# Patient Record
Sex: MALE | Race: WHITE | Employment: OTHER | URBAN - METROPOLITAN AREA
[De-identification: names, ages, dates, MRNs, and addresses within clinical notes are randomized per-mention and may not be internally consistent; named-entity substitution may affect disease eponyms.]

---

## 2019-03-27 ENCOUNTER — HOSPITAL ENCOUNTER (INPATIENT)
Age: 79
LOS: 7 days | Discharge: HOME | DRG: 872 | End: 2019-04-03
Attending: EMERGENCY MEDICINE | Admitting: INTERNAL MEDICINE
Payer: MEDICARE

## 2019-03-27 ENCOUNTER — APPOINTMENT (OUTPATIENT)
Dept: CT IMAGING | Age: 79
DRG: 872 | End: 2019-03-27
Payer: MEDICARE

## 2019-03-27 ENCOUNTER — APPOINTMENT (OUTPATIENT)
Dept: GENERAL RADIOLOGY | Age: 79
DRG: 872 | End: 2019-03-27
Payer: MEDICARE

## 2019-03-27 ENCOUNTER — APPOINTMENT (OUTPATIENT)
Dept: ULTRASOUND IMAGING | Age: 79
DRG: 872 | End: 2019-03-27
Payer: MEDICARE

## 2019-03-27 DIAGNOSIS — R41.82 ALTERED MENTAL STATUS, UNSPECIFIED ALTERED MENTAL STATUS TYPE: ICD-10-CM

## 2019-03-27 DIAGNOSIS — L03.90 CELLULITIS, UNSPECIFIED CELLULITIS SITE: ICD-10-CM

## 2019-03-27 DIAGNOSIS — R50.9 FEVER, UNSPECIFIED FEVER CAUSE: Primary | ICD-10-CM

## 2019-03-27 PROBLEM — L03.119 CELLULITIS OF LOWER LEG: Status: ACTIVE | Noted: 2019-03-27

## 2019-03-27 PROBLEM — L03.119 CELLULITIS OF LEG: Status: ACTIVE | Noted: 2019-03-27

## 2019-03-27 LAB
ALBUMIN SERPL-MCNC: 3.7 GM/DL (ref 3.4–5)
ALP BLD-CCNC: 42 IU/L (ref 40–129)
ALT SERPL-CCNC: 26 U/L (ref 10–40)
ANION GAP SERPL CALCULATED.3IONS-SCNC: 8 MMOL/L (ref 4–16)
AST SERPL-CCNC: 43 IU/L (ref 15–37)
BACTERIA: NEGATIVE /HPF
BASOPHILS ABSOLUTE: 0 K/CU MM
BASOPHILS RELATIVE PERCENT: 0.6 % (ref 0–1)
BILIRUB SERPL-MCNC: 0.5 MG/DL (ref 0–1)
BILIRUBIN URINE: NEGATIVE MG/DL
BLOOD, URINE: ABNORMAL
BUN BLDV-MCNC: 24 MG/DL (ref 6–23)
CALCIUM SERPL-MCNC: 8.6 MG/DL (ref 8.3–10.6)
CHLORIDE BLD-SCNC: 99 MMOL/L (ref 99–110)
CHP ED QC CHECK: NORMAL
CLARITY: CLEAR
CO2: 28 MMOL/L (ref 21–32)
COLOR: YELLOW
CREAT SERPL-MCNC: 1.3 MG/DL (ref 0.9–1.3)
DIFFERENTIAL TYPE: ABNORMAL
EOSINOPHILS ABSOLUTE: 0.1 K/CU MM
EOSINOPHILS RELATIVE PERCENT: 1 % (ref 0–3)
GFR AFRICAN AMERICAN: >60 ML/MIN/1.73M2
GFR NON-AFRICAN AMERICAN: 54 ML/MIN/1.73M2
GLUCOSE BLD-MCNC: 200 MG/DL
GLUCOSE BLD-MCNC: 200 MG/DL (ref 70–99)
GLUCOSE BLD-MCNC: 218 MG/DL (ref 70–99)
GLUCOSE, URINE: >500 MG/DL
HCT VFR BLD CALC: 43.9 % (ref 42–52)
HEMOGLOBIN: 13.7 GM/DL (ref 13.5–18)
IMMATURE NEUTROPHIL %: 1 % (ref 0–0.43)
KETONES, URINE: NEGATIVE MG/DL
LACTATE: 1.4 MMOL/L (ref 0.4–2)
LACTIC ACID, SEPSIS: 1.4 MMOL/L (ref 0.5–1.9)
LEUKOCYTE ESTERASE, URINE: NEGATIVE
LYMPHOCYTES ABSOLUTE: 0.7 K/CU MM
LYMPHOCYTES RELATIVE PERCENT: 10.8 % (ref 24–44)
MCH RBC QN AUTO: 29.5 PG (ref 27–31)
MCHC RBC AUTO-ENTMCNC: 31.2 % (ref 32–36)
MCV RBC AUTO: 94.4 FL (ref 78–100)
MONOCYTES ABSOLUTE: 0.4 K/CU MM
MONOCYTES RELATIVE PERCENT: 6.1 % (ref 0–4)
NITRITE URINE, QUANTITATIVE: NEGATIVE
NUCLEATED RBC %: 0 %
PDW BLD-RTO: 14 % (ref 11.7–14.9)
PH, URINE: 6 (ref 5–8)
PLATELET # BLD: 145 K/CU MM (ref 140–440)
PMV BLD AUTO: 10.5 FL (ref 7.5–11.1)
POTASSIUM SERPL-SCNC: 5.1 MMOL/L (ref 3.5–5.1)
PRO-BNP: 604.4 PG/ML
PROTEIN UA: 30 MG/DL
RAPID INFLUENZA  B AGN: NEGATIVE
RAPID INFLUENZA A AGN: NEGATIVE
RBC # BLD: 4.65 M/CU MM (ref 4.6–6.2)
RBC URINE: <1 /HPF (ref 0–3)
SEGMENTED NEUTROPHILS ABSOLUTE COUNT: 5.5 K/CU MM
SEGMENTED NEUTROPHILS RELATIVE PERCENT: 80.5 % (ref 36–66)
SODIUM BLD-SCNC: 135 MMOL/L (ref 135–145)
SPECIFIC GRAVITY UA: 1.02 (ref 1–1.03)
TOTAL IMMATURE NEUTOROPHIL: 0.07 K/CU MM
TOTAL NUCLEATED RBC: 0 K/CU MM
TOTAL PROTEIN: 6.9 GM/DL (ref 6.4–8.2)
TRICHOMONAS: ABNORMAL /HPF
TROPONIN T: <0.01 NG/ML
UROBILINOGEN, URINE: NORMAL MG/DL (ref 0.2–1)
WBC # BLD: 6.9 K/CU MM (ref 4–10.5)
WBC UA: <1 /HPF (ref 0–2)

## 2019-03-27 PROCEDURE — 6370000000 HC RX 637 (ALT 250 FOR IP): Performed by: EMERGENCY MEDICINE

## 2019-03-27 PROCEDURE — 36415 COLL VENOUS BLD VENIPUNCTURE: CPT

## 2019-03-27 PROCEDURE — 80053 COMPREHEN METABOLIC PANEL: CPT

## 2019-03-27 PROCEDURE — 96375 TX/PRO/DX INJ NEW DRUG ADDON: CPT

## 2019-03-27 PROCEDURE — 93971 EXTREMITY STUDY: CPT

## 2019-03-27 PROCEDURE — 87804 INFLUENZA ASSAY W/OPTIC: CPT

## 2019-03-27 PROCEDURE — 1200000000 HC SEMI PRIVATE

## 2019-03-27 PROCEDURE — 81001 URINALYSIS AUTO W/SCOPE: CPT

## 2019-03-27 PROCEDURE — 85025 COMPLETE CBC W/AUTO DIFF WBC: CPT

## 2019-03-27 PROCEDURE — 96365 THER/PROPH/DIAG IV INF INIT: CPT

## 2019-03-27 PROCEDURE — 70450 CT HEAD/BRAIN W/O DYE: CPT

## 2019-03-27 PROCEDURE — 82962 GLUCOSE BLOOD TEST: CPT

## 2019-03-27 PROCEDURE — 2580000003 HC RX 258: Performed by: PHYSICIAN ASSISTANT

## 2019-03-27 PROCEDURE — 83880 ASSAY OF NATRIURETIC PEPTIDE: CPT

## 2019-03-27 PROCEDURE — 87040 BLOOD CULTURE FOR BACTERIA: CPT

## 2019-03-27 PROCEDURE — 6360000002 HC RX W HCPCS: Performed by: PHYSICIAN ASSISTANT

## 2019-03-27 PROCEDURE — 99285 EMERGENCY DEPT VISIT HI MDM: CPT

## 2019-03-27 PROCEDURE — 84484 ASSAY OF TROPONIN QUANT: CPT

## 2019-03-27 PROCEDURE — 96366 THER/PROPH/DIAG IV INF ADDON: CPT

## 2019-03-27 PROCEDURE — 93005 ELECTROCARDIOGRAM TRACING: CPT | Performed by: PHYSICIAN ASSISTANT

## 2019-03-27 PROCEDURE — 83605 ASSAY OF LACTIC ACID: CPT

## 2019-03-27 PROCEDURE — 96367 TX/PROPH/DG ADDL SEQ IV INF: CPT

## 2019-03-27 PROCEDURE — 71045 X-RAY EXAM CHEST 1 VIEW: CPT

## 2019-03-27 RX ORDER — MONTELUKAST SODIUM 4 MG/1
1 TABLET, CHEWABLE ORAL 3 TIMES DAILY
Status: ON HOLD | COMMUNITY
End: 2020-10-01 | Stop reason: HOSPADM

## 2019-03-27 RX ORDER — KETOROLAC TROMETHAMINE 30 MG/ML
15 INJECTION, SOLUTION INTRAMUSCULAR; INTRAVENOUS ONCE
Status: COMPLETED | OUTPATIENT
Start: 2019-03-27 | End: 2019-03-27

## 2019-03-27 RX ORDER — OXYCODONE HYDROCHLORIDE AND ACETAMINOPHEN 5; 325 MG/1; MG/1
1.5 TABLET ORAL ONCE
Status: COMPLETED | OUTPATIENT
Start: 2019-03-27 | End: 2019-03-27

## 2019-03-27 RX ORDER — SODIUM CHLORIDE 0.9 % (FLUSH) 0.9 %
10 SYRINGE (ML) INJECTION PRN
Status: DISCONTINUED | OUTPATIENT
Start: 2019-03-27 | End: 2019-04-03 | Stop reason: HOSPADM

## 2019-03-27 RX ORDER — RANOLAZINE 500 MG/1
500 TABLET, EXTENDED RELEASE ORAL 2 TIMES DAILY
Status: ON HOLD | COMMUNITY
End: 2020-08-09 | Stop reason: ALTCHOICE

## 2019-03-27 RX ORDER — LOSARTAN POTASSIUM 100 MG/1
100 TABLET ORAL DAILY
Status: ON HOLD | COMMUNITY
End: 2020-10-01 | Stop reason: HOSPADM

## 2019-03-27 RX ORDER — ACETAMINOPHEN 80 MG
TABLET,CHEWABLE ORAL
Status: COMPLETED
Start: 2019-03-27 | End: 2019-03-27

## 2019-03-27 RX ORDER — 0.9 % SODIUM CHLORIDE 0.9 %
1000 INTRAVENOUS SOLUTION INTRAVENOUS ONCE
Status: COMPLETED | OUTPATIENT
Start: 2019-03-27 | End: 2019-03-27

## 2019-03-27 RX ORDER — SODIUM CHLORIDE 0.9 % (FLUSH) 0.9 %
10 SYRINGE (ML) INJECTION EVERY 12 HOURS SCHEDULED
Status: DISCONTINUED | OUTPATIENT
Start: 2019-03-27 | End: 2019-04-03 | Stop reason: HOSPADM

## 2019-03-27 RX ORDER — CEPHALEXIN 500 MG/1
500 CAPSULE ORAL 3 TIMES DAILY
Status: ON HOLD | COMMUNITY
Start: 2019-03-27 | End: 2019-04-03 | Stop reason: HOSPADM

## 2019-03-27 RX ADMIN — OXYCODONE AND ACETAMINOPHEN 1.5 TABLET: 5; 325 TABLET ORAL at 21:39

## 2019-03-27 RX ADMIN — SODIUM CHLORIDE 1000 ML: 9 INJECTION, SOLUTION INTRAVENOUS at 18:16

## 2019-03-27 RX ADMIN — GABAPENTIN 400 MG: 300 CAPSULE ORAL at 21:39

## 2019-03-27 RX ADMIN — Medication 10 ML: at 22:01

## 2019-03-27 RX ADMIN — TAZOBACTAM SODIUM AND PIPERACILLIN SODIUM 3.38 G: 375; 3 INJECTION, SOLUTION INTRAVENOUS at 18:17

## 2019-03-27 RX ADMIN — Medication: at 22:00

## 2019-03-27 RX ADMIN — VANCOMYCIN HYDROCHLORIDE 1500 MG: 5 INJECTION, POWDER, LYOPHILIZED, FOR SOLUTION INTRAVENOUS at 19:11

## 2019-03-27 RX ADMIN — KETOROLAC TROMETHAMINE 15 MG: 30 INJECTION, SOLUTION INTRAMUSCULAR; INTRAVENOUS at 18:17

## 2019-03-27 ASSESSMENT — PAIN SCALES - GENERAL
PAINLEVEL_OUTOF10: 5
PAINLEVEL_OUTOF10: 8
PAINLEVEL_OUTOF10: 8

## 2019-03-27 NOTE — ED TRIAGE NOTES
Patient presented to the ED with complaints of altered mental status. Patient recently started keflex and has had a decrease in mental status ever since. Patient is arrousable to pain.  No family at bedside

## 2019-03-27 NOTE — ED PROVIDER NOTES
eMERGENCY dEPARTMENT eNCOUnter      PCP: Ursula De La Cruz MD    CHIEF COMPLAINT    Chief Complaint   Patient presents with    Altered Mental Status    Fever       HPI    Zeb Draper is a 69 y.o. male who presents with family with AMS. Patient lives with his daughter. She states that patient went to an urgent care at around 10 this morning complaining of leg pain. He was diagnosed with a cellulitis and started on Keflex. She states that he took first dose of Keflex, lied down at home around noon. She states around 4 patient was difficult to arouse, seemed confused and had difficulty ambulating. Symptoms have been consistent since that time. Apart from starting Keflex, no other new medications. No recent infectious symptoms according to family including cough, congestion, abdominal pain, nausea, vomiting or diarrhea. Patient is arousable to voice during my history and exam, however is not reliable historian. REVIEW OF SYSTEMS  Per family  General: No known fevers or chills  ENT: No obvious visual deficits. No sore throat, ear pain  Cardiac: No chest pain or palpitations  Respiratory: No shortness of breath or new cough  : No dysuria or hematuria  GI: No vomiting or diarrhea  Neuro: See HPI  Lymphatics: No swollen lymph nodes or streaks    See HPI for further details. All other review of systems are negative  See HPI and nursing notes for additional information    PAST MEDICAL OR SURGICAL HISTORY    Past Medical History:   Diagnosis Date    Arthritis     Diabetes mellitus ([address]     GERD (gastroesophageal reflux disease)     Gout     left ankle and right shoulder    Hypertension     MI (myocardial infarction) [facility])     May 2013       CURRENT MEDICATIONS    Current Outpatient Rx   Medication Sig Dispense Refill    aspirin 81 MG tablet Take 81 mg by mouth daily.  atorvastatin (LIPITOR) 20 MG tablet Take 20 mg by mouth daily.  clopidogrel (PLAVIX) 75 MG tablet Take 75 mg by mouth daily.       normal bowel sounds, nontender  Musculoskeletal:  No edema, no acute deformities   Integument:  Skin is warm and dry, no obvious rash    Swelling, tenderness and area of indurated macular erythema noted to lateral aspect of left lower leg  Vascular: Radial and DP pulses 2+ equal bilaterally  Neurologic:    - Arousable by voice  - Resting tremor of upper extremities bilaterally  - No obvious gross motor deficits  - Sensation intact to light touch  - Strength 5/5 in upper extremities bilaterally  - Strength 3/5 in lower extremities bilaterally  - Follows commands        LABS:  Results for orders placed or performed during the hospital encounter of 03/27/19   Rapid Flu Swab   Result Value Ref Range    Rapid Influenza A Ag NEGATIVE NEGATIVE    Rapid Influenza B Ag NEGATIVE NEGATIVE   CBC auto diff   Result Value Ref Range    WBC 6.9 4.0 - 10.5 K/CU MM    RBC 4.65 4.6 - 6.2 M/CU MM    Hemoglobin 13.7 13.5 - 18.0 GM/DL    Hematocrit 43.9 42 - 52 %    MCV 94.4 78 - 100 FL    MCH 29.5 27 - 31 PG    MCHC 31.2 (L) 32.0 - 36.0 %    RDW 14.0 11.7 - 14.9 %    Platelets 724 209 - 160 K/CU MM    MPV 10.5 7.5 - 11.1 FL    Differential Type AUTOMATED DIFFERENTIAL     Segs Relative 80.5 (H) 36 - 66 %    Lymphocytes % 10.8 (L) 24 - 44 %    Monocytes % 6.1 (H) 0 - 4 %    Eosinophils % 1.0 0 - 3 %    Basophils % 0.6 0 - 1 %    Segs Absolute 5.5 K/CU MM    Lymphocytes # 0.7 K/CU MM    Monocytes # 0.4 K/CU MM    Eosinophils # 0.1 K/CU MM    Basophils # 0.0 K/CU MM    Nucleated RBC % 0.0 %    Total Nucleated RBC 0.0 K/CU MM    Total Immature Neutrophil 0.07 K/CU MM    Immature Neutrophil % 1.0 (H) 0 - 0.43 %   CMP   Result Value Ref Range    Sodium 135 135 - 145 MMOL/L    Potassium 5.1 3.5 - 5.1 MMOL/L    Chloride 99 99 - 110 mMol/L    CO2 28 21 - 32 MMOL/L    BUN 24 (H) 6 - 23 MG/DL    CREATININE 1.3 0.9 - 1.3 MG/DL    Glucose 218 (H) 70 - 99 MG/DL    Calcium 8.6 8.3 - 10.6 MG/DL    Alb 3.7 3.4 - 5.0 GM/DL    Total Protein 6.9 6.4 - 8.2 GM/DL    Total Bilirubin 0.5 0.0 - 1.0 MG/DL    ALT 26 10 - 40 U/L    AST 43 (H) 15 - 37 IU/L    Alkaline Phosphatase 42 40 - 129 IU/L    GFR Non- 54 (L) >60 mL/min/1.73m2    GFR African American >60 >60 mL/min/1.73m2    Anion Gap 8 4 - 16   Troponin   Result Value Ref Range    Troponin T <0.010 <0.01 NG/ML   Lactic Acid, Plasma   Result Value Ref Range    Lactate 1.4 0.4 - 2.0 mMOL/L   Brain Natriuretic Peptide   Result Value Ref Range    Pro-.4 (H) <300 PG/ML   POCT Glucose   Result Value Ref Range    Glucose 200 mg/dL    QC OK? Jock Reading PA    POCT Glucose   Result Value Ref Range    POC Glucose 200 (H) 70 - 99 MG/DL   EKG 12 Lead   Result Value Ref Range    Ventricular Rate 93 BPM    Atrial Rate 93 BPM    P-R Interval 206 ms    QRS Duration 96 ms    Q-T Interval 366 ms    QTc Calculation (Bazett) 455 ms    P Axis 88 degrees    R Axis 4 degrees    T Axis 60 degrees    Diagnosis       Normal sinus rhythm  Lateral infarct , age undetermined  Abnormal ECG  When compared with ECG of 06-JAN-2017 12:48,  Incomplete right bundle branch block is no longer present  Lateral infarct is now present           EKG Interpretation  Please see ED physician's note for EKG interpretation      RADIOLOGY/PROCEDURES    CT Head WO Contrast   Final Result   No acute intracranial abnormality. XR CHEST PORTABLE   Final Result   Cardiomegaly. Findings suggesting mild congestive heart failure. VL DUP LOWER EXTREMITY VENOUS LEFT    (Results Pending)           ED COURSE & MEDICAL DECISION MAKING       Vital signs and nursing notes reviewed during ED course. Patient care and presentation staffed with supervising MD.   Patient seen by supervising MD today- see his/her note for details of the encounter. Patient presents as above. On arrival, patient is febrile with temperature of 103. He is hypertensive, not tachycardic, oxygenating at 98% on room air.    As he is afebrile with possible cellulitic

## 2019-03-27 NOTE — ED PROVIDER NOTES
I independently examined and evaluated Araceli Baumgarten. In brief their history revealed cellulitis, altered mental status. He states that he developed some redness, swelling to the left lower leg yesterday, and then went and started Keflex today. He was told with cellulitis. Family states that this afternoon he seemed confused and was altered. Medics arrived and stated his blood pressure was low. He is complaining of pain in the left lower leg. Complains of fever. Denies new cough, shortness of breath. Family states he has intermittently complained of chest pain over the last few days. He states he took nitro and it resolved. Their focused exam revealed awake, alert, well-hydrated, well-nourished, and in no acute distress. Mucous membranes are moist. Speech is clear. Breathing is unlabored. Skin is dry. Mental status is normal. The patient has moves all extremities, and is without facial droop. There is erythema, induration of the left anterior lower leg. No crepitance noted. Pain is not out of proportion. ED course: 81-year-old male who presented with altered mental status, cellulitis. On arrival he was febrile at 103. White blood cell count is normal, urinalysis shows no sign of infection, lactate was normal.  Was given antibiotics. Ultrasound negative for DVT, head CT normal, chest x-ray showed cardiomegaly and findings of congestive heart failure. BNP is within normal range for the patient's age. Patient appears to have history of diastolic dysfunction on ultrasound. Appears to be cellulitis at this point as the source of infection and fever. Patient's mentation is normal on reevaluation. Plan at this point will be admission to the hospital for further evaluation and care. I spoke with Dr. Jarek Santiago who is on-call for the patient's primary care physician Dr. Teresa Alarcon. He agrees to admit at this time.     All diagnostic, treatment, and disposition decisions were made by myself in conjunction with the Advanced Practice Provider. For all further details of the patient's emergency department visit, please see the Advanced Practice Provider's documentation.       Ashlie Leary, DO  03/27/19 1382

## 2019-03-28 LAB
BASOPHILS ABSOLUTE: 0 K/CU MM
BASOPHILS RELATIVE PERCENT: 0.4 % (ref 0–1)
DIFFERENTIAL TYPE: ABNORMAL
EKG ATRIAL RATE: 90 BPM
EKG ATRIAL RATE: 93 BPM
EKG DIAGNOSIS: NORMAL
EKG DIAGNOSIS: NORMAL
EKG P AXIS: 55 DEGREES
EKG P AXIS: 88 DEGREES
EKG P-R INTERVAL: 176 MS
EKG P-R INTERVAL: 206 MS
EKG Q-T INTERVAL: 366 MS
EKG Q-T INTERVAL: 390 MS
EKG QRS DURATION: 96 MS
EKG QRS DURATION: 98 MS
EKG QTC CALCULATION (BAZETT): 455 MS
EKG QTC CALCULATION (BAZETT): 477 MS
EKG R AXIS: -5 DEGREES
EKG R AXIS: 4 DEGREES
EKG T AXIS: 49 DEGREES
EKG T AXIS: 60 DEGREES
EKG VENTRICULAR RATE: 90 BPM
EKG VENTRICULAR RATE: 93 BPM
EOSINOPHILS ABSOLUTE: 0.1 K/CU MM
EOSINOPHILS RELATIVE PERCENT: 1 % (ref 0–3)
ERYTHROCYTE SEDIMENTATION RATE: 28 MM/HR (ref 0–20)
GLUCOSE BLD-MCNC: 241 MG/DL (ref 70–99)
GLUCOSE BLD-MCNC: 252 MG/DL (ref 70–99)
GLUCOSE BLD-MCNC: 337 MG/DL (ref 70–99)
GLUCOSE BLD-MCNC: 355 MG/DL (ref 70–99)
GLUCOSE BLD-MCNC: 409 MG/DL (ref 70–99)
HCT VFR BLD CALC: 40.6 % (ref 42–52)
HEMOGLOBIN: 12.6 GM/DL (ref 13.5–18)
HIGH SENSITIVE C-REACTIVE PROTEIN: 77.8 MG/L
IMMATURE NEUTROPHIL %: 1.5 % (ref 0–0.43)
LACTIC ACID, SEPSIS: 1.1 MMOL/L (ref 0.5–1.9)
LYMPHOCYTES ABSOLUTE: 1.2 K/CU MM
LYMPHOCYTES RELATIVE PERCENT: 17.3 % (ref 24–44)
MCH RBC QN AUTO: 30.1 PG (ref 27–31)
MCHC RBC AUTO-ENTMCNC: 31 % (ref 32–36)
MCV RBC AUTO: 96.9 FL (ref 78–100)
MONOCYTES ABSOLUTE: 0.6 K/CU MM
MONOCYTES RELATIVE PERCENT: 8.3 % (ref 0–4)
NUCLEATED RBC %: 0 %
PDW BLD-RTO: 14.3 % (ref 11.7–14.9)
PLATELET # BLD: 125 K/CU MM (ref 140–440)
PMV BLD AUTO: 10.6 FL (ref 7.5–11.1)
PROCALCITONIN: 0.62
RBC # BLD: 4.19 M/CU MM (ref 4.6–6.2)
SEGMENTED NEUTROPHILS ABSOLUTE COUNT: 5.1 K/CU MM
SEGMENTED NEUTROPHILS RELATIVE PERCENT: 71.5 % (ref 36–66)
TOTAL IMMATURE NEUTOROPHIL: 0.11 K/CU MM
TOTAL NUCLEATED RBC: 0 K/CU MM
WBC # BLD: 7.1 K/CU MM (ref 4–10.5)

## 2019-03-28 PROCEDURE — 86141 C-REACTIVE PROTEIN HS: CPT

## 2019-03-28 PROCEDURE — 6370000000 HC RX 637 (ALT 250 FOR IP): Performed by: INTERNAL MEDICINE

## 2019-03-28 PROCEDURE — 6360000002 HC RX W HCPCS: Performed by: INTERNAL MEDICINE

## 2019-03-28 PROCEDURE — 87081 CULTURE SCREEN ONLY: CPT

## 2019-03-28 PROCEDURE — 83605 ASSAY OF LACTIC ACID: CPT

## 2019-03-28 PROCEDURE — 99222 1ST HOSP IP/OBS MODERATE 55: CPT | Performed by: INTERNAL MEDICINE

## 2019-03-28 PROCEDURE — 84145 PROCALCITONIN (PCT): CPT

## 2019-03-28 PROCEDURE — 82962 GLUCOSE BLOOD TEST: CPT

## 2019-03-28 PROCEDURE — 93010 ELECTROCARDIOGRAM REPORT: CPT | Performed by: INTERNAL MEDICINE

## 2019-03-28 PROCEDURE — 2580000003 HC RX 258: Performed by: INTERNAL MEDICINE

## 2019-03-28 PROCEDURE — 93005 ELECTROCARDIOGRAM TRACING: CPT | Performed by: INTERNAL MEDICINE

## 2019-03-28 PROCEDURE — 85652 RBC SED RATE AUTOMATED: CPT

## 2019-03-28 PROCEDURE — 85025 COMPLETE CBC W/AUTO DIFF WBC: CPT

## 2019-03-28 PROCEDURE — 36415 COLL VENOUS BLD VENIPUNCTURE: CPT

## 2019-03-28 PROCEDURE — 1200000000 HC SEMI PRIVATE

## 2019-03-28 PROCEDURE — 94761 N-INVAS EAR/PLS OXIMETRY MLT: CPT

## 2019-03-28 RX ORDER — OXYCODONE AND ACETAMINOPHEN 7.5; 325 MG/1; MG/1
1 TABLET ORAL EVERY 8 HOURS PRN
Status: DISCONTINUED | OUTPATIENT
Start: 2019-03-28 | End: 2019-03-30

## 2019-03-28 RX ORDER — DEXTROSE MONOHYDRATE 25 G/50ML
12.5 INJECTION, SOLUTION INTRAVENOUS PRN
Status: DISCONTINUED | OUTPATIENT
Start: 2019-03-28 | End: 2019-04-03 | Stop reason: HOSPADM

## 2019-03-28 RX ORDER — CITALOPRAM 20 MG/1
20 TABLET ORAL DAILY
Status: DISCONTINUED | OUTPATIENT
Start: 2019-03-28 | End: 2019-04-03 | Stop reason: HOSPADM

## 2019-03-28 RX ORDER — FENOFIBRATE 160 MG/1
160 TABLET ORAL DAILY
Status: DISCONTINUED | OUTPATIENT
Start: 2019-03-28 | End: 2019-04-03 | Stop reason: HOSPADM

## 2019-03-28 RX ORDER — PANTOPRAZOLE SODIUM 40 MG/1
40 TABLET, DELAYED RELEASE ORAL
Status: DISCONTINUED | OUTPATIENT
Start: 2019-03-28 | End: 2019-04-03 | Stop reason: HOSPADM

## 2019-03-28 RX ORDER — LOSARTAN POTASSIUM 50 MG/1
50 TABLET ORAL DAILY
Status: DISCONTINUED | OUTPATIENT
Start: 2019-03-28 | End: 2019-04-03 | Stop reason: HOSPADM

## 2019-03-28 RX ORDER — CLOPIDOGREL BISULFATE 75 MG/1
75 TABLET ORAL DAILY
Status: DISCONTINUED | OUTPATIENT
Start: 2019-03-28 | End: 2019-04-03 | Stop reason: HOSPADM

## 2019-03-28 RX ORDER — FLUTICASONE PROPIONATE 50 MCG
1 SPRAY, SUSPENSION (ML) NASAL 2 TIMES DAILY
Status: DISCONTINUED | OUTPATIENT
Start: 2019-03-28 | End: 2019-04-03 | Stop reason: HOSPADM

## 2019-03-28 RX ORDER — SODIUM CHLORIDE 0.9 % (FLUSH) 0.9 %
10 SYRINGE (ML) INJECTION EVERY 12 HOURS SCHEDULED
Status: DISCONTINUED | OUTPATIENT
Start: 2019-03-28 | End: 2019-04-03 | Stop reason: HOSPADM

## 2019-03-28 RX ORDER — ACETAMINOPHEN 325 MG/1
650 TABLET ORAL EVERY 4 HOURS PRN
Status: DISCONTINUED | OUTPATIENT
Start: 2019-03-28 | End: 2019-04-03 | Stop reason: HOSPADM

## 2019-03-28 RX ORDER — GABAPENTIN 400 MG/1
400 CAPSULE ORAL 3 TIMES DAILY
Status: DISCONTINUED | OUTPATIENT
Start: 2019-03-28 | End: 2019-03-31

## 2019-03-28 RX ORDER — DEXTROSE MONOHYDRATE 50 MG/ML
100 INJECTION, SOLUTION INTRAVENOUS PRN
Status: DISCONTINUED | OUTPATIENT
Start: 2019-03-28 | End: 2019-04-03 | Stop reason: HOSPADM

## 2019-03-28 RX ORDER — RANOLAZINE 500 MG/1
500 TABLET, EXTENDED RELEASE ORAL 2 TIMES DAILY
Status: DISCONTINUED | OUTPATIENT
Start: 2019-03-28 | End: 2019-04-03 | Stop reason: HOSPADM

## 2019-03-28 RX ORDER — NICOTINE POLACRILEX 4 MG
15 LOZENGE BUCCAL PRN
Status: DISCONTINUED | OUTPATIENT
Start: 2019-03-28 | End: 2019-04-03 | Stop reason: HOSPADM

## 2019-03-28 RX ORDER — ATORVASTATIN CALCIUM 20 MG/1
20 TABLET, FILM COATED ORAL DAILY
Status: DISCONTINUED | OUTPATIENT
Start: 2019-03-28 | End: 2019-04-03 | Stop reason: HOSPADM

## 2019-03-28 RX ORDER — CLOTRIMAZOLE 1 %
CREAM (GRAM) TOPICAL 2 TIMES DAILY
Status: DISCONTINUED | OUTPATIENT
Start: 2019-03-28 | End: 2019-04-03 | Stop reason: HOSPADM

## 2019-03-28 RX ORDER — SODIUM CHLORIDE 0.9 % (FLUSH) 0.9 %
10 SYRINGE (ML) INJECTION PRN
Status: DISCONTINUED | OUTPATIENT
Start: 2019-03-28 | End: 2019-04-03 | Stop reason: HOSPADM

## 2019-03-28 RX ORDER — INSULIN GLARGINE 100 [IU]/ML
60 INJECTION, SOLUTION SUBCUTANEOUS EVERY EVENING
Status: DISCONTINUED | OUTPATIENT
Start: 2019-03-28 | End: 2019-04-03 | Stop reason: HOSPADM

## 2019-03-28 RX ORDER — CEFAZOLIN SODIUM 1 G/50ML
1 INJECTION, SOLUTION INTRAVENOUS EVERY 8 HOURS
Status: DISCONTINUED | OUTPATIENT
Start: 2019-03-28 | End: 2019-03-30

## 2019-03-28 RX ORDER — ONDANSETRON 2 MG/ML
4 INJECTION INTRAMUSCULAR; INTRAVENOUS EVERY 6 HOURS PRN
Status: DISCONTINUED | OUTPATIENT
Start: 2019-03-28 | End: 2019-04-03 | Stop reason: HOSPADM

## 2019-03-28 RX ADMIN — TAZOBACTAM SODIUM AND PIPERACILLIN SODIUM 3.38 G: 375; 3 INJECTION, SOLUTION INTRAVENOUS at 02:28

## 2019-03-28 RX ADMIN — RANOLAZINE 500 MG: 500 TABLET, FILM COATED, EXTENDED RELEASE ORAL at 09:50

## 2019-03-28 RX ADMIN — GABAPENTIN 400 MG: 400 CAPSULE ORAL at 21:24

## 2019-03-28 RX ADMIN — CLOTRIMAZOLE: 10 CREAM TOPICAL at 21:24

## 2019-03-28 RX ADMIN — PANTOPRAZOLE SODIUM 40 MG: 40 TABLET, DELAYED RELEASE ORAL at 07:04

## 2019-03-28 RX ADMIN — FENOFIBRATE 160 MG: 160 TABLET ORAL at 09:50

## 2019-03-28 RX ADMIN — INSULIN LISPRO 4 UNITS: 100 INJECTION, SOLUTION INTRAVENOUS; SUBCUTANEOUS at 09:01

## 2019-03-28 RX ADMIN — OXYCODONE HYDROCHLORIDE AND ACETAMINOPHEN 1 TABLET: 7.5; 325 TABLET ORAL at 09:53

## 2019-03-28 RX ADMIN — CEFAZOLIN SODIUM 1 G: 1 INJECTION, SOLUTION INTRAVENOUS at 21:25

## 2019-03-28 RX ADMIN — INSULIN LISPRO 8 UNITS: 100 INJECTION, SOLUTION INTRAVENOUS; SUBCUTANEOUS at 18:17

## 2019-03-28 RX ADMIN — TAZOBACTAM SODIUM AND PIPERACILLIN SODIUM 3.38 G: 375; 3 INJECTION, SOLUTION INTRAVENOUS at 08:09

## 2019-03-28 RX ADMIN — LOSARTAN POTASSIUM 50 MG: 50 TABLET, FILM COATED ORAL at 09:50

## 2019-03-28 RX ADMIN — INSULIN LISPRO 5 UNITS: 100 INJECTION, SOLUTION INTRAVENOUS; SUBCUTANEOUS at 21:24

## 2019-03-28 RX ADMIN — INSULIN LISPRO 12 UNITS: 100 INJECTION, SOLUTION INTRAVENOUS; SUBCUTANEOUS at 12:31

## 2019-03-28 RX ADMIN — CLOPIDOGREL BISULFATE 75 MG: 75 TABLET ORAL at 09:50

## 2019-03-28 RX ADMIN — VANCOMYCIN HYDROCHLORIDE 1250 MG: 5 INJECTION, POWDER, LYOPHILIZED, FOR SOLUTION INTRAVENOUS at 06:23

## 2019-03-28 RX ADMIN — METFORMIN HYDROCHLORIDE 1000 MG: 500 TABLET ORAL at 09:50

## 2019-03-28 RX ADMIN — ENOXAPARIN SODIUM 40 MG: 100 INJECTION SUBCUTANEOUS at 09:51

## 2019-03-28 RX ADMIN — CITALOPRAM HYDROBROMIDE 20 MG: 20 TABLET ORAL at 09:50

## 2019-03-28 RX ADMIN — VANCOMYCIN HYDROCHLORIDE 1250 MG: 5 INJECTION, POWDER, LYOPHILIZED, FOR SOLUTION INTRAVENOUS at 23:36

## 2019-03-28 RX ADMIN — INSULIN GLARGINE 60 UNITS: 100 INJECTION, SOLUTION SUBCUTANEOUS at 21:24

## 2019-03-28 RX ADMIN — GABAPENTIN 400 MG: 400 CAPSULE ORAL at 09:50

## 2019-03-28 RX ADMIN — METFORMIN HYDROCHLORIDE 1000 MG: 500 TABLET ORAL at 18:05

## 2019-03-28 RX ADMIN — ATORVASTATIN CALCIUM 20 MG: 20 TABLET, FILM COATED ORAL at 09:50

## 2019-03-28 RX ADMIN — GABAPENTIN 400 MG: 400 CAPSULE ORAL at 14:07

## 2019-03-28 RX ADMIN — RANOLAZINE 500 MG: 500 TABLET, FILM COATED, EXTENDED RELEASE ORAL at 21:24

## 2019-03-28 RX ADMIN — SODIUM CHLORIDE, PRESERVATIVE FREE 10 ML: 5 INJECTION INTRAVENOUS at 21:30

## 2019-03-28 RX ADMIN — INSULIN LISPRO 3 UNITS: 100 INJECTION, SOLUTION INTRAVENOUS; SUBCUTANEOUS at 02:28

## 2019-03-28 RX ADMIN — OXYCODONE HYDROCHLORIDE AND ACETAMINOPHEN 1 TABLET: 7.5; 325 TABLET ORAL at 18:05

## 2019-03-28 RX ADMIN — SODIUM CHLORIDE, PRESERVATIVE FREE 10 ML: 5 INJECTION INTRAVENOUS at 09:53

## 2019-03-28 RX ADMIN — RANOLAZINE 500 MG: 500 TABLET, FILM COATED, EXTENDED RELEASE ORAL at 02:28

## 2019-03-28 ASSESSMENT — PAIN DESCRIPTION - PAIN TYPE
TYPE: ACUTE PAIN
TYPE: ACUTE PAIN

## 2019-03-28 ASSESSMENT — PAIN SCALES - GENERAL
PAINLEVEL_OUTOF10: 7
PAINLEVEL_OUTOF10: 0
PAINLEVEL_OUTOF10: 7

## 2019-03-28 ASSESSMENT — PAIN DESCRIPTION - LOCATION
LOCATION: LEG
LOCATION: LEG

## 2019-03-28 ASSESSMENT — PAIN DESCRIPTION - ORIENTATION
ORIENTATION: LEFT
ORIENTATION: LEFT

## 2019-03-28 ASSESSMENT — PAIN DESCRIPTION - ONSET
ONSET: ON-GOING
ONSET: ON-GOING

## 2019-03-28 ASSESSMENT — PAIN DESCRIPTION - DESCRIPTORS
DESCRIPTORS: ACHING;CONSTANT
DESCRIPTORS: ACHING;CONSTANT

## 2019-03-28 ASSESSMENT — PAIN DESCRIPTION - FREQUENCY
FREQUENCY: CONTINUOUS
FREQUENCY: CONTINUOUS

## 2019-03-28 ASSESSMENT — PAIN - FUNCTIONAL ASSESSMENT
PAIN_FUNCTIONAL_ASSESSMENT: PREVENTS OR INTERFERES SOME ACTIVE ACTIVITIES AND ADLS
PAIN_FUNCTIONAL_ASSESSMENT: PREVENTS OR INTERFERES SOME ACTIVE ACTIVITIES AND ADLS

## 2019-03-28 ASSESSMENT — PAIN DESCRIPTION - PROGRESSION
CLINICAL_PROGRESSION: GRADUALLY WORSENING
CLINICAL_PROGRESSION: GRADUALLY WORSENING

## 2019-03-28 NOTE — ED NOTES
Per verbal order from Dr. Mullen Other 7.5/325mg and Gabapentin 400mg x 1 dose. Orders placed.       Louann Simmonds, RN  03/27/19 9143

## 2019-03-28 NOTE — ED NOTES
22:01 paged Dr Consuelo Bae (covering for Dr Kelton Mayorga) thru perfect [employer]     [facility]  03/27/19 9525

## 2019-03-28 NOTE — ED NOTES
[facility] for bed [address], RN  03/28/19 0010

## 2019-03-28 NOTE — PROGRESS NOTES
3/28/2019  Pt admitted last night with leg cellulitis. Heart regular, lungs clear, abd soft legs with 1-2+ edema bilat, left leg with anterior erythema. Tmax last night was 103.5. WBC 7.1 K/CU MM    RBC 4. 19Low  M/CU MM    Hemoglobin 12.6Low  GM/DL    Hematocrit 40.6Low  %    MCV 96.9 FL    MCH 30.1 PG    MCHC 31.0Low  %    RDW 14.3 %    Platelets 145SCB  K/CU MM    MPV 10.6 FL    Differential Type AUTOMATED DIFFERENTIAL    Segs Relative 71.5High  %    Lymphocytes % 17.3Low  %     Will continue IV antibiotics, await ID consult. Follow for diabetic control; elevate legs for reducing edema. Leg cellulitis. Mental status appears clear, he had an episode of confusion yesterday.   Deanna Muniz MD

## 2019-03-28 NOTE — ED NOTES
00:01 received ready bed 4021 -- notified [facility] RN @ 00:03     Malissa Rae  03/28/19 0004

## 2019-03-28 NOTE — CONSULTS
Infectious Disease Consult Note  3/28/2019   Patient Name: Richard Montenegro : 1940   Impression   Sepsis secondary to left leg non-purulent cellulitis  o Lymphangitic and discrete borders suggests Streptococcal etiology  · Tinea pedis, obesity are risk factors   Multi-morbidity: per PMHx Diastolic CHF, DM  Plan:   D/c Zosyn  · Continue vancomycin  · Start cefazolin  · CRP, pct  · F/u blood culture  · Clotrimazole to toe clefts  · Keep left leg elevated    Thank you for allowing me to consult in the care of this patient.  ------------------------  REASON FOR CONSULT: Infective syndrome   Requested by: Dr. Osvaldo Camarillo is a 69 y.o. white male with DM, CHF, who was admitted 3/27/2019 for further evaluation and management of sudden onset left leg swelling, pain, and redness. There was associated fever. Denies nausea, vomiting, diarrhea. Venous doppler negative for DVT. ? Infectious diseases service was consulted to evaluate the pt, and recommend further investigative and therapeutic measures. ROS: Other systems reviewed Including eyes, ENT, respiratory, cardiovascular, GI, , dermatologic, neurologic, psych, hem/lymphatic, musculoskeletal and endocrine were negative other than what is mentioned above.      Patient Active Problem List    Diagnosis Date Noted    Cellulitis of leg 2019    Cellulitis of lower leg 2019    Chest pain 2014    Coronary atherosclerosis 2014    Chronic kidney disease, stage III (moderate) ([address] 2014    Essential hypertension, benign 2013    Type II or unspecified type diabetes mellitus without mention of complication, not stated as uncontrolled 2013    Other and unspecified hyperlipidemia 2013     Past Medical History:   Diagnosis Date    Arthritis     Diabetes mellitus ([address]     GERD (gastroesophageal reflux disease)     Gout     left ankle and right shoulder    Hypertension     MI (myocardial infarction) ([address] May 2013      Past Surgical History:   Procedure Laterality Date    BACK SURGERY      CARDIAC SURGERY      stents x 1    CHOLECYSTECTOMY      May 2013    DILATATION, ESOPHAGUS      SINUS SURGERY      sinus surgery x 2    TOTAL KNEE ARTHROPLASTY Bilateral       History reviewed. No pertinent family history. Infectious disease related family history - not contibutory. SOCIAL HISTORY  Social History     Tobacco Use    Smoking status: Never Smoker    Smokeless tobacco: Never Used   Substance Use Topics    Alcohol use: No       Born:   Lived   Occupation:   No recent travel of significance.  No recent unusual exposures.  NO pets    ? ALLERGIES  Allergies   Allergen Reactions    Sulfa Antibiotics Hives      MEDICATIONS  Reviewed and are per the chart/EMR. ? Antibiotics:   Vancomycin  Zosyn  ?  -------------------------------------------------------------------------------------------------------------------    Vital Signs:  Vitals:    03/28/19 0905   BP:    Pulse:    Resp:    Temp:    SpO2: 100%         Exam:    VS: noted; wt 114.4 kg  Gen: alert and oriented X3, no distress  Skin: no stigmata of endocarditis, left leg erythema, tender, swollen, lymphangitic spread, tender lymph nodes in groin. Wounds: C/D/I  HEMT: AT/NC Oropharynx pink, moist, and without lesions or exudates; dentition in good state of repair  Eyes: PERRLA, EOMI, conjunctiva pink, sclera anicteric. Neck: Supple. Trachea midline. No LAD. Chest: no distress and CTA. Good air movement. Heart: RRR and no MRG. Abd: soft, non-distended, no tenderness, no hepatomegaly. Normoactive bowel sounds. Ext: no clubbing, cyanosis, or edema  Catheter Site: without erythema or tenderness  Neuro: Mental status intact. CN 2-12 intact and no focal sensory or motor deficits    ? Diagnostic Studies: reviewed  ? ?   I have examined this patient and available medical records on this date and have made the above observations, conclusions and

## 2019-03-28 NOTE — ED NOTES
22:01 paged Dr Jessica Henry (covering for Dr Maldonado Medina) thru perfect [employer]     Shruthi Savannahing [facility]  03/27/19 9411

## 2019-03-29 LAB
ANION GAP SERPL CALCULATED.3IONS-SCNC: 12 MMOL/L (ref 4–16)
BUN BLDV-MCNC: 27 MG/DL (ref 6–23)
CALCIUM SERPL-MCNC: 8.3 MG/DL (ref 8.3–10.6)
CHLORIDE BLD-SCNC: 96 MMOL/L (ref 99–110)
CO2: 25 MMOL/L (ref 21–32)
CREAT SERPL-MCNC: 1.5 MG/DL (ref 0.9–1.3)
DOSE AMOUNT: NORMAL
DOSE TIME: NORMAL
GFR AFRICAN AMERICAN: 55 ML/MIN/1.73M2
GFR NON-AFRICAN AMERICAN: 45 ML/MIN/1.73M2
GLUCOSE BLD-MCNC: 269 MG/DL (ref 70–99)
GLUCOSE BLD-MCNC: 338 MG/DL (ref 70–99)
GLUCOSE BLD-MCNC: 368 MG/DL (ref 70–99)
GLUCOSE BLD-MCNC: 376 MG/DL (ref 70–99)
GLUCOSE BLD-MCNC: 395 MG/DL (ref 70–99)
HIGH SENSITIVE C-REACTIVE PROTEIN: 118.9 MG/L
POTASSIUM SERPL-SCNC: 4.6 MMOL/L (ref 3.5–5.1)
SODIUM BLD-SCNC: 133 MMOL/L (ref 135–145)
VANCOMYCIN TROUGH: 13.6 UG/ML (ref 10–20)

## 2019-03-29 PROCEDURE — 6360000002 HC RX W HCPCS: Performed by: INTERNAL MEDICINE

## 2019-03-29 PROCEDURE — 82962 GLUCOSE BLOOD TEST: CPT

## 2019-03-29 PROCEDURE — 80202 ASSAY OF VANCOMYCIN: CPT

## 2019-03-29 PROCEDURE — 86141 C-REACTIVE PROTEIN HS: CPT

## 2019-03-29 PROCEDURE — 6370000000 HC RX 637 (ALT 250 FOR IP): Performed by: INTERNAL MEDICINE

## 2019-03-29 PROCEDURE — 99232 SBSQ HOSP IP/OBS MODERATE 35: CPT | Performed by: INTERNAL MEDICINE

## 2019-03-29 PROCEDURE — 2580000003 HC RX 258: Performed by: INTERNAL MEDICINE

## 2019-03-29 PROCEDURE — 36415 COLL VENOUS BLD VENIPUNCTURE: CPT

## 2019-03-29 PROCEDURE — 80048 BASIC METABOLIC PNL TOTAL CA: CPT

## 2019-03-29 PROCEDURE — 1200000000 HC SEMI PRIVATE

## 2019-03-29 RX ADMIN — VANCOMYCIN HYDROCHLORIDE 1250 MG: 5 INJECTION, POWDER, LYOPHILIZED, FOR SOLUTION INTRAVENOUS at 17:38

## 2019-03-29 RX ADMIN — LOSARTAN POTASSIUM 50 MG: 50 TABLET, FILM COATED ORAL at 08:53

## 2019-03-29 RX ADMIN — INSULIN LISPRO 8 UNITS: 100 INJECTION, SOLUTION INTRAVENOUS; SUBCUTANEOUS at 17:35

## 2019-03-29 RX ADMIN — ENOXAPARIN SODIUM 40 MG: 100 INJECTION SUBCUTANEOUS at 08:53

## 2019-03-29 RX ADMIN — INSULIN LISPRO 8 UNITS: 100 INJECTION, SOLUTION INTRAVENOUS; SUBCUTANEOUS at 17:32

## 2019-03-29 RX ADMIN — CLOPIDOGREL BISULFATE 75 MG: 75 TABLET ORAL at 08:53

## 2019-03-29 RX ADMIN — FENOFIBRATE 160 MG: 160 TABLET ORAL at 08:53

## 2019-03-29 RX ADMIN — PANTOPRAZOLE SODIUM 40 MG: 40 TABLET, DELAYED RELEASE ORAL at 05:32

## 2019-03-29 RX ADMIN — CLOTRIMAZOLE: 10 CREAM TOPICAL at 09:33

## 2019-03-29 RX ADMIN — OXYCODONE HYDROCHLORIDE AND ACETAMINOPHEN 1 TABLET: 7.5; 325 TABLET ORAL at 11:39

## 2019-03-29 RX ADMIN — CEFAZOLIN SODIUM 1 G: 1 INJECTION, SOLUTION INTRAVENOUS at 13:48

## 2019-03-29 RX ADMIN — CLOTRIMAZOLE: 10 CREAM TOPICAL at 22:54

## 2019-03-29 RX ADMIN — INSULIN GLARGINE 60 UNITS: 100 INJECTION, SOLUTION SUBCUTANEOUS at 22:53

## 2019-03-29 RX ADMIN — INSULIN LISPRO 10 UNITS: 100 INJECTION, SOLUTION INTRAVENOUS; SUBCUTANEOUS at 12:36

## 2019-03-29 RX ADMIN — ATORVASTATIN CALCIUM 20 MG: 20 TABLET, FILM COATED ORAL at 08:53

## 2019-03-29 RX ADMIN — CITALOPRAM HYDROBROMIDE 20 MG: 20 TABLET ORAL at 08:53

## 2019-03-29 RX ADMIN — OXYCODONE HYDROCHLORIDE AND ACETAMINOPHEN 1 TABLET: 7.5; 325 TABLET ORAL at 19:37

## 2019-03-29 RX ADMIN — GABAPENTIN 400 MG: 400 CAPSULE ORAL at 08:53

## 2019-03-29 RX ADMIN — GABAPENTIN 400 MG: 400 CAPSULE ORAL at 22:53

## 2019-03-29 RX ADMIN — INSULIN LISPRO 5 UNITS: 100 INJECTION, SOLUTION INTRAVENOUS; SUBCUTANEOUS at 08:54

## 2019-03-29 RX ADMIN — CEFAZOLIN SODIUM 1 G: 1 INJECTION, SOLUTION INTRAVENOUS at 05:32

## 2019-03-29 RX ADMIN — CEFAZOLIN SODIUM 1 G: 1 INJECTION, SOLUTION INTRAVENOUS at 22:53

## 2019-03-29 RX ADMIN — INSULIN LISPRO 3 UNITS: 100 INJECTION, SOLUTION INTRAVENOUS; SUBCUTANEOUS at 22:54

## 2019-03-29 RX ADMIN — METFORMIN HYDROCHLORIDE 1000 MG: 500 TABLET ORAL at 17:38

## 2019-03-29 RX ADMIN — METFORMIN HYDROCHLORIDE 1000 MG: 500 TABLET ORAL at 08:53

## 2019-03-29 RX ADMIN — OXYCODONE HYDROCHLORIDE AND ACETAMINOPHEN 1 TABLET: 7.5; 325 TABLET ORAL at 02:55

## 2019-03-29 RX ADMIN — GABAPENTIN 400 MG: 400 CAPSULE ORAL at 13:48

## 2019-03-29 RX ADMIN — MAGNESIUM HYDROXIDE 30 ML: 400 SUSPENSION ORAL at 10:29

## 2019-03-29 RX ADMIN — RANOLAZINE 500 MG: 500 TABLET, FILM COATED, EXTENDED RELEASE ORAL at 08:53

## 2019-03-29 RX ADMIN — RANOLAZINE 500 MG: 500 TABLET, FILM COATED, EXTENDED RELEASE ORAL at 22:53

## 2019-03-29 RX ADMIN — SODIUM CHLORIDE, PRESERVATIVE FREE 10 ML: 5 INJECTION INTRAVENOUS at 09:36

## 2019-03-29 ASSESSMENT — PAIN SCALES - GENERAL
PAINLEVEL_OUTOF10: 3
PAINLEVEL_OUTOF10: 7
PAINLEVEL_OUTOF10: 7
PAINLEVEL_OUTOF10: 8

## 2019-03-29 ASSESSMENT — PAIN DESCRIPTION - PAIN TYPE: TYPE: ACUTE PAIN

## 2019-03-29 ASSESSMENT — PAIN DESCRIPTION - LOCATION: LOCATION: LEG

## 2019-03-29 ASSESSMENT — PAIN DESCRIPTION - ORIENTATION: ORIENTATION: LEFT

## 2019-03-29 NOTE — H&P
causing hives. CURRENT OUTPATIENT MEDICATIONS:  1. Aspirin 81 mg once a day. 2.  Atorvastatin 20 mg daily. 3.  Plavix 75 mg daily. 4.  Neurontin 100 mg t.i.d.  5.  Cyclobenzaprine 10 mg t.i.d. p.r.n. muscle spasm. 6.  Metformin 1000 mg b.i.d.  7.  Fenofibrate 160 once a day. 8.  Losartan/hydrochlorothiazide 100/12.5.  9. Percocet one every 12 hours as needed for pain. 10.  Fluticasone nasal spray one spray nasal route 2 times daily. 11.  Omeprazole 20 mg daily. 12.  Amaryl 4 mg b.i.d.  13.  Insulin glargine 50 units each night. 14.  Citalopram 20 mg daily. 15.  Byetta 10 mg b.i.d. with meals. 16.  Orphenadrine 100 mg b.i.d. for spasm. PHYSICAL EXAMINATION:  VITAL SIGNS:  In the emergency room, blood pressure is 166/71, pulse of  71. Temp was 100.3 in the vital signs reviewed, but later in the  hospital ER stay he was 100.3 and 103. Respirations 22. He is 5 feet  10 inches tall, 250 pounds for a BMI of 33. GENERAL:  Overall, well nourished and well developed  HEENT:  Normocephalic, atraumatic head. PERRLA. EOMI. Oral mucosa  slightly dry. NECK:  Supple. No TMG or LA. Pharynx is clear. No carotid bruits are  appreciated. LUNGS:  Clear to A and P with no wheeze, rales, or rhonchi. Good air  movement. No respiratory shifts. HEART:  Regular. He has a 2/6 to 3/6 systolic ejection murmur at the  aortic root that radiates to the left ventricular outflow tract. Pulses  are 2+ in the lower extremities. ABDOMEN:  Benign. Active bowel sounds. No HSM. No CVAT. EXTREMITIES:  Shows 1 to 2+ edema bilaterally. SKIN:  Warm and dry. He has indurated macular erythema to the lateral  and anterior aspect of the left leg which is discontinuous. It is  somewhat tender to palpation. VASCULAR:  Appears to have equal pulses in the lower extremities. NEUROLOGIC:  Grossly intact on my examination some hours after  admission. He moves all extremities.   He is alert, oriented and  cooperative on my exam.    LABORATORY WORK:  Rapid influenza A and B were negative. Laboratory  values show white count of 6900, H and H of 13 and 43, platelets are  363,944. Differential shows 80% segs, 10% lymphocytes, 60% monocytes. Biochem profile shows sodium of 135, potassium 5.1, chloride 99, CO2 is  28, BUN is 24, creatinine is 1.3, glucose 218, calcium 8.6, albumin 3.7,  total protein 6.9, bilirubin 0.5. ALT is 26, AST is slightly elevated  at 43. GFR is estimated at 54. Lactate is 1.4. Troponin is less than  0.010. ProBNP is 604. EKG shows sinus rhythm with an old lateral  infarct, which appears to be new from an EKG in 01/2017. CT of the brain showed no acute intracranial abnormality. Chest x-ray  suggested mild congestive failure. DISCUSSION:  The patient is febrile in the emergency room and confused. He has cellulitis of the leg. Because of these changes and his  worsening clinical presentation since the visit to urgent care, he is  admitted now for further evaluation and therapy of cellulitis of the  leg. He was started on vancomycin and Zosyn in the emergency room. Because of his diabetic status, infectious disease will be consulted. DIAGNOSES:  Include the aforementioned fever, unspecified fever cause;  cellulitis of the left leg, altered mental status, type 2 diabetes,  history of coronary artery disease. The patient is admitted to Dr. Lui Horton service as he is the attending physician and I am helping in  cross coverage.         Rachael Salazar MD    D: 03/28/2019 22:03:28       T: 03/29/2019 0:57:46     [email or web address]  Job#: [id number]     Doc#: [id number]    CC:  <>

## 2019-03-29 NOTE — PROGRESS NOTES
355 (H) 70 - 99 MG/DL   POCT Glucose    Collection Time: 03/29/19  7:47 AM   Result Value Ref Range    POC Glucose 376 (H) 70 - 99 MG/DL   C-Reactive Protein    Collection Time: 03/29/19 10:16 AM   Result Value Ref Range    CRP, High Sensitivity 118.9 mg/L   Basic metabolic panel    Collection Time: 03/29/19 10:16 AM   Result Value Ref Range    Sodium 133 (L) 135 - 145 MMOL/L    Potassium 4.6 3.5 - 5.1 MMOL/L    Chloride 96 (L) 99 - 110 mMol/L    CO2 25 21 - 32 MMOL/L    Anion Gap 12 4 - 16    BUN 27 (H) 6 - 23 MG/DL    CREATININE 1.5 (H) 0.9 - 1.3 MG/DL    Glucose 395 (H) 70 - 99 MG/DL    Calcium 8.3 8.3 - 10.6 MG/DL    GFR Non- 45 (L) >60 mL/min/1.73m2    GFR  55 (L) >60 mL/min/1.73m2   Vancomycin, trough    Collection Time: 03/29/19 10:16 AM   Result Value Ref Range    Vancomycin Tr 13.6 10 - 20 UG/ML    DOSE AMOUNT DOSE AMT. GIVEN - 1250 mg     DOSE TIME DOSE TIME GIVEN - 0700    POCT Glucose    Collection Time: 03/29/19 11:50 AM   Result Value Ref Range    POC Glucose 368 (H) 70 - 99 MG/DL     CULTURE results: Invalid input(s): BLOOD CULTURE,  URINE CULTURE, SURGICAL CULTURE    Diagnosis:  Patient Active Problem List   Diagnosis    Essential hypertension, benign    Type II or unspecified type diabetes mellitus without mention of complication, not stated as uncontrolled    Other and unspecified hyperlipidemia    Chest pain    Coronary atherosclerosis    Chronic kidney disease, stage III (moderate) (HCC)    Cellulitis of leg    Cellulitis of lower leg       Active Problems  Principal Problem:    Cellulitis of leg  Active Problems:    Cellulitis of lower leg  Resolved Problems:    * No resolved hospital problems.  *    Electronically signed by: Electronically signed by Edward Durant MD on 3/29/2019 at 3:41 PM

## 2019-03-29 NOTE — PROGRESS NOTES
3/29/2019  Afebrile,VSS. Leg remains reddened anteriorly on hte left, and leg edema is 2+, chronic. Lungs clear, heart regualr, abd soft. Glucose elevated. Sodium 133Low  MMOL/L    Potassium 4.6 MMOL/L    Chloride 96Low  mMol/L    CO2 25 MMOL/L    Anion Gap 12    BUN 27High  MG/DL    CREATININE 1.5High  MG/DL    Glucose 395High  MG/DL    Calcium 8.3 MG/DL    GFR Non-African American 45Low  mL/min/1.73m2   Antibiotics adjusted as per ID recommendation. Will modify prandial insulin dosing, with sliding scale as well. Leg cellulitis. Improved renal function.   Bill Garibay MD

## 2019-03-29 NOTE — PROGRESS NOTES
[facility]  consulted by Dr. Patricia Love for monitoring and adjustment. Indication for treatment: Cellulitis  Goal trough: 10-15 mcg/mL     Pertinent Laboratory Values:   Temp Readings from Last 3 Encounters:   03/29/19 98.4 °F (36.9 °C) (Oral)   01/06/17 97.7 °F (36.5 °C) (Oral)     Recent Labs     03/27/19  1730 03/28/19  0717   WBC 6.9 7.1   LACTATE 1.4  --      Recent Labs     03/27/19  1730 03/29/19  1016   BUN 24* 27*   CREATININE 1.3 1.5*     Estimated Creatinine Clearance: 52 mL/min (A) (based on SCr of 1.5 mg/dL (H)). Intake/Output Summary (Last 24 hours) at 3/29/2019 1225  Last data filed at 3/29/2019 1113  Gross per 24 hour   Intake 780 ml   Output 1975 ml   Net -1195 ml       Vancomycin level:   TROUGH:    Recent Labs     03/29/19  1016   VANCOTROUGH 13.6     RANDOM:  No results for input(s): VANCORANDOM in the last 72 hours. Assessment:  · WBC and temperature: stable  · SCr, BUN, and urine output: trending up slightly   · Day(s) of therapy: 3   · Vancomycin level: therapeutic    Plan:  · Mr. Marquis received vancomycin 1250 mg q12h IVPB x 1  · Trough drawn 12h post-dose therapeutic at 13  · Based on renal function trending up will adjust to q18h IVPB  · Repeat trough in 48h   · Pharmacy will continue to monitor patient and adjust therapy as indicated    Thank you for the consult.   Rashawn Rao, PharmD, BCPS   3/29/2019 12:25 PM

## 2019-03-30 LAB
ANION GAP SERPL CALCULATED.3IONS-SCNC: 10 MMOL/L (ref 4–16)
BUN BLDV-MCNC: 24 MG/DL (ref 6–23)
CALCIUM SERPL-MCNC: 8.9 MG/DL (ref 8.3–10.6)
CHLORIDE BLD-SCNC: 99 MMOL/L (ref 99–110)
CO2: 29 MMOL/L (ref 21–32)
CREAT SERPL-MCNC: 1.3 MG/DL (ref 0.9–1.3)
GFR AFRICAN AMERICAN: >60 ML/MIN/1.73M2
GFR NON-AFRICAN AMERICAN: 54 ML/MIN/1.73M2
GLUCOSE BLD-MCNC: 195 MG/DL (ref 70–99)
GLUCOSE BLD-MCNC: 208 MG/DL (ref 70–99)
GLUCOSE BLD-MCNC: 252 MG/DL (ref 70–99)
GLUCOSE BLD-MCNC: 273 MG/DL (ref 70–99)
GLUCOSE BLD-MCNC: 291 MG/DL (ref 70–99)
GLUCOSE BLD-MCNC: 300 MG/DL (ref 70–99)
GLUCOSE BLD-MCNC: 312 MG/DL (ref 70–99)
HIGH SENSITIVE C-REACTIVE PROTEIN: 87.3 MG/L
POTASSIUM SERPL-SCNC: 4.6 MMOL/L (ref 3.5–5.1)
SODIUM BLD-SCNC: 138 MMOL/L (ref 135–145)

## 2019-03-30 PROCEDURE — 36415 COLL VENOUS BLD VENIPUNCTURE: CPT

## 2019-03-30 PROCEDURE — 80048 BASIC METABOLIC PNL TOTAL CA: CPT

## 2019-03-30 PROCEDURE — 2580000003 HC RX 258: Performed by: INTERNAL MEDICINE

## 2019-03-30 PROCEDURE — 1200000000 HC SEMI PRIVATE

## 2019-03-30 PROCEDURE — 6370000000 HC RX 637 (ALT 250 FOR IP): Performed by: INTERNAL MEDICINE

## 2019-03-30 PROCEDURE — 6360000002 HC RX W HCPCS: Performed by: INTERNAL MEDICINE

## 2019-03-30 PROCEDURE — 94761 N-INVAS EAR/PLS OXIMETRY MLT: CPT

## 2019-03-30 PROCEDURE — 82962 GLUCOSE BLOOD TEST: CPT

## 2019-03-30 PROCEDURE — 99232 SBSQ HOSP IP/OBS MODERATE 35: CPT | Performed by: INTERNAL MEDICINE

## 2019-03-30 PROCEDURE — 86141 C-REACTIVE PROTEIN HS: CPT

## 2019-03-30 RX ORDER — OXYCODONE AND ACETAMINOPHEN 7.5; 325 MG/1; MG/1
1 TABLET ORAL EVERY 6 HOURS PRN
Status: DISCONTINUED | OUTPATIENT
Start: 2019-03-30 | End: 2019-04-03 | Stop reason: HOSPADM

## 2019-03-30 RX ORDER — CEFAZOLIN SODIUM 2 G/100ML
2 INJECTION, SOLUTION INTRAVENOUS EVERY 8 HOURS
Status: DISCONTINUED | OUTPATIENT
Start: 2019-03-30 | End: 2019-04-03 | Stop reason: HOSPADM

## 2019-03-30 RX ORDER — PREDNISONE 20 MG/1
40 TABLET ORAL DAILY
Status: DISCONTINUED | OUTPATIENT
Start: 2019-03-30 | End: 2019-04-03 | Stop reason: HOSPADM

## 2019-03-30 RX ADMIN — METFORMIN HYDROCHLORIDE 1000 MG: 500 TABLET ORAL at 08:52

## 2019-03-30 RX ADMIN — RANOLAZINE 500 MG: 500 TABLET, FILM COATED, EXTENDED RELEASE ORAL at 21:56

## 2019-03-30 RX ADMIN — INSULIN LISPRO 6 UNITS: 100 INJECTION, SOLUTION INTRAVENOUS; SUBCUTANEOUS at 13:46

## 2019-03-30 RX ADMIN — OXYCODONE HYDROCHLORIDE AND ACETAMINOPHEN 1 TABLET: 7.5; 325 TABLET ORAL at 12:20

## 2019-03-30 RX ADMIN — SODIUM CHLORIDE, PRESERVATIVE FREE 10 ML: 5 INJECTION INTRAVENOUS at 08:49

## 2019-03-30 RX ADMIN — OXYCODONE HYDROCHLORIDE AND ACETAMINOPHEN 1 TABLET: 7.5; 325 TABLET ORAL at 04:27

## 2019-03-30 RX ADMIN — VANCOMYCIN HYDROCHLORIDE 1250 MG: 5 INJECTION, POWDER, LYOPHILIZED, FOR SOLUTION INTRAVENOUS at 11:58

## 2019-03-30 RX ADMIN — CLOTRIMAZOLE: 10 CREAM TOPICAL at 21:58

## 2019-03-30 RX ADMIN — INSULIN GLARGINE 60 UNITS: 100 INJECTION, SOLUTION SUBCUTANEOUS at 22:05

## 2019-03-30 RX ADMIN — CEFAZOLIN SODIUM 2 G: 2 INJECTION, SOLUTION INTRAVENOUS at 23:01

## 2019-03-30 RX ADMIN — CLOPIDOGREL BISULFATE 75 MG: 75 TABLET ORAL at 08:53

## 2019-03-30 RX ADMIN — METFORMIN HYDROCHLORIDE 1000 MG: 500 TABLET ORAL at 17:15

## 2019-03-30 RX ADMIN — FENOFIBRATE 160 MG: 160 TABLET ORAL at 08:53

## 2019-03-30 RX ADMIN — OXYCODONE HYDROCHLORIDE AND ACETAMINOPHEN 1 TABLET: 7.5; 325 TABLET ORAL at 21:56

## 2019-03-30 RX ADMIN — LOSARTAN POTASSIUM 50 MG: 50 TABLET, FILM COATED ORAL at 08:53

## 2019-03-30 RX ADMIN — CEFAZOLIN SODIUM 1 G: 1 INJECTION, SOLUTION INTRAVENOUS at 14:32

## 2019-03-30 RX ADMIN — INSULIN LISPRO 6 UNITS: 100 INJECTION, SOLUTION INTRAVENOUS; SUBCUTANEOUS at 17:48

## 2019-03-30 RX ADMIN — ATORVASTATIN CALCIUM 20 MG: 20 TABLET, FILM COATED ORAL at 08:53

## 2019-03-30 RX ADMIN — GABAPENTIN 400 MG: 400 CAPSULE ORAL at 21:56

## 2019-03-30 RX ADMIN — CITALOPRAM HYDROBROMIDE 20 MG: 20 TABLET ORAL at 08:52

## 2019-03-30 RX ADMIN — Medication 10 ML: at 23:01

## 2019-03-30 RX ADMIN — SODIUM CHLORIDE, PRESERVATIVE FREE 10 ML: 5 INJECTION INTRAVENOUS at 21:59

## 2019-03-30 RX ADMIN — INSULIN LISPRO 4 UNITS: 100 INJECTION, SOLUTION INTRAVENOUS; SUBCUTANEOUS at 22:05

## 2019-03-30 RX ADMIN — FLUTICASONE PROPIONATE 1 SPRAY: 50 SPRAY, METERED NASAL at 10:22

## 2019-03-30 RX ADMIN — ENOXAPARIN SODIUM 40 MG: 100 INJECTION SUBCUTANEOUS at 08:53

## 2019-03-30 RX ADMIN — INSULIN LISPRO 2 UNITS: 100 INJECTION, SOLUTION INTRAVENOUS; SUBCUTANEOUS at 08:51

## 2019-03-30 RX ADMIN — PANTOPRAZOLE SODIUM 40 MG: 40 TABLET, DELAYED RELEASE ORAL at 05:52

## 2019-03-30 RX ADMIN — GABAPENTIN 400 MG: 400 CAPSULE ORAL at 08:53

## 2019-03-30 RX ADMIN — RANOLAZINE 500 MG: 500 TABLET, FILM COATED, EXTENDED RELEASE ORAL at 08:53

## 2019-03-30 RX ADMIN — FLUTICASONE PROPIONATE 1 SPRAY: 50 SPRAY, METERED NASAL at 21:58

## 2019-03-30 RX ADMIN — PREDNISONE 40 MG: 20 TABLET ORAL at 17:14

## 2019-03-30 RX ADMIN — CLOTRIMAZOLE: 10 CREAM TOPICAL at 08:53

## 2019-03-30 RX ADMIN — CEFAZOLIN SODIUM 1 G: 1 INJECTION, SOLUTION INTRAVENOUS at 05:53

## 2019-03-30 RX ADMIN — GABAPENTIN 400 MG: 400 CAPSULE ORAL at 15:46

## 2019-03-30 ASSESSMENT — PAIN DESCRIPTION - PROGRESSION

## 2019-03-30 ASSESSMENT — PAIN DESCRIPTION - DESCRIPTORS: DESCRIPTORS: BURNING;ACHING

## 2019-03-30 ASSESSMENT — PAIN SCALES - GENERAL
PAINLEVEL_OUTOF10: 8
PAINLEVEL_OUTOF10: 4
PAINLEVEL_OUTOF10: 5
PAINLEVEL_OUTOF10: 9
PAINLEVEL_OUTOF10: 8

## 2019-03-30 ASSESSMENT — PAIN DESCRIPTION - ONSET: ONSET: ON-GOING

## 2019-03-30 ASSESSMENT — PAIN DESCRIPTION - LOCATION: LOCATION: OTHER (COMMENT)

## 2019-03-30 ASSESSMENT — PAIN DESCRIPTION - FREQUENCY: FREQUENCY: CONTINUOUS

## 2019-03-30 ASSESSMENT — PAIN DESCRIPTION - ORIENTATION: ORIENTATION: LEFT

## 2019-03-30 ASSESSMENT — PAIN - FUNCTIONAL ASSESSMENT: PAIN_FUNCTIONAL_ASSESSMENT: ACTIVITIES ARE NOT PREVENTED

## 2019-03-30 ASSESSMENT — PAIN DESCRIPTION - PAIN TYPE: TYPE: ACUTE PAIN

## 2019-03-30 NOTE — PROGRESS NOTES
Infectious Disease Progress Note  3/30/2019   Patient Name: Trish Nola : 1940   Impression  · Sepsis secondary to left leg non-purulent cellulitis  § Afebrile, leg still warm and tender but the absence of fever, and downtrending CRP indicates that this is not atypical ie gram negative microbe, and most likely to be Streptococcus. § Marked inflammatory reaction  § MRSA nares negative, and as its non-purulent unlikely to be MRSA  § Blood cx 3/27 0/2 ngtd  · Tinea pedis  · Obesity  · Uncontrolled DM  · Multi-morbidity: per PMHx Diastolic CHF, DM  Plan:  ? discontinue vancomycin  ? Increase dose of cefazolin owing to his obesity  ? Prednisone 40 mg po daily for 7 days  ? Trend CRP, pct  ? Clotrimazole to toe clefts  ? Continue Keep left leg elevated      Ongoing Antimicrobial Therapy  Cefazolin 3/28? Completed Antimicrobial Therapy  Zosyn 3/27-3/28  Vancomycin 3/27-? History:? Interval history noted, left leg cellulitis  Some pain in left leg but no worse than before. Denies n/v/d/f or untoward effects of antibiotics  Physical Exam:  Vital Signs: BP (!) 146/78   Pulse 85   Temp 98.2 °F (36.8 °C) (Oral)   Resp 18   Ht 5' 10\" (1.778 m)   Wt 252 lb 1.6 oz (114.4 kg)   SpO2 96%   BMI 36.17 kg/m²     Gen: alert and oriented X3, no distress  Skin: no stigmata of endocarditis, left leg erythema, tender, swollen, lymphangitic spread. Wounds: C/D/I  HEMT: AT/NC Oropharynx pink, moist, and without lesions or exudates; dentition in good state of repair  Eyes: PERRLA, EOMI, conjunctiva pink, sclera anicteric. Neck: Supple. Trachea midline. No LAD. Chest: no distress and CTA. Good air movement. Heart: RRR and no MRG. Abd: soft, non-distended, no tenderness, no hepatomegaly. Normoactive bowel sounds. Ext: no clubbing, cyanosis, or edema  Catheter Site: without erythema or tenderness  Neuro: Mental status intact.  CN 2-12 intact and no focal sensory or motor deficits         Radiologic / Imaging /

## 2019-03-30 NOTE — PROGRESS NOTES
3/30/2019  Afebrile,VSS. Heart regular abd soft lungs clear. Left leg with slightly less erythema and tenderness. Bilat leg edema L>R, 1-2 +. Labs are pending. Insulin has been adjusted for better diabetic control. Continue antibiotics for leg cellulitis.   Jairo Galindo MD

## 2019-03-30 NOTE — PLAN OF CARE
Problem: Falls - Risk of:  Goal: Will remain free from falls  Description  Will remain free from falls  3/30/2019 0040 by Radha Gonzalez RN  Outcome: Ongoing  3/29/2019 1202 by Cecilia Montero RN  Outcome: Ongoing  Goal: Absence of physical injury  Description  Absence of physical injury  3/30/2019 0040 by Radha Gonzalez RN  Outcome: Ongoing  3/29/2019 1202 by Cecilia Montero RN  Outcome: Ongoing

## 2019-03-31 LAB
ANION GAP SERPL CALCULATED.3IONS-SCNC: 13 MMOL/L (ref 4–16)
BUN BLDV-MCNC: 22 MG/DL (ref 6–23)
CALCIUM SERPL-MCNC: 9.4 MG/DL (ref 8.3–10.6)
CHLORIDE BLD-SCNC: 95 MMOL/L (ref 99–110)
CO2: 28 MMOL/L (ref 21–32)
CREAT SERPL-MCNC: 1.2 MG/DL (ref 0.9–1.3)
CULTURE: NORMAL
GFR AFRICAN AMERICAN: >60 ML/MIN/1.73M2
GFR NON-AFRICAN AMERICAN: 59 ML/MIN/1.73M2
GLUCOSE BLD-MCNC: 267 MG/DL (ref 70–99)
GLUCOSE BLD-MCNC: 355 MG/DL (ref 70–99)
GLUCOSE BLD-MCNC: 392 MG/DL (ref 70–99)
GLUCOSE BLD-MCNC: 394 MG/DL (ref 70–99)
GLUCOSE BLD-MCNC: 400 MG/DL (ref 70–99)
HIGH SENSITIVE C-REACTIVE PROTEIN: 67.7 MG/L
Lab: NORMAL
POTASSIUM SERPL-SCNC: 5 MMOL/L (ref 3.5–5.1)
PROCALCITONIN: 0.28
SODIUM BLD-SCNC: 136 MMOL/L (ref 135–145)
SPECIMEN: NORMAL

## 2019-03-31 PROCEDURE — 6360000002 HC RX W HCPCS: Performed by: INTERNAL MEDICINE

## 2019-03-31 PROCEDURE — 36415 COLL VENOUS BLD VENIPUNCTURE: CPT

## 2019-03-31 PROCEDURE — 6370000000 HC RX 637 (ALT 250 FOR IP): Performed by: INTERNAL MEDICINE

## 2019-03-31 PROCEDURE — 80048 BASIC METABOLIC PNL TOTAL CA: CPT

## 2019-03-31 PROCEDURE — 82962 GLUCOSE BLOOD TEST: CPT

## 2019-03-31 PROCEDURE — 84145 PROCALCITONIN (PCT): CPT

## 2019-03-31 PROCEDURE — 2580000003 HC RX 258: Performed by: INTERNAL MEDICINE

## 2019-03-31 PROCEDURE — 1200000000 HC SEMI PRIVATE

## 2019-03-31 PROCEDURE — 86141 C-REACTIVE PROTEIN HS: CPT

## 2019-03-31 RX ORDER — INSULIN GLARGINE 100 [IU]/ML
30 INJECTION, SOLUTION SUBCUTANEOUS
Status: DISCONTINUED | OUTPATIENT
Start: 2019-04-01 | End: 2019-04-03 | Stop reason: HOSPADM

## 2019-03-31 RX ORDER — INSULIN GLARGINE 100 [IU]/ML
20 INJECTION, SOLUTION SUBCUTANEOUS ONCE
Status: COMPLETED | OUTPATIENT
Start: 2019-03-31 | End: 2019-03-31

## 2019-03-31 RX ORDER — GABAPENTIN 400 MG/1
400 CAPSULE ORAL 3 TIMES DAILY
Status: DISCONTINUED | OUTPATIENT
Start: 2019-04-01 | End: 2019-04-03 | Stop reason: HOSPADM

## 2019-03-31 RX ADMIN — CLOTRIMAZOLE: 10 CREAM TOPICAL at 10:35

## 2019-03-31 RX ADMIN — SODIUM CHLORIDE, PRESERVATIVE FREE 10 ML: 5 INJECTION INTRAVENOUS at 10:33

## 2019-03-31 RX ADMIN — INSULIN LISPRO 8 UNITS: 100 INJECTION, SOLUTION INTRAVENOUS; SUBCUTANEOUS at 08:43

## 2019-03-31 RX ADMIN — INSULIN LISPRO 12 UNITS: 100 INJECTION, SOLUTION INTRAVENOUS; SUBCUTANEOUS at 17:56

## 2019-03-31 RX ADMIN — ATORVASTATIN CALCIUM 20 MG: 20 TABLET, FILM COATED ORAL at 10:32

## 2019-03-31 RX ADMIN — CLOTRIMAZOLE: 10 CREAM TOPICAL at 21:19

## 2019-03-31 RX ADMIN — OXYCODONE HYDROCHLORIDE AND ACETAMINOPHEN 1 TABLET: 7.5; 325 TABLET ORAL at 18:02

## 2019-03-31 RX ADMIN — CEFAZOLIN SODIUM 2 G: 2 INJECTION, SOLUTION INTRAVENOUS at 22:03

## 2019-03-31 RX ADMIN — RANOLAZINE 500 MG: 500 TABLET, FILM COATED, EXTENDED RELEASE ORAL at 10:31

## 2019-03-31 RX ADMIN — METFORMIN HYDROCHLORIDE 1000 MG: 500 TABLET ORAL at 10:32

## 2019-03-31 RX ADMIN — INSULIN LISPRO 15 UNITS: 100 INJECTION, SOLUTION INTRAVENOUS; SUBCUTANEOUS at 17:55

## 2019-03-31 RX ADMIN — INSULIN LISPRO 7 UNITS: 100 INJECTION, SOLUTION INTRAVENOUS; SUBCUTANEOUS at 21:28

## 2019-03-31 RX ADMIN — FLUTICASONE PROPIONATE 1 SPRAY: 50 SPRAY, METERED NASAL at 10:35

## 2019-03-31 RX ADMIN — INSULIN GLARGINE 20 UNITS: 100 INJECTION, SOLUTION SUBCUTANEOUS at 17:55

## 2019-03-31 RX ADMIN — ENOXAPARIN SODIUM 40 MG: 100 INJECTION SUBCUTANEOUS at 10:32

## 2019-03-31 RX ADMIN — OXYCODONE HYDROCHLORIDE AND ACETAMINOPHEN 1 TABLET: 7.5; 325 TABLET ORAL at 12:13

## 2019-03-31 RX ADMIN — GABAPENTIN 400 MG: 400 CAPSULE ORAL at 17:58

## 2019-03-31 RX ADMIN — PREDNISONE 40 MG: 20 TABLET ORAL at 10:31

## 2019-03-31 RX ADMIN — CITALOPRAM HYDROBROMIDE 20 MG: 20 TABLET ORAL at 10:31

## 2019-03-31 RX ADMIN — SODIUM CHLORIDE, PRESERVATIVE FREE 10 ML: 5 INJECTION INTRAVENOUS at 21:22

## 2019-03-31 RX ADMIN — LOSARTAN POTASSIUM 50 MG: 50 TABLET, FILM COATED ORAL at 10:31

## 2019-03-31 RX ADMIN — CLOPIDOGREL BISULFATE 75 MG: 75 TABLET ORAL at 10:31

## 2019-03-31 RX ADMIN — INSULIN GLARGINE 60 UNITS: 100 INJECTION, SOLUTION SUBCUTANEOUS at 22:09

## 2019-03-31 RX ADMIN — GABAPENTIN 400 MG: 400 CAPSULE ORAL at 23:47

## 2019-03-31 RX ADMIN — RANOLAZINE 500 MG: 500 TABLET, FILM COATED, EXTENDED RELEASE ORAL at 21:20

## 2019-03-31 RX ADMIN — CEFAZOLIN SODIUM 2 G: 2 INJECTION, SOLUTION INTRAVENOUS at 06:20

## 2019-03-31 RX ADMIN — INSULIN LISPRO 10 UNITS: 100 INJECTION, SOLUTION INTRAVENOUS; SUBCUTANEOUS at 08:42

## 2019-03-31 RX ADMIN — FENOFIBRATE 160 MG: 160 TABLET ORAL at 10:32

## 2019-03-31 RX ADMIN — OXYCODONE HYDROCHLORIDE AND ACETAMINOPHEN 1 TABLET: 7.5; 325 TABLET ORAL at 06:20

## 2019-03-31 RX ADMIN — GABAPENTIN 400 MG: 400 CAPSULE ORAL at 10:32

## 2019-03-31 RX ADMIN — PANTOPRAZOLE SODIUM 40 MG: 40 TABLET, DELAYED RELEASE ORAL at 06:20

## 2019-03-31 ASSESSMENT — PAIN DESCRIPTION - ORIENTATION: ORIENTATION: LEFT;LOWER

## 2019-03-31 ASSESSMENT — PAIN SCALES - GENERAL
PAINLEVEL_OUTOF10: 7
PAINLEVEL_OUTOF10: 7
PAINLEVEL_OUTOF10: 6
PAINLEVEL_OUTOF10: 7
PAINLEVEL_OUTOF10: 8

## 2019-03-31 ASSESSMENT — PAIN DESCRIPTION - PROGRESSION
CLINICAL_PROGRESSION: GRADUALLY IMPROVING

## 2019-03-31 ASSESSMENT — PAIN DESCRIPTION - PAIN TYPE: TYPE: ACUTE PAIN

## 2019-03-31 ASSESSMENT — PAIN DESCRIPTION - LOCATION: LOCATION: LEG

## 2019-03-31 NOTE — PROGRESS NOTES
3/31/2019  Afebrile,mild hypertension. Pt denies chest pain or shortness of breath. He would like to go home  Lungs clear,heart regular, abd soft, still with inflammation of right leg. Sodium 136 MMOL/L     Potassium 5.0 MMOL/L    Chloride 95Low  mMol/L    CO2 28 MMOL/L    Anion Gap 13    BUN 22 MG/DL    CREATININE 1.2 MG/DL    Glucose 400High  MG/DL    Calcium 9.4 MG/DL    GFR Non-African American 59Low  mL/min/1.73m2   Glucoses elevated with steroid prescribed by ID for leg inflammation, will adjust insulin further. May need to add additional BP medication as well. Renal function is stable. Continue antibiotics. Will look at possible topical therapy for leg irritation.    Ary Calvert MD

## 2019-04-01 LAB
ANION GAP SERPL CALCULATED.3IONS-SCNC: 10 MMOL/L (ref 4–16)
BUN BLDV-MCNC: 26 MG/DL (ref 6–23)
CALCIUM SERPL-MCNC: 9 MG/DL (ref 8.3–10.6)
CHLORIDE BLD-SCNC: 96 MMOL/L (ref 99–110)
CO2: 32 MMOL/L (ref 21–32)
CREAT SERPL-MCNC: 1.3 MG/DL (ref 0.9–1.3)
CULTURE: NORMAL
CULTURE: NORMAL
GFR AFRICAN AMERICAN: >60 ML/MIN/1.73M2
GFR NON-AFRICAN AMERICAN: 54 ML/MIN/1.73M2
GLUCOSE BLD-MCNC: 192 MG/DL (ref 70–99)
GLUCOSE BLD-MCNC: 209 MG/DL (ref 70–99)
GLUCOSE BLD-MCNC: 221 MG/DL (ref 70–99)
GLUCOSE BLD-MCNC: 255 MG/DL (ref 70–99)
GLUCOSE BLD-MCNC: 279 MG/DL (ref 70–99)
GLUCOSE BLD-MCNC: 293 MG/DL (ref 70–99)
GLUCOSE BLD-MCNC: 354 MG/DL (ref 70–99)
HIGH SENSITIVE C-REACTIVE PROTEIN: 36.5 MG/L
Lab: NORMAL
Lab: NORMAL
POTASSIUM SERPL-SCNC: 4.5 MMOL/L (ref 3.5–5.1)
SODIUM BLD-SCNC: 138 MMOL/L (ref 135–145)
SPECIMEN: NORMAL
SPECIMEN: NORMAL

## 2019-04-01 PROCEDURE — 94761 N-INVAS EAR/PLS OXIMETRY MLT: CPT

## 2019-04-01 PROCEDURE — 2580000003 HC RX 258: Performed by: INTERNAL MEDICINE

## 2019-04-01 PROCEDURE — 6360000002 HC RX W HCPCS: Performed by: INTERNAL MEDICINE

## 2019-04-01 PROCEDURE — 6370000000 HC RX 637 (ALT 250 FOR IP): Performed by: INTERNAL MEDICINE

## 2019-04-01 PROCEDURE — 80048 BASIC METABOLIC PNL TOTAL CA: CPT

## 2019-04-01 PROCEDURE — 82962 GLUCOSE BLOOD TEST: CPT

## 2019-04-01 PROCEDURE — 99232 SBSQ HOSP IP/OBS MODERATE 35: CPT | Performed by: INTERNAL MEDICINE

## 2019-04-01 PROCEDURE — 1200000000 HC SEMI PRIVATE

## 2019-04-01 PROCEDURE — 86141 C-REACTIVE PROTEIN HS: CPT

## 2019-04-01 RX ADMIN — INSULIN LISPRO 12 UNITS: 100 INJECTION, SOLUTION INTRAVENOUS; SUBCUTANEOUS at 18:11

## 2019-04-01 RX ADMIN — METFORMIN HYDROCHLORIDE 1000 MG: 500 TABLET ORAL at 08:34

## 2019-04-01 RX ADMIN — FENOFIBRATE 160 MG: 160 TABLET ORAL at 08:34

## 2019-04-01 RX ADMIN — LOSARTAN POTASSIUM 50 MG: 50 TABLET, FILM COATED ORAL at 08:34

## 2019-04-01 RX ADMIN — ATORVASTATIN CALCIUM 20 MG: 20 TABLET, FILM COATED ORAL at 08:34

## 2019-04-01 RX ADMIN — OXYCODONE HYDROCHLORIDE AND ACETAMINOPHEN 1 TABLET: 7.5; 325 TABLET ORAL at 22:51

## 2019-04-01 RX ADMIN — OXYCODONE HYDROCHLORIDE AND ACETAMINOPHEN 1 TABLET: 7.5; 325 TABLET ORAL at 16:46

## 2019-04-01 RX ADMIN — CEFAZOLIN SODIUM 2 G: 2 INJECTION, SOLUTION INTRAVENOUS at 15:06

## 2019-04-01 RX ADMIN — RANOLAZINE 500 MG: 500 TABLET, FILM COATED, EXTENDED RELEASE ORAL at 21:55

## 2019-04-01 RX ADMIN — SODIUM CHLORIDE, PRESERVATIVE FREE 10 ML: 5 INJECTION INTRAVENOUS at 08:35

## 2019-04-01 RX ADMIN — INSULIN GLARGINE 60 UNITS: 100 INJECTION, SOLUTION SUBCUTANEOUS at 22:00

## 2019-04-01 RX ADMIN — PREDNISONE 40 MG: 20 TABLET ORAL at 08:34

## 2019-04-01 RX ADMIN — CLOTRIMAZOLE: 10 CREAM TOPICAL at 21:57

## 2019-04-01 RX ADMIN — CLOTRIMAZOLE: 10 CREAM TOPICAL at 08:37

## 2019-04-01 RX ADMIN — CLOPIDOGREL BISULFATE 75 MG: 75 TABLET ORAL at 08:34

## 2019-04-01 RX ADMIN — OXYCODONE HYDROCHLORIDE AND ACETAMINOPHEN 1 TABLET: 7.5; 325 TABLET ORAL at 08:34

## 2019-04-01 RX ADMIN — FLUTICASONE PROPIONATE 1 SPRAY: 50 SPRAY, METERED NASAL at 21:56

## 2019-04-01 RX ADMIN — GABAPENTIN 400 MG: 400 CAPSULE ORAL at 21:55

## 2019-04-01 RX ADMIN — CEFAZOLIN SODIUM 2 G: 2 INJECTION, SOLUTION INTRAVENOUS at 06:01

## 2019-04-01 RX ADMIN — GABAPENTIN 400 MG: 400 CAPSULE ORAL at 15:07

## 2019-04-01 RX ADMIN — GABAPENTIN 400 MG: 400 CAPSULE ORAL at 08:34

## 2019-04-01 RX ADMIN — METFORMIN HYDROCHLORIDE 1000 MG: 500 TABLET ORAL at 16:46

## 2019-04-01 RX ADMIN — RANOLAZINE 500 MG: 500 TABLET, FILM COATED, EXTENDED RELEASE ORAL at 08:34

## 2019-04-01 RX ADMIN — OXYCODONE HYDROCHLORIDE AND ACETAMINOPHEN 1 TABLET: 7.5; 325 TABLET ORAL at 00:56

## 2019-04-01 RX ADMIN — CITALOPRAM HYDROBROMIDE 20 MG: 20 TABLET ORAL at 08:34

## 2019-04-01 RX ADMIN — INSULIN LISPRO 3 UNITS: 100 INJECTION, SOLUTION INTRAVENOUS; SUBCUTANEOUS at 08:37

## 2019-04-01 RX ADMIN — INSULIN LISPRO 7 UNITS: 100 INJECTION, SOLUTION INTRAVENOUS; SUBCUTANEOUS at 21:59

## 2019-04-01 RX ADMIN — CEFAZOLIN SODIUM 2 G: 2 INJECTION, SOLUTION INTRAVENOUS at 22:51

## 2019-04-01 RX ADMIN — INSULIN LISPRO 8 UNITS: 100 INJECTION, SOLUTION INTRAVENOUS; SUBCUTANEOUS at 12:55

## 2019-04-01 RX ADMIN — ENOXAPARIN SODIUM 40 MG: 100 INJECTION SUBCUTANEOUS at 08:37

## 2019-04-01 RX ADMIN — INSULIN LISPRO 8 UNITS: 100 INJECTION, SOLUTION INTRAVENOUS; SUBCUTANEOUS at 08:39

## 2019-04-01 RX ADMIN — SODIUM CHLORIDE, PRESERVATIVE FREE 10 ML: 5 INJECTION INTRAVENOUS at 22:51

## 2019-04-01 RX ADMIN — PANTOPRAZOLE SODIUM 40 MG: 40 TABLET, DELAYED RELEASE ORAL at 06:01

## 2019-04-01 RX ADMIN — INSULIN GLARGINE 30 UNITS: 100 INJECTION, SOLUTION SUBCUTANEOUS at 06:40

## 2019-04-01 RX ADMIN — INSULIN LISPRO 9 UNITS: 100 INJECTION, SOLUTION INTRAVENOUS; SUBCUTANEOUS at 18:11

## 2019-04-01 RX ADMIN — INSULIN LISPRO 6 UNITS: 100 INJECTION, SOLUTION INTRAVENOUS; SUBCUTANEOUS at 12:54

## 2019-04-01 ASSESSMENT — PAIN DESCRIPTION - ORIENTATION: ORIENTATION: LEFT;LOWER

## 2019-04-01 ASSESSMENT — PAIN DESCRIPTION - FREQUENCY: FREQUENCY: INTERMITTENT

## 2019-04-01 ASSESSMENT — PAIN SCALES - GENERAL
PAINLEVEL_OUTOF10: 7
PAINLEVEL_OUTOF10: 8
PAINLEVEL_OUTOF10: 7

## 2019-04-01 ASSESSMENT — PAIN DESCRIPTION - DESCRIPTORS: DESCRIPTORS: CONSTANT

## 2019-04-01 ASSESSMENT — PAIN DESCRIPTION - LOCATION: LOCATION: BACK;LEG

## 2019-04-01 ASSESSMENT — PAIN DESCRIPTION - PAIN TYPE: TYPE: CHRONIC PAIN;ACUTE PAIN

## 2019-04-01 NOTE — PROGRESS NOTES
INTERNAL MEDICINE PROGRESS NOTE        Gato Bloodgood   1940   Primary Care Physician:  Sanju Gonzalez MD  Admit Date: 3/27/2019     Subjective:   Pt is doing better today. Still has pain in left lower ext, but improving. Denies chest pain, SOB, nausea, vomiting, abdominal pain. Remainder of ROS is unremarkable. Meds, labs and other notes reviewed. BS are elevated due to use of steroids. Objective:   BP (!) 150/81   Pulse 88   Temp 97.8 °F (36.6 °C) (Oral)   Resp 16   Ht 5' 10\" (1.778 m)   Wt 252 lb (114.3 kg)   SpO2 94%   BMI 36.16 kg/m²    Recent Labs     03/31/19  2125 04/01/19  0218 04/01/19  0637 04/01/19  0817   POCGLU 355* 293* 221* 192*       I/O last 3 completed shifts: In: 930 [P.O.:720; I.V.:10; IV Piggyback:200]  Out: 325 [Urine:325]  No intake/output data recorded. Neck: no adenopathy and supple, symmetrical, trachea midline  Lungs: clear to auscultation bilaterally  Heart: regular rate and rhythm and S1, S2 normal  Abdomen: obese, soft, non-tender; bowel sounds normal; no masses,  no organomegaly  Extremities: extremities normal, atraumatic, no cyanosis. 1+ edema. Chronic skin discoloration. Left lower ext/shin has redness and erythema. No calf tenderness. Neurologic: Grossly normal    Data Review  CBC with Differential:  No results for input(s): WBC, RBC, HGB, HCT, PLT, MCV, MCH, MCHC, RDW, NRBC, SEGSPCT, BANDSPCT, BLASTSPCT, METASPCT, LYMPHOPCT, PROMYELOPCT, MONOPCT, EOSPCT, BASOPCT, MONOSABS, LYMPHSABS, EOSABS, BASOSABS, DIFFTYPE in the last 72 hours. Invalid input(s): MYELOPCT;3, ATYLMREL  CMP:    Recent Labs     03/30/19  0928 03/31/19  0852 04/01/19  0551    136 138   K 4.6 5.0 4.5   CL 99 95* 96*   CO2 29 28 32   BUN 24* 22 26*   CREATININE 1.3 1.2 1.3   GFRAA >60 >60 >60   LABGLOM 54* 59* 54*   GLUCOSE 273* 400* 255*   CALCIUM 8.9 9.4 9.0     PT/INR:  No results for input(s): PROTIME, INR in the last 72 hours.   Meds:    insulin glargine  30 Units Subcutaneous QAM AC    insulin lispro  0-18 Units Subcutaneous TID WC    insulin lispro  0-9 Units Subcutaneous Nightly    gabapentin  400 mg Oral TID    ceFAZolin  2 g Intravenous Q8H    predniSONE  40 mg Oral Daily    insulin lispro  8 Units Subcutaneous BID WC    insulin lispro  12 Units Subcutaneous Daily    atorvastatin  20 mg Oral Daily    citalopram  20 mg Oral Daily    clopidogrel  75 mg Oral Daily    fenofibrate  160 mg Oral Daily    fluticasone  1 spray Nasal BID    insulin glargine  60 Units Subcutaneous QPM    losartan  50 mg Oral Daily    metFORMIN  1,000 mg Oral BID WC    pantoprazole  40 mg Oral QAM AC    ranolazine  500 mg Oral BID    sodium chloride flush  10 mL Intravenous 2 times per day    enoxaparin  40 mg Subcutaneous Daily    clotrimazole   Topical BID    sodium chloride flush  10 mL Intravenous 2 times per day     PRN Meds: oxyCODONE-acetaminophen, sodium chloride flush, magnesium hydroxide, ondansetron, acetaminophen, glucose, dextrose, glucagon (rDNA), dextrose, sodium chloride flush    Assessment/Plan:   1. Left lower ext cellulitis, sepsis and marked inflammatory reaction. To continue IV Cefazolin. 2. Tinea pedis. On Clotrimazole. 3.  HTN, CAD. Stable. CPM.  4.  DM. Will continue Metformin, Lantus Insulin and SSI coverage. 5.  Hyperlipidemia. 6.  CKD. cret 1.3.       Sanju Gonzalez MD  4/1/2019 11:46 AM

## 2019-04-01 NOTE — PROGRESS NOTES
Infectious Disease Progress Note  2019   Patient Name: Valerie Shah : 1940   Impression  · Left leg non-purulent cellulitis  § Afebrile, leg still warm and tender . § Marked inflammatory reaction, but continues to show good response to therapy as CRP and Pct is on a downward trend  § MRSA nares negative, and as its non-purulent unlikely to be MRSA  § Blood cx 3/27 0/2 ngtd  · Tinea pedis  · Obesity  · Uncontrolled DM  · Multi-morbidity: per PMHx Diastolic CHF, DM  Plan:  ? continue cefazolin. May discharge with 6 day prescription of cefuroxime 500 mg po bid for 6 days (end-date 19)  ? F/u in one week  ? Prednisone 40 mg po daily for 7 days, however this will not be prescribed as home medication owing to hyperglycemia. NSAIDs are an option, but risk of worsening renal function exists. ? Trend CRP, pct  ? Clotrimazole to toe clefts  ? Continue Keep left leg elevated      Ongoing Antimicrobial Therapy  Cefazolin 3/28? Completed Antimicrobial Therapy  Zosyn 3/27-3/28  Vancomycin 3/27-? History:? Interval history noted, left leg cellulitis  Pain in the left leg has improved. Denies n/v/d/f or untoward effects of antibiotics  Physical Exam:  Vital Signs: BP (!) 150/81   Pulse 88   Temp 97.8 °F (36.6 °C) (Oral)   Resp 16   Ht 5' 10\" (1.778 m)   Wt 252 lb (114.3 kg)   SpO2 94%   BMI 36.16 kg/m²     Gen: alert and oriented X3, no distress  Skin: no stigmata of endocarditis, left leg erythema, tender, swollen   Wounds: C/D/I  HEMT: AT/NC Oropharynx pink, moist, and without lesions or exudates; dentition in good state of repair  Eyes: PERRLA, EOMI, conjunctiva pink, sclera anicteric. Neck: Supple. Trachea midline. No LAD. Chest: no distress and CTA. Good air movement. Heart: RRR and no MRG. Abd: soft, non-distended, no tenderness, no hepatomegaly. Normoactive bowel sounds. Ext: no clubbing, cyanosis, or edema  Catheter Site: without erythema or tenderness  Neuro: Mental status intact.  CN 2-12

## 2019-04-02 LAB
GLUCOSE BLD-MCNC: 141 MG/DL (ref 70–99)
GLUCOSE BLD-MCNC: 152 MG/DL (ref 70–99)
GLUCOSE BLD-MCNC: 187 MG/DL (ref 70–99)
GLUCOSE BLD-MCNC: 268 MG/DL (ref 70–99)
GLUCOSE BLD-MCNC: 268 MG/DL (ref 70–99)

## 2019-04-02 PROCEDURE — 1200000000 HC SEMI PRIVATE

## 2019-04-02 PROCEDURE — 6370000000 HC RX 637 (ALT 250 FOR IP): Performed by: INTERNAL MEDICINE

## 2019-04-02 PROCEDURE — 6360000002 HC RX W HCPCS: Performed by: INTERNAL MEDICINE

## 2019-04-02 PROCEDURE — 82962 GLUCOSE BLOOD TEST: CPT

## 2019-04-02 PROCEDURE — 99232 SBSQ HOSP IP/OBS MODERATE 35: CPT | Performed by: INTERNAL MEDICINE

## 2019-04-02 PROCEDURE — 2580000003 HC RX 258: Performed by: INTERNAL MEDICINE

## 2019-04-02 RX ADMIN — INSULIN LISPRO 3 UNITS: 100 INJECTION, SOLUTION INTRAVENOUS; SUBCUTANEOUS at 09:28

## 2019-04-02 RX ADMIN — CEFAZOLIN SODIUM 2 G: 2 INJECTION, SOLUTION INTRAVENOUS at 13:58

## 2019-04-02 RX ADMIN — INSULIN LISPRO 3 UNITS: 100 INJECTION, SOLUTION INTRAVENOUS; SUBCUTANEOUS at 13:53

## 2019-04-02 RX ADMIN — FLUTICASONE PROPIONATE 1 SPRAY: 50 SPRAY, METERED NASAL at 09:27

## 2019-04-02 RX ADMIN — SODIUM CHLORIDE, PRESERVATIVE FREE 10 ML: 5 INJECTION INTRAVENOUS at 09:35

## 2019-04-02 RX ADMIN — GABAPENTIN 400 MG: 400 CAPSULE ORAL at 22:16

## 2019-04-02 RX ADMIN — LOSARTAN POTASSIUM 50 MG: 50 TABLET, FILM COATED ORAL at 09:24

## 2019-04-02 RX ADMIN — PREDNISONE 40 MG: 20 TABLET ORAL at 09:24

## 2019-04-02 RX ADMIN — METFORMIN HYDROCHLORIDE 1000 MG: 500 TABLET ORAL at 17:42

## 2019-04-02 RX ADMIN — RANOLAZINE 500 MG: 500 TABLET, FILM COATED, EXTENDED RELEASE ORAL at 09:25

## 2019-04-02 RX ADMIN — FLUTICASONE PROPIONATE 1 SPRAY: 50 SPRAY, METERED NASAL at 22:18

## 2019-04-02 RX ADMIN — SODIUM CHLORIDE, PRESERVATIVE FREE 10 ML: 5 INJECTION INTRAVENOUS at 22:17

## 2019-04-02 RX ADMIN — FENOFIBRATE 160 MG: 160 TABLET ORAL at 09:24

## 2019-04-02 RX ADMIN — INSULIN LISPRO 12 UNITS: 100 INJECTION, SOLUTION INTRAVENOUS; SUBCUTANEOUS at 17:45

## 2019-04-02 RX ADMIN — PANTOPRAZOLE SODIUM 40 MG: 40 TABLET, DELAYED RELEASE ORAL at 06:34

## 2019-04-02 RX ADMIN — ATORVASTATIN CALCIUM 20 MG: 20 TABLET, FILM COATED ORAL at 09:25

## 2019-04-02 RX ADMIN — INSULIN LISPRO 8 UNITS: 100 INJECTION, SOLUTION INTRAVENOUS; SUBCUTANEOUS at 09:31

## 2019-04-02 RX ADMIN — CLOTRIMAZOLE: 10 CREAM TOPICAL at 09:37

## 2019-04-02 RX ADMIN — CEFAZOLIN SODIUM 2 G: 2 INJECTION, SOLUTION INTRAVENOUS at 05:33

## 2019-04-02 RX ADMIN — INSULIN LISPRO 8 UNITS: 100 INJECTION, SOLUTION INTRAVENOUS; SUBCUTANEOUS at 13:56

## 2019-04-02 RX ADMIN — GABAPENTIN 400 MG: 400 CAPSULE ORAL at 13:52

## 2019-04-02 RX ADMIN — ENOXAPARIN SODIUM 40 MG: 100 INJECTION SUBCUTANEOUS at 09:24

## 2019-04-02 RX ADMIN — OXYCODONE HYDROCHLORIDE AND ACETAMINOPHEN 1 TABLET: 7.5; 325 TABLET ORAL at 22:16

## 2019-04-02 RX ADMIN — INSULIN GLARGINE 60 UNITS: 100 INJECTION, SOLUTION SUBCUTANEOUS at 22:18

## 2019-04-02 RX ADMIN — METFORMIN HYDROCHLORIDE 1000 MG: 500 TABLET ORAL at 09:25

## 2019-04-02 RX ADMIN — OXYCODONE HYDROCHLORIDE AND ACETAMINOPHEN 1 TABLET: 7.5; 325 TABLET ORAL at 14:45

## 2019-04-02 RX ADMIN — GABAPENTIN 400 MG: 400 CAPSULE ORAL at 09:24

## 2019-04-02 RX ADMIN — INSULIN LISPRO 9 UNITS: 100 INJECTION, SOLUTION INTRAVENOUS; SUBCUTANEOUS at 17:42

## 2019-04-02 RX ADMIN — INSULIN GLARGINE 30 UNITS: 100 INJECTION, SOLUTION SUBCUTANEOUS at 06:56

## 2019-04-02 RX ADMIN — CITALOPRAM HYDROBROMIDE 20 MG: 20 TABLET ORAL at 09:24

## 2019-04-02 RX ADMIN — CEFAZOLIN SODIUM 2 G: 2 INJECTION, SOLUTION INTRAVENOUS at 22:17

## 2019-04-02 RX ADMIN — RANOLAZINE 500 MG: 500 TABLET, FILM COATED, EXTENDED RELEASE ORAL at 22:16

## 2019-04-02 RX ADMIN — INSULIN LISPRO 5 UNITS: 100 INJECTION, SOLUTION INTRAVENOUS; SUBCUTANEOUS at 22:19

## 2019-04-02 RX ADMIN — CLOPIDOGREL BISULFATE 75 MG: 75 TABLET ORAL at 09:25

## 2019-04-02 ASSESSMENT — PAIN SCALES - GENERAL
PAINLEVEL_OUTOF10: 7
PAINLEVEL_OUTOF10: 6

## 2019-04-02 NOTE — PROGRESS NOTES
Infectious Disease Progress Note  2019   Patient Name: Ean Arreola : 1940   Impression  · Left leg non-purulent cellulitis  § Afebrile, leg still warm but only tender on his shin. § Continues to improve  § Blood cx 3/27 0/2 ngtd  · Chronic venous dermatitis  · Tinea pedis  · Obesity  · Uncontrolled DM  · Multi-morbidity: per PMHx Diastolic CHF, DM  Plan:  ? continue cefazolin. May discharge with 6 day prescription of cefuroxime 500 mg po bid for 6 days (end-date 19)  ? F/u in one week  ? Prednisone 40 mg po daily for 7 days, however this will not be prescribed as home medication owing to hyperglycemia. NSAIDs are an option, but risk of worsening renal function exists. ? Trend CRP, pct  ? Clotrimazole to toe clefts  ? Continue Keep left leg elevated  ? D/w Dr. Lorie Akins      Ongoing Antimicrobial Therapy  Cefazolin 3/28? Completed Antimicrobial Therapy  Zosyn 3/27-3/28  Vancomycin 3/27-? History:? Interval history noted, left leg cellulitis  Pain in the left leg has improved. Denies n/v/d/f or untoward effects of antibiotics  Physical Exam:  Vital Signs: /66   Pulse 102   Temp 98.5 °F (36.9 °C) (Oral)   Resp 16   Ht 5' 10\" (1.778 m)   Wt 252 lb (114.3 kg)   SpO2 92%   BMI 36.16 kg/m²     Gen: alert and oriented X3, no distress  Skin: no stigmata of endocarditis, left shin tender, and warm  Wounds: C/D/I  HEMT: AT/NC Oropharynx pink, moist, and without lesions or exudates; dentition in good state of repair  Eyes: PERRLA, EOMI, conjunctiva pink, sclera anicteric. Neck: Supple. Trachea midline. No LAD. Chest: no distress and CTA. Good air movement. Heart: RRR and no MRG. Abd: soft, non-distended, no tenderness, no hepatomegaly. Normoactive bowel sounds. Ext: no clubbing, cyanosis, or edema  Catheter Site: without erythema or tenderness  Neuro: Mental status intact. CN 2-12 intact and no focal sensory or motor deficits         Radiologic / Imaging / TESTING  No results found. Labs:    Recent Results (from the past 24 hour(s))   POCT Glucose    Collection Time: 04/01/19 12:24 PM   Result Value Ref Range    POC Glucose 209 (H) 70 - 99 MG/DL   POCT Glucose    Collection Time: 04/01/19  4:56 PM   Result Value Ref Range    POC Glucose 279 (H) 70 - 99 MG/DL   POCT Glucose    Collection Time: 04/01/19  9:07 PM   Result Value Ref Range    POC Glucose 354 (H) 70 - 99 MG/DL   POCT Glucose    Collection Time: 04/02/19  6:36 AM   Result Value Ref Range    POC Glucose 187 (H) 70 - 99 MG/DL   POCT Glucose    Collection Time: 04/02/19  8:20 AM   Result Value Ref Range    POC Glucose 141 (H) 70 - 99 MG/DL     CULTURE results: Invalid input(s): BLOOD CULTURE,  URINE CULTURE, SURGICAL CULTURE    Diagnosis:  Patient Active Problem List   Diagnosis    Essential hypertension, benign    Type 2 diabetes mellitus, with long-term current use of insulin (HCC)    Other and unspecified hyperlipidemia    Chest pain    Coronary atherosclerosis    Chronic kidney disease, stage III (moderate) ([facility])    Cellulitis of leg    Cellulitis of lower leg       Active Problems  Principal Problem:    Cellulitis of leg  Active Problems:    Essential hypertension, benign    Type 2 diabetes mellitus, with long-term current use of insulin (HCC)    Cellulitis of lower leg  Resolved Problems:    * No resolved hospital problems.  *    Electronically signed by: Electronically signed by Tova Andrea MD on 4/2/2019 at 11:18 AM

## 2019-04-02 NOTE — PROGRESS NOTES
INTERNAL MEDICINE PROGRESS NOTE        Suki Madrid   1940   Primary Care Physician:  Renato Muniz MD  Admit Date: 3/27/2019     Subjective:   Pt is doing better today. Pain in left lower ext is improving. Denies chest pain, SOB, nausea, vomiting, abdominal pain. Remainder of ROS is unremarkable. Meds, labs and other notes reviewed. BS are elevated due to use of steroids. Objective:   BP (!) 159/92   Pulse 79   Temp 98.6 °F (37 °C) (Oral)   Resp 16   Ht 5' 10\" (1.778 m)   Wt 252 lb (114.3 kg)   SpO2 98%   BMI 36.16 kg/m²    Recent Labs     04/01/19  1224 04/01/19  1656 04/01/19  2107 04/02/19  0636   POCGLU 209* 279* 354* 187*       I/O last 3 completed shifts: In: 1300 [P.O.:1300]  Out: 675 [Urine:350; Emesis/NG output:325]  No intake/output data recorded. Neck: no adenopathy and supple, symmetrical, trachea midline  Lungs: clear to auscultation bilaterally  Heart: regular rate and rhythm and S1, S2 normal  Abdomen: obese, soft, non-tender; bowel sounds normal; no masses,  no organomegaly  Extremities: extremities normal, atraumatic, no cyanosis. 1+ edema. Chronic skin discoloration. Left lower ext/shin has redness and erythema. No calf tenderness. Neurologic: Grossly normal    Data Review  CBC with Differential:  No results for input(s): WBC, RBC, HGB, HCT, PLT, MCV, MCH, MCHC, RDW, NRBC, SEGSPCT, BANDSPCT, BLASTSPCT, METASPCT, LYMPHOPCT, PROMYELOPCT, MONOPCT, EOSPCT, BASOPCT, MONOSABS, LYMPHSABS, EOSABS, BASOSABS, DIFFTYPE in the last 72 hours. Invalid input(s): MYELOPCT;3, ATYLMREL  CMP:    Recent Labs     03/30/19  0928 03/31/19  0852 04/01/19  0551    136 138   K 4.6 5.0 4.5   CL 99 95* 96*   CO2 29 28 32   BUN 24* 22 26*   CREATININE 1.3 1.2 1.3   GFRAA >60 >60 >60   LABGLOM 54* 59* 54*   GLUCOSE 273* 400* 255*   CALCIUM 8.9 9.4 9.0     PT/INR:  No results for input(s): PROTIME, INR in the last 72 hours.   Meds:    insulin glargine  30 Units Subcutaneous QAM AC    insulin lispro  0-18 Units Subcutaneous TID     insulin lispro  0-9 Units Subcutaneous Nightly    gabapentin  400 mg Oral TID    ceFAZolin  2 g Intravenous Q8H    predniSONE  40 mg Oral Daily    insulin lispro  8 Units Subcutaneous BID     insulin lispro  12 Units Subcutaneous Daily    atorvastatin  20 mg Oral Daily    citalopram  20 mg Oral Daily    clopidogrel  75 mg Oral Daily    fenofibrate  160 mg Oral Daily    fluticasone  1 spray Nasal BID    insulin glargine  60 Units Subcutaneous QPM    losartan  50 mg Oral Daily    metFORMIN  1,000 mg Oral BID WC    pantoprazole  40 mg Oral QAM AC    ranolazine  500 mg Oral BID    sodium chloride flush  10 mL Intravenous 2 times per day    enoxaparin  40 mg Subcutaneous Daily    clotrimazole   Topical BID    sodium chloride flush  10 mL Intravenous 2 times per day     PRN Meds: oxyCODONE-acetaminophen, sodium chloride flush, magnesium hydroxide, ondansetron, acetaminophen, glucose, dextrose, glucagon (rDNA), dextrose, sodium chloride flush    Assessment/Plan:   1. Left lower ext cellulitis, sepsis and marked inflammatory reaction. To continue IV Cefazolin. 2. Tinea pedis. On Clotrimazole. 3.  HTN, CAD. Stable. CPM.  4.  DM. Will continue Metformin, Lantus Insulin and SSI coverage. 5.  Hyperlipidemia. 6.  CKD. 7.  Increase activity.  Anticipate DC in am.       Tea Schaffer MD  4/2/2019 8:16 AM

## 2019-04-03 VITALS
SYSTOLIC BLOOD PRESSURE: 182 MMHG | DIASTOLIC BLOOD PRESSURE: 90 MMHG | HEIGHT: 70 IN | WEIGHT: 252 LBS | RESPIRATION RATE: 16 BRPM | TEMPERATURE: 97.5 F | BODY MASS INDEX: 36.08 KG/M2 | OXYGEN SATURATION: 96 % | HEART RATE: 89 BPM

## 2019-04-03 LAB
GLUCOSE BLD-MCNC: 276 MG/DL (ref 70–99)
GLUCOSE BLD-MCNC: 285 MG/DL (ref 70–99)

## 2019-04-03 PROCEDURE — 82962 GLUCOSE BLOOD TEST: CPT

## 2019-04-03 PROCEDURE — 6370000000 HC RX 637 (ALT 250 FOR IP): Performed by: INTERNAL MEDICINE

## 2019-04-03 PROCEDURE — 6360000002 HC RX W HCPCS: Performed by: INTERNAL MEDICINE

## 2019-04-03 PROCEDURE — 94761 N-INVAS EAR/PLS OXIMETRY MLT: CPT

## 2019-04-03 RX ORDER — CEFUROXIME AXETIL 500 MG/1
500 TABLET ORAL 2 TIMES DAILY
Qty: 14 TABLET | Refills: 0 | Status: SHIPPED | OUTPATIENT
Start: 2019-04-03 | End: 2019-04-10

## 2019-04-03 RX ORDER — CLOTRIMAZOLE 1 %
CREAM (GRAM) TOPICAL
Qty: 60 G | Refills: 1 | Status: SHIPPED | OUTPATIENT
Start: 2019-04-03 | End: 2019-04-10

## 2019-04-03 RX ADMIN — OXYCODONE HYDROCHLORIDE AND ACETAMINOPHEN 1 TABLET: 7.5; 325 TABLET ORAL at 07:12

## 2019-04-03 RX ADMIN — GABAPENTIN 400 MG: 400 CAPSULE ORAL at 09:37

## 2019-04-03 RX ADMIN — LOSARTAN POTASSIUM 50 MG: 50 TABLET, FILM COATED ORAL at 09:37

## 2019-04-03 RX ADMIN — INSULIN LISPRO 9 UNITS: 100 INJECTION, SOLUTION INTRAVENOUS; SUBCUTANEOUS at 09:38

## 2019-04-03 RX ADMIN — INSULIN GLARGINE 30 UNITS: 100 INJECTION, SOLUTION SUBCUTANEOUS at 06:31

## 2019-04-03 RX ADMIN — RANOLAZINE 500 MG: 500 TABLET, FILM COATED, EXTENDED RELEASE ORAL at 09:37

## 2019-04-03 RX ADMIN — METFORMIN HYDROCHLORIDE 1000 MG: 500 TABLET ORAL at 09:37

## 2019-04-03 RX ADMIN — ENOXAPARIN SODIUM 40 MG: 100 INJECTION SUBCUTANEOUS at 09:38

## 2019-04-03 RX ADMIN — PREDNISONE 40 MG: 20 TABLET ORAL at 09:37

## 2019-04-03 RX ADMIN — CLOTRIMAZOLE: 10 CREAM TOPICAL at 09:36

## 2019-04-03 RX ADMIN — PANTOPRAZOLE SODIUM 40 MG: 40 TABLET, DELAYED RELEASE ORAL at 06:31

## 2019-04-03 RX ADMIN — FLUTICASONE PROPIONATE 1 SPRAY: 50 SPRAY, METERED NASAL at 09:37

## 2019-04-03 RX ADMIN — CEFAZOLIN SODIUM 2 G: 2 INJECTION, SOLUTION INTRAVENOUS at 06:31

## 2019-04-03 RX ADMIN — INSULIN LISPRO 8 UNITS: 100 INJECTION, SOLUTION INTRAVENOUS; SUBCUTANEOUS at 09:42

## 2019-04-03 RX ADMIN — ATORVASTATIN CALCIUM 20 MG: 20 TABLET, FILM COATED ORAL at 09:37

## 2019-04-03 RX ADMIN — CLOPIDOGREL BISULFATE 75 MG: 75 TABLET ORAL at 09:37

## 2019-04-03 RX ADMIN — FENOFIBRATE 160 MG: 160 TABLET ORAL at 09:37

## 2019-04-03 RX ADMIN — CITALOPRAM HYDROBROMIDE 20 MG: 20 TABLET ORAL at 09:37

## 2019-04-03 ASSESSMENT — PAIN SCALES - GENERAL: PAINLEVEL_OUTOF10: 7

## 2019-04-03 NOTE — DISCHARGE SUMMARY
sulci.  Vascular calcifications. ORBITS: The visualized portion of the orbits demonstrate no acute abnormality. SINUSES: Evidence of previous functional endoscopic surgery. Mild ethmoid sinus mucosal thickening. SOFT TISSUES/SKULL:  No acute abnormality of the visualized skull or soft tissues. No acute intracranial abnormality. Xr Chest Portable    Result Date: 3/27/2019  EXAMINATION: SINGLE XRAY VIEW OF THE CHEST 3/27/2019 5:52 pm COMPARISON: Chest radiograph 01/06/2017. HISTORY: ORDERING SYSTEM PROVIDED HISTORY: Chest pain TECHNOLOGIST PROVIDED HISTORY: Reason for exam:->Chest pain Ordering Physician Provided Reason for Exam: chest pain Acuity: Acute Type of Exam: Initial FINDINGS: The cardiac silhouette is enlarged. The mediastinal and hilar silhouettes appear unremarkable. Vascular engorgement and cephalization is demonstrated with bilateral peribronchial cuffing and perivascular haziness. No dense consolidation or pleural effusion evident. No pneumothorax is seen. No acute osseous abnormality is identified. Cardiomegaly. Findings suggesting mild congestive heart failure. Vl Dup Lower Extremity Venous Left    Result Date: 3/27/2019  EXAMINATION: DUPLEX VENOUS ULTRASOUND OF THE LEFT LOWER EXTREMITY 3/27/2019 9:11 pm TECHNIQUE: Duplex ultrasound and Doppler images were obtained of the left lower extremity. COMPARISON: None. HISTORY: ORDERING SYSTEM PROVIDED HISTORY: leg pain / swelling TECHNOLOGIST PROVIDED HISTORY: Reason for exam:->leg pain / swelling Ordering Physician Provided Reason for Exam: left leg pain Acuity: Acute Type of Exam: Initial FINDINGS: The visualized veins of the left lower extremity are patent and free of echogenic thrombus. The veins are normally compressible and have normal phasic flow.      No evidence of DVT in the left lower extremity.   -    Recent Results (from the past 24 hour(s))   POCT Glucose    Collection Time: 04/02/19 12:01 PM   Result Value Ref Range    POC Glucose 152 (H) 70 - 99 MG/DL   POCT Glucose    Collection Time: 04/02/19  4:38 PM   Result Value Ref Range    POC Glucose 268 (H) 70 - 99 MG/DL   POCT Glucose    Collection Time: 04/02/19 10:15 PM   Result Value Ref Range    POC Glucose 268 (H) 70 - 99 MG/DL   POCT Glucose    Collection Time: 04/03/19  6:29 AM   Result Value Ref Range    POC Glucose 276 (H) 70 - 99 MG/DL   POCT Glucose    Collection Time: 04/03/19  8:01 AM   Result Value Ref Range    POC Glucose 285 (H) 70 - 99 MG/DL        Disposition: home    Patient Instructions:    Ta Watson   Home Medication Instructions [id number]    Printed on:04/03/19 [id number]   Medication Information                      aspirin 325 MG tablet  Take 81 mg by mouth daily. atorvastatin (LIPITOR) 20 MG tablet  Take 20 mg by mouth daily. cefUROXime (CEFTIN) 500 MG tablet  Take 1 tablet by mouth 2 times daily for 7 days             citalopram (CELEXA) 10 MG tablet  Take 20 mg by mouth daily. clopidogrel (PLAVIX) 75 MG tablet  Take 75 mg by mouth daily. clotrimazole (LOTRIMIN) 1 % cream  Apply topically 2 times daily. colestipol (COLESTID) 1 g tablet  Take 1 g by mouth 3 times daily             fenofibrate 160 MG tablet  Take 160 mg by mouth daily. fluticasone (FLONASE) 50 MCG/ACT nasal spray  1 spray by Nasal route 2 times daily. gabapentin (NEURONTIN) 300 MG capsule  Take 400 mg by mouth 3 times daily. glimepiride (AMARYL) 4 MG tablet  Take 4 mg by mouth 2 times daily. insulin aspart (NOVOLOG) 100 UNIT/ML injection vial  Inject into the skin 3 times daily (before meals)             insulin glargine (LANTUS) 100 UNIT/ML injection  Inject 60 Units into the skin every evening              losartan (COZAAR) 50 MG tablet  Take 50 mg by mouth daily             metFORMIN (GLUCOPHAGE) 1000 MG tablet  Take 1,000 mg by mouth 2 times daily (with meals).              omeprazole (PRILOSEC) 20 MG capsule  Take 20 mg by mouth Daily. ORPHENADRINE CITRATE PO  Take 100 mg by mouth 2 times daily. oxyCODONE-acetaminophen (PERCOCET) 7.5-325 MG per tablet  Take 1 tablet by mouth every 8 hours as needed. ranolazine (RANEXA) 500 MG extended release tablet  Take 500 mg by mouth 2 times daily                  Diet: DIET CARB CONTROL;     Follow-up with Lora Justin in 2 days    Signed: Lora Justin    Time spent on discharge 35 minutes

## 2019-10-30 ENCOUNTER — APPOINTMENT (OUTPATIENT)
Dept: CT IMAGING | Age: 79
End: 2019-10-30
Attending: INTERNAL MEDICINE
Payer: MEDICARE

## 2019-10-30 ENCOUNTER — HOSPITAL ENCOUNTER (OUTPATIENT)
Age: 79
Discharge: HOME | End: 2019-10-30
Payer: MEDICARE

## 2019-10-30 ENCOUNTER — HOSPITAL ENCOUNTER (OUTPATIENT)
Dept: CARDIAC CATH/INVASIVE PROCEDURES | Age: 79
Discharge: HOME | End: 2019-10-30
Attending: INTERNAL MEDICINE | Admitting: INTERNAL MEDICINE
Payer: MEDICARE

## 2019-10-30 VITALS
WEIGHT: 257 LBS | DIASTOLIC BLOOD PRESSURE: 91 MMHG | HEIGHT: 71 IN | SYSTOLIC BLOOD PRESSURE: 194 MMHG | RESPIRATION RATE: 18 BRPM | HEART RATE: 78 BPM | OXYGEN SATURATION: 97 % | BODY MASS INDEX: 35.98 KG/M2 | TEMPERATURE: 97.4 F

## 2019-10-30 LAB
ACTIVATED CLOTTING TIME, LOW RANGE: 173 SEC
ANION GAP SERPL CALCULATED.3IONS-SCNC: 7 MMOL/L (ref 4–16)
APTT: 32.3 SECONDS (ref 25.1–37.1)
BASE EXCESS MIXED: ABNORMAL (ref 0–1.2)
BASOPHILS ABSOLUTE: 0 K/CU MM
BASOPHILS RELATIVE PERCENT: 1 % (ref 0–1)
BUN BLDV-MCNC: 24 MG/DL (ref 6–23)
CALCIUM SERPL-MCNC: 9.9 MG/DL (ref 8.3–10.6)
CARBON MONOXIDE, BLOOD: 1.9 % (ref 0–5)
CHLORIDE BLD-SCNC: 101 MMOL/L (ref 99–110)
CO2 CONTENT: 30.2 MMOL/L (ref 19–24)
CO2: 33 MMOL/L (ref 21–32)
COMMENT: ABNORMAL
CREAT SERPL-MCNC: 1.2 MG/DL (ref 0.9–1.3)
D DIMER: 263 NG/ML(DDU)
DIFFERENTIAL TYPE: ABNORMAL
EOSINOPHILS ABSOLUTE: 0.2 K/CU MM
EOSINOPHILS RELATIVE PERCENT: 5.5 % (ref 0–3)
GFR AFRICAN AMERICAN: >60 ML/MIN/1.73M2
GFR NON-AFRICAN AMERICAN: 59 ML/MIN/1.73M2
GLUCOSE BLD-MCNC: 185 MG/DL (ref 70–99)
HCO3 ARTERIAL: 28.5 MMOL/L (ref 18–23)
HCT VFR BLD CALC: 48.3 % (ref 42–52)
HEMOGLOBIN: 15.1 GM/DL (ref 13.5–18)
IMMATURE NEUTROPHIL %: 1 % (ref 0–0.43)
INR BLD: 1.02 INDEX
LYMPHOCYTES ABSOLUTE: 1.3 K/CU MM
LYMPHOCYTES RELATIVE PERCENT: 45.9 % (ref 24–44)
MCH RBC QN AUTO: 29 PG (ref 27–31)
MCHC RBC AUTO-ENTMCNC: 31.3 % (ref 32–36)
MCV RBC AUTO: 92.7 FL (ref 78–100)
METHEMOGLOBIN ARTERIAL: 0.9 %
MONOCYTES ABSOLUTE: 0.3 K/CU MM
MONOCYTES RELATIVE PERCENT: 10.3 % (ref 0–4)
NUCLEATED RBC %: 0 %
O2 SATURATION: 96.5 % (ref 96–97)
PCO2 ARTERIAL: 54 MMHG (ref 32–45)
PDW BLD-RTO: 13.6 % (ref 11.7–14.9)
PH BLOOD: 7.33 (ref 7.34–7.45)
PLATELET # BLD: 138 K/CU MM (ref 140–440)
PMV BLD AUTO: 10.7 FL (ref 7.5–11.1)
PO2 ARTERIAL: 157 MMHG (ref 75–100)
POTASSIUM SERPL-SCNC: 4.8 MMOL/L (ref 3.5–5.1)
PROTHROMBIN TIME: 11.8 SECONDS (ref 11.7–14.5)
RBC # BLD: 5.21 M/CU MM (ref 4.6–6.2)
REASON FOR REJECTION: NORMAL
REASON FOR REJECTION: NORMAL
REJECTED TEST: NORMAL
SEGMENTED NEUTROPHILS ABSOLUTE COUNT: 1.1 K/CU MM
SEGMENTED NEUTROPHILS RELATIVE PERCENT: 36.3 % (ref 36–66)
SODIUM BLD-SCNC: 141 MMOL/L (ref 135–145)
TOTAL IMMATURE NEUTOROPHIL: 0.03 K/CU MM
TOTAL NUCLEATED RBC: 0 K/CU MM
WBC # BLD: 2.9 K/CU MM (ref 4–10.5)

## 2019-10-30 PROCEDURE — 85730 THROMBOPLASTIN TIME PARTIAL: CPT

## 2019-10-30 PROCEDURE — C1894 INTRO/SHEATH, NON-LASER: HCPCS

## 2019-10-30 PROCEDURE — C1751 CATH, INF, PER/CENT/MIDLINE: HCPCS

## 2019-10-30 PROCEDURE — 6360000004 HC RX CONTRAST MEDICATION

## 2019-10-30 PROCEDURE — 6370000000 HC RX 637 (ALT 250 FOR IP): Performed by: INTERNAL MEDICINE

## 2019-10-30 PROCEDURE — 85379 FIBRIN DEGRADATION QUANT: CPT

## 2019-10-30 PROCEDURE — 2580000003 HC RX 258: Performed by: INTERNAL MEDICINE

## 2019-10-30 PROCEDURE — 6360000002 HC RX W HCPCS

## 2019-10-30 PROCEDURE — 2709999900 HC NON-CHARGEABLE SUPPLY

## 2019-10-30 PROCEDURE — 85025 COMPLETE CBC W/AUTO DIFF WBC: CPT

## 2019-10-30 PROCEDURE — 36415 COLL VENOUS BLD VENIPUNCTURE: CPT

## 2019-10-30 PROCEDURE — 85347 COAGULATION TIME ACTIVATED: CPT

## 2019-10-30 PROCEDURE — C1887 CATHETER, GUIDING: HCPCS

## 2019-10-30 PROCEDURE — 75625 CONTRAST EXAM ABDOMINL AORTA: CPT

## 2019-10-30 PROCEDURE — 93460 R&L HRT ART/VENTRICLE ANGIO: CPT

## 2019-10-30 PROCEDURE — 85610 PROTHROMBIN TIME: CPT

## 2019-10-30 PROCEDURE — 94761 N-INVAS EAR/PLS OXIMETRY MLT: CPT

## 2019-10-30 PROCEDURE — 82803 BLOOD GASES ANY COMBINATION: CPT

## 2019-10-30 PROCEDURE — 2500000003 HC RX 250 WO HCPCS

## 2019-10-30 PROCEDURE — 80048 BASIC METABOLIC PNL TOTAL CA: CPT

## 2019-10-30 PROCEDURE — 71250 CT THORAX DX C-: CPT

## 2019-10-30 PROCEDURE — C1769 GUIDE WIRE: HCPCS

## 2019-10-30 RX ORDER — DIPHENHYDRAMINE HCL 25 MG
50 TABLET ORAL ONCE
Status: COMPLETED | OUTPATIENT
Start: 2019-10-30 | End: 2019-10-30

## 2019-10-30 RX ORDER — DIAZEPAM 5 MG/1
5 TABLET ORAL ONCE
Status: COMPLETED | OUTPATIENT
Start: 2019-10-30 | End: 2019-10-30

## 2019-10-30 RX ORDER — MORPHINE SULFATE 2 MG/ML
1 INJECTION, SOLUTION INTRAMUSCULAR; INTRAVENOUS
Status: DISCONTINUED | OUTPATIENT
Start: 2019-10-30 | End: 2019-10-30 | Stop reason: HOSPADM

## 2019-10-30 RX ORDER — SODIUM CHLORIDE 9 MG/ML
INJECTION, SOLUTION INTRAVENOUS CONTINUOUS
Status: DISCONTINUED | OUTPATIENT
Start: 2019-10-30 | End: 2019-10-30 | Stop reason: HOSPADM

## 2019-10-30 RX ORDER — BUSPIRONE HYDROCHLORIDE 10 MG/1
10 TABLET ORAL 3 TIMES DAILY
COMMUNITY
End: 2020-02-05 | Stop reason: CLARIF

## 2019-10-30 RX ORDER — ATROPINE SULFATE 0.4 MG/ML
0.5 AMPUL (ML) INJECTION
Status: DISCONTINUED | OUTPATIENT
Start: 2019-10-30 | End: 2019-10-30 | Stop reason: HOSPADM

## 2019-10-30 RX ORDER — SODIUM CHLORIDE 0.9 % (FLUSH) 0.9 %
10 SYRINGE (ML) INJECTION PRN
Status: DISCONTINUED | OUTPATIENT
Start: 2019-10-30 | End: 2019-10-30 | Stop reason: HOSPADM

## 2019-10-30 RX ORDER — SODIUM CHLORIDE 0.9 % (FLUSH) 0.9 %
10 SYRINGE (ML) INJECTION EVERY 12 HOURS SCHEDULED
Status: DISCONTINUED | OUTPATIENT
Start: 2019-10-30 | End: 2019-10-30 | Stop reason: HOSPADM

## 2019-10-30 RX ORDER — CHOLESTYRAMINE 4 G/9G
1 POWDER, FOR SUSPENSION ORAL
COMMUNITY

## 2019-10-30 RX ORDER — ACETAMINOPHEN 325 MG/1
650 TABLET ORAL EVERY 4 HOURS PRN
Status: DISCONTINUED | OUTPATIENT
Start: 2019-10-30 | End: 2019-10-30 | Stop reason: HOSPADM

## 2019-10-30 RX ORDER — FUROSEMIDE 20 MG/1
40 TABLET ORAL DAILY
Qty: 60 TABLET | Refills: 3 | Status: ON HOLD | OUTPATIENT
Start: 2019-10-30 | End: 2020-08-09 | Stop reason: ALTCHOICE

## 2019-10-30 RX ORDER — PANTOPRAZOLE SODIUM 40 MG/1
40 GRANULE, DELAYED RELEASE ORAL
COMMUNITY

## 2019-10-30 RX ADMIN — DIAZEPAM 5 MG: 5 TABLET ORAL at 10:56

## 2019-10-30 RX ADMIN — DIPHENHYDRAMINE HYDROCHLORIDE 50 MG: 25 TABLET ORAL at 10:56

## 2019-10-30 RX ADMIN — SODIUM CHLORIDE: 9 INJECTION, SOLUTION INTRAVENOUS at 10:57

## 2020-01-28 ENCOUNTER — HOSPITAL ENCOUNTER (OUTPATIENT)
Age: 80
Discharge: HOME | End: 2020-01-28
Payer: MEDICARE

## 2020-01-28 ENCOUNTER — HOSPITAL ENCOUNTER (OUTPATIENT)
Dept: GENERAL RADIOLOGY | Age: 80
Discharge: HOME | End: 2020-01-28
Payer: MEDICARE

## 2020-01-28 PROCEDURE — 71046 X-RAY EXAM CHEST 2 VIEWS: CPT

## 2020-08-08 ENCOUNTER — HOSPITAL ENCOUNTER (INPATIENT)
Age: 80
LOS: 5 days | Discharge: HOME | DRG: 177 | End: 2020-08-13
Attending: EMERGENCY MEDICINE | Admitting: INTERNAL MEDICINE
Payer: MEDICARE

## 2020-08-08 ENCOUNTER — APPOINTMENT (OUTPATIENT)
Dept: CT IMAGING | Age: 80
DRG: 177 | End: 2020-08-08
Payer: MEDICARE

## 2020-08-08 PROBLEM — R53.83 LETHARGY: Status: ACTIVE | Noted: 2020-08-08

## 2020-08-08 LAB
ALBUMIN SERPL-MCNC: 3.7 GM/DL (ref 3.4–5)
ALCOHOL SCREEN SERUM: <0.01 %WT/VOL
ALP BLD-CCNC: 63 IU/L (ref 40–129)
ALT SERPL-CCNC: 26 U/L (ref 10–40)
AMMONIA: 43 UMOL/L (ref 16–60)
AMPHETAMINES: NEGATIVE
ANION GAP SERPL CALCULATED.3IONS-SCNC: 11 MMOL/L (ref 4–16)
AST SERPL-CCNC: 38 IU/L (ref 15–37)
BACTERIA: NEGATIVE /HPF
BARBITURATE SCREEN URINE: NEGATIVE
BASE EXCESS MIXED: 4.2 (ref 0–1.2)
BASOPHILS ABSOLUTE: 0 K/CU MM
BASOPHILS RELATIVE PERCENT: 0.6 % (ref 0–1)
BENZODIAZEPINE SCREEN, URINE: NEGATIVE
BILIRUB SERPL-MCNC: 0.6 MG/DL (ref 0–1)
BILIRUBIN URINE: NEGATIVE MG/DL
BLOOD, URINE: ABNORMAL
BUN BLDV-MCNC: 18 MG/DL (ref 6–23)
CALCIUM SERPL-MCNC: 8.5 MG/DL (ref 8.3–10.6)
CANNABINOID SCREEN URINE: NEGATIVE
CARBON MONOXIDE, BLOOD: 5.7 % (ref 0–5)
CHLORIDE BLD-SCNC: 99 MMOL/L (ref 99–110)
CLARITY: CLEAR
CO2 CONTENT: 29.6 MMOL/L (ref 19–24)
CO2: 28 MMOL/L (ref 21–32)
COCAINE METABOLITE: NEGATIVE
COLOR: YELLOW
COMMENT: ABNORMAL
CREAT SERPL-MCNC: 1.2 MG/DL (ref 0.9–1.3)
DIFFERENTIAL TYPE: ABNORMAL
EOSINOPHILS ABSOLUTE: 0.1 K/CU MM
EOSINOPHILS RELATIVE PERCENT: 2.2 % (ref 0–3)
GFR AFRICAN AMERICAN: >60 ML/MIN/1.73M2
GFR NON-AFRICAN AMERICAN: 58 ML/MIN/1.73M2
GLUCOSE BLD-MCNC: 175 MG/DL (ref 70–99)
GLUCOSE BLD-MCNC: 198 MG/DL
GLUCOSE BLD-MCNC: 211 MG/DL (ref 70–99)
GLUCOSE, URINE: NEGATIVE MG/DL
HCO3 ARTERIAL: 28.4 MMOL/L (ref 18–23)
HCT VFR BLD CALC: 43.9 % (ref 42–52)
HEMOGLOBIN: 14.2 GM/DL (ref 13.5–18)
IMMATURE NEUTROPHIL %: 1 % (ref 0–0.43)
KETONES, URINE: NEGATIVE MG/DL
LACTIC ACID, SEPSIS: 4.2 MMOL/L (ref 0.5–1.9)
LEUKOCYTE ESTERASE, URINE: NEGATIVE
LYMPHOCYTES ABSOLUTE: 0.5 K/CU MM
LYMPHOCYTES RELATIVE PERCENT: 17.1 % (ref 24–44)
MCH RBC QN AUTO: 30.3 PG (ref 27–31)
MCHC RBC AUTO-ENTMCNC: 32.3 % (ref 32–36)
MCV RBC AUTO: 93.8 FL (ref 78–100)
METHEMOGLOBIN ARTERIAL: 1 %
MONOCYTES ABSOLUTE: 0.5 K/CU MM
MONOCYTES RELATIVE PERCENT: 15.6 % (ref 0–4)
NITRITE URINE, QUANTITATIVE: NEGATIVE
NUCLEATED RBC %: 0 %
O2 SATURATION: 89.7 % (ref 96–97)
OPIATES, URINE: NEGATIVE
OXYCODONE: ABNORMAL
PCO2 ARTERIAL: 40 MMHG (ref 32–45)
PDW BLD-RTO: 14.1 % (ref 11.7–14.9)
PH BLOOD: 7.46 (ref 7.34–7.45)
PH, URINE: 5 (ref 5–8)
PHENCYCLIDINE, URINE: NEGATIVE
PLATELET # BLD: 140 K/CU MM (ref 140–440)
PMV BLD AUTO: 9.7 FL (ref 7.5–11.1)
PO2 ARTERIAL: 73 MMHG (ref 75–100)
POTASSIUM SERPL-SCNC: 4 MMOL/L (ref 3.5–5.1)
PRO-BNP: 466 PG/ML
PROCALCITONIN: 0.17
PROTEIN UA: 100 MG/DL
RBC # BLD: 4.68 M/CU MM (ref 4.6–6.2)
RBC URINE: <1 /HPF (ref 0–3)
SEGMENTED NEUTROPHILS ABSOLUTE COUNT: 2 K/CU MM
SEGMENTED NEUTROPHILS RELATIVE PERCENT: 63.5 % (ref 36–66)
SODIUM BLD-SCNC: 138 MMOL/L (ref 135–145)
SPECIFIC GRAVITY UA: 1.03 (ref 1–1.03)
SQUAMOUS EPITHELIAL: 1 /HPF
TOTAL IMMATURE NEUTOROPHIL: 0.03 K/CU MM
TOTAL NUCLEATED RBC: 0 K/CU MM
TOTAL PROTEIN: 6.3 GM/DL (ref 6.4–8.2)
TRICHOMONAS: ABNORMAL /HPF
TROPONIN T: 0.01 NG/ML
UROBILINOGEN, URINE: NORMAL MG/DL (ref 0.2–1)
WBC # BLD: 3.2 K/CU MM (ref 4–10.5)
WBC UA: <1 /HPF (ref 0–2)

## 2020-08-08 PROCEDURE — 82140 ASSAY OF AMMONIA: CPT

## 2020-08-08 PROCEDURE — 83605 ASSAY OF LACTIC ACID: CPT

## 2020-08-08 PROCEDURE — 96375 TX/PRO/DX INJ NEW DRUG ADDON: CPT

## 2020-08-08 PROCEDURE — 85025 COMPLETE CBC W/AUTO DIFF WBC: CPT

## 2020-08-08 PROCEDURE — 6360000004 HC RX CONTRAST MEDICATION: Performed by: EMERGENCY MEDICINE

## 2020-08-08 PROCEDURE — 81001 URINALYSIS AUTO W/SCOPE: CPT

## 2020-08-08 PROCEDURE — 80307 DRUG TEST PRSMV CHEM ANLYZR: CPT

## 2020-08-08 PROCEDURE — 36415 COLL VENOUS BLD VENIPUNCTURE: CPT

## 2020-08-08 PROCEDURE — U0002 COVID-19 LAB TEST NON-CDC: HCPCS

## 2020-08-08 PROCEDURE — 2060000000 HC ICU INTERMEDIATE R&B

## 2020-08-08 PROCEDURE — 87040 BLOOD CULTURE FOR BACTERIA: CPT

## 2020-08-08 PROCEDURE — 36600 WITHDRAWAL OF ARTERIAL BLOOD: CPT

## 2020-08-08 PROCEDURE — 96361 HYDRATE IV INFUSION ADD-ON: CPT

## 2020-08-08 PROCEDURE — 71275 CT ANGIOGRAPHY CHEST: CPT

## 2020-08-08 PROCEDURE — 74177 CT ABD & PELVIS W/CONTRAST: CPT

## 2020-08-08 PROCEDURE — 76376 3D RENDER W/INTRP POSTPROCES: CPT

## 2020-08-08 PROCEDURE — 96366 THER/PROPH/DIAG IV INF ADDON: CPT

## 2020-08-08 PROCEDURE — 82962 GLUCOSE BLOOD TEST: CPT

## 2020-08-08 PROCEDURE — 84484 ASSAY OF TROPONIN QUANT: CPT

## 2020-08-08 PROCEDURE — 70450 CT HEAD/BRAIN W/O DYE: CPT

## 2020-08-08 PROCEDURE — 6360000002 HC RX W HCPCS: Performed by: EMERGENCY MEDICINE

## 2020-08-08 PROCEDURE — 96365 THER/PROPH/DIAG IV INF INIT: CPT

## 2020-08-08 PROCEDURE — 2580000003 HC RX 258: Performed by: EMERGENCY MEDICINE

## 2020-08-08 PROCEDURE — G0480 DRUG TEST DEF 1-7 CLASSES: HCPCS

## 2020-08-08 PROCEDURE — 82803 BLOOD GASES ANY COMBINATION: CPT

## 2020-08-08 PROCEDURE — 80053 COMPREHEN METABOLIC PANEL: CPT

## 2020-08-08 PROCEDURE — 99285 EMERGENCY DEPT VISIT HI MDM: CPT

## 2020-08-08 PROCEDURE — 93005 ELECTROCARDIOGRAM TRACING: CPT | Performed by: EMERGENCY MEDICINE

## 2020-08-08 PROCEDURE — 83880 ASSAY OF NATRIURETIC PEPTIDE: CPT

## 2020-08-08 PROCEDURE — 84145 PROCALCITONIN (PCT): CPT

## 2020-08-08 PROCEDURE — 96367 TX/PROPH/DG ADDL SEQ IV INF: CPT

## 2020-08-08 RX ORDER — SODIUM CHLORIDE 9 MG/ML
INJECTION, SOLUTION INTRAVENOUS CONTINUOUS
Status: CANCELLED | OUTPATIENT
Start: 2020-08-08

## 2020-08-08 RX ORDER — FENTANYL CITRATE 50 UG/ML
25 INJECTION, SOLUTION INTRAMUSCULAR; INTRAVENOUS ONCE
Status: COMPLETED | OUTPATIENT
Start: 2020-08-08 | End: 2020-08-08

## 2020-08-08 RX ORDER — 0.9 % SODIUM CHLORIDE 0.9 %
30 INTRAVENOUS SOLUTION INTRAVENOUS ONCE
Status: COMPLETED | OUTPATIENT
Start: 2020-08-08 | End: 2020-08-08

## 2020-08-08 RX ADMIN — SODIUM CHLORIDE 1113 ML: 9 INJECTION, SOLUTION INTRAVENOUS at 20:22

## 2020-08-08 RX ADMIN — IOPAMIDOL 95 ML: 755 INJECTION, SOLUTION INTRAVENOUS at 15:22

## 2020-08-08 RX ADMIN — FENTANYL CITRATE 25 MCG: 50 INJECTION INTRAMUSCULAR; INTRAVENOUS at 23:28

## 2020-08-08 RX ADMIN — CEFTRIAXONE SODIUM 1 G: 1 INJECTION, POWDER, FOR SOLUTION INTRAMUSCULAR; INTRAVENOUS at 16:56

## 2020-08-08 RX ADMIN — AZITHROMYCIN DIHYDRATE 500 MG: 500 INJECTION, POWDER, LYOPHILIZED, FOR SOLUTION INTRAVENOUS at 17:53

## 2020-08-08 ASSESSMENT — PAIN SCALES - GENERAL: PAINLEVEL_OUTOF10: 7

## 2020-08-08 NOTE — ED TRIAGE NOTES
Patient found by son unconscious. When EMS arrived patient awake and O2 sat was 88. Patient brought in on nonrebreather at 15L/min.

## 2020-08-08 NOTE — ED PROVIDER NOTES
(gastroesophageal reflux disease)     Gout     left ankle and right shoulder    Hyperlipidemia     Hypertension     MI (myocardial infarction) [facility])     May 2013       CURRENT MEDICATIONS  [unfilled]    ALLERGIES  Allergies   Allergen Reactions    Sulfa Antibiotics Hives       SURGICAL HISTORY  Past Surgical History:   Procedure Laterality Date    BACK SURGERY      CARDIAC SURGERY      stents x 1    CHOLECYSTECTOMY      May 2013    DILATATION, ESOPHAGUS      HAND SURGERY      JOINT REPLACEMENT      SINUS SURGERY      sinus surgery x 2    SINUS SURGERY      TOTAL KNEE ARTHROPLASTY Bilateral        FAMILY HISTORY  History reviewed. No pertinent family history.     SOCIAL HISTORY  Social History     Socioeconomic History    Marital status:      Spouse name: None    Number of children: None    Years of education: None    Highest education level: None   Occupational History    None   Social Needs    Financial resource strain: None    Food insecurity     Worry: None     Inability: None    Transportation needs     Medical: None     Non-medical: None   Tobacco Use    Smoking status: Never Smoker    Smokeless tobacco: Never Used   Substance and Sexual Activity    Alcohol use: No    Drug use: No    Sexual activity: None   Lifestyle    Physical activity     Days per week: None     Minutes per session: None    Stress: None   Relationships    Social connections     Talks on phone: None     Gets together: None     Attends Orthodoxy service: None     Active member of club or organization: None     Attends meetings of clubs or organizations: None     Relationship status: None    Intimate partner violence     Fear of current or ex partner: None     Emotionally abused: None     Physically abused: None     Forced sexual activity: None   Other Topics Concern    None   Social History Narrative    None         **Past medical, family and social histories, and nursing notes reviewed and verified by Questions Right +2  Dysarthric/Intubated/ Trauma/Language Barrier +1  Aphasic +2     1C: 'Blink Eyes' & 'Squeeze Hands'(Pantomime Commands if Communication Barrier)  Performs Both Tasks0  Performs 1 Task+1  Performs 0 Tasks+2     2: Test Horizontal Extraocular Movements  Normal 0  Partial Gaze Palsy: Can Be Overcome +1  Partial Gaze Palsy: Corrects with Oculocephalic Reflex +1  Forced Gaze Palsy: Cannot Be Overcome +2     3: Test Visual Fields  No Visual Loss 0  Partial Hemianopia +1  Complete Hemianopia +2  Patient is Bilaterally Blind +3  Bilateral Hemianopia +3     4: Test Facial Palsy(Use Grimace if Obtunded)  Normal symmetry 0  Minor paralysis (flat nasolabial fold, smile asymmetry) +1  Partial paralysis (lower face) +2  Unilateral Complete paralysis (upper/lower face) +3  Bilateral Complete paralysis (upper/lower face) +3     5A: Test Left Arm Motor Drift  Amputation/Joint Fusion 0  No Drift for 10 Seconds 0  Drift, but doesn't hit bed +1  Drift, hits bed +2  Some Effort Against Gravity +2  No Effort Against Gravity +3  No Movement +4     5B:  Test Right Arm Motor Drift  Amputation/Joint Fusion 0  No Drift for 10 Seconds 0  Drift, but doesn't hit bed +1  Drift, hits bed +2  Some Effort Against Gravity +2  No Effort Against Gravity +3  No Movement +4     6A: Test Left Leg Motor Drift  Amputation/Joint Fusion 0  No Drift for 5 Seconds 0  Drift, but doesn't hit bed +1  Drift, hits bed +2  Some Effort Against Gravity +2  No Effort Against Gravity +3  No Movement +4     6B: Test Right Leg Motor Drift  Amputation/Joint Fusion 0  No Drift for 5 Seconds 0  Drift, but doesn't hit bed +1  Drift, hits bed +2  Some Effort Against Gravity +2  No Effort Against Gravity +3  No Movement +4     7: Test Limb Ataxia(FTN/Heel-Shin)  Amputation/Joint Fusion 0  Does Not Understand 0  Paralyzed 0  No Ataxia 0  Ataxia in 1 Limb +1  Ataxia in 2 Limbs +2     8: Test Sensation  Normal; No sensory loss 0  Mild-Moderate Loss: Less Sharp/More Dull +1  Mild-Moderate Loss: Can Sense Being Touched +1  Complete Loss: Cannot Sense Being Touched At All +2  No Response and Quadriplegic +2  Coma/Unresponsive +2     9: Test Language/Aphasia (Describe the scene; name the words; read the sentences)  Normal; No aphasia 0  Mild-Moderate Aphasia: Some Obvious Changes, Without Significant Limitation +1  Severe Aphasia: Fragmentary Expression, Inference Needed, Cannot Identify  Materials +2  Mute/Global Aphasia: No Usable Speech/Auditory Comprehension +3  Coma/Unresponsive +3     10: Test Dysarthria (Read the words)  Intubated/Unable to Test 0  Normal 0  Mild-Moderate Dysarthria: Slurring but can be understood +1  Severe Dysarthria: Unintelligble Slurring or Out of Proportion to Dysphasia +2  Mute/Anarthric +2     11: Test Extinction/Inattention  No abnormality 0  Visual/tactile/auditory/spatial/personal inattention +1  Extinction to bilateral simultaneous stimulation +1  Profound richard-inattention (ex: does not recognize own hand) +2  Extinction to >1 modality +2     NIH score is 0. EKG Interpretation    Interpreted by emergency department physician    Rhythm: normal sinus   Rate: normal  Axis: left  Ectopy: none  Conduction: normal  ST Segments: normal  T Waves: normal  Q Waves: none    Clinical Impression: Normal sinus rhythm with left axis deviation, otherwise unremarkable EKG. There is no prolonged QTc interval on manual calculation in the manually calculated QTc interval is 455 ms.         RADIOLOGY/PROCEDURES/LABS/MEDICATIONS ADMINISTERED:    I have reviewed and interpreted all of the currently available lab results from this visit (if applicable):  Results for orders placed or performed during the hospital encounter of 08/08/20   CBC Auto Differential   Result Value Ref Range    WBC 3.2 (L) 4.0 - 10.5 K/CU MM    RBC 4.68 4.6 - 6.2 M/CU MM    Hemoglobin 14.2 13.5 - 18.0 GM/DL    Hematocrit 43.9 42 - 52 %    MCV 93.8 78 - 100 FL    MCH 30.3 27 - 31 PG    MCHC 32.3 32.0 - 36.0 %    RDW 14.1 11.7 - 14.9 %    Platelets 342 828 - 273 K/CU MM    MPV 9.7 7.5 - 11.1 FL    Differential Type AUTOMATED DIFFERENTIAL     Segs Relative 63.5 36 - 66 %    Lymphocytes % 17.1 (L) 24 - 44 %    Monocytes % 15.6 (H) 0 - 4 %    Eosinophils % 2.2 0 - 3 %    Basophils % 0.6 0 - 1 %    Segs Absolute 2.0 K/CU MM    Lymphocytes Absolute 0.5 K/CU MM    Monocytes Absolute 0.5 K/CU MM    Eosinophils Absolute 0.1 K/CU MM    Basophils Absolute 0.0 K/CU MM    Nucleated RBC % 0.0 %    Total Nucleated RBC 0.0 K/CU MM    Total Immature Neutrophil 0.03 K/CU MM    Immature Neutrophil % 1.0 (H) 0 - 0.43 %   Comprehensive Metabolic Panel w/ Reflex to MG   Result Value Ref Range    Sodium 138 135 - 145 MMOL/L    Potassium 4.0 3.5 - 5.1 MMOL/L    Chloride 99 99 - 110 mMol/L    CO2 28 21 - 32 MMOL/L    BUN 18 6 - 23 MG/DL    CREATININE 1.2 0.9 - 1.3 MG/DL    Glucose 211 (H) 70 - 99 MG/DL    Calcium 8.5 8.3 - 10.6 MG/DL    Alb 3.7 3.4 - 5.0 GM/DL    Total Protein 6.3 (L) 6.4 - 8.2 GM/DL    Total Bilirubin 0.6 0.0 - 1.0 MG/DL    ALT 26 10 - 40 U/L    AST 38 (H) 15 - 37 IU/L    Alkaline Phosphatase 63 40 - 129 IU/L    GFR Non- 58 (L) >60 mL/min/1.73m2    GFR African American >60 >60 mL/min/1.73m2    Anion Gap 11 4 - 16   Troponin   Result Value Ref Range    Troponin T 0.013 (H) <0.01 NG/ML   Brain Natriuretic Peptide   Result Value Ref Range    Pro-.0 (H) <300 PG/ML   ABG Blood Gas, Arterial   Result Value Ref Range    pH, Bld 7.46 (H) 7.34 - 7.45    pCO2, Arterial 40.0 32 - 45 MMHG    pO2, Arterial 73 (L) 75 - 100 MMHG    Base Exc, Mixed 4.2 (H) 0 - 1.2    HCO3, Arterial 28.4 (H) 18 - 23 MMOL/L    CO2 Content 29.6 (H) 19 - 24 MMOL/L    O2 Sat 89.7 (L) 96 - 97 %    Carbon Monoxide, Blood 5.7 (H) 0 - 5 %    Methemoglobin, Arterial 1.0 <1.5 %    Comment VBG    Ammonia Level   Result Value Ref Range    Ammonia 43 16 - 60 UMOL/L   Ethanol   Result Value Ref Range    Alcohol Scrn <0.01 <0.01 %WT/VOL   Drug screen multi urine   Result Value Ref Range    Cannabinoid Scrn, Ur NEGATIVE NEGATIVE    Amphetamines NEGATIVE NEGATIVE    Cocaine Metabolite NEGATIVE NEGATIVE    Benzodiazepine Screen, Urine NEGATIVE NEGATIVE    Barbiturate Screen, Ur NEGATIVE NEGATIVE    Opiates, Urine NEGATIVE NEGATIVE    Phencyclidine, Urine NEGATIVE NEGATIVE    Oxycodone UNCONFIRMED POSITIVE (A) NEGATIVE   POC Blood Glucose   Result Value Ref Range    Glucose 198 mg/dL   POCT Glucose   Result Value Ref Range    POC Glucose 175 (H) 70 - 99 MG/DL   EKG 12 Lead   Result Value Ref Range    Ventricular Rate 96 BPM    Atrial Rate 96 BPM    P-R Interval 178 ms    QRS Duration 96 ms    Q-T Interval 384 ms    QTc Calculation (Bazett) 485 ms    P Axis 44 degrees    R Axis -23 degrees    T Axis 42 degrees    Diagnosis       Normal sinus rhythm  Prolonged QT  Abnormal ECG  When compared with ECG of 28-MAR-2019 08:25,  No significant change was found            ABNORMAL LABS:  Labs Reviewed   CBC WITH AUTO DIFFERENTIAL - Abnormal; Notable for the following components:       Result Value    WBC 3.2 (*)     Lymphocytes % 17.1 (*)     Monocytes % 15.6 (*)     Immature Neutrophil % 1.0 (*)     All other components within normal limits   COMPREHENSIVE METABOLIC PANEL W/ REFLEX TO MG FOR LOW K - Abnormal; Notable for the following components:    Glucose 211 (*)     Total Protein 6.3 (*)     AST 38 (*)     GFR Non- 58 (*)     All other components within normal limits   TROPONIN - Abnormal; Notable for the following components:    Troponin T 0.013 (*)     All other components within normal limits   BRAIN NATRIURETIC PEPTIDE - Abnormal; Notable for the following components:    Pro-.0 (*)     All other components within normal limits   BLOOD GAS, ARTERIAL - Abnormal; Notable for the following components:    pH, Bld 7.46 (*)     pO2, Arterial 73 (*)     Base Exc, Mixed 4.2 (*)     HCO3, Arterial 28.4 (*)     CO2 Content 29.6 (*)     O2 Sat 89.7 (*)     Carbon Monoxide, Blood 5.7 (*)     All other components within normal limits   URINE DRUG SCREEN - Abnormal; Notable for the following components:    Oxycodone UNCONFIRMED POSITIVE (*)     All other components within normal limits   POCT GLUCOSE - Abnormal; Notable for the following components:    POC Glucose 175 (*)     All other components within normal limits   POCT GLUCOSE - Normal   CULTURE, BLOOD 1   CULTURE, BLOOD 2   AMMONIA   ETHANOL   URINALYSIS WITH MICROSCOPIC   COVID-19   PROCALCITONIN   LACTATE, SEPSIS   LACTATE, SEPSIS         IMAGING STUDIES ORDERED:  CT HEAD WO CONTRAST  CTA PULMONARY W CONTRAST  CT ABDOMEN PELVIS W IV CONTRAST  CT LUMBAR RECONSTRUCTION WO POST PROCESS  IP CONSULT TO HOSPITALIST  TELEMETRY MONITORING  PATIENT STATUS (DIRECT)  IP CONSULT TO ENDOCRINOLOGY  NOTIFY PHYSICIAN (SPECIFY)  HYPOGLYCEMIA TREATMENT: BG LESS THAN 50 MG/DL AND PATIENT ALERT  HYPOGLYCEMIA TREATMENT: BG LESS THAN 70 MG/DL AND PATIENT ALERT  HYPOGLYCEMIA TREATMENT: BG LESS THAN 70 MG/DL AND PATIENT NOT ALERT  VITAL SIGNS  NOTIFY PHYSICIAN (SPECIFY)  UP WITH ASSISTANCE  NURSING COMMUNICATION  REASON FOR NO MECHANICAL VTE PROPHYLAXIS  FULL CODE  DIET CARB CONTROL  DROPLET PLUS ISOLATION  PT EVAL AND TREAT  OT EVAL AND TREAT    I have personally viewed the imaging studies. The radiologist interpretation is:   CT ABDOMEN PELVIS W IV CONTRAST Additional Contrast? None   Preliminary Result   Low lung volumes with mild elevation of the left hemidiaphragm. There are   basilar predominant airspace opacities, left greater than right. These are   new from the prior chest CT. While this could represent atelectasis in the   setting of low lung volumes, infection is not excluded. Poor contrast opacification of the pulmonary arteries. No central pulmonary   arterial filling defect is seen. No evidence of right heart strain. No acute abnormality in the abdomen or pelvis.   No acute fracture or   traumatic malalignment in the lumbar spine. 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 CTA PULMONARY W CONTRAST   Preliminary Result   Low lung volumes with mild elevation of the left hemidiaphragm. There are   basilar predominant airspace opacities, left greater than right. These are   new from the prior chest CT. While this could represent atelectasis in the   setting of low lung volumes, infection is not excluded. Poor contrast opacification of the pulmonary arteries. No central pulmonary   arterial filling defect is seen. No evidence of right heart strain. No acute abnormality in the abdomen or pelvis. No acute fracture or   traumatic malalignment in the lumbar spine. 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 CT LUMBAR RECONSTRUCTION WO POST PROCESS   Preliminary Result   Low lung volumes with mild elevation of the left hemidiaphragm. There are   basilar predominant airspace opacities, left greater than right. These are   new from the prior chest CT. While this could represent atelectasis in the   setting of low lung volumes, infection is not excluded. Poor contrast opacification of the pulmonary arteries. No central pulmonary   arterial filling defect is seen. No evidence of right heart strain. No acute abnormality in the abdomen or pelvis. No acute fracture or   traumatic malalignment in the lumbar spine. 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 CT Head WO Contrast   Final Result   No acute intracranial abnormality.                MEDICATIONS ADMINISTERED:  Medications   azithromycin (ZITHROMAX) 500 mg in dextrose 5 % 250 mL IVPB (500 mg Intravenous New Bag 8/8/20 7292)   iopamidol (ISOVUE-370) 76 % injection 75 mL (95 mLs Intravenous Given 8/8/20 1522)   cefTRIAXone (ROCEPHIN) 1 g IVPB in 50 mL D5W minibag (0 g Intravenous Stopped 8/8/20 3160)         COURSE & MEDICAL DECISION MAKING  Last vitals: BP (!) 131/58   Pulse 95   Temp 99.6 °F (37.6 °C) (Oral)   Resp 26   Ht 5' 10.5\" (1.791 m)   Wt 250 lb (113.4 kg)   SpO2 100%   BMI 35.36 kg/m²     28-year-old male with altered mental status who was noted to have an unresponsive episode at home of unclear etiology. Also noted to have some mild hypoxia with oxygen saturation upper 80s. There is some concern for possible respiratory infection and cannot exclude pneumonia. COVID-19 testing is pending as well. Patient is been started on antibiotics for this. There is no evidence for pulmonary embolism. No evidence for an acute coronary syndrome at this time. Is satting well on 2 L O2 via nasal cannula. No focal neurological deficits no clinical evidence to suggest stroke or additional acute neurological process. Differential diagnoses considered included, but were not limited to, acute bronchospasm, allergic reaction/anaphylaxis, acute respiratory infection, pneumonia, acute coronary syndrome, dysrhythmia, congestive heart failure, non-CHF-related pulmonary edema, pneumothorax, pulmonary embolism, and anemia. Additional workup and treatment in the ED as documented above. Pt will be admitted for further evaluation and treatment. I discussed the case with hospitalist, who agreed to admit the patient. Plan discussed with pt and/or family who expressed understanding and agreement with plan. Admitted in stable condition. Clinical Impression:  1. Hypoxia    2. Altered mental status, unspecified altered mental status type    3.  Unresponsive episode        Disposition referral (if applicable):  [place]  [id number] [address] [address] [address]  [phone]            Disposition medications (if applicable):  New Prescriptions    No medications on file       ED Provider Disposition Time  DISPOSITION            Electronically signed by: Leandro Nolasco M.D.,

## 2020-08-08 NOTE — H&P
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 Weston Do in 2018 (this has an off-label use for primary biliary cholangitis. No medical records available at this time. Other Problems    Coronary artery disease with prior insertion of one stent - OM1in 2013 by Dr. Alexandra Noguera  -He reports that he has been off of aspirin lately because of trouble with his stomach  -He is on Plavix apparently for CAD  -Recent cath looked very good    Chronic kidney disease per chart  -He has seen Dr. Gonzales Siddiqi in the past (the   -His mucous membranes are dry and lactic acid level is high on admission - ordered fluids    Hypertension  -Continue losartan question  -Continue metoprolol question    Diabetes mellitus type 2 since 1995-  -Glimepiride held  -Metformin held  -Continue insulin aspart 15 units 3 times a day  -Continue insulin glargine 60 units daily  -Consult endocrinology    Dyslipidemia-continue Lipitor and fenofibrate    Obesity - Body mass index is 37.2 kg/m². History of tobacco use - not much at all, quit a very long time ago when young    Chronic back pain - he has had 3 back surgeries  -held gabapentin and Percocet for now due to mental status change  -he gets 4 Percocet 7.5 per day per OARRS report    History of Present Illness:     Chief Complaint:   Mirta Garcia is a 79 y.o.  male  who presents with a fall    When asked what brought the patient to the hospital he replied \"I fell a couple of times. My balance is bad. Sometimes I cannot even walk without a walker. When I asked him how long he has had this balance problem he seems to think maybe about 6 months. He reports \"it is not all the time\". He was found unresponsive at home. When the squad arrived to pick him up he was hypoxic.  The ED physician was concerned about a respiratory infection because of this and because imaging was not normal (see above). I asked him if he was short of breath, and all he could tell me was \"once in a while\". I could not get him to tell me whether or not his shortness of breath has worsened the past few days. He did endorse that he has been coughing for about a week. Nothing comes up. Denies any fever. I asked him if he has been feeling sick or has had malaise lately and he replied to that yes, maybe he thought he was on \"too much medication\". He stated that he had his legs operated on recently because the veins in them had a problem. He reports that the legs were pretty swollen and painful. When I spoke with the ED physician I was given the following history: The patient was picked up by the EMS. He gave them very limited history. His son gave history to the EMS. He was allegedly found unresponsive with an O2 sat of 88%. He was initially placed on a nonrebreather mask. When he arrived to the ED he was awake and knew who he was. He denied shortness of breath upon arrival.  He has chronic back pain. Imaging in the ED showed infiltrates in the chest, and there was some concern for sepsis. COVID test was sent by ED. On 8/9 I was able to reach his daughter who indicated that the patient has been confused for a week. He would go to sleep okay, then he would wake up in the room saying things like he had just not notarized some titles when in face he had been sleeping all night. His daughter reports that one of his knees almost gave out the other day. He has had a bilateral knee replacement, on one side he has had it done twice. He has also had 3 or 4 back surgeries per daughter.     Surrogate decision maker: His daughter Alejandro Kilpatrick he states    CODE STATUS: He wants to be a full code    Living well: He reports that he has 1 and stated that \"I have not paid attention to it in a while\" so he is not sure what is in it    Pharmacy: [facility] on [address] [place] [address]     10-14 point ROS reviewed negative, unless as noted above    Objective:   No intake or output data in the 24 hours ending 08/08/20 1727   Vitals:   Vitals:    08/08/20 1456   BP:    Pulse: 95   Resp: 26   Temp:    SpO2: 100%     Physical Exam:    GEN Awake male, sitting upright in bed, well-developed and well-nourished, Body mass index is 37.2 kg/m². EYES No scleral erythema, discharge, or conjunctivitis. RESP Clear to auscultation, no wheezes, rales or rhonchi. Symmetric chest movement while on room air. CARDIO/VASC S1/S2 auscultated. Regular rate without appreciable murmurs, rubs, or gallops. GI Abdomen is soft without significant tenderness   Hong catheter is not present. MSK there is some redness, edema, and tenderness of the left lower extremity  NEURO Cranial nerves appear grossly intact, normal speech, no lateralizing weakness. PSYCH  Affect appropriate. Past Medical History: PMHx:   Past Medical History:   Diagnosis Date    Arthritis     Diabetes mellitus ([address]     GERD (gastroesophageal reflux disease)     Gout     left ankle and right shoulder    Hyperlipidemia     Hypertension     MI (myocardial infarction) [facility])     May 2013     PSHx:  has a past surgical history that includes Total knee arthroplasty (Bilateral); back surgery; Cholecystectomy; Cardiac surgery; Dilatation, esophagus; sinus surgery; joint replacement; Hand surgery; and sinus surgery. Allergies: Allergies   Allergen Reactions    Sulfa Antibiotics Hives     Home Medications:   Prior to Admission medications    Medication Sig Start Date End Date Taking?  Authorizing Provider   torsemide (DEMADEX) 20 MG tablet  1/26/20   Historical Provider, MD   clotrimazole (LOTRIMIN) 1 % cream  11/4/19   Historical Provider, MD   exenatide (BYETTA) 5 MCG/0.02ML injection Inject 5 mcg into the skin 2 times daily (with meals)    Historical Provider, MD   Calcium Carb-Cholecalciferol (CALCIUM-VITAMIN D) 500-200 MG-UNIT per tablet Take 1 tablet by mouth 2 times daily (with meals)    Historical Provider, MD   cholestyramine (QUESTRAN) 4 g packet Take 1 packet by mouth 3 times daily (with meals)    Historical Provider, MD   metoprolol tartrate (LOPRESSOR) 25 MG tablet Take 50 mg by mouth 2 times daily    Historical Provider, MD   pantoprazole sodium (PROTONIX) 40 MG PACK packet Take 40 mg by mouth every morning (before breakfast)    Historical Provider, MD   furosemide (LASIX) 20 MG tablet Take 2 tablets by mouth daily 10/30/19   Yaneli Chakraborty MD   colestipol (COLESTID) 1 g tablet Take 1 g by mouth 3 times daily    Historical Provider, MD   ranolazine (RANEXA) 500 MG extended release tablet Take 500 mg by mouth 2 times daily    Historical Provider, MD   losartan (COZAAR) 50 MG tablet Take 50 mg by mouth daily    Historical Provider, MD   insulin aspart (NOVOLOG) 100 UNIT/ML injection vial Inject into the skin 3 times daily (before meals)    Historical Provider, MD   atorvastatin (LIPITOR) 20 MG tablet Take 20 mg by mouth daily. Historical Provider, MD   clopidogrel (PLAVIX) 75 MG tablet Take 75 mg by mouth daily. Historical Provider, MD   gabapentin (NEURONTIN) 300 MG capsule Take 400 mg by mouth 3 times daily. Historical Provider, MD   metFORMIN (GLUCOPHAGE) 1000 MG tablet Take 1,000 mg by mouth 2 times daily (with meals). Historical Provider, MD   fenofibrate 160 MG tablet Take 160 mg by mouth daily. Historical Provider, MD   aspirin 325 MG tablet Take 81 mg by mouth daily. Historical Provider, MD   oxyCODONE-acetaminophen (PERCOCET) 7.5-325 MG per tablet Take 1 tablet by mouth every 8 hours as needed. Historical Provider, MD   fluticasone (FLONASE) 50 MCG/ACT nasal spray 1 spray by Nasal route 2 times daily. Historical Provider, MD   omeprazole (PRILOSEC) 20 MG capsule Take 20 mg by mouth Daily.     Historical Provider, MD   glimepiride (AMARYL) 4 MG tablet Take 4 mg by mouth 2 times daily. Historical Provider, MD   insulin glargine (LANTUS) 100 UNIT/ML injection Inject 60 Units into the skin every evening     Historical Provider, MD   citalopram (CELEXA) 10 MG tablet Take 20 mg by mouth daily. Historical Provider, MD   ORPHENADRINE CITRATE PO Take 100 mg by mouth 2 times daily. Historical Provider, MD     FHx: family history is not on file. not very reliable due to his mental status change  SHx:   Social History     Socioeconomic History    Marital status:      Spouse name: None    Number of children: None    Years of education: None    Highest education level: None   Occupational History    None   Social Needs    Financial resource strain: None    Food insecurity     Worry: None     Inability: None    Transportation needs     Medical: None     Non-medical: None   Tobacco Use    Smoking status: Never Smoker    Smokeless tobacco: Never Used   Substance and Sexual Activity    Alcohol use: No    Drug use: No    Sexual activity: None   Lifestyle    Physical activity     Days per week: None     Minutes per session: None    Stress: None   Relationships    Social connections     Talks on phone: None     Gets together: None     Attends Yarsani service: None     Active member of club or organization: None     Attends meetings of clubs or organizations: None     Relationship status: None    Intimate partner violence     Fear of current or ex partner: None     Emotionally abused: None     Physically abused: None     Forced sexual activity: None   Other Topics Concern    None   Social History Narrative    None     I spoke with the ED physician, and we decided to admit him    HGB is 14.1    Electronically signed by Gala Munguia MD on 8/8/2020 at 5:27 PM

## 2020-08-08 NOTE — ED NOTES
Bed: ED-16  Expected date:   Expected time:   Means of arrival:   Comments:  [address] [place] [address], TIA  08/08/20 4331

## 2020-08-09 LAB
ABO/RH: NORMAL
ADENOVIRUS DETECTION BY PCR: NOT DETECTED
ALBUMIN SERPL-MCNC: 3.9 GM/DL (ref 3.4–5)
ALP BLD-CCNC: 62 IU/L (ref 40–129)
ALT SERPL-CCNC: 27 U/L (ref 10–40)
ANION GAP SERPL CALCULATED.3IONS-SCNC: 12 MMOL/L (ref 4–16)
ANTIBODY SCREEN: NEGATIVE
APTT: 39.7 SECONDS (ref 25.1–37.1)
AST SERPL-CCNC: 45 IU/L (ref 15–37)
BASOPHILS ABSOLUTE: 0 K/CU MM
BASOPHILS RELATIVE PERCENT: 0.7 % (ref 0–1)
BILIRUB SERPL-MCNC: 0.5 MG/DL (ref 0–1)
BORDETELLA PARAPERTUSSIS BY PCR: NOT DETECTED
BORDETELLA PERTUSSIS PCR: NOT DETECTED
BUN BLDV-MCNC: 13 MG/DL (ref 6–23)
CALCIUM SERPL-MCNC: 8.4 MG/DL (ref 8.3–10.6)
CHLAMYDOPHILA PNEUMONIA PCR: NOT DETECTED
CHLORIDE BLD-SCNC: 102 MMOL/L (ref 99–110)
CO2: 25 MMOL/L (ref 21–32)
CORONAVIRUS 229E PCR: NOT DETECTED
CORONAVIRUS HKU1 PCR: NOT DETECTED
CORONAVIRUS NL63 PCR: NOT DETECTED
CORONAVIRUS OC43 PCR: NOT DETECTED
CREAT SERPL-MCNC: 1 MG/DL (ref 0.9–1.3)
D DIMER: 279 NG/ML(DDU)
DIFFERENTIAL TYPE: ABNORMAL
EOSINOPHILS ABSOLUTE: 0 K/CU MM
EOSINOPHILS RELATIVE PERCENT: 1 % (ref 0–3)
ESTIMATED AVERAGE GLUCOSE: 206 MG/DL
FERRITIN: 59 NG/ML (ref 30–400)
FIBRINOGEN LEVEL: 293 MG/DL (ref 196.9–442.1)
FOLATE: 18.2 NG/ML (ref 3.1–17.5)
GFR AFRICAN AMERICAN: >60 ML/MIN/1.73M2
GFR NON-AFRICAN AMERICAN: >60 ML/MIN/1.73M2
GLUCOSE BLD-MCNC: 175 MG/DL (ref 70–99)
GLUCOSE BLD-MCNC: 181 MG/DL (ref 70–99)
GLUCOSE BLD-MCNC: 189 MG/DL (ref 70–99)
GLUCOSE BLD-MCNC: 195 MG/DL (ref 70–99)
GLUCOSE BLD-MCNC: 209 MG/DL (ref 70–99)
GLUCOSE BLD-MCNC: 243 MG/DL (ref 70–99)
HBA1C MFR BLD: 8.8 % (ref 4.2–6.3)
HCT VFR BLD CALC: 42.9 % (ref 42–52)
HEMOGLOBIN: 14.1 GM/DL (ref 13.5–18)
HIGH SENSITIVE C-REACTIVE PROTEIN: 10.9 MG/L
HUMAN METAPNEUMOVIRUS PCR: NOT DETECTED
IMMATURE NEUTROPHIL %: 1 % (ref 0–0.43)
INFLUENZA A BY PCR: NOT DETECTED
INFLUENZA A H1 (2009) PCR: NOT DETECTED
INFLUENZA A H1 PANDEMIC PCR: NOT DETECTED
INFLUENZA A H3 PCR: NOT DETECTED
INFLUENZA B BY PCR: NOT DETECTED
INR BLD: 1.08 INDEX
IRON: 47 UG/DL (ref 59–158)
LACTATE DEHYDROGENASE: 257 IU/L (ref 120–246)
LEGIONELLA URINARY AG: NEGATIVE
LYMPHOCYTES ABSOLUTE: 0.8 K/CU MM
LYMPHOCYTES RELATIVE PERCENT: 27.2 % (ref 24–44)
MCH RBC QN AUTO: 30.1 PG (ref 27–31)
MCHC RBC AUTO-ENTMCNC: 32.9 % (ref 32–36)
MCV RBC AUTO: 91.5 FL (ref 78–100)
MONOCYTES ABSOLUTE: 0.5 K/CU MM
MONOCYTES RELATIVE PERCENT: 16.7 % (ref 0–4)
MYCOPLASMA PNEUMONIAE PCR: NOT DETECTED
NUCLEATED RBC %: 0 %
PARAINFLUENZA 1 PCR: NOT DETECTED
PARAINFLUENZA 2 PCR: NOT DETECTED
PARAINFLUENZA 3 PCR: NOT DETECTED
PARAINFLUENZA 4 PCR: NOT DETECTED
PCT TRANSFERRIN: 14 % (ref 10–44)
PDW BLD-RTO: 14 % (ref 11.7–14.9)
PLATELET # BLD: 101 K/CU MM (ref 140–440)
PMV BLD AUTO: 10.2 FL (ref 7.5–11.1)
POTASSIUM SERPL-SCNC: 4 MMOL/L (ref 3.5–5.1)
PROCALCITONIN: 0.16
PROTHROMBIN TIME: 13.1 SECONDS (ref 11.7–14.5)
RBC # BLD: 4.69 M/CU MM (ref 4.6–6.2)
RHINOVIRUS ENTEROVIRUS PCR: NOT DETECTED
RSV PCR: NOT DETECTED
SEGMENTED NEUTROPHILS ABSOLUTE COUNT: 1.6 K/CU MM
SEGMENTED NEUTROPHILS RELATIVE PERCENT: 53.4 % (ref 36–66)
SODIUM BLD-SCNC: 139 MMOL/L (ref 135–145)
STREP PNEUMONIAE ANTIGEN: NORMAL
TOTAL IMMATURE NEUTOROPHIL: 0.03 K/CU MM
TOTAL IRON BINDING CAPACITY: 340 UG/DL (ref 250–450)
TOTAL NUCLEATED RBC: 0 K/CU MM
TOTAL PROTEIN: 6.1 GM/DL (ref 6.4–8.2)
TSH HIGH SENSITIVITY: 2.23 UIU/ML (ref 0.27–4.2)
UNSATURATED IRON BINDING CAPACITY: 293 UG/DL (ref 110–370)
VITAMIN B-12: 521.8 PG/ML (ref 211–911)
WBC # BLD: 3.1 K/CU MM (ref 4–10.5)

## 2020-08-09 PROCEDURE — 86901 BLOOD TYPING SEROLOGIC RH(D): CPT

## 2020-08-09 PROCEDURE — 87486 CHLMYD PNEUM DNA AMP PROBE: CPT

## 2020-08-09 PROCEDURE — 83540 ASSAY OF IRON: CPT

## 2020-08-09 PROCEDURE — 84145 PROCALCITONIN (PCT): CPT

## 2020-08-09 PROCEDURE — 87449 NOS EACH ORGANISM AG IA: CPT

## 2020-08-09 PROCEDURE — 82607 VITAMIN B-12: CPT

## 2020-08-09 PROCEDURE — 6360000002 HC RX W HCPCS: Performed by: INTERNAL MEDICINE

## 2020-08-09 PROCEDURE — 82746 ASSAY OF FOLIC ACID SERUM: CPT

## 2020-08-09 PROCEDURE — 85610 PROTHROMBIN TIME: CPT

## 2020-08-09 PROCEDURE — 85379 FIBRIN DEGRADATION QUANT: CPT

## 2020-08-09 PROCEDURE — 83036 HEMOGLOBIN GLYCOSYLATED A1C: CPT

## 2020-08-09 PROCEDURE — 85025 COMPLETE CBC W/AUTO DIFF WBC: CPT

## 2020-08-09 PROCEDURE — 6360000002 HC RX W HCPCS: Performed by: PHYSICIAN ASSISTANT

## 2020-08-09 PROCEDURE — 6360000002 HC RX W HCPCS: Performed by: HOSPITALIST

## 2020-08-09 PROCEDURE — 83615 LACTATE (LD) (LDH) ENZYME: CPT

## 2020-08-09 PROCEDURE — 87581 M.PNEUMON DNA AMP PROBE: CPT

## 2020-08-09 PROCEDURE — 93010 ELECTROCARDIOGRAM REPORT: CPT | Performed by: INTERNAL MEDICINE

## 2020-08-09 PROCEDURE — 1200000000 HC SEMI PRIVATE

## 2020-08-09 PROCEDURE — 6370000000 HC RX 637 (ALT 250 FOR IP): Performed by: INTERNAL MEDICINE

## 2020-08-09 PROCEDURE — 2060000000 HC ICU INTERMEDIATE R&B

## 2020-08-09 PROCEDURE — 86141 C-REACTIVE PROTEIN HS: CPT

## 2020-08-09 PROCEDURE — 87798 DETECT AGENT NOS DNA AMP: CPT

## 2020-08-09 PROCEDURE — 87040 BLOOD CULTURE FOR BACTERIA: CPT

## 2020-08-09 PROCEDURE — 85730 THROMBOPLASTIN TIME PARTIAL: CPT

## 2020-08-09 PROCEDURE — 82728 ASSAY OF FERRITIN: CPT

## 2020-08-09 PROCEDURE — 83605 ASSAY OF LACTIC ACID: CPT

## 2020-08-09 PROCEDURE — 82962 GLUCOSE BLOOD TEST: CPT

## 2020-08-09 PROCEDURE — 87899 AGENT NOS ASSAY W/OPTIC: CPT

## 2020-08-09 PROCEDURE — 84443 ASSAY THYROID STIM HORMONE: CPT

## 2020-08-09 PROCEDURE — 6370000000 HC RX 637 (ALT 250 FOR IP): Performed by: HOSPITALIST

## 2020-08-09 PROCEDURE — 86850 RBC ANTIBODY SCREEN: CPT

## 2020-08-09 PROCEDURE — 80053 COMPREHEN METABOLIC PANEL: CPT

## 2020-08-09 PROCEDURE — 87633 RESP VIRUS 12-25 TARGETS: CPT

## 2020-08-09 PROCEDURE — 85384 FIBRINOGEN ACTIVITY: CPT

## 2020-08-09 PROCEDURE — 2580000003 HC RX 258: Performed by: INTERNAL MEDICINE

## 2020-08-09 PROCEDURE — 94761 N-INVAS EAR/PLS OXIMETRY MLT: CPT

## 2020-08-09 PROCEDURE — 83550 IRON BINDING TEST: CPT

## 2020-08-09 PROCEDURE — 86900 BLOOD TYPING SEROLOGIC ABO: CPT

## 2020-08-09 RX ORDER — NICOTINE POLACRILEX 4 MG
15 LOZENGE BUCCAL PRN
Status: DISCONTINUED | OUTPATIENT
Start: 2020-08-09 | End: 2020-08-13 | Stop reason: HOSPADM

## 2020-08-09 RX ORDER — FUROSEMIDE 10 MG/ML
20 INJECTION INTRAMUSCULAR; INTRAVENOUS 2 TIMES DAILY
Status: DISCONTINUED | OUTPATIENT
Start: 2020-08-09 | End: 2020-08-11

## 2020-08-09 RX ORDER — LOSARTAN POTASSIUM 50 MG/1
50 TABLET ORAL DAILY
Status: DISCONTINUED | OUTPATIENT
Start: 2020-08-09 | End: 2020-08-09

## 2020-08-09 RX ORDER — OXYCODONE HYDROCHLORIDE 5 MG/1
5 TABLET ORAL EVERY 4 HOURS PRN
Status: DISCONTINUED | OUTPATIENT
Start: 2020-08-09 | End: 2020-08-13 | Stop reason: HOSPADM

## 2020-08-09 RX ORDER — INSULIN GLARGINE 100 [IU]/ML
30 INJECTION, SOLUTION SUBCUTANEOUS NIGHTLY
Status: DISCONTINUED | OUTPATIENT
Start: 2020-08-09 | End: 2020-08-12

## 2020-08-09 RX ORDER — CLOPIDOGREL BISULFATE 75 MG/1
75 TABLET ORAL DAILY
Status: DISCONTINUED | OUTPATIENT
Start: 2020-08-09 | End: 2020-08-13 | Stop reason: HOSPADM

## 2020-08-09 RX ORDER — CHLORTHALIDONE 25 MG/1
12.5 TABLET ORAL DAILY
Status: DISCONTINUED | OUTPATIENT
Start: 2020-08-09 | End: 2020-08-13 | Stop reason: HOSPADM

## 2020-08-09 RX ORDER — CITALOPRAM 20 MG/1
20 TABLET ORAL DAILY
Status: DISCONTINUED | OUTPATIENT
Start: 2020-08-09 | End: 2020-08-13 | Stop reason: HOSPADM

## 2020-08-09 RX ORDER — METOPROLOL TARTRATE 50 MG/1
50 TABLET, FILM COATED ORAL 2 TIMES DAILY
Status: DISCONTINUED | OUTPATIENT
Start: 2020-08-09 | End: 2020-08-13 | Stop reason: HOSPADM

## 2020-08-09 RX ORDER — LOSARTAN POTASSIUM 50 MG/1
100 TABLET ORAL DAILY
Status: DISCONTINUED | OUTPATIENT
Start: 2020-08-10 | End: 2020-08-13 | Stop reason: HOSPADM

## 2020-08-09 RX ORDER — PROMETHAZINE HYDROCHLORIDE 25 MG/1
12.5 TABLET ORAL EVERY 6 HOURS PRN
Status: DISCONTINUED | OUTPATIENT
Start: 2020-08-09 | End: 2020-08-13 | Stop reason: HOSPADM

## 2020-08-09 RX ORDER — ZIPRASIDONE MESYLATE 20 MG/ML
10 INJECTION, POWDER, LYOPHILIZED, FOR SOLUTION INTRAMUSCULAR ONCE
Status: DISCONTINUED | OUTPATIENT
Start: 2020-08-09 | End: 2020-08-13 | Stop reason: HOSPADM

## 2020-08-09 RX ORDER — AMLODIPINE BESYLATE 5 MG/1
5 TABLET ORAL 2 TIMES DAILY
Status: DISCONTINUED | OUTPATIENT
Start: 2020-08-09 | End: 2020-08-13 | Stop reason: HOSPADM

## 2020-08-09 RX ORDER — HYDROXYZINE HYDROCHLORIDE 50 MG/ML
50 INJECTION, SOLUTION INTRAMUSCULAR ONCE
Status: COMPLETED | OUTPATIENT
Start: 2020-08-09 | End: 2020-08-09

## 2020-08-09 RX ORDER — TIZANIDINE 4 MG/1
4 TABLET ORAL EVERY 6 HOURS PRN
Status: ON HOLD | COMMUNITY
End: 2020-08-13 | Stop reason: HOSPADM

## 2020-08-09 RX ORDER — DEXTROSE MONOHYDRATE 50 MG/ML
100 INJECTION, SOLUTION INTRAVENOUS PRN
Status: DISCONTINUED | OUTPATIENT
Start: 2020-08-09 | End: 2020-08-13 | Stop reason: HOSPADM

## 2020-08-09 RX ORDER — ONDANSETRON 2 MG/ML
4 INJECTION INTRAMUSCULAR; INTRAVENOUS EVERY 6 HOURS PRN
Status: DISCONTINUED | OUTPATIENT
Start: 2020-08-09 | End: 2020-08-13 | Stop reason: HOSPADM

## 2020-08-09 RX ORDER — SODIUM CHLORIDE 9 MG/ML
INJECTION, SOLUTION INTRAVENOUS CONTINUOUS
Status: DISCONTINUED | OUTPATIENT
Start: 2020-08-09 | End: 2020-08-09

## 2020-08-09 RX ORDER — POLYETHYLENE GLYCOL 3350 17 G/17G
17 POWDER, FOR SOLUTION ORAL DAILY PRN
Status: DISCONTINUED | OUTPATIENT
Start: 2020-08-09 | End: 2020-08-13 | Stop reason: HOSPADM

## 2020-08-09 RX ORDER — DEXTROSE MONOHYDRATE 25 G/50ML
12.5 INJECTION, SOLUTION INTRAVENOUS PRN
Status: DISCONTINUED | OUTPATIENT
Start: 2020-08-09 | End: 2020-08-13 | Stop reason: HOSPADM

## 2020-08-09 RX ORDER — SODIUM CHLORIDE 0.9 % (FLUSH) 0.9 %
10 SYRINGE (ML) INJECTION PRN
Status: DISCONTINUED | OUTPATIENT
Start: 2020-08-09 | End: 2020-08-13 | Stop reason: HOSPADM

## 2020-08-09 RX ORDER — SPIRONOLACTONE 25 MG/1
25 TABLET ORAL DAILY
Status: DISCONTINUED | OUTPATIENT
Start: 2020-08-09 | End: 2020-08-13 | Stop reason: HOSPADM

## 2020-08-09 RX ORDER — DOXAZOSIN MESYLATE 4 MG/1
4 TABLET ORAL ONCE
Status: COMPLETED | OUTPATIENT
Start: 2020-08-09 | End: 2020-08-09

## 2020-08-09 RX ORDER — ZIPRASIDONE MESYLATE 20 MG/ML
INJECTION, POWDER, LYOPHILIZED, FOR SOLUTION INTRAMUSCULAR
Status: DISCONTINUED
Start: 2020-08-09 | End: 2020-08-09 | Stop reason: WASHOUT

## 2020-08-09 RX ORDER — PANTOPRAZOLE SODIUM 40 MG/1
40 TABLET, DELAYED RELEASE ORAL
Status: DISCONTINUED | OUTPATIENT
Start: 2020-08-09 | End: 2020-08-13 | Stop reason: HOSPADM

## 2020-08-09 RX ORDER — ASPIRIN 81 MG/1
81 TABLET, CHEWABLE ORAL DAILY
Status: DISCONTINUED | OUTPATIENT
Start: 2020-08-09 | End: 2020-08-13 | Stop reason: HOSPADM

## 2020-08-09 RX ORDER — INSULIN GLARGINE 100 [IU]/ML
0.25 INJECTION, SOLUTION SUBCUTANEOUS NIGHTLY
Status: DISCONTINUED | OUTPATIENT
Start: 2020-08-09 | End: 2020-08-09

## 2020-08-09 RX ORDER — SODIUM CHLORIDE 0.9 % (FLUSH) 0.9 %
10 SYRINGE (ML) INJECTION EVERY 12 HOURS SCHEDULED
Status: DISCONTINUED | OUTPATIENT
Start: 2020-08-09 | End: 2020-08-13 | Stop reason: HOSPADM

## 2020-08-09 RX ORDER — ATORVASTATIN CALCIUM 20 MG/1
20 TABLET, FILM COATED ORAL DAILY
Status: DISCONTINUED | OUTPATIENT
Start: 2020-08-09 | End: 2020-08-13

## 2020-08-09 RX ORDER — ACETAMINOPHEN 650 MG/1
650 SUPPOSITORY RECTAL EVERY 6 HOURS PRN
Status: DISCONTINUED | OUTPATIENT
Start: 2020-08-09 | End: 2020-08-10 | Stop reason: SDUPTHER

## 2020-08-09 RX ORDER — BUSPIRONE HYDROCHLORIDE 10 MG/1
10 TABLET ORAL 3 TIMES DAILY
COMMUNITY

## 2020-08-09 RX ORDER — ACETAMINOPHEN 325 MG/1
650 TABLET ORAL EVERY 6 HOURS PRN
Status: DISCONTINUED | OUTPATIENT
Start: 2020-08-09 | End: 2020-08-10 | Stop reason: SDUPTHER

## 2020-08-09 RX ORDER — FENOFIBRATE 160 MG/1
160 TABLET ORAL DAILY
Status: DISCONTINUED | OUTPATIENT
Start: 2020-08-09 | End: 2020-08-13 | Stop reason: HOSPADM

## 2020-08-09 RX ADMIN — OXYCODONE HYDROCHLORIDE 5 MG: 5 TABLET ORAL at 12:32

## 2020-08-09 RX ADMIN — ENOXAPARIN SODIUM 30 MG: 30 INJECTION SUBCUTANEOUS at 01:31

## 2020-08-09 RX ADMIN — INSULIN GLARGINE 30 UNITS: 100 INJECTION, SOLUTION SUBCUTANEOUS at 21:37

## 2020-08-09 RX ADMIN — SODIUM CHLORIDE, PRESERVATIVE FREE 10 ML: 5 INJECTION INTRAVENOUS at 21:37

## 2020-08-09 RX ADMIN — DOXAZOSIN 4 MG: 4 TABLET ORAL at 15:08

## 2020-08-09 RX ADMIN — CITALOPRAM HYDROBROMIDE 20 MG: 20 TABLET ORAL at 08:02

## 2020-08-09 RX ADMIN — FUROSEMIDE 20 MG: 10 INJECTION, SOLUTION INTRAMUSCULAR; INTRAVENOUS at 10:14

## 2020-08-09 RX ADMIN — FENOFIBRATE 160 MG: 160 TABLET ORAL at 10:14

## 2020-08-09 RX ADMIN — OXYCODONE HYDROCHLORIDE 5 MG: 5 TABLET ORAL at 21:41

## 2020-08-09 RX ADMIN — PANTOPRAZOLE SODIUM 40 MG: 40 TABLET, DELAYED RELEASE ORAL at 06:08

## 2020-08-09 RX ADMIN — ENOXAPARIN SODIUM 30 MG: 30 INJECTION SUBCUTANEOUS at 08:02

## 2020-08-09 RX ADMIN — ATORVASTATIN CALCIUM 20 MG: 20 TABLET, FILM COATED ORAL at 08:02

## 2020-08-09 RX ADMIN — OYSTER SHELL CALCIUM WITH VITAMIN D 1 TABLET: 500; 200 TABLET, FILM COATED ORAL at 08:02

## 2020-08-09 RX ADMIN — OYSTER SHELL CALCIUM WITH VITAMIN D 1 TABLET: 500; 200 TABLET, FILM COATED ORAL at 18:09

## 2020-08-09 RX ADMIN — FUROSEMIDE 20 MG: 10 INJECTION, SOLUTION INTRAMUSCULAR; INTRAVENOUS at 18:09

## 2020-08-09 RX ADMIN — CEFTRIAXONE SODIUM 1 G: 1 INJECTION, POWDER, FOR SOLUTION INTRAMUSCULAR; INTRAVENOUS at 18:08

## 2020-08-09 RX ADMIN — AZITHROMYCIN MONOHYDRATE 500 MG: 500 INJECTION, POWDER, LYOPHILIZED, FOR SOLUTION INTRAVENOUS at 18:45

## 2020-08-09 RX ADMIN — AMLODIPINE BESYLATE 5 MG: 5 TABLET ORAL at 21:36

## 2020-08-09 RX ADMIN — AMLODIPINE BESYLATE 5 MG: 5 TABLET ORAL at 12:32

## 2020-08-09 RX ADMIN — LOSARTAN POTASSIUM 50 MG: 50 TABLET, FILM COATED ORAL at 08:02

## 2020-08-09 RX ADMIN — HYDROXYZINE HYDROCHLORIDE 50 MG: 50 INJECTION, SOLUTION INTRAMUSCULAR at 06:08

## 2020-08-09 RX ADMIN — CLOPIDOGREL BISULFATE 75 MG: 75 TABLET ORAL at 08:02

## 2020-08-09 RX ADMIN — CHLORTHALIDONE 12.5 MG: 25 TABLET ORAL at 13:49

## 2020-08-09 RX ADMIN — OXYCODONE HYDROCHLORIDE 5 MG: 5 TABLET ORAL at 17:34

## 2020-08-09 RX ADMIN — ENOXAPARIN SODIUM 30 MG: 30 INJECTION SUBCUTANEOUS at 21:37

## 2020-08-09 RX ADMIN — METOPROLOL TARTRATE 50 MG: 50 TABLET, FILM COATED ORAL at 01:32

## 2020-08-09 RX ADMIN — METOPROLOL TARTRATE 50 MG: 50 TABLET, FILM COATED ORAL at 21:36

## 2020-08-09 RX ADMIN — SPIRONOLACTONE 25 MG: 25 TABLET ORAL at 12:32

## 2020-08-09 RX ADMIN — ASPIRIN 81 MG CHEWABLE TABLET 81 MG: 81 TABLET CHEWABLE at 08:02

## 2020-08-09 RX ADMIN — SODIUM CHLORIDE: 9 INJECTION, SOLUTION INTRAVENOUS at 01:31

## 2020-08-09 RX ADMIN — INSULIN GLARGINE 30 UNITS: 100 INJECTION, SOLUTION SUBCUTANEOUS at 01:31

## 2020-08-09 RX ADMIN — METOPROLOL TARTRATE 50 MG: 50 TABLET, FILM COATED ORAL at 08:02

## 2020-08-09 ASSESSMENT — PAIN SCALES - GENERAL
PAINLEVEL_OUTOF10: 0
PAINLEVEL_OUTOF10: 8
PAINLEVEL_OUTOF10: 1
PAINLEVEL_OUTOF10: 7
PAINLEVEL_OUTOF10: 2
PAINLEVEL_OUTOF10: 7

## 2020-08-09 ASSESSMENT — PAIN DESCRIPTION - PAIN TYPE: TYPE: CHRONIC PAIN

## 2020-08-09 ASSESSMENT — PAIN DESCRIPTION - ONSET: ONSET: ON-GOING

## 2020-08-09 ASSESSMENT — PAIN DESCRIPTION - DESCRIPTORS: DESCRIPTORS: ACHING

## 2020-08-09 ASSESSMENT — PAIN DESCRIPTION - FREQUENCY: FREQUENCY: INTERMITTENT

## 2020-08-09 ASSESSMENT — PAIN - FUNCTIONAL ASSESSMENT: PAIN_FUNCTIONAL_ASSESSMENT: ACTIVITIES ARE NOT PREVENTED

## 2020-08-09 ASSESSMENT — PAIN DESCRIPTION - ORIENTATION: ORIENTATION: OTHER (COMMENT)

## 2020-08-09 ASSESSMENT — PAIN DESCRIPTION - PROGRESSION: CLINICAL_PROGRESSION: NOT CHANGED

## 2020-08-09 ASSESSMENT — PAIN DESCRIPTION - LOCATION: LOCATION: GENERALIZED

## 2020-08-09 NOTE — CONSULTS
[address], [address] [facility]                                  CONSULTATION    PATIENT NAME: Roman Logan                       :        1940  MED REC NO:   [id number]                          ROOM:         ACCOUNT NO:   [de-identified]                           ADMIT DATE: 2020  PROVIDER:     Aba Velázquez MD    CONSULT DATE:  2020    CONSULT REQUESTED BY:  Kenneth Linder M.D. REASON FOR CONSULT:  Uncontrolled hypertension. BRIEF HISTORY:  The patient is a 43-year-old male with a history of  diabetes, apparently coronary artery disease and chronic pain syndrome,  on t.i.d. Percocet. He was brought to the emergency room after being  found unconscious and unresponsive at home. The detail is unknown to  me, but he tells me that he has no idea how he ended up here. Otherwise, he was able to give me a very good comprehensive history. It  is quite possible that he could not remember anyone of those things if  he is unresponsive, of course. What I gathered from [address] [place] [address] Rd, other healthcare provider, nursing report,  etc., that the son found him unresponsive, but he was breathing and had  pulse and brought to the emergency room. When the squad was there, he  also complained of shortness of breath and apparently was hypoxic. In the emergency room, available data suggests he was hypertensive. Blood pressure was more than 200/100, although this is an automatic  blood pressure. He does have a history of hypertension and underwent  several diagnostic tests, which include imaging and biochemical.   Imaging study, which include several CTs of the abdomen and pelvis with  IV contrast, CT angiogram, as well as CT of the head, showed cirrhosis. His bladder was a little bit distended and has a Hong catheter now that  was placed after the CT was done and the splenomegaly and questionable  infiltrative process. Biochemical testing showed acceptable  electrolytes. Creatinine is 1.0, slightly high sugar. LDH is 257 and a  little bit of low white count of 3.1; otherwise, normal H and H. His  respiratory viral panel was negative and lactate of 12.2. He was  subsequently admitted to [facility] to rule out SARS-CoV-2 because of his CT  finding and other symptomatology. The patient sees Dr. Padmaja Collado. He does have CKD stage III and  long-standing hypertension. He was managed on losartan therapy. He  also goes to see Dr. Emely Avendaño for chronic pain syndrome, mainly for  osteoarthritis. He is not very active for the last 20 years since he  retired from [employer] here at [employer]. PAST MEDICAL HISTORY:  1.  CKD, stage III, stable it looks like. 2.  Diabetes. He said he was diagnosed in 1995, had been on insulin  therapy since then. Unsure whether he has any retinopathy, but may have  some proteinuria. He said it is relatively well controlled. 3.  Hypertension, also long-standing. 4.  Coronary artery disease. He said he had an MI in 2013, which is  probably true because I see Dr. Marshal Moore's note. He also had a cath  looks like in 10/2019 and he has a 30% LAD lesion and mid diagonal  region looks like he also had a left circumflex and obtuse marginal.  He  has stent, which is patent, so he does have coronary artery disease and  his recent cath looked okay, patent stent. He has relatively preserved  left ventricular ejection fraction as of 2013. He probably had some  other outpatient echo, I am assuming since it is not in the Epic system. 5.  Hyperlipidemia. 6.  He has obstructive sleep apnea. He does not use any CPAP machine at  home. 7.  Chronic pain syndrome, some of them from osteoarthritis. As I  mentioned earlier, he is on opioid as well as tizanidine. 8.  Mood disorder. 9.  Apparently had 3 kidney stones, one time he needed lithotripsy.   I  am unsure whether he has any other significant medical history. This is  what he told me and I could figure out from the [address] [place] [address] Rd. PAST SURGICAL HISTORY:  1. Of course, heart cath x2, most recent one in 10/2019 as mentioned  earlier. Patent stent and no intervention was done. 2.  Bilateral knee replacement surgery from the scar I could see. When  I asked him, he said he did have it and that makes sense. He is not  aware of any other major surgery. I did not any other scar when I  examined him. HABITS:  He said he quit smoking long time ago. He has up to  10-pack-year history. No history of alcohol or illicit drug abuse. SOCIAL HISTORY:  He is . His wife  in 2017. He lives at  home. Apparently, his daughter and son-in-law lives with him. He used  to be a  at [employer]. He is  retired in [address]. He has not been very active for the last 20 years,  according to him. He does not really walk much either. FAMILY MEDICAL HISTORY:  Dad  of cancer at age 76. Mother lived  long time,  naturally and sister has diabetes. Rest of the family  member apparently healthy. ALLERGIES:  He is listed allergic to SULFA. CURRENT MEDICATIONS:  Here in the hospital include he is on Lasix 20 mg  b.i.d., losartan 100 mg daily, he is on Rocephin and azithromycin,  calcium and vitamin D, he is also on metoprolol and Protonix. REVIEW OF SYSTEMS:  Apparently, he was unconscious, unresponsive, etc.   No fever, chills or rigors, had some shortness of breath. He is looking  better now. He could not remember any other symptoms. Rest of the  review of systems is negative or as in previous paragraph. PHYSICAL EXAMINATION:  VITAL SIGNS:  At the time of examination show, his temperature, his  T-max is 99.6, blood pressure on monitor 200/100, of course, I did not  have a chance to do a manual, I will try later today or tomorrow.   He is  making good urine as he had dilated urine in the Hong Further plan will be dictated by  the clinical course.         Lety Marina MD    D: 08/09/2020 12:01:59       T: 08/09/2020 13:27:12     [email or web address]  Job#: [id number]     Doc#: [id number]    CC:

## 2020-08-09 NOTE — PROGRESS NOTES
GI Note:  Patient admitted with recent confusion and weakness. CT showed cirrhotic liver contour without ascites- also noted in 2015  Spoke with daughter who states he has been taking a lot of Sami Ree lately for diarrhea.  She also states that he has had esophageal dilatations in the past at [facility]- she thinks by Dr Yolande Mckeon.  -Dr Yolande Mckeon will see in am and follow  -serologic liver workup ordered

## 2020-08-09 NOTE — PROGRESS NOTES
Came into pt's room around 4:50pm to draw blood cultures. Pt was confused again, and started to try to get out of bed. I called Rubia Marie, and he still was not settling down, saying he was not getting back in his bed. I called Dr. Lisbet Torres, and he ordered a one time dose of Geodon IM. Pt's daughter, Bhargav Michael, returned my call, and was able to speak to the pt and help him to calm down. Oxycodone was given to pt. Holding off on Geodon for now. Pt is currently resting in bed quietly. Will continue to monitor the pt.

## 2020-08-09 NOTE — PROGRESS NOTES
Patient arrived to unit from er per er cart. Alert and orient times 4. [place] to room, call light, staff, meals, lights, tv, and meals and no questions at this time. Skin assessment completed with TIA Fields and   No areas noted at this time.

## 2020-08-09 NOTE — ACP (ADVANCE CARE PLANNING)
Advance Care Planning     Advance Care Planning Activator (Inpatient)  Conversation Note      Date of ACP Conversation: 8/8/2020    Conversation Conducted with: Patient with Decision Making Capacity    ACP Activator: [address] makes decisions on behalf of the incapacitated patient: Decision Maker is asked to consider and make decisions based on patient values, known preferences, or best interests. Health Care Decision Maker:     Current Designated Health Care Decision Maker:   Primary Decision Maker: Waqar Mail - Child - [phone]  (If there is a [address] named in the \"Healthcare Decision Makers\" box in the ACP activity, but it is not visible above, be sure to open that field and then select the health care decision maker relationship (ie \"primary\") in the blank space to the right of the name.) Validate  this information as still accurate & up-to-date; edit WHOOP 8 field as needed.)    Note: Assess and validate information in current ACP documents, as indicated. If no Decision Maker listed above or available through scanned documents, then:    If no Authorized Decision Maker has previously been identified, then patient chooses [address]:  \"Who would you like to name as your primary health care decision-maker? \"               Name: Elise Bills        Relationship: dtr          Phone number: [phone]  \"Can this person be reached easily? \" Yes  \"Who would you like to name as your back-up decision maker? \"   Name:         Relationship:           Phone number:   \"Can this person be reached easily? \" No    Note: If the relationship of these Decision-Makers to the patient does NOT follow your state's Next of Kin hierarchy, recommend that patient complete ACP document that meets state-specific requirements to allow them to act on the patient's behalf when appropriate. Care Preferences    Ventilation:   \"If you were in your review and signature  [] POLST/POST/MOLST/MOST prepared for Provider review and signature      Follow-up plan:    [] Schedule follow-up conversation to continue planning  [] Referred individual to Provider for additional questions/concerns   [] Advised patient/agent/surrogate to review completed ACP document and update if needed with changes in condition, patient preferences or care setting    [] This note routed to one or more involved healthcare providers     Pt states his dtr/Katie is his MPOA, states he brought paperwork in before. Information has not been scanned into pt chart advised to bring in so it can be scanned into pt chart.

## 2020-08-09 NOTE — CONSULTS
Endocrinology   Consult Note  Dear Doctor Selvin Banks     Thank You for the Consult     Pt. Was Admitted for : Unstable gait patient fell down few times when picked up by squad he was hypoxic and was unresponsive  Patient admitted to [facility]  unit as  rule out    Reason for Consult: Better control of blood glucose      History Obtained From:  Patient/ EMR       HISTORY OF PRESENT ILLNESS:                The patient is a 79 y.o. male with significant past medical history of diabetes mellitus, GERD, hyperlipidemia, hypertension, MI, CAD and had a stent and cirrhosis of liver admitted as patient has unstable gait and fell down few times in the squad arrived she was hypoxic and unresponsive. But he recovered the consciousness very soon. I was  consulted for better control of blood glucose. ROS:   Pt's ROS done in detail. Abnormal ROS are noted in Medical and Surgical History Section below: Other Medical History:        Diagnosis Date    Arthritis     Diabetes mellitus ([address]     GERD (gastroesophageal reflux disease)     Gout     left ankle and right shoulder    Hyperlipidemia     Hypertension     MI (myocardial infarction) [facility])     May 2013     Surgical History:        Procedure Laterality Date    BACK SURGERY      CARDIAC SURGERY      stents x 1    CHOLECYSTECTOMY      May 2013    DILATATION, ESOPHAGUS      HAND SURGERY      JOINT REPLACEMENT      SINUS SURGERY      sinus surgery x 2    SINUS SURGERY      TOTAL KNEE ARTHROPLASTY Bilateral        Allergies:  Sulfa antibiotics    Family History:   History reviewed. No pertinent family history.   REVIEW OF SYSTEMS:  Review of System Done as noted above     PHYSICAL EXAM:      Vitals:    BP (!) 224/109   Pulse 82   Temp 97.7 °F (36.5 °C) (Oral)   Resp 17   Ht 5' 10.5\" (1.791 m)   Wt 263 lb (119.3 kg)   SpO2 95%   BMI 37.20 kg/m²     CONSTITUTIONAL:  awake, alert, cooperative, appears stated age   EYES:  vision intact Fundoscopic Exam not performed   ENT:Normal  NECK:  Supple, No JVD. Thyroid Exam:Normal   LUNGS:  Has Vesicular Breath Sounds, has some wheezing and rales and diminished breath sounds  CARDIOVASCULAR:  Normal apical impulse, regular rate and rhythm, normal S1 and S2, no S3 or S4, and has no  murmur   ABDOMEN:  No scars, normal bowel sounds, soft, non-distended, non-tender, no masses palpated, no hepatolienomegaly  Musculoskeletal: Normal  Extremities: Normal, peripheral pulses normal, , has no edema   NEUROLOGIC:  Awake, alert, oriented to name, place and time. Cranial nerves II-XII are grossly intact. Motor is  intact. Sensory ? ? Peripheral neuropathy t. ,  and gait is abnormal.  And unstable    DATA:    CBC:   Recent Labs     08/08/20  1358 08/09/20  0530   WBC 3.2* 3.1*   HGB 14.2 14.1    101*    CMP:  Recent Labs     08/08/20  1358 08/09/20  0530    139   K 4.0 4.0   CL 99 102   CO2 28 25   BUN 18 13   CREATININE 1.2 1.0   CALCIUM 8.5 8.4   PROT 6.3* 6.1*   LABALBU 3.7 3.9   BILITOT 0.6 0.5   ALKPHOS 63 62   AST 38* 45*   ALT 26 27     Lipids:   Lab Results   Component Value Date    CHOL 129 05/14/2013    HDL 28 05/14/2013    TRIG 220 05/14/2013     Glucose:   Recent Labs     08/08/20  1423 08/09/20  0124 08/09/20  0806   POCGLU 175* 243* 189*     Hemoglobin A1C:   Lab Results   Component Value Date    LABA1C 8.6 01/05/2014     Free T4:   Lab Results   Component Value Date    T4FREE 1.08 10/19/2015     Free T3:   Lab Results   Component Value Date    FT3 2.3 10/19/2015     TSH High Sensitivity:   Lab Results   Component Value Date    TSHHS 4.410 10/19/2015       Ct Head Wo Contrast    Result Date: 8/8/2020  EXAMINATION: CT OF THE HEAD WITHOUT CONTRAST  8/8/2020 2:51 pm   No acute intracranial abnormality.      Ct Abdomen Pelvis W Iv Contrast Additional Contrast? None    Result Date: 8/8/2020  EXAMINATION: CTA OF THE CHEST; CT OF THE ABDOMEN AND PELVIS WITH CONTRAST; CT OF THE LUMBAR SPINE WITHOUT CONTRAST 8/8/2020 3:12 pm; 8/8/2020 3:20 pm; 8/8/2020 2:52 pm .    Low lung volumes with mild elevation of the left hemidiaphragm. There are basilar predominant airspace opacities, left greater than right. These are new from the prior chest CT. While this could represent atelectasis in the setting of low lung volumes, infection is not excluded. Poor contrast opacification of the pulmonary arteries. No central pulmonary arterial filling defect is seen. No evidence of right heart strain. No acute abnormality in the abdomen or pelvis. No acute fracture or traumatic malalignment in the lumbar spine. 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 Cta Pulmonary W Contrast    Result Date: 8/8/2020  EXAMINATION: CTA OF THE CHEST; CT OF THE ABDOMEN AND PELVIS WITH CONTRAST; CT OF THE LUMBAR SPINE WITHOUT CONTRAST 8/8/2020 3:12 pm; 8/8/2020 3:20 pm; 8/8/2020 2:52 pm     .     Low lung volumes with mild elevation of the left hemidiaphragm. There are basilar predominant airspace opacities, left greater than right. These are new from the prior chest CT. While this could represent atelectasis in the setting of low lung volumes, infection is not excluded. Poor contrast opacification of the pulmonary arteries. No central pulmonary arterial filling defect is seen. No evidence of right heart strain. No acute abnormality in the abdomen or pelvis. No acute fracture or traumatic malalignment in the lumbar spine. 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        Scheduled Medicines   Medications:    aspirin  81 mg Oral Daily    atorvastatin  20 mg Oral Daily    calcium-vitamin D  1 tablet Oral BID WC    citalopram  20 mg Oral Daily    clopidogrel  75 mg Oral Daily    metoprolol tartrate  50 mg Oral BID    pantoprazole  40 mg Oral QAM AC    insulin lispro  0-6 Units Subcutaneous TID     sodium chloride flush  10 mL Intravenous 2 times per day    azithromycin  500 mg

## 2020-08-09 NOTE — ED NOTES
Granddaughter Sejal Treviño called, wants to be updated when PT is placed into a room. Her phone number is [phone]. PT is okay with giving updates to granddaughter.       Mikaela Ernst RN  08/08/20 1377

## 2020-08-09 NOTE — PROGRESS NOTES
[address] [place] [address]  HOSPITALIST PROGRESS NOTE                       Name:  Elsa Gibson /Age/Sex: 1940  (79 y.o. male)   MRN & CSN:  [id number] & [id number] Admission Date/Time: 2020  1:49 PM   Location:  -A Attending:  Shreya Byers MD                                                  HPI  Elsa Gibson is a 79 y.o. male who presents with encephalopathy/weakness    SUBJECTIVE  - awake, interactive, answered orientation questions appropriately but poor historian and per nurse has been agitated though out the night- trying to get out of the bed  -discussed with daughter over the phone who reports that he has been intermittent confused- mainly hallucinations, and also generalized weakness- could not get up yesterday- hence made decision to bring him to ED    10 point review of systems reviewed and negative unless noted above. ALLERGIES:   Allergies   Allergen Reactions    Sulfa Antibiotics Hives       PCP: Nevaeh Meredith MD    PAST MEDICAL HISTORY, SURGICAL HISTORY, SOCIAL HISTORY and  HOME MEDICATIONS all reviewed. OBJECTIVE  Vitals:    20 0033 20 0055 20 0430 20 0752   BP: (!) 204/102  (!) 209/115 (!) 224/109   Pulse: 105 100 78 82   Resp: 18  19 17   Temp: 98.2 °F (36.8 °C)  98.4 °F (36.9 °C) 97.7 °F (36.5 °C)   TempSrc: Oral  Oral Oral   SpO2:       Weight: 263 lb 10.7 oz (119.6 kg)  263 lb (119.3 kg)    Height: 5' 10.5\" (1.791 m)          PHYSICAL EXAM   GEN Awake male, sitting upright in bed in no apparent distress. EYES Pupils are equally round. No scleral erythema, discharge, or conjunctivitis. HENT Mucous membranes are moist. Oral pharynx without exudates, no evidence of thrush. NECK Supple, no apparent thyromegaly or masses. RESP Unlabored respiration, bilateral coarse breath sounds  CARDIO/VASC S1/S2 auscultated. Regular rate without appreciable murmurs, rubs, or gallops. No JVD or carotid bruits. Peripheral pulses equal bilaterally and palpable. positive peripheral edema. GI Abdomen is soft without significant tenderness, masses, or guarding. Bowel sounds are normoactive. Rectal exam deferred.  No costovertebral angle tenderness. Normal appearing external genitalia. HEME/LYMPH No palpable cervical lymphadenopathy and no hepatosplenomegaly. No petechiae or ecchymoses. MSK Spontaneous movement of all extremities. No gross joint deformities. SKIN Normal coloration, warm, dry. NEURO Cranial nerves appear grossly intact, normal speech, no lateralizing weakness. PSYCH Awake, alert, oriented x 4. Affect appropriate. INTAKE: No intake/output data recorded. OUTPUT: No intake/output data recorded.     LABS  Recent Labs     08/08/20  1358 08/09/20  0530   WBC 3.2* 3.1*   HGB 14.2 14.1   HCT 43.9 42.9    101*      Recent Labs     08/08/20  1358 08/09/20  0530    139   K 4.0 4.0   CL 99 102   CO2 28 25   BUN 18 13   CREATININE 1.2 1.0     Recent Labs     08/08/20  1358 08/09/20  0530   AST 38* 45*   ALT 26 27   BILITOT 0.6 0.5   ALKPHOS 63 62     Recent Labs     08/09/20  0530   INR 1.08     Recent Labs     08/08/20  1358   TROPONINT 0.013*          Abnormal labs for today noted      Imaging:     ECHO:    Microbiology:  Blood culture:    Urine culture:    Sputum culture:    Procedures done this admission:    MEDS  Scheduled Meds:   aspirin  81 mg Oral Daily    atorvastatin  20 mg Oral Daily    calcium-vitamin D  1 tablet Oral BID WC    citalopram  20 mg Oral Daily    clopidogrel  75 mg Oral Daily    metoprolol tartrate  50 mg Oral BID    pantoprazole  40 mg Oral QAM AC    insulin glargine  0.25 Units/kg Subcutaneous Nightly    insulin lispro  0.08 Units/kg Subcutaneous TID WC    insulin lispro  0-6 Units Subcutaneous TID WC    insulin lispro  0-3 Units Subcutaneous Nightly    sodium chloride flush  10 mL Intravenous 2 times per day    azithromycin  500 mg Intravenous Q24H    And    cefTRIAXone (ROCEPHIN) IV  1 g Intravenous Q24H    enoxaparin  30 mg Subcutaneous BID    furosemide  20 mg Intravenous BID    [START ON 8/10/2020] losartan  100 mg Oral Daily    fenofibrate  160 mg Oral Daily     Continuous Infusions:   dextrose       PRN Meds:glucose, dextrose, glucagon (rDNA), dextrose, sodium chloride flush, acetaminophen **OR** acetaminophen, polyethylene glycol, promethazine **OR** ondansetron        ASSESSMENT and PLAN  Hospital Day: 2    1-Acute encephalopathy- intermittent, mainly hallucinations and at times lethargic- ongoing for the last one week- CT head and ammonia non-acute, will get MRI brain. ?hypertensive encephalopathy given elevated BP on presentation  2-HTN crisis- SBP in 200s- restart home medications, if not improving will start cardene for better control  3-Reported hypoxemia with shortness of breath- on room air when seen- noted CXR- stop IVF and give dose of IV lasix, get echo  4-Lactic acidosis on presentation- doubt sepsis, repeat pending. 5-Lower extremity weakness with recurrent falls- multifactorial- including probable neuropathy and OA especially of the knees-  while in bed, was able to lift both legs against the gravity- noted CT L spine- ordered MRI L spine.   7-Liver cirrhosis per CT- GI consulted, appreciate input  8-Chronic pain syndrome- follows up with - on percocet- discussed with family, needs to stop tylenol going forward given cirrhosis    Other issues  -HTN  -CAD  -DM  -HLD                 Disp:     Diet DIET CARB CONTROL;   DVT Prophylaxis [] Lovenox, []  Heparin, [] SCDs, [] Ambulation   GI Prophylaxis [] PPI,  [] H2 Blocker,  [] Carafate,  [] Diet/Tube Feeds   Code Status Full Code   Disposition Patient requires continued admission due to encephalopathy   CMS Level of Risk [] Low, [x] Moderate,[]  High  Patient's risk as above due to encephalopathy     ALINA FORRESTER MD 8/9/2020 10:13 AM

## 2020-08-09 NOTE — ED NOTES
PT was assisted to the bedside commode. 2 person assist required. PT bed was cleaned up  and PT was changed into a gown. PT has difficultly using the urinal while he is in the bed and would prefer to sit up in bed but needs assistance leaning up.       Johnathan Dial RN  08/08/20 [id number]

## 2020-08-09 NOTE — ED NOTES
Report called to [facility] RN. All question answered.       Malini Robledo RN  08/09/20 0010

## 2020-08-10 LAB
ALBUMIN ELP: 3.1 GM/DL (ref 3.2–5.6)
ALBUMIN SERPL-MCNC: 3.6 GM/DL (ref 3.4–5)
ALP BLD-CCNC: 57 IU/L (ref 40–128)
ALPHA-1-GLOBULIN: 0.2 GM/DL (ref 0.1–0.4)
ALPHA-2-GLOBULIN: 0.8 GM/DL (ref 0.4–1.2)
ALT SERPL-CCNC: 30 U/L (ref 10–40)
ANION GAP SERPL CALCULATED.3IONS-SCNC: 11 MMOL/L (ref 4–16)
APTT: 39.9 SECONDS (ref 25.1–37.1)
AST SERPL-CCNC: 54 IU/L (ref 15–37)
BASOPHILS ABSOLUTE: 0 K/CU MM
BASOPHILS RELATIVE PERCENT: 0.6 % (ref 0–1)
BETA GLOBULIN: 0.8 GM/DL (ref 0.5–1.3)
BILIRUB SERPL-MCNC: 0.5 MG/DL (ref 0–1)
BUN BLDV-MCNC: 17 MG/DL (ref 6–23)
CALCIUM SERPL-MCNC: 8.6 MG/DL (ref 8.3–10.6)
CHLORIDE BLD-SCNC: 97 MMOL/L (ref 99–110)
CO2: 30 MMOL/L (ref 21–32)
CREAT SERPL-MCNC: 1.2 MG/DL (ref 0.9–1.3)
D DIMER: <200 NG/ML(DDU)
DIFFERENTIAL TYPE: ABNORMAL
EOSINOPHILS ABSOLUTE: 0 K/CU MM
EOSINOPHILS RELATIVE PERCENT: 0.3 % (ref 0–3)
FIBRINOGEN LEVEL: 274 MG/DL (ref 196.9–442.1)
GAMMA GLOBULIN: 1 GM/DL (ref 0.5–1.6)
GFR AFRICAN AMERICAN: >60 ML/MIN/1.73M2
GFR NON-AFRICAN AMERICAN: 58 ML/MIN/1.73M2
GLUCOSE BLD-MCNC: 152 MG/DL (ref 70–99)
GLUCOSE BLD-MCNC: 170 MG/DL (ref 70–99)
GLUCOSE BLD-MCNC: 189 MG/DL (ref 70–99)
GLUCOSE BLD-MCNC: 200 MG/DL (ref 70–99)
GLUCOSE BLD-MCNC: 201 MG/DL (ref 70–99)
GLUCOSE BLD-MCNC: 221 MG/DL (ref 70–99)
HAV IGM SER IA-ACNC: NON REACTIVE
HBV SURFACE AB TITR SER: <3.5 {TITER}
HCT VFR BLD CALC: 45.4 % (ref 42–52)
HEMOGLOBIN: 14.9 GM/DL (ref 13.5–18)
HEPATITIS B CORE IGM ANTIBODY: NON REACTIVE
HEPATITIS B SURFACE ANTIGEN: NON REACTIVE
HEPATITIS C ANTIBODY: NON REACTIVE
HIGH SENSITIVE C-REACTIVE PROTEIN: 8.2 MG/L
IMMATURE NEUTROPHIL %: 0.6 % (ref 0–0.43)
INR BLD: 1.05 INDEX
LACTATE: 1.2 MMOL/L (ref 0.4–2)
LYMPHOCYTES ABSOLUTE: 1.3 K/CU MM
LYMPHOCYTES RELATIVE PERCENT: 41.2 % (ref 24–44)
MAGNESIUM: 1.3 MG/DL (ref 1.8–2.4)
MCH RBC QN AUTO: 30 PG (ref 27–31)
MCHC RBC AUTO-ENTMCNC: 32.8 % (ref 32–36)
MCV RBC AUTO: 91.5 FL (ref 78–100)
MONOCYTES ABSOLUTE: 0.4 K/CU MM
MONOCYTES RELATIVE PERCENT: 13.6 % (ref 0–4)
NUCLEATED RBC %: 0 %
PDW BLD-RTO: 13.7 % (ref 11.7–14.9)
PLATELET # BLD: 92 K/CU MM (ref 140–440)
PMV BLD AUTO: 10.7 FL (ref 7.5–11.1)
POTASSIUM SERPL-SCNC: 3.5 MMOL/L (ref 3.5–5.1)
PROCALCITONIN: 0.15
PROTHROMBIN TIME: 12.7 SECONDS (ref 11.7–14.5)
RBC # BLD: 4.96 M/CU MM (ref 4.6–6.2)
SARS-COV-2: DETECTED
SEGMENTED NEUTROPHILS ABSOLUTE COUNT: 1.4 K/CU MM
SEGMENTED NEUTROPHILS RELATIVE PERCENT: 43.7 % (ref 36–66)
SODIUM BLD-SCNC: 138 MMOL/L (ref 135–145)
SOURCE: ABNORMAL
SPEP INTERPRETATION: ABNORMAL
TOTAL IMMATURE NEUTOROPHIL: 0.02 K/CU MM
TOTAL NUCLEATED RBC: 0 K/CU MM
TOTAL PROTEIN: 6 GM/DL (ref 6.4–8.2)
TOTAL PROTEIN: 6 GM/DL (ref 6.4–8.2)
WBC # BLD: 3.2 K/CU MM (ref 4–10.5)

## 2020-08-10 PROCEDURE — 85379 FIBRIN DEGRADATION QUANT: CPT

## 2020-08-10 PROCEDURE — 82105 ALPHA-FETOPROTEIN SERUM: CPT

## 2020-08-10 PROCEDURE — 84145 PROCALCITONIN (PCT): CPT

## 2020-08-10 PROCEDURE — 83516 IMMUNOASSAY NONANTIBODY: CPT

## 2020-08-10 PROCEDURE — 6370000000 HC RX 637 (ALT 250 FOR IP): Performed by: HOSPITALIST

## 2020-08-10 PROCEDURE — 6360000002 HC RX W HCPCS: Performed by: HOSPITALIST

## 2020-08-10 PROCEDURE — 80074 ACUTE HEPATITIS PANEL: CPT

## 2020-08-10 PROCEDURE — 6370000000 HC RX 637 (ALT 250 FOR IP): Performed by: INTERNAL MEDICINE

## 2020-08-10 PROCEDURE — 6360000002 HC RX W HCPCS: Performed by: INTERNAL MEDICINE

## 2020-08-10 PROCEDURE — 80053 COMPREHEN METABOLIC PANEL: CPT

## 2020-08-10 PROCEDURE — 82103 ALPHA-1-ANTITRYPSIN TOTAL: CPT

## 2020-08-10 PROCEDURE — 85610 PROTHROMBIN TIME: CPT

## 2020-08-10 PROCEDURE — 94761 N-INVAS EAR/PLS OXIMETRY MLT: CPT

## 2020-08-10 PROCEDURE — 83550 IRON BINDING TEST: CPT

## 2020-08-10 PROCEDURE — 86706 HEP B SURFACE ANTIBODY: CPT

## 2020-08-10 PROCEDURE — 86038 ANTINUCLEAR ANTIBODIES: CPT

## 2020-08-10 PROCEDURE — 86708 HEPATITIS A ANTIBODY: CPT

## 2020-08-10 PROCEDURE — 85384 FIBRINOGEN ACTIVITY: CPT

## 2020-08-10 PROCEDURE — 85025 COMPLETE CBC W/AUTO DIFF WBC: CPT

## 2020-08-10 PROCEDURE — 84165 PROTEIN E-PHORESIS SERUM: CPT

## 2020-08-10 PROCEDURE — 82962 GLUCOSE BLOOD TEST: CPT

## 2020-08-10 PROCEDURE — 1200000000 HC SEMI PRIVATE

## 2020-08-10 PROCEDURE — 83735 ASSAY OF MAGNESIUM: CPT

## 2020-08-10 PROCEDURE — 86901 BLOOD TYPING SEROLOGIC RH(D): CPT

## 2020-08-10 PROCEDURE — 83605 ASSAY OF LACTIC ACID: CPT

## 2020-08-10 PROCEDURE — 86256 FLUORESCENT ANTIBODY TITER: CPT

## 2020-08-10 PROCEDURE — 85027 COMPLETE CBC AUTOMATED: CPT

## 2020-08-10 PROCEDURE — 2580000003 HC RX 258: Performed by: INTERNAL MEDICINE

## 2020-08-10 PROCEDURE — 86141 C-REACTIVE PROTEIN HS: CPT

## 2020-08-10 PROCEDURE — 85730 THROMBOPLASTIN TIME PARTIAL: CPT

## 2020-08-10 RX ORDER — DEXAMETHASONE 4 MG/1
6 TABLET ORAL DAILY
Status: DISCONTINUED | OUTPATIENT
Start: 2020-08-11 | End: 2020-08-13 | Stop reason: HOSPADM

## 2020-08-10 RX ORDER — ACETAMINOPHEN 650 MG/1
650 SUPPOSITORY RECTAL EVERY 6 HOURS PRN
Status: DISCONTINUED | OUTPATIENT
Start: 2020-08-10 | End: 2020-08-13 | Stop reason: HOSPADM

## 2020-08-10 RX ORDER — ACETAMINOPHEN 325 MG/1
650 TABLET ORAL EVERY 6 HOURS PRN
Status: DISCONTINUED | OUTPATIENT
Start: 2020-08-10 | End: 2020-08-13 | Stop reason: HOSPADM

## 2020-08-10 RX ADMIN — METOPROLOL TARTRATE 50 MG: 50 TABLET, FILM COATED ORAL at 21:32

## 2020-08-10 RX ADMIN — CHLORTHALIDONE 12.5 MG: 25 TABLET ORAL at 09:27

## 2020-08-10 RX ADMIN — AZITHROMYCIN MONOHYDRATE 500 MG: 500 INJECTION, POWDER, LYOPHILIZED, FOR SOLUTION INTRAVENOUS at 18:45

## 2020-08-10 RX ADMIN — FUROSEMIDE 20 MG: 10 INJECTION, SOLUTION INTRAMUSCULAR; INTRAVENOUS at 17:50

## 2020-08-10 RX ADMIN — FUROSEMIDE 20 MG: 10 INJECTION, SOLUTION INTRAMUSCULAR; INTRAVENOUS at 09:28

## 2020-08-10 RX ADMIN — OXYCODONE HYDROCHLORIDE 5 MG: 5 TABLET ORAL at 14:54

## 2020-08-10 RX ADMIN — CEFTRIAXONE SODIUM 1 G: 1 INJECTION, POWDER, FOR SOLUTION INTRAMUSCULAR; INTRAVENOUS at 17:50

## 2020-08-10 RX ADMIN — SPIRONOLACTONE 25 MG: 25 TABLET ORAL at 09:28

## 2020-08-10 RX ADMIN — ASPIRIN 81 MG CHEWABLE TABLET 81 MG: 81 TABLET CHEWABLE at 09:27

## 2020-08-10 RX ADMIN — OXYCODONE HYDROCHLORIDE 5 MG: 5 TABLET ORAL at 23:51

## 2020-08-10 RX ADMIN — METOPROLOL TARTRATE 50 MG: 50 TABLET, FILM COATED ORAL at 09:27

## 2020-08-10 RX ADMIN — AMLODIPINE BESYLATE 5 MG: 5 TABLET ORAL at 09:27

## 2020-08-10 RX ADMIN — CITALOPRAM HYDROBROMIDE 20 MG: 20 TABLET ORAL at 09:27

## 2020-08-10 RX ADMIN — ATORVASTATIN CALCIUM 20 MG: 20 TABLET, FILM COATED ORAL at 09:27

## 2020-08-10 RX ADMIN — INSULIN GLARGINE 30 UNITS: 100 INJECTION, SOLUTION SUBCUTANEOUS at 21:32

## 2020-08-10 RX ADMIN — AMLODIPINE BESYLATE 5 MG: 5 TABLET ORAL at 21:32

## 2020-08-10 RX ADMIN — LOSARTAN POTASSIUM 100 MG: 50 TABLET, FILM COATED ORAL at 09:27

## 2020-08-10 RX ADMIN — SODIUM CHLORIDE, PRESERVATIVE FREE 10 ML: 5 INJECTION INTRAVENOUS at 09:28

## 2020-08-10 RX ADMIN — CLOPIDOGREL BISULFATE 75 MG: 75 TABLET ORAL at 09:27

## 2020-08-10 RX ADMIN — OXYCODONE HYDROCHLORIDE 5 MG: 5 TABLET ORAL at 05:49

## 2020-08-10 RX ADMIN — OXYCODONE HYDROCHLORIDE 5 MG: 5 TABLET ORAL at 19:08

## 2020-08-10 RX ADMIN — OYSTER SHELL CALCIUM WITH VITAMIN D 1 TABLET: 500; 200 TABLET, FILM COATED ORAL at 17:50

## 2020-08-10 RX ADMIN — PANTOPRAZOLE SODIUM 40 MG: 40 TABLET, DELAYED RELEASE ORAL at 05:49

## 2020-08-10 RX ADMIN — FENOFIBRATE 160 MG: 160 TABLET ORAL at 09:27

## 2020-08-10 ASSESSMENT — PAIN DESCRIPTION - PROGRESSION: CLINICAL_PROGRESSION: NOT CHANGED

## 2020-08-10 ASSESSMENT — PAIN SCALES - GENERAL
PAINLEVEL_OUTOF10: 8
PAINLEVEL_OUTOF10: 7
PAINLEVEL_OUTOF10: 7
PAINLEVEL_OUTOF10: 8
PAINLEVEL_OUTOF10: 8

## 2020-08-10 ASSESSMENT — PAIN DESCRIPTION - ONSET: ONSET: ON-GOING

## 2020-08-10 ASSESSMENT — PAIN DESCRIPTION - PAIN TYPE: TYPE: CHRONIC PAIN

## 2020-08-10 ASSESSMENT — PAIN DESCRIPTION - ORIENTATION: ORIENTATION: OTHER (COMMENT)

## 2020-08-10 ASSESSMENT — PAIN DESCRIPTION - FREQUENCY: FREQUENCY: INTERMITTENT

## 2020-08-10 ASSESSMENT — PAIN DESCRIPTION - DESCRIPTORS: DESCRIPTORS: ACHING

## 2020-08-10 ASSESSMENT — PAIN - FUNCTIONAL ASSESSMENT: PAIN_FUNCTIONAL_ASSESSMENT: ACTIVITIES ARE NOT PREVENTED

## 2020-08-10 ASSESSMENT — PAIN DESCRIPTION - LOCATION: LOCATION: GENERALIZED

## 2020-08-10 NOTE — PROGRESS NOTES
Nephrology Progress Note  8/10/2020 6:59 AM        Subjective:   Admit Date: 8/8/2020  PCP: Dylon Lujan MD    Interval History: no major event     Diet: some    ROS:  No sob- BP was high better now - needed several po meds , no overt confusion    Data:     Current meds:    aspirin  81 mg Oral Daily    atorvastatin  20 mg Oral Daily    calcium-vitamin D  1 tablet Oral BID WC    citalopram  20 mg Oral Daily    clopidogrel  75 mg Oral Daily    metoprolol tartrate  50 mg Oral BID    pantoprazole  40 mg Oral QAM AC    insulin lispro  0-6 Units Subcutaneous TID WC    sodium chloride flush  10 mL Intravenous 2 times per day    azithromycin  500 mg Intravenous Q24H    And    cefTRIAXone (ROCEPHIN) IV  1 g Intravenous Q24H    enoxaparin  30 mg Subcutaneous BID    furosemide  20 mg Intravenous BID    losartan  100 mg Oral Daily    fenofibrate  160 mg Oral Daily    insulin glargine  30 Units Subcutaneous Nightly    insulin lispro  10 Units Subcutaneous TID WC    insulin lispro  0-3 Units Subcutaneous 2 times per day    amLODIPine  5 mg Oral BID    chlorthalidone  12.5 mg Oral Daily    spironolactone  25 mg Oral Daily    ziprasidone  10 mg Intramuscular Once      dextrose           I/O last 3 completed shifts:   In: 120 [P.O.:120]  Out: 2500 [Urine:2500]    CBC:   Recent Labs     08/08/20  1358 08/09/20  0530   WBC 3.2* 3.1*   HGB 14.2 14.1    101*          Recent Labs     08/08/20  1358 08/08/20  1445 08/09/20  0530     --  139   K 4.0  --  4.0   CL 99  --  102   CO2 28  --  25   BUN 18  --  13   CREATININE 1.2  --  1.0   GLUCOSE 211* 198 195*       Lab Results   Component Value Date    CALCIUM 8.4 08/09/2020    PHOS 3.0 10/19/2015       Objective:     Vitals: /66   Pulse 75   Temp 98.5 °F (36.9 °C) (Oral)   Resp 19   Ht 5' 10.5\" (1.791 m)   Wt 255 lb 1.2 oz (115.7 kg)   SpO2 94%   BMI 36.08 kg/m²     General appearance:  No ac distress   HEENT:  No gross conj pallor  Neck: supple  Lungs:  + few adv BS  Heart:  Seems RRR  Abdomen: soft  Extremities:  ++ ch edema   Has islas       Problem List :         Impression :     1. HTN- better - may go down now - ? Sec to tizanidine withdrawal but CVA cab also cause it - CT as neg   2. CKD stage 3- stable with fluid overload   3. MS change ? Sec to med's/ ? Infection ? CVA/TIA  4. Underlying ASCVD/ ch pain syndrome / DM etc     Recommendation/Plan  :     1. Keep BP > 130/80   2. Adjust BP med's   3. He is with loop / thiazide  - adjust as H does  4. Daily wt  5. better neuro exam  6. Revisit his course   7. He is r/o for SARS-CoV- 2   8.  Follow clinically  And  biochemical       Keith Lynn MD

## 2020-08-10 NOTE — PROGRESS NOTES
Consult acknowledged for AMS   I see he was hypertensive   I see that he is greatly improved today  Will see once off covid unit.    Thank you  Sung THOMPSON CNP

## 2020-08-10 NOTE — PROGRESS NOTES
[address] [facility]  HOSPITALIST PROGRESS NOTE                       Name:  Georgi Grimes /Age/Sex: 1940  (79 y.o. male)   MRN & CSN:  [id number] & [id number] Admission Date/Time: 2020  1:49 PM   Location:  -A Attending:  Minda Reyes MD                                                  HPI  Georgi Grimes is a 79 y.o. male who presents with encephalopathy/weakness    SUBJECTIVE  - awake, interactive, reports feeling some better today, wants to get up when ambulate on the hallway. Pain better controlled    10 point review of systems reviewed and negative unless noted above. ALLERGIES:   Allergies   Allergen Reactions    Sulfa Antibiotics Hives       PCP: Joan Alex MD    PAST MEDICAL HISTORY, SURGICAL HISTORY, SOCIAL HISTORY and  HOME MEDICATIONS all reviewed. OBJECTIVE  Vitals:    08/10/20 0715 08/10/20 0753 08/10/20 0930 08/10/20 1100   BP:  (!) 142/69 (!) 143/71 126/75   Pulse: 77  83 73   Resp:      Temp:  98.2 °F (36.8 °C)     TempSrc:  Oral     SpO2:  (!) 88%     Weight:       Height:           PHYSICAL EXAM   GEN Awake male, sitting upright in bed in no apparent distress. EYES Pupils are equally round. No scleral erythema, discharge, or conjunctivitis. HENT Mucous membranes are moist. Oral pharynx without exudates, no evidence of thrush. NECK Supple, no apparent thyromegaly or masses. RESP Unlabored respiration, bilateral coarse breath sounds  CARDIO/VASC S1/S2 auscultated. Regular rate without appreciable murmurs, rubs, or gallops. No JVD or carotid bruits. Peripheral pulses equal bilaterally and palpable. positive peripheral edema. GI Abdomen is soft without significant tenderness, masses, or guarding. Bowel sounds are normoactive. Rectal exam deferred.  No costovertebral angle tenderness. Normal appearing external genitalia. HEME/LYMPH No palpable cervical lymphadenopathy and no hepatosplenomegaly. No petechiae or ecchymoses.   MSK Spontaneous movement of all extremities. No gross joint deformities. SKIN Normal coloration, warm, dry. NEURO Cranial nerves appear grossly intact, normal speech, no lateralizing weakness. PSYCH Awake, alert, oriented x 4. Affect appropriate. INTAKE: In: 130 [P.O.:120;  I.V.:10]  Out: 1900   OUTPUT: In: 130   Out: 1900 [Urine:1900]    LABS  Recent Labs     08/08/20  1358 08/09/20  0530 08/10/20  0630   WBC 3.2* 3.1* 3.2*   HGB 14.2 14.1 14.9   HCT 43.9 42.9 45.4    101* 92*      Recent Labs     08/08/20  1358 08/09/20  0530 08/10/20  0630    139 138   K 4.0 4.0 3.5   CL 99 102 97*   CO2 28 25 30   BUN 18 13 17   CREATININE 1.2 1.0 1.2     Recent Labs     08/08/20  1358 08/09/20  0530 08/10/20  0630   AST 38* 45* 54*   ALT 26 27 30   BILITOT 0.6 0.5 0.5   ALKPHOS 63 62 57     Recent Labs     08/09/20  0530 08/10/20  0630   INR 1.08 1.05     Recent Labs     08/08/20  1358   TROPONINT 0.013*          Abnormal labs for today noted      Imaging:     ECHO:    Microbiology:  Blood culture:    Urine culture:    Sputum culture:    Procedures done this admission:    MEDS  Scheduled Meds:   aspirin  81 mg Oral Daily    atorvastatin  20 mg Oral Daily    calcium-vitamin D  1 tablet Oral BID WC    citalopram  20 mg Oral Daily    clopidogrel  75 mg Oral Daily    metoprolol tartrate  50 mg Oral BID    pantoprazole  40 mg Oral QAM AC    insulin lispro  0-6 Units Subcutaneous TID WC    sodium chloride flush  10 mL Intravenous 2 times per day    azithromycin  500 mg Intravenous Q24H    And    cefTRIAXone (ROCEPHIN) IV  1 g Intravenous Q24H    enoxaparin  30 mg Subcutaneous BID    furosemide  20 mg Intravenous BID    losartan  100 mg Oral Daily    fenofibrate  160 mg Oral Daily    insulin glargine  30 Units Subcutaneous Nightly    insulin lispro  10 Units Subcutaneous TID WC    insulin lispro  0-3 Units Subcutaneous 2 times per day    amLODIPine  5 mg Oral BID    chlorthalidone  12.5 mg Oral Daily    spironolactone  25 mg Oral Daily    ziprasidone  10 mg Intramuscular Once     Continuous Infusions:   dextrose       PRN Meds:glucose, dextrose, glucagon (rDNA), dextrose, sodium chloride flush, acetaminophen **OR** acetaminophen, polyethylene glycol, promethazine **OR** ondansetron, oxyCODONE        ASSESSMENT and PLAN  Hospital Day: 3    1-Acute encephalopathy- intermittent, mainly hallucinations and at times lethargic- ongoing for the last one week- CT head and ammonia non-acute, MRI pending and neuro consulted. ?hypertensive encephalopathy given elevated BP on presentation  2-HTN crisis- SBP in 200s- restart home medications, if not improving will start cardene for better control- better today after nephro adjusted medications  3-Reported hypoxemia with shortness of breath- on room air when seen- noted CXR- stop IVF and gave dose of IV lasix, echo pending  4-Lactic acidosis on presentation- doubt sepsis, repeat pending. 5-Lower extremity weakness with recurrent falls- multifactorial- including probable neuropathy and OA especially of the knees-  while in bed, was able to lift both legs against the gravity- noted CT L spine- ordered MRI L spine.   7-Liver cirrhosis per CT- GI consulted, appreciate input  8-Chronic pain syndrome- follows up with - on percocet- discussed with family, needs to stop tylenol going forward given cirrhosis    Other issues  -HTN  -CAD  -DM  -HLD      -if covid negative, transfer out of unit    -PT/OT     Disp:     Diet DIET CARB CONTROL;   DVT Prophylaxis [] Lovenox, []  Heparin, [] SCDs, [] Ambulation   GI Prophylaxis [] PPI,  [] H2 Blocker,  [] Carafate,  [] Diet/Tube Feeds   Code Status Full Code   Disposition Patient requires continued admission due to encephalopathy   CMS Level of Risk [] Low, [x] Moderate,[]  High  Patient's risk as above due to encephalopathy     ALINA FORRESTER MD 8/10/2020 11:30 AM

## 2020-08-10 NOTE — CONSULTS
[address] [place] [address], [address] [facility]                                  CONSULTATION    PATIENT NAME: Gage Jurado                       :        1940  MED REC NO:   [id number]                          ROOM:         ACCOUNT NO:   [de-identified]                           ADMIT DATE: 2020  PROVIDER:     Carson Delgado MD    CONSULT DATE:  08/10/2020    CHIEF COMPLAINT:  Abnormal CAT scan, rule out chronic liver  disease/cirrhosis of the liver. HISTORY OF PRESENT ILLNESS:  The patient is a 29-year-old white  gentleman with past medical history significant for hypertension,  diabetes mellitus, coronary artery disease, status post PTCA with  coronary stents in place, hyperlipidemia, gout, osteoarthritis, obesity,  chronic kidney disease, history of tobacco abuse, and chronic back pain,  who was admitted to the hospital on the 2020 with altered mental  status and possible sepsis. The patient had a CAT scan done of the  abdomen and pelvis, which showed cirrhotic morphology of the liver with  splenomegaly and perisplenic varices. There is no history of abdominal pain, vomiting, hematemesis, melena or  hematochezia. The patient has seen Dr. Galindo Gna from GI in [facility], [facility] in  the past as well. The patient has not had a recent EGD or a colonoscopy  done. The blood workup done during the present hospitalization  comprised of chem profile today, which is unremarkable. LFTs are within  normal limits and the patient's INR is 1.05. Hepatitis A, B and C  serology is negative and iron studies are negative for hemochromatosis. Antinuclear antibody, anti-smooth muscle antibody, antimitochondrial  antibody and serum protein electrophoresis are pending. The patient is  hemodynamically stable. REVIEW OF SYSTEMS:  CENTRAL NERVOUS SYSTEM:  There is no history of headache or focal  sensorimotor symptoms.   CARDIOVASCULAR SYSTEM:  No history of chest pain, shortness of breath or  leg swelling. GENITOURINARY SYSTEM:  No history of dysuria, pyuria, or hematuria. MUSCULOSKELETAL SYSTEM:  The patient complains of generalized weakness. PAST MEDICAL HISTORY:  Significant for history of hypertension, diabetes  mellitus, coronary artery disease, status post PTCA with coronary stents  in place, on Plavix, chronic kidney disease, hyperlipidemia, obesity,  tobacco abuse, chronic back pain, and gout. FAMILY HISTORY:  Noncontributory. MEDICATIONS:  Please refer to the chart. SOCIOECONOMIC HISTORY:  No history of EtOH abuse. The patient does not  smoke cigarettes. PAST SURGICAL HISTORY:  The patient has had total knee replacement,  sinus surgery done, hand surgery, dilatation of the esophagus in the  past, cholecystectomy, PTCA with coronary stents placed and back  surgery. ALLERGIES:  The patient is allergic to SULFA ANTIBIOTICS. PHYSICAL EXAMINATION:  GENERAL:  Shows a 69-year-old gentleman of average build and nutritional  status, who is lying flat in bed, in no acute distress. He is awake,  alert and pleasant to talk with. VITAL SIGNS:  Please refer to chart. HEENT:  Shows skull to be atraumatic. NECK:  Supple. CHEST:  Clear. HEART:  S1 and S2 are normal.  ABDOMEN:  Soft, nontender. Liver and spleen are not palpable. Bowel  sounds are present. RECTAL:  Deferred. CNS:  Shows the patient to be awake and responds to verbal commands. The patient is moving all 4 extremities. MUSCULOSKELETAL:  Shows evidence of degenerative joint disease changes. LABORATORY DATA:  As above mentioned. IMPRESSION:  A 69-year-old white gentleman with multiple comorbidities,  admitted with altered mental status and possible sepsis, and is noted to  have a cirrhotic morphology of the liver on CT scan, rule out chronic  liver disease, etiology to be determined. RECOMMENDATION:  1. Agree with present management.   2.  We will follow up on the serologic workup for the patient's chronic  liver disease. 3.  We will also follow up on alpha-fetoprotein level. 4.  The patient will need a screening EGD to rule out esophageal/gastric  varices and colonoscopy also later as an outpatient. 5.  The patient will need vaccination for hepatitis A and B as well. 6.  Liver biopsy later if needed. 7.  Monitor the patient's LFTs for any acute decompensation of his liver  disease.         Shreyas Gutierrez MD    D: 08/10/2020 9:32:51       T: 08/10/2020 13:35:30     [email or web address]  Job#: [id number]     Doc#: [id number]    CC:

## 2020-08-10 NOTE — CARE COORDINATION
Patient on COVID unit . Phoned and spoke with patient's nurse Eris Crum for update on patient . She stated patient is able to speak with CM but doesn't have a phone in his room. Conversation facilitated with patient 's nurse Rosario. Patient is from home with his daughter and RODOLFO and has a cane and walker at home for mobility. Patient reported being able to perform his ADL's PTA. Patient has PCP and insurance to assist with RX coverage.  Case Management to follow for any potential discharge needs

## 2020-08-11 LAB
ALBUMIN SERPL-MCNC: 3.7 GM/DL (ref 3.4–5)
ALP BLD-CCNC: 58 IU/L (ref 40–128)
ALPHA-1 ANTITRYPSIN: 153 MG/DL (ref 90–200)
ALT SERPL-CCNC: 31 U/L (ref 10–40)
ANION GAP SERPL CALCULATED.3IONS-SCNC: 10 MMOL/L (ref 4–16)
ANTI-NUCLEAR ANTIBODY (ANA): NORMAL
APTT: 35.2 SECONDS (ref 25.1–37.1)
AST SERPL-CCNC: 54 IU/L (ref 15–37)
BASOPHILS ABSOLUTE: 0 K/CU MM
BASOPHILS RELATIVE PERCENT: 0.6 % (ref 0–1)
BILIRUB SERPL-MCNC: 0.6 MG/DL (ref 0–1)
BUN BLDV-MCNC: 23 MG/DL (ref 6–23)
CALCIUM SERPL-MCNC: 8.7 MG/DL (ref 8.3–10.6)
CHLORIDE BLD-SCNC: 93 MMOL/L (ref 99–110)
CO2: 33 MMOL/L (ref 21–32)
CREAT SERPL-MCNC: 1.5 MG/DL (ref 0.9–1.3)
D DIMER: <200 NG/ML(DDU)
DIFFERENTIAL TYPE: ABNORMAL
EOSINOPHILS ABSOLUTE: 0 K/CU MM
EOSINOPHILS RELATIVE PERCENT: 0.6 % (ref 0–3)
F-ACTIN AB, IGG: 18 UNITS (ref 0–19)
FERRITIN: 89 NG/ML (ref 30–400)
FIBRINOGEN LEVEL: 293 MG/DL (ref 196.9–442.1)
GFR AFRICAN AMERICAN: 55 ML/MIN/1.73M2
GFR NON-AFRICAN AMERICAN: 45 ML/MIN/1.73M2
GLUCOSE BLD-MCNC: 163 MG/DL (ref 70–99)
GLUCOSE BLD-MCNC: 203 MG/DL (ref 70–99)
GLUCOSE BLD-MCNC: 226 MG/DL (ref 70–99)
GLUCOSE BLD-MCNC: 230 MG/DL (ref 70–99)
GLUCOSE BLD-MCNC: 318 MG/DL (ref 70–99)
GLUCOSE BLD-MCNC: 328 MG/DL (ref 70–99)
HAV AB SERPL IA-ACNC: NEGATIVE
HCT VFR BLD CALC: 46.1 % (ref 42–52)
HEMOGLOBIN: 15 GM/DL (ref 13.5–18)
HIGH SENSITIVE C-REACTIVE PROTEIN: 6.2 MG/L
IMMATURE NEUTROPHIL %: 0.9 % (ref 0–0.43)
INR BLD: 1.07 INDEX
LV EF: 60 %
LVEF MODALITY: NORMAL
LYMPHOCYTES ABSOLUTE: 0.6 K/CU MM
LYMPHOCYTES RELATIVE PERCENT: 16.8 % (ref 24–44)
MAGNESIUM: 1.2 MG/DL (ref 1.8–2.4)
MAGNESIUM: 1.3 MG/DL (ref 1.8–2.4)
MCH RBC QN AUTO: 30.2 PG (ref 27–31)
MCHC RBC AUTO-ENTMCNC: 32.5 % (ref 32–36)
MCV RBC AUTO: 92.9 FL (ref 78–100)
MONOCYTES ABSOLUTE: 0.4 K/CU MM
MONOCYTES RELATIVE PERCENT: 12.2 % (ref 0–4)
MS ALPHA-FETOPROTEIN: 1 NG/ML (ref 0–9)
NUCLEATED RBC %: 0 %
PDW BLD-RTO: 13.8 % (ref 11.7–14.9)
PLATELET # BLD: 104 K/CU MM (ref 140–440)
PMV BLD AUTO: 10.8 FL (ref 7.5–11.1)
POTASSIUM SERPL-SCNC: 3.5 MMOL/L (ref 3.5–5.1)
PROCALCITONIN: 0.14
PROTHROMBIN TIME: 12.9 SECONDS (ref 11.7–14.5)
RBC # BLD: 4.96 M/CU MM (ref 4.6–6.2)
SEGMENTED NEUTROPHILS ABSOLUTE COUNT: 2.4 K/CU MM
SEGMENTED NEUTROPHILS RELATIVE PERCENT: 68.9 % (ref 36–66)
SODIUM BLD-SCNC: 136 MMOL/L (ref 135–145)
TOTAL IMMATURE NEUTOROPHIL: 0.03 K/CU MM
TOTAL NUCLEATED RBC: 0 K/CU MM
TOTAL PROTEIN: 6 GM/DL (ref 6.4–8.2)
WBC # BLD: 3.5 K/CU MM (ref 4–10.5)

## 2020-08-11 PROCEDURE — 6360000002 HC RX W HCPCS: Performed by: INTERNAL MEDICINE

## 2020-08-11 PROCEDURE — 85610 PROTHROMBIN TIME: CPT

## 2020-08-11 PROCEDURE — 84145 PROCALCITONIN (PCT): CPT

## 2020-08-11 PROCEDURE — 6360000002 HC RX W HCPCS: Performed by: PHYSICIAN ASSISTANT

## 2020-08-11 PROCEDURE — 6370000000 HC RX 637 (ALT 250 FOR IP): Performed by: INTERNAL MEDICINE

## 2020-08-11 PROCEDURE — 82728 ASSAY OF FERRITIN: CPT

## 2020-08-11 PROCEDURE — 6370000000 HC RX 637 (ALT 250 FOR IP): Performed by: HOSPITALIST

## 2020-08-11 PROCEDURE — 80053 COMPREHEN METABOLIC PANEL: CPT

## 2020-08-11 PROCEDURE — 2580000003 HC RX 258: Performed by: INTERNAL MEDICINE

## 2020-08-11 PROCEDURE — 94761 N-INVAS EAR/PLS OXIMETRY MLT: CPT

## 2020-08-11 PROCEDURE — 1200000000 HC SEMI PRIVATE

## 2020-08-11 PROCEDURE — 6360000002 HC RX W HCPCS: Performed by: HOSPITALIST

## 2020-08-11 PROCEDURE — 85730 THROMBOPLASTIN TIME PARTIAL: CPT

## 2020-08-11 PROCEDURE — 85384 FIBRINOGEN ACTIVITY: CPT

## 2020-08-11 PROCEDURE — 93306 TTE W/DOPPLER COMPLETE: CPT

## 2020-08-11 PROCEDURE — 6370000000 HC RX 637 (ALT 250 FOR IP): Performed by: PHYSICIAN ASSISTANT

## 2020-08-11 PROCEDURE — 86141 C-REACTIVE PROTEIN HS: CPT

## 2020-08-11 PROCEDURE — 85027 COMPLETE CBC AUTOMATED: CPT

## 2020-08-11 PROCEDURE — 83735 ASSAY OF MAGNESIUM: CPT

## 2020-08-11 PROCEDURE — 82962 GLUCOSE BLOOD TEST: CPT

## 2020-08-11 PROCEDURE — 85025 COMPLETE CBC W/AUTO DIFF WBC: CPT

## 2020-08-11 PROCEDURE — 85379 FIBRIN DEGRADATION QUANT: CPT

## 2020-08-11 RX ORDER — MAGNESIUM SULFATE 4 G/50ML
4 INJECTION INTRAVENOUS ONCE
Status: COMPLETED | OUTPATIENT
Start: 2020-08-11 | End: 2020-08-11

## 2020-08-11 RX ADMIN — FENOFIBRATE 160 MG: 160 TABLET ORAL at 09:08

## 2020-08-11 RX ADMIN — PANTOPRAZOLE SODIUM 40 MG: 40 TABLET, DELAYED RELEASE ORAL at 09:08

## 2020-08-11 RX ADMIN — MAGNESIUM SULFATE HEPTAHYDRATE 4 G: 80 INJECTION, SOLUTION INTRAVENOUS at 16:00

## 2020-08-11 RX ADMIN — DEXAMETHASONE 6 MG: 4 TABLET ORAL at 09:11

## 2020-08-11 RX ADMIN — ACETAMINOPHEN 650 MG: 325 TABLET ORAL at 03:47

## 2020-08-11 RX ADMIN — ASPIRIN 81 MG CHEWABLE TABLET 81 MG: 81 TABLET CHEWABLE at 09:08

## 2020-08-11 RX ADMIN — CHLORTHALIDONE 12.5 MG: 25 TABLET ORAL at 09:11

## 2020-08-11 RX ADMIN — OXYCODONE HYDROCHLORIDE 5 MG: 5 TABLET ORAL at 20:40

## 2020-08-11 RX ADMIN — SODIUM CHLORIDE, PRESERVATIVE FREE 10 ML: 5 INJECTION INTRAVENOUS at 20:44

## 2020-08-11 RX ADMIN — OYSTER SHELL CALCIUM WITH VITAMIN D 1 TABLET: 500; 200 TABLET, FILM COATED ORAL at 17:15

## 2020-08-11 RX ADMIN — ATORVASTATIN CALCIUM 20 MG: 20 TABLET, FILM COATED ORAL at 09:10

## 2020-08-11 RX ADMIN — AMLODIPINE BESYLATE 5 MG: 5 TABLET ORAL at 09:08

## 2020-08-11 RX ADMIN — SPIRONOLACTONE 25 MG: 25 TABLET ORAL at 09:10

## 2020-08-11 RX ADMIN — CITALOPRAM HYDROBROMIDE 20 MG: 20 TABLET ORAL at 09:08

## 2020-08-11 RX ADMIN — AZITHROMYCIN MONOHYDRATE 500 MG: 500 INJECTION, POWDER, LYOPHILIZED, FOR SOLUTION INTRAVENOUS at 21:53

## 2020-08-11 RX ADMIN — PROMETHAZINE HYDROCHLORIDE 12.5 MG: 25 TABLET ORAL at 03:36

## 2020-08-11 RX ADMIN — METOPROLOL TARTRATE 50 MG: 50 TABLET, FILM COATED ORAL at 09:10

## 2020-08-11 RX ADMIN — AMLODIPINE BESYLATE 5 MG: 5 TABLET ORAL at 20:40

## 2020-08-11 RX ADMIN — ENOXAPARIN SODIUM 30 MG: 30 INJECTION SUBCUTANEOUS at 09:07

## 2020-08-11 RX ADMIN — FUROSEMIDE 20 MG: 10 INJECTION, SOLUTION INTRAMUSCULAR; INTRAVENOUS at 09:11

## 2020-08-11 RX ADMIN — CEFTRIAXONE SODIUM 1 G: 1 INJECTION, POWDER, FOR SOLUTION INTRAMUSCULAR; INTRAVENOUS at 20:39

## 2020-08-11 RX ADMIN — INSULIN GLARGINE 30 UNITS: 100 INJECTION, SOLUTION SUBCUTANEOUS at 21:10

## 2020-08-11 RX ADMIN — OXYCODONE HYDROCHLORIDE 5 MG: 5 TABLET ORAL at 15:01

## 2020-08-11 RX ADMIN — LOSARTAN POTASSIUM 100 MG: 50 TABLET, FILM COATED ORAL at 09:08

## 2020-08-11 RX ADMIN — CLOPIDOGREL BISULFATE 75 MG: 75 TABLET ORAL at 09:09

## 2020-08-11 RX ADMIN — SODIUM CHLORIDE, PRESERVATIVE FREE 10 ML: 5 INJECTION INTRAVENOUS at 09:08

## 2020-08-11 RX ADMIN — METOPROLOL TARTRATE 50 MG: 50 TABLET, FILM COATED ORAL at 20:40

## 2020-08-11 RX ADMIN — OXYCODONE HYDROCHLORIDE 5 MG: 5 TABLET ORAL at 03:51

## 2020-08-11 RX ADMIN — OXYCODONE HYDROCHLORIDE 5 MG: 5 TABLET ORAL at 10:52

## 2020-08-11 RX ADMIN — OYSTER SHELL CALCIUM WITH VITAMIN D 1 TABLET: 500; 200 TABLET, FILM COATED ORAL at 09:10

## 2020-08-11 ASSESSMENT — PAIN SCALES - GENERAL
PAINLEVEL_OUTOF10: 0
PAINLEVEL_OUTOF10: 5
PAINLEVEL_OUTOF10: 7
PAINLEVEL_OUTOF10: 8
PAINLEVEL_OUTOF10: 7

## 2020-08-11 NOTE — PROGRESS NOTES
Spoke with RN, pt wants MRI to take place \"tomorrow,\" 8/12. I said that is fine. Will inform following shift.

## 2020-08-11 NOTE — PROGRESS NOTES
Nephrology Progress Note  8/11/2020 3:20 PM        Subjective:   Admit Date: 8/8/2020  PCP: Dayan Mo MD    Interval History: pt sen in early am     Diet: good    ROS:  No sob - no overt confusion - good UOP     Data:     Current med's:    magnesium sulfate  4 g Intravenous Once    dexamethasone  6 mg Oral Daily    aspirin  81 mg Oral Daily    atorvastatin  20 mg Oral Daily    calcium-vitamin D  1 tablet Oral BID WC    citalopram  20 mg Oral Daily    clopidogrel  75 mg Oral Daily    metoprolol tartrate  50 mg Oral BID    pantoprazole  40 mg Oral QAM AC    insulin lispro  0-6 Units Subcutaneous TID WC    sodium chloride flush  10 mL Intravenous 2 times per day    azithromycin  500 mg Intravenous Q24H    And    cefTRIAXone (ROCEPHIN) IV  1 g Intravenous Q24H    enoxaparin  30 mg Subcutaneous BID    losartan  100 mg Oral Daily    fenofibrate  160 mg Oral Daily    insulin glargine  30 Units Subcutaneous Nightly    insulin lispro  10 Units Subcutaneous TID WC    insulin lispro  0-3 Units Subcutaneous 2 times per day    amLODIPine  5 mg Oral BID    chlorthalidone  12.5 mg Oral Daily    spironolactone  25 mg Oral Daily    ziprasidone  10 mg Intramuscular Once      dextrose           I/O last 3 completed shifts:  In: -   Out: 3000 [Urine:3000]    CBC:   Recent Labs     08/09/20  0530 08/10/20  0630 08/11/20  0615   WBC 3.1* 3.2* 3.5*   HGB 14.1 14.9 15.0   * 92* 104*          Recent Labs     08/09/20  0530 08/10/20  0630 08/11/20  0615    138 136   K 4.0 3.5 3.5    97* 93*   CO2 25 30 33*   BUN 13 17 23   CREATININE 1.0 1.2 1.5*   GLUCOSE 195* 201* 203*       Lab Results   Component Value Date    CALCIUM 8.7 08/11/2020    PHOS 3.0 10/19/2015       Objective:     Vitals: BP (!) 143/80   Pulse 73   Temp 98.3 °F (36.8 °C) (Oral)   Resp 14   Ht 5' 10.5\" (1.791 m)   Wt 255 lb 1.2 oz (115.7 kg)   SpO2 99%   BMI 36.08 kg/m²     General appearance:  No ac distress  HEENT:  No conj pallor  Neck:  supple  Lungs:  No gross crackles  Heart:  Seems RRR  Abdomen: soft  Extremities:  + Ch edema   Has roshan       Problem List :         Impression :     1. VICKY- CKD stage 3 - likely from inadequate \"capillary plasm are will from loop - but also has risk fforAIN and other etiology rudi with  abx - so redo uUAand hold loop and look for any additional clue also ccouldbe better BP   2. HTN - acceptable now   3. MS good -   4. underlying ASCVD/DM etc   5. No precise coeurs eof infection or onanism but on emp abx and R/O SARS-CoV- 2   6. Low mg from loop and thiazide - IV repletion     Recommendation/Plan  :     1. Hold loop  2. Keep thiazide   3. manual BP  4. BMP in am \  5. Daily wt  6. \watch UOP  7. Follow clinically  8.  Look for precise source of infection and if none may d/cancer abx       Asher Wilson MD

## 2020-08-11 NOTE — PROGRESS NOTES
LUMBAR SPINE WITHOUT CONTRAST 8/8/2020 3:12 pm; 8/8/2020 3:20 pm; 8/8/2020 2:52 pm , symptomatic-significantly low alert oriented. Low lung volumes with mild elevation of  PCMthe left hemidiaphragm. There are basilar predominant airspace opacities, left greater than right. These are new from the prior chest CT. While this could represent atelectasis in the setting of low lung volumes, infection is not excluded. Poor contrast opacification of the pulmonary arteries. No central pulmonary arterial filling defect is seen. No evidence of right heart strain. No acute abnormality in the abdomen or pelvis. No acute fracture or traumatic malalignment in the lumbar spine. 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 Cta Pulmonary W Contrast    Result Date: 8/10/2020  EXAMINATION: CTA OF THE CHEST; CT OF THE ABDOMEN AND PELVIS WITH CONTRAST; CT OF THE LUMBAR SPINE WITHOUT CONTRAST 8/8/2020 3:12 pm; 8/8/2020 3:20 pm; 8/8/2020 2:52 pm   .     Low lung volumes with mild elevation of the left hemidiaphragm. There are basilar predominant airspace opacities, left greater than right. These are new from the prior chest CT. While this could represent atelectasis in the setting of low lung volumes, infection is not excluded. Poor contrast opacification of the pulmonary arteries. No central pulmonary arterial filling defect is seen. No evidence of right heart strain. No acute abnormality in the abdomen or pelvis. No acute fracture or traumatic malalignment in the lumbar spine. 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 Ct Lumbar Reconstruction Wo Post Process    Result Date: 8/10/2020  EXAMINATION: CTA OF THE CHEST; CT OF THE ABDOMEN AND PELVIS WITH CONTRAST; CT OF THE LUMBAR SPINE WITHOUT CONTRAST 8/8/2020 3:12 pm; 8/8/2020 3:20 pm; 8/8/2020 2:52 pm,    Low lung volumes with mild elevation of the left hemidiaphragm.   There are basilar predominant masses,  no organomegaly  Musculoskeletal: Normal  Extremities: extremities normal, , no edema  Neurologic:  Awake, alert, oriented to name, place and time. Cranial nerves II-XII are grossly intact. Motor is  intact. Sensory is intact. ,  and gait is normal.    Assessment:     Patient Active Problem List:     Essential hypertension, benign     Type 2 diabetes mellitus, with long-term current use of insulin ([facility] [address]     Other and unspecified hyperlipidemia     Chest pain     Coronary atherosclerosis     Chronic kidney disease, stage III (moderate) (HCC)     Cellulitis of leg     Cellulitis of lower leg     Lethargy      Plan:     1. Reviewed POC blood glucose . Labs and X ray results   2. Reviewed Current Medicines   3. On meal/ Correction bolus Humalog/ Basal Lantus Insulin regime   4. Monitor Blood glucose frequently   5. Modified  the dose of Insulin/ other medicines as needed   6. Will follow     .      Payam Mauricio MD

## 2020-08-12 ENCOUNTER — APPOINTMENT (OUTPATIENT)
Dept: MRI IMAGING | Age: 80
DRG: 177 | End: 2020-08-12
Payer: MEDICARE

## 2020-08-12 LAB
ALBUMIN SERPL-MCNC: 4 GM/DL (ref 3.4–5)
ALP BLD-CCNC: 60 IU/L (ref 40–128)
ALT SERPL-CCNC: 33 U/L (ref 10–40)
ANION GAP SERPL CALCULATED.3IONS-SCNC: 10 MMOL/L (ref 4–16)
ANTI-MITOCHON TITER: 2.8 UNITS (ref 0–24.9)
AST SERPL-CCNC: 50 IU/L (ref 15–37)
BACTERIA: NEGATIVE /HPF
BASOPHILS ABSOLUTE: 0 K/CU MM
BASOPHILS RELATIVE PERCENT: 0 % (ref 0–1)
BILIRUB SERPL-MCNC: 0.7 MG/DL (ref 0–1)
BILIRUBIN URINE: NEGATIVE MG/DL
BLOOD, URINE: ABNORMAL
BUN BLDV-MCNC: 34 MG/DL (ref 6–23)
CALCIUM SERPL-MCNC: 8.9 MG/DL (ref 8.3–10.6)
CHLORIDE BLD-SCNC: 92 MMOL/L (ref 99–110)
CLARITY: ABNORMAL
CO2: 30 MMOL/L (ref 21–32)
COLOR: YELLOW
CREAT SERPL-MCNC: 1.6 MG/DL (ref 0.9–1.3)
DIFFERENTIAL TYPE: ABNORMAL
EOSINOPHILS ABSOLUTE: 0 K/CU MM
EOSINOPHILS RELATIVE PERCENT: 0 % (ref 0–3)
GFR AFRICAN AMERICAN: 51 ML/MIN/1.73M2
GFR NON-AFRICAN AMERICAN: 42 ML/MIN/1.73M2
GLUCOSE BLD-MCNC: 151 MG/DL (ref 70–99)
GLUCOSE BLD-MCNC: 221 MG/DL (ref 70–99)
GLUCOSE BLD-MCNC: 258 MG/DL (ref 70–99)
GLUCOSE BLD-MCNC: 296 MG/DL (ref 70–99)
GLUCOSE BLD-MCNC: 303 MG/DL (ref 70–99)
GLUCOSE BLD-MCNC: 308 MG/DL (ref 70–99)
GLUCOSE, URINE: 150 MG/DL
HCT VFR BLD CALC: 47.6 % (ref 42–52)
HEMOGLOBIN: 15.9 GM/DL (ref 13.5–18)
HYALINE CASTS: 5 /LPF
IMMATURE NEUTROPHIL %: 0.6 % (ref 0–0.43)
KETONES, URINE: NEGATIVE MG/DL
LEUKOCYTE ESTERASE, URINE: NEGATIVE
LYMPHOCYTES ABSOLUTE: 0.7 K/CU MM
LYMPHOCYTES RELATIVE PERCENT: 21.1 % (ref 24–44)
MAGNESIUM: 1.9 MG/DL (ref 1.8–2.4)
MCH RBC QN AUTO: 30.1 PG (ref 27–31)
MCHC RBC AUTO-ENTMCNC: 33.4 % (ref 32–36)
MCV RBC AUTO: 90 FL (ref 78–100)
MONOCYTES ABSOLUTE: 0.6 K/CU MM
MONOCYTES RELATIVE PERCENT: 16.9 % (ref 0–4)
MUCUS: ABNORMAL HPF
NITRITE URINE, QUANTITATIVE: NEGATIVE
NUCLEATED RBC %: 0 %
PDW BLD-RTO: 13.3 % (ref 11.7–14.9)
PH, URINE: 5 (ref 5–8)
PLATELET # BLD: 107 K/CU MM (ref 140–440)
PMV BLD AUTO: 10.7 FL (ref 7.5–11.1)
POTASSIUM SERPL-SCNC: 4 MMOL/L (ref 3.5–5.1)
PROCALCITONIN: 0.16
PROTEIN UA: 100 MG/DL
RBC # BLD: 5.29 M/CU MM (ref 4.6–6.2)
RBC URINE: 100 /HPF (ref 0–3)
SEGMENTED NEUTROPHILS ABSOLUTE COUNT: 2.1 K/CU MM
SEGMENTED NEUTROPHILS RELATIVE PERCENT: 61.4 % (ref 36–66)
SODIUM BLD-SCNC: 132 MMOL/L (ref 135–145)
SPECIFIC GRAVITY UA: 1.01 (ref 1–1.03)
TOTAL IMMATURE NEUTOROPHIL: 0.02 K/CU MM
TOTAL NUCLEATED RBC: 0 K/CU MM
TOTAL PROTEIN: 6.6 GM/DL (ref 6.4–8.2)
TRICHOMONAS: ABNORMAL /HPF
UROBILINOGEN, URINE: NORMAL MG/DL (ref 0.2–1)
WBC # BLD: 3.4 K/CU MM (ref 4–10.5)
WBC UA: 1 /HPF (ref 0–2)

## 2020-08-12 PROCEDURE — 2700000000 HC OXYGEN THERAPY PER DAY

## 2020-08-12 PROCEDURE — 6360000002 HC RX W HCPCS: Performed by: PHYSICIAN ASSISTANT

## 2020-08-12 PROCEDURE — 6370000000 HC RX 637 (ALT 250 FOR IP): Performed by: INTERNAL MEDICINE

## 2020-08-12 PROCEDURE — 6360000002 HC RX W HCPCS: Performed by: INTERNAL MEDICINE

## 2020-08-12 PROCEDURE — 82962 GLUCOSE BLOOD TEST: CPT

## 2020-08-12 PROCEDURE — 85027 COMPLETE CBC AUTOMATED: CPT

## 2020-08-12 PROCEDURE — 80053 COMPREHEN METABOLIC PANEL: CPT

## 2020-08-12 PROCEDURE — 94761 N-INVAS EAR/PLS OXIMETRY MLT: CPT

## 2020-08-12 PROCEDURE — 6370000000 HC RX 637 (ALT 250 FOR IP): Performed by: HOSPITALIST

## 2020-08-12 PROCEDURE — 1200000000 HC SEMI PRIVATE

## 2020-08-12 PROCEDURE — 81001 URINALYSIS AUTO W/SCOPE: CPT

## 2020-08-12 PROCEDURE — 83735 ASSAY OF MAGNESIUM: CPT

## 2020-08-12 PROCEDURE — 85025 COMPLETE CBC W/AUTO DIFF WBC: CPT

## 2020-08-12 PROCEDURE — 2580000003 HC RX 258: Performed by: INTERNAL MEDICINE

## 2020-08-12 PROCEDURE — 84145 PROCALCITONIN (PCT): CPT

## 2020-08-12 RX ORDER — INSULIN GLARGINE 100 [IU]/ML
40 INJECTION, SOLUTION SUBCUTANEOUS NIGHTLY
Status: DISCONTINUED | OUTPATIENT
Start: 2020-08-12 | End: 2020-08-13

## 2020-08-12 RX ADMIN — AZITHROMYCIN MONOHYDRATE 500 MG: 500 INJECTION, POWDER, LYOPHILIZED, FOR SOLUTION INTRAVENOUS at 22:57

## 2020-08-12 RX ADMIN — ATORVASTATIN CALCIUM 20 MG: 20 TABLET, FILM COATED ORAL at 08:34

## 2020-08-12 RX ADMIN — FENOFIBRATE 160 MG: 160 TABLET ORAL at 08:33

## 2020-08-12 RX ADMIN — ENOXAPARIN SODIUM 40 MG: 40 INJECTION SUBCUTANEOUS at 08:33

## 2020-08-12 RX ADMIN — METOPROLOL TARTRATE 50 MG: 50 TABLET, FILM COATED ORAL at 08:32

## 2020-08-12 RX ADMIN — INSULIN GLARGINE 40 UNITS: 100 INJECTION, SOLUTION SUBCUTANEOUS at 22:59

## 2020-08-12 RX ADMIN — AMLODIPINE BESYLATE 5 MG: 5 TABLET ORAL at 08:32

## 2020-08-12 RX ADMIN — CEFTRIAXONE SODIUM 1 G: 1 INJECTION, POWDER, FOR SOLUTION INTRAMUSCULAR; INTRAVENOUS at 22:56

## 2020-08-12 RX ADMIN — CLOPIDOGREL BISULFATE 75 MG: 75 TABLET ORAL at 08:33

## 2020-08-12 RX ADMIN — OXYCODONE HYDROCHLORIDE 5 MG: 5 TABLET ORAL at 13:25

## 2020-08-12 RX ADMIN — OXYCODONE HYDROCHLORIDE 5 MG: 5 TABLET ORAL at 06:52

## 2020-08-12 RX ADMIN — SODIUM CHLORIDE, PRESERVATIVE FREE 10 ML: 5 INJECTION INTRAVENOUS at 22:58

## 2020-08-12 RX ADMIN — ASPIRIN 81 MG CHEWABLE TABLET 81 MG: 81 TABLET CHEWABLE at 08:33

## 2020-08-12 RX ADMIN — PANTOPRAZOLE SODIUM 40 MG: 40 TABLET, DELAYED RELEASE ORAL at 06:18

## 2020-08-12 RX ADMIN — OYSTER SHELL CALCIUM WITH VITAMIN D 1 TABLET: 500; 200 TABLET, FILM COATED ORAL at 08:33

## 2020-08-12 RX ADMIN — SPIRONOLACTONE 25 MG: 25 TABLET ORAL at 08:34

## 2020-08-12 RX ADMIN — SODIUM CHLORIDE, PRESERVATIVE FREE 10 ML: 5 INJECTION INTRAVENOUS at 08:33

## 2020-08-12 RX ADMIN — OXYCODONE HYDROCHLORIDE 5 MG: 5 TABLET ORAL at 02:39

## 2020-08-12 RX ADMIN — AMLODIPINE BESYLATE 5 MG: 5 TABLET ORAL at 22:57

## 2020-08-12 RX ADMIN — OXYCODONE HYDROCHLORIDE 5 MG: 5 TABLET ORAL at 23:18

## 2020-08-12 RX ADMIN — CHLORTHALIDONE 12.5 MG: 25 TABLET ORAL at 08:33

## 2020-08-12 RX ADMIN — OXYCODONE HYDROCHLORIDE 5 MG: 5 TABLET ORAL at 19:01

## 2020-08-12 RX ADMIN — OYSTER SHELL CALCIUM WITH VITAMIN D 1 TABLET: 500; 200 TABLET, FILM COATED ORAL at 16:17

## 2020-08-12 RX ADMIN — LOSARTAN POTASSIUM 100 MG: 50 TABLET, FILM COATED ORAL at 08:32

## 2020-08-12 RX ADMIN — DEXAMETHASONE 6 MG: 4 TABLET ORAL at 11:05

## 2020-08-12 RX ADMIN — CITALOPRAM HYDROBROMIDE 20 MG: 20 TABLET ORAL at 08:32

## 2020-08-12 RX ADMIN — METOPROLOL TARTRATE 50 MG: 50 TABLET, FILM COATED ORAL at 22:57

## 2020-08-12 ASSESSMENT — PAIN DESCRIPTION - FREQUENCY
FREQUENCY: CONTINUOUS
FREQUENCY: CONTINUOUS

## 2020-08-12 ASSESSMENT — PAIN SCALES - WONG BAKER

## 2020-08-12 ASSESSMENT — PAIN DESCRIPTION - PAIN TYPE
TYPE: CHRONIC PAIN

## 2020-08-12 ASSESSMENT — PAIN SCALES - GENERAL
PAINLEVEL_OUTOF10: 8
PAINLEVEL_OUTOF10: 0

## 2020-08-12 ASSESSMENT — PAIN DESCRIPTION - ONSET
ONSET: ON-GOING
ONSET: ON-GOING

## 2020-08-12 ASSESSMENT — PAIN DESCRIPTION - DESCRIPTORS
DESCRIPTORS: ACHING
DESCRIPTORS: DISCOMFORT

## 2020-08-12 ASSESSMENT — PAIN DESCRIPTION - LOCATION
LOCATION: GENERALIZED
LOCATION: GENERALIZED

## 2020-08-12 ASSESSMENT — PAIN - FUNCTIONAL ASSESSMENT
PAIN_FUNCTIONAL_ASSESSMENT: ACTIVITIES ARE NOT PREVENTED
PAIN_FUNCTIONAL_ASSESSMENT: ACTIVITIES ARE NOT PREVENTED

## 2020-08-12 ASSESSMENT — PAIN DESCRIPTION - PROGRESSION
CLINICAL_PROGRESSION: NOT CHANGED
CLINICAL_PROGRESSION: NOT CHANGED

## 2020-08-12 NOTE — PROGRESS NOTES
Severa Bers called back and stated that a nurse doesn't have to go with patient and I told her this is what Nandini Ran the transport eduardo stated. She was given his number and waiting for a response back from [facility].

## 2020-08-12 NOTE — PROGRESS NOTES
Progress Note( Dr. Rona Denney)  8/11/2020  Subjective:   Admit Date: 8/8/2020  PCP: Jessica Fisher MD    Admitted For :Unstable gait patient fell down few times when picked up by squad he was hypoxic and was unresponsive  Patient admitted to [facility]  unit     Consulted For: Better better control of blood glucose    Interval History: Patient is positive for COVID    Denies any chest pains,   Mild SOB . Denies nausea or vomiting. No new bowel or bladder symptoms. Intake/Output Summary (Last 24 hours) at 8/11/2020 2225  Last data filed at 8/11/2020 1454  Gross per 24 hour   Intake --   Output 2000 ml   Net -2000 ml       DATA    CBC:   Recent Labs     08/09/20  0530 08/10/20  0630 08/11/20  0615   WBC 3.1* 3.2* 3.5*   HGB 14.1 14.9 15.0   * 92* 104*    CMP:  Recent Labs     08/09/20  0530 08/10/20  0630 08/11/20  0615    138 136   K 4.0 3.5 3.5    97* 93*   CO2 25 30 33*   BUN 13 17 23   CREATININE 1.0 1.2 1.5*   CALCIUM 8.4 8.6 8.7   PROT 6.1* 6.0*  6.0* 6.0*   LABALBU 3.9 3.6 3.7   BILITOT 0.5 0.5 0.6   ALKPHOS 62 57 58   AST 45* 54* 54*   ALT 27 30 31     Lipids:   Lab Results   Component Value Date    CHOL 129 05/14/2013    HDL 28 05/14/2013    TRIG 220 05/14/2013     Glucose:  Recent Labs     08/11/20  1237 08/11/20  1713 08/11/20 2038   POCGLU 230* 318* 328*     LnslowhesmZ0G:  Lab Results   Component Value Date    LABA1C 8.8 08/09/2020     High Sensitivity TSH:   Lab Results   Component Value Date    TSHHS 2.230 08/09/2020     Free T3:   Lab Results   Component Value Date    FT3 2.3 10/19/2015     Free T4:  Lab Results   Component Value Date    T4FREE 1.08 10/19/2015       Ct Head Wo Contrast    Result Date: 8/8/2020  EXAMINATION: CT OF THE HEAD WITHOUT CONTRAST  8/8/2020 2:51 pm    No acute intracranial abnormality.      Ct Abdomen Pelvis W Iv Contrast Additional Contrast? None    Result Date: 8/10/2020  EXAMINATION: CTA OF THE CHEST; CT OF THE ABDOMEN AND PELVIS WITH CONTRAST; CT OF THE LUMBAR SPINE WITHOUT CONTRAST 8/8/2020 3:12 pm; 8/8/2020 3:20 pm; 8/8/2020 2:52 pm , symptomatic-significantly low alert oriented. Low lung volumes with mild elevation of  PCMthe left hemidiaphragm. There are basilar predominant airspace opacities, left greater than right. These are new from the prior chest CT. While this could represent atelectasis in the setting of low lung volumes, infection is not excluded. Poor contrast opacification of the pulmonary arteries. No central pulmonary arterial filling defect is seen. No evidence of right heart strain. No acute abnormality in the abdomen or pelvis. No acute fracture or traumatic malalignment in the lumbar spine. 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 Cta Pulmonary W Contrast    Result Date: 8/10/2020  EXAMINATION: CTA OF THE CHEST; CT OF THE ABDOMEN AND PELVIS WITH CONTRAST; CT OF THE LUMBAR SPINE WITHOUT CONTRAST 8/8/2020 3:12 pm; 8/8/2020 3:20 pm; 8/8/2020 2:52 pm   .     Low lung volumes with mild elevation of the left hemidiaphragm. There are basilar predominant airspace opacities, left greater than right. These are new from the prior chest CT. While this could represent atelectasis in the setting of low lung volumes, infection is not excluded. Poor contrast opacification of the pulmonary arteries. No central pulmonary arterial filling defect is seen. No evidence of right heart strain. No acute abnormality in the abdomen or pelvis. No acute fracture or traumatic malalignment in the lumbar spine. 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 Ct Lumbar Reconstruction Wo Post Process    Result Date: 8/10/2020  EXAMINATION: CTA OF THE CHEST; CT OF THE ABDOMEN AND PELVIS WITH CONTRAST; CT OF THE LUMBAR SPINE WITHOUT CONTRAST 8/8/2020 3:12 pm; 8/8/2020 3:20 pm; 8/8/2020 2:52 pm,    Low lung volumes with mild elevation of the left hemidiaphragm.   There are basilar predominant airspace opacities, left greater than right. These are new from the prior chest CT. While this could represent atelectasis in the setting of low lung volumes, infection is not excluded. Poor contrast opacification of the pulmonary arteries. No central pulmonary arterial filling defect is seen. No evidence of right heart strain. No acute abnormality in the abdomen or pelvis. No acute fracture or traumatic malalignment in the lumbar spine. 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        Scheduled Medicines   Medications:    enoxaparin  40 mg Subcutaneous BID    dexamethasone  6 mg Oral Daily    aspirin  81 mg Oral Daily    atorvastatin  20 mg Oral Daily    calcium-vitamin D  1 tablet Oral BID WC    citalopram  20 mg Oral Daily    clopidogrel  75 mg Oral Daily    metoprolol tartrate  50 mg Oral BID    pantoprazole  40 mg Oral QAM AC    insulin lispro  0-6 Units Subcutaneous TID WC    sodium chloride flush  10 mL Intravenous 2 times per day    azithromycin  500 mg Intravenous Q24H    And    cefTRIAXone (ROCEPHIN) IV  1 g Intravenous Q24H    losartan  100 mg Oral Daily    fenofibrate  160 mg Oral Daily    insulin glargine  30 Units Subcutaneous Nightly    insulin lispro  10 Units Subcutaneous TID WC    insulin lispro  0-3 Units Subcutaneous 2 times per day    amLODIPine  5 mg Oral BID    chlorthalidone  12.5 mg Oral Daily    spironolactone  25 mg Oral Daily    ziprasidone  10 mg Intramuscular Once      Infusions:    dextrose           Objective:   Vitals: BP (!) 143/80   Pulse 73   Temp 98.3 °F (36.8 °C) (Oral)   Resp 14   Ht 5' 10.5\" (1.791 m)   Wt 255 lb 1.2 oz (115.7 kg)   SpO2 98%   BMI 36.08 kg/m²   General appearance: alert and cooperative with exam  Neck: no JVD or bruit  Thyroid : Normal lobes   Lungs: Has Vesicular Breath sounds mild wheezing and rales  Heart:  regular rate and rhythm  Abdomen: soft, non-tender; bowel sounds normal; no masses,  no organomegaly  Musculoskeletal: Normal  Extremities: extremities normal, , no edema  Neurologic:  Awake, alert, oriented to name, place and time. Cranial nerves II-XII are grossly intact. Motor is  intact. Sensory is intact. ,  and gait is normal.    Assessment:     Patient Active Problem List:     Essential hypertension, benign     Type 2 diabetes mellitus, with long-term current use of insulin ([facility] [address]     Other and unspecified hyperlipidemia     Chest pain     Coronary atherosclerosis     Chronic kidney disease, stage III (moderate) (HCC)     Cellulitis of leg     Cellulitis of lower leg     Lethargy  COVID positive for infection      Plan:     1. Reviewed POC blood glucose . Labs and X ray results   2. Reviewed Current Medicines   3. On meal/ Correction bolus Humalog/ Basal Lantus Insulin regime   4. Monitor Blood glucose frequently   5. Modified  the dose of Insulin/ other medicines as needed   6. Will follow     .      Humaira Diaz MD

## 2020-08-12 NOTE — PROGRESS NOTES
Nephrology Progress Note  8/12/2020 11:22 AM        Subjective:   Admit Date: 8/8/2020  PCP: True Carver MD    Interval History: no ac distress - wondering when he can go home     Diet: good    ROS:  No sob , no overt confusion this  am , good UOP - has islas     Data:     Current med's:    insulin glargine  40 Units Subcutaneous Nightly    insulin lispro  15 Units Subcutaneous TID WC    insulin lispro  0-12 Units Subcutaneous TID WC    insulin lispro  0-6 Units Subcutaneous 2 times per day    enoxaparin  40 mg Subcutaneous BID    dexamethasone  6 mg Oral Daily    aspirin  81 mg Oral Daily    atorvastatin  20 mg Oral Daily    calcium-vitamin D  1 tablet Oral BID WC    citalopram  20 mg Oral Daily    clopidogrel  75 mg Oral Daily    metoprolol tartrate  50 mg Oral BID    pantoprazole  40 mg Oral QAM AC    sodium chloride flush  10 mL Intravenous 2 times per day    azithromycin  500 mg Intravenous Q24H    And    cefTRIAXone (ROCEPHIN) IV  1 g Intravenous Q24H    losartan  100 mg Oral Daily    fenofibrate  160 mg Oral Daily    amLODIPine  5 mg Oral BID    chlorthalidone  12.5 mg Oral Daily    spironolactone  25 mg Oral Daily    ziprasidone  10 mg Intramuscular Once      dextrose           I/O last 3 completed shifts:  In: -   Out: 3000 [Urine:3000]    CBC:   Recent Labs     08/10/20  0630 08/11/20  0615 08/12/20  0630   WBC 3.2* 3.5* 3.4*   HGB 14.9 15.0 15.9   PLT 92* 104* 107*          Recent Labs     08/10/20  0630 08/11/20  0615 08/12/20  0630    136 132*   K 3.5 3.5 4.0   CL 97* 93* 92*   CO2 30 33* 30   BUN 17 23 34*   CREATININE 1.2 1.5* 1.6*   GLUCOSE 201* 203* 303*       Lab Results   Component Value Date    CALCIUM 8.9 08/12/2020    PHOS 3.0 10/19/2015       Objective:     Vitals: BP (!) 165/79   Pulse 67   Temp 97.8 °F (36.6 °C) (Oral)   Resp 13   Ht 5' 10.5\" (1.791 m)   Wt 249 lb 1.9 oz (113 kg)   SpO2 93%   BMI 35.24 kg/m²     General appearance:  No ditress  HEENT:

## 2020-08-12 NOTE — PROGRESS NOTES
Patient has attempted 3 time to get out of bed to go to bathroom and has set off the bed alarm. Reporting issue to [employer]. Taken patient to bedside now 3 times. Bed alarm is on patient.

## 2020-08-12 NOTE — PROGRESS NOTES
Got a call from transport and they stated they are getting ready to bring up patient because he refused his MRI. Fe Weinberg to verify if patient refused and she stated that he stated he wasn't going to lay on that table to get it done. Requested Amalia Brooks to please give the phone to patient so that I could explain the importance of scan because patient has periods of confusion and he needs to be talked through what is happening. She stated that I could not speak to patient and she is not allowed to persuade patient to have procedure and that he refused. At this time called [address] charge nurse and explained the above situation and she stated that she would call Sara Mccabe to have her go speak with patient. Before she could make it down there patient was already on route back to room. Amalia Brooks stated that patient was ripping off his bipap and refusing. Patient is not on Bipap and went down on 1 liter NC. Anabelle Calles RN called Josh nurse manager and made her aware of above situation.

## 2020-08-12 NOTE — PROGRESS NOTES
Dr. Elise Hamilton stated that patient doesn't have to have MRI now and if it cannot be done it is okay to have done as outpatient.

## 2020-08-12 NOTE — PROGRESS NOTES
Got a call from transport stating that they will be up within the next 15 mins to get patient and take to MRI. Called MRI and spoke with Meg Raphael and verified with her that patient  was coming down because I spoke with her this morning and she stated that the patient wasn't allowed to come down because he was positive and they were only doing MRI on positive patient that it was absolutley necessary. Meg Raphael stated that since she already did one positive she was going to do the other positive now. told her I would send down patient.

## 2020-08-12 NOTE — PROGRESS NOTES
Spoke with Zachary from [facility] and he stated that my patient traveling down to MRI has to travel with a nurse and that nurse has to stay with a positive patient. Attempted to call Wesley Reina RN charge nurse x2 with no answer so I called ICU charge nurse Dionisio Mccormick and asked if a nurse can go with patient she stated no and that she would get a hold of Wesley Reina RN charge nurse to see if we can have someone go with patient. Told Zachary would call him back when I get an answer from the charge nurses. Waiting for response.

## 2020-08-12 NOTE — PROGRESS NOTES
Spoke with Dr. Shamika Harkins and he stated that he adjusted patients 02 to 1 liter because he was 97 percent. Told Dr. Shamika Harkins that patients 02 dropped when he was taken off to 85 percent with good pleth.

## 2020-08-12 NOTE — PROGRESS NOTES
Call from [facility] stating patients 02 was 85 percent with good pleth. Came into room patients 02 was at 87 percent so placed back on 2 liters NC. Patient climbed up to 94 percent. Will cont to monitor patient.

## 2020-08-12 NOTE — PROGRESS NOTES
Report was called to Cara Hammonds at this time on [address] All questions answered. Patients belongings are packed and ready to leave. Waiting on call from Cara Hammonds.

## 2020-08-12 NOTE — PROGRESS NOTES
conjunctivitis. HENT Mucous membranes are moist. Oral pharynx without exudates, no evidence of thrush. NECK Supple, no apparent thyromegaly or masses. RESP Clear to auscultation, no wheezes, rales or rhonchi. Symmetric chest movement while on 2 L of O2.  CARDIO/VASC S1/S2 auscultated. Regular rate without appreciable murmurs, rubs, or gallops. No JVD or carotid bruits. Peripheral pulses equal bilaterally and palpable. No peripheral edema. GI Abdomen is soft without significant tenderness, masses, or guarding. Bowel sounds are normoactive. Rectal exam deferred.  No costovertebral angle tenderness. Normal appearing external genitalia. Hong catheter is not present. HEME/LYMPH No palpable cervical lymphadenopathy and no hepatosplenomegaly. No petechiae or ecchymoses. MSK No gross joint deformities. SKIN Normal coloration, warm, dry. NEURO Cranial nerves appear grossly intact, normal speech, no lateralizing weakness. PSYCH Awake, alert, oriented x 4. Affect appropriate.     Medications:   Medications:    insulin glargine  40 Units Subcutaneous Nightly    insulin lispro  15 Units Subcutaneous TID WC    insulin lispro  0-12 Units Subcutaneous TID WC    insulin lispro  0-6 Units Subcutaneous 2 times per day    enoxaparin  40 mg Subcutaneous BID    dexamethasone  6 mg Oral Daily    aspirin  81 mg Oral Daily    atorvastatin  20 mg Oral Daily    calcium-vitamin D  1 tablet Oral BID WC    citalopram  20 mg Oral Daily    clopidogrel  75 mg Oral Daily    metoprolol tartrate  50 mg Oral BID    pantoprazole  40 mg Oral QAM AC    sodium chloride flush  10 mL Intravenous 2 times per day    azithromycin  500 mg Intravenous Q24H    And    cefTRIAXone (ROCEPHIN) IV  1 g Intravenous Q24H    losartan  100 mg Oral Daily    fenofibrate  160 mg Oral Daily    amLODIPine  5 mg Oral BID    chlorthalidone  12.5 mg Oral Daily    spironolactone  25 mg Oral Daily    ziprasidone  10 mg Intramuscular Once Infusions:    dextrose       PRN Meds: acetaminophen, 650 mg, Q6H PRN    Or  acetaminophen, 650 mg, Q6H PRN  glucose, 15 g, PRN  dextrose, 12.5 g, PRN  glucagon (rDNA), 1 mg, PRN  dextrose, 100 mL/hr, PRN  sodium chloride flush, 10 mL, PRN  polyethylene glycol, 17 g, Daily PRN  promethazine, 12.5 mg, Q6H PRN    Or  ondansetron, 4 mg, Q6H PRN  oxyCODONE, 5 mg, Q4H PRN          Electronically signed by Alejandra Lopez MD on 8/12/2020 at 2:04 PM

## 2020-08-12 NOTE — PROGRESS NOTES
Patient arrived back to unit at this time. Asked patient why he didn't want to get the MRI done and he stated that he was having a PANIC attack and felt like he couldn't breath because he had the canopy over his head and they wouldn't take it off so he started pull at it to remove it out of panic. Patient is alert and oriented at this time. Explained the importance of patient getting the MRI completed and he stated he is willing to get MRI done.

## 2020-08-13 VITALS
BODY MASS INDEX: 34.88 KG/M2 | TEMPERATURE: 97.8 F | WEIGHT: 249.12 LBS | SYSTOLIC BLOOD PRESSURE: 139 MMHG | DIASTOLIC BLOOD PRESSURE: 94 MMHG | HEART RATE: 61 BPM | OXYGEN SATURATION: 93 % | RESPIRATION RATE: 22 BRPM | HEIGHT: 71 IN

## 2020-08-13 LAB
ALBUMIN SERPL-MCNC: 4.1 GM/DL (ref 3.4–5)
ALP BLD-CCNC: 61 IU/L (ref 40–128)
ALT SERPL-CCNC: 34 U/L (ref 10–40)
ANION GAP SERPL CALCULATED.3IONS-SCNC: 12 MMOL/L (ref 4–16)
AST SERPL-CCNC: 53 IU/L (ref 15–37)
BASOPHILS ABSOLUTE: 0 K/CU MM
BASOPHILS RELATIVE PERCENT: 0.2 % (ref 0–1)
BILIRUB SERPL-MCNC: 0.6 MG/DL (ref 0–1)
BUN BLDV-MCNC: 48 MG/DL (ref 6–23)
CALCIUM SERPL-MCNC: 9.1 MG/DL (ref 8.3–10.6)
CHLORIDE BLD-SCNC: 91 MMOL/L (ref 99–110)
CO2: 32 MMOL/L (ref 21–32)
CREAT SERPL-MCNC: 2 MG/DL (ref 0.9–1.3)
CULTURE: NORMAL
CULTURE: NORMAL
DIFFERENTIAL TYPE: ABNORMAL
EOSINOPHILS ABSOLUTE: 0 K/CU MM
EOSINOPHILS RELATIVE PERCENT: 0 % (ref 0–3)
GFR AFRICAN AMERICAN: 39 ML/MIN/1.73M2
GFR NON-AFRICAN AMERICAN: 32 ML/MIN/1.73M2
GLUCOSE BLD-MCNC: 241 MG/DL (ref 70–99)
GLUCOSE BLD-MCNC: 262 MG/DL (ref 70–99)
GLUCOSE BLD-MCNC: 272 MG/DL (ref 70–99)
HCT VFR BLD CALC: 49.5 % (ref 42–52)
HEMOGLOBIN: 16.2 GM/DL (ref 13.5–18)
IMMATURE NEUTROPHIL %: 0.7 % (ref 0–0.43)
LYMPHOCYTES ABSOLUTE: 1.5 K/CU MM
LYMPHOCYTES RELATIVE PERCENT: 24.7 % (ref 24–44)
Lab: NORMAL
Lab: NORMAL
MAGNESIUM: 2.1 MG/DL (ref 1.8–2.4)
MCH RBC QN AUTO: 29.7 PG (ref 27–31)
MCHC RBC AUTO-ENTMCNC: 32.7 % (ref 32–36)
MCV RBC AUTO: 90.8 FL (ref 78–100)
MONOCYTES ABSOLUTE: 0.7 K/CU MM
MONOCYTES RELATIVE PERCENT: 12.6 % (ref 0–4)
NUCLEATED RBC %: 0 %
PDW BLD-RTO: 13.6 % (ref 11.7–14.9)
PLATELET # BLD: 137 K/CU MM (ref 140–440)
PMV BLD AUTO: 11.1 FL (ref 7.5–11.1)
POTASSIUM SERPL-SCNC: 4.1 MMOL/L (ref 3.5–5.1)
RBC # BLD: 5.45 M/CU MM (ref 4.6–6.2)
SEGMENTED NEUTROPHILS ABSOLUTE COUNT: 3.6 K/CU MM
SEGMENTED NEUTROPHILS RELATIVE PERCENT: 61.8 % (ref 36–66)
SODIUM BLD-SCNC: 135 MMOL/L (ref 135–145)
SPECIMEN: NORMAL
SPECIMEN: NORMAL
TOTAL IMMATURE NEUTOROPHIL: 0.04 K/CU MM
TOTAL NUCLEATED RBC: 0 K/CU MM
TOTAL PROTEIN: 6.9 GM/DL (ref 6.4–8.2)
WBC # BLD: 5.9 K/CU MM (ref 4–10.5)

## 2020-08-13 PROCEDURE — 83735 ASSAY OF MAGNESIUM: CPT

## 2020-08-13 PROCEDURE — 6360000002 HC RX W HCPCS: Performed by: PHYSICIAN ASSISTANT

## 2020-08-13 PROCEDURE — 85025 COMPLETE CBC W/AUTO DIFF WBC: CPT

## 2020-08-13 PROCEDURE — 6370000000 HC RX 637 (ALT 250 FOR IP): Performed by: HOSPITALIST

## 2020-08-13 PROCEDURE — 94761 N-INVAS EAR/PLS OXIMETRY MLT: CPT

## 2020-08-13 PROCEDURE — 6370000000 HC RX 637 (ALT 250 FOR IP): Performed by: INTERNAL MEDICINE

## 2020-08-13 PROCEDURE — 80053 COMPREHEN METABOLIC PANEL: CPT

## 2020-08-13 PROCEDURE — 82962 GLUCOSE BLOOD TEST: CPT

## 2020-08-13 PROCEDURE — 85027 COMPLETE CBC AUTOMATED: CPT

## 2020-08-13 PROCEDURE — 2580000003 HC RX 258: Performed by: INTERNAL MEDICINE

## 2020-08-13 PROCEDURE — 6360000002 HC RX W HCPCS: Performed by: INTERNAL MEDICINE

## 2020-08-13 RX ORDER — SPIRONOLACTONE 25 MG/1
25 TABLET ORAL DAILY
Qty: 30 TABLET | Refills: 3 | Status: ON HOLD | OUTPATIENT
Start: 2020-08-14 | End: 2020-10-01 | Stop reason: HOSPADM

## 2020-08-13 RX ORDER — CHLORTHALIDONE 25 MG/1
12.5 TABLET ORAL DAILY
Qty: 30 TABLET | Refills: 3 | Status: ON HOLD | OUTPATIENT
Start: 2020-08-14 | End: 2020-10-01 | Stop reason: HOSPADM

## 2020-08-13 RX ORDER — AMLODIPINE BESYLATE 5 MG/1
5 TABLET ORAL 2 TIMES DAILY
Qty: 30 TABLET | Refills: 3 | Status: SHIPPED | OUTPATIENT
Start: 2020-08-13

## 2020-08-13 RX ORDER — ATORVASTATIN CALCIUM 20 MG/1
20 TABLET, FILM COATED ORAL NIGHTLY
Status: DISCONTINUED | OUTPATIENT
Start: 2020-08-13 | End: 2020-08-13 | Stop reason: HOSPADM

## 2020-08-13 RX ORDER — INSULIN GLARGINE 100 [IU]/ML
50 INJECTION, SOLUTION SUBCUTANEOUS NIGHTLY
Status: DISCONTINUED | OUTPATIENT
Start: 2020-08-13 | End: 2020-08-13 | Stop reason: HOSPADM

## 2020-08-13 RX ORDER — DEXAMETHASONE 6 MG/1
6 TABLET ORAL DAILY
Qty: 6 TABLET | Refills: 0 | Status: SHIPPED | OUTPATIENT
Start: 2020-08-14 | End: 2020-08-20

## 2020-08-13 RX ADMIN — PANTOPRAZOLE SODIUM 40 MG: 40 TABLET, DELAYED RELEASE ORAL at 05:26

## 2020-08-13 RX ADMIN — AMLODIPINE BESYLATE 5 MG: 5 TABLET ORAL at 08:25

## 2020-08-13 RX ADMIN — OYSTER SHELL CALCIUM WITH VITAMIN D 1 TABLET: 500; 200 TABLET, FILM COATED ORAL at 08:25

## 2020-08-13 RX ADMIN — CLOPIDOGREL BISULFATE 75 MG: 75 TABLET ORAL at 08:25

## 2020-08-13 RX ADMIN — SPIRONOLACTONE 25 MG: 25 TABLET ORAL at 08:24

## 2020-08-13 RX ADMIN — METOPROLOL TARTRATE 50 MG: 50 TABLET, FILM COATED ORAL at 08:25

## 2020-08-13 RX ADMIN — CHLORTHALIDONE 12.5 MG: 25 TABLET ORAL at 08:25

## 2020-08-13 RX ADMIN — OXYCODONE HYDROCHLORIDE 5 MG: 5 TABLET ORAL at 05:26

## 2020-08-13 RX ADMIN — CITALOPRAM HYDROBROMIDE 20 MG: 20 TABLET ORAL at 08:25

## 2020-08-13 RX ADMIN — FENOFIBRATE 160 MG: 160 TABLET ORAL at 08:25

## 2020-08-13 RX ADMIN — DEXAMETHASONE 6 MG: 4 TABLET ORAL at 08:24

## 2020-08-13 RX ADMIN — SODIUM CHLORIDE, PRESERVATIVE FREE 10 ML: 5 INJECTION INTRAVENOUS at 08:25

## 2020-08-13 RX ADMIN — ENOXAPARIN SODIUM 40 MG: 40 INJECTION SUBCUTANEOUS at 08:25

## 2020-08-13 RX ADMIN — LOSARTAN POTASSIUM 100 MG: 50 TABLET, FILM COATED ORAL at 08:25

## 2020-08-13 RX ADMIN — ASPIRIN 81 MG CHEWABLE TABLET 81 MG: 81 TABLET CHEWABLE at 08:25

## 2020-08-13 ASSESSMENT — PAIN SCALES - WONG BAKER
WONGBAKER_NUMERICALRESPONSE: 0

## 2020-08-13 ASSESSMENT — PAIN SCALES - GENERAL
PAINLEVEL_OUTOF10: 8
PAINLEVEL_OUTOF10: 0

## 2020-08-13 NOTE — PROGRESS NOTES
Pt discharged and instructed to quarantine until 8/22/2020. Health dept notified by Charge TIA Olivera 71.

## 2020-08-13 NOTE — PLAN OF CARE
Problem: Falls - Risk of:  Goal: Will remain free from falls  Description: Will remain free from falls  Outcome: Ongoing  Goal: Absence of physical injury  Description: Absence of physical injury  Outcome: Ongoing     Problem: Pain:  Goal: Pain level will decrease  Description: Pain level will decrease  Outcome: Ongoing  Goal: Control of acute pain  Description: Control of acute pain  Outcome: Ongoing  Goal: Control of chronic pain  Description: Control of chronic pain  Outcome: Ongoing     Problem: Infection:  Goal: Will remain free from infection  Description: Will remain free from infection  Outcome: Ongoing     Problem: Safety:  Goal: Free from accidental physical injury  Description: Free from accidental physical injury  Outcome: Ongoing  Goal: Free from intentional harm  Description: Free from intentional harm  Outcome: Ongoing     Problem: Daily Care:  Goal: Daily care needs are met  Description: Daily care needs are met  Outcome: Ongoing     Problem: Discharge Planning:  Goal: Patients continuum of care needs are met  Description: Patients continuum of care needs are met  Outcome: Ongoing     Problem: Airway Clearance - Ineffective  Goal: Achieve or maintain patent airway  Outcome: Ongoing     Problem: Gas Exchange - Impaired  Goal: Absence of hypoxia  Outcome: Ongoing  Goal: Promote optimal lung function  Outcome: Ongoing     Problem: Breathing Pattern - Ineffective  Goal: Ability to achieve and maintain a regular respiratory rate  Outcome: Ongoing     Problem:  Body Temperature -  Risk of, Imbalanced  Goal: Ability to maintain a body temperature within defined limits  Outcome: Ongoing  Goal: Will regain or maintain usual level of consciousness  Outcome: Ongoing  Goal: Complications related to the disease process, condition or treatment will be avoided or minimized  Outcome: Ongoing     Problem: Isolation Precautions - Risk of Spread of Infection  Goal: Prevent transmission of infection  Outcome: Ongoing Problem: Nutrition Deficits  Goal: Optimize nutrtional status  Outcome: Ongoing     Problem: Risk for Fluid Volume Deficit  Goal: Maintain normal heart rhythm  Outcome: Ongoing  Goal: Maintain absence of muscle cramping  Outcome: Ongoing  Goal: Maintain normal serum potassium, sodium, calcium, phosphorus, and pH  Outcome: Ongoing     Problem: Loneliness or Risk for Loneliness  Goal: Demonstrate positive use of time alone when socialization is not possible  Outcome: Ongoing     Problem: Fatigue  Goal: Verbalize increase energy and improved vitality  Outcome: Ongoing     Problem: Patient Education: Go to Patient Education Activity  Goal: Patient/Family Education  Outcome: Ongoing     Problem: Skin Integrity:  Goal: Will show no infection signs and symptoms  Description: Will show no infection signs and symptoms  Outcome: Ongoing  Goal: Absence of new skin breakdown  Description: Absence of new skin breakdown  Outcome: Ongoing

## 2020-08-13 NOTE — DISCHARGE SUMMARY
Discharge Summary    Name:  Laurence Martino /Age/Sex: 1940  (79 y.o. male)   MRN & CSN:  [id number] & [id number] Admission Date/Time: 2020  1:49 PM   Attending:  Antonio Vazquez MD Discharging Physician: Olga Wiggins MD     Hospital Course:     Hillary Marquis is a 79 y.o.  male  who presents with weakness     Patient was seen and examined  Denied any worsening sob  Wants to be discharged  Seems to be back to his baseline mental status     Assessment and Plan  1) Acute hypoxic respiratory failure due to COVID 19 PNA   -CTA Chest: Basilar predominant airspace opacities L>R  -COVID test positive  -complete Dexamethasone 6 mg daily for 10 days  -CRP and Procal not significantly elevated  -Wean off O2 successfully     2) Acute metabolic encephalopathy 2/2 above  -CT head and ammonia non-acute  -Improved  -No further need of MRI if patient continues to improve  -Neuro on board; dicussed with Neurology     3 Essential HTN  -Admitted with SBP> 200  -Nephrology adjusting meds  -OP follow up given     4-Lower extremity weakness with recurrent falls  -work as OP     5) Liver cirrhosis per [address] [facility] board for work up     Other issues  -Essential HTN  -CAD  -DM  -HLD  -Chronic pain syndrome      The patient expressed appropriate understanding of and agreement with the discharge recommendations, medications, and plan.      Consults this admission:  IP CONSULT TO HOSPITALIST  IP CONSULT TO ENDOCRINOLOGY  IP CONSULT TO GI  IP CONSULT TO NEPHROLOGY  IP CONSULT TO NEUROLOGY    Discharge Instruction:   Follow up appointments: nephro and GI in 2 weeks  Primary care physician:  within 2 weeks    Diet:  diabetic diet   Activity: activity as tolerated  Disposition: Discharged to:   [x]Home, []HHC, []SNF, []Acute Rehab, []Hospice   Condition on discharge: Stable    Discharge Medications:      Baylee Sorensen   Home Medication Instructions [id number]    Printed on:20 7537   Medication Information amLODIPine (NORVASC) 5 MG tablet  Take 1 tablet by mouth 2 times daily             aspirin 325 MG tablet  Take 81 mg by mouth daily. atorvastatin (LIPITOR) 20 MG tablet  Take 20 mg by mouth daily. busPIRone (BUSPAR) 10 MG tablet  Take 10 mg by mouth 3 times daily             Calcium Carb-Cholecalciferol (CALCIUM-VITAMIN D) 500-200 MG-UNIT per tablet  Take 1 tablet by mouth 2 times daily (with meals)             chlorthalidone (HYGROTON) 25 MG tablet  Take 0.5 tablets by mouth daily             cholestyramine (QUESTRAN) 4 g packet  Take 1 packet by mouth 3 times daily (with meals)             citalopram (CELEXA) 10 MG tablet  Take 20 mg by mouth daily. clopidogrel (PLAVIX) 75 MG tablet  Take 75 mg by mouth daily. clotrimazole (LOTRIMIN) 1 % cream               colestipol (COLESTID) 1 g tablet  Take 1 g by mouth 3 times daily             dexamethasone (DECADRON) 6 MG tablet  Take 1 tablet by mouth daily for 6 days             fenofibrate 160 MG tablet  Take 160 mg by mouth daily. fluticasone (FLONASE) 50 MCG/ACT nasal spray  1 spray by Nasal route 2 times daily. glimepiride (AMARYL) 4 MG tablet  Take 4 mg by mouth 2 times daily. insulin aspart (NOVOLOG) 100 UNIT/ML injection vial  Inject into the skin 3 times daily (before meals)             insulin glargine (LANTUS) 100 UNIT/ML injection  Inject 60 Units into the skin every evening              losartan (COZAAR) 100 MG tablet  Take 100 mg by mouth daily              metoprolol tartrate (LOPRESSOR) 25 MG tablet  Take 50 mg by mouth 2 times daily             oxyCODONE-acetaminophen (PERCOCET) 7.5-325 MG per tablet  Take 1 tablet by mouth every 8 hours as needed.               pantoprazole sodium (PROTONIX) 40 MG PACK packet  Take 40 mg by mouth every morning (before breakfast)             spironolactone (ALDACTONE) 25 MG tablet  Take 1 tablet by mouth daily Objective Findings at Discharge:   BP (!) 139/94   Pulse 61   Temp 97.8 °F (36.6 °C) (Oral)   Resp 16   Ht 5' 10.5\" (1.791 m)   Wt 249 lb 1.9 oz (113 kg)   SpO2 93%   BMI 35.24 kg/m²            PHYSICAL EXAM   GEN Awake male, sitting upright in bed in no apparent distress. Appears given age. EYES Pupils are equally round. No scleral erythema, discharge, or conjunctivitis. HENT Mucous membranes are moist. Oral pharynx without exudates, no evidence of thrush. NECK Supple, no apparent thyromegaly or masses. RESP Clear to auscultation, no wheezes, rales or rhonchi. Symmetric chest movement while on room air. CARDIO/VASC S1/S2 auscultated. Regular rate without appreciable murmurs, rubs, or gallops. No JVD or carotid bruits. Peripheral pulses equal bilaterally and palpable. No peripheral edema. GI Abdomen is soft without significant tenderness, masses, or guarding. Bowel sounds are normoactive. Rectal exam deferred.  No costovertebral angle tenderness. Normal appearing external genitalia. Hong catheter is not present. HEME/LYMPH No palpable cervical lymphadenopathy and no hepatosplenomegaly. No petechiae or ecchymoses. MSK No gross joint deformities. SKIN Normal coloration, warm, dry. NEURO Cranial nerves appear grossly intact, normal speech, no lateralizing weakness. PSYCH Awake, alert, oriented x 4. Affect appropriate.     BMP/CBC  Recent Labs     08/11/20  0615 08/12/20  0630 08/13/20  0520    132* 135   K 3.5 4.0 4.1   CL 93* 92* 91*   CO2 33* 30 32   BUN 23 34* 48*   CREATININE 1.5* 1.6* 2.0*   WBC 3.5* 3.4* 5.9   HCT 46.1 47.6 49.5   * 107* 137*       IMAGING:  As above    Discharge Time of 35 minutes    Electronically signed by Verona Reis MD on 8/13/2020 at 9:53 AM

## 2020-08-13 NOTE — PROGRESS NOTES
Nephrology Progress Note  8/13/2020 3:29 PM        Subjective:   Admit Date: 8/8/2020  PCP: True Carver MD    Interval History: pt seen in early am in COVID floor    Diet: good    ROS:  wants to go home -  Good UOP-  off loop no confusion pos for SARS CoV -2     Data:     Current med's:    insulin glargine  50 Units Subcutaneous Nightly    insulin lispro  0-12 Units Subcutaneous 2 times per day    atorvastatin  20 mg Oral Nightly    insulin lispro  15 Units Subcutaneous TID WC    insulin lispro  0-12 Units Subcutaneous TID WC    enoxaparin  40 mg Subcutaneous BID    dexamethasone  6 mg Oral Daily    aspirin  81 mg Oral Daily    calcium-vitamin D  1 tablet Oral BID WC    citalopram  20 mg Oral Daily    clopidogrel  75 mg Oral Daily    metoprolol tartrate  50 mg Oral BID    pantoprazole  40 mg Oral QAM AC    sodium chloride flush  10 mL Intravenous 2 times per day    azithromycin  500 mg Intravenous Q24H    And    cefTRIAXone (ROCEPHIN) IV  1 g Intravenous Q24H    losartan  100 mg Oral Daily    fenofibrate  160 mg Oral Daily    amLODIPine  5 mg Oral BID    chlorthalidone  12.5 mg Oral Daily    spironolactone  25 mg Oral Daily    ziprasidone  10 mg Intramuscular Once      dextrose           I/O last 3 completed shifts:   In: 0   Out: 1350 [Urine:1350]    CBC:   Recent Labs     08/11/20  0615 08/12/20  0630 08/13/20  0520   WBC 3.5* 3.4* 5.9   HGB 15.0 15.9 16.2   * 107* 137*          Recent Labs     08/11/20  0615 08/12/20  0630 08/13/20  0520    132* 135   K 3.5 4.0 4.1   CL 93* 92* 91*   CO2 33* 30 32   BUN 23 34* 48*   CREATININE 1.5* 1.6* 2.0*   GLUCOSE 203* 303* 272*       Lab Results   Component Value Date    CALCIUM 9.1 08/13/2020    PHOS 3.0 10/19/2015       Objective:     Vitals: BP (!) 139/94   Pulse 61   Temp 97.8 °F (36.6 °C) (Oral)   Resp 22   Ht 5' 10.5\" (1.791 m)   Wt 249 lb 1.9 oz (113 kg)   SpO2 93%   BMI 35.24 kg/m²     General appearance:  No distress  HEENT:  No gross conj pallor  Neck:  supple  Lungs:  Few adv BS- no crackles  Heart:  Seems RRR  Abdomen: soft- ha Hong  Extremities:  + Ch edema LE better as he is not ambulating      Problem List :         Impression :     1. VICKY- CKD stage 3- non oliguric  cr rising - urine  yesterday had rbc and alb - he  is pos for COVID- so renal involvement is possible rudi tubular  - although may have multiple other causes including recent CRS type 1   2. COVID- 19 - not much resp sx   3. Dm / ASCVD/ HTN     Recommendation/Plan  :     1. By the time I am wring the note- he has been d/C home   2. So I have contacted by office and will call his PCP- will get CMP , UA as an out pt   3. F/U with me in 2-03 wk's after he is safe to come see me or virtual visit   4. He was d/C with arb/MRA/ ccb / thiazide   5. Also he did receive dexamethasone  Emp abx   6. His MRI was not done   7.  Will f/U as an out pt       Juli Hernandez MD

## 2020-08-13 NOTE — PROGRESS NOTES
Progress Note( Dr. Peter Perdomo)  8/12/2020  Subjective:   Admit Date: 8/8/2020  PCP: Nevaeh Meredith MD    Admitted For :Unstable gait patient fell down few times when picked up by squad he was hypoxic and was unresponsive  Patient admitted to [facility]  unit     Consulted For: Better better control of blood glucose    Interval History: Patient is positive for COVID    Denies any chest pains,   Mild SOB . Denies nausea or vomiting. No new bowel or bladder symptoms. Intake/Output Summary (Last 24 hours) at 8/12/2020 2205  Last data filed at 8/12/2020 1711  Gross per 24 hour   Intake 220 ml   Output 1500 ml   Net -1280 ml       DATA    CBC:   Recent Labs     08/10/20  0630 08/11/20  0615 08/12/20  0630   WBC 3.2* 3.5* 3.4*   HGB 14.9 15.0 15.9   PLT 92* 104* 107*    CMP:  Recent Labs     08/10/20  0630 08/11/20  0615 08/12/20  0630    136 132*   K 3.5 3.5 4.0   CL 97* 93* 92*   CO2 30 33* 30   BUN 17 23 34*   CREATININE 1.2 1.5* 1.6*   CALCIUM 8.6 8.7 8.9   PROT 6.0*  6.0* 6.0* 6.6   LABALBU 3.6 3.7 4.0   BILITOT 0.5 0.6 0.7   ALKPHOS 57 58 60   AST 54* 54* 50*   ALT 30 31 33     Lipids:   Lab Results   Component Value Date    CHOL 129 05/14/2013    HDL 28 05/14/2013    TRIG 220 05/14/2013     Glucose:  Recent Labs     08/12/20  0816 08/12/20  1108 08/12/20  1617   POCGLU 308* 258* 151*     [id number]:  Lab Results   Component Value Date    LABA1C 8.8 08/09/2020     High Sensitivity TSH:   Lab Results   Component Value Date    TSHHS 2.230 08/09/2020     Free T3:   Lab Results   Component Value Date    FT3 2.3 10/19/2015     Free T4:  Lab Results   Component Value Date    T4FREE 1.08 10/19/2015       Ct Head Wo Contrast    Result Date: 8/8/2020  EXAMINATION: CT OF THE HEAD WITHOUT CONTRAST  8/8/2020 2:51 pm    No acute intracranial abnormality.      Ct Abdomen Pelvis W Iv Contrast Additional Contrast? None    Result Date: 8/10/2020  EXAMINATION: CTA OF THE CHEST; CT OF THE ABDOMEN AND PELVIS WITH CONTRAST; CT predominant airspace opacities, left greater than right. These are new from the prior chest CT. While this could represent atelectasis in the setting of low lung volumes, infection is not excluded. Poor contrast opacification of the pulmonary arteries. No central pulmonary arterial filling defect is seen. No evidence of right heart strain. No acute abnormality in the abdomen or pelvis. No acute fracture or traumatic malalignment in the lumbar spine. 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        Scheduled Medicines   Medications:    insulin glargine  40 Units Subcutaneous Nightly    insulin lispro  15 Units Subcutaneous TID WC    insulin lispro  0-12 Units Subcutaneous TID WC    insulin lispro  0-6 Units Subcutaneous 2 times per day    enoxaparin  40 mg Subcutaneous BID    dexamethasone  6 mg Oral Daily    aspirin  81 mg Oral Daily    atorvastatin  20 mg Oral Daily    calcium-vitamin D  1 tablet Oral BID WC    citalopram  20 mg Oral Daily    clopidogrel  75 mg Oral Daily    metoprolol tartrate  50 mg Oral BID    pantoprazole  40 mg Oral QAM AC    sodium chloride flush  10 mL Intravenous 2 times per day    azithromycin  500 mg Intravenous Q24H    And    cefTRIAXone (ROCEPHIN) IV  1 g Intravenous Q24H    losartan  100 mg Oral Daily    fenofibrate  160 mg Oral Daily    amLODIPine  5 mg Oral BID    chlorthalidone  12.5 mg Oral Daily    spironolactone  25 mg Oral Daily    ziprasidone  10 mg Intramuscular Once      Infusions:    dextrose           Objective:   Vitals: BP (!) 151/82   Pulse 62   Temp 97.8 °F (36.6 °C) (Oral)   Resp 17   Ht 5' 10.5\" (1.791 m)   Wt 249 lb 1.9 oz (113 kg)   SpO2 96%   BMI 35.24 kg/m²   General appearance: alert and cooperative with exam  Neck: no JVD or bruit  Thyroid : Normal lobes   Lungs: Has Vesicular Breath sounds mild wheezing and rales  Heart:  regular rate and rhythm  Abdomen: soft, non-tender; bowel sounds normal; no masses,  no organomegaly  Musculoskeletal: Normal  Extremities: extremities normal, , no edema  Neurologic:  Awake, alert, oriented to name, place and time. Cranial nerves II-XII are grossly intact. Motor is  intact. Sensory is intact. ,  and gait is normal.    Assessment:     Patient Active Problem List:     Essential hypertension, benign     Type 2 diabetes mellitus, with long-term current use of insulin ([address]     Other and unspecified hyperlipidemia     Chest pain     Coronary atherosclerosis     Chronic kidney disease, stage III (moderate) ([facility])     Cellulitis of leg     Cellulitis of lower leg     Lethargy      Plan:     1. Reviewed POC blood glucose . Labs and X ray results   2. Reviewed Current Medicines   3. On meal/ Correction bolus Humalog/ Basal Lantus Insulin regime   4. Monitor Blood glucose frequently   5. Modified  the dose of Insulin/ other medicines as needed   6. Will follow     .      Silva Lawrence MD

## 2020-08-14 LAB
CULTURE: NORMAL
EKG ATRIAL RATE: 96 BPM
EKG DIAGNOSIS: NORMAL
EKG P AXIS: 44 DEGREES
EKG P-R INTERVAL: 178 MS
EKG Q-T INTERVAL: 384 MS
EKG QRS DURATION: 96 MS
EKG QTC CALCULATION (BAZETT): 485 MS
EKG R AXIS: -23 DEGREES
EKG T AXIS: 42 DEGREES
EKG VENTRICULAR RATE: 96 BPM
Lab: NORMAL
SPECIMEN: NORMAL

## 2020-08-16 NOTE — PROGRESS NOTES
Progress Note( Dr. Jerad Castillo)    Subjective:   Admit Date: 8/8/2020  PCP: Shobha Guzman MD    Admitted For :Unstable gait patient fell down few times when picked up by squad he was hypoxic and was unresponsive  Patient admitted to [facility]  unit     Consulted For: Better better control of blood glucose    Interval History: Patient is positive for COVID    Denies any chest pains,   Mild SOB . Denies nausea or vomiting. No new bowel or bladder symptoms. No intake or output data in the 24 hours ending 08/15/20 2109    DATA    CBC:   Recent Labs     08/13/20 0520   WBC 5.9   HGB 16.2   *    CMP:  Recent Labs     08/13/20 0520      K 4.1   CL 91*   CO2 32   BUN 48*   CREATININE 2.0*   CALCIUM 9.1   PROT 6.9   LABALBU 4.1   BILITOT 0.6   ALKPHOS 61   AST 53*   ALT 34     Lipids:   Lab Results   Component Value Date    CHOL 129 05/14/2013    HDL 28 05/14/2013    TRIG 220 05/14/2013     Glucose:  Recent Labs     08/12/20  2246 08/13/20  0818 08/13/20  1241   POCGLU 221* 262* 241*     [id number]:  Lab Results   Component Value Date    LABA1C 8.8 08/09/2020     High Sensitivity TSH:   Lab Results   Component Value Date    TSHHS 2.230 08/09/2020     Free T3:   Lab Results   Component Value Date    FT3 2.3 10/19/2015     Free T4:  Lab Results   Component Value Date    T4FREE 1.08 10/19/2015       Ct Head Wo Contrast    Result Date: 8/8/2020  EXAMINATION: CT OF THE HEAD WITHOUT CONTRAST  8/8/2020 2:51 pm    No acute intracranial abnormality. Ct Abdomen Pelvis W Iv Contrast Additional Contrast? None    Result Date: 8/10/2020  EXAMINATION: CTA OF THE CHEST; CT OF THE ABDOMEN AND PELVIS WITH CONTRAST; CT OF THE LUMBAR SPINE WITHOUT CONTRAST 8/8/2020 3:12 pm; 8/8/2020 3:20 pm; 8/8/2020 2:52 pm , symptomatic-significantly low alert oriented. Low lung volumes with mild elevation of  PCMthe left hemidiaphragm. There are basilar predominant airspace opacities, left greater than right.   These are new from No evidence of right heart strain. No acute abnormality in the abdomen or pelvis. No acute fracture or traumatic malalignment in the lumbar spine. 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 Scheduled Medicines   Medications:      Infusions:         Objective:   Vitals: BP (!) 139/94   Pulse 61   Temp 97.8 °F (36.6 °C) (Oral)   Resp 22   Ht 5' 10.5\" (1.791 m)   Wt 249 lb 1.9 oz (113 kg)   SpO2 93%   BMI 35.24 kg/m²   General appearance: alert and cooperative with exam  Neck: no JVD or bruit  Thyroid : Normal lobes   Lungs: Has Vesicular Breath sounds mild wheezing and rales  Heart:  regular rate and rhythm  Abdomen: soft, non-tender; bowel sounds normal; no masses,  no organomegaly  Musculoskeletal: Normal  Extremities: extremities normal, , no edema  Neurologic:  Awake, alert, oriented to name, place and time. Cranial nerves II-XII are grossly intact. Motor is  intact. Sensory is intact. ,  and gait is normal.    Assessment:     Patient Active Problem List:     Essential hypertension, benign     Type 2 diabetes mellitus, with long-term current use of insulin ([facility] [address]     Other and unspecified hyperlipidemia     Chest pain     Coronary atherosclerosis     Chronic kidney disease, stage III (moderate) ([facility])     Cellulitis of leg     Cellulitis of lower leg     Lethargy      Plan:     1. Reviewed POC blood glucose . Labs and X ray results   2. Reviewed Current Medicines   3. On meal/ Correction bolus Humalog/ Basal Lantus Insulin regime   4. Monitor Blood glucose frequently   5. Modified  the dose of Insulin/ other medicines as needed   6. Possibly discharge later in the day  .      Cordell Guidry MD

## 2020-08-17 ENCOUNTER — HOSPITAL ENCOUNTER (INPATIENT)
Age: 80
LOS: 3 days | Discharge: HOME | DRG: 177 | End: 2020-08-21
Attending: EMERGENCY MEDICINE | Admitting: INTERNAL MEDICINE
Payer: MEDICARE

## 2020-08-17 ENCOUNTER — APPOINTMENT (OUTPATIENT)
Dept: GENERAL RADIOLOGY | Age: 80
DRG: 177 | End: 2020-08-17
Payer: MEDICARE

## 2020-08-17 LAB
ALBUMIN SERPL-MCNC: 3.5 GM/DL (ref 3.4–5)
ALP BLD-CCNC: 49 IU/L (ref 40–129)
ALT SERPL-CCNC: 25 U/L (ref 10–40)
ANION GAP SERPL CALCULATED.3IONS-SCNC: 8 MMOL/L (ref 4–16)
AST SERPL-CCNC: 47 IU/L (ref 15–37)
BASE EXCESS MIXED: 11.6 (ref 0–1.2)
BASOPHILS ABSOLUTE: 0 K/CU MM
BASOPHILS RELATIVE PERCENT: 0.4 % (ref 0–1)
BILIRUB SERPL-MCNC: 0.5 MG/DL (ref 0–1)
BUN BLDV-MCNC: 37 MG/DL (ref 6–23)
CALCIUM SERPL-MCNC: 9 MG/DL (ref 8.3–10.6)
CHLORIDE BLD-SCNC: 100 MMOL/L (ref 99–110)
CO2: 32 MMOL/L (ref 21–32)
COMMENT: ABNORMAL
CREAT SERPL-MCNC: 1.7 MG/DL (ref 0.9–1.3)
DIFFERENTIAL TYPE: ABNORMAL
EOSINOPHILS ABSOLUTE: 0 K/CU MM
EOSINOPHILS RELATIVE PERCENT: 0.8 % (ref 0–3)
GFR AFRICAN AMERICAN: 47 ML/MIN/1.73M2
GFR NON-AFRICAN AMERICAN: 39 ML/MIN/1.73M2
GLUCOSE BLD-MCNC: 66 MG/DL (ref 70–99)
GLUCOSE BLD-MCNC: 70 MG/DL
GLUCOSE BLD-MCNC: 70 MG/DL (ref 70–99)
HCO3 VENOUS: 39.9 MMOL/L (ref 19–25)
HCT VFR BLD CALC: 49.6 % (ref 42–52)
HEMOGLOBIN: 16 GM/DL (ref 13.5–18)
IMMATURE NEUTROPHIL %: 0.8 % (ref 0–0.43)
LYMPHOCYTES ABSOLUTE: 1.6 K/CU MM
LYMPHOCYTES RELATIVE PERCENT: 31.5 % (ref 24–44)
MCH RBC QN AUTO: 29.9 PG (ref 27–31)
MCHC RBC AUTO-ENTMCNC: 32.3 % (ref 32–36)
MCV RBC AUTO: 92.5 FL (ref 78–100)
MONOCYTES ABSOLUTE: 0.6 K/CU MM
MONOCYTES RELATIVE PERCENT: 11.8 % (ref 0–4)
NUCLEATED RBC %: 0 %
O2 SAT, VEN: 68.7 % (ref 50–70)
PCO2, VEN: 69 MMHG (ref 38–52)
PDW BLD-RTO: 13.6 % (ref 11.7–14.9)
PH VENOUS: 7.37 (ref 7.32–7.42)
PLATELET # BLD: 152 K/CU MM (ref 140–440)
PMV BLD AUTO: 11.4 FL (ref 7.5–11.1)
PO2, VEN: 32 MMHG (ref 28–48)
POTASSIUM SERPL-SCNC: 4 MMOL/L (ref 3.5–5.1)
RBC # BLD: 5.36 M/CU MM (ref 4.6–6.2)
SEGMENTED NEUTROPHILS ABSOLUTE COUNT: 2.7 K/CU MM
SEGMENTED NEUTROPHILS RELATIVE PERCENT: 54.7 % (ref 36–66)
SODIUM BLD-SCNC: 140 MMOL/L (ref 135–145)
TOTAL IMMATURE NEUTOROPHIL: 0.04 K/CU MM
TOTAL NUCLEATED RBC: 0 K/CU MM
TOTAL PROTEIN: 6.5 GM/DL (ref 6.4–8.2)
WBC # BLD: 5 K/CU MM (ref 4–10.5)

## 2020-08-17 PROCEDURE — 93005 ELECTROCARDIOGRAM TRACING: CPT | Performed by: EMERGENCY MEDICINE

## 2020-08-17 PROCEDURE — 82805 BLOOD GASES W/O2 SATURATION: CPT

## 2020-08-17 PROCEDURE — 80053 COMPREHEN METABOLIC PANEL: CPT

## 2020-08-17 PROCEDURE — 82962 GLUCOSE BLOOD TEST: CPT

## 2020-08-17 PROCEDURE — 87077 CULTURE AEROBIC IDENTIFY: CPT

## 2020-08-17 PROCEDURE — 71045 X-RAY EXAM CHEST 1 VIEW: CPT

## 2020-08-17 PROCEDURE — 85025 COMPLETE CBC W/AUTO DIFF WBC: CPT

## 2020-08-17 PROCEDURE — 87040 BLOOD CULTURE FOR BACTERIA: CPT

## 2020-08-17 PROCEDURE — 99285 EMERGENCY DEPT VISIT HI MDM: CPT

## 2020-08-17 RX ORDER — DEXTROSE MONOHYDRATE 25 G/50ML
25 INJECTION, SOLUTION INTRAVENOUS ONCE
Status: COMPLETED | OUTPATIENT
Start: 2020-08-17 | End: 2020-08-18

## 2020-08-17 RX ORDER — 0.9 % SODIUM CHLORIDE 0.9 %
30 INTRAVENOUS SOLUTION INTRAVENOUS ONCE
Status: COMPLETED | OUTPATIENT
Start: 2020-08-17 | End: 2020-08-18

## 2020-08-18 ENCOUNTER — APPOINTMENT (OUTPATIENT)
Dept: CT IMAGING | Age: 80
DRG: 177 | End: 2020-08-18
Payer: MEDICARE

## 2020-08-18 PROBLEM — J96.02 ACUTE RESPIRATORY FAILURE WITH HYPOXIA AND HYPERCAPNIA (HCC): Status: ACTIVE | Noted: 2020-08-18

## 2020-08-18 PROBLEM — R41.82 ALTERED MENTAL STATUS, UNSPECIFIED: Status: ACTIVE | Noted: 2020-08-18

## 2020-08-18 PROBLEM — E16.2 HYPOGLYCEMIA: Status: ACTIVE | Noted: 2020-08-18

## 2020-08-18 PROBLEM — J96.01 ACUTE RESPIRATORY FAILURE WITH HYPOXIA AND HYPERCAPNIA (HCC): Status: ACTIVE | Noted: 2020-08-18

## 2020-08-18 PROBLEM — N17.9 ACUTE KIDNEY INJURY (HCC): Status: ACTIVE | Noted: 2020-08-18

## 2020-08-18 LAB
ABO/RH: NORMAL
ADENOVIRUS DETECTION BY PCR: NOT DETECTED
ALBUMIN SERPL-MCNC: 3.3 GM/DL (ref 3.4–5)
ALP BLD-CCNC: 49 IU/L (ref 40–129)
ALT SERPL-CCNC: 22 U/L (ref 10–40)
AMMONIA: 38 UMOL/L (ref 16–60)
ANION GAP SERPL CALCULATED.3IONS-SCNC: 12 MMOL/L (ref 4–16)
ANTIBODY SCREEN: NEGATIVE
APTT: 44.8 SECONDS (ref 25.1–37.1)
AST SERPL-CCNC: 43 IU/L (ref 15–37)
BACTERIA: NEGATIVE /HPF
BASE EXCESS: 1 (ref 0–3.3)
BASOPHILS ABSOLUTE: 0 K/CU MM
BASOPHILS RELATIVE PERCENT: 0 % (ref 0–1)
BILIRUB SERPL-MCNC: 0.5 MG/DL (ref 0–1)
BILIRUBIN URINE: NEGATIVE MG/DL
BLOOD, URINE: ABNORMAL
BORDETELLA PARAPERTUSSIS BY PCR: NOT DETECTED
BORDETELLA PERTUSSIS PCR: NOT DETECTED
BUN BLDV-MCNC: 40 MG/DL (ref 6–23)
CALCIUM SERPL-MCNC: 8.2 MG/DL (ref 8.3–10.6)
CARBON MONOXIDE, BLOOD: 1.7 % (ref 0–5)
CHLAMYDOPHILA PNEUMONIA PCR: NOT DETECTED
CHLORIDE BLD-SCNC: 96 MMOL/L (ref 99–110)
CLARITY: CLEAR
CO2 CONTENT: 25.6 MMOL/L (ref 19–24)
CO2: 25 MMOL/L (ref 21–32)
COLOR: YELLOW
COMMENT: ABNORMAL
CORONAVIRUS 229E PCR: NOT DETECTED
CORONAVIRUS HKU1 PCR: NOT DETECTED
CORONAVIRUS NL63 PCR: NOT DETECTED
CORONAVIRUS OC43 PCR: NOT DETECTED
CREAT SERPL-MCNC: 2.2 MG/DL (ref 0.9–1.3)
D DIMER: 290 NG/ML(DDU)
DIFFERENTIAL TYPE: ABNORMAL
EKG ATRIAL RATE: 75 BPM
EKG ATRIAL RATE: 75 BPM
EKG DIAGNOSIS: NORMAL
EKG DIAGNOSIS: NORMAL
EKG P AXIS: 21 DEGREES
EKG P AXIS: 32 DEGREES
EKG P-R INTERVAL: 186 MS
EKG P-R INTERVAL: 194 MS
EKG Q-T INTERVAL: 402 MS
EKG Q-T INTERVAL: 414 MS
EKG QRS DURATION: 106 MS
EKG QRS DURATION: 108 MS
EKG QTC CALCULATION (BAZETT): 448 MS
EKG QTC CALCULATION (BAZETT): 462 MS
EKG R AXIS: -26 DEGREES
EKG R AXIS: -29 DEGREES
EKG T AXIS: 61 DEGREES
EKG T AXIS: 73 DEGREES
EKG VENTRICULAR RATE: 75 BPM
EKG VENTRICULAR RATE: 75 BPM
EOSINOPHILS ABSOLUTE: 0 K/CU MM
EOSINOPHILS RELATIVE PERCENT: 0 % (ref 0–3)
FERRITIN: 265 NG/ML (ref 30–400)
FIBRINOGEN LEVEL: 341 MG/DL (ref 196.9–442.1)
GFR AFRICAN AMERICAN: 35 ML/MIN/1.73M2
GFR NON-AFRICAN AMERICAN: 29 ML/MIN/1.73M2
GLUCOSE BLD-MCNC: 354 MG/DL (ref 70–99)
GLUCOSE BLD-MCNC: 369 MG/DL (ref 70–99)
GLUCOSE BLD-MCNC: 386 MG/DL (ref 70–99)
GLUCOSE BLD-MCNC: 426 MG/DL (ref 70–99)
GLUCOSE, URINE: NEGATIVE MG/DL
HCO3 ARTERIAL: 24.3 MMOL/L (ref 18–23)
HCT VFR BLD CALC: 48.4 % (ref 42–52)
HEMOGLOBIN: 14.9 GM/DL (ref 13.5–18)
HUMAN METAPNEUMOVIRUS PCR: NOT DETECTED
IMMATURE NEUTROPHIL %: 1.3 % (ref 0–0.43)
INFLUENZA A BY PCR: NOT DETECTED
INFLUENZA A H1 (2009) PCR: NOT DETECTED
INFLUENZA A H1 PANDEMIC PCR: NOT DETECTED
INFLUENZA A H3 PCR: NOT DETECTED
INFLUENZA B BY PCR: NOT DETECTED
INR BLD: 1.05 INDEX
KETONES, URINE: NEGATIVE MG/DL
LACTATE DEHYDROGENASE: 298 IU/L (ref 120–246)
LACTATE: 1.4 MMOL/L (ref 0.4–2)
LEGIONELLA URINARY AG: NEGATIVE
LEUKOCYTE ESTERASE, URINE: NEGATIVE
LYMPHOCYTES ABSOLUTE: 0.4 K/CU MM
LYMPHOCYTES RELATIVE PERCENT: 18.4 % (ref 24–44)
MCH RBC QN AUTO: 29.6 PG (ref 27–31)
MCHC RBC AUTO-ENTMCNC: 30.8 % (ref 32–36)
MCV RBC AUTO: 96.2 FL (ref 78–100)
METHEMOGLOBIN ARTERIAL: 1.1 %
MONOCYTES ABSOLUTE: 0.1 K/CU MM
MONOCYTES RELATIVE PERCENT: 5.6 % (ref 0–4)
MYCOPLASMA PNEUMONIAE PCR: NOT DETECTED
NITRITE URINE, QUANTITATIVE: NEGATIVE
NUCLEATED RBC %: 0 %
O2 SATURATION: 94.2 % (ref 96–97)
PARAINFLUENZA 1 PCR: NOT DETECTED
PARAINFLUENZA 2 PCR: NOT DETECTED
PARAINFLUENZA 3 PCR: NOT DETECTED
PARAINFLUENZA 4 PCR: NOT DETECTED
PCO2 ARTERIAL: 42 MMHG (ref 32–45)
PDW BLD-RTO: 13.5 % (ref 11.7–14.9)
PH BLOOD: 7.37 (ref 7.34–7.45)
PH, URINE: 5 (ref 5–8)
PLATELET # BLD: 126 K/CU MM (ref 140–440)
PMV BLD AUTO: 11.3 FL (ref 7.5–11.1)
PO2 ARTERIAL: 72 MMHG (ref 75–100)
POTASSIUM SERPL-SCNC: 5.7 MMOL/L (ref 3.5–5.1)
PROTEIN UA: 30 MG/DL
PROTHROMBIN TIME: 12.7 SECONDS (ref 11.7–14.5)
RBC # BLD: 5.03 M/CU MM (ref 4.6–6.2)
RBC URINE: <1 /HPF (ref 0–3)
RHINOVIRUS ENTEROVIRUS PCR: NOT DETECTED
RSV PCR: NOT DETECTED
SEGMENTED NEUTROPHILS ABSOLUTE COUNT: 1.8 K/CU MM
SEGMENTED NEUTROPHILS RELATIVE PERCENT: 74.7 % (ref 36–66)
SODIUM BLD-SCNC: 133 MMOL/L (ref 135–145)
SPECIFIC GRAVITY UA: 1.01 (ref 1–1.03)
SQUAMOUS EPITHELIAL: <1 /HPF
STREP PNEUMONIAE ANTIGEN: NORMAL
TOTAL IMMATURE NEUTOROPHIL: 0.03 K/CU MM
TOTAL NUCLEATED RBC: 0 K/CU MM
TOTAL PROTEIN: 5.9 GM/DL (ref 6.4–8.2)
TOTAL RETICULOCYTE COUNT: 0.04 K/CU MM
TRICHOMONAS: ABNORMAL /HPF
UROBILINOGEN, URINE: NORMAL MG/DL (ref 0.2–1)
WBC # BLD: 2.3 K/CU MM (ref 4–10.5)
WBC UA: 1 /HPF (ref 0–2)

## 2020-08-18 PROCEDURE — 82962 GLUCOSE BLOOD TEST: CPT

## 2020-08-18 PROCEDURE — 87486 CHLMYD PNEUM DNA AMP PROBE: CPT

## 2020-08-18 PROCEDURE — 81001 URINALYSIS AUTO W/SCOPE: CPT

## 2020-08-18 PROCEDURE — 86901 BLOOD TYPING SEROLOGIC RH(D): CPT

## 2020-08-18 PROCEDURE — 82140 ASSAY OF AMMONIA: CPT

## 2020-08-18 PROCEDURE — 93010 ELECTROCARDIOGRAM REPORT: CPT | Performed by: INTERNAL MEDICINE

## 2020-08-18 PROCEDURE — 6360000002 HC RX W HCPCS: Performed by: EMERGENCY MEDICINE

## 2020-08-18 PROCEDURE — 6360000002 HC RX W HCPCS: Performed by: NURSE PRACTITIONER

## 2020-08-18 PROCEDURE — 96365 THER/PROPH/DIAG IV INF INIT: CPT

## 2020-08-18 PROCEDURE — 87581 M.PNEUMON DNA AMP PROBE: CPT

## 2020-08-18 PROCEDURE — 2700000000 HC OXYGEN THERAPY PER DAY

## 2020-08-18 PROCEDURE — U0002 COVID-19 LAB TEST NON-CDC: HCPCS

## 2020-08-18 PROCEDURE — 94761 N-INVAS EAR/PLS OXIMETRY MLT: CPT

## 2020-08-18 PROCEDURE — 6370000000 HC RX 637 (ALT 250 FOR IP): Performed by: NURSE PRACTITIONER

## 2020-08-18 PROCEDURE — 6370000000 HC RX 637 (ALT 250 FOR IP): Performed by: INTERNAL MEDICINE

## 2020-08-18 PROCEDURE — 85379 FIBRIN DEGRADATION QUANT: CPT

## 2020-08-18 PROCEDURE — 2580000003 HC RX 258: Performed by: NURSE PRACTITIONER

## 2020-08-18 PROCEDURE — 36415 COLL VENOUS BLD VENIPUNCTURE: CPT

## 2020-08-18 PROCEDURE — 82728 ASSAY OF FERRITIN: CPT

## 2020-08-18 PROCEDURE — 93005 ELECTROCARDIOGRAM TRACING: CPT | Performed by: EMERGENCY MEDICINE

## 2020-08-18 PROCEDURE — 80053 COMPREHEN METABOLIC PANEL: CPT

## 2020-08-18 PROCEDURE — 87633 RESP VIRUS 12-25 TARGETS: CPT

## 2020-08-18 PROCEDURE — 1200000000 HC SEMI PRIVATE

## 2020-08-18 PROCEDURE — 85025 COMPLETE CBC W/AUTO DIFF WBC: CPT

## 2020-08-18 PROCEDURE — 2580000003 HC RX 258: Performed by: EMERGENCY MEDICINE

## 2020-08-18 PROCEDURE — 36600 WITHDRAWAL OF ARTERIAL BLOOD: CPT

## 2020-08-18 PROCEDURE — 87899 AGENT NOS ASSAY W/OPTIC: CPT

## 2020-08-18 PROCEDURE — 85610 PROTHROMBIN TIME: CPT

## 2020-08-18 PROCEDURE — 82803 BLOOD GASES ANY COMBINATION: CPT

## 2020-08-18 PROCEDURE — 85730 THROMBOPLASTIN TIME PARTIAL: CPT

## 2020-08-18 PROCEDURE — 70450 CT HEAD/BRAIN W/O DYE: CPT

## 2020-08-18 PROCEDURE — 86850 RBC ANTIBODY SCREEN: CPT

## 2020-08-18 PROCEDURE — 96375 TX/PRO/DX INJ NEW DRUG ADDON: CPT

## 2020-08-18 PROCEDURE — 87798 DETECT AGENT NOS DNA AMP: CPT

## 2020-08-18 PROCEDURE — 96367 TX/PROPH/DG ADDL SEQ IV INF: CPT

## 2020-08-18 PROCEDURE — 86900 BLOOD TYPING SEROLOGIC ABO: CPT

## 2020-08-18 PROCEDURE — 85384 FIBRINOGEN ACTIVITY: CPT

## 2020-08-18 PROCEDURE — 87449 NOS EACH ORGANISM AG IA: CPT

## 2020-08-18 PROCEDURE — 6370000000 HC RX 637 (ALT 250 FOR IP): Performed by: PHYSICIAN ASSISTANT

## 2020-08-18 PROCEDURE — 83615 LACTATE (LD) (LDH) ENZYME: CPT

## 2020-08-18 PROCEDURE — 87040 BLOOD CULTURE FOR BACTERIA: CPT

## 2020-08-18 PROCEDURE — 83605 ASSAY OF LACTIC ACID: CPT

## 2020-08-18 RX ORDER — CITALOPRAM 20 MG/1
20 TABLET ORAL DAILY
Status: DISCONTINUED | OUTPATIENT
Start: 2020-08-18 | End: 2020-08-21 | Stop reason: HOSPADM

## 2020-08-18 RX ORDER — ONDANSETRON 2 MG/ML
4 INJECTION INTRAMUSCULAR; INTRAVENOUS EVERY 6 HOURS PRN
Status: DISCONTINUED | OUTPATIENT
Start: 2020-08-18 | End: 2020-08-21 | Stop reason: HOSPADM

## 2020-08-18 RX ORDER — PROMETHAZINE HYDROCHLORIDE 25 MG/1
12.5 TABLET ORAL EVERY 6 HOURS PRN
Status: DISCONTINUED | OUTPATIENT
Start: 2020-08-18 | End: 2020-08-21 | Stop reason: HOSPADM

## 2020-08-18 RX ORDER — ACETAMINOPHEN 650 MG/1
650 SUPPOSITORY RECTAL EVERY 6 HOURS PRN
Status: DISCONTINUED | OUTPATIENT
Start: 2020-08-18 | End: 2020-08-21 | Stop reason: HOSPADM

## 2020-08-18 RX ORDER — OXYCODONE AND ACETAMINOPHEN 7.5; 325 MG/1; MG/1
1 TABLET ORAL EVERY 8 HOURS PRN
Status: DISCONTINUED | OUTPATIENT
Start: 2020-08-18 | End: 2020-08-21 | Stop reason: HOSPADM

## 2020-08-18 RX ORDER — SODIUM CHLORIDE 0.9 % (FLUSH) 0.9 %
10 SYRINGE (ML) INJECTION EVERY 12 HOURS SCHEDULED
Status: DISCONTINUED | OUTPATIENT
Start: 2020-08-18 | End: 2020-08-21 | Stop reason: HOSPADM

## 2020-08-18 RX ORDER — POLYETHYLENE GLYCOL 3350 17 G/17G
17 POWDER, FOR SOLUTION ORAL DAILY PRN
Status: DISCONTINUED | OUTPATIENT
Start: 2020-08-18 | End: 2020-08-21 | Stop reason: HOSPADM

## 2020-08-18 RX ORDER — CHLORTHALIDONE 25 MG/1
12.5 TABLET ORAL DAILY
Status: DISCONTINUED | OUTPATIENT
Start: 2020-08-18 | End: 2020-08-19

## 2020-08-18 RX ORDER — ATORVASTATIN CALCIUM 20 MG/1
20 TABLET, FILM COATED ORAL NIGHTLY
Status: DISCONTINUED | OUTPATIENT
Start: 2020-08-18 | End: 2020-08-21 | Stop reason: HOSPADM

## 2020-08-18 RX ORDER — DEXTROSE MONOHYDRATE 25 G/50ML
12.5 INJECTION, SOLUTION INTRAVENOUS PRN
Status: DISCONTINUED | OUTPATIENT
Start: 2020-08-18 | End: 2020-08-21 | Stop reason: HOSPADM

## 2020-08-18 RX ORDER — LORAZEPAM 0.5 MG/1
0.5 TABLET ORAL EVERY 6 HOURS PRN
Status: DISCONTINUED | OUTPATIENT
Start: 2020-08-18 | End: 2020-08-20

## 2020-08-18 RX ORDER — LOSARTAN POTASSIUM 50 MG/1
100 TABLET ORAL DAILY
Status: DISCONTINUED | OUTPATIENT
Start: 2020-08-18 | End: 2020-08-19

## 2020-08-18 RX ORDER — SPIRONOLACTONE 25 MG/1
25 TABLET ORAL DAILY
Status: DISCONTINUED | OUTPATIENT
Start: 2020-08-18 | End: 2020-08-19

## 2020-08-18 RX ORDER — CLOTRIMAZOLE 1 %
CREAM (GRAM) TOPICAL 2 TIMES DAILY
Status: DISCONTINUED | OUTPATIENT
Start: 2020-08-18 | End: 2020-08-21 | Stop reason: HOSPADM

## 2020-08-18 RX ORDER — INSULIN GLARGINE 100 [IU]/ML
60 INJECTION, SOLUTION SUBCUTANEOUS EVERY EVENING
Status: DISCONTINUED | OUTPATIENT
Start: 2020-08-18 | End: 2020-08-19

## 2020-08-18 RX ORDER — NICOTINE POLACRILEX 4 MG
15 LOZENGE BUCCAL PRN
Status: DISCONTINUED | OUTPATIENT
Start: 2020-08-18 | End: 2020-08-21 | Stop reason: HOSPADM

## 2020-08-18 RX ORDER — DEXTROSE MONOHYDRATE 50 MG/ML
100 INJECTION, SOLUTION INTRAVENOUS PRN
Status: DISCONTINUED | OUTPATIENT
Start: 2020-08-18 | End: 2020-08-21 | Stop reason: HOSPADM

## 2020-08-18 RX ORDER — ASPIRIN 81 MG/1
81 TABLET ORAL DAILY
Status: DISCONTINUED | OUTPATIENT
Start: 2020-08-18 | End: 2020-08-21 | Stop reason: HOSPADM

## 2020-08-18 RX ORDER — PANTOPRAZOLE SODIUM 40 MG/1
40 TABLET, DELAYED RELEASE ORAL
Status: DISCONTINUED | OUTPATIENT
Start: 2020-08-18 | End: 2020-08-21 | Stop reason: HOSPADM

## 2020-08-18 RX ORDER — SODIUM CHLORIDE 0.9 % (FLUSH) 0.9 %
10 SYRINGE (ML) INJECTION PRN
Status: DISCONTINUED | OUTPATIENT
Start: 2020-08-18 | End: 2020-08-21 | Stop reason: HOSPADM

## 2020-08-18 RX ORDER — FLUTICASONE PROPIONATE 50 MCG
1 SPRAY, SUSPENSION (ML) NASAL 2 TIMES DAILY
Status: DISCONTINUED | OUTPATIENT
Start: 2020-08-18 | End: 2020-08-21 | Stop reason: HOSPADM

## 2020-08-18 RX ORDER — HEPARIN SODIUM 5000 [USP'U]/ML
5000 INJECTION, SOLUTION INTRAVENOUS; SUBCUTANEOUS EVERY 8 HOURS
Status: DISCONTINUED | OUTPATIENT
Start: 2020-08-18 | End: 2020-08-21 | Stop reason: HOSPADM

## 2020-08-18 RX ORDER — ZINC SULFATE 50(220)MG
50 CAPSULE ORAL DAILY
Status: DISCONTINUED | OUTPATIENT
Start: 2020-08-18 | End: 2020-08-21 | Stop reason: HOSPADM

## 2020-08-18 RX ORDER — BUSPIRONE HYDROCHLORIDE 10 MG/1
10 TABLET ORAL 3 TIMES DAILY
Status: DISCONTINUED | OUTPATIENT
Start: 2020-08-18 | End: 2020-08-21 | Stop reason: HOSPADM

## 2020-08-18 RX ORDER — FENOFIBRATE 160 MG/1
160 TABLET ORAL DAILY
Status: DISCONTINUED | OUTPATIENT
Start: 2020-08-18 | End: 2020-08-21 | Stop reason: HOSPADM

## 2020-08-18 RX ORDER — METOPROLOL TARTRATE 50 MG/1
50 TABLET, FILM COATED ORAL 2 TIMES DAILY
Status: DISCONTINUED | OUTPATIENT
Start: 2020-08-18 | End: 2020-08-21 | Stop reason: HOSPADM

## 2020-08-18 RX ORDER — DEXAMETHASONE 4 MG/1
6 TABLET ORAL DAILY
Status: DISCONTINUED | OUTPATIENT
Start: 2020-08-18 | End: 2020-08-21 | Stop reason: HOSPADM

## 2020-08-18 RX ORDER — CLOPIDOGREL BISULFATE 75 MG/1
75 TABLET ORAL DAILY
Status: DISCONTINUED | OUTPATIENT
Start: 2020-08-18 | End: 2020-08-21 | Stop reason: HOSPADM

## 2020-08-18 RX ORDER — AMLODIPINE BESYLATE 5 MG/1
5 TABLET ORAL 2 TIMES DAILY
Status: DISCONTINUED | OUTPATIENT
Start: 2020-08-18 | End: 2020-08-19

## 2020-08-18 RX ORDER — ACETAMINOPHEN 325 MG/1
650 TABLET ORAL EVERY 6 HOURS PRN
Status: DISCONTINUED | OUTPATIENT
Start: 2020-08-18 | End: 2020-08-21 | Stop reason: HOSPADM

## 2020-08-18 RX ORDER — PANTOPRAZOLE SODIUM 40 MG/1
40 TABLET, DELAYED RELEASE ORAL
Status: DISCONTINUED | OUTPATIENT
Start: 2020-08-18 | End: 2020-08-18 | Stop reason: SDUPTHER

## 2020-08-18 RX ADMIN — CEFTRIAXONE SODIUM 1 G: 1 INJECTION, POWDER, FOR SOLUTION INTRAMUSCULAR; INTRAVENOUS at 00:02

## 2020-08-18 RX ADMIN — DEXAMETHASONE 6 MG: 4 TABLET ORAL at 11:02

## 2020-08-18 RX ADMIN — AMLODIPINE BESYLATE 5 MG: 5 TABLET ORAL at 10:59

## 2020-08-18 RX ADMIN — BUSPIRONE HYDROCHLORIDE 10 MG: 10 TABLET ORAL at 11:00

## 2020-08-18 RX ADMIN — FENOFIBRATE 160 MG: 160 TABLET ORAL at 11:01

## 2020-08-18 RX ADMIN — BUSPIRONE HYDROCHLORIDE 10 MG: 10 TABLET ORAL at 14:17

## 2020-08-18 RX ADMIN — CLOPIDOGREL BISULFATE 75 MG: 75 TABLET ORAL at 11:01

## 2020-08-18 RX ADMIN — HEPARIN SODIUM 5000 UNITS: 5000 INJECTION, SOLUTION INTRAVENOUS; SUBCUTANEOUS at 11:02

## 2020-08-18 RX ADMIN — CITALOPRAM HYDROBROMIDE 20 MG: 20 TABLET ORAL at 11:00

## 2020-08-18 RX ADMIN — CHLORTHALIDONE 12.5 MG: 25 TABLET ORAL at 14:02

## 2020-08-18 RX ADMIN — OYSTER SHELL CALCIUM WITH VITAMIN D 1 TABLET: 500; 200 TABLET, FILM COATED ORAL at 16:59

## 2020-08-18 RX ADMIN — METOPROLOL TARTRATE 50 MG: 50 TABLET, FILM COATED ORAL at 10:59

## 2020-08-18 RX ADMIN — ASPIRIN 81 MG: 81 TABLET, COATED ORAL at 11:00

## 2020-08-18 RX ADMIN — SODIUM CHLORIDE 2691 ML: 9 INJECTION, SOLUTION INTRAVENOUS at 00:51

## 2020-08-18 RX ADMIN — PANTOPRAZOLE SODIUM 40 MG: 40 TABLET, DELAYED RELEASE ORAL at 11:00

## 2020-08-18 RX ADMIN — LORAZEPAM 0.5 MG: 0.5 TABLET ORAL at 18:50

## 2020-08-18 RX ADMIN — HEPARIN SODIUM 5000 UNITS: 5000 INJECTION, SOLUTION INTRAVENOUS; SUBCUTANEOUS at 16:59

## 2020-08-18 RX ADMIN — LOSARTAN POTASSIUM 100 MG: 50 TABLET, FILM COATED ORAL at 10:59

## 2020-08-18 RX ADMIN — SODIUM CHLORIDE, PRESERVATIVE FREE 10 ML: 5 INJECTION INTRAVENOUS at 11:04

## 2020-08-18 RX ADMIN — ZINC SULFATE 220 MG (50 MG) CAPSULE 50 MG: CAPSULE at 14:17

## 2020-08-18 RX ADMIN — OYSTER SHELL CALCIUM WITH VITAMIN D 1 TABLET: 500; 200 TABLET, FILM COATED ORAL at 11:00

## 2020-08-18 RX ADMIN — OXYCODONE HYDROCHLORIDE AND ACETAMINOPHEN 1 TABLET: 7.5; 325 TABLET ORAL at 11:00

## 2020-08-18 RX ADMIN — INSULIN HUMAN 10 UNITS: 100 INJECTION, SOLUTION PARENTERAL at 22:04

## 2020-08-18 RX ADMIN — DEXTROSE MONOHYDRATE 25 G: 25 INJECTION, SOLUTION INTRAVENOUS at 00:02

## 2020-08-18 RX ADMIN — BUSPIRONE HYDROCHLORIDE 10 MG: 10 TABLET ORAL at 21:56

## 2020-08-18 RX ADMIN — OXYCODONE HYDROCHLORIDE AND ACETAMINOPHEN 1 TABLET: 7.5; 325 TABLET ORAL at 16:59

## 2020-08-18 RX ADMIN — AZITHROMYCIN MONOHYDRATE 500 MG: 500 INJECTION, POWDER, LYOPHILIZED, FOR SOLUTION INTRAVENOUS at 21:56

## 2020-08-18 RX ADMIN — AMLODIPINE BESYLATE 5 MG: 5 TABLET ORAL at 21:56

## 2020-08-18 RX ADMIN — INSULIN GLARGINE 60 UNITS: 100 INJECTION, SOLUTION SUBCUTANEOUS at 18:37

## 2020-08-18 RX ADMIN — ATORVASTATIN CALCIUM 20 MG: 20 TABLET, FILM COATED ORAL at 21:55

## 2020-08-18 RX ADMIN — SPIRONOLACTONE 25 MG: 25 TABLET ORAL at 10:59

## 2020-08-18 RX ADMIN — AZITHROMYCIN MONOHYDRATE 500 MG: 500 INJECTION, POWDER, LYOPHILIZED, FOR SOLUTION INTRAVENOUS at 00:52

## 2020-08-18 RX ADMIN — INSULIN LISPRO 5 UNITS: 100 INJECTION, SOLUTION INTRAVENOUS; SUBCUTANEOUS at 18:51

## 2020-08-18 RX ADMIN — SODIUM CHLORIDE, PRESERVATIVE FREE 10 ML: 5 INJECTION INTRAVENOUS at 22:08

## 2020-08-18 ASSESSMENT — PAIN SCALES - GENERAL
PAINLEVEL_OUTOF10: 8
PAINLEVEL_OUTOF10: 0
PAINLEVEL_OUTOF10: 7

## 2020-08-18 ASSESSMENT — PAIN DESCRIPTION - PROGRESSION: CLINICAL_PROGRESSION: GRADUALLY IMPROVING

## 2020-08-18 NOTE — CARE COORDINATION
Patient admitted to [facility] unit and known to this  from recent admission on [facility] unit. Patient is from home and lives with his daughter and Son in law. Patient normally uses a cane or walker for mobility and is able to perform his own ADL's . Patient has PCP and Insurance to assist with RX coverage. Case Management following to assist with any potential discharge needs.

## 2020-08-18 NOTE — CONSULTS
[address] [place] [address], [address] [facility]                                  CONSULTATION    PATIENT NAME: Glenroy Simon                       :        1940  MED REC NO:   [id number]                          ROOM:         ACCOUNT NO:   [de-identified]                           ADMIT DATE: 2020  PROVIDER:     Karol Diaz MD    CONSULT DATE:  2020    HISTORY OF PRESENT ILLNESS:  The patient is a 73-year-old gentleman with  multiple medical problems including hypertension, hyperlipidemia,  gastroesophageal reflux disease, diabetes, arthritis, and severe  obstructive sleep apnea, who was prescribed CPAP early this year, who  tested positive for COVID-19 about 10 days ago and was being quarantined  at home. While at home, the wife noticed that he was getting more  confused. He was running low-grade temperature. He was brought to the  emergency room. He was found to be hypoxic with the saturation in the  80s. He was placed on oxygen. He also had a low blood sugar of 66. He  had a chest x-ray, which showed bilateral perihilar opacities and right  lower lobe infiltrate. He was subsequently admitted to the [facility] unit. He denied any hemoptysis. He denied any nausea or vomiting. He denied  any chest pains. This morning, the patient is feeling much more better. He is awake and responsive and answering questions appropriately. PAST MEDICAL HISTORY:  Significant for hypertension, hyperlipidemia,  coronary artery disease, gout, gastroesophageal reflux disease,  diabetes, arthritis, obstructive sleep apnea. PAST SURGICAL HISTORY:  Remarkable for bilateral total knee  arthroplasty, sinus surgery, hand surgery, dilatation of the esophagus,  cholecystectomy, cardiac catheterization and stent placement, back  surgery. FAMILY HISTORY:  Noncontributory. SOCIAL HISTORY:  Reveals that he is nonsmoker.   No history of alcohol or  drug abuse. MEDICATIONS:  Reviewed. ALLERGIES:  He is allergic to SULFA ANTIBIOTICS. REVIEW OF SYSTEMS:  10- to 14-point review of systems was reviewed and  is negative except for what is mentioned in the history of present  illness. PHYSICAL EXAMINATION:  GENERAL:  The patient is awake and responsive, in no acute respiratory  distress. VITAL SIGNS:  His blood pressure is 143/60 mmHg, pulse of 76 per minute,  and respiratory rate of 20 per minute. He is afebrile. His T-max was  100.2 degrees Fahrenheit. His saturation is 96% on 3 liters nasal  cannula. HEENT:  Essentially unremarkable. NECK:  There is no JVD. No lymphadenopathy. The neck is supple. LUNGS:  Revealed diminished breath sounds, occasional basilar crackles. HEART:  Showed normal S1 and S2. There was no S3 or S4 noted. ABDOMEN:  Benign. There is no evidence of any organomegaly. The bowel  sounds are present. NEUROLOGIC:  Reveals that he is awake and responsive, answering  questions appropriately, and moving his extremities. LABORATORY AND DIAGNOSTIC DATA:  His CAT scan of the head showed no  acute disease, moderate diffuse cerebral atrophy. His venous blood  gases showed a pH of 7.37, pCO2 of 69, pO2 of 32 with 68% saturation. CBC showed a white count of 5.0, hemoglobin 16.0, hematocrit of 49.6. His electrolytes showed a sodium of 140, potassium 4.0, chloride 100,  carbon dioxide 32, BUN 37, creatinine 1.7. His chest x-ray showed  bilateral perihilar opacities and right lower lobe infiltrate. IMPRESSION:  1. Bilateral pneumonia, most likely COVID pneumonia as he was positive  for COVID 10 days ago. 2.  Obstructive sleep apnea. 3.  History of hypertension, hyperlipidemia, gastroesophageal reflux  disease and diabetes. PLAN:  1. Continue present antibiotic therapy. 2.  Repeat COVID testing. 3.  Check mag and phos. 4.  Start Decadron. 5.  Start zinc supplement. 6.  Check COVID markers.   7. AutoCPAP 20/5.  8.  As per orders.         Brian Herrmann MD    D: 08/18/2020 11:06:13       T: 08/18/2020 12:58:56     /[email or web address]  Job#: [id number]     Doc#: [id number]    CC:

## 2020-08-18 NOTE — H&P
HISTORY AND PHYSICAL  (Hospitalist, Internal Medicine)  IDENTIFYING INFORMATION   PATIENT:  Mervyn Prader  MRN:  [id number]  ADMIT DATE: 8/17/2020  TIME OF EVALUATION: 8/18/2020 3:24 AM    CHIEF COMPLAINT     Altered mental status    HISTORY OF PRESENT ILLNESS   Mervyn Prader is a 79 y.o. male with a past medical history of hypertension, liver cirrhosis, CAD, DM 2, hyperlipidemia, chronic pain syndrome and depression. [facility] following treatment of acute respiratory failure due to COVID-19 pneumonia and metabolic encephalopathy. Per the patient he has been doing well. He reports that his family noticed he was getting confused to the point he could not remember his wife's name and where he was. Unsure of onset. He also recorded a temperature of 100.8 at home. On ED presentation, he was found to be hypoxic with SPO2 sats of 85% requiring 3 L supplemental oxygen. His blood glucose was 66 which was corrected with D50. ABGs significant for CO2 of 69 and bicarb of 39.9.  BUN/creatinine of 37/1.7 which is improved from 48/2.0 on 8/1 3.  CT head nonacute. Chest x-ray showed bilateral perihilar interstitial opacities consistent with known COVID-19 pneumonia. Patient started on broad-spectrum coverage at the ER. At time of this assessment, patient lying in bed on room air with O2 sats above 92% and in no acute distress. He is alert oriented to person place time and situation and answers all questions appropriately. He denies fever, chills, chest pain, shortness of breath, nausea, vomiting, diarrhea and constipation.       PMH listed below:    PAST MEDICAL, SURGICAL, FAMILY, and SOCIAL HISTORY     Past Medical History:   Diagnosis Date    Arthritis     Diabetes mellitus ([facility]     GERD (gastroesophageal reflux disease)     Gout     left ankle and right shoulder    Hyperlipidemia     Hypertension     MI [facility])     May 2013     Past Surgical History:   Procedure Laterality Date    BACK SURGERY      CARDIAC SURGERY      stents x 1    CHOLECYSTECTOMY      May 2013    DILATATION, ESOPHAGUS      HAND SURGERY      JOINT REPLACEMENT      SINUS SURGERY      sinus surgery x 2    SINUS SURGERY      TOTAL KNEE ARTHROPLASTY Bilateral      No family history on file. Family Hx of HTN  Family Hx as reviewed above, otherwise non-contributory  Social History     Socioeconomic History    Marital status:      Spouse name: Not on file    Number of children: Not on file    Years of education: Not on file    Highest education level: Not on file   Occupational History    Not on file   Social Needs    Financial resource strain: Not on file    Food insecurity     Worry: Not on file     Inability: Not on file    Transportation needs     Medical: Not on file     Non-medical: Not on file   Tobacco Use    Smoking status: Never Smoker    Smokeless tobacco: Never Used   Substance and Sexual Activity    Alcohol use: No    Drug use: No    Sexual activity: Not on file   Lifestyle    Physical activity     Days per week: Not on file     Minutes per session: Not on file    Stress: Not on file   Relationships    Social connections     Talks on phone: Not on file     Gets together: Not on file     Attends Latter day service: Not on file     Active member of club or organization: Not on file     Attends meetings of clubs or organizations: Not on file     Relationship status: Not on file    Intimate partner violence     Fear of current or ex partner: Not on file     Emotionally abused: Not on file     Physically abused: Not on file     Forced sexual activity: Not on file   Other Topics Concern    Not on file   Social History Narrative    Not on file       MEDICATIONS   Medications Prior to Admission  Not in a hospital admission. Current Medications  No current facility-administered medications for this encounter.       Current Outpatient Medications   Medication Sig Dispense Refill    amLODIPine (NORVASC) 5 MG tablet Take 1 tablet by mouth 2 times daily 30 tablet 3    dexamethasone (DECADRON) 6 MG tablet Take 1 tablet by mouth daily for 6 days 6 tablet 0    chlorthalidone (HYGROTON) 25 MG tablet Take 0.5 tablets by mouth daily 30 tablet 3    spironolactone (ALDACTONE) 25 MG tablet Take 1 tablet by mouth daily 30 tablet 3    busPIRone (BUSPAR) 10 MG tablet Take 10 mg by mouth 3 times daily      clotrimazole (LOTRIMIN) 1 % cream       Calcium Carb-Cholecalciferol (CALCIUM-VITAMIN D) 500-200 MG-UNIT per tablet Take 1 tablet by mouth 2 times daily (with meals)      cholestyramine (QUESTRAN) 4 g packet Take 1 packet by mouth 3 times daily (with meals)      metoprolol tartrate (LOPRESSOR) 25 MG tablet Take 50 mg by mouth 2 times daily      pantoprazole sodium (PROTONIX) 40 MG PACK packet Take 40 mg by mouth every morning (before breakfast)      colestipol (COLESTID) 1 g tablet Take 1 g by mouth 3 times daily      losartan (COZAAR) 100 MG tablet Take 100 mg by mouth daily       insulin aspart (NOVOLOG) 100 UNIT/ML injection vial Inject into the skin 3 times daily (before meals)      atorvastatin (LIPITOR) 20 MG tablet Take 20 mg by mouth daily.  clopidogrel (PLAVIX) 75 MG tablet Take 75 mg by mouth daily.  fenofibrate 160 MG tablet Take 160 mg by mouth daily.  aspirin 325 MG tablet Take 81 mg by mouth daily.  oxyCODONE-acetaminophen (PERCOCET) 7.5-325 MG per tablet Take 1 tablet by mouth every 8 hours as needed.  fluticasone (FLONASE) 50 MCG/ACT nasal spray 1 spray by Nasal route 2 times daily.  glimepiride (AMARYL) 4 MG tablet Take 4 mg by mouth 2 times daily.  insulin glargine (LANTUS) 100 UNIT/ML injection Inject 60 Units into the skin every evening       citalopram (CELEXA) 10 MG tablet Take 20 mg by mouth daily.            Allergies  Allergies   Allergen Reactions    Sulfa Antibiotics Hives       REVIEW OF SYSTEMS   Within above limitations. 14 point review of systems reviewed. Pertinent positive or negative as per HPI or otherwise negative per 14 point systems review. PHYSICAL EXAM     Wt Readings from Last 3 Encounters:   08/17/20 249 lb 1.9 oz (113 kg)   08/12/20 249 lb 1.9 oz (113 kg)   02/05/20 249 lb (112.9 kg)       Blood pressure 134/63, pulse 76, temperature 100.2 °F (37.9 °C), temperature source Oral, resp. rate 19, height 5' 10.51\" (1.791 m), weight 249 lb 1.9 oz (113 kg), SpO2 96 %. General - AAO x 3  Psych - Appropriate affect/speech. No agitation  Eyes - Eye lids intact. No scleral icterus  ENT - Lips wnl. External ear clear/dry/intact. No thyromegaly on inspection  Neuro - No gross peripheral or central neuro deficits on inspection  Heart - Sinus. RRR. S1 and S2 present. No added HS/murmurs appreciated. No elevated JVD appreciated  Lung - Adequate air entry b/l, No crackles/wheezes appreciated  GI - Soft. No guarding/rigidity. No hepatosplenomegaly/ascites. BS+   - No CVA/suprapubic tenderness or palpable bladder distension  Skin - Intact. No rash/petechiae/ecchymosis. Warm extremities  MSK - Joints with normal ROM. BLE 1+ nonpitting edema.       LABS AND IMAGING   CBC  [unfilled]    Last 3 Hemoglobin  Lab Results   Component Value Date    HGB 16.0 08/17/2020    HGB 16.2 08/13/2020    HGB 15.9 08/12/2020     Last 3 WBC/ANC  Lab Results   Component Value Date    WBC 5.0 08/17/2020    WBC 5.9 08/13/2020    WBC 3.4 08/12/2020     No components found for: GRNLOCTYABS  Last 3 Platelets  No results found for: PLATELET  Chemistry  [unfilled]  [unfilled]  Lab Results   Component Value Date     08/09/2020     Coagulation Studies  Lab Results   Component Value Date    INR 1.07 08/11/2020     Liver Function Studies  Lab Results   Component Value Date    ALT 25 08/17/2020    AST 47 08/17/2020    ALKPHOS 49 08/17/2020       Recent Imaging    EXAMINATION:    CT OF THE HEAD WITHOUT CONTRAST  8/18/2020 12:35 am      TECHNIQUE:    CT of the head was performed without the administration of intravenous    contrast. Dose modulation, iterative reconstruction, and/or weight based    adjustment of the mA/kV was utilized to reduce the radiation dose to as low    as reasonably achievable.         COMPARISON:    CT head scan without contrast August 8, 2020.         HISTORY:    ORDERING SYSTEM PROVIDED HISTORY: ams    TECHNOLOGIST PROVIDED HISTORY:    Known COVID patient    Reason for exam:->ams    Has a \"code stroke\" or \"stroke alert\" been called? ->No    Reason for Exam: ams         FINDINGS:    BRAIN/VENTRICLES: There is no acute intracranial hemorrhage, mass effect or    midline shift.  No abnormal extra-axial fluid collection.  The gray-white    differentiation is maintained without evidence of an acute infarct.  There is    no evidence of hydrocephalus.  Moderate diffuse decrease in cerebral volume is    noted with corresponding prominence of the sulci and ventricles.         ORBITS: The visualized portion of the orbits demonstrate no acute abnormality.         SINUSES: The visualized paranasal sinuses and mastoid air cells demonstrate    no acute abnormality.         SOFT TISSUES/SKULL:  No acute abnormality of the visualized skull or soft    tissues.              Impression    No acute intracranial abnormality.         Moderate diffuse cerebral atrophy.             EXAMINATION:    ONE XRAY VIEW OF THE CHEST         8/17/2020 11:12 pm         COMPARISON:    01/28/2020         HISTORY:    ORDERING SYSTEM PROVIDED HISTORY: Known COVID patient increased O2    requirements    TECHNOLOGIST PROVIDED HISTORY:    Reason for exam:->Known COVID patient increased O2 requirements    Reason for Exam: Known COVID patient increased O2 requirements    Acuity: Unknown    Type of Exam: Unknown         FINDINGS:    The lungs are underinflated, resulting in vascular crowding and subsegmental    atelectasis.  Bilateral perihilar interstitial opacities are noted with more    focal airspace opacities in the right lower lung zone.  No effusion or    pneumothorax.  The cardiac silhouette is stably enlarged given the degree of    inspiration and patient rotation.  No acute bony abnormality.              Impression    Bilateral perihilar interstitial opacities with more focal opacities in the    right lower lung zone.  Findings are consistent with the patient's known    COVID-19 pneumonia.         Stable enlargement of the cardiac silhouette. Relevant labs and imaging reviewed    ASSESSMENT AND PLAN     Mr. Crescencio Carrillo is a 61-year-old male who presents to the ER with altered mental status, hypoxia and hypoglycemia. A/P as follows; Acute hypoxic and hypercapnic respiratory failure likely secondary to COVID-19 pneumonia. SPO2 of 85%, PCO2 69. Required 3L o2 at ER. TMax of 100.8.   -Recent COVID-19 pneumonia infection. D/cd from [facility] 8/13 on decadron and breathing tx.   -Swabed for COVID-19 at the ER.   -Daily COVID-19 panel and related labs  -Blood and sputum cultures, Legionella, strep antigen, respiratory disease panel.  -Check d-dimer if elevated consider CT PE.  -Urinalysis pending  -BiPAP and repeat ABG. -Received Rocephin and azithromycin at the ER. Continue azithromycin. No leucocytosis, tachycardia, tachypnea. Lactate pending.    -Decadron  -Pulmonary consult    Acute metabolic encephalopathy likely 2/2 above. Improved with administration of supplemental O2, and correction of hypoglycemia. -CT head non-acute but showed moderate diffuse cerebral atrophy.  -Ammonia pending  -Consider neurology consult if worsens. Type 2 diabetes. Hemoglobin A1c of 8.8 on 8/9. Hypoglycemic (BG 66) on presentation. Received D50 at the ER.  -Hold home oral hypoglycemics. -Hypoglycemic protocol  -Lantus and SSI when appropriate.  -Diabetic diet    Acute kidney injury. Improved from prior admission. BUN and creatinine of 1.7/37.   Was 48/2.0 on 8/13.  -We will continue home Norvasc, losartan, and diuretics as VICKY improved from prior labs. -Monitor. -If worsens, hold diuretics and consider nephro consult. Other medical conditions; Liver cirrhosis  Hypertension. Current SBP less than 160. Resume home spironolactone, losartan, Norvasc, metoprolol.   CAD-aspirin and Plavix  Hyperlipidemia-cholestramine and Lipitor  Depression-Celexa and BuSpar  Chronic pain-Percocet    -DVT Prophylaxis: hepatin    -GI Prophylaxis: protonix    Case d/w ED physician and Attending Dr. Eyal Groves, CNP  8/18/2020 at 3:24 AM

## 2020-08-18 NOTE — PROGRESS NOTES
Patient was seen and evaluated bedside. Agree with current management. Additional recommendations as per hospital course.

## 2020-08-18 NOTE — ED NOTES
Upon assessment PT is more alert and oriented. PT opens his eyes to talk to this nurse now. PT is able to sit up and grab the call light, PT is able to say what month it is and what his wifes name is. During first assess PT was unable to tell this nurse what day/month it was and was unable to tell this nurse what his wifes name was. PT is able to listen follow commands better than when he first arrived.       Aleida Wise RN  08/18/20 0010

## 2020-08-18 NOTE — ED NOTES
Bed: ED-14  Expected date:   Expected time:   Means of arrival:   Comments:  covid + 8/8/2020   AMS Selina Blizzard, RN  08/17/20 [id number]

## 2020-08-18 NOTE — ED TRIAGE NOTES
PT is alert to place and self. PT is unable to tell wifes name or date/time. PT was able to identify president. Upon assessment PT was unable to follow commands 100%. PT has tremors in the hands and feet. PT is complaining  of back and shoulder pain but stated that is \"normal\" for him.

## 2020-08-18 NOTE — PROGRESS NOTES
Called daughter Taya Mauro) to give her update on her dad. She would like Dr. Padmaja Collado to see him, but he does not see patients in the COVID unit.   Notified her of this

## 2020-08-18 NOTE — ED NOTES
PT is refusing to lay back in his bed. PT is consistently getting out of bed and taking off the monitor cords off of himself.       Nakita Traore RN  08/18/20 [id number]

## 2020-08-18 NOTE — ED PROVIDER NOTES
Emergency Department Encounter    Patient: Anali Godinez  MRN: [id number]  : 1940  Date of Evaluation: 2020  ED Provider:  Natalya Hammond    Triage Chief Complaint:   Altered Mental Status    [place]:  Anali Godinez is a 79 y.o. male that presents with altered mental status. He has had confusion to where he does not know his wife's name. He does know his name but does not know where he is at. He does have back pain at this time. He denies any other symptoms including chest pain or shortness of breath. He is not able to provide any further history at this time.     ROS - see HPI, below listed is current ROS at time of my eval:  General:  No fevers, no chills, no weakness  Eyes:  No recent vison changes, no discharge  ENT:  No sore throat, no nasal congestion, no hearing changes  Cardiovascular:  No chest pain, no palpitations  Respiratory:  No shortness of breath, no cough, no wheezing  Gastrointestinal:  No pain, no nausea, no vomiting, no diarrhea  Musculoskeletal:  No muscle pain, no joint pain  Skin:  No rash, no pruritis, no easy bruising  Neurologic:  No speech problems, no headache, no extremity numbness, no extremity tingling, no extremity weakness  Psychiatric:  No anxiety  Genitourinary:  No dysuria, no hematuria  Endocrine:  No unexpected weight gain, no unexpected weight loss  Extremities:  no edema, no pain    Past Medical History:   Diagnosis Date    Arthritis     Diabetes mellitus ([facility] [address]     GERD (gastroesophageal reflux disease)     Gout     left ankle and right shoulder    Hyperlipidemia     Hypertension     MI ([facility]ocardial infarction[facility])     May 2013     Past Surgical History:   Procedure Laterality Date    BACK SURGERY      CARDIAC SURGERY      stents x 1    CHOLECYSTECTOMY      May 2013    DILATATION, ESOPHAGUS      HAND SURGERY      JOINT REPLACEMENT      SINUS SURGERY      sinus surgery x 2    SINUS SURGERY      TOTAL KNEE ARTHROPLASTY Bilateral      No family history on file. Social History     Socioeconomic History    Marital status:      Spouse name: Not on file    Number of children: Not on file    Years of education: Not on file    Highest education level: Not on file   Occupational History    Not on file   Social Needs    Financial resource strain: Not on file    Food insecurity     Worry: Not on file     Inability: Not on file    Transportation needs     Medical: Not on file     Non-medical: Not on file   Tobacco Use    Smoking status: Never Smoker    Smokeless tobacco: Never Used   Substance and Sexual Activity    Alcohol use: No    Drug use: No    Sexual activity: Not on file   Lifestyle    Physical activity     Days per week: Not on file     Minutes per session: Not on file    Stress: Not on file   Relationships    Social connections     Talks on phone: Not on file     Gets together: Not on file     Attends L[employer] day service: Not on file     Active member of club or organization: Not on file     Attends meetings of clubs or organizations: Not on file     Relationship status: Not on file    Intimate partner violence     Fear of current or ex partner: Not on file     Emotionally abused: Not on file     Physically abused: Not on file     Forced sexual activity: Not on file   Other Topics Concern    Not on file   Social History Narrative    Not on file     No current facility-administered medications for this encounter.       Current Outpatient Medications   Medication Sig Dispense Refill    amLODIPine (NORVASC) 5 MG tablet Take 1 tablet by mouth 2 times daily 30 tablet 3    dexamethasone (DECADRON) 6 MG tablet Take 1 tablet by mouth daily for 6 days 6 tablet 0    chlorthalidone (HYGROTON) 25 MG tablet Take 0.5 tablets by mouth daily 30 tablet 3    spironolactone (ALDACTONE) 25 MG tablet Take 1 tablet by mouth daily 30 tablet 3    busPIRone (BUSPAR) 10 MG tablet Take 10 mg by mouth 3 times daily      clotrimazole (LOTRIMIN) 1 % cream       Calcium Carb-Cholecalciferol (CALCIUM-VITAMIN D) 500-200 MG-UNIT per tablet Take 1 tablet by mouth 2 times daily (with meals)      cholestyramine (QUESTRAN) 4 g packet Take 1 packet by mouth 3 times daily (with meals)      metoprolol tartrate (LOPRESSOR) 25 MG tablet Take 50 mg by mouth 2 times daily      pantoprazole sodium (PROTONIX) 40 MG PACK packet Take 40 mg by mouth every morning (before breakfast)      colestipol (COLESTID) 1 g tablet Take 1 g by mouth 3 times daily      losartan (COZAAR) 100 MG tablet Take 100 mg by mouth daily       insulin aspart (NOVOLOG) 100 UNIT/ML injection vial Inject into the skin 3 times daily (before meals)      atorvastatin (LIPITOR) 20 MG tablet Take 20 mg by mouth daily.  clopidogrel (PLAVIX) 75 MG tablet Take 75 mg by mouth daily.  fenofibrate 160 MG tablet Take 160 mg by mouth daily.  aspirin 325 MG tablet Take 81 mg by mouth daily.  oxyCODONE-acetaminophen (PERCOCET) 7.5-325 MG per tablet Take 1 tablet by mouth every 8 hours as needed.  fluticasone (FLONASE) 50 MCG/ACT nasal spray 1 spray by Nasal route 2 times daily.  glimepiride (AMARYL) 4 MG tablet Take 4 mg by mouth 2 times daily.  insulin glargine (LANTUS) 100 UNIT/ML injection Inject 60 Units into the skin every evening       citalopram (CELEXA) 10 MG tablet Take 20 mg by mouth daily. Allergies   Allergen Reactions    Sulfa Antibiotics Hives       Nursing Notes Reviewed    Physical Exam:  Triage VS:    ED Triage Vitals [08/17/20 2242]   Enc Vitals Group      BP (!) 157/88      Pulse 76      Resp 19      Temp 100.2 °F (37.9 °C)      Temp Source Oral      SpO2 92 %      Weight       Height       Head Circumference       Peak Flow       Pain Score       Pain Loc       Pain Edu? Excl. in [address]? My pulse ox interpretation is - normal    General appearance:  No acute distress. Skin:  Warm. Dry. Eye:  Extraocular movements intact.      Ears, nose, mouth and throat:  Oral mucosa moist   Neck:  Trachea midline. Extremity:  No swelling. Normal ROM. Decreased range of motion with generalized weakness. Heart:  Regular rate and rhythm, normal S1 & S2, no extra heart sounds. Perfusion:  intact  Respiratory:  Lungs clear to auscultation bilaterally. Respirations nonlabored. Abdominal:  Normal bowel sounds. Soft. Nontender. Non distended. Back:  No CVA tenderness to palpation     Neurological:  Alert and oriented to person only. No focal neuro deficits. Patient able to follow commands. Cranial nerves II through XII are intact. No decrease sensation to light touch. Difficult to assess neurological status due to the patient not fully complying with exam.          Psychiatric: Confused. No anxiety.     I have reviewed and interpreted all of the currently available lab results from this visit (if applicable):  Results for orders placed or performed during the hospital encounter of 08/17/20   CBC Auto Differential   Result Value Ref Range    WBC 5.0 4.0 - 10.5 K/CU MM    RBC 5.36 4.6 - 6.2 M/CU MM    Hemoglobin 16.0 13.5 - 18.0 GM/DL    Hematocrit 49.6 42 - 52 %    MCV 92.5 78 - 100 FL    MCH 29.9 27 - 31 PG    MCHC 32.3 32.0 - 36.0 %    RDW 13.6 11.7 - 14.9 %    Platelets 669 288 - 650 K/CU MM    MPV 11.4 (H) 7.5 - 11.1 FL    Differential Type AUTOMATED DIFFERENTIAL     Segs Relative 54.7 36 - 66 %    Lymphocytes % 31.5 24 - 44 %    Monocytes % 11.8 (H) 0 - 4 %    Eosinophils % 0.8 0 - 3 %    Basophils % 0.4 0 - 1 %    Segs Absolute 2.7 K/CU MM    Lymphocytes Absolute 1.6 K/CU MM    Monocytes Absolute 0.6 K/CU MM    Eosinophils Absolute 0.0 K/CU MM    Basophils Absolute 0.0 K/CU MM    Nucleated RBC % 0.0 %    Total Nucleated RBC 0.0 K/CU MM    Total Immature Neutrophil 0.04 K/CU MM    Immature Neutrophil % 0.8 (H) 0 - 0.43 %   Comprehensive Metabolic Panel w/ Reflex to MG   Result Value Ref Range    Sodium 140 135 - 145 MMOL/L    Potassium 4.0 3.5 - 5.1 MMOL/L    Chloride 100 99 - 110 mMol/L    CO2 32 21 - 32 MMOL/L    BUN 37 (H) 6 - 23 MG/DL    CREATININE 1.7 (H) 0.9 - 1.3 MG/DL    Glucose 66 (L) 70 - 99 MG/DL    Calcium 9.0 8.3 - 10.6 MG/DL    Alb 3.5 3.4 - 5.0 GM/DL    Total Protein 6.5 6.4 - 8.2 GM/DL    Total Bilirubin 0.5 0.0 - 1.0 MG/DL    ALT 25 10 - 40 U/L    AST 47 (H) 15 - 37 IU/L    Alkaline Phosphatase 49 40 - 129 IU/L    GFR Non- 39 (L) >60 mL/min/1.73m2    GFR  47 (L) >60 mL/min/1.73m2    Anion Gap 8 4 - 16   Blood Gas, Venous   Result Value Ref Range    pH, [place] 7.37 7.32 - 7.42    pCO2, [place] 69 (H) 38 - 52 mmHG    pO2, [place] 32 28 - 48 mmHG    Base Exc, Mixed 11.6 (H) 0 - 1.2    HCO3, Venous 39.9 (H) 19 - 25 MMOL/L    O2 Sat, [place] 68.7 50 - 70 %    Comment VBG    POCT Glucose   Result Value Ref Range    Glucose 70 mg/dL   POCT Glucose   Result Value Ref Range    POC Glucose 70 70 - 99 MG/DL      Radiographs (if obtained):  Radiologist's Report Reviewed:  No results found. EKG (if obtained): (All EKG's are interpreted by myself in the absence of a cardiologist)    EKG: normal EKG, normal sinus rhythm. MDM:  Patient was evaluated for his altered mental status. Patient was confused and was able to state his wife's name and did not know where he was or what was going on. He could answer yes and no questions however. Patient was hypoxic on my evaluation and was requiring oxygen. EMS did need oxygen as well and transport. Patient was febrile at home as well with a temperature of 100.8. The patient's glucose was also found to be 66. He was given an amp of D50 which did improve his mentation. Patient was started on broad-spectrum antibiotics. VBG was performed which did show CO2 retention with a normal pH. Bicarb was elevated showing this is a chronic process. Lactate and blood cultures were drawn. Patient was started on broad-spectrum antibiotics and IV fluids.     After discussing the case with the patient he

## 2020-08-18 NOTE — PROGRESS NOTES
Patient is up to chair and has eaten dinner. Is slightly diaphoretic and anxious. Schedule to receive lantus, so bedside glucose checked, which was 369. NOtified Dr. Filemon Mota who ordered SS insulin and accuchecks.

## 2020-08-18 NOTE — PROGRESS NOTES
Patient appears very anxious, I helped him up to the chair, he is weak but he is able to transfer independently. He know the year without prompting, and also knows his wife's name (she  3 years ago he told me). He seems more oriented, but more anxious.   Asked if he could go home tomorrow as \"this is very hard for me\" (being in the hospital)

## 2020-08-19 LAB
ALBUMIN SERPL-MCNC: 3.9 GM/DL (ref 3.4–5)
ALP BLD-CCNC: 56 IU/L (ref 40–128)
ALT SERPL-CCNC: 26 U/L (ref 10–40)
ANION GAP SERPL CALCULATED.3IONS-SCNC: 15 MMOL/L (ref 4–16)
APTT: 39.5 SECONDS (ref 25.1–37.1)
AST SERPL-CCNC: 46 IU/L (ref 15–37)
BACTERIA: NEGATIVE /HPF
BASOPHILS ABSOLUTE: 0 K/CU MM
BASOPHILS RELATIVE PERCENT: 0.4 % (ref 0–1)
BILIRUB SERPL-MCNC: 0.6 MG/DL (ref 0–1)
BILIRUBIN URINE: NEGATIVE MG/DL
BLOOD, URINE: ABNORMAL
BUN BLDV-MCNC: 47 MG/DL (ref 6–23)
CALCIUM SERPL-MCNC: 8.6 MG/DL (ref 8.3–10.6)
CHLORIDE BLD-SCNC: 92 MMOL/L (ref 99–110)
CHLORIDE URINE RANDOM: 85 MMOL/L (ref 43–210)
CLARITY: CLEAR
CO2: 27 MMOL/L (ref 21–32)
COLOR: ABNORMAL
CREAT SERPL-MCNC: 2.6 MG/DL (ref 0.9–1.3)
CREATININE URINE: 41.5 MG/DL (ref 39–259)
D DIMER: 419 NG/ML(DDU)
DIFFERENTIAL TYPE: ABNORMAL
EOSINOPHILS ABSOLUTE: 0 K/CU MM
EOSINOPHILS RELATIVE PERCENT: 0 % (ref 0–3)
FIBRINOGEN LEVEL: 333 MG/DL (ref 196.9–442.1)
GFR AFRICAN AMERICAN: 29 ML/MIN/1.73M2
GFR NON-AFRICAN AMERICAN: 24 ML/MIN/1.73M2
GLUCOSE BLD-MCNC: 195 MG/DL (ref 70–99)
GLUCOSE BLD-MCNC: 254 MG/DL (ref 70–99)
GLUCOSE BLD-MCNC: 315 MG/DL (ref 70–99)
GLUCOSE BLD-MCNC: 345 MG/DL (ref 70–99)
GLUCOSE BLD-MCNC: 366 MG/DL (ref 70–99)
GLUCOSE, URINE: NEGATIVE MG/DL
HCT VFR BLD CALC: 45.2 % (ref 42–52)
HEMOGLOBIN: 14.3 GM/DL (ref 13.5–18)
HYALINE CASTS: 0 /LPF
IMMATURE NEUTROPHIL %: 0.7 % (ref 0–0.43)
INR BLD: 1.06 INDEX
KETONES, URINE: NEGATIVE MG/DL
LEUKOCYTE ESTERASE, URINE: NEGATIVE
LYMPHOCYTES ABSOLUTE: 0.5 K/CU MM
LYMPHOCYTES RELATIVE PERCENT: 18.6 % (ref 24–44)
MAGNESIUM: 2.2 MG/DL (ref 1.8–2.4)
MCH RBC QN AUTO: 29.7 PG (ref 27–31)
MCHC RBC AUTO-ENTMCNC: 31.6 % (ref 32–36)
MCV RBC AUTO: 94 FL (ref 78–100)
MONOCYTES ABSOLUTE: 0.3 K/CU MM
MONOCYTES RELATIVE PERCENT: 10 % (ref 0–4)
NITRITE URINE, QUANTITATIVE: NEGATIVE
NUCLEATED RBC %: 0 %
PDW BLD-RTO: 13.5 % (ref 11.7–14.9)
PH, URINE: 5 (ref 5–8)
PHOSPHORUS: 3.9 MG/DL (ref 2.5–4.9)
PLATELET # BLD: 122 K/CU MM (ref 140–440)
PMV BLD AUTO: 11.4 FL (ref 7.5–11.1)
POTASSIUM SERPL-SCNC: 4.1 MMOL/L (ref 3.5–5.1)
POTASSIUM, UR: 13.1 MMOL/L (ref 22–119)
PRO-BNP: 508 PG/ML
PROT/CREAT RATIO, UR: 0.2
PROTEIN UA: NEGATIVE MG/DL
PROTHROMBIN TIME: 12.8 SECONDS (ref 11.7–14.5)
RBC # BLD: 4.81 M/CU MM (ref 4.6–6.2)
RBC URINE: ABNORMAL /HPF (ref 0–3)
SARS-COV-2: DETECTED
SEGMENTED NEUTROPHILS ABSOLUTE COUNT: 1.9 K/CU MM
SEGMENTED NEUTROPHILS RELATIVE PERCENT: 70.3 % (ref 36–66)
SODIUM BLD-SCNC: 134 MMOL/L (ref 135–145)
SODIUM URINE: 102 MMOL/L (ref 35–167)
SOURCE: ABNORMAL
SPECIFIC GRAVITY UA: 1.01 (ref 1–1.03)
SQUAMOUS EPITHELIAL: <1 /HPF
TOTAL IMMATURE NEUTOROPHIL: 0.02 K/CU MM
TOTAL NUCLEATED RBC: 0 K/CU MM
TOTAL PROTEIN: 6.9 GM/DL (ref 6.4–8.2)
TRICHOMONAS: ABNORMAL /HPF
URINE TOTAL PROTEIN: 8.2 MG/DL
UROBILINOGEN, URINE: NORMAL MG/DL (ref 0.2–1)
WBC # BLD: 2.7 K/CU MM (ref 4–10.5)
WBC UA: <1 /HPF (ref 0–2)

## 2020-08-19 PROCEDURE — 81001 URINALYSIS AUTO W/SCOPE: CPT

## 2020-08-19 PROCEDURE — 1200000000 HC SEMI PRIVATE

## 2020-08-19 PROCEDURE — 85379 FIBRIN DEGRADATION QUANT: CPT

## 2020-08-19 PROCEDURE — 83880 ASSAY OF NATRIURETIC PEPTIDE: CPT

## 2020-08-19 PROCEDURE — 85610 PROTHROMBIN TIME: CPT

## 2020-08-19 PROCEDURE — 6360000002 HC RX W HCPCS: Performed by: INTERNAL MEDICINE

## 2020-08-19 PROCEDURE — 6370000000 HC RX 637 (ALT 250 FOR IP): Performed by: NURSE PRACTITIONER

## 2020-08-19 PROCEDURE — 85025 COMPLETE CBC W/AUTO DIFF WBC: CPT

## 2020-08-19 PROCEDURE — 84300 ASSAY OF URINE SODIUM: CPT

## 2020-08-19 PROCEDURE — 85384 FIBRINOGEN ACTIVITY: CPT

## 2020-08-19 PROCEDURE — 83735 ASSAY OF MAGNESIUM: CPT

## 2020-08-19 PROCEDURE — 80053 COMPREHEN METABOLIC PANEL: CPT

## 2020-08-19 PROCEDURE — 82436 ASSAY OF URINE CHLORIDE: CPT

## 2020-08-19 PROCEDURE — 6370000000 HC RX 637 (ALT 250 FOR IP): Performed by: INTERNAL MEDICINE

## 2020-08-19 PROCEDURE — 82570 ASSAY OF URINE CREATININE: CPT

## 2020-08-19 PROCEDURE — 85730 THROMBOPLASTIN TIME PARTIAL: CPT

## 2020-08-19 PROCEDURE — 2580000003 HC RX 258: Performed by: NURSE PRACTITIONER

## 2020-08-19 PROCEDURE — P9047 ALBUMIN (HUMAN), 25%, 50ML: HCPCS | Performed by: INTERNAL MEDICINE

## 2020-08-19 PROCEDURE — 84156 ASSAY OF PROTEIN URINE: CPT

## 2020-08-19 PROCEDURE — 84133 ASSAY OF URINE POTASSIUM: CPT

## 2020-08-19 PROCEDURE — 82962 GLUCOSE BLOOD TEST: CPT

## 2020-08-19 PROCEDURE — 84100 ASSAY OF PHOSPHORUS: CPT

## 2020-08-19 PROCEDURE — 6360000002 HC RX W HCPCS: Performed by: NURSE PRACTITIONER

## 2020-08-19 RX ORDER — AMLODIPINE BESYLATE 5 MG/1
5 TABLET ORAL DAILY
Status: DISCONTINUED | OUTPATIENT
Start: 2020-08-20 | End: 2020-08-21 | Stop reason: HOSPADM

## 2020-08-19 RX ORDER — INSULIN GLARGINE 100 [IU]/ML
60 INJECTION, SOLUTION SUBCUTANEOUS NIGHTLY
Status: DISCONTINUED | OUTPATIENT
Start: 2020-08-19 | End: 2020-08-20

## 2020-08-19 RX ORDER — FUROSEMIDE 10 MG/ML
40 INJECTION INTRAMUSCULAR; INTRAVENOUS EVERY 12 HOURS
Status: COMPLETED | OUTPATIENT
Start: 2020-08-19 | End: 2020-08-20

## 2020-08-19 RX ORDER — ALBUMIN (HUMAN) 12.5 G/50ML
25 SOLUTION INTRAVENOUS EVERY 12 HOURS
Status: COMPLETED | OUTPATIENT
Start: 2020-08-19 | End: 2020-08-20

## 2020-08-19 RX ADMIN — ALBUMIN (HUMAN) 25 G: 0.25 INJECTION, SOLUTION INTRAVENOUS at 16:54

## 2020-08-19 RX ADMIN — FENOFIBRATE 160 MG: 160 TABLET ORAL at 07:43

## 2020-08-19 RX ADMIN — OXYCODONE HYDROCHLORIDE AND ACETAMINOPHEN 1 TABLET: 7.5; 325 TABLET ORAL at 03:19

## 2020-08-19 RX ADMIN — AMLODIPINE BESYLATE 5 MG: 5 TABLET ORAL at 07:44

## 2020-08-19 RX ADMIN — OXYCODONE HYDROCHLORIDE AND ACETAMINOPHEN 1 TABLET: 7.5; 325 TABLET ORAL at 22:02

## 2020-08-19 RX ADMIN — HEPARIN SODIUM 5000 UNITS: 5000 INJECTION, SOLUTION INTRAVENOUS; SUBCUTANEOUS at 16:51

## 2020-08-19 RX ADMIN — LORAZEPAM 0.5 MG: 0.5 TABLET ORAL at 22:01

## 2020-08-19 RX ADMIN — INSULIN GLARGINE 60 UNITS: 100 INJECTION, SOLUTION SUBCUTANEOUS at 22:02

## 2020-08-19 RX ADMIN — SODIUM CHLORIDE, PRESERVATIVE FREE 10 ML: 5 INJECTION INTRAVENOUS at 07:43

## 2020-08-19 RX ADMIN — INSULIN LISPRO 4 UNITS: 100 INJECTION, SOLUTION INTRAVENOUS; SUBCUTANEOUS at 12:07

## 2020-08-19 RX ADMIN — CITALOPRAM HYDROBROMIDE 20 MG: 20 TABLET ORAL at 07:44

## 2020-08-19 RX ADMIN — ATORVASTATIN CALCIUM 20 MG: 20 TABLET, FILM COATED ORAL at 22:02

## 2020-08-19 RX ADMIN — ASPIRIN 81 MG: 81 TABLET, COATED ORAL at 07:44

## 2020-08-19 RX ADMIN — METOPROLOL TARTRATE 50 MG: 50 TABLET, FILM COATED ORAL at 07:44

## 2020-08-19 RX ADMIN — OXYCODONE HYDROCHLORIDE AND ACETAMINOPHEN 1 TABLET: 7.5; 325 TABLET ORAL at 14:11

## 2020-08-19 RX ADMIN — AZITHROMYCIN MONOHYDRATE 500 MG: 500 INJECTION, POWDER, LYOPHILIZED, FOR SOLUTION INTRAVENOUS at 22:03

## 2020-08-19 RX ADMIN — OYSTER SHELL CALCIUM WITH VITAMIN D 1 TABLET: 500; 200 TABLET, FILM COATED ORAL at 16:55

## 2020-08-19 RX ADMIN — BUSPIRONE HYDROCHLORIDE 10 MG: 10 TABLET ORAL at 07:43

## 2020-08-19 RX ADMIN — HEPARIN SODIUM 5000 UNITS: 5000 INJECTION, SOLUTION INTRAVENOUS; SUBCUTANEOUS at 22:14

## 2020-08-19 RX ADMIN — CLOTRIMAZOLE: 10 CREAM TOPICAL at 07:46

## 2020-08-19 RX ADMIN — FLUTICASONE PROPIONATE 1 SPRAY: 50 SPRAY, METERED NASAL at 22:12

## 2020-08-19 RX ADMIN — METOPROLOL TARTRATE 50 MG: 50 TABLET, FILM COATED ORAL at 22:01

## 2020-08-19 RX ADMIN — CHLORTHALIDONE 12.5 MG: 25 TABLET ORAL at 07:44

## 2020-08-19 RX ADMIN — BUSPIRONE HYDROCHLORIDE 10 MG: 10 TABLET ORAL at 22:02

## 2020-08-19 RX ADMIN — LOSARTAN POTASSIUM 100 MG: 50 TABLET, FILM COATED ORAL at 07:44

## 2020-08-19 RX ADMIN — FUROSEMIDE 40 MG: 10 INJECTION, SOLUTION INTRAMUSCULAR; INTRAVENOUS at 16:53

## 2020-08-19 RX ADMIN — SPIRONOLACTONE 25 MG: 25 TABLET ORAL at 07:43

## 2020-08-19 RX ADMIN — FLUTICASONE PROPIONATE 1 SPRAY: 50 SPRAY, METERED NASAL at 07:46

## 2020-08-19 RX ADMIN — SODIUM CHLORIDE, PRESERVATIVE FREE 10 ML: 5 INJECTION INTRAVENOUS at 22:12

## 2020-08-19 RX ADMIN — OYSTER SHELL CALCIUM WITH VITAMIN D 1 TABLET: 500; 200 TABLET, FILM COATED ORAL at 07:43

## 2020-08-19 RX ADMIN — DEXAMETHASONE 6 MG: 4 TABLET ORAL at 07:44

## 2020-08-19 RX ADMIN — CLOTRIMAZOLE: 10 CREAM TOPICAL at 22:11

## 2020-08-19 RX ADMIN — PANTOPRAZOLE SODIUM 40 MG: 40 TABLET, DELAYED RELEASE ORAL at 07:43

## 2020-08-19 RX ADMIN — ZINC SULFATE 220 MG (50 MG) CAPSULE 50 MG: CAPSULE at 07:43

## 2020-08-19 RX ADMIN — INSULIN LISPRO 5 UNITS: 100 INJECTION, SOLUTION INTRAVENOUS; SUBCUTANEOUS at 07:45

## 2020-08-19 RX ADMIN — BUSPIRONE HYDROCHLORIDE 10 MG: 10 TABLET ORAL at 14:06

## 2020-08-19 RX ADMIN — CLOPIDOGREL BISULFATE 75 MG: 75 TABLET ORAL at 07:43

## 2020-08-19 RX ADMIN — HEPARIN SODIUM 5000 UNITS: 5000 INJECTION, SOLUTION INTRAVENOUS; SUBCUTANEOUS at 07:44

## 2020-08-19 ASSESSMENT — PAIN SCALES - GENERAL
PAINLEVEL_OUTOF10: 5
PAINLEVEL_OUTOF10: 0
PAINLEVEL_OUTOF10: 8
PAINLEVEL_OUTOF10: 0
PAINLEVEL_OUTOF10: 0
PAINLEVEL_OUTOF10: 5
PAINLEVEL_OUTOF10: 5
PAINLEVEL_OUTOF10: 0
PAINLEVEL_OUTOF10: 8

## 2020-08-19 ASSESSMENT — PAIN DESCRIPTION - PAIN TYPE: TYPE: CHRONIC PAIN

## 2020-08-19 ASSESSMENT — PAIN SCALES - WONG BAKER
WONGBAKER_NUMERICALRESPONSE: 0

## 2020-08-19 ASSESSMENT — PAIN DESCRIPTION - LOCATION: LOCATION: GENERALIZED

## 2020-08-19 NOTE — PROGRESS NOTES
Hospitalist Progress Note         Admit Date: 8/17/2020    PCP: Jared Rodriguez MD     Chief Complaint   Patient presents with    Altered Mental Status        Assessment and Plan:      Acute respiratory failure with hypoxia and hypercapnia (HCC)  Continue Decadron, inhaler and O2 support. Start azithromycin for bronchitis. Werner Precise Encephalopathy unspecified improved   Uncontrolled diabetes mellitus insulin regimen changed. Follow Accu-Cheks.  Acute kidney injury ([address] on CKD stage III/hyperkalemia nephrology on board for fluid management. Albumin IV administered as per nephrology. Monitor clinically, I/os, vitals and BMP   ??CAD continue home aspirin, Plavix, statin, metoprolol. Monitor   Acute on chronic diastolic heart failure continue Lasix IV, Lopressor and Norvasc. Recent echo with normal EF and G1 DD. DC home Aldactone, losartan for now.  HTN BP medication changes as above.   Monitor vitals closely      VTE prophylaxis LMWH    Current Facility-Administered Medications   Medication Dose Route Frequency Provider Last Rate Last Dose    acetaminophen (TYLENOL) tablet 650 mg  650 mg Oral Q6H PRN Blanca Dapilah, APRN - CNP        Or    acetaminophen (TYLENOL) suppository 650 mg  650 mg Rectal Q6H PRN Blanca Dapilah, APRN - CNP        dexamethasone (DECADRON) tablet 6 mg  6 mg Oral Daily Blanca Dapilah, APRN - CNP   6 mg at 08/19/20 0744    sodium chloride flush 0.9 % injection 10 mL  10 mL Intravenous 2 times per day Blanca Dapilah, APRN - CNP   10 mL at 08/19/20 0743    sodium chloride flush 0.9 % injection 10 mL  10 mL Intravenous PRN Blanca Dapilah, APRN - CNP        polyethylene glycol (GLYCOLAX) packet 17 g  17 g Oral Daily PRN Blanca Dapilah, APRN - CNP        promethazine (PHENERGAN) tablet 12.5 mg  12.5 mg Oral Q6H PRN Blanca Dapilah, APRN - CNP        Or    ondansetron (ZOFRAN) injection 4 mg  4 mg Intravenous Q6H PRN Blanca Dapilah, APRN - CNP        azithromycin (Freda Farrier) 500 mg in dextrose 5 % 250 mL IVPB  500 mg Intravenous Q24H DONNA Manjarrez - CNP   Stopped at 08/18/20 2256    amLODIPine (NORVASC) tablet 5 mg  5 mg Oral BID DONNA Manjarrez - CNP   5 mg at 08/19/20 0744    aspirin EC tablet 81 mg  81 mg Oral Daily Blanca Prieto APRN - CNP   81 mg at 08/19/20 0744    atorvastatin (LIPITOR) tablet 20 mg  20 mg Oral Nightly Blanca Prieto APRN - CNP   20 mg at 08/18/20 2155    busPIRone (BUSPAR) tablet 10 mg  10 mg Oral TID DONNA Montano - CNP   10 mg at 08/19/20 0743    calcium-vitamin D 500-200 MG-UNIT per tablet 1 tablet  1 tablet Oral BID WC DONNA Manjarrez - CNP   1 tablet at 08/19/20 0743    chlorthalidone (HYGROTON) tablet 12.5 mg  12.5 mg Oral Daily DONNA Manjarrez - CNP   12.5 mg at 08/19/20 0744    citalopram (CELEXA) tablet 20 mg  20 mg Oral Daily Blanca Prieto APRN - CNP   20 mg at 08/19/20 0744    clopidogrel (PLAVIX) tablet 75 mg  75 mg Oral Daily DONNA Manjarrez - CNP   75 mg at 08/19/20 0743    clotrimazole (LOTRIMIN) 1 % cream   Topical BID Blanca Prieto APRN - CNP        fenofibrate (TRIGLIDE) tablet 160 mg  160 mg Oral Daily Blanca Prieto APRN - CNP   160 mg at 08/19/20 0743    fluticasone (FLONASE) 50 MCG/ACT nasal spray 1 spray  1 spray Nasal BID Blanca Prieto APRN - CNP   1 spray at 08/19/20 0746    insulin glargine (LANTUS) injection vial 60 Units  60 Units Subcutaneous QPM DONNA Manjarrez - CNP   60 Units at 08/18/20 1837    losartan (COZAAR) tablet 100 mg  100 mg Oral Daily Blanca Prieto APRN - CNP   100 mg at 08/19/20 0744    metoprolol tartrate (LOPRESSOR) tablet 50 mg  50 mg Oral BID DONNA Manjarrez CNP   50 mg at 08/19/20 0744    oxyCODONE-acetaminophen (PERCOCET) 7.5-325 MG per tablet 1 tablet  1 tablet Oral Q8H PRN DONNA Manjarrez CNP   1 tablet at 08/19/20 0319    pantoprazole (PROTONIX) tablet 40 mg  40 mg Oral QAM AC DONNA Manjarrez - CNP   40 mg at 08/19/20 0743    spironolactone (ALDACTONE) tablet 25 mg  25 mg Oral Daily Blanca Ana Liliajeanmarie APRN - CNP   25 mg at 08/19/20 0743    heparin (porcine) injection 5,000 Units  5,000 Units Subcutaneous Q8H Blanca Ana Liliajeanmarie APRN - CNP   5,000 Units at 08/19/20 0744    zinc sulfate (ZINCATE) capsule 50 mg  50 mg Oral Daily Clay Gardner MD   50 mg at 08/19/20 0743    LORazepam (ATIVAN) tablet 0.5 mg  0.5 mg Oral Q6H PRN Ollie Kyle MD   0.5 mg at 08/18/20 1850    insulin lispro (HUMALOG) injection vial 0-6 Units  0-6 Units Subcutaneous TID WC Ollie Kyle MD   5 Units at 08/19/20 0745    insulin lispro (HUMALOG) injection vial 0-3 Units  0-3 Units Subcutaneous Nightly Ollie Kyle MD   3 Units at 08/18/20 2156    glucose (GLUTOSE) 40 % oral gel 15 g  15 g Oral PRN Cecily Hughes PA-C        dextrose 50 % IV solution  12.5 g Intravenous PRN Cecily Hughes PA-C        glucagon (rDNA) injection 1 mg  1 mg Intramuscular PRN Cecily Hughes PA-C        dextrose 5 % solution  100 mL/hr Intravenous PRN Claudeen Lees, PA-C           Subjective:     Patient says his shortness of breath is slowly getting better. Continues to have uncontrolled blood sugars  No significant overnight events. AutoPap at night    Objective:        Intake/Output Summary (Last 24 hours) at 8/19/2020 1133  Last data filed at 8/19/2020 0743  Gross per 24 hour   Intake 10 ml   Output 375 ml   Net -365 ml      Vitals:   Vitals:    08/19/20 0738   BP: (!) 148/73   Pulse: 62   Resp: 12   Temp: 96.4 °F (35.8 °C)   SpO2: 94%     Physical Exam:  General Appearance:    Alert, cooperative, improving respiratory distress +  Head:      Normocephalic, without obvious abnormality, atraumatic  Eyes:       Conjunctiva/corneas clear, EOM's intact  Lungs:    B/L diminished BS and occasional wheeze +  Heart:                Regular rate and rhythm, S1 and S2 normal, no murmur,   rub or gallop  Abdomen:     Soft, non-tender, bowel sounds active, no masses, no organomegaly  Extremities:    Improving B/L1-2 + pedal edema  Neurological:   Grossly Intact. Significant Diagnostic Studies:   DATA:    CBC   Recent Labs     08/17/20 2300 08/18/20 1959 08/19/20  0650   WBC 5.0 2.3* 2.7*   HGB 16.0 14.9 14.3   HCT 49.6 48.4 45.2    126* 122*      BMP   Recent Labs     08/17/20 2300 08/18/20 1959 08/19/20  0650    133* 134*   K 4.0 5.7* 4.1    96* 92*   CO2 32 25 27   PHOS  --   --  3.9   BUN 37* 40* 47*   CREATININE 1.7* 2.2* 2.6*     LFT'S   Recent Labs     08/17/20 2300 08/18/20 1959 08/19/20  0650   AST 47* 43* 46*   ALT 25 22 26   BILITOT 0.5 0.5 0.6   ALKPHOS 49 49 56     COAG   Recent Labs     08/18/20 1959 08/19/20  0650   INR 1.05 1.06     POC:   Lab Results   Component Value Date    POCGLU 315 08/19/2020    POCGLU 366 08/19/2020    POCGLU 354 08/18/2020    POCGLU 386 08/18/2020     [id number]:  Lab Results   Component Value Date    LABA1C 8.8 08/09/2020     CARDIAC ENZYMES  No results for input(s): CKTOTAL, CKMB, CKMBINDEX, TROPONINI in the last 72 hours. Troponin: No results for input(s): TROPONINT in the last 72 hours. BNP:   Recent Labs     08/19/20  0650   PROBNP 508.0*     U/A:    Lab Results   Component Value Date    COLORU YELLOW 08/18/2020    WBCUA 1 08/18/2020    RBCUA <1 08/18/2020    MUCUS RARE 08/12/2020    BACTERIA NEGATIVE 08/18/2020    CLARITYU CLEAR 08/18/2020    SPECGRAV 1.014 08/18/2020    LEUKOCYTESUR NEGATIVE 08/18/2020    BLOODU SMALL 08/18/2020       Ct Head Without Contrast    Result Date: 8/18/2020  EXAMINATION: CT OF THE HEAD WITHOUT CONTRAST  8/18/2020 12:35 am TECHNIQUE: CT of the head was performed without the administration of intravenous contrast. Dose modulation, iterative reconstruction, and/or weight based adjustment of the mA/kV was utilized to reduce the radiation dose to as low as reasonably achievable. COMPARISON: CT head scan without contrast August 8, 2020.  HISTORY: ORDERING SYSTEM PROVIDED HISTORY: ams TECHNOLOGIST PROVIDED HISTORY: Known COVID patient Reason for exam:->ams Has a \"code stroke\" or \"stroke alert\" been called? ->No Reason for Exam: ams FINDINGS: BRAIN/VENTRICLES: There is no acute intracranial hemorrhage, mass effect or midline shift. No abnormal extra-axial fluid collection. The gray-white differentiation is maintained without evidence of an acute infarct. There is no evidence of hydrocephalus. Moderate diffuse decrease in cerebral volume is noted with corresponding prominence of the sulci and ventricles. ORBITS: The visualized portion of the orbits demonstrate no acute abnormality. SINUSES: The visualized paranasal sinuses and mastoid air cells demonstrate no acute abnormality. SOFT TISSUES/SKULL:  No acute abnormality of the visualized skull or soft tissues. No acute intracranial abnormality. Moderate diffuse cerebral atrophy. Ct Head Wo Contrast    Result Date: 8/8/2020  EXAMINATION: CT OF THE HEAD WITHOUT CONTRAST  8/8/2020 2:51 pm TECHNIQUE: CT of the head was performed without the administration of intravenous contrast. Dose modulation, iterative reconstruction, and/or weight based adjustment of the mA/kV was utilized to reduce the radiation dose to as low as reasonably achievable. COMPARISON: CT brain 03/27/2019. HISTORY: ORDERING SYSTEM PROVIDED HISTORY: altered mental status TECHNOLOGIST PROVIDED HISTORY: Reason for exam:->altered mental status Has a \"code stroke\" or \"stroke alert\" been called? ->No Reason for Exam: loc;sob Acuity: Acute Type of Exam: Initial FINDINGS: BRAIN/VENTRICLES: There is no acute intracranial hemorrhage, mass effect or midline shift. No abnormal extra-axial fluid collection. The gray-white differentiation is maintained without evidence of an acute infarct. There is no evidence of hydrocephalus. Mild generalized parenchymal volume loss and chronic periventricular white matter hypoattenuation are seen.  ORBITS: The visualized portion of the orbits demonstrate no acute abnormality. SINUSES: The visualized paranasal sinuses and mastoid air cells demonstrate no acute abnormality. SOFT TISSUES/SKULL:  No acute abnormality of the visualized skull or soft tissues. No acute intracranial abnormality. Ct Abdomen Pelvis W Iv Contrast Additional Contrast? None    Result Date: 8/10/2020  EXAMINATION: CTA OF THE CHEST; CT OF THE ABDOMEN AND PELVIS WITH CONTRAST; CT OF THE LUMBAR SPINE WITHOUT CONTRAST 8/8/2020 3:12 pm; 8/8/2020 3:20 pm; 8/8/2020 2:52 pm TECHNIQUE: CTA of the chest was performed after the administration of intravenous contrast.  Multiplanar reformatted images are provided for review. MIP images are provided for review. Dose modulation, iterative reconstruction, and/or weight based adjustment of the mA/kV was utilized to reduce the radiation dose to as low as reasonably achievable.; CT of the abdomen and pelvis was performed with the administration of intravenous contrast. Multiplanar reformatted images are provided for review. Dose modulation, iterative reconstruction, and/or weight based adjustment of the mA/kV was utilized to reduce the radiation dose to as low as reasonably achievable.; CT of the lumbar spine was performed without the administration of intravenous contrast. Multiplanar reformatted images are provided for review. Dose modulation, iterative reconstruction, and/or weight based adjustment of the mA/kV was utilized to reduce the radiation dose to as low as reasonably achievable. COMPARISON: CT chest abdomen and pelvis 09/25/2015 HISTORY: ORDERING SYSTEM PROVIDED HISTORY: syncope. SOB TECHNOLOGIST PROVIDED HISTORY: Reason for exam:->syncope.  SOB Reason for Exam: LOC;SOB Acuity: Acute Type of Exam: Initial Additional signs and symptoms: PT SON FOUND PT ON FLOOR; ORDERING SYSTEM PROVIDED HISTORY: abdominal and lower back pain TECHNOLOGIST PROVIDED HISTORY: Reason for exam:->abdominal and lower back pain Additional Contrast?->None Reason for Exam: ABD AND LOWER BACK PAIN Acuity: Acute Type of Exam: Initial Additional signs and symptoms: LOC,PT'S SON JEROME PT ON FLOOR; ORDERING SYSTEM PROVIDED HISTORY: low back pain after fall TECHNOLOGIST PROVIDED HISTORY: Reason for exam:->low back pain after fall Reason for Exam: lower back pain after fall Acuity: Acute Type of Exam: Initial FINDINGS: Chest: Mediastinum: No evidence of mediastinal hematoma or lymphadenopathy. Calcified right hilar lymph nodes noted. No pericardial effusion. Coronary artery calcifications are seen. The thoracic aorta is normal in caliber. Pulmonary arteries: Poor contrast opacification of the pulmonary arteries. No obvious central pulmonary arterial filling defect is seen. No evidence of right heart strain. The main pulmonary artery is normal in caliber. Lungs/pleura: The central airways are patent. No pleural effusion or pneumothorax is seen. Airspace opacities are seen in the lower lobes, left greater than right, which are new from the prior CT. Mild lingular atelectasis. There are low lung volumes. Mild elevation of the left hemidiaphragm. Soft Tissues/Bones: No acute bony abnormality. Abdomen/Pelvis: Organs: The liver, spleen, pancreas, adrenal glands, and kidneys demonstrate no acute abnormality. The liver has a nodular contour. The spleen is enlarged. There are perisplenic varices. Cholecystectomy clips are seen. GI/Bowel: No bowel obstruction is seen. Colonic diverticulosis without CT evidence of diverticulitis. Normal appendix. Pelvis: The urinary bladder is unremarkable. Peritoneum/Retroperitoneum: No retroperitoneal hematoma. No lymphadenopathy. No intraperitoneal free air or free fluid. Bones/Soft Tissues: No acute bony abnormality. Lumbar spine: No acute fracture or traumatic malalignment. Multilevel endplate and facet degenerative changes are seen with advanced L5-S1 disc height loss.      Low lung volumes with mild elevation of the left hemidiaphragm. There are basilar predominant airspace opacities, left greater than right. These are new from the prior chest CT. While this could represent atelectasis in the setting of low lung volumes, infection is not excluded. Poor contrast opacification of the pulmonary arteries. No central pulmonary arterial filling defect is seen. No evidence of right heart strain. No acute abnormality in the abdomen or pelvis. No acute fracture or traumatic malalignment in the lumbar spine. 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 Xr Chest Portable    Result Date: 8/17/2020  EXAMINATION: ONE XRAY VIEW OF THE CHEST 8/17/2020 11:12 pm COMPARISON: 01/28/2020 HISTORY: ORDERING SYSTEM PROVIDED HISTORY: Known COVID patient increased O2 requirements TECHNOLOGIST PROVIDED HISTORY: Reason for exam:->Known COVID patient increased O2 requirements Reason for Exam: Known COVID patient increased O2 requirements Acuity: Unknown Type of Exam: Unknown FINDINGS: The lungs are underinflated, resulting in vascular crowding and subsegmental atelectasis. Bilateral perihilar interstitial opacities are noted with more focal airspace opacities in the right lower lung zone. No effusion or pneumothorax. The cardiac silhouette is stably enlarged given the degree of inspiration and patient rotation. No acute bony abnormality. Bilateral perihilar interstitial opacities with more focal opacities in the right lower lung zone. Findings are consistent with the patient's known COVID-19 pneumonia. Stable enlargement of the cardiac silhouette.      Cta Pulmonary W Contrast    Result Date: 8/10/2020  EXAMINATION: CTA OF THE CHEST; CT OF THE ABDOMEN AND PELVIS WITH CONTRAST; CT OF THE LUMBAR SPINE WITHOUT CONTRAST 8/8/2020 3:12 pm; 8/8/2020 3:20 pm; 8/8/2020 2:52 pm TECHNIQUE: CTA of the chest was performed after the administration of intravenous contrast.  Multiplanar reformatted images are provided for review. MIP images are provided for review. Dose modulation, iterative reconstruction, and/or weight based adjustment of the mA/kV was utilized to reduce the radiation dose to as low as reasonably achievable.; CT of the abdomen and pelvis was performed with the administration of intravenous contrast. Multiplanar reformatted images are provided for review. Dose modulation, iterative reconstruction, and/or weight based adjustment of the mA/kV was utilized to reduce the radiation dose to as low as reasonably achievable.; CT of the lumbar spine was performed without the administration of intravenous contrast. Multiplanar reformatted images are provided for review. Dose modulation, iterative reconstruction, and/or weight based adjustment of the mA/kV was utilized to reduce the radiation dose to as low as reasonably achievable. COMPARISON: CT chest abdomen and pelvis 09/25/2015 HISTORY: ORDERING SYSTEM PROVIDED HISTORY: syncope. SOB TECHNOLOGIST PROVIDED HISTORY: Reason for exam:->syncope. SOB Reason for Exam: LOC;SOB Acuity: Acute Type of Exam: Initial Additional signs and symptoms: PT SON FOUND PT ON FLOOR; ORDERING SYSTEM PROVIDED HISTORY: abdominal and lower back pain TECHNOLOGIST PROVIDED HISTORY: Reason for exam:->abdominal and lower back pain Additional Contrast?->None Reason for Exam: ABD AND LOWER BACK PAIN Acuity: Acute Type of Exam: Initial Additional signs and symptoms: LOC,PT'S SON KAYLEYN PT ON FLOOR; ORDERING SYSTEM PROVIDED HISTORY: low back pain after fall TECHNOLOGIST PROVIDED HISTORY: Reason for exam:->low back pain after fall Reason for Exam: lower back pain after fall Acuity: Acute Type of Exam: Initial FINDINGS: Chest: Mediastinum: No evidence of mediastinal hematoma or lymphadenopathy. Calcified right hilar lymph nodes noted. No pericardial effusion. Coronary artery calcifications are seen. The thoracic aorta is normal in caliber.  Pulmonary arteries: Poor contrast opacification of the pulmonary arteries. No obvious central pulmonary arterial filling defect is seen. No evidence of right heart strain. The main pulmonary artery is normal in caliber. Lungs/pleura: The central airways are patent. No pleural effusion or pneumothorax is seen. Airspace opacities are seen in the lower lobes, left greater than right, which are new from the prior CT. Mild lingular atelectasis. There are low lung volumes. Mild elevation of the left hemidiaphragm. Soft Tissues/Bones: No acute bony abnormality. Abdomen/Pelvis: Organs: The liver, spleen, pancreas, adrenal glands, and kidneys demonstrate no acute abnormality. The liver has a nodular contour. The spleen is enlarged. There are perisplenic varices. Cholecystectomy clips are seen. GI/Bowel: No bowel obstruction is seen. Colonic diverticulosis without CT evidence of diverticulitis. Normal appendix. Pelvis: The urinary bladder is unremarkable. Peritoneum/Retroperitoneum: No retroperitoneal hematoma. No lymphadenopathy. No intraperitoneal free air or free fluid. Bones/Soft Tissues: No acute bony abnormality. Lumbar spine: No acute fracture or traumatic malalignment. Multilevel endplate and facet degenerative changes are seen with advanced L5-S1 disc height loss. Low lung volumes with mild elevation of the left hemidiaphragm. There are basilar predominant airspace opacities, left greater than right. These are new from the prior chest CT. While this could represent atelectasis in the setting of low lung volumes, infection is not excluded. Poor contrast opacification of the pulmonary arteries. No central pulmonary arterial filling defect is seen. No evidence of right heart strain. No acute abnormality in the abdomen or pelvis. No acute fracture or traumatic malalignment in the lumbar spine. 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      Ct Lumbar Reconstruction Wo Post Process    Result Date: 8/10/2020  EXAMINATION: CTA OF THE CHEST; CT OF THE ABDOMEN AND PELVIS WITH CONTRAST; CT OF THE LUMBAR SPINE WITHOUT CONTRAST 8/8/2020 3:12 pm; 8/8/2020 3:20 pm; 8/8/2020 2:52 pm TECHNIQUE: CTA of the chest was performed after the administration of intravenous contrast.  Multiplanar reformatted images are provided for review. MIP images are provided for review. Dose modulation, iterative reconstruction, and/or weight based adjustment of the mA/kV was utilized to reduce the radiation dose to as low as reasonably achievable.; CT of the abdomen and pelvis was performed with the administration of intravenous contrast. Multiplanar reformatted images are provided for review. Dose modulation, iterative reconstruction, and/or weight based adjustment of the mA/kV was utilized to reduce the radiation dose to as low as reasonably achievable.; CT of the lumbar spine was performed without the administration of intravenous contrast. Multiplanar reformatted images are provided for review. Dose modulation, iterative reconstruction, and/or weight based adjustment of the mA/kV was utilized to reduce the radiation dose to as low as reasonably achievable. COMPARISON: CT chest abdomen and pelvis 09/25/2015 HISTORY: ORDERING SYSTEM PROVIDED HISTORY: syncope. SOB TECHNOLOGIST PROVIDED HISTORY: Reason for exam:->syncope.  SOB Reason for Exam: LOC;SOB Acuity: Acute Type of Exam: Initial Additional signs and symptoms: PT SON FOUND PT ON FLOOR; ORDERING SYSTEM PROVIDED HISTORY: abdominal and lower back pain TECHNOLOGIST PROVIDED HISTORY: Reason for exam:->abdominal and lower back pain Additional Contrast?->None Reason for Exam: ABD AND LOWER BACK PAIN Acuity: Acute Type of Exam: Initial Additional signs and symptoms: LOC,PT'S SON KAYLEYN PT ON FLOOR; ORDERING SYSTEM PROVIDED HISTORY: low back pain after fall TECHNOLOGIST PROVIDED HISTORY: Reason for exam:->low back pain after fall Reason for Exam: lower back pain after fall Acuity: Acute Type of Exam: Initial FINDINGS: Chest: Mediastinum: No evidence of mediastinal hematoma or lymphadenopathy. Calcified right hilar lymph nodes noted. No pericardial effusion. Coronary artery calcifications are seen. The thoracic aorta is normal in caliber. Pulmonary arteries: Poor contrast opacification of the pulmonary arteries. No obvious central pulmonary arterial filling defect is seen. No evidence of right heart strain. The main pulmonary artery is normal in caliber. Lungs/pleura: The central airways are patent. No pleural effusion or pneumothorax is seen. Airspace opacities are seen in the lower lobes, left greater than right, which are new from the prior CT. Mild lingular atelectasis. There are low lung volumes. Mild elevation of the left hemidiaphragm. Soft Tissues/Bones: No acute bony abnormality. Abdomen/Pelvis: Organs: The liver, spleen, pancreas, adrenal glands, and kidneys demonstrate no acute abnormality. The liver has a nodular contour. The spleen is enlarged. There are perisplenic varices. Cholecystectomy clips are seen. GI/Bowel: No bowel obstruction is seen. Colonic diverticulosis without CT evidence of diverticulitis. Normal appendix. Pelvis: The urinary bladder is unremarkable. Peritoneum/Retroperitoneum: No retroperitoneal hematoma. No lymphadenopathy. No intraperitoneal free air or free fluid. Bones/Soft Tissues: No acute bony abnormality. Lumbar spine: No acute fracture or traumatic malalignment. Multilevel endplate and facet degenerative changes are seen with advanced L5-S1 disc height loss. Low lung volumes with mild elevation of the left hemidiaphragm. There are basilar predominant airspace opacities, left greater than right. These are new from the prior chest CT. While this could represent atelectasis in the setting of low lung volumes, infection is not excluded. Poor contrast opacification of the pulmonary arteries. No central pulmonary arterial filling defect is seen.   No evidence of right heart strain. No acute abnormality in the abdomen or pelvis. No acute fracture or traumatic malalignment in the lumbar spine. 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            [facility]

## 2020-08-19 NOTE — CONSULTS
Endocrinology   Consult Note  Dear Doctor  Sarah Rosas for the Consult     Pt. Was Admitted for : Shortness of breath diminished O2 saturation altered mental state. Patient admitted to [facility] unit    Reason for Consult: Better control of blood glucose    History Obtained From:  Patient/ EMR       HISTORY OF PRESENT ILLNESS:                The patient is a 79 y.o. male with significant past medical history of diabetes mellitus, CAD, stent, GERD, gout, hyperlipidemia, hypertension, history of MI was diagnosed with the COVID-19 infection with respiratory issues and he was discharged home 2 days ago. Apparently at home he is not doing very well he is not hypoxic short of breath and confused also developed some fever she was brought back to the hospital and readmitted to the COVID unit I was  consulted for better control of blood glucose. ROS:   Pt's ROS done in detail. Abnormal ROS are noted in Medical and Surgical History Section below: Other Medical History:        Diagnosis Date    Arthritis     Diabetes mellitus ([address]     GERD (gastroesophageal reflux disease)     Gout     left ankle and right shoulder    Hyperlipidemia     Hypertension     MI (myocardial infarction) [facility])     May 2013     Surgical History:        Procedure Laterality Date    BACK SURGERY      CARDIAC SURGERY      stents x 1    CHOLECYSTECTOMY      May 2013    DILATATION, ESOPHAGUS      HAND SURGERY      JOINT REPLACEMENT      SINUS SURGERY      sinus surgery x 2    SINUS SURGERY      TOTAL KNEE ARTHROPLASTY Bilateral        Allergies:  Sulfa antibiotics    Family History:   No family history on file.   REVIEW OF SYSTEMS:  Review of System Done as noted above     PHYSICAL EXAM:      Vitals:    BP (!) 148/73   Pulse 62   Temp 96.4 °F (35.8 °C) (Oral)   Resp 12   Ht 5' 10\" (1.778 m)   Wt 245 lb 13 oz (111.5 kg)   SpO2 94%   BMI 35.27 kg/m²     CONSTITUTIONAL:  awake, alert, cooperative, appears stated age EYES:  vision intact Fundoscopic Exam not performed   ENT:Normal  NECK:  Supple, No JVD. Thyroid Exam:Normal   LUNGS:  Has Vesicular Breath Sounds, has some wheezing and rales  CARDIOVASCULAR:  Normal apical impulse, regular rate and rhythm, normal S1 and S2, no S3 or S4, and has no  murmur   ABDOMEN:  No scars, normal bowel sounds, soft, non-distended, non-tender, no masses palpated, no hepatolienomegaly  Musculoskeletal: Normal  Extremities: Normal, peripheral pulses normal, , has no edema   NEUROLOGIC:  Awake, alert, oriented to name, place and time. Cranial nerves II-XII are grossly intact. Motor is  intact. Sensory mild neuropathy questionable. ,  and gait is normal.    DATA:    CBC:   Recent Labs     08/17/20 2300 08/18/20 1959 08/19/20  0650   WBC 5.0 2.3* 2.7*   HGB 16.0 14.9 14.3    126* 122*    CMP:  Recent Labs     08/17/20 2300 08/18/20 1959 08/19/20  0650    133* 134*   K 4.0 5.7* 4.1    96* 92*   CO2 32 25 27   BUN 37* 40* 47*   CREATININE 1.7* 2.2* 2.6*   CALCIUM 9.0 8.2* 8.6   PROT 6.5 5.9* 6.9   LABALBU 3.5 3.3* 3.9   BILITOT 0.5 0.5 0.6   ALKPHOS 49 49 56   AST 47* 43* 46*   ALT 25 22 26     Lipids:   Lab Results   Component Value Date    CHOL 129 05/14/2013    HDL 28 05/14/2013    TRIG 220 05/14/2013     Glucose:   Recent Labs     08/18/20  2249 08/19/20  0738 08/19/20  1121   POCGLU 354* 366* 315*     Hemoglobin A1C:   Lab Results   Component Value Date    LABA1C 8.8 08/09/2020     Free T4:   Lab Results   Component Value Date    T4FREE 1.08 10/19/2015     Free T3:   Lab Results   Component Value Date    FT3 2.3 10/19/2015     TSH High Sensitivity:   Lab Results   Component Value Date    TSHHS 2.230 08/09/2020       Ct Head Without Contrast    Result Date: 8/18/2020  EXAMINATION: CT OF THE HEAD WITHOUT CONTRAST  8/18/2020 12:35 am        No acute intracranial abnormality. Moderate diffuse cerebral atrophy.      Xr Chest Portable    Result Date: 8/17/2020  EXAMINATION: Medicines   3. Will Start On meal/ Correction bolus Humalog/ Lantus Insulin regime /    4. Monitor Blood glucose frequently   5. Modify  the dose of Insulin/ as needed        Will follow with you  Again thank you for sharing pt's care with me.      Truly yours,       Corean Simmonds MD

## 2020-08-19 NOTE — CONSULTS
Nephrology Service Consultation    Patient:  Justine Ramos  MRN: [id number]  Consulting physician:  Cathryn Wyatt MD  Reason for Consult: arf     History Obtained From:  patient, electronic medical record  PCP: Collette Freeman, MD    HISTORY OF PRESENT ILLNESS:   The patient is a 79 y.o. male who presents with weakness and confusion from home. Pt saw dr John Bowser 10 days ago with htn - u in setting sob and +covid after being unresponsive. Pt with ckd 3, Dm2, htn, cad carlos. Chronic pain. After more stable was dc a few days ago to home and then per chart more confused and not himself and concern worsen of covid and readmitted. Pt state more lucid today and labs with arf on ckd and renal asked to see.      Past Medical History:        Diagnosis Date    Arthritis     Diabetes mellitus ([address]     GERD (gastroesophageal reflux disease)     Gout     left ankle and right shoulder    Hyperlipidemia     Hypertension     [facility] [address]     May 2013       Past Surgical History:        Procedure Laterality Date    BACK SURGERY      CARDIAC SURGERY      stents x 1    CHOLECYSTECTOMY      May 2013    DILATATION, ESOPHAGUS      HAND SURGERY      JOINT REPLACEMENT      SINUS SURGERY      sinus surgery x 2    SINUS SURGERY      TOTAL KNEE ARTHROPLASTY Bilateral        Medications:   Scheduled Meds:   insulin lispro  20 Units Subcutaneous TID WC    insulin glargine  60 Units Subcutaneous Nightly    insulin lispro  0-12 Units Subcutaneous TID WC    insulin lispro  0-12 Units Subcutaneous 2 times per day    dexamethasone  6 mg Oral Daily    sodium chloride flush  10 mL Intravenous 2 times per day    azithromycin  500 mg Intravenous Q24H    amLODIPine  5 mg Oral BID    aspirin  81 mg Oral Daily    atorvastatin  20 mg Oral Nightly    busPIRone  10 mg Oral TID    calcium-vitamin D  1 tablet Oral BID WC    citalopram  20 mg Oral Daily    clopidogrel  75 mg Oral Daily    clotrimazole   Topical BID    fenofibrate  160 mg Oral Daily    fluticasone  1 spray Nasal BID    metoprolol tartrate  50 mg Oral BID    pantoprazole  40 mg Oral QAM AC    heparin (porcine)  5,000 Units Subcutaneous Q8H    zinc sulfate  50 mg Oral Daily     Continuous Infusions:   dextrose       PRN Meds:.acetaminophen **OR** acetaminophen, sodium chloride flush, polyethylene glycol, promethazine **OR** ondansetron, oxyCODONE-acetaminophen, LORazepam, glucose, dextrose, glucagon (rDNA), dextrose    Allergies:  Sulfa antibiotics    Social History:   TOBACCO:   reports that he has never smoked. He has never used smokeless tobacco.  ETOH:   reports no history of alcohol use. OCCUPATION:      Family History:   No family history on file. REVIEW OF SYSTEMS:  Negative except for weak anxious mild confusion obese. Physical Exam:    Vitals: BP (!) 114/57   Pulse 58   Temp 98.1 °F (36.7 °C) (Oral)   Resp 14   Ht 5' 10\" (1.778 m)   Wt 245 lb 13 oz (111.5 kg)   SpO2 92%   BMI 35.27 kg/m²   General appearance: awake weak  HEENT: Head: Normal, normocephalic, atraumatic. Neck: supple, symmetrical, trachea midline  Lungs: diminished breath sounds bilaterally  Heart: S1, S2 normal  Abdomen: abnormal findings:  hypoactive bowel sounds  Extremities: edema +  Neurologic: Mental status: alertness: awake    CBC:   Recent Labs     08/17/20 2300 08/18/20 1959 08/19/20  0650   WBC 5.0 2.3* 2.7*   HGB 16.0 14.9 14.3    126* 122*     BMP:    Recent Labs     08/17/20 2300 08/17/20 2343 08/18/20 1959 08/19/20  0650     --  133* 134*   K 4.0  --  5.7* 4.1     --  96* 92*   CO2 32  --  25 27   BUN 37*  --  40* 47*   CREATININE 1.7*  --  2.2* 2.6*   GLUCOSE 66* 70 426* 345*     Hepatic:   Recent Labs     08/17/20 2300 08/18/20 1959 08/19/20  0650   AST 47* 43* 46*   ALT 25 22 26   BILITOT 0.5 0.5 0.6   ALKPHOS 49 49 56     Troponin: No results for input(s): TROPONINI in the last 72 hours.   Mg, Phos:   Recent Labs 08/19/20  0650   MG 2.2   PHOS 3.9       ABGs:   Lab Results   Component Value Date    PO2ART 72 08/18/2020    NPT0FRK 42.0 08/18/2020     INR:   Recent Labs     08/18/20 1959 08/19/20  0650   INR 1.05 1.06     -----------------------------------------------------------------      Assessment and Recommendations     Patient Active Problem List   Diagnosis Code    Essential hypertension, benign I10    Type 2 diabetes mellitus, with long-term current use of insulin ([facility] E11.9, Z79.4    Other and unspecified hyperlipidemia E78.5    Chest pain R07.9    Coronary atherosclerosis I25.10    Chronic kidney disease, stage III (moderate) ([facility]) N18.3    Cellulitis of leg L03.119    Cellulitis of lower leg L03.119    Lethargy R53.83    Acute respiratory failure with hypoxia and hypercapnia ([facility]) J96.01, J96.02    Altered mental status, unspecified R41.82    Hypoglycemia E16.2    Acute kidney injury ([facility] N17.9     Imp/plan  1 arf from atn on ckd 3  2 htn-U  3 fluid overload  4 metabolic encephalopathy  5 recent + covid 8/8/20  6 Dm2 poor control    Plan  1 with arf avoid ace and arb and nsaid, give albumin with lasix X 2 doses  2 bp low today and monitor  3 not nephrotic syndrome and try uf  4 affect improve and monitor with o2  5 monitor in covid floor no fever  6 K improved   7 ssi and try correct  Avoid excess narcotic  Will monitor    Electronically signed by Davian Capps MD on 8/19/2020 at 2:57 PM

## 2020-08-19 NOTE — PROGRESS NOTES
Pulmonary and Critical Care  Progress Note    Subjective: The patient is sitting comfortably. Shortness of breath better. Chest pain none  Addressing respiratory complaints Patient is negative for  hemoptysis and cyanosis  CONSTITUTIONAL:  negative for fevers and chills      Past Medical History:     has a past medical history of Arthritis, Diabetes mellitus ([facility] GERD (gastroesophageal reflux disease), Gout, Hyperlipidemia, Hypertension, and MI (myocardial infarction) ([facility] [address] has a past surgical history that includes Total knee arthroplasty (Bilateral); back surgery; Cholecystectomy; Cardiac surgery; Dilatation, esophagus; sinus surgery; joint replacement; Hand surgery; and sinus surgery. reports that he has never smoked. He has never used smokeless tobacco. He reports that he does not drink alcohol or use drugs. Family history:  family history is not on file. Allergies   Allergen Reactions    Sulfa Antibiotics Hives     Social History:    Reviewed; no changes    Objective:   PHYSICAL EXAM:        VITALS:  BP (!) 148/73   Pulse 62   Temp 96.4 °F (35.8 °C) (Oral)   Resp 12   Ht 5' 10\" (1.778 m)   Wt 245 lb 13 oz (111.5 kg)   SpO2 94%   BMI 35.27 kg/m²     24HR INTAKE/OUTPUT:      Intake/Output Summary (Last 24 hours) at 8/19/2020 1037  Last data filed at 8/19/2020 0743  Gross per 24 hour   Intake 10 ml   Output 375 ml   Net -365 ml       CONSTITUTIONAL:  awake, alert, cooperative, no apparent distress, and appears stated age  LUNGS:  decreased breath sounds, basilar crackles. CARDIOVASCULAR:  normal S1 and S2 and negative JVD  ABD:Abdomen soft, non-tender. BS normal. No masses,  No organomegaly  NEURO:Alert and oriented x3. Gait normal. Reflexes and motor strength normal and symmetric. Cranial nerves 2-12 and sensation grossly intact.   DATA:    CBC:  Recent Labs     08/17/20  2300 08/18/20  1959 08/19/20  0650   WBC 5.0 2.3* 2.7*   RBC 5.36 5.03 4.81   HGB 16.0 14.9 14.3   HCT 49.6 48.4 45.2  126* 122*   MCV 92.5 96.2 94.0   MCH 29.9 29.6 29.7   MCHC 32.3 30.8* 31.6*   RDW 13.6 13.5 13.5   SEGSPCT 54.7 74.7* 70.3*      BMP:  Recent Labs     08/17/20  2300 08/17/20  2343 08/18/20  1959 08/19/20  0650     --  133* 134*   K 4.0  --  5.7* 4.1     --  96* 92*   CO2 32  --  25 27   BUN 37*  --  40* 47*   CREATININE 1.7*  --  2.2* 2.6*   CALCIUM 9.0  --  8.2* 8.6   GLUCOSE 66* 70 426* 345*      ABG:  Recent Labs     08/18/20  1530   PH 7.37   PO2ART 72*   SVN9YAR 42.0   O2SAT 94.2*     Lab Results   Component Value Date    PROBNP 508.0 (H) 08/19/2020    PROBNP 466.0 (H) 08/08/2020    PROBNP 604.4 (H) 03/27/2019     No results found for: [facility]    Radiology Review:  Pertinent images / reports were reviewed as a part of this visit. Assessment:     Patient Active Problem List   Diagnosis    Essential hypertension, benign    Type 2 diabetes mellitus, with long-term current use of insulin (HCC)    Other and unspecified hyperlipidemia    Chest pain    Coronary atherosclerosis    Chronic kidney disease, stage III (moderate) ([facility])    Cellulitis of leg    Cellulitis of lower leg    Lethargy    Acute respiratory failure with hypoxia and hypercapnia (HCC)    Altered mental status, unspecified    Hypoglycemia    Acute kidney injury ([facility]       Plan:   1. Overall the patient has slightly improved  2. Check Covid results. 3. Inc. Activity.     Milvia Alexis MD  8/19/2020  10:37 AM

## 2020-08-19 NOTE — PLAN OF CARE
Problem: Airway Clearance - Ineffective  Goal: Achieve or maintain patent airway  Outcome: Ongoing     Problem: Gas Exchange - Impaired  Goal: Absence of hypoxia  Outcome: Ongoing  Goal: Promote optimal lung function  Outcome: Ongoing     Problem: Breathing Pattern - Ineffective  Goal: Ability to achieve and maintain a regular respiratory rate  Outcome: Ongoing     Problem:  Body Temperature -  Risk of, Imbalanced  Goal: Ability to maintain a body temperature within defined limits  Outcome: Ongoing  Goal: Will regain or maintain usual level of consciousness  Outcome: Ongoing  Goal: Complications related to the disease process, condition or treatment will be avoided or minimized  Outcome: Ongoing     Problem: Isolation Precautions - Risk of Spread of Infection  Goal: Prevent transmission of infection  Outcome: Ongoing     Problem: Nutrition Deficits  Goal: Optimize nutrtional status  Outcome: Ongoing     Problem: Risk for Fluid Volume Deficit  Goal: Maintain normal heart rhythm  Outcome: Ongoing  Goal: Maintain absence of muscle cramping  Outcome: Ongoing  Goal: Maintain normal serum potassium, sodium, calcium, phosphorus, and pH  Outcome: Ongoing     Problem: Loneliness or Risk for Loneliness  Goal: Demonstrate positive use of time alone when socialization is not possible  Outcome: Ongoing     Problem: Fatigue  Goal: Verbalize increase energy and improved vitality  Outcome: Ongoing     Problem: Patient Education: Go to Patient Education Activity  Goal: Patient/Family Education  Outcome: Ongoing     Problem: Pain:  Goal: Pain level will decrease  Description: Pain level will decrease  Outcome: Ongoing  Goal: Control of acute pain  Description: Control of acute pain  Outcome: Ongoing  Goal: Control of chronic pain  Description: Control of chronic pain  Outcome: Ongoing     Problem: Serum Glucose Level - Abnormal:  Goal: Ability to maintain appropriate glucose levels has stabilized  Description: Ability to maintain appropriate glucose levels has stabilized  Outcome: Ongoing     Problem: Falls - Risk of:  Goal: Will remain free from falls  Description: Will remain free from falls  Outcome: Ongoing  Goal: Absence of physical injury  Description: Absence of physical injury  Outcome: Ongoing

## 2020-08-20 ENCOUNTER — APPOINTMENT (OUTPATIENT)
Dept: GENERAL RADIOLOGY | Age: 80
DRG: 177 | End: 2020-08-20
Payer: MEDICARE

## 2020-08-20 LAB
ALBUMIN SERPL-MCNC: 3.1 GM/DL (ref 3.4–5)
ANION GAP SERPL CALCULATED.3IONS-SCNC: 12 MMOL/L (ref 4–16)
APTT: 40.8 SECONDS (ref 25.1–37.1)
BASOPHILS ABSOLUTE: 0 K/CU MM
BASOPHILS RELATIVE PERCENT: 0.1 % (ref 0–1)
BUN BLDV-MCNC: 57 MG/DL (ref 6–23)
CALCIUM SERPL-MCNC: 7.5 MG/DL (ref 8.3–10.6)
CHLORIDE BLD-SCNC: 99 MMOL/L (ref 99–110)
CO2: 24 MMOL/L (ref 21–32)
CREAT SERPL-MCNC: 3.7 MG/DL (ref 0.9–1.3)
D DIMER: 285 NG/ML(DDU)
DIFFERENTIAL TYPE: ABNORMAL
EOSINOPHILS ABSOLUTE: 0 K/CU MM
EOSINOPHILS RELATIVE PERCENT: 0 % (ref 0–3)
FIBRINOGEN LEVEL: 285 MG/DL (ref 196.9–442.1)
GFR AFRICAN AMERICAN: 19 ML/MIN/1.73M2
GFR NON-AFRICAN AMERICAN: 16 ML/MIN/1.73M2
GLUCOSE BLD-MCNC: 138 MG/DL (ref 70–99)
GLUCOSE BLD-MCNC: 145 MG/DL (ref 70–99)
GLUCOSE BLD-MCNC: 174 MG/DL (ref 70–99)
GLUCOSE BLD-MCNC: 190 MG/DL (ref 70–99)
HCT VFR BLD CALC: 41.4 % (ref 42–52)
HEMOGLOBIN: 13.3 GM/DL (ref 13.5–18)
IMMATURE NEUTROPHIL %: 0.7 % (ref 0–0.43)
INR BLD: 1.11 INDEX
LYMPHOCYTES ABSOLUTE: 0.6 K/CU MM
LYMPHOCYTES RELATIVE PERCENT: 8.7 % (ref 24–44)
MCH RBC QN AUTO: 29.6 PG (ref 27–31)
MCHC RBC AUTO-ENTMCNC: 32.1 % (ref 32–36)
MCV RBC AUTO: 92.2 FL (ref 78–100)
MONOCYTES ABSOLUTE: 0.5 K/CU MM
MONOCYTES RELATIVE PERCENT: 6.7 % (ref 0–4)
NUCLEATED RBC %: 0 %
PDW BLD-RTO: 13.3 % (ref 11.7–14.9)
PHOSPHORUS: 4 MG/DL (ref 2.5–4.9)
PLATELET # BLD: 147 K/CU MM (ref 140–440)
PMV BLD AUTO: 11.3 FL (ref 7.5–11.1)
POTASSIUM SERPL-SCNC: 4.5 MMOL/L (ref 3.5–5.1)
PROTHROMBIN TIME: 13.5 SECONDS (ref 11.7–14.5)
RBC # BLD: 4.49 M/CU MM (ref 4.6–6.2)
SARS-COV-2: DETECTED
SEGMENTED NEUTROPHILS ABSOLUTE COUNT: 6.1 K/CU MM
SEGMENTED NEUTROPHILS RELATIVE PERCENT: 83.8 % (ref 36–66)
SODIUM BLD-SCNC: 135 MMOL/L (ref 135–145)
SOURCE: ABNORMAL
TOTAL IMMATURE NEUTOROPHIL: 0.05 K/CU MM
TOTAL NUCLEATED RBC: 0 K/CU MM
WBC # BLD: 7.3 K/CU MM (ref 4–10.5)

## 2020-08-20 PROCEDURE — P9047 ALBUMIN (HUMAN), 25%, 50ML: HCPCS | Performed by: INTERNAL MEDICINE

## 2020-08-20 PROCEDURE — 2580000003 HC RX 258: Performed by: INTERNAL MEDICINE

## 2020-08-20 PROCEDURE — 6360000002 HC RX W HCPCS: Performed by: INTERNAL MEDICINE

## 2020-08-20 PROCEDURE — 80069 RENAL FUNCTION PANEL: CPT

## 2020-08-20 PROCEDURE — 2700000000 HC OXYGEN THERAPY PER DAY

## 2020-08-20 PROCEDURE — 6370000000 HC RX 637 (ALT 250 FOR IP): Performed by: INTERNAL MEDICINE

## 2020-08-20 PROCEDURE — 71045 X-RAY EXAM CHEST 1 VIEW: CPT

## 2020-08-20 PROCEDURE — 82962 GLUCOSE BLOOD TEST: CPT

## 2020-08-20 PROCEDURE — 6370000000 HC RX 637 (ALT 250 FOR IP): Performed by: NURSE PRACTITIONER

## 2020-08-20 PROCEDURE — 85384 FIBRINOGEN ACTIVITY: CPT

## 2020-08-20 PROCEDURE — 94761 N-INVAS EAR/PLS OXIMETRY MLT: CPT

## 2020-08-20 PROCEDURE — 85379 FIBRIN DEGRADATION QUANT: CPT

## 2020-08-20 PROCEDURE — 6360000002 HC RX W HCPCS: Performed by: NURSE PRACTITIONER

## 2020-08-20 PROCEDURE — 80053 COMPREHEN METABOLIC PANEL: CPT

## 2020-08-20 PROCEDURE — 85610 PROTHROMBIN TIME: CPT

## 2020-08-20 PROCEDURE — 1200000000 HC SEMI PRIVATE

## 2020-08-20 PROCEDURE — 85730 THROMBOPLASTIN TIME PARTIAL: CPT

## 2020-08-20 PROCEDURE — 2580000003 HC RX 258: Performed by: NURSE PRACTITIONER

## 2020-08-20 PROCEDURE — 85025 COMPLETE CBC W/AUTO DIFF WBC: CPT

## 2020-08-20 RX ORDER — INSULIN GLARGINE 100 [IU]/ML
50 INJECTION, SOLUTION SUBCUTANEOUS NIGHTLY
Status: DISCONTINUED | OUTPATIENT
Start: 2020-08-20 | End: 2020-08-21 | Stop reason: HOSPADM

## 2020-08-20 RX ORDER — SODIUM CHLORIDE 9 MG/ML
INJECTION, SOLUTION INTRAVENOUS CONTINUOUS
Status: DISCONTINUED | OUTPATIENT
Start: 2020-08-20 | End: 2020-08-21 | Stop reason: HOSPADM

## 2020-08-20 RX ORDER — HALOPERIDOL 5 MG/ML
1 INJECTION INTRAMUSCULAR EVERY 4 HOURS PRN
Status: DISCONTINUED | OUTPATIENT
Start: 2020-08-20 | End: 2020-08-21 | Stop reason: HOSPADM

## 2020-08-20 RX ORDER — LORAZEPAM 2 MG/ML
0.5 INJECTION INTRAMUSCULAR EVERY 4 HOURS PRN
Status: DISCONTINUED | OUTPATIENT
Start: 2020-08-20 | End: 2020-08-21 | Stop reason: HOSPADM

## 2020-08-20 RX ADMIN — OYSTER SHELL CALCIUM WITH VITAMIN D 1 TABLET: 500; 200 TABLET, FILM COATED ORAL at 09:01

## 2020-08-20 RX ADMIN — ATORVASTATIN CALCIUM 20 MG: 20 TABLET, FILM COATED ORAL at 22:40

## 2020-08-20 RX ADMIN — CLOPIDOGREL BISULFATE 75 MG: 75 TABLET ORAL at 09:01

## 2020-08-20 RX ADMIN — AZITHROMYCIN MONOHYDRATE 500 MG: 500 INJECTION, POWDER, LYOPHILIZED, FOR SOLUTION INTRAVENOUS at 23:40

## 2020-08-20 RX ADMIN — CLOTRIMAZOLE: 10 CREAM TOPICAL at 22:41

## 2020-08-20 RX ADMIN — METOPROLOL TARTRATE 50 MG: 50 TABLET, FILM COATED ORAL at 22:41

## 2020-08-20 RX ADMIN — FLUTICASONE PROPIONATE 1 SPRAY: 50 SPRAY, METERED NASAL at 09:07

## 2020-08-20 RX ADMIN — CITALOPRAM HYDROBROMIDE 20 MG: 20 TABLET ORAL at 09:00

## 2020-08-20 RX ADMIN — LORAZEPAM 0.5 MG: 0.5 TABLET ORAL at 10:20

## 2020-08-20 RX ADMIN — SODIUM CHLORIDE, PRESERVATIVE FREE 10 ML: 5 INJECTION INTRAVENOUS at 22:42

## 2020-08-20 RX ADMIN — FLUTICASONE PROPIONATE 1 SPRAY: 50 SPRAY, METERED NASAL at 22:41

## 2020-08-20 RX ADMIN — BUSPIRONE HYDROCHLORIDE 10 MG: 10 TABLET ORAL at 22:40

## 2020-08-20 RX ADMIN — AMLODIPINE BESYLATE 5 MG: 5 TABLET ORAL at 09:01

## 2020-08-20 RX ADMIN — PROMETHAZINE HYDROCHLORIDE 12.5 MG: 25 TABLET ORAL at 10:20

## 2020-08-20 RX ADMIN — METOPROLOL TARTRATE 50 MG: 50 TABLET, FILM COATED ORAL at 09:01

## 2020-08-20 RX ADMIN — FENOFIBRATE 160 MG: 160 TABLET ORAL at 09:01

## 2020-08-20 RX ADMIN — CLOTRIMAZOLE: 10 CREAM TOPICAL at 09:07

## 2020-08-20 RX ADMIN — DEXAMETHASONE 6 MG: 4 TABLET ORAL at 09:00

## 2020-08-20 RX ADMIN — SODIUM CHLORIDE, PRESERVATIVE FREE 10 ML: 5 INJECTION INTRAVENOUS at 09:02

## 2020-08-20 RX ADMIN — ALBUMIN (HUMAN) 25 G: 0.25 INJECTION, SOLUTION INTRAVENOUS at 02:17

## 2020-08-20 RX ADMIN — HALOPERIDOL LACTATE 1 MG: 5 INJECTION, SOLUTION INTRAMUSCULAR at 22:37

## 2020-08-20 RX ADMIN — HEPARIN SODIUM 5000 UNITS: 5000 INJECTION, SOLUTION INTRAVENOUS; SUBCUTANEOUS at 09:01

## 2020-08-20 RX ADMIN — BUSPIRONE HYDROCHLORIDE 10 MG: 10 TABLET ORAL at 09:00

## 2020-08-20 RX ADMIN — FUROSEMIDE 40 MG: 10 INJECTION, SOLUTION INTRAMUSCULAR; INTRAVENOUS at 02:21

## 2020-08-20 RX ADMIN — PANTOPRAZOLE SODIUM 40 MG: 40 TABLET, DELAYED RELEASE ORAL at 04:29

## 2020-08-20 RX ADMIN — BUSPIRONE HYDROCHLORIDE 10 MG: 10 TABLET ORAL at 13:00

## 2020-08-20 RX ADMIN — LORAZEPAM 0.5 MG: 2 INJECTION, SOLUTION INTRAMUSCULAR; INTRAVENOUS at 13:00

## 2020-08-20 RX ADMIN — ZINC SULFATE 220 MG (50 MG) CAPSULE 50 MG: CAPSULE at 09:00

## 2020-08-20 RX ADMIN — ASPIRIN 81 MG: 81 TABLET, COATED ORAL at 09:01

## 2020-08-20 RX ADMIN — SODIUM CHLORIDE: 9 INJECTION, SOLUTION INTRAVENOUS at 06:37

## 2020-08-20 ASSESSMENT — PAIN SCALES - GENERAL
PAINLEVEL_OUTOF10: 0

## 2020-08-20 NOTE — CARE COORDINATION
Received return call from patient's daughter Shailesh Lewis . She confirmed that patient is from home with her and her  and normally uses a cane or walker for mobility . She also confirmed that patient is normally able to perform his own ADL's. Patient has PCP and insurance and is able to afford his prescriptions. Discharge plan for patient is to return home with family . No other needs identified.

## 2020-08-20 NOTE — PROGRESS NOTES
Nephrology Progress Note  8/20/2020 11:00 AM  Subjective:   Admit Date: 8/17/2020  PCP: Shobha Guzman MD  Interval History: pt awake weak and want go home    Diet: DIET CARB CONTROL;  Pain is:Mild      Data:   Scheduled Meds:   insulin glargine  50 Units Subcutaneous Nightly    insulin lispro  20 Units Subcutaneous TID WC    insulin lispro  0-12 Units Subcutaneous TID WC    insulin lispro  0-12 Units Subcutaneous 2 times per day    amLODIPine  5 mg Oral Daily    dexamethasone  6 mg Oral Daily    sodium chloride flush  10 mL Intravenous 2 times per day    azithromycin  500 mg Intravenous Q24H    aspirin  81 mg Oral Daily    atorvastatin  20 mg Oral Nightly    busPIRone  10 mg Oral TID    calcium-vitamin D  1 tablet Oral BID WC    citalopram  20 mg Oral Daily    clopidogrel  75 mg Oral Daily    clotrimazole   Topical BID    fenofibrate  160 mg Oral Daily    fluticasone  1 spray Nasal BID    metoprolol tartrate  50 mg Oral BID    pantoprazole  40 mg Oral QAM AC    heparin (porcine)  5,000 Units Subcutaneous Q8H    zinc sulfate  50 mg Oral Daily     Continuous Infusions:   sodium chloride 50 mL/hr at 08/20/20 0637    dextrose       PRN Meds:acetaminophen **OR** acetaminophen, sodium chloride flush, polyethylene glycol, promethazine **OR** ondansetron, oxyCODONE-acetaminophen, LORazepam, glucose, dextrose, glucagon (rDNA), dextrose  I/O last 3 completed shifts: In: 350 [P.O.:240;  I.V.:10; IV Piggyback:100]  Out: 1000 [Urine:1000]  I/O this shift:  In: 120 [P.O.:120]  Out: 350 [Urine:350]    Intake/Output Summary (Last 24 hours) at 8/20/2020 1100  Last data filed at 8/20/2020 0858  Gross per 24 hour   Intake 460 ml   Output 1350 ml   Net -890 ml     CBC:   Recent Labs     08/18/20 1959 08/19/20  0650 08/20/20  0405   WBC 2.3* 2.7* 7.3   HGB 14.9 14.3 13.3*   * 122* 147     BMP:    Recent Labs     08/18/20 1959 08/19/20  0650 08/20/20  0405   * 134* 135   K 5.7* 4.1 4.5   CL 96* 92* 99   CO2 25 27 24   BUN 40* 47* 57*   CREATININE 2.2* 2.6* 3.7*   GLUCOSE 426* 345* 145*     Hepatic:   Recent Labs     08/17/20  2300 08/18/20 1959 08/19/20  0650   AST 47* 43* 46*   ALT 25 22 26   BILITOT 0.5 0.5 0.6   ALKPHOS 49 49 56     Troponin: No results for input(s): TROPONINI in the last 72 hours. BNP: No results for input(s): BNP in the last 72 hours. Lipids: No results for input(s): CHOL, HDL in the last 72 hours. Invalid input(s): LDLCALCU  ABGs:   Lab Results   Component Value Date    PO2ART 72 08/18/2020    MNG8COU 42.0 08/18/2020     INR:   Recent Labs     08/18/20 1959 08/19/20  0650 08/20/20  0405   INR 1.05 1.06 1.11     Renal Labs  Albumin:    Lab Results   Component Value Date    LABALBU 3.1 08/20/2020     Calcium:    Lab Results   Component Value Date    CALCIUM 7.5 08/20/2020     Phosphorus:    Lab Results   Component Value Date    PHOS 4.0 08/20/2020     U/A:    Lab Results   Component Value Date    NITRU NEGATIVE 08/19/2020    COLORU STRAW 08/19/2020    WBCUA <1 08/19/2020    RBCUA NONE SEEN 08/19/2020    MUCUS RARE 08/12/2020    TRICHOMONAS NONE SEEN 08/19/2020    BACTERIA NEGATIVE 08/19/2020    CLARITYU CLEAR 08/19/2020    SPECGRAV 1.008 08/19/2020    UROBILINOGEN NORMAL 08/19/2020    BILIRUBINUR NEGATIVE 08/19/2020    BLOODU MODERATE 08/19/2020    KETUA NEGATIVE 08/19/2020     ABG:    Lab Results   Component Value Date    CLG6PFF 42.0 08/18/2020    PO2ART 72 08/18/2020    KWU5HMR 24.3 08/18/2020     HgBA1c:    Lab Results   Component Value Date    LABA1C 8.8 08/09/2020     Microalbumen/Creatinine ratio:  No components found for: RUCREAT          Objective:   Vitals: /84   Pulse 61   Temp 97.9 °F (36.6 °C) (Oral)   Resp 18   Ht 5' 10\" (1.778 m)   Wt 245 lb 13 oz (111.5 kg)   SpO2 98%   BMI 35.27 kg/m²   General appearance: awake weak  HEENT: Head: Normal, normocephalic, atraumatic.   Neck: supple, symmetrical, trachea midline  Lungs: diminished breath sounds bilaterally  Heart: S1, S2 normal  Abdomen: abnormal findings:  soft nt obese  Extremities: edema trace  Neurologic: Mental status: alertness: awake      Patient Active Problem List:     Essential hypertension, benign     Type 2 diabetes mellitus, with long-term current use of insulin (HCC)     Other and unspecified hyperlipidemia     Chest pain     Coronary atherosclerosis     Chronic kidney disease, stage III (moderate) ([facility])     Cellulitis of leg     Cellulitis of lower leg     Lethargy     Acute respiratory failure with hypoxia and hypercapnia ([facility])     Altered mental status, unspecified     Hypoglycemia     Acute kidney injury ([facility] [address]    Assessment and Plan:      IMP:  1 arf from atn on ckd 3  2 htn-U  3 fluid overload  4 metabolic encephalopathy  5 recent + covid 8/8/20  6 Dm2 poor control    Plan     1 renal worse and increase ivf no diuretic and monitor today, goal with covid need keep dry but not able to do for now  2 bp stable  3 volume monitor not felt need diuresis today  4 awake but still felt confused  5 maintain therapy as + covid  6 ssi and monitor  Will follow not uremic and felt arf from atn can and improve still           Davian MD Génesis

## 2020-08-20 NOTE — CARE COORDINATION
Patient on COVID unit. Phoned patient's daughter Amie Saldana to discuss discharge planning and had to leave a message with request for return call to CM.

## 2020-08-20 NOTE — PROGRESS NOTES
(TYLENOL) tablet 650 mg  650 mg Oral Q6H PRN Blanca Prieto APRN - CNP        Or    acetaminophen (TYLENOL) suppository 650 mg  650 mg Rectal Q6H PRN Blanca Prieto APRN - CNP        dexamethasone (DECADRON) tablet 6 mg  6 mg Oral Daily Blanca Prieto, APRN - CNP   6 mg at 08/19/20 0744    sodium chloride flush 0.9 % injection 10 mL  10 mL Intravenous 2 times per day Blanca Prieto APRN - CNP   10 mL at 08/19/20 2212    sodium chloride flush 0.9 % injection 10 mL  10 mL Intravenous PRN Blanca Prieto APRN - CNP        polyethylene glycol (GLYCOLAX) packet 17 g  17 g Oral Daily PRN Blanca Prieto APRN - FRANCISCA        promethazine (PHENERGAN) tablet 12.5 mg  12.5 mg Oral Q6H PRN DONNA Manjarrez - CNP        Or    ondansetron (ZOFRAN) injection 4 mg  4 mg Intravenous Q6H PRN Blanca Prieto APRN - CNP        azithromycin (ZITHROMAX) 500 mg in dextrose 5 % 250 mL IVPB  500 mg Intravenous Q24H Blanca Prieto APRN - CNP   Stopped at 08/19/20 2334    aspirin EC tablet 81 mg  81 mg Oral Daily Blanca Prieto APRN - CNP   81 mg at 08/19/20 0744    atorvastatin (LIPITOR) tablet 20 mg  20 mg Oral Nightly Blanca Prieto APRN - CNP   20 mg at 08/19/20 2202    busPIRone (BUSPAR) tablet 10 mg  10 mg Oral TID DONNA Dong - CNP   10 mg at 08/19/20 2202    calcium-vitamin D 500-200 MG-UNIT per tablet 1 tablet  1 tablet Oral BID  Blanca Prieto APRN - CNP   1 tablet at 08/19/20 1655    citalopram (CELEXA) tablet 20 mg  20 mg Oral Daily Blanca Prieto APRN - CNP   20 mg at 08/19/20 0744    clopidogrel (PLAVIX) tablet 75 mg  75 mg Oral Daily Blanca Prieto APRN - CNP   75 mg at 08/19/20 0743    clotrimazole (LOTRIMIN) 1 % cream   Topical BID DONNA Manjarrez CNP        fenofibrate (TRIGLIDE) tablet 160 mg  160 mg Oral Daily DONNA Manjarrez CNP   160 mg at 08/19/20 0743    fluticasone (FLONASE) 50 MCG/ACT nasal spray 1 spray  1 spray Nasal BID DONNA Dong - CNP   1 spray at 08/19/20 2212    metoprolol tartrate (LOPRESSOR) tablet 50 mg  50 mg Oral BID [place] Dottie, APRN - CNP   50 mg at 08/19/20 2201    oxyCODONE-acetaminophen (PERCOCET) 7.5-325 MG per tablet 1 tablet  1 tablet Oral Q8H PRN Christina Knock, APRN - CNP   1 tablet at 08/19/20 2202    pantoprazole (PROTONIX) tablet 40 mg  40 mg Oral QAM AC Blanca Ana Lilialah, APRN - CNP   40 mg at 08/20/20 0429    heparin (porcine) injection 5,000 Units  5,000 Units Subcutaneous Q8H Blanca Dapilah, APRN - CNP   5,000 Units at 08/19/20 2214    zinc sulfate (ZINCATE) capsule 50 mg  50 mg Oral Daily Clay Gray MD   50 mg at 08/19/20 0743    LORazepam (ATIVAN) tablet 0.5 mg  0.5 mg Oral Q6H PRN MD Hernesto   0.5 mg at 08/19/20 2201    glucose (GLUTOSE) 40 % oral gel 15 g  15 g Oral PRN Cecily O'Matthieu PA-C        dextrose 50 % IV solution  12.5 g Intravenous PRN Cecily SHELTON'Matthieu, PA-SILVESTRE        glucagon (rDNA) injection 1 mg  1 mg Intramuscular PRN Cecily SHELTON'Matthieu, PA-C        dextrose 5 % solution  100 mL/hr Intravenous PRN Oly Alcala PA-C           Subjective:     Patient says his shortness of breath is slowly getting better. However patient seemed to be very agitated and confused. He remains very anxious and unable to cooperate with treatment plan. Kept on two-point soft restraints. No acute overnight events. Objective:        Intake/Output Summary (Last 24 hours) at 8/20/2020 0728  Last data filed at 8/20/2020 0425  Gross per 24 hour   Intake 350 ml   Output 1000 ml   Net -650 ml      Vitals:   Vitals:    08/20/20 0415   BP: 132/71   Pulse: 61   Resp: 21   Temp: 97 °F (36.1 °C)   SpO2: 97%     Physical Exam:  General Appearance:    Alert, cooperative, improving respiratory distress +  Head:      Normocephalic, without obvious abnormality, atraumatic  Eyes:       Conjunctiva/corneas clear, EOM's intact  Lungs:  Stable B/L diminished BS and occasional wheeze +  Heart:                Regular rate and rhythm, S1 and S2 normal, no murmur,   rub or gallop  Abdomen:     Soft, non-tender, bowel sounds active, no masses, no organomegaly  Extremities:    Improving B/L1+ pedal edema  Neurological:   Grossly Intact. Significant Diagnostic Studies:   DATA:    CBC   Recent Labs     08/18/20 1959 08/19/20  0650 08/20/20  0405   WBC 2.3* 2.7* 7.3   HGB 14.9 14.3 13.3*   HCT 48.4 45.2 41.4*   * 122* 147      BMP   Recent Labs     08/18/20 1959 08/19/20  0650 08/20/20  0405   * 134* 135   K 5.7* 4.1 4.5   CL 96* 92* 99   CO2 25 27 24   PHOS  --  3.9 4.0   BUN 40* 47* 57*   CREATININE 2.2* 2.6* 3.7*     LFT'S   Recent Labs     08/17/20  2300 08/18/20 1959 08/19/20  0650   AST 47* 43* 46*   ALT 25 22 26   BILITOT 0.5 0.5 0.6   ALKPHOS 49 49 56     COAG   Recent Labs     08/18/20 1959 08/19/20  0650 08/20/20  0405   INR 1.05 1.06 1.11     POC:   Lab Results   Component Value Date    POCGLU 138 08/20/2020    POCGLU 195 08/19/2020    POCGLU 254 08/19/2020    POCGLU 315 08/19/2020     [id number]:  Lab Results   Component Value Date    LABA1C 8.8 08/09/2020     CARDIAC ENZYMES  No results for input(s): CKTOTAL, CKMB, CKMBINDEX, TROPONINI in the last 72 hours. Troponin: No results for input(s): TROPONINT in the last 72 hours.   BNP:   Recent Labs     08/19/20  0650   PROBNP 508.0*     U/A:    Lab Results   Component Value Date    COLORU STRAW 08/19/2020    WBCUA <1 08/19/2020    RBCUA NONE SEEN 08/19/2020    MUCUS RARE 08/12/2020    BACTERIA NEGATIVE 08/19/2020    CLARITYU CLEAR 08/19/2020    SPECGRAV 1.008 08/19/2020    LEUKOCYTESUR NEGATIVE 08/19/2020    BLOODU MODERATE 08/19/2020       Ct Head Without Contrast    Result Date: 8/18/2020  EXAMINATION: CT OF THE HEAD WITHOUT CONTRAST  8/18/2020 12:35 am TECHNIQUE: CT of the head was performed without the administration of intravenous contrast. Dose modulation, iterative reconstruction, and/or weight based adjustment of the mA/kV was utilized to reduce the radiation dose to as low as reasonably achievable. COMPARISON: CT head scan without contrast August 8, 2020. HISTORY: ORDERING SYSTEM PROVIDED HISTORY: ams TECHNOLOGIST PROVIDED HISTORY: Known COVID patient Reason for exam:->ams Has a \"code stroke\" or \"stroke alert\" been called? ->No Reason for Exam: ams FINDINGS: BRAIN/VENTRICLES: There is no acute intracranial hemorrhage, mass effect or midline shift. No abnormal extra-axial fluid collection. The gray-white differentiation is maintained without evidence of an acute infarct. There is no evidence of hydrocephalus. Moderate diffuse decrease in cerebral volume is noted with corresponding prominence of the sulci and ventricles. ORBITS: The visualized portion of the orbits demonstrate no acute abnormality. SINUSES: The visualized paranasal sinuses and mastoid air cells demonstrate no acute abnormality. SOFT TISSUES/SKULL:  No acute abnormality of the visualized skull or soft tissues. No acute intracranial abnormality. Moderate diffuse cerebral atrophy. Ct Head Wo Contrast    Result Date: 8/8/2020  EXAMINATION: CT OF THE HEAD WITHOUT CONTRAST  8/8/2020 2:51 pm TECHNIQUE: CT of the head was performed without the administration of intravenous contrast. Dose modulation, iterative reconstruction, and/or weight based adjustment of the mA/kV was utilized to reduce the radiation dose to as low as reasonably achievable. COMPARISON: CT brain 03/27/2019. HISTORY: ORDERING SYSTEM PROVIDED HISTORY: altered mental status TECHNOLOGIST PROVIDED HISTORY: Reason for exam:->altered mental status Has a \"code stroke\" or \"stroke alert\" been called? ->No Reason for Exam: loc;sob Acuity: Acute Type of Exam: Initial FINDINGS: BRAIN/VENTRICLES: There is no acute intracranial hemorrhage, mass effect or midline shift. No abnormal extra-axial fluid collection. The gray-white differentiation is maintained without evidence of an acute infarct. There is no evidence of hydrocephalus.  Mild generalized parenchymal volume loss and chronic periventricular white matter hypoattenuation are seen. ORBITS: The visualized portion of the orbits demonstrate no acute abnormality. SINUSES: The visualized paranasal sinuses and mastoid air cells demonstrate no acute abnormality. SOFT TISSUES/SKULL:  No acute abnormality of the visualized skull or soft tissues. No acute intracranial abnormality. Ct Abdomen Pelvis W Iv Contrast Additional Contrast? None    Result Date: 8/10/2020  EXAMINATION: CTA OF THE CHEST; CT OF THE ABDOMEN AND PELVIS WITH CONTRAST; CT OF THE LUMBAR SPINE WITHOUT CONTRAST 8/8/2020 3:12 pm; 8/8/2020 3:20 pm; 8/8/2020 2:52 pm TECHNIQUE: CTA of the chest was performed after the administration of intravenous contrast.  Multiplanar reformatted images are provided for review. MIP images are provided for review. Dose modulation, iterative reconstruction, and/or weight based adjustment of the mA/kV was utilized to reduce the radiation dose to as low as reasonably achievable.; CT of the abdomen and pelvis was performed with the administration of intravenous contrast. Multiplanar reformatted images are provided for review. Dose modulation, iterative reconstruction, and/or weight based adjustment of the mA/kV was utilized to reduce the radiation dose to as low as reasonably achievable.; CT of the lumbar spine was performed without the administration of intravenous contrast. Multiplanar reformatted images are provided for review. Dose modulation, iterative reconstruction, and/or weight based adjustment of the mA/kV was utilized to reduce the radiation dose to as low as reasonably achievable. COMPARISON: CT chest abdomen and pelvis 09/25/2015 HISTORY: ORDERING SYSTEM PROVIDED HISTORY: syncope. SOB TECHNOLOGIST PROVIDED HISTORY: Reason for exam:->syncope.  SOB Reason for Exam: LOC;SOB Acuity: Acute Type of Exam: Initial Additional signs and symptoms: PT SON FOUND PT ON FLOOR; ORDERING SYSTEM PROVIDED HISTORY: abdominal and lower back pain TECHNOLOGIST PROVIDED HISTORY: Reason for exam:->abdominal and lower back pain Additional Contrast?->None Reason for Exam: ABD AND LOWER BACK PAIN Acuity: Acute Type of Exam: Initial Additional signs and symptoms: LOC,PT'S SON JEROME PT ON FLOOR; ORDERING SYSTEM PROVIDED HISTORY: low back pain after fall TECHNOLOGIST PROVIDED HISTORY: Reason for exam:->low back pain after fall Reason for Exam: lower back pain after fall Acuity: Acute Type of Exam: Initial FINDINGS: Chest: Mediastinum: No evidence of mediastinal hematoma or lymphadenopathy. Calcified right hilar lymph nodes noted. No pericardial effusion. Coronary artery calcifications are seen. The thoracic aorta is normal in caliber. Pulmonary arteries: Poor contrast opacification of the pulmonary arteries. No obvious central pulmonary arterial filling defect is seen. No evidence of right heart strain. The main pulmonary artery is normal in caliber. Lungs/pleura: The central airways are patent. No pleural effusion or pneumothorax is seen. Airspace opacities are seen in the lower lobes, left greater than right, which are new from the prior CT. Mild lingular atelectasis. There are low lung volumes. Mild elevation of the left hemidiaphragm. Soft Tissues/Bones: No acute bony abnormality. Abdomen/Pelvis: Organs: The liver, spleen, pancreas, adrenal glands, and kidneys demonstrate no acute abnormality. The liver has a nodular contour. The spleen is enlarged. There are perisplenic varices. Cholecystectomy clips are seen. GI/Bowel: No bowel obstruction is seen. Colonic diverticulosis without CT evidence of diverticulitis. Normal appendix. Pelvis: The urinary bladder is unremarkable. Peritoneum/Retroperitoneum: No retroperitoneal hematoma. No lymphadenopathy. No intraperitoneal free air or free fluid. Bones/Soft Tissues: No acute bony abnormality. Lumbar spine: No acute fracture or traumatic malalignment.   Multilevel endplate and facet degenerative changes are seen with advanced L5-S1 disc height loss. Low lung volumes with mild elevation of the left hemidiaphragm. There are basilar predominant airspace opacities, left greater than right. These are new from the prior chest CT. While this could represent atelectasis in the setting of low lung volumes, infection is not excluded. Poor contrast opacification of the pulmonary arteries. No central pulmonary arterial filling defect is seen. No evidence of right heart strain. No acute abnormality in the abdomen or pelvis. No acute fracture or traumatic malalignment in the lumbar spine. 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 Xr Chest Portable    Result Date: 8/17/2020  EXAMINATION: ONE XRAY VIEW OF THE CHEST 8/17/2020 11:12 pm COMPARISON: 01/28/2020 HISTORY: ORDERING SYSTEM PROVIDED HISTORY: Known COVID patient increased O2 requirements TECHNOLOGIST PROVIDED HISTORY: Reason for exam:->Known COVID patient increased O2 requirements Reason for Exam: Known COVID patient increased O2 requirements Acuity: Unknown Type of Exam: Unknown FINDINGS: The lungs are underinflated, resulting in vascular crowding and subsegmental atelectasis. Bilateral perihilar interstitial opacities are noted with more focal airspace opacities in the right lower lung zone. No effusion or pneumothorax. The cardiac silhouette is stably enlarged given the degree of inspiration and patient rotation. No acute bony abnormality. Bilateral perihilar interstitial opacities with more focal opacities in the right lower lung zone. Findings are consistent with the patient's known COVID-19 pneumonia. Stable enlargement of the cardiac silhouette.      Cta Pulmonary W Contrast    Result Date: 8/10/2020  EXAMINATION: CTA OF THE CHEST; CT OF THE ABDOMEN AND PELVIS WITH CONTRAST; CT OF THE LUMBAR SPINE WITHOUT CONTRAST 8/8/2020 3:12 pm; 8/8/2020 3:20 pm; 8/8/2020 2:52 pm artery calcifications are seen. The thoracic aorta is normal in caliber. Pulmonary arteries: Poor contrast opacification of the pulmonary arteries. No obvious central pulmonary arterial filling defect is seen. No evidence of right heart strain. The main pulmonary artery is normal in caliber. Lungs/pleura: The central airways are patent. No pleural effusion or pneumothorax is seen. Airspace opacities are seen in the lower lobes, left greater than right, which are new from the prior CT. Mild lingular atelectasis. There are low lung volumes. Mild elevation of the left hemidiaphragm. Soft Tissues/Bones: No acute bony abnormality. Abdomen/Pelvis: Organs: The liver, spleen, pancreas, adrenal glands, and kidneys demonstrate no acute abnormality. The liver has a nodular contour. The spleen is enlarged. There are perisplenic varices. Cholecystectomy clips are seen. GI/Bowel: No bowel obstruction is seen. Colonic diverticulosis without CT evidence of diverticulitis. Normal appendix. Pelvis: The urinary bladder is unremarkable. Peritoneum/Retroperitoneum: No retroperitoneal hematoma. No lymphadenopathy. No intraperitoneal free air or free fluid. Bones/Soft Tissues: No acute bony abnormality. Lumbar spine: No acute fracture or traumatic malalignment. Multilevel endplate and facet degenerative changes are seen with advanced L5-S1 disc height loss. Low lung volumes with mild elevation of the left hemidiaphragm. There are basilar predominant airspace opacities, left greater than right. These are new from the prior chest CT. While this could represent atelectasis in the setting of low lung volumes, infection is not excluded. Poor contrast opacification of the pulmonary arteries. No central pulmonary arterial filling defect is seen. No evidence of right heart strain. No acute abnormality in the abdomen or pelvis. No acute fracture or traumatic malalignment in the lumbar spine.  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 Ct Lumbar Reconstruction Wo Post Process    Result Date: 8/10/2020  EXAMINATION: CTA OF THE CHEST; CT OF THE ABDOMEN AND PELVIS WITH CONTRAST; CT OF THE LUMBAR SPINE WITHOUT CONTRAST 8/8/2020 3:12 pm; 8/8/2020 3:20 pm; 8/8/2020 2:52 pm TECHNIQUE: CTA of the chest was performed after the administration of intravenous contrast.  Multiplanar reformatted images are provided for review. MIP images are provided for review. Dose modulation, iterative reconstruction, and/or weight based adjustment of the mA/kV was utilized to reduce the radiation dose to as low as reasonably achievable.; CT of the abdomen and pelvis was performed with the administration of intravenous contrast. Multiplanar reformatted images are provided for review. Dose modulation, iterative reconstruction, and/or weight based adjustment of the mA/kV was utilized to reduce the radiation dose to as low as reasonably achievable.; CT of the lumbar spine was performed without the administration of intravenous contrast. Multiplanar reformatted images are provided for review. Dose modulation, iterative reconstruction, and/or weight based adjustment of the mA/kV was utilized to reduce the radiation dose to as low as reasonably achievable. COMPARISON: CT chest abdomen and pelvis 09/25/2015 HISTORY: ORDERING SYSTEM PROVIDED HISTORY: syncope. SOB TECHNOLOGIST PROVIDED HISTORY: Reason for exam:->syncope.  SOB Reason for Exam: LOC;SOB Acuity: Acute Type of Exam: Initial Additional signs and symptoms: PT SON FOUND PT ON FLOOR; ORDERING SYSTEM PROVIDED HISTORY: abdominal and lower back pain TECHNOLOGIST PROVIDED HISTORY: Reason for exam:->abdominal and lower back pain Additional Contrast?->None Reason for Exam: ABD AND LOWER BACK PAIN Acuity: Acute Type of Exam: Initial Additional signs and symptoms: LOC,PT'S SON JEROME PT ON FLOOR; ORDERING SYSTEM PROVIDED HISTORY: low back pain after fall TECHNOLOGIST PROVIDED HISTORY: Reason for exam:->low back pain after fall Reason for Exam: lower back pain after fall Acuity: Acute Type of Exam: Initial FINDINGS: Chest: Mediastinum: No evidence of mediastinal hematoma or lymphadenopathy. Calcified right hilar lymph nodes noted. No pericardial effusion. Coronary artery calcifications are seen. The thoracic aorta is normal in caliber. Pulmonary arteries: Poor contrast opacification of the pulmonary arteries. No obvious central pulmonary arterial filling defect is seen. No evidence of right heart strain. The main pulmonary artery is normal in caliber. Lungs/pleura: The central airways are patent. No pleural effusion or pneumothorax is seen. Airspace opacities are seen in the lower lobes, left greater than right, which are new from the prior CT. Mild lingular atelectasis. There are low lung volumes. Mild elevation of the left hemidiaphragm. Soft Tissues/Bones: No acute bony abnormality. Abdomen/Pelvis: Organs: The liver, spleen, pancreas, adrenal glands, and kidneys demonstrate no acute abnormality. The liver has a nodular contour. The spleen is enlarged. There are perisplenic varices. Cholecystectomy clips are seen. GI/Bowel: No bowel obstruction is seen. Colonic diverticulosis without CT evidence of diverticulitis. Normal appendix. Pelvis: The urinary bladder is unremarkable. Peritoneum/Retroperitoneum: No retroperitoneal hematoma. No lymphadenopathy. No intraperitoneal free air or free fluid. Bones/Soft Tissues: No acute bony abnormality. Lumbar spine: No acute fracture or traumatic malalignment. Multilevel endplate and facet degenerative changes are seen with advanced L5-S1 disc height loss. Low lung volumes with mild elevation of the left hemidiaphragm. There are basilar predominant airspace opacities, left greater than right. These are new from the prior chest CT.   While this could represent atelectasis in the setting of low lung volumes, infection is not excluded. Poor contrast opacification of the pulmonary arteries. No central pulmonary arterial filling defect is seen. No evidence of right heart strain. No acute abnormality in the abdomen or pelvis. No acute fracture or traumatic malalignment in the lumbar spine. 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            [facility]

## 2020-08-20 NOTE — PROGRESS NOTES
48.4 45.2 41.4*   * 122* 147   MCV 96.2 94.0 92.2   MCH 29.6 29.7 29.6   MCHC 30.8* 31.6* 32.1   RDW 13.5 13.5 13.3   SEGSPCT 74.7* 70.3* 83.8*      BMP:  Recent Labs     08/18/20  1959 08/19/20  0650 08/20/20  0405   * 134* 135   K 5.7* 4.1 4.5   CL 96* 92* 99   CO2 25 27 24   BUN 40* 47* 57*   CREATININE 2.2* 2.6* 3.7*   CALCIUM 8.2* 8.6 7.5*   GLUCOSE 426* 345* 145*      ABG:  Recent Labs     08/18/20  1530   PH 7.37   PO2ART 72*   [id number] 42.0   O2SAT 94.2*     Lab Results   Component Value Date    PROBNP 508.0 (H) 08/19/2020    PROBNP 466.0 (H) 08/08/2020    PROBNP 604.4 (H) 03/27/2019     No results found for: [facility]    Radiology Review:  Pertinent images / reports were reviewed as a part of this visit. Assessment:     Patient Active Problem List   Diagnosis    Essential hypertension, benign    Type 2 diabetes mellitus, with long-term current use of insulin (HCC)    Other and unspecified hyperlipidemia    Chest pain    Coronary atherosclerosis    Chronic kidney disease, stage III (moderate) ([facility])    Cellulitis of leg    Cellulitis of lower leg    Lethargy    Acute respiratory failure with hypoxia and hypercapnia (HCC)    Altered mental status, unspecified    Hypoglycemia    Acute kidney injury ([facility] [address]       Plan:   1. Overall the patient has slightly improved  2. Inc. Activity.     Jessica Wilson MD  8/20/2020  10:17 AM

## 2020-08-21 VITALS
SYSTOLIC BLOOD PRESSURE: 128 MMHG | OXYGEN SATURATION: 98 % | WEIGHT: 245.81 LBS | BODY MASS INDEX: 35.19 KG/M2 | TEMPERATURE: 97.5 F | DIASTOLIC BLOOD PRESSURE: 84 MMHG | HEART RATE: 61 BPM | HEIGHT: 70 IN | RESPIRATION RATE: 20 BRPM

## 2020-08-21 LAB
ALBUMIN SERPL-MCNC: 3.7 GM/DL (ref 3.4–5)
ANION GAP SERPL CALCULATED.3IONS-SCNC: 13 MMOL/L (ref 4–16)
APTT: 33.5 SECONDS (ref 25.1–37.1)
BASOPHILS ABSOLUTE: 0 K/CU MM
BASOPHILS RELATIVE PERCENT: 0 % (ref 0–1)
BUN BLDV-MCNC: 79 MG/DL (ref 6–23)
CALCIUM SERPL-MCNC: 8.8 MG/DL (ref 8.3–10.6)
CHLORIDE BLD-SCNC: 96 MMOL/L (ref 99–110)
CO2: 26 MMOL/L (ref 21–32)
CREAT SERPL-MCNC: 4.3 MG/DL (ref 0.9–1.3)
D DIMER: 342 NG/ML(DDU)
DIFFERENTIAL TYPE: ABNORMAL
EKG ATRIAL RATE: 61 BPM
EKG DIAGNOSIS: NORMAL
EKG P AXIS: 115 DEGREES
EKG P-R INTERVAL: 190 MS
EKG Q-T INTERVAL: 464 MS
EKG QRS DURATION: 112 MS
EKG QTC CALCULATION (BAZETT): 467 MS
EKG R AXIS: 190 DEGREES
EKG T AXIS: 136 DEGREES
EKG VENTRICULAR RATE: 61 BPM
EOSINOPHILS ABSOLUTE: 0 K/CU MM
EOSINOPHILS RELATIVE PERCENT: 0 % (ref 0–3)
FIBRINOGEN LEVEL: 333 MG/DL (ref 196.9–442.1)
GFR AFRICAN AMERICAN: 16 ML/MIN/1.73M2
GFR NON-AFRICAN AMERICAN: 13 ML/MIN/1.73M2
GLUCOSE BLD-MCNC: 173 MG/DL (ref 70–99)
GLUCOSE BLD-MCNC: 183 MG/DL (ref 70–99)
GLUCOSE BLD-MCNC: 217 MG/DL (ref 70–99)
GLUCOSE BLD-MCNC: 225 MG/DL (ref 70–99)
HCT VFR BLD CALC: 46.3 % (ref 42–52)
HEMOGLOBIN: 15.1 GM/DL (ref 13.5–18)
IMMATURE NEUTROPHIL %: 0.4 % (ref 0–0.43)
INR BLD: 0.99 INDEX
LYMPHOCYTES ABSOLUTE: 0.7 K/CU MM
LYMPHOCYTES RELATIVE PERCENT: 12.2 % (ref 24–44)
MCH RBC QN AUTO: 29.6 PG (ref 27–31)
MCHC RBC AUTO-ENTMCNC: 32.6 % (ref 32–36)
MCV RBC AUTO: 90.8 FL (ref 78–100)
MONOCYTES ABSOLUTE: 0.5 K/CU MM
MONOCYTES RELATIVE PERCENT: 9.3 % (ref 0–4)
NUCLEATED RBC %: 0 %
PDW BLD-RTO: 13.3 % (ref 11.7–14.9)
PHOSPHORUS: 4.6 MG/DL (ref 2.5–4.9)
PLATELET # BLD: 148 K/CU MM (ref 140–440)
PMV BLD AUTO: 10.9 FL (ref 7.5–11.1)
POTASSIUM SERPL-SCNC: 4.8 MMOL/L (ref 3.5–5.1)
PROTHROMBIN TIME: 12 SECONDS (ref 11.7–14.5)
RBC # BLD: 5.1 M/CU MM (ref 4.6–6.2)
SEGMENTED NEUTROPHILS ABSOLUTE COUNT: 4.4 K/CU MM
SEGMENTED NEUTROPHILS RELATIVE PERCENT: 78.1 % (ref 36–66)
SODIUM BLD-SCNC: 135 MMOL/L (ref 135–145)
TOTAL IMMATURE NEUTOROPHIL: 0.02 K/CU MM
TOTAL NUCLEATED RBC: 0 K/CU MM
WBC # BLD: 5.7 K/CU MM (ref 4–10.5)

## 2020-08-21 PROCEDURE — 85025 COMPLETE CBC W/AUTO DIFF WBC: CPT

## 2020-08-21 PROCEDURE — 85730 THROMBOPLASTIN TIME PARTIAL: CPT

## 2020-08-21 PROCEDURE — 80053 COMPREHEN METABOLIC PANEL: CPT

## 2020-08-21 PROCEDURE — 6370000000 HC RX 637 (ALT 250 FOR IP): Performed by: NURSE PRACTITIONER

## 2020-08-21 PROCEDURE — 2580000003 HC RX 258: Performed by: NURSE PRACTITIONER

## 2020-08-21 PROCEDURE — P9047 ALBUMIN (HUMAN), 25%, 50ML: HCPCS | Performed by: INTERNAL MEDICINE

## 2020-08-21 PROCEDURE — 94761 N-INVAS EAR/PLS OXIMETRY MLT: CPT

## 2020-08-21 PROCEDURE — 6370000000 HC RX 637 (ALT 250 FOR IP): Performed by: INTERNAL MEDICINE

## 2020-08-21 PROCEDURE — 80069 RENAL FUNCTION PANEL: CPT

## 2020-08-21 PROCEDURE — 82962 GLUCOSE BLOOD TEST: CPT

## 2020-08-21 PROCEDURE — 6360000002 HC RX W HCPCS: Performed by: INTERNAL MEDICINE

## 2020-08-21 PROCEDURE — 93010 ELECTROCARDIOGRAM REPORT: CPT | Performed by: INTERNAL MEDICINE

## 2020-08-21 PROCEDURE — 93005 ELECTROCARDIOGRAM TRACING: CPT | Performed by: INTERNAL MEDICINE

## 2020-08-21 PROCEDURE — 85610 PROTHROMBIN TIME: CPT

## 2020-08-21 PROCEDURE — 2700000000 HC OXYGEN THERAPY PER DAY

## 2020-08-21 PROCEDURE — 6360000002 HC RX W HCPCS: Performed by: NURSE PRACTITIONER

## 2020-08-21 PROCEDURE — 85379 FIBRIN DEGRADATION QUANT: CPT

## 2020-08-21 PROCEDURE — 85384 FIBRINOGEN ACTIVITY: CPT

## 2020-08-21 RX ORDER — MORPHINE SULFATE 2 MG/ML
2 INJECTION, SOLUTION INTRAMUSCULAR; INTRAVENOUS EVERY 4 HOURS PRN
Status: DISCONTINUED | OUTPATIENT
Start: 2020-08-21 | End: 2020-08-21 | Stop reason: HOSPADM

## 2020-08-21 RX ORDER — ALBUMIN (HUMAN) 12.5 G/50ML
25 SOLUTION INTRAVENOUS ONCE
Status: COMPLETED | OUTPATIENT
Start: 2020-08-21 | End: 2020-08-21

## 2020-08-21 RX ADMIN — CITALOPRAM HYDROBROMIDE 20 MG: 20 TABLET ORAL at 10:00

## 2020-08-21 RX ADMIN — MORPHINE SULFATE 2 MG: 2 INJECTION, SOLUTION INTRAMUSCULAR; INTRAVENOUS at 14:15

## 2020-08-21 RX ADMIN — AMLODIPINE BESYLATE 5 MG: 5 TABLET ORAL at 09:59

## 2020-08-21 RX ADMIN — PANTOPRAZOLE SODIUM 40 MG: 40 TABLET, DELAYED RELEASE ORAL at 05:56

## 2020-08-21 RX ADMIN — OXYCODONE HYDROCHLORIDE AND ACETAMINOPHEN 1 TABLET: 7.5; 325 TABLET ORAL at 00:20

## 2020-08-21 RX ADMIN — SODIUM CHLORIDE, PRESERVATIVE FREE 10 ML: 5 INJECTION INTRAVENOUS at 10:00

## 2020-08-21 RX ADMIN — ZINC SULFATE 220 MG (50 MG) CAPSULE 50 MG: CAPSULE at 09:59

## 2020-08-21 RX ADMIN — OXYCODONE HYDROCHLORIDE AND ACETAMINOPHEN 1 TABLET: 7.5; 325 TABLET ORAL at 09:59

## 2020-08-21 RX ADMIN — DEXAMETHASONE 6 MG: 4 TABLET ORAL at 09:59

## 2020-08-21 RX ADMIN — FLUTICASONE PROPIONATE 1 SPRAY: 50 SPRAY, METERED NASAL at 10:01

## 2020-08-21 RX ADMIN — OYSTER SHELL CALCIUM WITH VITAMIN D 1 TABLET: 500; 200 TABLET, FILM COATED ORAL at 10:07

## 2020-08-21 RX ADMIN — FENOFIBRATE 160 MG: 160 TABLET ORAL at 09:59

## 2020-08-21 RX ADMIN — METOPROLOL TARTRATE 50 MG: 50 TABLET, FILM COATED ORAL at 09:59

## 2020-08-21 RX ADMIN — BUSPIRONE HYDROCHLORIDE 10 MG: 10 TABLET ORAL at 10:01

## 2020-08-21 RX ADMIN — HEPARIN SODIUM 5000 UNITS: 5000 INJECTION, SOLUTION INTRAVENOUS; SUBCUTANEOUS at 10:06

## 2020-08-21 RX ADMIN — BUSPIRONE HYDROCHLORIDE 10 MG: 10 TABLET ORAL at 13:14

## 2020-08-21 RX ADMIN — CLOPIDOGREL BISULFATE 75 MG: 75 TABLET ORAL at 10:00

## 2020-08-21 RX ADMIN — ASPIRIN 81 MG: 81 TABLET, COATED ORAL at 09:59

## 2020-08-21 RX ADMIN — ALBUMIN (HUMAN) 25 G: 0.25 INJECTION, SOLUTION INTRAVENOUS at 13:31

## 2020-08-21 RX ADMIN — CLOTRIMAZOLE: 10 CREAM TOPICAL at 10:01

## 2020-08-21 ASSESSMENT — PAIN SCALES - GENERAL
PAINLEVEL_OUTOF10: 10
PAINLEVEL_OUTOF10: 7
PAINLEVEL_OUTOF10: 10

## 2020-08-21 NOTE — PROGRESS NOTES
Pt asking to leave, discharge, today. This nurse advised pt that he would not be discharged today and that I spoke with the physician. Pt stated he was leaving anyways. I explained to the pt the risks involved with leaving AMA and benefits of staying and completing his care/treatment. Pt is adamant about leaving. This nurse phoned pt's daughter, Mallory Garcia, and explained the situation to her as well as the status of the pt and the education that was provided to him. Mallory Garcia spoke with pt on the phone in an attempt to make him stay here. Pt stated he was still leaving. Pt has had episodes of confusion over last 24hrs. At this time pt is alert and oriented X4. He is oriented to time, person, place, and situation. Kay Krishnata that he can make his own decisions at this time. Mallory Garcia stated she would come and pick pt up at front door. Dr. Lester Barnes notified of situation. AMA paperwork completed by this nurse and pt. All belongings were accounted for at time of departure.

## 2020-08-21 NOTE — PROGRESS NOTES
Pulmonary and Critical Care  Progress Note    Subjective: The patient is lying in bed comfortably. Shortness of breath better. Chest pain none  Addressing respiratory complaints Patient is negative for  hemoptysis and cyanosis  CONSTITUTIONAL:  negative for fevers and chills      Past Medical History:     has a past medical history of Arthritis, Diabetes mellitus ([facility] [address] GERD (gastroesophageal reflux disease), Gout, Hyperlipidemia, Hypertension, and MI (myocardial infarction) ([facility] [address] has a past surgical history that includes Total knee arthroplasty (Bilateral); back surgery; Cholecystectomy; Cardiac surgery; Dilatation, esophagus; sinus surgery; joint replacement; Hand surgery; and sinus surgery. reports that he has never smoked. He has never used smokeless tobacco. He reports that he does not drink alcohol or use drugs. Family history:  family history is not on file. Allergies   Allergen Reactions    Sulfa Antibiotics Hives     Social History:    Reviewed; no changes    Objective:   PHYSICAL EXAM:        VITALS:  BP (!) 143/78   Pulse 61   Temp 97.6 °F (36.4 °C) (Oral)   Resp 16   Ht 5' 10\" (1.778 m)   Wt 245 lb 13 oz (111.5 kg)   SpO2 96%   BMI 35.27 kg/m²     24HR INTAKE/OUTPUT:      Intake/Output Summary (Last 24 hours) at 8/21/2020 1104  Last data filed at 8/21/2020 0950  Gross per 24 hour   Intake 370 ml   Output --   Net 370 ml       CONSTITUTIONAL:  awake, alert, cooperative, no apparent distress, and appears stated age  LUNGS:  decreased breath sounds, basilar crackles. CARDIOVASCULAR:  normal S1 and S2 and negative JVD  ABD:Abdomen soft, non-tender. BS normal. No masses,  No organomegaly  NEURO:Alert and oriented x3. Gait normal. Reflexes and motor strength normal and symmetric. Cranial nerves 2-12 and sensation grossly intact.   DATA:    CBC:  Recent Labs     08/19/20  0650 08/20/20  0405 08/21/20  0600   WBC 2.7* 7.3 5.7   RBC 4.81 4.49* 5.10   HGB 14.3 13.3* 15.1   HCT 45.2 41.4* 46.3 * 147 148   MCV 94.0 92.2 90.8   MCH 29.7 29.6 29.6   MCHC 31.6* 32.1 32.6   RDW 13.5 13.3 13.3   SEGSPCT 70.3* 83.8* 78.1*      BMP:  Recent Labs     08/19/20  0650 08/20/20  0405 08/21/20  0600   * 135 135   K 4.1 4.5 4.8   CL 92* 99 96*   CO2 27 24 26   BUN 47* 57* 79*   CREATININE 2.6* 3.7* 4.3*   CALCIUM 8.6 7.5* 8.8   GLUCOSE 345* 145* 217*      ABG:  Recent Labs     08/18/20  1530   PH 7.37   PO2ART 72*   ADK9EOV 42.0   O2SAT 94.2*     Lab Results   Component Value Date    PROBNP 508.0 (H) 08/19/2020    PROBNP 466.0 (H) 08/08/2020    PROBNP 604.4 (H) 03/27/2019     No results found for: [facility]    Radiology Review:  Pertinent images / reports were reviewed as a part of this visit. Assessment:     Patient Active Problem List   Diagnosis    Essential hypertension, benign    Type 2 diabetes mellitus, with long-term current use of insulin (HCC)    Other and unspecified hyperlipidemia    Chest pain    Coronary atherosclerosis    Chronic kidney disease, stage III (moderate) (HCC)    Cellulitis of leg    Cellulitis of lower leg    Lethargy    Acute respiratory failure with hypoxia and hypercapnia (HCC)    Altered mental status, unspecified    Hypoglycemia    Acute kidney injury ([facility] [address]       Plan:   1. Overall the patient has slightly improved  2. Inc. Activity.     Nickolas Cota MD  8/21/2020  11:04 AM

## 2020-08-21 NOTE — PROGRESS NOTES
Nephrology Progress Note  8/21/2020 11:13 AM  Subjective:   Admit Date: 8/17/2020  PCP: True Carver MD  Interval History:  Pt keep wanting go home    Diet: DIET CARB CONTROL;  Pain is:Mild      Data:   Scheduled Meds:   insulin lispro  0-18 Units Subcutaneous TID WC    insulin lispro  0-18 Units Subcutaneous 2 times per day    insulin lispro  25 Units Subcutaneous TID WC    insulin glargine  50 Units Subcutaneous Nightly    amLODIPine  5 mg Oral Daily    dexamethasone  6 mg Oral Daily    sodium chloride flush  10 mL Intravenous 2 times per day    azithromycin  500 mg Intravenous Q24H    aspirin  81 mg Oral Daily    atorvastatin  20 mg Oral Nightly    busPIRone  10 mg Oral TID    calcium-vitamin D  1 tablet Oral BID WC    citalopram  20 mg Oral Daily    clopidogrel  75 mg Oral Daily    clotrimazole   Topical BID    fenofibrate  160 mg Oral Daily    fluticasone  1 spray Nasal BID    metoprolol tartrate  50 mg Oral BID    pantoprazole  40 mg Oral QAM AC    heparin (porcine)  5,000 Units Subcutaneous Q8H    zinc sulfate  50 mg Oral Daily     Continuous Infusions:   sodium chloride 50 mL/hr at 08/20/20 0637    dextrose       PRN Meds:LORazepam, haloperidol lactate, acetaminophen **OR** acetaminophen, sodium chloride flush, polyethylene glycol, promethazine **OR** ondansetron, oxyCODONE-acetaminophen, glucose, dextrose, glucagon (rDNA), dextrose  I/O last 3 completed shifts: In: 250 [P.O.:240;  I.V.:10]  Out: 850 [Urine:850]  I/O this shift:  In: 240 [P.O.:240]  Out: -     Intake/Output Summary (Last 24 hours) at 8/21/2020 1113  Last data filed at 8/21/2020 0950  Gross per 24 hour   Intake 370 ml   Output --   Net 370 ml     CBC:   Recent Labs     08/19/20  0650 08/20/20  0405 08/21/20  0600   WBC 2.7* 7.3 5.7   HGB 14.3 13.3* 15.1   * 147 148     BMP:    Recent Labs     08/19/20  0650 08/20/20  0405 08/21/20  0600   * 135 135   K 4.1 4.5 4.8   CL 92* 99 96*   CO2 27 24 26   BUN 47* 57* 79*   CREATININE 2.6* 3.7* 4.3*   GLUCOSE 345* 145* 217*     Hepatic:   Recent Labs     08/18/20  1959 08/19/20  0650   AST 43* 46*   ALT 22 26   BILITOT 0.5 0.6   ALKPHOS 49 56     Troponin: No results for input(s): TROPONINI in the last 72 hours. BNP: No results for input(s): BNP in the last 72 hours. Lipids: No results for input(s): CHOL, HDL in the last 72 hours. Invalid input(s): LDLCALCU  ABGs:   Lab Results   Component Value Date    PO2ART 72 08/18/2020    KFN0CXE 42.0 08/18/2020     INR:   Recent Labs     08/19/20  0650 08/20/20  0405 08/21/20  0600   INR 1.06 1.11 0.99     Renal Labs  Albumin:    Lab Results   Component Value Date    LABALBU 3.7 08/21/2020     Calcium:    Lab Results   Component Value Date    CALCIUM 8.8 08/21/2020     Phosphorus:    Lab Results   Component Value Date    PHOS 4.6 08/21/2020     U/A:    Lab Results   Component Value Date    NITRU NEGATIVE 08/19/2020    COLORU STRAW 08/19/2020    WBCUA <1 08/19/2020    RBCUA NONE SEEN 08/19/2020    MUCUS RARE 08/12/2020    TRICHOMONAS NONE SEEN 08/19/2020    BACTERIA NEGATIVE 08/19/2020    CLARITYU CLEAR 08/19/2020    SPECGRAV 1.008 08/19/2020    UROBILINOGEN NORMAL 08/19/2020    BILIRUBINUR NEGATIVE 08/19/2020    BLOODU MODERATE 08/19/2020    KETUA NEGATIVE 08/19/2020     ABG:    Lab Results   Component Value Date    BIV8UMX 42.0 08/18/2020    PO2ART 72 08/18/2020    VNL2DNX 24.3 08/18/2020     HgBA1c:    Lab Results   Component Value Date    LABA1C 8.8 08/09/2020     Microalbumen/Creatinine ratio:  No components found for: RUCREAT          Objective:   Vitals: BP (!) 143/78   Pulse 61   Temp 97.6 °F (36.4 °C) (Oral)   Resp 16   Ht 5' 10\" (1.778 m)   Wt 245 lb 13 oz (111.5 kg)   SpO2 96%   BMI 35.27 kg/m²   General appearance: arousable weak  HEENT: Head: Normal, normocephalic, atraumatic.   Neck: supple, symmetrical, trachea midline  Lungs: diminished breath sounds bilaterally  Heart: S1, S2 normal  Abdomen: abnormal findings:  soft nt obese  Extremities: edema trace  Neurologic: Mental status: alertness: awake      Patient Active Problem List:     Essential hypertension, benign     Type 2 diabetes mellitus, with long-term current use of insulin ([facility] [address]     Other and unspecified hyperlipidemia     Chest pain     Coronary atherosclerosis     Chronic kidney disease, stage III (moderate) (HCC)     Cellulitis of leg     Cellulitis of lower leg     Lethargy     Acute respiratory failure with hypoxia and hypercapnia (HCC)     Altered mental status, unspecified     Hypoglycemia     Acute kidney injury ([facility] [address]    Assessment and Plan:      IMP:  1 arf from atn on ckd 3  2 htn-U  3 fluid overload  4 metabolic encephalopathy  5 recent + covid 8/8/20  6 Dm2 poor control    Plan     1 renal function continue to get worse. Not able see amount uop.  Not felt uremia and not need dialysis for now  2 bp stable monitor  3 volume appear ok and hold on diuretic  4 still confused and not felt from renal failure  5 maintain therapy and trying avoid extra volume  6 ssi and can improve  Will monitor and no acute change today           Sigifredo Oshea MD

## 2020-08-21 NOTE — PROGRESS NOTES
VIEW OF THE CHEST 8/20/2020 5:26 am COMPARISON: 08/17/2020 HISTORY: ORDERING SYSTEM PROVIDED HISTORY: SOB TECHNOLOGIST PROVIDED HISTORY: Reason for exam:->SOB Reason for Exam: SOB Acuity: Acute Type of Exam: Subsequent/Follow-up FINDINGS: Peripheral airspace opacities appear similar. No pleural effusion or pneumothorax. The heart is stable in size. No acute osseous abnormality is seen. No significant change in predominantly peripheral airspace opacities, most compatible with viral/COVID-19 pneumonia. Xr Chest Portable    Result Date: 8/17/2020  EXAMINATION: ONE XRAY VIEW OF THE CHEST 8/17/2020 11:12 pm     Bilateral perihilar interstitial opacities with more focal opacities in the right lower lung zone. Findings are consistent with the patient's known COVID-19 pneumonia. Stable enlargement of the cardiac silhouette.        Scheduled Medicines   Medications:    insulin glargine  50 Units Subcutaneous Nightly    insulin lispro  20 Units Subcutaneous TID WC    insulin lispro  0-12 Units Subcutaneous TID WC    insulin lispro  0-12 Units Subcutaneous 2 times per day    amLODIPine  5 mg Oral Daily    dexamethasone  6 mg Oral Daily    sodium chloride flush  10 mL Intravenous 2 times per day    azithromycin  500 mg Intravenous Q24H    aspirin  81 mg Oral Daily    atorvastatin  20 mg Oral Nightly    busPIRone  10 mg Oral TID    calcium-vitamin D  1 tablet Oral BID WC    citalopram  20 mg Oral Daily    clopidogrel  75 mg Oral Daily    clotrimazole   Topical BID    fenofibrate  160 mg Oral Daily    fluticasone  1 spray Nasal BID    metoprolol tartrate  50 mg Oral BID    pantoprazole  40 mg Oral QAM AC    heparin (porcine)  5,000 Units Subcutaneous Q8H    zinc sulfate  50 mg Oral Daily      Infusions:    sodium chloride 50 mL/hr at 08/20/20 0637    dextrose           Objective:   Vitals: BP (!) 170/12   Pulse 78   Temp 98.2 °F (36.8 °C) (Oral)   Resp 28   Ht 5' 10\" (1.778 m)   Wt 245 lb 13 oz (111.5 kg)   SpO2 93%   BMI 35.27 kg/m²   General appearance: alert and cooperative with exam  Neck: no JVD or bruit  Thyroid : Normal lobes   Lungs: Has Vesicular Breath sounds has some rales and wheezing bilaterally  Heart:  regular rate and rhythm  Abdomen: soft, non-tender; bowel sounds normal; no masses,  no organomegaly  Musculoskeletal: Normal  Extremities: extremities normal, , no edema  Neurologic:  Awake, alert, oriented to name, place and time. Cranial nerves II-XII are grossly intact. Motor is  intact. Sensory neuropathy. ,  and gait is abnormal.  Unstable    Assessment:     Patient Active Problem List:     Essential hypertension, benign     Type 2 diabetes mellitus, with long-term current use of insulin ([address]     Other and unspecified hyperlipidemia     Chest pain     Coronary atherosclerosis     Chronic kidney disease, stage III (moderate) (HCC)     Lethargy     Acute respiratory failure with hypoxia and hypercapnia (HCC)     Altered mental status, unspecified     History of hypoglycemia     Acute kidney injury ([address]      Plan:     1. Reviewed POC blood glucose . Labs and X ray results   2. Reviewed Current Medicines   3. On meal/ Correction bolus Humalog/ Basal Lantus Insulin regime /  4. Monitor Blood glucose frequently   5. Modified  the dose of Insulin/ other medicines as needed   6. Will follow     .      Karlos Travis MD

## 2020-08-21 NOTE — DISCHARGE SUMMARY
(CALCIUM-VITAMIN D) 500-200 MG-UNIT per tablet  Take 1 tablet by mouth 2 times daily (with meals)             chlorthalidone (HYGROTON) 25 MG tablet  Take 0.5 tablets by mouth daily             cholestyramine (QUESTRAN) 4 g packet  Take 1 packet by mouth 3 times daily (with meals)             citalopram (CELEXA) 10 MG tablet  Take 20 mg by mouth daily. clopidogrel (PLAVIX) 75 MG tablet  Take 75 mg by mouth daily. clotrimazole (LOTRIMIN) 1 % cream               colestipol (COLESTID) 1 g tablet  Take 1 g by mouth 3 times daily             fenofibrate 160 MG tablet  Take 160 mg by mouth daily. fluticasone (FLONASE) 50 MCG/ACT nasal spray  1 spray by Nasal route 2 times daily. glimepiride (AMARYL) 4 MG tablet  Take 4 mg by mouth 2 times daily. insulin aspart (NOVOLOG) 100 UNIT/ML injection vial  Inject into the skin 3 times daily (before meals)             insulin glargine (LANTUS) 100 UNIT/ML injection  Inject 60 Units into the skin every evening              losartan (COZAAR) 100 MG tablet  Take 100 mg by mouth daily              metoprolol tartrate (LOPRESSOR) 25 MG tablet  Take 50 mg by mouth 2 times daily             oxyCODONE-acetaminophen (PERCOCET) 7.5-325 MG per tablet  Take 1 tablet by mouth every 8 hours as needed.               pantoprazole sodium (PROTONIX) 40 MG PACK packet  Take 40 mg by mouth every morning (before breakfast)             spironolactone (ALDACTONE) 25 MG tablet  Take 1 tablet by mouth daily               No discharge instruction provided while patient left AMA       Discharge Physician Signed: Yokasta Mac

## 2020-08-21 NOTE — PROGRESS NOTES
Pt off restraints, pt sleeping quietly, no signs of pulling at lines or trying to get out of bed. This nurse will continue to monitor, bed alarm on, call light within reach.

## 2020-08-21 NOTE — PROGRESS NOTES
Hospitalist Progress Note         Admit Date: 8/17/2020    PCP: Jared Rodriguez MD     Chief Complaint   Patient presents with    Altered Mental Status        Assessment and Plan:      Acute respiratory failure with hypoxia and hypercapnia (HCC)    Continue Decadron, inhaler and O2 support. Start azithromycin for bronchitis. .   Acute metabolic encephalopathy resolved. Currently on Haldol/Ativan as needed.  Uncontrolled diabetes mellitus improving on current insulin regimen as per endocrinology. Werner Precise Acute kidney injury ([address] on CKD stage III/hyperkalemia nephrology on board for fluid management. However creatinine continued to worsen. Nephrology might plan ultrafiltration.  ??CAD continue home aspirin, Plavix, statin, metoprolol. Monitor   Acute on chronic diastolic heart failure continue Lasix IV, Lopressor and Norvasc. Recent echo with normal EF and G1 DD. DC home Aldactone, losartan for now.  HTN BP medication changes as above.   Monitor vitals closely       VTE prophylaxis LMWH    Current Facility-Administered Medications   Medication Dose Route Frequency Provider Last Rate Last Dose    0.9 % sodium chloride infusion   Intravenous Continuous Antony LISANDRO Wharton MD 50 mL/hr at 08/20/20 0637      insulin glargine (LANTUS) injection vial 50 Units  50 Units Subcutaneous Nightly Payam Mauricio MD        LORazepam (ATIVAN) injection 0.5 mg  0.5 mg Intravenous Q4H PRN Ivonne Tyson MD   0.5 mg at 08/20/20 1300    haloperidol lactate (HALDOL) injection 1 mg  1 mg Intramuscular Q4H PRN Ivonne Tyson MD   1 mg at 08/20/20 2237    insulin lispro (HUMALOG) injection vial 20 Units  20 Units Subcutaneous TID  Payam Mauricio MD   Stopped at 08/20/20 5342    insulin lispro (HUMALOG) injection vial 0-12 Units  0-12 Units Subcutaneous TID  Payam Mauricio MD   Stopped at 08/20/20 1350    insulin lispro (HUMALOG) injection vial 0-12 Units  0-12 Units Subcutaneous 2 times per day INDIRA Cervantes Olvin Pfeiffer MD   4 Units at 08/21/20 0244    amLODIPine (NORVASC) tablet 5 mg  5 mg Oral Daily Antony Chavez MD   5 mg at 08/20/20 0901    acetaminophen (TYLENOL) tablet 650 mg  650 mg Oral Q6H PRN Blanca Prieto, APRN - CNP        Or    acetaminophen (TYLENOL) suppository 650 mg  650 mg Rectal Q6H PRN Blanca Prieto, APRN - CNP        dexamethasone (DECADRON) tablet 6 mg  6 mg Oral Daily Blanca Prieto, APRN - CNP   6 mg at 08/20/20 0900    sodium chloride flush 0.9 % injection 10 mL  10 mL Intravenous 2 times per day DONNA Zarate - CNP   10 mL at 08/20/20 2242    sodium chloride flush 0.9 % injection 10 mL  10 mL Intravenous PRN Blanca Prieto APRN - CNP        polyethylene glycol (GLYCOLAX) packet 17 g  17 g Oral Daily PRN Blanca Prieto, APRN - CNP        promethazine (PHENERGAN) tablet 12.5 mg  12.5 mg Oral Q6H PRN Blanca Prieto, APRN - CNP   12.5 mg at 08/20/20 1020    Or    ondansetron (ZOFRAN) injection 4 mg  4 mg Intravenous Q6H PRN Blanca Prieto APRN - CNP        azithromycin (ZITHROMAX) 500 mg in dextrose 5 % 250 mL IVPB  500 mg Intravenous Q24H Blanca Prieto APRN - CNP   Stopped at 08/21/20 0040    aspirin EC tablet 81 mg  81 mg Oral Daily Blanca Prieto, APRN - CNP   81 mg at 08/20/20 0901    atorvastatin (LIPITOR) tablet 20 mg  20 mg Oral Nightly Blanca Prieto, APRN - CNP   20 mg at 08/20/20 2240    busPIRone (BUSPAR) tablet 10 mg  10 mg Oral TID DONNA Zarate - CNP   10 mg at 08/20/20 2240    calcium-vitamin D 500-200 MG-UNIT per tablet 1 tablet  1 tablet Oral BID WC Blanca Prieto, APRN - CNP   1 tablet at 08/20/20 0901    citalopram (CELEXA) tablet 20 mg  20 mg Oral Daily Blanca Prieto APRN - CNP   20 mg at 08/20/20 0900    clopidogrel (PLAVIX) tablet 75 mg  75 mg Oral Daily DONNA Manjarrez CNP   75 mg at 08/20/20 0901    clotrimazole (LOTRIMIN) 1 % cream   Topical BID DONNA Manjarrez CNP        fenofibrate (TRIGLIDE) tablet 160 mg  160 mg Oral Daily Blanca Conner, APRN - CNP   160 mg at 08/20/20 0901    fluticasone (FLONASE) 50 MCG/ACT nasal spray 1 spray  1 spray Nasal BID Jimmyal Avalos, APRN - CNP   1 spray at 08/20/20 2241    metoprolol tartrate (LOPRESSOR) tablet 50 mg  50 mg Oral BID Jimmy Marussell, APRN - CNP   50 mg at 08/20/20 2241    oxyCODONE-acetaminophen (PERCOCET) 7.5-325 MG per tablet 1 tablet  1 tablet Oral Q8H PRN Jimmyal Avalos, APRN - CNP   1 tablet at 08/21/20 0020    pantoprazole (PROTONIX) tablet 40 mg  40 mg Oral QAM AC Blanca Conner, APRN - CNP   40 mg at 08/21/20 0556    heparin (porcine) injection 5,000 Units  5,000 Units Subcutaneous Q8H Blanca Dapilajoi, APRN - CNP   5,000 Units at 08/20/20 0901    zinc sulfate (ZINCATE) capsule 50 mg  50 mg Oral Daily Clay Gray MD   50 mg at 08/20/20 0900    glucose (GLUTOSE) 40 % oral gel 15 g  15 g Oral PRN Cecily O'Matthieu, PA-C        dextrose 50 % IV solution  12.5 g Intravenous PRN Cecily SHELTON'Matthieu PA-C        glucagon (rDNA) injection 1 mg  1 mg Intramuscular PRN Cecily O'Matthieu, PA-C        dextrose 5 % solution  100 mL/hr Intravenous PRN Emilio ORA Webster           Subjective:     Patient seemed to be more awake and alert today. He is very anxious to be discharged. No acute overnight events reported  Off restraints overnight    Objective:        Intake/Output Summary (Last 24 hours) at 8/21/2020 0746  Last data filed at 8/20/2020 2242  Gross per 24 hour   Intake 250 ml   Output 850 ml   Net -600 ml      Vitals:   Vitals:    08/21/20 0700   BP:    Pulse: 70   Resp:    Temp:    SpO2:      Physical Exam:  General Appearance:    Alert, cooperative, improved confusion/oriented x3  Head:      Normocephalic, without obvious abnormality, atraumatic  Eyes:       Conjunctiva/corneas clear, EOM's intact  Lungs:    Improving B/L diminished BS and occasional wheeze +  Heart:                Regular rate and rhythm, S1 and S2 normal, no murmur,   rub or contrast August 8, 2020. HISTORY: ORDERING SYSTEM PROVIDED HISTORY: ams TECHNOLOGIST PROVIDED HISTORY: Known COVID patient Reason for exam:->ams Has a \"code stroke\" or \"stroke alert\" been called? ->No Reason for Exam: ams FINDINGS: BRAIN/VENTRICLES: There is no acute intracranial hemorrhage, mass effect or midline shift. No abnormal extra-axial fluid collection. The gray-white differentiation is maintained without evidence of an acute infarct. There is no evidence of hydrocephalus. Moderate diffuse decrease in cerebral volume is noted with corresponding prominence of the sulci and ventricles. ORBITS: The visualized portion of the orbits demonstrate no acute abnormality. SINUSES: The visualized paranasal sinuses and mastoid air cells demonstrate no acute abnormality. SOFT TISSUES/SKULL:  No acute abnormality of the visualized skull or soft tissues. No acute intracranial abnormality. Moderate diffuse cerebral atrophy. Ct Head Wo Contrast    Result Date: 8/8/2020  EXAMINATION: CT OF THE HEAD WITHOUT CONTRAST  8/8/2020 2:51 pm TECHNIQUE: CT of the head was performed without the administration of intravenous contrast. Dose modulation, iterative reconstruction, and/or weight based adjustment of the mA/kV was utilized to reduce the radiation dose to as low as reasonably achievable. COMPARISON: CT brain 03/27/2019. HISTORY: ORDERING SYSTEM PROVIDED HISTORY: altered mental status TECHNOLOGIST PROVIDED HISTORY: Reason for exam:->altered mental status Has a \"code stroke\" or \"stroke alert\" been called? ->No Reason for Exam: loc;sob Acuity: Acute Type of Exam: Initial FINDINGS: BRAIN/VENTRICLES: There is no acute intracranial hemorrhage, mass effect or midline shift. No abnormal extra-axial fluid collection. The gray-white differentiation is maintained without evidence of an acute infarct. There is no evidence of hydrocephalus.  Mild generalized parenchymal volume loss and chronic periventricular white matter hypoattenuation are seen. ORBITS: The visualized portion of the orbits demonstrate no acute abnormality. SINUSES: The visualized paranasal sinuses and mastoid air cells demonstrate no acute abnormality. SOFT TISSUES/SKULL:  No acute abnormality of the visualized skull or soft tissues. No acute intracranial abnormality. Ct Abdomen Pelvis W Iv Contrast Additional Contrast? None    Result Date: 8/10/2020  EXAMINATION: CTA OF THE CHEST; CT OF THE ABDOMEN AND PELVIS WITH CONTRAST; CT OF THE LUMBAR SPINE WITHOUT CONTRAST 8/8/2020 3:12 pm; 8/8/2020 3:20 pm; 8/8/2020 2:52 pm TECHNIQUE: CTA of the chest was performed after the administration of intravenous contrast.  Multiplanar reformatted images are provided for review. MIP images are provided for review. Dose modulation, iterative reconstruction, and/or weight based adjustment of the mA/kV was utilized to reduce the radiation dose to as low as reasonably achievable.; CT of the abdomen and pelvis was performed with the administration of intravenous contrast. Multiplanar reformatted images are provided for review. Dose modulation, iterative reconstruction, and/or weight based adjustment of the mA/kV was utilized to reduce the radiation dose to as low as reasonably achievable.; CT of the lumbar spine was performed without the administration of intravenous contrast. Multiplanar reformatted images are provided for review. Dose modulation, iterative reconstruction, and/or weight based adjustment of the mA/kV was utilized to reduce the radiation dose to as low as reasonably achievable. COMPARISON: CT chest abdomen and pelvis 09/25/2015 HISTORY: ORDERING SYSTEM PROVIDED HISTORY: syncope. SOB TECHNOLOGIST PROVIDED HISTORY: Reason for exam:->syncope.  SOB Reason for Exam: LOC;SOB Acuity: Acute Type of Exam: Initial Additional signs and symptoms: PT SON FOUND PT ON FLOOR; ORDERING SYSTEM PROVIDED HISTORY: abdominal and lower back pain TECHNOLOGIST PROVIDED HISTORY: Reason for exam:->abdominal and lower back pain Additional Contrast?->None Reason for Exam: ABD AND LOWER BACK PAIN Acuity: Acute Type of Exam: Initial Additional signs and symptoms: LOC,PT'S SON JEROME PT ON FLOOR; ORDERING SYSTEM PROVIDED HISTORY: low back pain after fall TECHNOLOGIST PROVIDED HISTORY: Reason for exam:->low back pain after fall Reason for Exam: lower back pain after fall Acuity: Acute Type of Exam: Initial FINDINGS: Chest: Mediastinum: No evidence of mediastinal hematoma or lymphadenopathy. Calcified right hilar lymph nodes noted. No pericardial effusion. Coronary artery calcifications are seen. The thoracic aorta is normal in caliber. Pulmonary arteries: Poor contrast opacification of the pulmonary arteries. No obvious central pulmonary arterial filling defect is seen. No evidence of right heart strain. The main pulmonary artery is normal in caliber. Lungs/pleura: The central airways are patent. No pleural effusion or pneumothorax is seen. Airspace opacities are seen in the lower lobes, left greater than right, which are new from the prior CT. Mild lingular atelectasis. There are low lung volumes. Mild elevation of the left hemidiaphragm. Soft Tissues/Bones: No acute bony abnormality. Abdomen/Pelvis: Organs: The liver, spleen, pancreas, adrenal glands, and kidneys demonstrate no acute abnormality. The liver has a nodular contour. The spleen is enlarged. There are perisplenic varices. Cholecystectomy clips are seen. GI/Bowel: No bowel obstruction is seen. Colonic diverticulosis without CT evidence of diverticulitis. Normal appendix. Pelvis: The urinary bladder is unremarkable. Peritoneum/Retroperitoneum: No retroperitoneal hematoma. No lymphadenopathy. No intraperitoneal free air or free fluid. Bones/Soft Tissues: No acute bony abnormality. Lumbar spine: No acute fracture or traumatic malalignment.   Multilevel endplate and facet degenerative changes are seen with advanced L5-S1 disc height loss.     Low lung volumes with mild elevation of the left hemidiaphragm. There are basilar predominant airspace opacities, left greater than right. These are new from the prior chest CT. While this could represent atelectasis in the setting of low lung volumes, infection is not excluded. Poor contrast opacification of the pulmonary arteries. No central pulmonary arterial filling defect is seen. No evidence of right heart strain. No acute abnormality in the abdomen or pelvis. No acute fracture or traumatic malalignment in the lumbar spine. 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 Xr Chest Portable    Result Date: 8/17/2020  EXAMINATION: ONE XRAY VIEW OF THE CHEST 8/17/2020 11:12 pm COMPARISON: 01/28/2020 HISTORY: ORDERING SYSTEM PROVIDED HISTORY: Known COVID patient increased O2 requirements TECHNOLOGIST PROVIDED HISTORY: Reason for exam:->Known COVID patient increased O2 requirements Reason for Exam: Known COVID patient increased O2 requirements Acuity: Unknown Type of Exam: Unknown FINDINGS: The lungs are underinflated, resulting in vascular crowding and subsegmental atelectasis. Bilateral perihilar interstitial opacities are noted with more focal airspace opacities in the right lower lung zone. No effusion or pneumothorax. The cardiac silhouette is stably enlarged given the degree of inspiration and patient rotation. No acute bony abnormality. Bilateral perihilar interstitial opacities with more focal opacities in the right lower lung zone. Findings are consistent with the patient's known COVID-19 pneumonia. Stable enlargement of the cardiac silhouette.      Cta Pulmonary W Contrast    Result Date: 8/10/2020  EXAMINATION: CTA OF THE CHEST; CT OF THE ABDOMEN AND PELVIS WITH CONTRAST; CT OF THE LUMBAR SPINE WITHOUT CONTRAST 8/8/2020 3:12 pm; 8/8/2020 3:20 pm; 8/8/2020 2:52 pm TECHNIQUE: CTA of the chest was performed after the administration of intravenous Process    Result Date: 8/10/2020  EXAMINATION: CTA OF THE CHEST; CT OF THE ABDOMEN AND PELVIS WITH CONTRAST; CT OF THE LUMBAR SPINE WITHOUT CONTRAST 8/8/2020 3:12 pm; 8/8/2020 3:20 pm; 8/8/2020 2:52 pm TECHNIQUE: CTA of the chest was performed after the administration of intravenous contrast.  Multiplanar reformatted images are provided for review. MIP images are provided for review. Dose modulation, iterative reconstruction, and/or weight based adjustment of the mA/kV was utilized to reduce the radiation dose to as low as reasonably achievable.; CT of the abdomen and pelvis was performed with the administration of intravenous contrast. Multiplanar reformatted images are provided for review. Dose modulation, iterative reconstruction, and/or weight based adjustment of the mA/kV was utilized to reduce the radiation dose to as low as reasonably achievable.; CT of the lumbar spine was performed without the administration of intravenous contrast. Multiplanar reformatted images are provided for review. Dose modulation, iterative reconstruction, and/or weight based adjustment of the mA/kV was utilized to reduce the radiation dose to as low as reasonably achievable. COMPARISON: CT chest abdomen and pelvis 09/25/2015 HISTORY: ORDERING SYSTEM PROVIDED HISTORY: syncope. SOB TECHNOLOGIST PROVIDED HISTORY: Reason for exam:->syncope.  SOB Reason for Exam: LOC;SOB Acuity: Acute Type of Exam: Initial Additional signs and symptoms: PT SON FOUND PT ON FLOOR; ORDERING SYSTEM PROVIDED HISTORY: abdominal and lower back pain TECHNOLOGIST PROVIDED HISTORY: Reason for exam:->abdominal and lower back pain Additional Contrast?->None Reason for Exam: ABD AND LOWER BACK PAIN Acuity: Acute Type of Exam: Initial Additional signs and symptoms: LOC,PT'S SON JEROME PT ON FLOOR; ORDERING SYSTEM PROVIDED HISTORY: low back pain after fall TECHNOLOGIST PROVIDED HISTORY: Reason for exam:->low back pain after fall Reason for Exam: lower back pain after fall Acuity: Acute Type of Exam: Initial FINDINGS: Chest: Mediastinum: No evidence of mediastinal hematoma or lymphadenopathy. Calcified right hilar lymph nodes noted. No pericardial effusion. Coronary artery calcifications are seen. The thoracic aorta is normal in caliber. Pulmonary arteries: Poor contrast opacification of the pulmonary arteries. No obvious central pulmonary arterial filling defect is seen. No evidence of right heart strain. The main pulmonary artery is normal in caliber. Lungs/pleura: The central airways are patent. No pleural effusion or pneumothorax is seen. Airspace opacities are seen in the lower lobes, left greater than right, which are new from the prior CT. Mild lingular atelectasis. There are low lung volumes. Mild elevation of the left hemidiaphragm. Soft Tissues/Bones: No acute bony abnormality. Abdomen/Pelvis: Organs: The liver, spleen, pancreas, adrenal glands, and kidneys demonstrate no acute abnormality. The liver has a nodular contour. The spleen is enlarged. There are perisplenic varices. Cholecystectomy clips are seen. GI/Bowel: No bowel obstruction is seen. Colonic diverticulosis without CT evidence of diverticulitis. Normal appendix. Pelvis: The urinary bladder is unremarkable. Peritoneum/Retroperitoneum: No retroperitoneal hematoma. No lymphadenopathy. No intraperitoneal free air or free fluid. Bones/Soft Tissues: No acute bony abnormality. Lumbar spine: No acute fracture or traumatic malalignment. Multilevel endplate and facet degenerative changes are seen with advanced L5-S1 disc height loss. Low lung volumes with mild elevation of the left hemidiaphragm. There are basilar predominant airspace opacities, left greater than right. These are new from the prior chest CT. While this could represent atelectasis in the setting of low lung volumes, infection is not excluded. Poor contrast opacification of the pulmonary arteries.   No central pulmonary arterial

## 2020-08-22 LAB
CULTURE: ABNORMAL
Lab: ABNORMAL
SPECIMEN: ABNORMAL

## 2020-08-23 LAB
CULTURE: ABNORMAL
CULTURE: ABNORMAL
Lab: ABNORMAL
SPECIMEN: ABNORMAL

## 2020-09-19 ENCOUNTER — APPOINTMENT (OUTPATIENT)
Dept: CT IMAGING | Age: 80
DRG: 682 | End: 2020-09-19
Payer: MEDICARE

## 2020-09-19 ENCOUNTER — APPOINTMENT (OUTPATIENT)
Dept: GENERAL RADIOLOGY | Age: 80
DRG: 682 | End: 2020-09-19
Payer: MEDICARE

## 2020-09-19 ENCOUNTER — HOSPITAL ENCOUNTER (INPATIENT)
Age: 80
LOS: 8 days | Discharge: NURSING, REHAB OR LONG-TERM CARE | DRG: 682 | End: 2020-09-28
Attending: EMERGENCY MEDICINE | Admitting: STUDENT IN AN ORGANIZED HEALTH CARE EDUCATION/TRAINING PROGRAM
Payer: MEDICARE

## 2020-09-19 DIAGNOSIS — E83.42 HYPOMAGNESEMIA: ICD-10-CM

## 2020-09-19 DIAGNOSIS — R41.82 ALTERED MENTAL STATUS, UNSPECIFIED ALTERED MENTAL STATUS TYPE: Primary | ICD-10-CM

## 2020-09-19 DIAGNOSIS — N17.9 ACUTE KIDNEY INJURY (HCC): ICD-10-CM
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
## 2020-09-19 DIAGNOSIS — R53.1 GENERALIZED WEAKNESS: ICD-10-CM

## 2020-09-19 PROCEDURE — 83690 ASSAY OF LIPASE: CPT

## 2020-09-19 PROCEDURE — 82248 BILIRUBIN DIRECT: CPT

## 2020-09-19 PROCEDURE — 70450 CT HEAD/BRAIN W/O DYE: CPT

## 2020-09-19 PROCEDURE — 84484 ASSAY OF TROPONIN QUANT: CPT

## 2020-09-19 PROCEDURE — 85025 COMPLETE CBC W/AUTO DIFF WBC: CPT

## 2020-09-19 PROCEDURE — 80053 COMPREHEN METABOLIC PANEL: CPT

## 2020-09-19 PROCEDURE — 82805 BLOOD GASES W/O2 SATURATION: CPT

## 2020-09-19 PROCEDURE — 72125 CT NECK SPINE W/O DYE: CPT

## 2020-09-19 PROCEDURE — 71045 X-RAY EXAM CHEST 1 VIEW: CPT

## 2020-09-19 PROCEDURE — 72128 CT CHEST SPINE W/O DYE: CPT

## 2020-09-19 PROCEDURE — 83735 ASSAY OF MAGNESIUM: CPT

## 2020-09-19 PROCEDURE — 85610 PROTHROMBIN TIME: CPT

## 2020-09-19 PROCEDURE — 72131 CT LUMBAR SPINE W/O DYE: CPT

## 2020-09-19 PROCEDURE — 72170 X-RAY EXAM OF PELVIS: CPT

## 2020-09-19 PROCEDURE — 99285 EMERGENCY DEPT VISIT HI MDM: CPT

## 2020-09-19 PROCEDURE — G0480 DRUG TEST DEF 1-7 CLASSES: HCPCS

## 2020-09-19 PROCEDURE — 85730 THROMBOPLASTIN TIME PARTIAL: CPT

## 2020-09-19 PROCEDURE — 83605 ASSAY OF LACTIC ACID: CPT

## 2020-09-19 ASSESSMENT — PAIN DESCRIPTION - FREQUENCY: FREQUENCY: CONTINUOUS

## 2020-09-19 ASSESSMENT — PAIN DESCRIPTION - PAIN TYPE: TYPE: ACUTE PAIN

## 2020-09-19 ASSESSMENT — PAIN DESCRIPTION - LOCATION: LOCATION: BACK;LEG

## 2020-09-20 ENCOUNTER — APPOINTMENT (OUTPATIENT)
Dept: GENERAL RADIOLOGY | Age: 80
DRG: 682 | End: 2020-09-20
Payer: MEDICARE

## 2020-09-20 PROBLEM — I10 HTN (HYPERTENSION): Status: ACTIVE | Noted: 2020-09-20

## 2020-09-20 PROBLEM — G89.29 CHRONIC PAIN: Status: ACTIVE | Noted: 2020-09-20

## 2020-09-20 PROBLEM — G93.40 ENCEPHALOPATHY: Status: ACTIVE | Noted: 2020-09-20

## 2020-09-20 PROBLEM — K74.60 LIVER CIRRHOSIS (HCC): Status: ACTIVE | Noted: 2020-09-20

## 2020-09-20 PROBLEM — F32.A DEPRESSION: Status: ACTIVE | Noted: 2020-09-20

## 2020-09-20 PROBLEM — R53.1 GENERALIZED WEAKNESS: Status: ACTIVE | Noted: 2020-09-20

## 2020-09-20 LAB
ACETAMINOPHEN LEVEL: <5 UG/ML (ref 15–30)
ALBUMIN SERPL-MCNC: 4.1 GM/DL (ref 3.4–5)
ALCOHOL SCREEN SERUM: <0.01 %WT/VOL
ALP BLD-CCNC: 78 IU/L (ref 40–129)
ALT SERPL-CCNC: 19 U/L (ref 10–40)
AMMONIA: 23 UMOL/L (ref 16–60)
ANION GAP SERPL CALCULATED.3IONS-SCNC: 13 MMOL/L (ref 4–16)
ANION GAP SERPL CALCULATED.3IONS-SCNC: 15 MMOL/L (ref 4–16)
APTT: 36.6 SECONDS (ref 25.1–37.1)
AST SERPL-CCNC: 23 IU/L (ref 15–37)
BACTERIA: ABNORMAL /HPF
BASOPHILS ABSOLUTE: 0 K/CU MM
BASOPHILS RELATIVE PERCENT: 0.5 % (ref 0–1)
BILIRUB SERPL-MCNC: 0.9 MG/DL (ref 0–1)
BILIRUBIN DIRECT: 0.3 MG/DL (ref 0–0.3)
BILIRUBIN URINE: NEGATIVE MG/DL
BILIRUBIN, INDIRECT: 0.6 MG/DL (ref 0–0.7)
BLOOD, URINE: NEGATIVE
BUN BLDV-MCNC: 57 MG/DL (ref 6–23)
BUN BLDV-MCNC: 64 MG/DL (ref 6–23)
CALCIUM SERPL-MCNC: 9.4 MG/DL (ref 8.3–10.6)
CALCIUM SERPL-MCNC: 9.8 MG/DL (ref 8.3–10.6)
CHLORIDE BLD-SCNC: 101 MMOL/L (ref 99–110)
CHLORIDE BLD-SCNC: 98 MMOL/L (ref 99–110)
CLARITY: CLEAR
CO2: 20 MMOL/L (ref 21–32)
CO2: 21 MMOL/L (ref 21–32)
COLOR: YELLOW
CREAT SERPL-MCNC: 1.8 MG/DL (ref 0.9–1.3)
CREAT SERPL-MCNC: 3.7 MG/DL (ref 0.9–1.3)
DIFFERENTIAL TYPE: ABNORMAL
DOSE AMOUNT: ABNORMAL
DOSE AMOUNT: ABNORMAL
DOSE TIME: ABNORMAL
DOSE TIME: ABNORMAL
EOSINOPHILS ABSOLUTE: 0.1 K/CU MM
EOSINOPHILS RELATIVE PERCENT: 1.1 % (ref 0–3)
GFR AFRICAN AMERICAN: 19 ML/MIN/1.73M2
GFR AFRICAN AMERICAN: 44 ML/MIN/1.73M2
GFR NON-AFRICAN AMERICAN: 16 ML/MIN/1.73M2
GFR NON-AFRICAN AMERICAN: 37 ML/MIN/1.73M2
GLUCOSE BLD-MCNC: 203 MG/DL (ref 70–99)
GLUCOSE BLD-MCNC: 206 MG/DL (ref 70–99)
GLUCOSE BLD-MCNC: 207 MG/DL (ref 70–99)
GLUCOSE BLD-MCNC: 217 MG/DL (ref 70–99)
GLUCOSE BLD-MCNC: 218 MG/DL (ref 70–99)
GLUCOSE BLD-MCNC: 245 MG/DL (ref 70–99)
GLUCOSE BLD-MCNC: 246 MG/DL (ref 70–99)
GLUCOSE, URINE: NEGATIVE MG/DL
HCT VFR BLD CALC: 47.8 % (ref 42–52)
HEMOGLOBIN: 16.1 GM/DL (ref 13.5–18)
HYALINE CASTS: 5 /LPF
IMMATURE NEUTROPHIL %: 1.5 % (ref 0–0.43)
INR BLD: 0.98 INDEX
KETONES, URINE: NEGATIVE MG/DL
LACTATE: 1.7 MMOL/L (ref 0.4–2)
LEUKOCYTE ESTERASE, URINE: NEGATIVE
LIPASE: 29 IU/L (ref 13–60)
LYMPHOCYTES ABSOLUTE: 0.7 K/CU MM
LYMPHOCYTES RELATIVE PERCENT: 11 % (ref 24–44)
MAGNESIUM: 1.6 MG/DL (ref 1.8–2.4)
MAGNESIUM: 1.8 MG/DL (ref 1.8–2.4)
MCH RBC QN AUTO: 30.6 PG (ref 27–31)
MCHC RBC AUTO-ENTMCNC: 33.7 % (ref 32–36)
MCV RBC AUTO: 90.7 FL (ref 78–100)
MONOCYTES ABSOLUTE: 0.5 K/CU MM
MONOCYTES RELATIVE PERCENT: 6.9 % (ref 0–4)
MUCUS: ABNORMAL HPF
NITRITE URINE, QUANTITATIVE: NEGATIVE
NUCLEATED RBC %: 0 %
PDW BLD-RTO: 14.2 % (ref 11.7–14.9)
PH, URINE: 5 (ref 5–8)
PHOSPHORUS: 2.5 MG/DL (ref 2.5–4.9)
PLATELET # BLD: 125 K/CU MM (ref 140–440)
PMV BLD AUTO: 10.7 FL (ref 7.5–11.1)
POTASSIUM SERPL-SCNC: 4.7 MMOL/L (ref 3.5–5.1)
POTASSIUM SERPL-SCNC: 5 MMOL/L (ref 3.5–5.1)
PROTEIN UA: NEGATIVE MG/DL
PROTHROMBIN TIME: 11.9 SECONDS (ref 11.7–14.5)
RBC # BLD: 5.27 M/CU MM (ref 4.6–6.2)
RBC URINE: ABNORMAL /HPF (ref 0–3)
REASON FOR REJECTION: NORMAL
REJECTED TEST: NORMAL
SALICYLATE LEVEL: <0.3 MG/DL (ref 15–30)
SEGMENTED NEUTROPHILS ABSOLUTE COUNT: 5.3 K/CU MM
SEGMENTED NEUTROPHILS RELATIVE PERCENT: 79 % (ref 36–66)
SODIUM BLD-SCNC: 133 MMOL/L (ref 135–145)
SODIUM BLD-SCNC: 135 MMOL/L (ref 135–145)
SPECIFIC GRAVITY UA: 1.02 (ref 1–1.03)
SQUAMOUS EPITHELIAL: 1 /HPF
TOTAL IMMATURE NEUTOROPHIL: 0.1 K/CU MM
TOTAL NUCLEATED RBC: 0 K/CU MM
TOTAL PROTEIN: 7 GM/DL (ref 6.4–8.2)
TRANSITIONAL EPITHELIAL: <1 /HPF
TRICHOMONAS: ABNORMAL /HPF
TROPONIN T: 0.02 NG/ML
TROPONIN T: 0.05 NG/ML
UNCLASSIFIED CAST: 5 /LPF
UROBILINOGEN, URINE: NORMAL MG/DL (ref 0.2–1)
WBC # BLD: 6.7 K/CU MM (ref 4–10.5)
WBC UA: 1 /HPF (ref 0–2)

## 2020-09-20 PROCEDURE — 2060000000 HC ICU INTERMEDIATE R&B

## 2020-09-20 PROCEDURE — 71045 X-RAY EXAM CHEST 1 VIEW: CPT

## 2020-09-20 PROCEDURE — 36556 INSERT NON-TUNNEL CV CATH: CPT | Performed by: SURGERY

## 2020-09-20 PROCEDURE — U0002 COVID-19 LAB TEST NON-CDC: HCPCS

## 2020-09-20 PROCEDURE — 6360000002 HC RX W HCPCS: Performed by: INTERNAL MEDICINE

## 2020-09-20 PROCEDURE — 84484 ASSAY OF TROPONIN QUANT: CPT

## 2020-09-20 PROCEDURE — 80048 BASIC METABOLIC PNL TOTAL CA: CPT

## 2020-09-20 PROCEDURE — 6360000002 HC RX W HCPCS: Performed by: EMERGENCY MEDICINE

## 2020-09-20 PROCEDURE — 1200000000 HC SEMI PRIVATE

## 2020-09-20 PROCEDURE — 2580000003 HC RX 258: Performed by: STUDENT IN AN ORGANIZED HEALTH CARE EDUCATION/TRAINING PROGRAM

## 2020-09-20 PROCEDURE — 96365 THER/PROPH/DIAG IV INF INIT: CPT

## 2020-09-20 PROCEDURE — 81001 URINALYSIS AUTO W/SCOPE: CPT

## 2020-09-20 PROCEDURE — 02HV33Z INSERTION OF INFUSION DEVICE INTO SUPERIOR VENA CAVA, PERCUTANEOUS APPROACH: ICD-10-PCS | Performed by: SURGERY

## 2020-09-20 PROCEDURE — 84100 ASSAY OF PHOSPHORUS: CPT

## 2020-09-20 PROCEDURE — 96375 TX/PRO/DX INJ NEW DRUG ADDON: CPT

## 2020-09-20 PROCEDURE — 99231 SBSQ HOSP IP/OBS SF/LOW 25: CPT | Performed by: SURGERY

## 2020-09-20 PROCEDURE — 82962 GLUCOSE BLOOD TEST: CPT

## 2020-09-20 PROCEDURE — 87040 BLOOD CULTURE FOR BACTERIA: CPT

## 2020-09-20 PROCEDURE — 83735 ASSAY OF MAGNESIUM: CPT

## 2020-09-20 PROCEDURE — 96366 THER/PROPH/DIAG IV INF ADDON: CPT

## 2020-09-20 PROCEDURE — 82140 ASSAY OF AMMONIA: CPT

## 2020-09-20 PROCEDURE — 94761 N-INVAS EAR/PLS OXIMETRY MLT: CPT

## 2020-09-20 PROCEDURE — 36592 COLLECT BLOOD FROM PICC: CPT

## 2020-09-20 PROCEDURE — 36556 INSERT NON-TUNNEL CV CATH: CPT

## 2020-09-20 PROCEDURE — 6360000002 HC RX W HCPCS: Performed by: STUDENT IN AN ORGANIZED HEALTH CARE EDUCATION/TRAINING PROGRAM

## 2020-09-20 PROCEDURE — 6370000000 HC RX 637 (ALT 250 FOR IP): Performed by: STUDENT IN AN ORGANIZED HEALTH CARE EDUCATION/TRAINING PROGRAM

## 2020-09-20 RX ORDER — CITALOPRAM 20 MG/1
20 TABLET ORAL DAILY
Status: DISCONTINUED | OUTPATIENT
Start: 2020-09-20 | End: 2020-09-28 | Stop reason: HOSPADM

## 2020-09-20 RX ORDER — AMLODIPINE BESYLATE 5 MG/1
5 TABLET ORAL 2 TIMES DAILY
Status: DISCONTINUED | OUTPATIENT
Start: 2020-09-20 | End: 2020-09-21

## 2020-09-20 RX ORDER — HALOPERIDOL 5 MG/ML
0.5 INJECTION INTRAMUSCULAR EVERY 6 HOURS PRN
Status: DISCONTINUED | OUTPATIENT
Start: 2020-09-20 | End: 2020-09-28 | Stop reason: HOSPADM

## 2020-09-20 RX ORDER — ACETAMINOPHEN 325 MG/1
650 TABLET ORAL EVERY 6 HOURS PRN
Status: DISCONTINUED | OUTPATIENT
Start: 2020-09-20 | End: 2020-09-28 | Stop reason: HOSPADM

## 2020-09-20 RX ORDER — INSULIN GLARGINE 100 [IU]/ML
0.15 INJECTION, SOLUTION SUBCUTANEOUS NIGHTLY
Status: DISCONTINUED | OUTPATIENT
Start: 2020-09-20 | End: 2020-09-22

## 2020-09-20 RX ORDER — POLYETHYLENE GLYCOL 3350 17 G/17G
17 POWDER, FOR SOLUTION ORAL DAILY PRN
Status: DISCONTINUED | OUTPATIENT
Start: 2020-09-20 | End: 2020-09-28 | Stop reason: HOSPADM

## 2020-09-20 RX ORDER — METOPROLOL TARTRATE 50 MG/1
50 TABLET, FILM COATED ORAL 2 TIMES DAILY
Status: DISCONTINUED | OUTPATIENT
Start: 2020-09-20 | End: 2020-09-28 | Stop reason: HOSPADM

## 2020-09-20 RX ORDER — HEPARIN SODIUM 5000 [USP'U]/ML
5000 INJECTION, SOLUTION INTRAVENOUS; SUBCUTANEOUS 3 TIMES DAILY
Status: DISCONTINUED | OUTPATIENT
Start: 2020-09-20 | End: 2020-09-28 | Stop reason: HOSPADM

## 2020-09-20 RX ORDER — OXYCODONE AND ACETAMINOPHEN 7.5; 325 MG/1; MG/1
1 TABLET ORAL EVERY 8 HOURS PRN
Status: DISCONTINUED | OUTPATIENT
Start: 2020-09-20 | End: 2020-09-28 | Stop reason: HOSPADM

## 2020-09-20 RX ORDER — LORAZEPAM 2 MG/ML
1 INJECTION INTRAMUSCULAR ONCE
Status: COMPLETED | OUTPATIENT
Start: 2020-09-20 | End: 2020-09-20

## 2020-09-20 RX ORDER — MAGNESIUM OXIDE 400 MG/1
400 TABLET ORAL DAILY
Status: DISCONTINUED | OUTPATIENT
Start: 2020-09-20 | End: 2020-09-28 | Stop reason: HOSPADM

## 2020-09-20 RX ORDER — CLOPIDOGREL BISULFATE 75 MG/1
75 TABLET ORAL DAILY
Status: DISCONTINUED | OUTPATIENT
Start: 2020-09-20 | End: 2020-09-28 | Stop reason: HOSPADM

## 2020-09-20 RX ORDER — ASPIRIN 81 MG/1
81 TABLET, CHEWABLE ORAL DAILY
Status: DISCONTINUED | OUTPATIENT
Start: 2020-09-20 | End: 2020-09-28 | Stop reason: HOSPADM

## 2020-09-20 RX ORDER — BUSPIRONE HYDROCHLORIDE 10 MG/1
10 TABLET ORAL 3 TIMES DAILY
Status: DISCONTINUED | OUTPATIENT
Start: 2020-09-20 | End: 2020-09-28 | Stop reason: HOSPADM

## 2020-09-20 RX ORDER — FENOFIBRATE 160 MG/1
160 TABLET ORAL DAILY
Status: DISCONTINUED | OUTPATIENT
Start: 2020-09-20 | End: 2020-09-21

## 2020-09-20 RX ORDER — PROMETHAZINE HYDROCHLORIDE 25 MG/1
12.5 TABLET ORAL EVERY 6 HOURS PRN
Status: DISCONTINUED | OUTPATIENT
Start: 2020-09-20 | End: 2020-09-28 | Stop reason: HOSPADM

## 2020-09-20 RX ORDER — HALOPERIDOL 5 MG/ML
0.5 INJECTION INTRAMUSCULAR EVERY 30 MIN PRN
Status: DISCONTINUED | OUTPATIENT
Start: 2020-09-20 | End: 2020-09-20

## 2020-09-20 RX ORDER — SODIUM CHLORIDE 0.9 % (FLUSH) 0.9 %
10 SYRINGE (ML) INJECTION PRN
Status: DISCONTINUED | OUTPATIENT
Start: 2020-09-20 | End: 2020-09-28 | Stop reason: HOSPADM

## 2020-09-20 RX ORDER — DEXTROSE MONOHYDRATE 50 MG/ML
100 INJECTION, SOLUTION INTRAVENOUS PRN
Status: DISCONTINUED | OUTPATIENT
Start: 2020-09-20 | End: 2020-09-28 | Stop reason: HOSPADM

## 2020-09-20 RX ORDER — DEXTROSE MONOHYDRATE 25 G/50ML
12.5 INJECTION, SOLUTION INTRAVENOUS PRN
Status: DISCONTINUED | OUTPATIENT
Start: 2020-09-20 | End: 2020-09-28 | Stop reason: HOSPADM

## 2020-09-20 RX ORDER — SODIUM CHLORIDE 0.9 % (FLUSH) 0.9 %
10 SYRINGE (ML) INJECTION EVERY 12 HOURS SCHEDULED
Status: DISCONTINUED | OUTPATIENT
Start: 2020-09-20 | End: 2020-09-28 | Stop reason: HOSPADM

## 2020-09-20 RX ORDER — MAGNESIUM SULFATE IN WATER 40 MG/ML
2 INJECTION, SOLUTION INTRAVENOUS ONCE
Status: COMPLETED | OUTPATIENT
Start: 2020-09-20 | End: 2020-09-20

## 2020-09-20 RX ORDER — ACETAMINOPHEN 325 MG/1
650 TABLET ORAL EVERY 8 HOURS
Status: DISCONTINUED | OUTPATIENT
Start: 2020-09-20 | End: 2020-09-21

## 2020-09-20 RX ORDER — ATORVASTATIN CALCIUM 20 MG/1
20 TABLET, FILM COATED ORAL DAILY
Status: DISCONTINUED | OUTPATIENT
Start: 2020-09-20 | End: 2020-09-28 | Stop reason: HOSPADM

## 2020-09-20 RX ORDER — ACETAMINOPHEN 650 MG/1
650 SUPPOSITORY RECTAL EVERY 6 HOURS PRN
Status: DISCONTINUED | OUTPATIENT
Start: 2020-09-20 | End: 2020-09-28 | Stop reason: HOSPADM

## 2020-09-20 RX ORDER — FLUTICASONE PROPIONATE 50 MCG
1 SPRAY, SUSPENSION (ML) NASAL 2 TIMES DAILY
Status: DISCONTINUED | OUTPATIENT
Start: 2020-09-20 | End: 2020-09-28 | Stop reason: HOSPADM

## 2020-09-20 RX ORDER — NICOTINE POLACRILEX 4 MG
15 LOZENGE BUCCAL PRN
Status: DISCONTINUED | OUTPATIENT
Start: 2020-09-20 | End: 2020-09-28 | Stop reason: HOSPADM

## 2020-09-20 RX ORDER — ONDANSETRON 2 MG/ML
4 INJECTION INTRAMUSCULAR; INTRAVENOUS EVERY 6 HOURS PRN
Status: DISCONTINUED | OUTPATIENT
Start: 2020-09-20 | End: 2020-09-28 | Stop reason: HOSPADM

## 2020-09-20 RX ORDER — CHLORTHALIDONE 25 MG/1
12.5 TABLET ORAL DAILY
Status: DISCONTINUED | OUTPATIENT
Start: 2020-09-20 | End: 2020-09-24

## 2020-09-20 RX ORDER — CHOLESTYRAMINE LIGHT 4 G/5.7G
1 POWDER, FOR SUSPENSION ORAL
Status: DISCONTINUED | OUTPATIENT
Start: 2020-09-20 | End: 2020-09-28 | Stop reason: HOSPADM

## 2020-09-20 RX ORDER — PANTOPRAZOLE SODIUM 40 MG/1
40 TABLET, DELAYED RELEASE ORAL
Status: DISCONTINUED | OUTPATIENT
Start: 2020-09-20 | End: 2020-09-28 | Stop reason: HOSPADM

## 2020-09-20 RX ADMIN — HALOPERIDOL LACTATE 0.5 MG: 5 INJECTION, SOLUTION INTRAMUSCULAR at 09:54

## 2020-09-20 RX ADMIN — AMLODIPINE BESYLATE 5 MG: 5 TABLET ORAL at 20:51

## 2020-09-20 RX ADMIN — BUSPIRONE HYDROCHLORIDE 10 MG: 10 TABLET ORAL at 20:51

## 2020-09-20 RX ADMIN — MAGNESIUM SULFATE 2 G: 2 INJECTION INTRAVENOUS at 01:46

## 2020-09-20 RX ADMIN — INSULIN GLARGINE 17 UNITS: 100 INJECTION, SOLUTION SUBCUTANEOUS at 20:52

## 2020-09-20 RX ADMIN — ACETAMINOPHEN 650 MG: 325 TABLET ORAL at 23:04

## 2020-09-20 RX ADMIN — INSULIN LISPRO 2 UNITS: 100 INJECTION, SOLUTION INTRAVENOUS; SUBCUTANEOUS at 13:19

## 2020-09-20 RX ADMIN — HEPARIN SODIUM 5000 UNITS: 5000 INJECTION INTRAVENOUS; SUBCUTANEOUS at 14:03

## 2020-09-20 RX ADMIN — PROMETHAZINE HYDROCHLORIDE 12.5 MG: 25 TABLET ORAL at 23:07

## 2020-09-20 RX ADMIN — INSULIN LISPRO 2 UNITS: 100 INJECTION, SOLUTION INTRAVENOUS; SUBCUTANEOUS at 20:52

## 2020-09-20 RX ADMIN — SODIUM CHLORIDE, PRESERVATIVE FREE 10 ML: 5 INJECTION INTRAVENOUS at 09:17

## 2020-09-20 RX ADMIN — LORAZEPAM 1 MG: 2 INJECTION INTRAMUSCULAR; INTRAVENOUS at 04:04

## 2020-09-20 RX ADMIN — HEPARIN SODIUM 5000 UNITS: 5000 INJECTION INTRAVENOUS; SUBCUTANEOUS at 20:51

## 2020-09-20 RX ADMIN — METOPROLOL TARTRATE 50 MG: 50 TABLET, FILM COATED ORAL at 20:51

## 2020-09-20 RX ADMIN — FLUTICASONE PROPIONATE 1 SPRAY: 50 SPRAY, METERED NASAL at 20:53

## 2020-09-20 RX ADMIN — SODIUM CHLORIDE, PRESERVATIVE FREE 10 ML: 5 INJECTION INTRAVENOUS at 20:48

## 2020-09-20 RX ADMIN — HALOPERIDOL LACTATE 0.5 MG: 5 INJECTION, SOLUTION INTRAMUSCULAR at 20:51

## 2020-09-20 RX ADMIN — INSULIN LISPRO 2 UNITS: 100 INJECTION, SOLUTION INTRAVENOUS; SUBCUTANEOUS at 18:10

## 2020-09-20 RX ADMIN — INSULIN LISPRO 2 UNITS: 100 INJECTION, SOLUTION INTRAVENOUS; SUBCUTANEOUS at 09:04

## 2020-09-20 ASSESSMENT — PAIN SCALES - GENERAL: PAINLEVEL_OUTOF10: 0

## 2020-09-20 ASSESSMENT — PAIN SCALES - WONG BAKER: WONGBAKER_NUMERICALRESPONSE: 0

## 2020-09-20 ASSESSMENT — PAIN DESCRIPTION - DESCRIPTORS: DESCRIPTORS: ACHING

## 2020-09-20 NOTE — ED PROVIDER NOTES
CHIEF COMPLAINT    Chief Complaint   Patient presents with    Altered Mental Status     per family     HPI  Rashel Barnes is a 79 y.o. male with history of diabetes, GERD, hyperlipidemia, hypertension, coronary artery disease who presents to the ED via EMS from home with reports of confusion and fall. Patient was hospitalized in August and left AMA on August 21. During that time he had acute respiratory failure and was COVID-19 positive. In addition he had encephalopathy. He has been living at home with his daughter since that time and she states over the last week he has experienced progressive weakness and worsening confusion over the last 3 days. He was able to ambulate with assistance over the last 2 days this has been extremely difficult. He apparently tried to get up on his own today and fell at home. He is complaining of back pain and neck pain on arrival here. Patient has no other complaints but is confused. Per daughter, he is normally completely oriented. Patient himself remembers falling and striking his head but denies losing consciousness. His back pain describes a throbbing stabbing pain that radiates throughout the upper and lower back. Symptoms are constant exacerbated with movement. Of note, the patient does take oxycodone as well. He denies fevers, chills, chest pain, shortness of breath, nausea, vomiting, dizziness, headedness, or dysuria. REVIEW OF SYSTEMS  Constitutional: No fever, chills or recent illness. Eye: No visual changes  HENT: No earache or sore throat. Resp: No SOB or productive cough. Cardio: No chest pain or palpitations. GI: No abdominal pain, nausea, vomiting, constipation or diarrhea. No melena. : No dysuria, urgency or frequency. Endocrine: No heat intolerance, no cold intolerance, no polydipsia   Lymphatics: No adenopathy  Musculoskeletal: Complains of back pain  Neuro: No headaches.   Psych: No homicidal or suicidal thoughts  Skin: No rash, No sinus surgery x 2    SINUS SURGERY      TOTAL KNEE ARTHROPLASTY Bilateral      CURRENT MEDICATIONS  Previous Medications    AMLODIPINE (NORVASC) 5 MG TABLET    Take 1 tablet by mouth 2 times daily    ASPIRIN 325 MG TABLET    Take 81 mg by mouth daily. ATORVASTATIN (LIPITOR) 20 MG TABLET    Take 20 mg by mouth daily. BUSPIRONE (BUSPAR) 10 MG TABLET    Take 10 mg by mouth 3 times daily    CALCIUM CARB-CHOLECALCIFEROL (CALCIUM-VITAMIN D) 500-200 MG-UNIT PER TABLET    Take 1 tablet by mouth 2 times daily (with meals)    CHLORTHALIDONE (HYGROTON) 25 MG TABLET    Take 0.5 tablets by mouth daily    CHOLESTYRAMINE (QUESTRAN) 4 G PACKET    Take 1 packet by mouth 3 times daily (with meals)    CITALOPRAM (CELEXA) 10 MG TABLET    Take 20 mg by mouth daily. CLOPIDOGREL (PLAVIX) 75 MG TABLET    Take 75 mg by mouth daily. CLOTRIMAZOLE (LOTRIMIN) 1 % CREAM        COLESTIPOL (COLESTID) 1 G TABLET    Take 1 g by mouth 3 times daily    FENOFIBRATE 160 MG TABLET    Take 160 mg by mouth daily. FLUTICASONE (FLONASE) 50 MCG/ACT NASAL SPRAY    1 spray by Nasal route 2 times daily. GLIMEPIRIDE (AMARYL) 4 MG TABLET    Take 4 mg by mouth 2 times daily. INSULIN ASPART (NOVOLOG) 100 UNIT/ML INJECTION VIAL    Inject into the skin 3 times daily (before meals)    INSULIN GLARGINE (LANTUS) 100 UNIT/ML INJECTION    Inject 60 Units into the skin every evening     LOSARTAN (COZAAR) 100 MG TABLET    Take 100 mg by mouth daily     METOPROLOL TARTRATE (LOPRESSOR) 25 MG TABLET    Take 50 mg by mouth 2 times daily    OXYCODONE-ACETAMINOPHEN (PERCOCET) 7.5-325 MG PER TABLET    Take 1 tablet by mouth every 8 hours as needed.      PANTOPRAZOLE SODIUM (PROTONIX) 40 MG PACK PACKET    Take 40 mg by mouth every morning (before breakfast)    SPIRONOLACTONE (ALDACTONE) 25 MG TABLET    Take 1 tablet by mouth daily     ALLERGIES  Allergies   Allergen Reactions    Sulfa Antibiotics Hives     PHYSICAL EXAM  VITAL SIGNS:   ED Triage Vitals Enc Vitals Group      BP       Pulse       Resp       Temp       Temp src       SpO2       Weight       Height       Head Circumference       Peak Flow       Pain Score       Pain Loc       Pain Edu? Excl. in [address]? Constitutional: Well developed, Well nourished, nontoxic appearing  HENT: Normocephalic, Atraumatic, Bilateral external ears normal, Oropharynx moist, No oral exudates, Nose normal.   Eyes: PERRL, EOMI, Conjunctiva normal, No discharge. No scleral icterus. Neck: Normal range of motion, No tenderness, Supple. Lymphatic: No lymphadenopathy noted. Cardiovascular: Mildly tachycardic, Normal rhythm, No murmurs, gallops or rubs. Thorax & Lungs: Normal breath sounds, No respiratory distress, No wheezing. Abdomen: Soft, No tenderness, No masses, No pulsatile masses, No distention, Normal bowel sounds  Skin: Warm, Dry, Pink, No mottling, No erythema, No rash. Back: Midline tenderness to palpation of the thoracic and lumbar spine  Extremities: No edema, No tenderness, No cyanosis, Normal perfusion, No clubbing. Musculoskeletal: Good range of motion in all major joints as observed. No major deformities noted. Neurologic: Alert & oriented to person and place but not time, GCS 14 secondary to confusion, normal motor function, Normal sensory function, CN II-XII grossly intact as tested, No focal deficits noted. Psychiatric: Slightly confused, cooperative    EKG  Per my interpretation demonstrates sinus rhythm with sinus arrhythmia at a rate of 92 bpm.  Normal axis. Normal intervals. No acute ST segment changes.   RADIOLOGY  Labs Reviewed   CBC WITH AUTO DIFFERENTIAL - Abnormal; Notable for the following components:       Result Value    Platelets 877 (*)     Segs Relative 79.0 (*)     Lymphocytes % 11.0 (*)     Monocytes % 6.9 (*)     Immature Neutrophil % 1.5 (*)     All other components within normal limits   BASIC METABOLIC PANEL - Abnormal; Notable for the following components:    Sodium 133 (*)     Chloride 98 (*)     CO2 20 (*)     BUN 64 (*)     CREATININE 3.7 (*)     Glucose 246 (*)     GFR Non- 16 (*)     GFR  19 (*)     All other components within normal limits   TROPONIN - Abnormal; Notable for the following components:    Troponin T 0.048 (*)     All other components within normal limits   URINALYSIS WITH MICROSCOPIC - Abnormal; Notable for the following components:    Bacteria, UA RARE (*)     Mucus, UA RARE (*)     All other components within normal limits   MAGNESIUM - Abnormal; Notable for the following components:    Magnesium 1.6 (*)     All other components within normal limits   SALICYLATE LEVEL - Abnormal; Notable for the following components:    Salicylate Lvl <3.6 (*)     All other components within normal limits   ACETAMINOPHEN LEVEL - Abnormal; Notable for the following components:    Acetaminophen Level <5.0 (*)     All other components within normal limits   POCT GLUCOSE - Abnormal; Notable for the following components:    POC Glucose 203 (*)     All other components within normal limits   CULTURE, BLOOD 1   CULTURE, BLOOD 2   HEPATIC FUNCTION PANEL   LIPASE   LACTIC ACID, PLASMA   PROTIME-INR   APTT   ETHANOL   BLOOD GAS, VENOUS   POCT GLUCOSE     I personally reviewed the images. The radiologist's interpretation reveals:  Last Imaging results   CT LUMBAR SPINE WO CONTRAST   Final Result   1. No acute abnormality of the lumbar spine. 2. Multilevel degenerative changes. CT THORACIC SPINE WO CONTRAST   Final Result   No evidence of acute fracture or traumatic malalignment of the thoracic spine. CT CERVICAL SPINE WO CONTRAST   Final Result   No acute abnormality of the cervical spine. Multilevel degenerative changes as described above. If clinically indicated,   correlation with MRI would be helpful. CT HEAD WO CONTRAST   Final Result   No acute intracranial abnormality. Mild age-appropriate atrophy.          XR PELVIS (1-2 VIEWS)   Final Result   No acute posttraumatic abnormality detected. XR CHEST PORTABLE   Final Result   No acute posttraumatic abnormality detected. Procedures  NA  MEDS GIVEN IN ED:  Medications   magnesium sulfate 2 g in 50 mL IVPB premix (0 g Intravenous Stopped 9/20/20 0404)   LORazepam (ATIVAN) injection 1 mg (1 mg Intravenous Given 9/20/20 0404)     COURSE & MEDICAL DECISION MAKING  67-year-old male presents emergency department with reports of progressive confusion over the last 2 to 3 days and worsening weakness over the last week. Initial vital signs remarkable for mild tachycardia heart of 101. He is nontoxic-appearing exam.  His neurological exam is nonfocal but he is not oriented to time and somewhat confusing the details of what brought him here. Lungs are clear to auscultation bilaterally. At this time we will obtain CT of the head, cervical spine, thoracic spine, lumbar spine. We will also obtain CBC, BMP, hepatic panel, EKG, troponin, serum tox screen, urinalysis, lactic acid, and blood cultures. CBC is reassuring. BMP shows acute kidney injury with creatinine of 3.7. It seems that his creatinine runs in the 2's. Magnesium is low at 1.6. EKG is without acute ischemic changes. Opponent is mildly elevated 0.048 although he has a chronic troponin leak at baseline and has no active chest pain. Imaging studies are negative for acute traumatic pathology. Fluid bolus ordered for worsening renal function. IV magnesium repletion ordered as well. At this time given the patient's continued encephalopathy with a KI and generalized weakness he will require hospitalization for further management. Patient discussed with hospitalist team who agreed to hospitalize patient for further management. Appropriate PPE utilized as indicated for entire patient encounter? Time of Disposition: See timeline  ?   New Prescriptions    No medications on file     FINAL IMPRESSION  1. Altered mental status, unspecified altered mental status type    2. Acute kidney injury ([facility] [address]    3. Generalized weakness    4. Fall, initial encounter    5. Hypomagnesemia        Electronically signed by:  [address]en, DO, 9/20/2020         [address], DO  09/20/20 4590

## 2020-09-20 NOTE — PROGRESS NOTES
Pt making multiple attempts to exit bed. Dr. Villarreal Door at bedside. Orders given to insert islas catheter as patient is not oriented enough to use bedside urinal or bedpan. Pt is also too weak to ambulate to restroom. IM haldol given at this time to control agitation.

## 2020-09-20 NOTE — H&P
History and Physical      Name:  Carmelita Rodriges /Age/Sex: 1940  (79 y.o. male)   MRN & CSN:  [id number] & [id number] Admission Date/Time: 2020 10:33 PM   Location:  ED29/ED-29 PCP: Jeane Klein MD       Hospital Day: 2    Assessment and Plan:   Carmelita Rodriges is a 79 y.o.  male  who presents with Encephalopathy    1. Encephalopathy   Likely multifactorial.  Patient does take opioids, BuSpar and SSRIs. Patient also has VICKY so it could be metabolic in nature.  CT head without any acute pathology   Admit inpatient status   Neurochecks, n.p.o., ambulate patient with assistance, PT/OT, Haldol 0.5 mg as needed agitation (seem to work well at last admission), orthostatic hypotension once patient is not altered, minimize auditory stimulation   Consider consult to nephrology if not improved in a.m. 2. VICKY on CKD   Holding spironolactone and losartan   Increased from last admission when he [facility] consult   If patient remains n.p.o. consider IV hydration    3. Elevated troponins   Trend troponins, likely chronically elevated given above. 4. History of liver cirrhosis   Ammonia levels pending      Other chronic medical conditions:     DM 2 insulin-dependent: Discontinued Amaryl and started on renally adjusted Lantus and p.o. sliding scale. Consider endocrine consult if needed   Essential hypertension: Holding meds as above, PRN medications if needed   CAD: Continue home meds except what told above      Diet No diet orders on file   DVT Prophylaxis [x] Lovenox, []  Heparin, [] SCDs, [] Ambulation   GI Prophylaxis [] PPI,  [] H2 Blocker,  [] Carafate,  [] Diet/Tube Feeds   Code Status Prior   Disposition Patient requires continued admission due to Encephalopathy   MDM [] Low, [] Moderate,[]  High  Patient's risk as above due to acuity of condition with potential for decompensation. History of Present Illness:     Chief Complaint: Encephalopathy    Carmelita Rodriges is a 79 y.o. male with a PMH of essential hypertension, DM 2 on insulin, CAD, liver cirrhosis, depression, chronic pain, and CKD stage III, who presents with confusion and status post fall. Patient was recently hospitalized and did not complete treatment and left AMA on 8/21/2020. At that time patient was significantly ill with acute hypoxic respiratory failure secondary to COVID-19. At that time patient also had encephalopathy. According to ED provider patient has been living with his daughter and states he had experienced progressive weakness and confusion over the last few days. Patient had difficulty with ambulation ultimately leading to a fall at home today. Daughter states he is normally completely oriented. Upon entering the room and trying to have a discussion with the patient the patient was difficult to arouse and apparently got 1 mg Ativan prior to my arrival.  Patient was able to tell me he fell and hit his head but was unable to provide any meaningful history. Discussed case with ED provider. Review of Systems   Unable to perform ROS: Mental status change       Objective:   No intake or output data in the 24 hours ending 09/20/20 0528   Vitals:   Vitals:    09/19/20 2346   BP: (!) 142/83   Pulse: 95   Resp: 17   Temp: 98.3 °F (36.8 °C)   SpO2: 98%     Physical Exam:   Physical Exam  Vitals signs and nursing note reviewed. Constitutional:       General: He is not in acute distress. Appearance: Normal appearance. He is obese. He is not ill-appearing or diaphoretic. HENT:      Head: Atraumatic. Right Ear: External ear normal.      Left Ear: External ear normal.      Nose: Nose normal. No rhinorrhea. Mouth/Throat:      Mouth: Mucous membranes are moist.   Eyes:      General: No scleral icterus. Conjunctiva/sclera: Conjunctivae normal.      Pupils: Pupils are equal, round, and reactive to light. Neck:      Musculoskeletal: Neck supple.    Cardiovascular:      Rate and Rhythm: Normal rate and regular rhythm. Heart sounds: Murmur present. Systolic murmur present with a grade of 3/6. No gallop. Pulmonary:      Effort: Pulmonary effort is normal. No tachypnea, prolonged expiration or respiratory distress. Breath sounds: No decreased air movement. No decreased breath sounds, wheezing, rhonchi or rales. Abdominal:      General: There is no distension. Palpations: Abdomen is soft. Tenderness: There is no abdominal tenderness. There is no guarding. Musculoskeletal: Normal range of motion. Right lower le+ Pitting Edema present. Left lower le+ Pitting Edema present. Skin:     General: Skin is warm and dry. Capillary Refill: Capillary refill takes less than 2 seconds. Neurological:      General: No focal deficit present. Mental Status: He is lethargic, disoriented and confused. Cranial Nerves: No dysarthria or facial asymmetry. Motor: Tremor present. No seizure activity. Comments: Unable to fully assess neurologic function. Patient does appear to be intact grossly cranial nerve wise. Patient was moving arms and legs equally as well as having a good pupillary reaction and extraocular muscles were intact. Psychiatric:         Behavior: Behavior is slowed. Cognition and Memory: Cognition is impaired. Memory is impaired. Past Medical History:      Past Medical History:   Diagnosis Date    Arthritis     Diabetes mellitus ([facility] [address]     GERD (gastroesophageal reflux disease)     Gout     left ankle and right shoulder    Hyperlipidemia     Hypertension     MI (myocardial infarction[facility])     May 2013     PSHX:  has a past surgical history that includes Total knee arthroplasty (Bilateral); back surgery; Cholecystectomy; Cardiac surgery; Dilatation, esophagus; sinus surgery; joint replacement; Hand surgery; and sinus surgery. Allergies:    Allergies   Allergen Reactions    Sulfa Antibiotics Hives       FAM HX: family history is not on file.   Soc HX:   Social History     Socioeconomic History    Marital status:      Spouse name: Not on file    Number of children: Not on file    Years of education: Not on file    Highest education level: Not on file   Occupational History    Not on file   Social Needs    Financial resource strain: Not on file    Food insecurity     Worry: Not on file     Inability: Not on file    Transportation needs     Medical: Not on file     Non-medical: Not on file   Tobacco Use    Smoking status: Never Smoker    Smokeless tobacco: Never Used   Substance and Sexual Activity    Alcohol use: No    Drug use: No    Sexual activity: Not on file   Lifestyle    Physical activity     Days per week: Not on file     Minutes per session: Not on file    Stress: Not on file   Relationships    Social connections     Talks on phone: Not on file     Gets together: Not on file     Attends Yazidism service: Not on file     Active member of club or organization: Not on file     Attends meetings of clubs or organizations: Not on file     Relationship status: Not on file    Intimate partner violence     Fear of current or ex partner: Not on file     Emotionally abused: Not on file     Physically abused: Not on file     Forced sexual activity: Not on file   Other Topics Concern    Not on file   Social History Narrative    Not on file       Medications:   Medications:    Infusions:   PRN Meds:     Electronically signed by Inge Yancey MD on 9/20/2020 at 5:28 AM

## 2020-09-20 NOTE — PROCEDURES
Patient Name: Moon Merida   Medical Record Number: [id number]  Date: 9/20/2020   Time: 12:23 PM   Room/Bed: 2010/2010-A       Central Line Placement Procedure Note  Indication: vascular access    Consent: verbal consent obtained from the patient's daughter due to patient's encephalopathy    Procedure: The patient was positioned appropriately and the skin over the right internal jugular vein was prepped with chlorhexidine. Local anesthesia was obtained by infiltration using 1% Lidocaine without epinephrine. A large bore needle was used to identify the vein under sterile ultrasound guidance. Wire would not pass into the internal jugular vein and a hematoma formed compromising our view on US. Decision was made to attempt right subclavian vein. The drape was adjusted and right upper chest was prepped once more with chlorhexidine. The subclavian vein was accessed with the large bore needle. A guide wire was then inserted into the vein through the needle and advanced without resistance. A skin incision was made at the wire and the tract was dilated. A triple lumen catheter was then inserted into the vessel over the guide wire using Seldinger technique. All ports showed good, free flowing blood return and were flushed with saline solution. The catheter was then securely fastened to the skin with sutures and covered with a sterile dressing. A post procedure X-ray was ordered and is still pending at this time. The patient tolerated the procedure well.     Blood loss: less than 10 mL    Complications: None    Electronically signed by Sheyla Harvey MD on 9/20/2020 at 12:23 PM

## 2020-09-20 NOTE — PROGRESS NOTES
Pt two RN Skin assessment performed by this RN and Juliet RN. Pt has scattered scabs, bruises and abrasions. Along with vascular ward of the shins. Pt also noted to have un blanchable redness of the sacrum. Pt left with bed alarm on and call light within reach.

## 2020-09-20 NOTE — ED NOTES
Bed: ED-29  Expected date:   Expected time:   Means of arrival: EMS  Comments:  EMS      Anahi Borges RN  09/19/20 4368

## 2020-09-20 NOTE — CONSULTS
Nephrology Service Consultation    Patient:  Nissa Cullen  MRN: [id number]  Consulting physician:  Ирина Lopez MD  Reason for Consult: arf on ckd    History Obtained From:  patient, electronic medical record  PCP: Eliverto Paget, MD    HISTORY OF PRESENT ILLNESS:   The patient is a 79 y.o. male who presents with weakness and confusion with fall at home and came to er and admitted. Recent seen last admission and left ama with arf on ckd and confusion ? Dementia but also can be lucid and again with confusion this admission with hx HTN, DM2, Cad, liver cirrhosis, chronic pain , ckd 3. Pt with arf last admission in setting covid and improved with labs this admission and wanted renal to evaluate in above setting. Pt is not helpful in care to get himself better.      Past Medical History:        Diagnosis Date    Arthritis     Diabetes mellitus ([address]     GERD (gastroesophageal reflux disease)     Gout     left ankle and right shoulder    Hyperlipidemia     Hypertension     MI (myocardial infarction) ([facility] [place] [address]     May 2013       Past Surgical History:        Procedure Laterality Date    BACK SURGERY      CARDIAC SURGERY      stents x 1    CHOLECYSTECTOMY      May 2013    DILATATION, ESOPHAGUS      HAND SURGERY      JOINT REPLACEMENT      SINUS SURGERY      sinus surgery x 2    SINUS SURGERY      TOTAL KNEE ARTHROPLASTY Bilateral        Medications:   Scheduled Meds:   amLODIPine  5 mg Oral BID    aspirin  81 mg Oral Daily    atorvastatin  20 mg Oral Daily    busPIRone  10 mg Oral TID    chlorthalidone  12.5 mg Oral Daily    calcium-vitamin D  1 tablet Oral BID WC    cholestyramine light  1 packet Oral TID WC    citalopram  20 mg Oral Daily    clopidogrel  75 mg Oral Daily    fenofibrate  160 mg Oral Daily    fluticasone  1 spray Nasal BID    metoprolol tartrate  50 mg Oral BID    pantoprazole  40 mg Oral QAM AC    acetaminophen  650 mg Oral Q8H    insulin glargine  0.15 Units/kg Subcutaneous Nightly    insulin lispro  0-6 Units Subcutaneous Q4H    sodium chloride flush  10 mL Intravenous 2 times per day    enoxaparin  40 mg Subcutaneous Daily     Continuous Infusions:   dextrose       PRN Meds:.oxyCODONE-acetaminophen, glucose, dextrose, glucagon (rDNA), dextrose, sodium chloride flush, acetaminophen **OR** acetaminophen, polyethylene glycol, promethazine **OR** ondansetron, haloperidol lactate    Allergies:  Sulfa antibiotics    Social History:   TOBACCO:   reports that he has never smoked. He has never used smokeless tobacco.  ETOH:   reports no history of alcohol use. OCCUPATION:      Family History:   No family history on file. REVIEW OF SYSTEMS:  Negative except for weak confused sp fall. Physical Exam:    Vitals: /83   Pulse 99   Temp 98.4 °F (36.9 °C) (Oral)   Resp 19   Wt 245 lb (111.1 kg)   SpO2 96%   BMI 35.15 kg/m²   General appearance: awake weak  HEENT: Head: Normal, normocephalic, atraumatic. Neck: supple, symmetrical, trachea midline  Lungs: diminished breath sounds bilaterally  Heart: S1, S2 normal  Abdomen: abnormal findings:  hypoactive bowel sounds  Extremities: edema trace  Neurologic: Mental status: alertness: alert    CBC:   Recent Labs     09/19/20  2329   WBC 6.7   HGB 16.1   *     BMP:    Recent Labs     09/19/20 2329   *   K 5.0   CL 98*   CO2 20*   BUN 64*   CREATININE 3.7*   GLUCOSE 246*     Hepatic:   Recent Labs     09/19/20  2329   AST 23   ALT 19   BILITOT 0.9   ALKPHOS 78     Troponin: No results for input(s): TROPONINI in the last 72 hours.   Mg, Phos:   Recent Labs     09/19/20  2329   MG 1.6*       ABGs:   Lab Results   Component Value Date    PO2ART 72 08/18/2020    ZUO4GZW 42.0 08/18/2020     INR:   Recent Labs     09/19/20  2329   INR 0.98     -----------------------------------------------------------------      Assessment and Recommendations     Patient Active Problem List   Diagnosis Code    Essential hypertension, benign I10    Type 2 diabetes mellitus, with long-term current use of insulin (HCC) E11.9, Z79.4    Other and unspecified hyperlipidemia E78.5    Chest pain R07.9    Coronary atherosclerosis I25.10    Chronic kidney disease, stage III (moderate) ([facility]) N18.3    Cellulitis of leg L03.119    Cellulitis of lower leg L03.119    Lethargy R53.83    Acute respiratory failure with hypoxia and hypercapnia ([facility]) J96.01, J96.02    Altered mental status, unspecified R41.82    Hypoglycemia E16.2    Acute kidney injury (HCC) N17.9    Generalized weakness R53.1    Liver cirrhosis ([facility]) K74.60    Depression F32.9    Chronic pain G89.29    Encephalopathy G93.40     Imp/plan  1 arf from atn on ckd 3  2 hx covid +  3 sp fall with confusion  4 Dm2  5 htn  6 low mg    Plan  1 renal is better place islas and monitor not uremic and not need dialysis  2 resp isolation fu covid  3 fu pt/ot when stable and may need neuro eval for dementia- head ct negative  4 ssi  5 bp stable  6 replete mg  Electronically signed by Rose Trevino MD on 9/20/2020 at 10:35 AM

## 2020-09-20 NOTE — CONSULTS
Department of General Surgery   Surgical Service Dr. Jessica Thorpe   Consult Note    Date of Consult: 9/20/20    Reason for Consult:  Need for vascular access  Requesting Physician:  Dr. Farrah Mai:  Encephalopathy, VICKY on CKD    History Obtained From:  electronic medical record    HISTORY OF PRESENT ILLNESS:    The patient is a 79 y.o. male who presented with encephalopathy. He has a history of DM, CAD, cirrhosis and CKD. He apparently suffered a fall prior to admission. He had progressive weakness and confusion prior to admission. He has been a difficult IV access and is requiring IV medication and frequent lab draws. He cannot get a PICC due to renal function. Surgery was consulted for central line placement.         Past Medical History:    Past Medical History:   Diagnosis Date    Arthritis     Diabetes mellitus ([address]     GERD (gastroesophageal reflux disease)     Gout     left ankle and right shoulder    Hyperlipidemia     Hypertension     MI ([facility] infarction[facility])     May 2013       Past Surgical History:    Past Surgical History:   Procedure Laterality Date    BACK SURGERY      CARDIAC SURGERY      stents x 1    CHOLECYSTECTOMY      May 2013    DILATATION, ESOPHAGUS      HAND SURGERY      JOINT REPLACEMENT      SINUS SURGERY      sinus surgery x 2    SINUS SURGERY      TOTAL KNEE ARTHROPLASTY Bilateral        Current Medications:   Current Facility-Administered Medications   Medication Dose Route Frequency Provider Last Rate Last Dose    amLODIPine (NORVASC) tablet 5 mg  5 mg Oral BID Oscar Johnson MD   Stopped at 09/20/20 3473    aspirin chewable tablet 81 mg  81 mg Oral Daily Oscar Johnson MD   Stopped at 09/20/20 0082    atorvastatin (LIPITOR) tablet 20 mg  20 mg Oral Daily Oscar Johnson MD   Stopped at 09/20/20 1664    busPIRone (BUSPAR) tablet 10 mg  10 mg Oral TID Oscar Johnson MD   Stopped at 09/20/20 0833    chlorthalidone (HYGROTON) tablet 12.5 mg 12.5 mg Oral Daily Sena Crowder MD   Stopped at 09/20/20 0834    calcium-vitamin D 500-200 MG-UNIT per tablet 1 tablet  1 tablet Oral BID JESENIA Crowder MD   Stopped at 09/20/20 0834    cholestyramine light packet 4 g  1 packet Oral TID JESENIA Crowder MD   Stopped at 09/20/20 0834    citalopram (CELEXA) tablet 20 mg  20 mg Oral Daily Sena Crowder MD   Stopped at 09/20/20 0834    clopidogrel (PLAVIX) tablet 75 mg  75 mg Oral Daily Sena Crowder MD   Stopped at 09/20/20 7056    fenofibrate (TRIGLIDE) tablet 160 mg  160 mg Oral Daily Sena Crowder MD   Stopped at 09/20/20 0833    fluticasone (FLONASE) 50 MCG/ACT nasal spray 1 spray  1 spray Nasal BID Sena Crowder MD   Stopped at 09/20/20 0834    metoprolol tartrate (LOPRESSOR) tablet 50 mg  50 mg Oral BID Sena Crowder MD   Stopped at 09/20/20 0833    oxyCODONE-acetaminophen (PERCOCET) 7.5-325 MG per tablet 1 tablet  1 tablet Oral Q8H PRN Sena Crowder MD        pantoprazole (PROTONIX) tablet 40 mg  40 mg Oral QAM  Sena Crowder MD   Stopped at 09/20/20 8107    glucose (GLUTOSE) 40 % oral gel 15 g  15 g Oral PRN Sena Crowder MD        dextrose 50 % IV solution  12.5 g Intravenous PRN Sena Crowder MD        glucagon (rDNA) injection 1 mg  1 mg Intramuscular PRN Sena Crowder MD        dextrose 5 % solution  100 mL/hr Intravenous PRN Sena Crowder MD        acetaminophen (TYLENOL) tablet 650 mg  650 mg Oral Q8H Sena Crowder MD   Stopped at 09/20/20 6457    insulin glargine (LANTUS) injection vial 17 Units  0.15 Units/kg Subcutaneous Nightly Sena Crowder MD        insulin lispro (HUMALOG) injection vial 0-6 Units  0-6 Units Subcutaneous Q4H Sena Crowder MD   2 Units at 09/20/20 9093    sodium chloride flush 0.9 % injection 10 mL  10 mL Intravenous 2 times per day Sena Crowder MD   10 mL at 09/20/20 0917    sodium chloride flush 0.9 % injection 10 mL  10 mL Intravenous PRN Sena Crowder MD       [address] [place] [address] acetaminophen (TYLENOL) tablet 650 mg  650 mg Oral Q6H PRN Gudelia Cabrera MD        Or    acetaminophen (TYLENOL) suppository 650 mg  650 mg Rectal Q6H PRN Gudelia Cabrera MD        polyethylene glycol (GLYCOLAX) packet 17 g  17 g Oral Daily PRN Gudelia Cabrera MD        promethazine (PHENERGAN) tablet 12.5 mg  12.5 mg Oral Q6H PRN Gudelia Cabrera MD        Or    ondansetron [facility]) injection 4 mg  4 mg Intravenous Q6H PRN Gudelia Cabrera MD        haloperidol lactate (HALDOL) injection 0.5 mg  0.5 mg Intramuscular Q6H PRN Mandie Atkins MD        magnesium oxide (MAG-OX) tablet 400 mg  400 mg Oral Daily Antony Vazquez MD        heparin (porcine) injection 5,000 Units  5,000 Units Subcutaneous TID Mandie Atkins MD           Allergies:  Sulfa antibiotics    Social History:   Social History     Socioeconomic History    Marital status:      Spouse name: Not on file    Number of children: Not on file    Years of education: Not on file    Highest education level: Not on file   Occupational History    Not on file   Social Needs    Financial resource strain: Not on file    Food insecurity     Worry: Not on file     Inability: Not on file    Transportation needs     Medical: Not on file     Non-medical: Not on file   Tobacco Use    Smoking status: Never Smoker    Smokeless tobacco: Never Used   Substance and Sexual Activity    Alcohol use: No    Drug use: No    Sexual activity: Not on file   Lifestyle    Physical activity     Days per week: Not on file     Minutes per session: Not on file    Stress: Not on file   Relationships    Social connections     Talks on phone: Not on file     Gets together: Not on file     Attends Buddhist service: Not on file     Active member of club or organization: Not on file     Attends meetings of clubs or organizations: Not on file     Relationship status: Not on file    Intimate partner violence     Fear of current or ex partner: Not on file     Emotionally abused: Not on file     Physically abused: Not on file     Forced sexual activity: Not on file   Other Topics Concern    Not on file   Social History Narrative    Not on file       Family History:   No family history on file. REVIEW OFSYSTEMS:    Unable to perform due to patient's encephalopathy    PHYSICAL EXAM:  Vitals:    09/20/20 0800 09/20/20 0900 09/20/20 1000 09/20/20 1100   BP: 130/61 125/62 122/83 108/63   Pulse: 96 90 99 94   Resp: 19 15 19 19   Temp: 98.4 °F (36.9 °C)      TempSrc: Oral      SpO2: 97% 98% 96% 96%   Weight:   245 lb (111.1 kg)        Physical Exam  General: awake, lethargic, confused  HEENT: mucous membranes moist  Respiratory: normal effort  CV: appears well perfused  Abdomen: Soft, non-distended. Skin: warm and dry  Extremities: atraumatic  Neuro: no focal deficits noted, confused  Psych: mood pleasant        DATA:    Lab Results   Component Value Date    WBC 6.7 09/19/2020    HGB 16.1 09/19/2020    HCT 47.8 09/19/2020    MCV 90.7 09/19/2020     (L) 09/19/2020     Lab Results   Component Value Date     09/19/2020    K 5.0 09/19/2020    CL 98 09/19/2020    CO2 20 09/19/2020    BUN 64 09/19/2020    CREATININE 3.7 09/19/2020    GLUCOSE 246 09/19/2020    CALCIUM 9.8 09/19/2020       IMPRESSION:    79 y.o. male with encephalopathy. Difficult IV access and need for frequent lab draws.     Patient Active Problem List:     Essential hypertension, benign     Type 2 diabetes mellitus, with long-term current use of insulin ([address]     Other and unspecified hyperlipidemia     Chest pain     Coronary atherosclerosis     Chronic kidney disease, stage III (moderate) (HCC)     Cellulitis of leg     Cellulitis of lower leg     Lethargy     Acute respiratory failure with hypoxia and hypercapnia (HCC)     Altered mental status, unspecified     Hypoglycemia     Acute kidney injury ([address]     Generalized weakness     Liver cirrhosis (HCC)     Depression     Chronic pain     Encephalopathy        PLAN:  Plan for central line    I spoke to Neo's daughter and verbal consent was obtained.         Electronically signed by Davidson Trevino MD on 9/20/2020 at 12:15 PM

## 2020-09-20 NOTE — ED TRIAGE NOTES
Patient arrived in the ED with EMS. Per EMS patient was brought here due to AMS. Patient was recently here for resp symptoms and tested for COVID. Patient is able to answer questions as his location but not able to recall month or current president/events. Patient appears to be lethargic and talking to staff with eyes closed.

## 2020-09-20 NOTE — PROGRESS NOTES
Upon AM assessment, pt is lethargic upon calling name. Pt is able to follow some commands and states his name is Sudhir Lugo and that he is in [facility]. Unable to tell this RN the time or the date. Pt is too lethargic for swallow screening, thus PO medications will be held until he is more alert. 3 RNs attempted to collect AM labs. This RN called lab for phlebotomist to come collect. Telephone orders received from Dr. Cooper Robbins to consult on-call surgeon Dr. Jocelyn Saucedo for placement of CVC.

## 2020-09-21 LAB
ALBUMIN SERPL-MCNC: 3.9 GM/DL (ref 3.4–5)
ALP BLD-CCNC: 78 IU/L (ref 40–128)
ALT SERPL-CCNC: 18 U/L (ref 10–40)
AMMONIA: 34 UMOL/L (ref 16–60)
ANION GAP SERPL CALCULATED.3IONS-SCNC: 11 MMOL/L (ref 4–16)
AST SERPL-CCNC: 21 IU/L (ref 15–37)
BASE EXCESS: 3 (ref 0–3.3)
BASOPHILS ABSOLUTE: 0 K/CU MM
BASOPHILS RELATIVE PERCENT: 0.5 % (ref 0–1)
BILIRUB SERPL-MCNC: 1.1 MG/DL (ref 0–1)
BUN BLDV-MCNC: 51 MG/DL (ref 6–23)
CALCIUM SERPL-MCNC: 9.1 MG/DL (ref 8.3–10.6)
CARBON MONOXIDE, BLOOD: 2 % (ref 0–5)
CHLORIDE BLD-SCNC: 104 MMOL/L (ref 99–110)
CO2 CONTENT: 23.2 MMOL/L (ref 19–24)
CO2: 22 MMOL/L (ref 21–32)
COMMENT: ABNORMAL
CREAT SERPL-MCNC: 1.5 MG/DL (ref 0.9–1.3)
DIFFERENTIAL TYPE: ABNORMAL
EOSINOPHILS ABSOLUTE: 0.1 K/CU MM
EOSINOPHILS RELATIVE PERCENT: 1.1 % (ref 0–3)
GFR AFRICAN AMERICAN: 55 ML/MIN/1.73M2
GFR NON-AFRICAN AMERICAN: 45 ML/MIN/1.73M2
GLUCOSE BLD-MCNC: 183 MG/DL (ref 70–99)
GLUCOSE BLD-MCNC: 184 MG/DL (ref 70–99)
GLUCOSE BLD-MCNC: 211 MG/DL (ref 70–99)
GLUCOSE BLD-MCNC: 212 MG/DL (ref 70–99)
GLUCOSE BLD-MCNC: 223 MG/DL (ref 70–99)
GLUCOSE BLD-MCNC: 228 MG/DL (ref 70–99)
HCO3 ARTERIAL: 22 MMOL/L (ref 18–23)
HCT VFR BLD CALC: 40.9 % (ref 42–52)
HEMOGLOBIN: 14.1 GM/DL (ref 13.5–18)
IMMATURE NEUTROPHIL %: 0.5 % (ref 0–0.43)
LYMPHOCYTES ABSOLUTE: 1.1 K/CU MM
LYMPHOCYTES RELATIVE PERCENT: 19.3 % (ref 24–44)
MCH RBC QN AUTO: 30.5 PG (ref 27–31)
MCHC RBC AUTO-ENTMCNC: 34.5 % (ref 32–36)
MCV RBC AUTO: 88.3 FL (ref 78–100)
METHEMOGLOBIN ARTERIAL: 1.3 %
MONOCYTES ABSOLUTE: 0.5 K/CU MM
MONOCYTES RELATIVE PERCENT: 9.4 % (ref 0–4)
NUCLEATED RBC %: 0 %
O2 SATURATION: 93.3 % (ref 96–97)
PCO2 ARTERIAL: 38 MMHG (ref 32–45)
PDW BLD-RTO: 14.2 % (ref 11.7–14.9)
PH BLOOD: 7.37 (ref 7.34–7.45)
PLATELET # BLD: 121 K/CU MM (ref 140–440)
PMV BLD AUTO: 10.3 FL (ref 7.5–11.1)
PO2 ARTERIAL: 70 MMHG (ref 75–100)
POTASSIUM SERPL-SCNC: 4.7 MMOL/L (ref 3.5–5.1)
RBC # BLD: 4.63 M/CU MM (ref 4.6–6.2)
SEGMENTED NEUTROPHILS ABSOLUTE COUNT: 3.9 K/CU MM
SEGMENTED NEUTROPHILS RELATIVE PERCENT: 69.2 % (ref 36–66)
SODIUM BLD-SCNC: 137 MMOL/L (ref 135–145)
TOTAL IMMATURE NEUTOROPHIL: 0.03 K/CU MM
TOTAL NUCLEATED RBC: 0 K/CU MM
TOTAL PROTEIN: 6 GM/DL (ref 6.4–8.2)
WBC # BLD: 5.6 K/CU MM (ref 4–10.5)

## 2020-09-21 PROCEDURE — 6370000000 HC RX 637 (ALT 250 FOR IP): Performed by: STUDENT IN AN ORGANIZED HEALTH CARE EDUCATION/TRAINING PROGRAM

## 2020-09-21 PROCEDURE — 2580000003 HC RX 258: Performed by: STUDENT IN AN ORGANIZED HEALTH CARE EDUCATION/TRAINING PROGRAM

## 2020-09-21 PROCEDURE — 85025 COMPLETE CBC W/AUTO DIFF WBC: CPT

## 2020-09-21 PROCEDURE — 82962 GLUCOSE BLOOD TEST: CPT

## 2020-09-21 PROCEDURE — 36600 WITHDRAWAL OF ARTERIAL BLOOD: CPT

## 2020-09-21 PROCEDURE — 6360000002 HC RX W HCPCS: Performed by: STUDENT IN AN ORGANIZED HEALTH CARE EDUCATION/TRAINING PROGRAM

## 2020-09-21 PROCEDURE — 82803 BLOOD GASES ANY COMBINATION: CPT

## 2020-09-21 PROCEDURE — 80053 COMPREHEN METABOLIC PANEL: CPT

## 2020-09-21 PROCEDURE — 6360000002 HC RX W HCPCS: Performed by: INTERNAL MEDICINE

## 2020-09-21 PROCEDURE — 6370000000 HC RX 637 (ALT 250 FOR IP): Performed by: INTERNAL MEDICINE

## 2020-09-21 PROCEDURE — 82140 ASSAY OF AMMONIA: CPT

## 2020-09-21 PROCEDURE — 1200000000 HC SEMI PRIVATE

## 2020-09-21 PROCEDURE — 94761 N-INVAS EAR/PLS OXIMETRY MLT: CPT

## 2020-09-21 RX ORDER — AMLODIPINE BESYLATE 10 MG/1
10 TABLET ORAL 2 TIMES DAILY
Status: DISCONTINUED | OUTPATIENT
Start: 2020-09-21 | End: 2020-09-28 | Stop reason: HOSPADM

## 2020-09-21 RX ADMIN — ASPIRIN 81 MG CHEWABLE TABLET 81 MG: 81 TABLET CHEWABLE at 08:48

## 2020-09-21 RX ADMIN — AMLODIPINE BESYLATE 10 MG: 10 TABLET ORAL at 08:48

## 2020-09-21 RX ADMIN — CHOLESTYRAMINE 4 G: 4 POWDER, FOR SUSPENSION ORAL at 17:16

## 2020-09-21 RX ADMIN — HEPARIN SODIUM 5000 UNITS: 5000 INJECTION INTRAVENOUS; SUBCUTANEOUS at 13:48

## 2020-09-21 RX ADMIN — PANTOPRAZOLE SODIUM 40 MG: 40 TABLET, DELAYED RELEASE ORAL at 08:55

## 2020-09-21 RX ADMIN — CHOLESTYRAMINE 4 G: 4 POWDER, FOR SUSPENSION ORAL at 08:48

## 2020-09-21 RX ADMIN — INSULIN GLARGINE 17 UNITS: 100 INJECTION, SOLUTION SUBCUTANEOUS at 21:56

## 2020-09-21 RX ADMIN — INSULIN LISPRO 2 UNITS: 100 INJECTION, SOLUTION INTRAVENOUS; SUBCUTANEOUS at 16:03

## 2020-09-21 RX ADMIN — INSULIN LISPRO 1 UNITS: 100 INJECTION, SOLUTION INTRAVENOUS; SUBCUTANEOUS at 08:41

## 2020-09-21 RX ADMIN — BUSPIRONE HYDROCHLORIDE 10 MG: 10 TABLET ORAL at 08:48

## 2020-09-21 RX ADMIN — CLOPIDOGREL BISULFATE 75 MG: 75 TABLET ORAL at 08:49

## 2020-09-21 RX ADMIN — ATORVASTATIN CALCIUM 20 MG: 20 TABLET, FILM COATED ORAL at 08:48

## 2020-09-21 RX ADMIN — FLUTICASONE PROPIONATE 1 SPRAY: 50 SPRAY, METERED NASAL at 08:49

## 2020-09-21 RX ADMIN — METOPROLOL TARTRATE 50 MG: 50 TABLET, FILM COATED ORAL at 21:53

## 2020-09-21 RX ADMIN — BUSPIRONE HYDROCHLORIDE 10 MG: 10 TABLET ORAL at 13:48

## 2020-09-21 RX ADMIN — HEPARIN SODIUM 5000 UNITS: 5000 INJECTION INTRAVENOUS; SUBCUTANEOUS at 21:56

## 2020-09-21 RX ADMIN — FLUTICASONE PROPIONATE 1 SPRAY: 50 SPRAY, METERED NASAL at 21:55

## 2020-09-21 RX ADMIN — SODIUM CHLORIDE, PRESERVATIVE FREE 10 ML: 5 INJECTION INTRAVENOUS at 08:48

## 2020-09-21 RX ADMIN — MAGNESIUM OXIDE 400 MG (241.3 MG MAGNESIUM) TABLET 400 MG: TABLET at 08:48

## 2020-09-21 RX ADMIN — AMLODIPINE BESYLATE 10 MG: 10 TABLET ORAL at 21:54

## 2020-09-21 RX ADMIN — CHLORTHALIDONE 12.5 MG: 25 TABLET ORAL at 08:49

## 2020-09-21 RX ADMIN — SODIUM CHLORIDE, PRESERVATIVE FREE 10 ML: 5 INJECTION INTRAVENOUS at 21:54

## 2020-09-21 RX ADMIN — BUSPIRONE HYDROCHLORIDE 10 MG: 10 TABLET ORAL at 21:53

## 2020-09-21 RX ADMIN — CITALOPRAM HYDROBROMIDE 20 MG: 20 TABLET ORAL at 08:50

## 2020-09-21 RX ADMIN — METOPROLOL TARTRATE 50 MG: 50 TABLET, FILM COATED ORAL at 08:48

## 2020-09-21 RX ADMIN — ONDANSETRON 4 MG: 2 INJECTION INTRAMUSCULAR; INTRAVENOUS at 17:26

## 2020-09-21 RX ADMIN — HEPARIN SODIUM 5000 UNITS: 5000 INJECTION INTRAVENOUS; SUBCUTANEOUS at 08:42

## 2020-09-21 RX ADMIN — INSULIN LISPRO 2 UNITS: 100 INJECTION, SOLUTION INTRAVENOUS; SUBCUTANEOUS at 12:06

## 2020-09-21 RX ADMIN — CHOLESTYRAMINE 4 G: 4 POWDER, FOR SUSPENSION ORAL at 13:48

## 2020-09-21 ASSESSMENT — PAIN SCALES - WONG BAKER
WONGBAKER_NUMERICALRESPONSE: 0

## 2020-09-21 ASSESSMENT — PAIN SCALES - GENERAL
PAINLEVEL_OUTOF10: 0

## 2020-09-21 NOTE — PLAN OF CARE
Problem: Falls - Risk of:  Goal: Will remain free from falls  Description: Will remain free from falls  9/21/2020 1944 by Azalea Martin RN  Outcome: Not Met This Shift  9/21/2020 1557 by Andrea Ngo RN  Outcome: Ongoing  Goal: Absence of physical injury  Description: Absence of physical injury  9/21/2020 1944 by Azalea Martin RN  Outcome: Not Met This Shift  9/21/2020 1557 by Andrea Ngo RN  Outcome: Ongoing     Problem: Restraint Use - Nonviolent/Non-Self-Destructive Behavior:  Goal: Absence of restraint indications  Description: Absence of restraint indications  9/21/2020 1944 by Azalea Martin RN  Outcome: Not Met This Shift  9/21/2020 1557 by Andrea Ngo RN  Outcome: Ongoing  Goal: Absence of restraint-related injury  Description: Absence of restraint-related injury  9/21/2020 1944 by Azalea Martin RN  Outcome: Not Met This Shift  9/21/2020 1557 by Andrea Ngo RN  Outcome: Ongoing

## 2020-09-21 NOTE — PROGRESS NOTES
Nephrology Progress Note  9/21/2020 9:45 AM  Subjective:   Admit Date: 9/19/2020  PCP: Bria Yan MD  Interval History: pt in restraint and with islas sig uop and mointor    Diet: Diet NPO Effective Now  Pain is:Mild      Data:   Scheduled Meds:   amLODIPine  10 mg Oral BID    aspirin  81 mg Oral Daily    atorvastatin  20 mg Oral Daily    busPIRone  10 mg Oral TID    chlorthalidone  12.5 mg Oral Daily    cholestyramine light  1 packet Oral TID WC    citalopram  20 mg Oral Daily    clopidogrel  75 mg Oral Daily    fluticasone  1 spray Nasal BID    metoprolol tartrate  50 mg Oral BID    pantoprazole  40 mg Oral QAM AC    insulin glargine  0.15 Units/kg Subcutaneous Nightly    insulin lispro  0-6 Units Subcutaneous Q4H    sodium chloride flush  10 mL Intravenous 2 times per day    magnesium oxide  400 mg Oral Daily    heparin (porcine)  5,000 Units Subcutaneous TID     Continuous Infusions:   dextrose       PRN Meds:oxyCODONE-acetaminophen, glucose, dextrose, glucagon (rDNA), dextrose, sodium chloride flush, acetaminophen **OR** acetaminophen, polyethylene glycol, promethazine **OR** ondansetron, haloperidol lactate  I/O last 3 completed shifts: In: 20 [I.V.:20]  Out: 1675 [Urine:1675]  No intake/output data recorded. Intake/Output Summary (Last 24 hours) at 9/21/2020 0945  Last data filed at 9/21/2020 0502  Gross per 24 hour   Intake 20 ml   Output 1675 ml   Net -1655 ml     CBC:   Recent Labs     09/19/20 2329 09/21/20  0345   WBC 6.7 5.6   HGB 16.1 14.1   * 121*     BMP:    Recent Labs     09/19/20 2329 09/20/20  2105 09/21/20  0345   * 135 137   K 5.0 4.7 4.7   CL 98* 101 104   CO2 20* 21 22   BUN 64* 57* 51*   CREATININE 3.7* 1.8* 1.5*   GLUCOSE 246* 245* 211*     Hepatic:   Recent Labs     09/19/20 2329 09/21/20  0345   AST 23 21   ALT 19 18   BILITOT 0.9 1.1*   ALKPHOS 78 78     Troponin: No results for input(s): TROPONINI in the last 72 hours.   BNP: No results for input(s): BNP in the last 72 hours. Lipids: No results for input(s): CHOL, HDL in the last 72 hours. Invalid input(s): LDLCALCU  ABGs:   Lab Results   Component Value Date    PO2ART 72 08/18/2020    EBR8BOD 42.0 08/18/2020     INR:   Recent Labs     09/19/20  2329   INR 0.98     Renal Labs  Albumin:    Lab Results   Component Value Date    LABALBU 3.9 09/21/2020     Calcium:    Lab Results   Component Value Date    CALCIUM 9.1 09/21/2020     Phosphorus:    Lab Results   Component Value Date    PHOS 2.5 09/20/2020     U/A:    Lab Results   Component Value Date    NITRU NEGATIVE 09/20/2020    COLORU YELLOW 09/20/2020    WBCUA 1 09/20/2020    RBCUA NONE SEEN 09/20/2020    MUCUS RARE 09/20/2020    TRICHOMONAS NONE SEEN 09/20/2020    BACTERIA RARE 09/20/2020    CLARITYU CLEAR 09/20/2020    SPECGRAV 1.018 09/20/2020    UROBILINOGEN NORMAL 09/20/2020    BILIRUBINUR NEGATIVE 09/20/2020    BLOODU NEGATIVE 09/20/2020    KETUA NEGATIVE 09/20/2020     ABG:    Lab Results   Component Value Date    TUX8PEE 42.0 08/18/2020    PO2ART 72 08/18/2020    XTG0DJE 24.3 08/18/2020     HgBA1c:    Lab Results   Component Value Date    LABA1C 8.8 08/09/2020     Microalbumen/Creatinine ratio:  No components found for: RUCREAT          Objective:   Vitals: BP (!) 156/122   Pulse 111   Temp 98.2 °F (36.8 °C) (Oral)   Resp (!) 32   Wt 227 lb 1.2 oz (103 kg)   SpO2 99%   BMI 32.58 kg/m²   General appearance: awake weak  HEENT: Head: Normal, normocephalic, atraumatic.   Neck: supple, symmetrical, trachea midline  Lungs: diminished breath sounds bilaterally  Heart: S1, S2 normal  Abdomen: abnormal findings:  soft nt  Extremities: edema trace  Neurologic: Mental status: alertness: alert      Patient Active Problem List:     Essential hypertension, benign     Type 2 diabetes mellitus, with long-term current use of insulin ([address]     Other and unspecified hyperlipidemia     Chest pain     Coronary atherosclerosis     Chronic kidney disease, stage III (moderate) (HCC)     Cellulitis of leg     Cellulitis of lower leg     Lethargy     Acute respiratory failure with hypoxia and hypercapnia (HCC)     Altered mental status, unspecified     Hypoglycemia     Acute kidney injury ([address]     Generalized weakness     Liver cirrhosis (HCC)     Depression     Chronic pain     Encephalopathy    Assessment and Plan:      IMP:  1 arf from atn on ckd 3  2 hx covid +  3 sp fall with confusion  4 Dm2  5 htn  6 low mg    Plan     1 renal improved with islas and hydration and monitor for now  2 fu covid  3 in restraint and ?  Dementia and rec neuro eval  4 glucsoe stable  5 bp vary and from being restless will watch             Pat Desai MD

## 2020-09-21 NOTE — PLAN OF CARE
Problem: Falls - Risk of:  Goal: Will remain free from falls  Description: Will remain free from falls  Outcome: Ongoing  Goal: Absence of physical injury  Description: Absence of physical injury  Outcome: Ongoing     Problem: Restraint Use - Nonviolent/Non-Self-Destructive Behavior:  Goal: Absence of restraint indications  Description: Absence of restraint indications  Outcome: Ongoing  Goal: Absence of restraint-related injury  Description: Absence of restraint-related injury  Outcome: Ongoing

## 2020-09-21 NOTE — PROGRESS NOTES
Hospitalist Progress Note      Name:  Rashel Barnes /Age/Sex: 1940  (79 y.o. male)   MRN & CSN:  [id number] & [id number] Admission Date/Time: 2020 10:33 PM   Location:  -A PCP: Agustin Flores MD         Hospital Day: 3    History of Present Illness:     Chief Complaint: Encephalopathy  Rashel Barnes is a 79 y.o.  male  who presents with altered mental status     The patient seen and examined at bedside. He is lethargic. He is waking up with stimulus. Received Haldol overnight. Currently on restraints. Ten point ROS reviewed negative, unless as noted above    Objective:        Intake/Output Summary (Last 24 hours) at 2020 1159  Last data filed at 2020 0502  Gross per 24 hour   Intake 20 ml   Output 975 ml   Net -955 ml      Vitals:   Vitals:    20 1102   BP: (!) 108/53   Pulse: 62   Resp: 18   Temp:    SpO2: 98%     Physical Exam:   General Appearance: Sleepy, opening eyes with deep stimulus  Cardiovascular: normal rate, regular rhythm, normal S1 and S2  Pulmonary/Chest: Decreased breath sounds bilaterally  Abdomen: soft, non-tender, non-distended, normal bowel sounds, no masses   Extremities: no cyanosis, clubbing or edema, pulse   Skin: warm and dry, no rash or erythema  Head: normocephalic and atraumatic  Eyes: pupils equal, round, and reactive to light  Neck: supple and non-tender without mass, no thyromegaly   Musculoskeletal: normal range of motion, no joint swelling, deformity or tenderness  Neurological: lethargic, waking up with deep stimulus and sleeping right back    Medications:   Medications:    amLODIPine  10 mg Oral BID    aspirin  81 mg Oral Daily    atorvastatin  20 mg Oral Daily    busPIRone  10 mg Oral TID    chlorthalidone  12.5 mg Oral Daily    cholestyramine light  1 packet Oral TID     citalopram  20 mg Oral Daily    clopidogrel  75 mg Oral Daily    fluticasone  1 spray Nasal BID    metoprolol tartrate  50 mg Oral BID    pantoprazole  40 mg Oral QAM AC    insulin glargine  0.15 Units/kg Subcutaneous Nightly    insulin lispro  0-6 Units Subcutaneous Q4H    sodium chloride flush  10 mL Intravenous 2 times per day    magnesium oxide  400 mg Oral Daily    heparin (porcine)  5,000 Units Subcutaneous TID      Infusions:    dextrose       PRN Meds: oxyCODONE-acetaminophen, 1 tablet, Q8H PRN  glucose, 15 g, PRN  dextrose, 12.5 g, PRN  glucagon (rDNA), 1 mg, PRN  dextrose, 100 mL/hr, PRN  sodium chloride flush, 10 mL, PRN  acetaminophen, 650 mg, Q6H PRN    Or  acetaminophen, 650 mg, Q6H PRN  polyethylene glycol, 17 g, Daily PRN  promethazine, 12.5 mg, Q6H PRN    Or  ondansetron, 4 mg, Q6H PRN  haloperidol lactate, 0.5 mg, Q6H PRN            Pertinent New Labs & Imaging Studies     CBC with Differential:    Lab Results   Component Value Date    WBC 5.6 09/21/2020    RBC 4.63 09/21/2020    HGB 14.1 09/21/2020    HCT 40.9 09/21/2020     09/21/2020    MCV 88.3 09/21/2020    MCH 30.5 09/21/2020    MCHC 34.5 09/21/2020    RDW 14.2 09/21/2020    SEGSPCT 69.2 09/21/2020    LYMPHOPCT 19.3 09/21/2020    MONOPCT 9.4 09/21/2020    BASOPCT 0.5 09/21/2020    MONOSABS 0.5 09/21/2020    LYMPHSABS 1.1 09/21/2020    EOSABS 0.1 09/21/2020    BASOSABS 0.0 09/21/2020    DIFFTYPE AUTOMATED DIFFERENTIAL 09/21/2020     CMP:    Lab Results   Component Value Date     09/21/2020    K 4.7 09/21/2020     09/21/2020    CO2 22 09/21/2020    BUN 51 09/21/2020    CREATININE 1.5 09/21/2020    GFRAA 55 09/21/2020    LABGLOM 45 09/21/2020    GLUCOSE 211 09/21/2020    PROT 6.0 09/21/2020    PROT 6.7 07/03/2012    LABALBU 3.9 09/21/2020    CALCIUM 9.1 09/21/2020    BILITOT 1.1 09/21/2020    ALKPHOS 78 09/21/2020    AST 21 09/21/2020    ALT 18 09/21/2020     Xr Pelvis (1-2 Views)    Result Date: 9/19/2020  EXAMINATION: ONE XRAY VIEW OF THE CHEST; ONE XRAY VIEW OF THE PELVIS 9/19/2020 11:16 pm; 9/19/2020 11:17 pm COMPARISON: None.  HISTORY: ORDERING SYSTEM PROVIDED HISTORY: fall TECHNOLOGIST PROVIDED HISTORY: Reason for exam:->fall Reason for Exam: fall Acuity: Acute Type of Exam: Initial; ORDERING SYSTEM PROVIDED HISTORY: fall TECHNOLOGIST PROVIDED HISTORY: Reason for exam:->fall Reason for Exam: fall Acuity: Acute FINDINGS: Chest x-ray: The patient is rotated limiting evaluation of the mediastinum. No pneumothorax or pulmonary contusion or effusion is identified. The heart size is probably within normal limits. Pelvic x-ray: No acute fracture or hip dislocation is visualized. No acute posttraumatic abnormality detected. Ct Head Wo Contrast    Result Date: 9/20/2020  EXAMINATION: CT OF THE HEAD WITHOUT CONTRAST  9/20/2020 12:11 am TECHNIQUE: CT of the head was performed without the administration of intravenous contrast. Dose modulation, iterative reconstruction, and/or weight based adjustment of the mA/kV was utilized to reduce the radiation dose to as low as reasonably achievable. COMPARISON: 08/18/2020 HISTORY: ORDERING SYSTEM PROVIDED HISTORY: fall, confusion TECHNOLOGIST PROVIDED HISTORY: Reason for exam:->fall, confusion Has a \"code stroke\" or \"stroke alert\" been called? ->No Reason for Exam: altered mental status FINDINGS: BRAIN/VENTRICLES: There is mild generalized atrophy. Small remote right basal ganglia lacunar infarct versus prominent perivascular space. There is no acute intracranial hemorrhage, mass effect or midline shift. No abnormal extra-axial fluid collection. The gray-white differentiation is maintained without evidence of an acute infarct. There is no evidence of hydrocephalus. ORBITS: The visualized portion of the orbits demonstrate no acute abnormality. SINUSES: The visualized paranasal sinuses and mastoid air cells demonstrate no acute abnormality. Evidence of prior sinus surgery with bilateral nasoantral windows and partial bilateral ethmoidectomies. SOFT TISSUES/SKULL:  No acute abnormality of the visualized skull or soft tissues.      No acute intracranial abnormality. Mild age-appropriate atrophy. Ct Cervical Spine Wo Contrast    Result Date: 9/20/2020  EXAMINATION: CT OF THE CERVICAL SPINE WITHOUT CONTRAST 9/20/2020 12:11 am TECHNIQUE: CT of the cervical spine was performed without the administration of intravenous contrast. Multiplanar reformatted images are provided for review. Dose modulation, iterative reconstruction, and/or weight based adjustment of the mA/kV was utilized to reduce the radiation dose to as low as reasonably achievable. COMPARISON: None. HISTORY: ORDERING SYSTEM PROVIDED HISTORY: fall TECHNOLOGIST PROVIDED HISTORY: Reason for exam:->fall FINDINGS: BONES/ALIGNMENT: There is no acute fracture or traumatic malalignment. Cervical vertebral body heights and alignment are normal. DEGENERATIVE CHANGES: There are multilevel degenerative changes in the cervical spine. At C2-C3, there is a small central disc protrusion/herniation. At C3-C4, there is moderate left facet arthropathy. At C4-C5, there is a moderate disc herniation more prominent to the right of midline causing mass effect on the cervical spinal cord (axial image 37-38). At C5-C6, there is a broad-based mixed protrusion/herniation more prominent left paracentrally causing moderate to severe cord flattening (axial image 43). At C6-C7, there is a broad-based mixed protrusion/herniation more prominent left paracentrally causing moderate cord flattening (axial image 47). SOFT TISSUES: There is no prevertebral soft tissue swelling. No acute abnormality of the cervical spine. Multilevel degenerative changes as described above. If clinically indicated, correlation with MRI would be helpful.      Ct Thoracic Spine Wo Contrast    Result Date: 9/20/2020  EXAMINATION: CT OF THE THORACIC SPINE WITHOUT CONTRAST  9/20/2020 12:11 am: TECHNIQUE: CT of the thoracic spine was performed without the administration of intravenous contrast. Multiplanar reformatted images are provided for review. Dose modulation, iterative reconstruction, and/or weight based adjustment of the mA/kV was utilized to reduce the radiation dose to as low as reasonably achievable. COMPARISON: 08/08/2020 HISTORY: ORDERING SYSTEM PROVIDED HISTORY: fall TECHNOLOGIST PROVIDED HISTORY: Reason for exam:->fall FINDINGS: BONES/ALIGNMENT: No evidence of acute fracture or traumatic malalignment of the thoracic spine. DEGENERATIVE CHANGES: No bony canal stenosis. Multilevel thoracic spine anterior disc osteophyte complexes are similar to the comparison study. SOFT TISSUES: No paraspinal mass. Dependent airspace opacities bilaterally are favored to be related to subsegmental atelectasis. There are coronary artery calcifications. No evidence of acute fracture or traumatic malalignment of the thoracic spine. Ct Lumbar Spine Wo Contrast    Result Date: 9/20/2020  EXAMINATION: CT OF THE LUMBAR SPINE WITHOUT CONTRAST  9/20/2020 TECHNIQUE: CT of the lumbar spine was performed without the administration of intravenous contrast. Multiplanar reformatted images are provided for review. Dose modulation, iterative reconstruction, and/or weight based adjustment of the mA/kV was utilized to reduce the radiation dose to as low as reasonably achievable. COMPARISON: 08/08/2020 HISTORY: ORDERING SYSTEM PROVIDED HISTORY: fall TECHNOLOGIST PROVIDED HISTORY: Reason for exam:->fall Reason for Exam: fall FINDINGS: BONES/ALIGNMENT: There is normal alignment of the spine. The vertebral body heights are maintained. No osseous destructive lesion is seen. DEGENERATIVE CHANGES: Multilevel degenerative changes of the lumbar spine with spinal canal stenosis at L2-3, L3-4, L4-5, and L5-S1. Multilevel neural foraminal narrowing. SOFT TISSUES/RETROPERITONEUM: No paraspinal mass is seen. 1. No acute abnormality of the lumbar spine. 2. Multilevel degenerative changes.      Xr Chest Portable    Result Date: 9/20/2020  EXAMINATION: ONE X-RAY VIEW OF THE CHEST 9/20/2020 12:39 pm COMPARISON: Chest radiograph 09/19/2020. HISTORY: ORDERING SYSTEM PROVIDED HISTORY: s/p CVC TECHNOLOGIST PROVIDED HISTORY: Reason for exam:->s/p CVC Reason for Exam: CVC Acuity: Acute Type of Exam: Subsequent/Follow-up FINDINGS: Mild rotation. Stable mediastinal and cardiac silhouettes. Stable lung volumes with no acute consolidation or effusion. No pneumothorax or free subdiaphragmatic air. Right subclavian CVC with tip along the distribution of the proximal superior vena cava. Redemonstration of densely calcified right hilar lymph nodes consistent with remote healed granulomatous disease. Right CVC with tip in the region of the proximal to mid superior vena cava. No acute pneumothorax. Xr Chest Portable    Result Date: 9/19/2020  EXAMINATION: ONE XRAY VIEW OF THE CHEST; ONE XRAY VIEW OF THE PELVIS 9/19/2020 11:16 pm; 9/19/2020 11:17 pm COMPARISON: None. HISTORY: ORDERING SYSTEM PROVIDED HISTORY: fall TECHNOLOGIST PROVIDED HISTORY: Reason for exam:->fall Reason for Exam: fall Acuity: Acute Type of Exam: Initial; ORDERING SYSTEM PROVIDED HISTORY: fall TECHNOLOGIST PROVIDED HISTORY: Reason for exam:->fall Reason for Exam: fall Acuity: Acute FINDINGS: Chest x-ray: The patient is rotated limiting evaluation of the mediastinum. No pneumothorax or pulmonary contusion or effusion is identified. The heart size is probably within normal limits. Pelvic x-ray: No acute fracture or hip dislocation is visualized. No acute posttraumatic abnormality detected.        Assessment and Plan:   Nerissa Macias is a 79 y.o.  male  who presents with Encephalopathy    Acute metabolic encephalopathy  Likely from VICKY   CT head with no abnormality  Obtain ABG   Monitor BMP  Ammonia ordered  DC restraints if possible  Continue monitoring mentation  Neurology consulted    VICKY on CKD stage 3  Likely Pre renal VICKY  Renal functions improved with IVF  Monitor BMP    Recent COVID 19 infection  R/o COVID 19  X ray chest with no abnormality  Current testing pending  Can be transferred to Med/surg floor if negative    Type 2 DM  Continue SS insulin  Hypoglycemia precautions  Diabetic diet    CAD  Continue aspirin, Plavix, statin and BB     Chronic diastolic HF-Currently compendated  Recent ECHO with stage 1 DD  Holding lasix    HTN  Continue home medications    Diet Diet NPO Effective Now   DVT Prophylaxis [] Lovenox, [x]  Heparin, [] SCDs, [] Ambulation   GI Prophylaxis [x] PPI,  [] H2 Blocker,  [] Carafate,  [] Diet/Tube Feeds   Code Status Full Code   Disposition Patient requires continued admission due to AMS and VICKY   MDM [] Low, [x] Moderate,[]  High     Electronically signed by Nelva Lombard, MD on 9/21/2020 at 11:59 AM

## 2020-09-22 LAB
GLUCOSE BLD-MCNC: 215 MG/DL (ref 70–99)
GLUCOSE BLD-MCNC: 222 MG/DL (ref 70–99)
GLUCOSE BLD-MCNC: 231 MG/DL (ref 70–99)
GLUCOSE BLD-MCNC: 241 MG/DL (ref 70–99)
GLUCOSE BLD-MCNC: 298 MG/DL (ref 70–99)
SARS-COV-2: NOT DETECTED
SOURCE: NORMAL

## 2020-09-22 PROCEDURE — 6370000000 HC RX 637 (ALT 250 FOR IP): Performed by: INTERNAL MEDICINE

## 2020-09-22 PROCEDURE — 6370000000 HC RX 637 (ALT 250 FOR IP): Performed by: STUDENT IN AN ORGANIZED HEALTH CARE EDUCATION/TRAINING PROGRAM

## 2020-09-22 PROCEDURE — 82962 GLUCOSE BLOOD TEST: CPT

## 2020-09-22 PROCEDURE — 6360000002 HC RX W HCPCS: Performed by: INTERNAL MEDICINE

## 2020-09-22 PROCEDURE — 1200000000 HC SEMI PRIVATE

## 2020-09-22 PROCEDURE — 94761 N-INVAS EAR/PLS OXIMETRY MLT: CPT

## 2020-09-22 PROCEDURE — 2580000003 HC RX 258: Performed by: STUDENT IN AN ORGANIZED HEALTH CARE EDUCATION/TRAINING PROGRAM

## 2020-09-22 RX ORDER — ACETAMINOPHEN 500 MG
1000 TABLET ORAL ONCE
Status: COMPLETED | OUTPATIENT
Start: 2020-09-22 | End: 2020-09-22

## 2020-09-22 RX ORDER — INSULIN GLARGINE 100 [IU]/ML
30 INJECTION, SOLUTION SUBCUTANEOUS NIGHTLY
Status: DISCONTINUED | OUTPATIENT
Start: 2020-09-22 | End: 2020-09-23

## 2020-09-22 RX ADMIN — HEPARIN SODIUM 5000 UNITS: 5000 INJECTION INTRAVENOUS; SUBCUTANEOUS at 21:21

## 2020-09-22 RX ADMIN — CHOLESTYRAMINE 4 G: 4 POWDER, FOR SUSPENSION ORAL at 16:43

## 2020-09-22 RX ADMIN — SODIUM CHLORIDE, PRESERVATIVE FREE 10 ML: 5 INJECTION INTRAVENOUS at 08:49

## 2020-09-22 RX ADMIN — CHLORTHALIDONE 12.5 MG: 25 TABLET ORAL at 08:48

## 2020-09-22 RX ADMIN — ATORVASTATIN CALCIUM 20 MG: 20 TABLET, FILM COATED ORAL at 08:49

## 2020-09-22 RX ADMIN — BUSPIRONE HYDROCHLORIDE 10 MG: 10 TABLET ORAL at 08:48

## 2020-09-22 RX ADMIN — MAGNESIUM OXIDE 400 MG (241.3 MG MAGNESIUM) TABLET 400 MG: TABLET at 08:48

## 2020-09-22 RX ADMIN — ASPIRIN 81 MG CHEWABLE TABLET 81 MG: 81 TABLET CHEWABLE at 08:48

## 2020-09-22 RX ADMIN — METOPROLOL TARTRATE 50 MG: 50 TABLET, FILM COATED ORAL at 21:07

## 2020-09-22 RX ADMIN — HEPARIN SODIUM 5000 UNITS: 5000 INJECTION INTRAVENOUS; SUBCUTANEOUS at 14:32

## 2020-09-22 RX ADMIN — INSULIN GLARGINE 30 UNITS: 100 INJECTION, SOLUTION SUBCUTANEOUS at 21:08

## 2020-09-22 RX ADMIN — CHOLESTYRAMINE 4 G: 4 POWDER, FOR SUSPENSION ORAL at 08:49

## 2020-09-22 RX ADMIN — ACETAMINOPHEN 1000 MG: 500 TABLET ORAL at 10:17

## 2020-09-22 RX ADMIN — BUSPIRONE HYDROCHLORIDE 10 MG: 10 TABLET ORAL at 14:32

## 2020-09-22 RX ADMIN — SODIUM CHLORIDE, PRESERVATIVE FREE 10 ML: 5 INJECTION INTRAVENOUS at 21:07

## 2020-09-22 RX ADMIN — FLUTICASONE PROPIONATE 1 SPRAY: 50 SPRAY, METERED NASAL at 21:07

## 2020-09-22 RX ADMIN — HALOPERIDOL LACTATE 0.5 MG: 5 INJECTION, SOLUTION INTRAMUSCULAR at 01:39

## 2020-09-22 RX ADMIN — AMLODIPINE BESYLATE 10 MG: 10 TABLET ORAL at 08:49

## 2020-09-22 RX ADMIN — FLUTICASONE PROPIONATE 1 SPRAY: 50 SPRAY, METERED NASAL at 09:08

## 2020-09-22 RX ADMIN — PROMETHAZINE HYDROCHLORIDE 12.5 MG: 25 TABLET ORAL at 00:38

## 2020-09-22 RX ADMIN — CHOLESTYRAMINE 4 G: 4 POWDER, FOR SUSPENSION ORAL at 12:28

## 2020-09-22 RX ADMIN — AMLODIPINE BESYLATE 10 MG: 10 TABLET ORAL at 21:07

## 2020-09-22 RX ADMIN — CLOPIDOGREL BISULFATE 75 MG: 75 TABLET ORAL at 08:49

## 2020-09-22 RX ADMIN — METOPROLOL TARTRATE 50 MG: 50 TABLET, FILM COATED ORAL at 08:48

## 2020-09-22 RX ADMIN — HEPARIN SODIUM 5000 UNITS: 5000 INJECTION INTRAVENOUS; SUBCUTANEOUS at 08:49

## 2020-09-22 RX ADMIN — CITALOPRAM HYDROBROMIDE 20 MG: 20 TABLET ORAL at 08:49

## 2020-09-22 RX ADMIN — BUSPIRONE HYDROCHLORIDE 10 MG: 10 TABLET ORAL at 21:07

## 2020-09-22 ASSESSMENT — PAIN DESCRIPTION - DESCRIPTORS: DESCRIPTORS: ACHING

## 2020-09-22 ASSESSMENT — PAIN DESCRIPTION - LOCATION: LOCATION: EAR

## 2020-09-22 ASSESSMENT — PAIN SCALES - GENERAL: PAINLEVEL_OUTOF10: 6

## 2020-09-22 ASSESSMENT — PAIN DESCRIPTION - FREQUENCY: FREQUENCY: CONTINUOUS

## 2020-09-22 NOTE — PROGRESS NOTES
Hospitalist Progress Note      Name:  Yossi Jimenez /Age/Sex: 1940  (79 y.o. male)   MRN & CSN:  [id number] & [id number] Admission Date/Time: 2020 10:33 PM   Location:  -A PCP: Sunny Grider MD       Hospital Day: 4        Assessment and Plan:     Encephalopathy  -He is on opioids at home  -He is on BuSpar and SSRIs  -He has an VICKY  -Haldol as needed for agitation-it seemed to work well last admission  -Orthostatic vital signs once patient is not altered  -Minimize auditory stimulation    Acute kidney injury on chronic kidney disease  -Nephrology consult appreciated  -Holding Spironolactone and losartan    Elevated troponin  -Minimal elevation, doubt ACS    History of liver cirrhosis  -Ammonia level is 34    Recent COVID-19 infection  -repeat test pending    Other problems    Diabetes mellitus type 2, on insulin  -Amaryl discontinued  -Started on renally adjusted Lantus and insulin correction scale    Hypertension  -Holding meds as above    Coronary artery disease  -Continue home meds except as above    Left ear pain - will follow up    Subjective:     Patient having left ear pain    Objective: Intake/Output Summary (Last 24 hours) at 2020 1207  Last data filed at 2020 1051  Gross per 24 hour   Intake 130 ml   Output 775 ml   Net -645 ml      Vitals:   Vitals:    20 0751   BP:    Pulse: 75   Resp: 20   Temp:    SpO2:      Physical Exam:      Physical Exam  Constitutional:       Appearance: He is obese. Pulmonary:      Effort: Pulmonary effort is normal.   Skin:     General: Skin is dry. Body mass index is 31.98 kg/m².         Medications:   Medications:    amLODIPine  10 mg Oral BID    insulin lispro  0-6 Units Subcutaneous 4x Daily AC & HS    aspirin  81 mg Oral Daily    atorvastatin  20 mg Oral Daily    busPIRone  10 mg Oral TID    chlorthalidone  12.5 mg Oral Daily    cholestyramine light  1 packet Oral TID WC    citalopram  20 mg Oral Daily    clopidogrel  75 mg Oral Daily    fluticasone  1 spray Nasal BID    metoprolol tartrate  50 mg Oral BID    pantoprazole  40 mg Oral QAM AC    insulin glargine  0.15 Units/kg Subcutaneous Nightly    sodium chloride flush  10 mL Intravenous 2 times per day    magnesium oxide  400 mg Oral Daily    heparin (porcine)  5,000 Units Subcutaneous TID      Infusions:    dextrose       PRN Meds: oxyCODONE-acetaminophen, 1 tablet, Q8H PRN  glucose, 15 g, PRN  dextrose, 12.5 g, PRN  glucagon (rDNA), 1 mg, PRN  dextrose, 100 mL/hr, PRN  sodium chloride flush, 10 mL, PRN  acetaminophen, 650 mg, Q6H PRN    Or  acetaminophen, 650 mg, Q6H PRN  polyethylene glycol, 17 g, Daily PRN  promethazine, 12.5 mg, Q6H PRN    Or  ondansetron, 4 mg, Q6H PRN  haloperidol lactate, 0.5 mg, Q6H PRN          Electronically signed by Krish Wall MD on 9/22/2020 at 12:07 PM

## 2020-09-22 NOTE — PROGRESS NOTES
Nephrology Progress Note  9/22/2020 10:11 AM  Subjective:   Admit Date: 9/19/2020  PCP: Bria Yan MD  Interval History: pt more calm and in restraint and with islas    Diet: DIET CARB CONTROL; Carb Control: 4 carb choices (60 gms)/meal  Pain is:Mild      Data:   Scheduled Meds:   acetaminophen  1,000 mg Oral Once    amLODIPine  10 mg Oral BID    insulin lispro  0-6 Units Subcutaneous 4x Daily AC & HS    aspirin  81 mg Oral Daily    atorvastatin  20 mg Oral Daily    busPIRone  10 mg Oral TID    chlorthalidone  12.5 mg Oral Daily    cholestyramine light  1 packet Oral TID WC    citalopram  20 mg Oral Daily    clopidogrel  75 mg Oral Daily    fluticasone  1 spray Nasal BID    metoprolol tartrate  50 mg Oral BID    pantoprazole  40 mg Oral QAM AC    insulin glargine  0.15 Units/kg Subcutaneous Nightly    sodium chloride flush  10 mL Intravenous 2 times per day    magnesium oxide  400 mg Oral Daily    heparin (porcine)  5,000 Units Subcutaneous TID     Continuous Infusions:   dextrose       PRN Meds:oxyCODONE-acetaminophen, glucose, dextrose, glucagon (rDNA), dextrose, sodium chloride flush, acetaminophen **OR** acetaminophen, polyethylene glycol, promethazine **OR** ondansetron, haloperidol lactate  I/O last 3 completed shifts: In: 10 [I.V.:10]  Out: 775 [Urine:775]  No intake/output data recorded.     Intake/Output Summary (Last 24 hours) at 9/22/2020 1011  Last data filed at 9/22/2020 0411  Gross per 24 hour   Intake 10 ml   Output 775 ml   Net -765 ml     CBC:   Recent Labs     09/19/20 2329 09/21/20  0345   WBC 6.7 5.6   HGB 16.1 14.1   * 121*     BMP:    Recent Labs     09/19/20 2329 09/20/20 2105 09/21/20  0345   * 135 137   K 5.0 4.7 4.7   CL 98* 101 104   CO2 20* 21 22   BUN 64* 57* 51*   CREATININE 3.7* 1.8* 1.5*   GLUCOSE 246* 245* 211*     Hepatic:   Recent Labs     09/19/20 2329 09/21/20  0345   AST 23 21   ALT 19 18   BILITOT 0.9 1.1*   ALKPHOS 78 78     Troponin: No results for input(s): TROPONINI in the last 72 hours. BNP: No results for input(s): BNP in the last 72 hours. Lipids: No results for input(s): CHOL, HDL in the last 72 hours. Invalid input(s): LDLCALCU  ABGs:   Lab Results   Component Value Date    PO2ART 70 09/21/2020    PRA3KEV 38.0 09/21/2020     INR:   Recent Labs     09/19/20  2329   INR 0.98     Renal Labs  Albumin:    Lab Results   Component Value Date    LABALBU 3.9 09/21/2020     Calcium:    Lab Results   Component Value Date    CALCIUM 9.1 09/21/2020     Phosphorus:    Lab Results   Component Value Date    PHOS 2.5 09/20/2020     U/A:    Lab Results   Component Value Date    NITRU NEGATIVE 09/20/2020    COLORU YELLOW 09/20/2020    WBCUA 1 09/20/2020    RBCUA NONE SEEN 09/20/2020    MUCUS RARE 09/20/2020    TRICHOMONAS NONE SEEN 09/20/2020    BACTERIA RARE 09/20/2020    CLARITYU CLEAR 09/20/2020    SPECGRAV 1.018 09/20/2020    UROBILINOGEN NORMAL 09/20/2020    BILIRUBINUR NEGATIVE 09/20/2020    BLOODU NEGATIVE 09/20/2020    KETUA NEGATIVE 09/20/2020     ABG:    Lab Results   Component Value Date    NGJ4ZQO 38.0 09/21/2020    PO2ART 70 09/21/2020    SXK5TSG 22.0 09/21/2020     HgBA1c:    Lab Results   Component Value Date    LABA1C 8.8 08/09/2020     Microalbumen/Creatinine ratio:  No components found for: RUCREAT          Objective:   Vitals: BP (!) 144/80   Pulse 75   Temp 98 °F (36.7 °C) (Oral)   Resp 20   Wt 222 lb 14.2 oz (101.1 kg)   SpO2 100%   BMI 31.98 kg/m²   General appearance: awake weak  HEENT: Head: Normal, normocephalic, atraumatic.   Neck: supple, symmetrical, trachea midline  Lungs: diminished breath sounds bilaterally  Heart: S1, S2 normal  Abdomen: abnormal findings:  soft nt  Extremities: edema trace  Neurologic: Mental status: alertness: alert      Patient Active Problem List:     Essential hypertension, benign     Type 2 diabetes mellitus, with long-term current use of insulin ([address]     Other and unspecified hyperlipidemia Chest pain     Coronary atherosclerosis     Chronic kidney disease, stage III (moderate) (HCC)     Cellulitis of leg     Cellulitis of lower leg     Lethargy     Acute respiratory failure with hypoxia and hypercapnia (HCC)     Altered mental status, unspecified     Hypoglycemia     Acute kidney injury ([facility] [address]     Generalized weakness     Liver cirrhosis (HCC)     Depression     Chronic pain     Encephalopathy    Assessment and Plan:      IMP:  1 arf from atn on ckd 3  2 hx covid +  3 sp fall with confusion  4 Dm2  5 htn      Plan     1 renal improved and monitor  2 fu repeat covid test  3 affect monitor and restriant but more lucid and see if can remove  4 ssi and trying improve  5 bp stable overall mointor  6 na better  Slowly improving and maintain with roshan Desai MD

## 2020-09-22 NOTE — CARE COORDINATION
Patient on COVID unit with altered mental status . Patient known to this  from recent admission. Patient is from home with his daughter Karina Gonzalez and son in law. Patient uses a cane or walker for mobility and is normally able to perform his ADL's. Patient has PCP and insurance to assist with RX coverage. Case management following to assist with any potential discharge needs.

## 2020-09-23 LAB
GLUCOSE BLD-MCNC: 159 MG/DL (ref 70–99)
GLUCOSE BLD-MCNC: 165 MG/DL (ref 70–99)
GLUCOSE BLD-MCNC: 191 MG/DL (ref 70–99)
GLUCOSE BLD-MCNC: 205 MG/DL (ref 70–99)
GLUCOSE BLD-MCNC: 220 MG/DL (ref 70–99)
SARS-COV-2, NAAT: DETECTED
SOURCE: ABNORMAL

## 2020-09-23 PROCEDURE — 6370000000 HC RX 637 (ALT 250 FOR IP): Performed by: STUDENT IN AN ORGANIZED HEALTH CARE EDUCATION/TRAINING PROGRAM

## 2020-09-23 PROCEDURE — 6370000000 HC RX 637 (ALT 250 FOR IP): Performed by: INTERNAL MEDICINE

## 2020-09-23 PROCEDURE — 2580000003 HC RX 258: Performed by: STUDENT IN AN ORGANIZED HEALTH CARE EDUCATION/TRAINING PROGRAM

## 2020-09-23 PROCEDURE — 51798 US URINE CAPACITY MEASURE: CPT

## 2020-09-23 PROCEDURE — 6360000002 HC RX W HCPCS: Performed by: INTERNAL MEDICINE

## 2020-09-23 PROCEDURE — 82962 GLUCOSE BLOOD TEST: CPT

## 2020-09-23 PROCEDURE — 1200000000 HC SEMI PRIVATE

## 2020-09-23 PROCEDURE — U0002 COVID-19 LAB TEST NON-CDC: HCPCS

## 2020-09-23 RX ORDER — INSULIN GLARGINE 100 [IU]/ML
40 INJECTION, SOLUTION SUBCUTANEOUS NIGHTLY
Status: DISCONTINUED | OUTPATIENT
Start: 2020-09-23 | End: 2020-09-27

## 2020-09-23 RX ADMIN — AMLODIPINE BESYLATE 10 MG: 10 TABLET ORAL at 08:15

## 2020-09-23 RX ADMIN — INSULIN GLARGINE 40 UNITS: 100 INJECTION, SOLUTION SUBCUTANEOUS at 21:00

## 2020-09-23 RX ADMIN — FLUTICASONE PROPIONATE 1 SPRAY: 50 SPRAY, METERED NASAL at 09:07

## 2020-09-23 RX ADMIN — ACETAMINOPHEN 650 MG: 325 TABLET ORAL at 18:14

## 2020-09-23 RX ADMIN — HALOPERIDOL LACTATE 0.5 MG: 5 INJECTION, SOLUTION INTRAMUSCULAR at 00:44

## 2020-09-23 RX ADMIN — MAGNESIUM OXIDE 400 MG (241.3 MG MAGNESIUM) TABLET 400 MG: TABLET at 08:15

## 2020-09-23 RX ADMIN — AMLODIPINE BESYLATE 10 MG: 10 TABLET ORAL at 21:00

## 2020-09-23 RX ADMIN — CLOPIDOGREL BISULFATE 75 MG: 75 TABLET ORAL at 08:15

## 2020-09-23 RX ADMIN — CITALOPRAM HYDROBROMIDE 20 MG: 20 TABLET ORAL at 08:15

## 2020-09-23 RX ADMIN — INSULIN LISPRO 2 UNITS: 100 INJECTION, SOLUTION INTRAVENOUS; SUBCUTANEOUS at 08:14

## 2020-09-23 RX ADMIN — SODIUM CHLORIDE, PRESERVATIVE FREE 10 ML: 5 INJECTION INTRAVENOUS at 09:07

## 2020-09-23 RX ADMIN — ACETAMINOPHEN 650 MG: 325 TABLET ORAL at 08:15

## 2020-09-23 RX ADMIN — SODIUM CHLORIDE, PRESERVATIVE FREE 10 ML: 5 INJECTION INTRAVENOUS at 21:00

## 2020-09-23 RX ADMIN — ATORVASTATIN CALCIUM 20 MG: 20 TABLET, FILM COATED ORAL at 08:15

## 2020-09-23 RX ADMIN — HEPARIN SODIUM 5000 UNITS: 5000 INJECTION INTRAVENOUS; SUBCUTANEOUS at 13:57

## 2020-09-23 RX ADMIN — BUSPIRONE HYDROCHLORIDE 10 MG: 10 TABLET ORAL at 21:00

## 2020-09-23 RX ADMIN — BUSPIRONE HYDROCHLORIDE 10 MG: 10 TABLET ORAL at 13:57

## 2020-09-23 RX ADMIN — METOPROLOL TARTRATE 50 MG: 50 TABLET, FILM COATED ORAL at 21:00

## 2020-09-23 RX ADMIN — METOPROLOL TARTRATE 50 MG: 50 TABLET, FILM COATED ORAL at 08:15

## 2020-09-23 RX ADMIN — INSULIN LISPRO 4 UNITS: 100 INJECTION, SOLUTION INTRAVENOUS; SUBCUTANEOUS at 12:18

## 2020-09-23 RX ADMIN — INSULIN LISPRO 2 UNITS: 100 INJECTION, SOLUTION INTRAVENOUS; SUBCUTANEOUS at 17:04

## 2020-09-23 RX ADMIN — PANTOPRAZOLE SODIUM 40 MG: 40 TABLET, DELAYED RELEASE ORAL at 06:05

## 2020-09-23 RX ADMIN — CHLORTHALIDONE 12.5 MG: 25 TABLET ORAL at 09:07

## 2020-09-23 RX ADMIN — HEPARIN SODIUM 5000 UNITS: 5000 INJECTION INTRAVENOUS; SUBCUTANEOUS at 08:14

## 2020-09-23 RX ADMIN — BUSPIRONE HYDROCHLORIDE 10 MG: 10 TABLET ORAL at 08:15

## 2020-09-23 RX ADMIN — ASPIRIN 81 MG CHEWABLE TABLET 81 MG: 81 TABLET CHEWABLE at 08:15

## 2020-09-23 RX ADMIN — CHOLESTYRAMINE 4 G: 4 POWDER, FOR SUSPENSION ORAL at 17:05

## 2020-09-23 RX ADMIN — CHOLESTYRAMINE 4 G: 4 POWDER, FOR SUSPENSION ORAL at 08:15

## 2020-09-23 ASSESSMENT — PAIN SCALES - WONG BAKER: WONGBAKER_NUMERICALRESPONSE: 0

## 2020-09-23 ASSESSMENT — PAIN SCALES - GENERAL
PAINLEVEL_OUTOF10: 7
PAINLEVEL_OUTOF10: 6

## 2020-09-23 NOTE — CONSULTS
age-appropriate atrophy. Ct Cervical Spine Wo Contrast    Result Date: 9/20/2020  EXAMINATION: CT OF THE CERVICAL SPINE WITHOUT CONTRAST 9/20/2020 12:11 am T    No acute abnormality of the cervical spine. Multilevel degenerative changes as described above. If clinically indicated, correlation with MRI would be helpful. Ct Thoracic Spine Wo Contrast    Result Date: 9/20/2020  EXAMINATION: CT OF THE THORACIC SPINE WITHOUT CONTRAST  9/20/2020 12:11 am:     No evidence of acute fracture or traumatic malalignment of the thoracic spine. Ct Lumbar Spine Wo Contrast    Result Date: 9/20/2020  EXAMINATION: CT OF THE LUMBAR SPINE WITHOUT CONTRAST  9/20/2020     1. No acute abnormality of the lumbar spine. 2. Multilevel degenerative changes. Xr Chest Portable    Result Date: 9/20/2020  EXAMINATION: ONE X-RAY VIEW OF THE CHEST 9/20/2020 12:39 pm COMPARISON: Chest radiograph 09/19/2020. HISTORY: ORDERING SYSTEM PROVIDED HISTORY: s/p CVC     Right CVC with tip in the region of the proximal to mid superior vena cava. No acute pneumothorax. Xr Chest Portable    Result Date: 9/19/2020  EXAMINATION: ONE XRAY VIEW OF THE CHEST; ONE XRAY VIEW OF THE PELVIS 9/19/2020 11:16 pm; 9/19/2020 11:17 pm COMPARISON: None. d.     No acute posttraumatic abnormality detected.        Scheduled Medicines   Medications:    insulin glargine  30 Units Subcutaneous Nightly    insulin lispro  0-12 Units Subcutaneous TID     insulin lispro  0-6 Units Subcutaneous 2 times per day    amLODIPine  10 mg Oral BID    aspirin  81 mg Oral Daily    atorvastatin  20 mg Oral Daily    busPIRone  10 mg Oral TID    chlorthalidone  12.5 mg Oral Daily    cholestyramine light  1 packet Oral TID WC    citalopram  20 mg Oral Daily    clopidogrel  75 mg Oral Daily    fluticasone  1 spray Nasal BID    metoprolol tartrate  50 mg Oral BID    pantoprazole  40 mg Oral QAM AC    sodium chloride flush  10 mL Intravenous 2 times per day    magnesium oxide  400 mg Oral Daily    heparin (porcine)  5,000 Units Subcutaneous TID      Infusions:    dextrose           IMPRESSION    Patient Active Problem List   Diagnosis    Essential hypertension, benign    Type 2 diabetes mellitus, with long-term current use of insulin (HCC)    Other and unspecified hyperlipidemia    Chest pain    Coronary atherosclerosis    Chronic kidney disease, stage III (moderate) (HCC)    Cellulitis of leg    Cellulitis of lower leg    Lethargy    Acute respiratory failure with hypoxia and hypercapnia (HCC)    Altered mental status, unspecified    Hypoglycemia    Acute kidney injury ([facility] [address]    Generalized weakness    Liver cirrhosis (HCC)    Depression    Chronic pain    Encephalopathy         RECOMMENDATIONS:      1. Reviewed POC blood glucose . Labs and X ray results   2. Reviewed Home and Current Medicines   3. Will Start On meal/ Correction bolus Humalog/ Lantus Insulin regime   4. Monitor Blood glucose frequently   5. Modify  the dose of Insulin  as needed        Will follow with you  Again thank you for sharing pt's care with me.      Truly yours,       Manjeet Brunner MD

## 2020-09-23 NOTE — PROGRESS NOTES
hours.  BNP: No results for input(s): BNP in the last 72 hours. Lipids: No results for input(s): CHOL, HDL in the last 72 hours. Invalid input(s): LDLCALCU  ABGs:   Lab Results   Component Value Date    PO2ART 70 09/21/2020    GXT7WNM 38.0 09/21/2020     INR:   No results for input(s): INR in the last 72 hours. Renal Labs  Albumin:    Lab Results   Component Value Date    LABALBU 3.9 09/21/2020     Calcium:    Lab Results   Component Value Date    CALCIUM 9.1 09/21/2020     Phosphorus:    Lab Results   Component Value Date    PHOS 2.5 09/20/2020     U/A:    Lab Results   Component Value Date    NITRU NEGATIVE 09/20/2020    COLORU YELLOW 09/20/2020    WBCUA 1 09/20/2020    RBCUA NONE SEEN 09/20/2020    MUCUS RARE 09/20/2020    TRICHOMONAS NONE SEEN 09/20/2020    BACTERIA RARE 09/20/2020    CLARITYU CLEAR 09/20/2020    SPECGRAV 1.018 09/20/2020    UROBILINOGEN NORMAL 09/20/2020    BILIRUBINUR NEGATIVE 09/20/2020    BLOODU NEGATIVE 09/20/2020    KETUA NEGATIVE 09/20/2020     ABG:    Lab Results   Component Value Date    MQO5VGX 38.0 09/21/2020    PO2ART 70 09/21/2020    JWD5ITF 22.0 09/21/2020     HgBA1c:    Lab Results   Component Value Date    LABA1C 8.8 08/09/2020     Microalbumen/Creatinine ratio:  No components found for: RUCREAT          Objective:   Vitals: BP (!) 148/83   Pulse 79   Temp 98.9 °F (37.2 °C) (Oral)   Resp 18   Wt 222 lb 14.2 oz (101.1 kg)   SpO2 97%   BMI 31.98 kg/m²   General appearance: awake weak  HEENT: Head: Normal, normocephalic, atraumatic.   Neck: supple, symmetrical, trachea midline  Lungs: diminished breath sounds bilaterally  Heart: S1, S2 normal  Abdomen: abnormal findings:  soft nt  Extremities: edema trace  Neurologic: Mental status: alertness: alert      Patient Active Problem List:     Essential hypertension, benign     Type 2 diabetes mellitus, with long-term current use of insulin ([facility] [address]     Other and unspecified hyperlipidemia     Chest pain     Coronary

## 2020-09-23 NOTE — PROGRESS NOTES
HOSPITALIST    Called daughter Najma Doctor to discuss plan. Per Najma Ramos, patient requiring 1 person assist to stand and pivot and use the [facility] and 2 person assist to get to BR. Also, had Geodon last night as was fidgety but did will during the day today.

## 2020-09-23 NOTE — PROGRESS NOTES
Hospitalist Progress Note      Name:  Krish Mrain /Age/Sex: 1940  (79 y.o. male)   MRN & CSN:  [id number] & [id number] Admission Date/Time: 2020 10:33 PM   Location:  -A PCP: Adair Monterroso MD       Hospital Day: 5        Assessment and Plan:     Encephalopathy  -He is on opioids at home  -He is on BuSpar and SSRIs  -He has an VICKY  -Haldol as needed for agitation-it seemed to work well last admission  -Orthostatic vital signs once patient is not altered  -Minimize auditory stimulation    Acute kidney injury on chronic kidney disease  -Nephrology consult appreciated  -Holding Spironolactone and losartan    Elevated troponin  -Minimal elevation, doubt ACS    History of liver cirrhosis  -Ammonia level is 34    Recent COVID-19 infection  -repeat test pending    Other problems    Diabetes mellitus type 2, on insulin  -Amaryl discontinued  -Started on renally adjusted Lantus and insulin correction scale  -Monitor blood sugar    Hypertension  -Holding meds as above    Coronary artery disease  -Continue home meds except as above    Left ear pain - will follow up    Subjective:     Patient having left ear pain on , but on  ear pain resolved    Objective: Intake/Output Summary (Last 24 hours) at 2020 1558  Last data filed at 2020 1230  Gross per 24 hour   Intake 840 ml   Output 250 ml   Net 590 ml      Vitals:   Vitals:    20 0804   BP: (!) 148/83   Pulse: 79   Resp: 18   Temp: 98.9 °F (37.2 °C)   SpO2: 97%     Physical Exam:      Physical Exam  Constitutional:       Appearance: He is obese. Pulmonary:      Effort: Pulmonary effort is normal.   Skin:     General: Skin is dry. Body mass index is 31.98 kg/m².         Medications:   Medications:    insulin glargine  40 Units Subcutaneous Nightly    insulin lispro  15 Units Subcutaneous TID WC    insulin lispro  0-12 Units Subcutaneous 2 times per day    insulin lispro  0-12 Units Subcutaneous TID WC    amLODIPine 10 mg Oral BID    aspirin  81 mg Oral Daily    atorvastatin  20 mg Oral Daily    busPIRone  10 mg Oral TID    chlorthalidone  12.5 mg Oral Daily    cholestyramine light  1 packet Oral TID WC    citalopram  20 mg Oral Daily    clopidogrel  75 mg Oral Daily    fluticasone  1 spray Nasal BID    metoprolol tartrate  50 mg Oral BID    pantoprazole  40 mg Oral QAM AC    sodium chloride flush  10 mL Intravenous 2 times per day    magnesium oxide  400 mg Oral Daily    heparin (porcine)  5,000 Units Subcutaneous TID      Infusions:    dextrose       PRN Meds: oxyCODONE-acetaminophen, 1 tablet, Q8H PRN  glucose, 15 g, PRN  dextrose, 12.5 g, PRN  glucagon (rDNA), 1 mg, PRN  dextrose, 100 mL/hr, PRN  sodium chloride flush, 10 mL, PRN  acetaminophen, 650 mg, Q6H PRN    Or  acetaminophen, 650 mg, Q6H PRN  polyethylene glycol, 17 g, Daily PRN  promethazine, 12.5 mg, Q6H PRN    Or  ondansetron, 4 mg, Q6H PRN  haloperidol lactate, 0.5 mg, Q6H PRN          Electronically signed by Yudelka Jimenez MD on 9/23/2020 at 3:58 PM

## 2020-09-24 LAB
ALBUMIN SERPL-MCNC: 3.8 GM/DL (ref 3.4–5)
ANION GAP SERPL CALCULATED.3IONS-SCNC: 12 MMOL/L (ref 4–16)
BUN BLDV-MCNC: 58 MG/DL (ref 6–23)
CALCIUM SERPL-MCNC: 9.2 MG/DL (ref 8.3–10.6)
CHLORIDE BLD-SCNC: 102 MMOL/L (ref 99–110)
CO2: 23 MMOL/L (ref 21–32)
CREAT SERPL-MCNC: 2.3 MG/DL (ref 0.9–1.3)
GFR AFRICAN AMERICAN: 33 ML/MIN/1.73M2
GFR NON-AFRICAN AMERICAN: 28 ML/MIN/1.73M2
GLUCOSE BLD-MCNC: 135 MG/DL (ref 70–99)
GLUCOSE BLD-MCNC: 136 MG/DL (ref 70–99)
GLUCOSE BLD-MCNC: 156 MG/DL (ref 70–99)
GLUCOSE BLD-MCNC: 161 MG/DL (ref 70–99)
GLUCOSE BLD-MCNC: 168 MG/DL (ref 70–99)
GLUCOSE BLD-MCNC: 222 MG/DL (ref 70–99)
PHOSPHORUS: 3 MG/DL (ref 2.5–4.9)
POTASSIUM SERPL-SCNC: 4.6 MMOL/L (ref 3.5–5.1)
SODIUM BLD-SCNC: 137 MMOL/L (ref 135–145)

## 2020-09-24 PROCEDURE — P9047 ALBUMIN (HUMAN), 25%, 50ML: HCPCS | Performed by: INTERNAL MEDICINE

## 2020-09-24 PROCEDURE — 6360000002 HC RX W HCPCS: Performed by: INTERNAL MEDICINE

## 2020-09-24 PROCEDURE — 82962 GLUCOSE BLOOD TEST: CPT

## 2020-09-24 PROCEDURE — 6370000000 HC RX 637 (ALT 250 FOR IP): Performed by: INTERNAL MEDICINE

## 2020-09-24 PROCEDURE — 2580000003 HC RX 258: Performed by: STUDENT IN AN ORGANIZED HEALTH CARE EDUCATION/TRAINING PROGRAM

## 2020-09-24 PROCEDURE — 1200000000 HC SEMI PRIVATE

## 2020-09-24 PROCEDURE — 6370000000 HC RX 637 (ALT 250 FOR IP): Performed by: STUDENT IN AN ORGANIZED HEALTH CARE EDUCATION/TRAINING PROGRAM

## 2020-09-24 PROCEDURE — 80069 RENAL FUNCTION PANEL: CPT

## 2020-09-24 RX ORDER — ALBUMIN (HUMAN) 12.5 G/50ML
25 SOLUTION INTRAVENOUS ONCE
Status: COMPLETED | OUTPATIENT
Start: 2020-09-24 | End: 2020-09-24

## 2020-09-24 RX ADMIN — BUSPIRONE HYDROCHLORIDE 10 MG: 10 TABLET ORAL at 21:15

## 2020-09-24 RX ADMIN — CITALOPRAM HYDROBROMIDE 20 MG: 20 TABLET ORAL at 08:31

## 2020-09-24 RX ADMIN — CLOPIDOGREL BISULFATE 75 MG: 75 TABLET ORAL at 08:32

## 2020-09-24 RX ADMIN — ACETAMINOPHEN 650 MG: 325 TABLET ORAL at 21:15

## 2020-09-24 RX ADMIN — CHLORTHALIDONE 12.5 MG: 25 TABLET ORAL at 10:46

## 2020-09-24 RX ADMIN — MAGNESIUM OXIDE 400 MG (241.3 MG MAGNESIUM) TABLET 400 MG: TABLET at 08:32

## 2020-09-24 RX ADMIN — OXYCODONE HYDROCHLORIDE AND ACETAMINOPHEN 1 TABLET: 7.5; 325 TABLET ORAL at 15:04

## 2020-09-24 RX ADMIN — CHOLESTYRAMINE 4 G: 4 POWDER, FOR SUSPENSION ORAL at 14:57

## 2020-09-24 RX ADMIN — ACETAMINOPHEN 650 MG: 325 TABLET ORAL at 09:42

## 2020-09-24 RX ADMIN — AMLODIPINE BESYLATE 10 MG: 10 TABLET ORAL at 08:32

## 2020-09-24 RX ADMIN — CHOLESTYRAMINE 4 G: 4 POWDER, FOR SUSPENSION ORAL at 08:32

## 2020-09-24 RX ADMIN — METOPROLOL TARTRATE 50 MG: 50 TABLET, FILM COATED ORAL at 08:32

## 2020-09-24 RX ADMIN — SODIUM CHLORIDE, PRESERVATIVE FREE 10 ML: 5 INJECTION INTRAVENOUS at 21:16

## 2020-09-24 RX ADMIN — ACETAMINOPHEN 650 MG: 325 TABLET ORAL at 02:58

## 2020-09-24 RX ADMIN — METOPROLOL TARTRATE 50 MG: 50 TABLET, FILM COATED ORAL at 21:15

## 2020-09-24 RX ADMIN — INSULIN LISPRO 4 UNITS: 100 INJECTION, SOLUTION INTRAVENOUS; SUBCUTANEOUS at 17:31

## 2020-09-24 RX ADMIN — OXYCODONE HYDROCHLORIDE AND ACETAMINOPHEN 1 TABLET: 7.5; 325 TABLET ORAL at 04:11

## 2020-09-24 RX ADMIN — PANTOPRAZOLE SODIUM 40 MG: 40 TABLET, DELAYED RELEASE ORAL at 10:46

## 2020-09-24 RX ADMIN — BUSPIRONE HYDROCHLORIDE 10 MG: 10 TABLET ORAL at 14:57

## 2020-09-24 RX ADMIN — ATORVASTATIN CALCIUM 20 MG: 20 TABLET, FILM COATED ORAL at 08:32

## 2020-09-24 RX ADMIN — BUSPIRONE HYDROCHLORIDE 10 MG: 10 TABLET ORAL at 08:32

## 2020-09-24 RX ADMIN — INSULIN LISPRO 2 UNITS: 100 INJECTION, SOLUTION INTRAVENOUS; SUBCUTANEOUS at 12:28

## 2020-09-24 RX ADMIN — INSULIN GLARGINE 40 UNITS: 100 INJECTION, SOLUTION SUBCUTANEOUS at 21:15

## 2020-09-24 RX ADMIN — HEPARIN SODIUM 5000 UNITS: 5000 INJECTION INTRAVENOUS; SUBCUTANEOUS at 14:55

## 2020-09-24 RX ADMIN — ALBUMIN (HUMAN) 25 G: 0.25 INJECTION, SOLUTION INTRAVENOUS at 15:05

## 2020-09-24 RX ADMIN — AMLODIPINE BESYLATE 10 MG: 10 TABLET ORAL at 21:15

## 2020-09-24 RX ADMIN — ASPIRIN 81 MG CHEWABLE TABLET 81 MG: 81 TABLET CHEWABLE at 08:32

## 2020-09-24 RX ADMIN — HEPARIN SODIUM 5000 UNITS: 5000 INJECTION INTRAVENOUS; SUBCUTANEOUS at 08:32

## 2020-09-24 RX ADMIN — CHOLESTYRAMINE 4 G: 4 POWDER, FOR SUSPENSION ORAL at 17:46

## 2020-09-24 ASSESSMENT — PAIN DESCRIPTION - PROGRESSION
CLINICAL_PROGRESSION: NOT CHANGED
CLINICAL_PROGRESSION: NOT CHANGED

## 2020-09-24 ASSESSMENT — PAIN SCALES - GENERAL
PAINLEVEL_OUTOF10: 7
PAINLEVEL_OUTOF10: 5

## 2020-09-24 ASSESSMENT — PAIN DESCRIPTION - ONSET
ONSET: GRADUAL
ONSET: ON-GOING

## 2020-09-24 ASSESSMENT — PAIN DESCRIPTION - FREQUENCY: FREQUENCY: INTERMITTENT

## 2020-09-24 ASSESSMENT — PAIN DESCRIPTION - PAIN TYPE: TYPE: ACUTE PAIN

## 2020-09-24 ASSESSMENT — PAIN SCALES - WONG BAKER: WONGBAKER_NUMERICALRESPONSE: 0

## 2020-09-24 ASSESSMENT — PAIN DESCRIPTION - LOCATION: LOCATION: HEAD

## 2020-09-24 ASSESSMENT — PAIN - FUNCTIONAL ASSESSMENT: PAIN_FUNCTIONAL_ASSESSMENT: ACTIVITIES ARE NOT PREVENTED

## 2020-09-24 NOTE — PROGRESS NOTES
Nephrology Progress Note  9/24/2020 12:13 PM  Subjective:   Admit Date: 9/19/2020  PCP: Monica Velazquez MD  Interval History: pt appear more lucid and less distress today    Diet: DIET CARB CONTROL; Carb Control: 4 carb choices (60 gms)/meal  Pain is:Mild      Data:   Scheduled Meds:   insulin glargine  40 Units Subcutaneous Nightly    insulin lispro  15 Units Subcutaneous TID WC    insulin lispro  0-12 Units Subcutaneous 2 times per day    insulin lispro  0-12 Units Subcutaneous TID WC    amLODIPine  10 mg Oral BID    aspirin  81 mg Oral Daily    atorvastatin  20 mg Oral Daily    busPIRone  10 mg Oral TID    chlorthalidone  12.5 mg Oral Daily    cholestyramine light  1 packet Oral TID WC    citalopram  20 mg Oral Daily    clopidogrel  75 mg Oral Daily    fluticasone  1 spray Nasal BID    metoprolol tartrate  50 mg Oral BID    pantoprazole  40 mg Oral QAM AC    sodium chloride flush  10 mL Intravenous 2 times per day    magnesium oxide  400 mg Oral Daily    heparin (porcine)  5,000 Units Subcutaneous TID     Continuous Infusions:   dextrose       PRN Meds:oxyCODONE-acetaminophen, glucose, dextrose, glucagon (rDNA), dextrose, sodium chloride flush, acetaminophen **OR** acetaminophen, polyethylene glycol, promethazine **OR** ondansetron, haloperidol lactate  I/O last 3 completed shifts: In: 480 [P.O.:480]  Out: 335 [Urine:335]  No intake/output data recorded. Intake/Output Summary (Last 24 hours) at 9/24/2020 1213  Last data filed at 9/23/2020 1848  Gross per 24 hour   Intake 240 ml   Output 85 ml   Net 155 ml     CBC:   No results for input(s): WBC, HGB, PLT in the last 72 hours. BMP:    Recent Labs     09/24/20  0300      K 4.6      CO2 23   BUN 58*   CREATININE 2.3*   GLUCOSE 156*     Hepatic:   No results for input(s): AST, ALT, ALB, BILITOT, ALKPHOS in the last 72 hours. Troponin: No results for input(s): TROPONINI in the last 72 hours.   BNP: No results for input(s): BNP in the last 72 hours. Lipids: No results for input(s): CHOL, HDL in the last 72 hours. Invalid input(s): LDLCALCU  ABGs:   Lab Results   Component Value Date    PO2ART 70 09/21/2020    [id number] 38.0 09/21/2020     INR:   No results for input(s): INR in the last 72 hours. Renal Labs  Albumin:    Lab Results   Component Value Date    LABALBU 3.8 09/24/2020     Calcium:    Lab Results   Component Value Date    CALCIUM 9.2 09/24/2020     Phosphorus:    Lab Results   Component Value Date    PHOS 3.0 09/24/2020     U/A:    Lab Results   Component Value Date    NITRU NEGATIVE 09/20/2020    COLORU YELLOW 09/20/2020    WBCUA 1 09/20/2020    RBCUA NONE SEEN 09/20/2020    MUCUS RARE 09/20/2020    TRICHOMONAS NONE SEEN 09/20/2020    BACTERIA RARE 09/20/2020    CLARITYU CLEAR 09/20/2020    SPECGRAV 1.018 09/20/2020    UROBILINOGEN NORMAL 09/20/2020    BILIRUBINUR NEGATIVE 09/20/2020    BLOODU NEGATIVE 09/20/2020    KETUA NEGATIVE 09/20/2020     ABG:    Lab Results   Component Value Date    PCV2CLK 38.0 09/21/2020    PO2ART 70 09/21/2020    KAH7MRU 22.0 09/21/2020     HgBA1c:    Lab Results   Component Value Date    LABA1C 8.8 08/09/2020     Microalbumen/Creatinine ratio:  No components found for: RUCREAT          Objective:   Vitals: BP (!) 144/72   Pulse 75   Temp 98.7 °F (37.1 °C) (Oral)   Resp 12   Ht 5' 10\" (1.778 m)   Wt 220 lb 0.3 oz (99.8 kg)   SpO2 95%   BMI 31.57 kg/m²   General appearance: awake weak  HEENT: Head: Normal, normocephalic, atraumatic.   Neck: supple, symmetrical, trachea midline  Lungs: diminished breath sounds bilaterally  Heart: S1, S2 normal  Abdomen: abnormal findings:  soft nt  Extremities: edema trace  Neurologic: Mental status: alertness: alert      Patient Active Problem List:     Essential hypertension, benign     Type 2 diabetes mellitus, with long-term current use of insulin ([address]     Other and unspecified hyperlipidemia     Chest pain     Coronary atherosclerosis     Chronic kidney disease, stage III (moderate) (HCC)     Cellulitis of leg     Cellulitis of lower leg     Lethargy     Acute respiratory failure with hypoxia and hypercapnia (HCC)     Altered mental status, unspecified     Hypoglycemia     Acute kidney injury ([facility] [address]     Generalized weakness     Liver cirrhosis (HCC)     Depression     Chronic pain     Encephalopathy    Assessment and Plan:      IMP:  1 arf from atn on ckd 3  2 hx covid +  3 sp fall with confusion  4 Dm2  5 htn      Plan     1 renal baseline 1.5 and increase after islas out and monitor hydration and check bladder scan  2 no fever  3 affect improved may need rehab  4 ssi and stable  5 bp stable overall   Work on Carmine and I will monitor renal for now             Jena Oshea MD

## 2020-09-24 NOTE — PROGRESS NOTES
HOSPITALIST    Left message for  - requesting call back to discuss the plan. Also, left message for daughter. 12:10 pm - was able to connect with [facility] Ailin - patient is medically ready for discharge from hospitalist perspective. Daughter Gee Ailin mentioned that she would like rehab. Per nurse today he is max assist x 1 from bed to chart and mod assist X2 for same activity. Daughter mentioned [facility].   Left message for

## 2020-09-24 NOTE — CARE COORDINATION
Received call from Dr Shreyas Montenegro; patient is ready for discharge; daughter Arlen Tang now wants rehab placement. Contacted [facility] (as that was daughter's preference); spoke to Nehemias Woods. They are not taking active COVID patients. Contacted daughter Ibis Schaffer by phone. She stated that she would accept any SNF rehab that would accept a COVID positive patient. Roshan Denton RN    Unable to use [facility], [address] or [place] at this time per [employer]. Attempting to locate SNF who will accept COVID positive patient.

## 2020-09-25 LAB
ANION GAP SERPL CALCULATED.3IONS-SCNC: 11 MMOL/L (ref 4–16)
BUN BLDV-MCNC: 47 MG/DL (ref 6–23)
CALCIUM SERPL-MCNC: 9.1 MG/DL (ref 8.3–10.6)
CHLORIDE BLD-SCNC: 101 MMOL/L (ref 99–110)
CO2: 23 MMOL/L (ref 21–32)
CREAT SERPL-MCNC: 1.8 MG/DL (ref 0.9–1.3)
CULTURE: NORMAL
CULTURE: NORMAL
GFR AFRICAN AMERICAN: 44 ML/MIN/1.73M2
GFR NON-AFRICAN AMERICAN: 37 ML/MIN/1.73M2
GLUCOSE BLD-MCNC: 130 MG/DL (ref 70–99)
GLUCOSE BLD-MCNC: 132 MG/DL (ref 70–99)
GLUCOSE BLD-MCNC: 144 MG/DL (ref 70–99)
GLUCOSE BLD-MCNC: 144 MG/DL (ref 70–99)
GLUCOSE BLD-MCNC: 148 MG/DL (ref 70–99)
GLUCOSE BLD-MCNC: 82 MG/DL (ref 70–99)
Lab: NORMAL
Lab: NORMAL
POTASSIUM SERPL-SCNC: 4.6 MMOL/L (ref 3.5–5.1)
SODIUM BLD-SCNC: 135 MMOL/L (ref 135–145)
SPECIMEN: NORMAL
SPECIMEN: NORMAL

## 2020-09-25 PROCEDURE — 1200000000 HC SEMI PRIVATE

## 2020-09-25 PROCEDURE — 6370000000 HC RX 637 (ALT 250 FOR IP): Performed by: INTERNAL MEDICINE

## 2020-09-25 PROCEDURE — 6370000000 HC RX 637 (ALT 250 FOR IP): Performed by: STUDENT IN AN ORGANIZED HEALTH CARE EDUCATION/TRAINING PROGRAM

## 2020-09-25 PROCEDURE — 2580000003 HC RX 258: Performed by: STUDENT IN AN ORGANIZED HEALTH CARE EDUCATION/TRAINING PROGRAM

## 2020-09-25 PROCEDURE — 6360000002 HC RX W HCPCS: Performed by: INTERNAL MEDICINE

## 2020-09-25 PROCEDURE — 97166 OT EVAL MOD COMPLEX 45 MIN: CPT

## 2020-09-25 PROCEDURE — 97530 THERAPEUTIC ACTIVITIES: CPT

## 2020-09-25 PROCEDURE — 80048 BASIC METABOLIC PNL TOTAL CA: CPT

## 2020-09-25 PROCEDURE — 94761 N-INVAS EAR/PLS OXIMETRY MLT: CPT

## 2020-09-25 PROCEDURE — 82962 GLUCOSE BLOOD TEST: CPT

## 2020-09-25 PROCEDURE — 97535 SELF CARE MNGMENT TRAINING: CPT

## 2020-09-25 PROCEDURE — 97162 PT EVAL MOD COMPLEX 30 MIN: CPT

## 2020-09-25 PROCEDURE — 97116 GAIT TRAINING THERAPY: CPT

## 2020-09-25 RX ADMIN — INSULIN LISPRO 2 UNITS: 100 INJECTION, SOLUTION INTRAVENOUS; SUBCUTANEOUS at 12:06

## 2020-09-25 RX ADMIN — ATORVASTATIN CALCIUM 20 MG: 20 TABLET, FILM COATED ORAL at 08:19

## 2020-09-25 RX ADMIN — INSULIN LISPRO 2 UNITS: 100 INJECTION, SOLUTION INTRAVENOUS; SUBCUTANEOUS at 08:20

## 2020-09-25 RX ADMIN — AMLODIPINE BESYLATE 10 MG: 10 TABLET ORAL at 08:20

## 2020-09-25 RX ADMIN — AMLODIPINE BESYLATE 10 MG: 10 TABLET ORAL at 21:43

## 2020-09-25 RX ADMIN — HEPARIN SODIUM 5000 UNITS: 5000 INJECTION INTRAVENOUS; SUBCUTANEOUS at 21:38

## 2020-09-25 RX ADMIN — CLOPIDOGREL BISULFATE 75 MG: 75 TABLET ORAL at 08:19

## 2020-09-25 RX ADMIN — HEPARIN SODIUM 5000 UNITS: 5000 INJECTION INTRAVENOUS; SUBCUTANEOUS at 14:42

## 2020-09-25 RX ADMIN — CHOLESTYRAMINE 4 G: 4 POWDER, FOR SUSPENSION ORAL at 08:20

## 2020-09-25 RX ADMIN — ACETAMINOPHEN 650 MG: 325 TABLET ORAL at 22:47

## 2020-09-25 RX ADMIN — METOPROLOL TARTRATE 50 MG: 50 TABLET, FILM COATED ORAL at 21:43

## 2020-09-25 RX ADMIN — ACETAMINOPHEN 650 MG: 325 TABLET ORAL at 09:21

## 2020-09-25 RX ADMIN — PANTOPRAZOLE SODIUM 40 MG: 40 TABLET, DELAYED RELEASE ORAL at 08:19

## 2020-09-25 RX ADMIN — ASPIRIN 81 MG CHEWABLE TABLET 81 MG: 81 TABLET CHEWABLE at 08:19

## 2020-09-25 RX ADMIN — BUSPIRONE HYDROCHLORIDE 10 MG: 10 TABLET ORAL at 14:42

## 2020-09-25 RX ADMIN — CITALOPRAM HYDROBROMIDE 20 MG: 20 TABLET ORAL at 08:20

## 2020-09-25 RX ADMIN — BUSPIRONE HYDROCHLORIDE 10 MG: 10 TABLET ORAL at 21:43

## 2020-09-25 RX ADMIN — METOPROLOL TARTRATE 50 MG: 50 TABLET, FILM COATED ORAL at 08:19

## 2020-09-25 RX ADMIN — MAGNESIUM OXIDE 400 MG (241.3 MG MAGNESIUM) TABLET 400 MG: TABLET at 08:19

## 2020-09-25 RX ADMIN — SODIUM CHLORIDE, PRESERVATIVE FREE 10 ML: 5 INJECTION INTRAVENOUS at 21:43

## 2020-09-25 RX ADMIN — OXYCODONE HYDROCHLORIDE AND ACETAMINOPHEN 1 TABLET: 7.5; 325 TABLET ORAL at 01:37

## 2020-09-25 RX ADMIN — HEPARIN SODIUM 5000 UNITS: 5000 INJECTION INTRAVENOUS; SUBCUTANEOUS at 08:20

## 2020-09-25 RX ADMIN — FLUTICASONE PROPIONATE 1 SPRAY: 50 SPRAY, METERED NASAL at 21:43

## 2020-09-25 RX ADMIN — SODIUM CHLORIDE, PRESERVATIVE FREE 10 ML: 5 INJECTION INTRAVENOUS at 08:20

## 2020-09-25 RX ADMIN — BUSPIRONE HYDROCHLORIDE 10 MG: 10 TABLET ORAL at 08:19

## 2020-09-25 ASSESSMENT — PAIN DESCRIPTION - PAIN TYPE
TYPE: ACUTE PAIN
TYPE: ACUTE PAIN

## 2020-09-25 ASSESSMENT — PAIN SCALES - GENERAL
PAINLEVEL_OUTOF10: 0
PAINLEVEL_OUTOF10: 3
PAINLEVEL_OUTOF10: 0
PAINLEVEL_OUTOF10: 6
PAINLEVEL_OUTOF10: 3
PAINLEVEL_OUTOF10: 0

## 2020-09-25 ASSESSMENT — PAIN DESCRIPTION - DESCRIPTORS
DESCRIPTORS: HEADACHE
DESCRIPTORS: HEADACHE

## 2020-09-25 ASSESSMENT — PAIN SCALES - WONG BAKER
WONGBAKER_NUMERICALRESPONSE: 0
WONGBAKER_NUMERICALRESPONSE: 0

## 2020-09-25 ASSESSMENT — PAIN DESCRIPTION - PROGRESSION: CLINICAL_PROGRESSION: GRADUALLY WORSENING

## 2020-09-25 ASSESSMENT — PAIN DESCRIPTION - ONSET
ONSET: GRADUAL
ONSET: ON-GOING

## 2020-09-25 ASSESSMENT — PAIN DESCRIPTION - FREQUENCY: FREQUENCY: INTERMITTENT

## 2020-09-25 NOTE — PROGRESS NOTES
index is 31.57 kg/m².         Medications:   Medications:    insulin glargine  40 Units Subcutaneous Nightly    insulin lispro  15 Units Subcutaneous TID WC    insulin lispro  0-12 Units Subcutaneous 2 times per day    insulin lispro  0-12 Units Subcutaneous TID WC    amLODIPine  10 mg Oral BID    aspirin  81 mg Oral Daily    atorvastatin  20 mg Oral Daily    busPIRone  10 mg Oral TID    cholestyramine light  1 packet Oral TID WC    citalopram  20 mg Oral Daily    clopidogrel  75 mg Oral Daily    fluticasone  1 spray Nasal BID    metoprolol tartrate  50 mg Oral BID    pantoprazole  40 mg Oral QAM AC    sodium chloride flush  10 mL Intravenous 2 times per day    magnesium oxide  400 mg Oral Daily    heparin (porcine)  5,000 Units Subcutaneous TID      Infusions:    dextrose       PRN Meds: oxyCODONE-acetaminophen, 1 tablet, Q8H PRN  glucose, 15 g, PRN  dextrose, 12.5 g, PRN  glucagon (rDNA), 1 mg, PRN  dextrose, 100 mL/hr, PRN  sodium chloride flush, 10 mL, PRN  acetaminophen, 650 mg, Q6H PRN    Or  acetaminophen, 650 mg, Q6H PRN  polyethylene glycol, 17 g, Daily PRN  promethazine, 12.5 mg, Q6H PRN    Or  ondansetron, 4 mg, Q6H PRN  haloperidol lactate, 0.5 mg, Q6H PRN          Electronically signed by Lashay Jorge MD on 9/24/2020 at 9:58 PM

## 2020-09-25 NOTE — PLAN OF CARE
Problem: Falls - Risk of:  Goal: Will remain free from falls  Description: Will remain free from falls  Outcome: Ongoing  Goal: Absence of physical injury  Description: Absence of physical injury  Outcome: Ongoing     Problem: Restraint Use - Nonviolent/Non-Self-Destructive Behavior:  Goal: Absence of restraint indications  Description: Absence of restraint indications  Outcome: Ongoing  Goal: Absence of restraint-related injury  Description: Absence of restraint-related injury  Outcome: Ongoing     Problem: Airway Clearance - Ineffective  Goal: Achieve or maintain patent airway  Outcome: Ongoing     Problem: Gas Exchange - Impaired  Goal: Absence of hypoxia  Outcome: Ongoing  Goal: Promote optimal lung function  Outcome: Ongoing     Problem: Breathing Pattern - Ineffective  Goal: Ability to achieve and maintain a regular respiratory rate  Outcome: Ongoing     Problem:  Body Temperature -  Risk of, Imbalanced  Goal: Ability to maintain a body temperature within defined limits  Outcome: Ongoing  Goal: Will regain or maintain usual level of consciousness  Outcome: Ongoing  Goal: Complications related to the disease process, condition or treatment will be avoided or minimized  Outcome: Ongoing     Problem: Isolation Precautions - Risk of Spread of Infection  Goal: Prevent transmission of infection  Outcome: Ongoing     Problem: Nutrition Deficits  Goal: Optimize nutrtional status  Outcome: Ongoing     Problem: Risk for Fluid Volume Deficit  Goal: Maintain normal heart rhythm  Outcome: Ongoing  Goal: Maintain absence of muscle cramping  Outcome: Ongoing  Goal: Maintain normal serum potassium, sodium, calcium, phosphorus, and pH  Outcome: Ongoing     Problem: Loneliness or Risk for Loneliness  Goal: Demonstrate positive use of time alone when socialization is not possible  Outcome: Ongoing     Problem: Fatigue  Goal: Verbalize increase energy and improved vitality  Outcome: Ongoing     Problem: Patient Education: Go to Patient Education Activity  Goal: Patient/Family Education  Outcome: Ongoing

## 2020-09-25 NOTE — PROGRESS NOTES
Nephrology Progress Note  9/25/2020 12:13 PM  Subjective:   Admit Date: 9/19/2020  PCP: Jayda Spring MD  Interval History: pt awake and nad    Diet: DIET CARB CONTROL; Carb Control: 4 carb choices (60 gms)/meal  Pain is:Mild      Data:   Scheduled Meds:   insulin glargine  40 Units Subcutaneous Nightly    insulin lispro  15 Units Subcutaneous TID WC    insulin lispro  0-12 Units Subcutaneous 2 times per day    insulin lispro  0-12 Units Subcutaneous TID WC    amLODIPine  10 mg Oral BID    aspirin  81 mg Oral Daily    atorvastatin  20 mg Oral Daily    busPIRone  10 mg Oral TID    cholestyramine light  1 packet Oral TID WC    citalopram  20 mg Oral Daily    clopidogrel  75 mg Oral Daily    fluticasone  1 spray Nasal BID    metoprolol tartrate  50 mg Oral BID    pantoprazole  40 mg Oral QAM AC    sodium chloride flush  10 mL Intravenous 2 times per day    magnesium oxide  400 mg Oral Daily    heparin (porcine)  5,000 Units Subcutaneous TID     Continuous Infusions:   dextrose       PRN Meds:oxyCODONE-acetaminophen, glucose, dextrose, glucagon (rDNA), dextrose, sodium chloride flush, acetaminophen **OR** acetaminophen, polyethylene glycol, promethazine **OR** ondansetron, haloperidol lactate  I/O last 3 completed shifts:  In: -   Out: 800 [Urine:800]  I/O this shift:  In: 240 [P.O.:240]  Out: -     Intake/Output Summary (Last 24 hours) at 9/25/2020 1213  Last data filed at 9/25/2020 0946  Gross per 24 hour   Intake 240 ml   Output 800 ml   Net -560 ml     CBC:   No results for input(s): WBC, HGB, PLT in the last 72 hours. BMP:    Recent Labs     09/24/20  0300      K 4.6      CO2 23   BUN 58*   CREATININE 2.3*   GLUCOSE 156*     Hepatic:   No results for input(s): AST, ALT, ALB, BILITOT, ALKPHOS in the last 72 hours. Troponin: No results for input(s): TROPONINI in the last 72 hours. BNP: No results for input(s): BNP in the last 72 hours.   Lipids: No results for input(s): CHOL, HDL in the last 72 hours. Invalid input(s): LDLCALCU  ABGs:   Lab Results   Component Value Date    PO2ART 70 09/21/2020    FJA6KJA 38.0 09/21/2020     INR:   No results for input(s): INR in the last 72 hours. Renal Labs  Albumin:    Lab Results   Component Value Date    LABALBU 3.8 09/24/2020     Calcium:    Lab Results   Component Value Date    CALCIUM 9.2 09/24/2020     Phosphorus:    Lab Results   Component Value Date    PHOS 3.0 09/24/2020     U/A:    Lab Results   Component Value Date    NITRU NEGATIVE 09/20/2020    COLORU YELLOW 09/20/2020    WBCUA 1 09/20/2020    RBCUA NONE SEEN 09/20/2020    MUCUS RARE 09/20/2020    TRICHOMONAS NONE SEEN 09/20/2020    BACTERIA RARE 09/20/2020    CLARITYU CLEAR 09/20/2020    SPECGRAV 1.018 09/20/2020    UROBILINOGEN NORMAL 09/20/2020    BILIRUBINUR NEGATIVE 09/20/2020    BLOODU NEGATIVE 09/20/2020    KETUA NEGATIVE 09/20/2020     ABG:    Lab Results   Component Value Date    RKD4UBR 38.0 09/21/2020    PO2ART 70 09/21/2020    KLM1SJX 22.0 09/21/2020     HgBA1c:    Lab Results   Component Value Date    LABA1C 8.8 08/09/2020     Microalbumen/Creatinine ratio:  No components found for: RUCREAT          Objective:   Vitals: BP (!) 152/80   Pulse 69   Temp 98.6 °F (37 °C) (Oral)   Resp 17   Ht 5' 10\" (1.778 m)   Wt 221 lb (100.2 kg)   SpO2 96%   BMI 31.71 kg/m²   General appearance: awake weak  HEENT: Head: Normal, normocephalic, atraumatic.   Neck: supple, symmetrical, trachea midline  Lungs: diminished breath sounds bilaterally  Heart: S1, S2 normal  Abdomen: abnormal findings:  soft nt  Extremities: edema trace  Neurologic: Mental status: alertness: alert      Patient Active Problem List:     Essential hypertension, benign     Type 2 diabetes mellitus, with long-term current use of insulin ([facility] [address]     Other and unspecified hyperlipidemia     Chest pain     Coronary atherosclerosis     Chronic kidney disease, stage III (moderate) ([facility])     Cellulitis of leg     Cellulitis of

## 2020-09-25 NOTE — PROGRESS NOTES
HOSPITALIST    Remains stable for dc. Awaiting ECF that can accept COVID positive patient. -OT recommended ECF  -PT to see patient as well.

## 2020-09-25 NOTE — PROGRESS NOTES
mg Oral QAM AC    sodium chloride flush  10 mL Intravenous 2 times per day    magnesium oxide  400 mg Oral Daily    heparin (porcine)  5,000 Units Subcutaneous TID      Infusions:    dextrose           Objective:   Vitals: BP (!) 143/78   Pulse 68   Temp 98.5 °F (36.9 °C) (Oral)   Resp 16   Ht 5' 10\" (1.778 m)   Wt 220 lb 0.3 oz (99.8 kg)   SpO2 97%   BMI 31.57 kg/m²   General appearance: alert and cooperative with exam  Neck: no JVD or bruit  Thyroid : Normal lobes   Lungs: Has Vesicular Breath sounds has some wheezing and rales  Heart:  regular rate and rhythm  Abdomen: soft, non-tender; bowel sounds normal; no masses,  no organomegaly  Musculoskeletal: Normal  Extremities: extremities normal, , no edema  Neurologic:  Awake, alert, oriented to name, place and time. Cranial nerves II-XII are grossly intact. Motor is  intact. Sensory neuropathy. ,  and gait is normal.    Assessment:     Patient Active Problem List:     Essential hypertension, benign     Type 2 diabetes mellitus, with long-term current use of insulin ([address]     Other and unspecified hyperlipidemia     Chest pain     Coronary atherosclerosis     Chronic kidney disease, stage III (moderate) (HCC)     Cellulitis of leg     Cellulitis of lower leg     Lethargy     Acute respiratory failure with hypoxia and hypercapnia (HCC)     Altered mental status, unspecified     Hypoglycemia     Acute kidney injury ([address]     Generalized weakness     Liver cirrhosis (HCC)     Depression     Chronic pain     Encephalopathy      Plan:     1. Reviewed POC blood glucose . Labs and X ray results   2. Reviewed Current Medicines   3. On meal/ Correction bolus Humalog/ Basal Lantus Insulin regime /   4. Monitor Blood glucose frequently   5. Modified  the dose of Insulin/ other medicines as needed   6. Will follow     .      Mehreen Shin MD

## 2020-09-25 NOTE — PROGRESS NOTES
Comprehensive Nutrition Assessment    Type and Reason for Visit:  Initial, RD Nutrition Re-Screen/LOS    Nutrition Recommendations/Plan:   · Continue current diet  · Start diabetic supplements    Nutrition Assessment:  Pt assessed due to los. He has been on a carb controlled diet and has been refusing several meals in the context of COVID. Will order supplements to help pt meet his estimated needs and continue to follow    Malnutrition Assessment:  Malnutrition Status:  Insufficient data(PIPE due to COVID)    Context:  Acute Illness       Estimated Daily Nutrient Needs:  Energy (kcal):  4796-6868    Protein (g):  75-90; Weight Used for Protein Requirements:  Ideal        Fluid (ml/day):  3111-5826; Weight Used for Fluid Requirements:  Current      Wounds:  None       Current Nutrition Therapies:    DIET CARB CONTROL; Carb Control: 4 carb choices (60 gms)/meal    Anthropometric Measures:  · Height: 5' 10\" (177.8 cm)  · Current Body Weight: 221 lb (100.2 kg)   · Admission Body Weight: 227 lb (103 kg)    · Usual Body Weight: 249 lb (112.9 kg)     · Ideal Body Weight: 166 lbs; % Ideal Body Weight 133.1 %   · BMI: 31.7  · BMI Categories: Obese Class 1 (BMI 30.0-34. 9)       Nutrition Diagnosis:   · Inadequate oral intake related to acute injury/trauma as evidenced by intake 0-25%, intake 26-50%      Nutrition Interventions:   Food and/or Nutrient Delivery:  Continue Current Diet, Start Oral Nutrition Supplement  Nutrition Education/Counseling:  No recommendation at this time   Coordination of Nutrition Care:  No recommendation at this time    Goals:  pt will consume greater than 75% of his meals and supplements       Nutrition Monitoring and Evaluation:   Food/Nutrient Intake Outcomes:  Food and Nutrient Intake, Supplement Intake  Physical Signs/Symptoms Outcomes:  Biochemical Data, Skin, Weight, Meal Time Behavior     Discharge Planning:     Too soon to determine     Electronically signed by Vania Awad RD, LD on 9/25/20 at 1:17 PM EDT    Contact: [phone]

## 2020-09-25 NOTE — CONSULTS
[address] 62, 1940, 2010/2010-A, 9/25/2020    Discharge Recommendation: Ben Danny      History:  [place]:  The primary encounter diagnosis was Altered mental status, unspecified altered mental status type. Diagnoses of Acute kidney injury ([facility] [address] Generalized weakness, Fall, initial encounter, and Hypomagnesemia were also pertinent to this visit. Subjective:  Patient states: Agreeable to therapy. Pain: Pt reports back and BLE pain. Pt also reports a headache this date. Communication with other providers: PT, RN, LSW  Restrictions: General Precautions, Fall Risk, Covid-19 Positive, Droplet Plus    Home Setup/Prior level of function:  Social/Functional History  Lives With: Family(Lives with daughter, son in law, and son. Pt reports he has 5 children and 3 have [place] Disease.)  Type of Home: House  Home Layout: One level  Home Access: Level entry  Bathroom Shower/Tub: Walk-in shower, Shower chair with back  [employer]: Handicap height  Bathroom Equipment: Grab bars in shower, Grab bars around toilet  Home Equipment: Forrestine Must, 4 wheeled walker, Garcia Fitz  ADL Assistance: Independent  Homemaking Assistance: Independent  Homemaking Responsibilities: Yes  Ambulation Assistance: Independent(Jaycob wtih 4ww)  Transfer Assistance: Independent  Active : Yes  Mode of Transportation: Car  Leisure & Hobbies: plays cards with his son    Examination:  · Observation: Supine in bed upon arrival  · Vision: Wears glasses.   · Hearing: [facility]  · Vitals: Stable vitals throughout session    Body Systems and functions:  · ROM: WFL   · Strength:  · RUE: 4/5 shoulder flexion  · LUE: 3/5 shoulder flexion  · Sensation: WFL  · Tone: Normal  · Coordination: WFL  · Perception: WNL    Activities of Daily Living (ADLs):  · Feeding: Jaycob (setup)  · Grooming: CGA (anticipate pt could complete in stand with CGA for balancing and VC throughout d/t decreased cognition. Pt stood and performed hand hygiene at sink with CGA for balancing). · UB bathing: SBA (in seated). · LB bathing: Vish (anticipate pt could complete in seated with assist for distal reaching and VC throughout to ensure performed thoroughly d/t decreased cognition). · UB dressing: SBA (anticipate pt could complete ins seated with VC throughout d/t decreased cognition). · LB dressing: Vish (Pt required assist to thread pull up in seated and fully pull depend up in standing. Assist to doff socks this date). · Toileting: ModA (pt required assist for controlled toilet transfers this date, as well as STS from toilet. Anticipate assist for kobe care while balancing in standing. Pt required assist to thread pull up in seated and pull depend up in standing). Cognitive and Psychosocial Functioning:  · Overall cognitive status: Pt was oriented to self, time, and location, however pt demo intermittent confusion throughout session as evidenced by re-asking questions and not recalling answers. Slightly decreased command following this date. · Affect: Normal     Balance:   · Sitting: SBA (pt demo good dynamic sitting balance, however pt demo slightly decreased command following and SBA necessary to ensure safety). · Standing: CGA (pt stood with RW demo good static balance). Functional Mobility:  · Bed Mobility: CGA (supine to seated bed mobility). · Transfers: CGA (STS from EOB with RW). Vish (STS from toilet with RW, VC for BUE placement on grab bar and RW). · Ambulation: CGA (pt ambulated HH distance to bathroom, reports slight SOB, vitals remain stable. Pt then ambulated approx 15ft to chair with RW and CGA demo slow pace throughout). AM-PAC 6 click short form for inpatient daily activity:   How much help from another person does the patient currently need. .. Unable  Dep A Lot  Max A A Lot   Mod A A Little  Min A A Little   CGA  SBA None   Mod I  Indep  Sup   1.   Putting on and taking off regular lower body clothing? [] 1    [] 2   [] 2   [x] 3   [] 3   [] 4      2. Bathing (including washing, rinsing, drying)? [] 1   [] 2   [] 2 [x] 3 [] 3 [] 4   3. Toileting, which includes using toilet, bedpan, or urinal? [] 1    [] 2   [x] 2   [] 3   [] 3   [] 4     4. Putting on and taking off regular upper body clothing? [] 1   [] 2   [] 2   [] 3   [x] 3    [] 4      5. Taking care of personal grooming such as brushing teeth? [] 1   [] 2    [] 2 [] 3    [x] 3   [] 4      6. Eating meals? [] 1   [] 2   [] 2   [] 3   [] 3   [x] 4      Raw Score:  18     [24=0% impaired(CH), 23=1-19%(CI), 20-22=20-39%(CJ), 15-19=40-59%(CK), 10-14=60-79%(CL), 7-9=80-99%(CM), 6=100%(CN)]     Treatment:  Self Care Training:   Cues were given for safety, sequence, UE/LE placement, visual cues, and balance. Activities performed today included dressing, toileting, and hand hygiene. Therapeutic Activity Training:   Therapeutic activity training was instructed today. Cues were given for safety, sequence, UE/LE placement, awareness, and balance. Activities performed today included bed mobility training, sup-sit, sit-stand, ambulation. Safety Measures: Gait belt used, Left in chair, Alarm in place    Assessment:  Assessment  Performance deficits / Impairments: Decreased functional mobility , Decreased endurance, Decreased balance, Decreased high-level IADLs, Decreased cognition, Decreased ADL status, Decreased strength, Decreased posture  Treatment Diagnosis: Encephalopathy  Prognosis: Good  Decision Making: Medium Complexity  REQUIRES OT FOLLOW UP: Yes  Discharge Recommendations: [address]      Pt is a 79year old M admitted for encephalopathy. Pt is also Covid-19 positive. Pt reports that prior to admission he was independent with ADLs, IADLs, and functional mobility. This date, pt demo decreased cognition, strength, endurance, and activity tolerance impacting ADL status.  Pt is currently functioning below baseline and would benefit from skilled OT services at SNF. Plan:  Plan  Times per week: 2+  Times per day: Daily      Goals:  1. Pt will complete all aspects of bed mobility for EOB/OOB ADLs   2. Pt will complete UB/LB bathing with supervision. 3. Pt will complete all aspects of LB dressing with CGA. 4. Pt will complete all functional transfers to and from bed, chair, toilet, shower chair with SBA and RW.   5. Pt will ambulate HH distance to bathroom for toileting with SBA and RW. 6. Pt will complete all aspects of toileting task with Vish. 7. Pt will complete oral hygiene/grooming routine in standing at sink with SBA demo good dynamic standing balance for approx 8 minutes. 8. Pt will complete ther ex/ther act with focus on UB strengthening. Time:   Time in: 935  Time out: 1013  Timed treatment minutes: 25  Total time: 38      Electronically signed by:       OTTO Terrazas/L, [address] [place] [id number]

## 2020-09-26 LAB
ALBUMIN SERPL-MCNC: 4 GM/DL (ref 3.4–5)
ANION GAP SERPL CALCULATED.3IONS-SCNC: 11 MMOL/L (ref 4–16)
BUN BLDV-MCNC: 44 MG/DL (ref 6–23)
CALCIUM SERPL-MCNC: 9.3 MG/DL (ref 8.3–10.6)
CHLORIDE BLD-SCNC: 101 MMOL/L (ref 99–110)
CO2: 23 MMOL/L (ref 21–32)
CREAT SERPL-MCNC: 1.7 MG/DL (ref 0.9–1.3)
GFR AFRICAN AMERICAN: 47 ML/MIN/1.73M2
GFR NON-AFRICAN AMERICAN: 39 ML/MIN/1.73M2
GLUCOSE BLD-MCNC: 115 MG/DL (ref 70–99)
GLUCOSE BLD-MCNC: 136 MG/DL (ref 70–99)
GLUCOSE BLD-MCNC: 144 MG/DL (ref 70–99)
GLUCOSE BLD-MCNC: 168 MG/DL (ref 70–99)
GLUCOSE BLD-MCNC: 175 MG/DL (ref 70–99)
GLUCOSE BLD-MCNC: 86 MG/DL (ref 70–99)
PHOSPHORUS: 3.3 MG/DL (ref 2.5–4.9)
POTASSIUM SERPL-SCNC: 4.7 MMOL/L (ref 3.5–5.1)
SODIUM BLD-SCNC: 135 MMOL/L (ref 135–145)

## 2020-09-26 PROCEDURE — 6370000000 HC RX 637 (ALT 250 FOR IP): Performed by: STUDENT IN AN ORGANIZED HEALTH CARE EDUCATION/TRAINING PROGRAM

## 2020-09-26 PROCEDURE — 80069 RENAL FUNCTION PANEL: CPT

## 2020-09-26 PROCEDURE — U0002 COVID-19 LAB TEST NON-CDC: HCPCS

## 2020-09-26 PROCEDURE — 6370000000 HC RX 637 (ALT 250 FOR IP): Performed by: INTERNAL MEDICINE

## 2020-09-26 PROCEDURE — 1200000000 HC SEMI PRIVATE

## 2020-09-26 PROCEDURE — 82962 GLUCOSE BLOOD TEST: CPT

## 2020-09-26 PROCEDURE — 2580000003 HC RX 258: Performed by: STUDENT IN AN ORGANIZED HEALTH CARE EDUCATION/TRAINING PROGRAM

## 2020-09-26 PROCEDURE — 94761 N-INVAS EAR/PLS OXIMETRY MLT: CPT

## 2020-09-26 PROCEDURE — 6360000002 HC RX W HCPCS: Performed by: INTERNAL MEDICINE

## 2020-09-26 RX ADMIN — CLOPIDOGREL BISULFATE 75 MG: 75 TABLET ORAL at 08:24

## 2020-09-26 RX ADMIN — ATORVASTATIN CALCIUM 20 MG: 20 TABLET, FILM COATED ORAL at 08:24

## 2020-09-26 RX ADMIN — PANTOPRAZOLE SODIUM 40 MG: 40 TABLET, DELAYED RELEASE ORAL at 06:23

## 2020-09-26 RX ADMIN — ACETAMINOPHEN 650 MG: 325 TABLET ORAL at 14:56

## 2020-09-26 RX ADMIN — HEPARIN SODIUM 5000 UNITS: 5000 INJECTION INTRAVENOUS; SUBCUTANEOUS at 08:24

## 2020-09-26 RX ADMIN — ACETAMINOPHEN 650 MG: 325 TABLET ORAL at 08:23

## 2020-09-26 RX ADMIN — INSULIN LISPRO 2 UNITS: 100 INJECTION, SOLUTION INTRAVENOUS; SUBCUTANEOUS at 08:24

## 2020-09-26 RX ADMIN — ASPIRIN 81 MG CHEWABLE TABLET 81 MG: 81 TABLET CHEWABLE at 08:24

## 2020-09-26 RX ADMIN — METOPROLOL TARTRATE 50 MG: 50 TABLET, FILM COATED ORAL at 08:24

## 2020-09-26 RX ADMIN — MAGNESIUM OXIDE 400 MG (241.3 MG MAGNESIUM) TABLET 400 MG: TABLET at 08:24

## 2020-09-26 RX ADMIN — CITALOPRAM HYDROBROMIDE 20 MG: 20 TABLET ORAL at 08:24

## 2020-09-26 RX ADMIN — SODIUM CHLORIDE, PRESERVATIVE FREE 10 ML: 5 INJECTION INTRAVENOUS at 08:24

## 2020-09-26 RX ADMIN — BUSPIRONE HYDROCHLORIDE 10 MG: 10 TABLET ORAL at 19:58

## 2020-09-26 RX ADMIN — BUSPIRONE HYDROCHLORIDE 10 MG: 10 TABLET ORAL at 08:24

## 2020-09-26 RX ADMIN — HEPARIN SODIUM 5000 UNITS: 5000 INJECTION INTRAVENOUS; SUBCUTANEOUS at 19:58

## 2020-09-26 RX ADMIN — CHOLESTYRAMINE 4 G: 4 POWDER, FOR SUSPENSION ORAL at 08:23

## 2020-09-26 RX ADMIN — BUSPIRONE HYDROCHLORIDE 10 MG: 10 TABLET ORAL at 14:09

## 2020-09-26 RX ADMIN — AMLODIPINE BESYLATE 10 MG: 10 TABLET ORAL at 08:24

## 2020-09-26 RX ADMIN — AMLODIPINE BESYLATE 10 MG: 10 TABLET ORAL at 19:58

## 2020-09-26 RX ADMIN — INSULIN GLARGINE 40 UNITS: 100 INJECTION, SOLUTION SUBCUTANEOUS at 20:00

## 2020-09-26 RX ADMIN — HEPARIN SODIUM 5000 UNITS: 5000 INJECTION INTRAVENOUS; SUBCUTANEOUS at 14:09

## 2020-09-26 RX ADMIN — OXYCODONE HYDROCHLORIDE AND ACETAMINOPHEN 1 TABLET: 7.5; 325 TABLET ORAL at 19:58

## 2020-09-26 RX ADMIN — SODIUM CHLORIDE, PRESERVATIVE FREE 10 ML: 5 INJECTION INTRAVENOUS at 19:59

## 2020-09-26 RX ADMIN — HALOPERIDOL LACTATE 0.5 MG: 5 INJECTION, SOLUTION INTRAMUSCULAR at 21:49

## 2020-09-26 ASSESSMENT — PAIN SCALES - GENERAL
PAINLEVEL_OUTOF10: 0
PAINLEVEL_OUTOF10: 3
PAINLEVEL_OUTOF10: 0
PAINLEVEL_OUTOF10: 3
PAINLEVEL_OUTOF10: 6
PAINLEVEL_OUTOF10: 0

## 2020-09-26 ASSESSMENT — PAIN SCALES - WONG BAKER
WONGBAKER_NUMERICALRESPONSE: 0

## 2020-09-26 NOTE — PLAN OF CARE
Problem: Falls - Risk of:  Goal: Will remain free from falls  Description: Will remain free from falls  9/26/2020 0051 by Bertha Vergara RN  Outcome: Ongoing  9/25/2020 1846 by Juventino Barba RN  Outcome: Ongoing  Goal: Absence of physical injury  Description: Absence of physical injury  9/26/2020 0051 by Bertha Vergara RN  Outcome: Ongoing  9/25/2020 1846 by Juventino Barba RN  Outcome: Ongoing     Problem: Restraint Use - Nonviolent/Non-Self-Destructive Behavior:  Goal: Absence of restraint indications  Description: Absence of restraint indications  9/26/2020 0051 by Bertha Vergara RN  Outcome: Ongoing  9/25/2020 1846 by Juventino Barba RN  Outcome: Ongoing  Goal: Absence of restraint-related injury  Description: Absence of restraint-related injury  9/26/2020 0051 by Bertha Vergara RN  Outcome: Ongoing  9/25/2020 1846 by Juventino Barba RN  Outcome: Ongoing     Problem: Airway Clearance - Ineffective  Goal: Achieve or maintain patent airway  9/26/2020 0051 by Bertha Vergara RN  Outcome: Ongoing  9/25/2020 1846 by Juventino Barba RN  Outcome: Ongoing     Problem: Gas Exchange - Impaired  Goal: Absence of hypoxia  9/26/2020 0051 by Bertha Vergara RN  Outcome: Ongoing  9/25/2020 1846 by Juventino Barba RN  Outcome: Ongoing  Goal: Promote optimal lung function  9/26/2020 0051 by Bertha Vergara RN  Outcome: Ongoing  9/25/2020 1846 by Juventino Barba RN  Outcome: Ongoing     Problem: Breathing Pattern - Ineffective  Goal: Ability to achieve and maintain a regular respiratory rate  9/26/2020 0051 by Bertha Vergara RN  Outcome: Ongoing  9/25/2020 1846 by Juventino Barba RN  Outcome: Ongoing     Problem:  Body Temperature -  Risk of, Imbalanced  Goal: Ability to maintain a body temperature within defined limits  9/26/2020 0051 by Bertha Vergara RN  Outcome: Ongoing  9/25/2020 1846 by Juventino Barba RN  Outcome: Ongoing  Goal: Will regain or maintain usual level of consciousness  9/26/2020 0051 by Katy Brock RN  Outcome: Ongoing  9/25/2020 1846 by Vinnie Sultana RN  Outcome: Ongoing  Goal: Complications related to the disease process, condition or treatment will be avoided or minimized  9/26/2020 0051 by Katy Brock RN  Outcome: Ongoing  9/25/2020 1846 by Vinnie Sultana RN  Outcome: Ongoing     Problem: Isolation Precautions - Risk of Spread of Infection  Goal: Prevent transmission of infection  9/26/2020 0051 by Katy Brock RN  Outcome: Ongoing  9/25/2020 1846 by Vinnie Sultana RN  Outcome: Ongoing     Problem: Nutrition Deficits  Goal: Optimize nutrtional status  9/26/2020 0051 by Katy Brock RN  Outcome: Ongoing  9/25/2020 1846 by Vinnie Sultana RN  Outcome: Ongoing     Problem: Risk for Fluid Volume Deficit  Goal: Maintain normal heart rhythm  9/26/2020 0051 by Katy Brock RN  Outcome: Ongoing  9/25/2020 1846 by Vinnie Sultana RN  Outcome: Ongoing  Goal: Maintain absence of muscle cramping  9/26/2020 0051 by Katy Brock RN  Outcome: Ongoing  9/25/2020 1846 by Vinnie Sultana RN  Outcome: Ongoing  Goal: Maintain normal serum potassium, sodium, calcium, phosphorus, and pH  9/26/2020 0051 by Katy Brock RN  Outcome: Ongoing  9/25/2020 1846 by Vinnie Sultana RN  Outcome: Ongoing     Problem: Loneliness or Risk for Loneliness  Goal: Demonstrate positive use of time alone when socialization is not possible  9/26/2020 0051 by Katy Brock RN  Outcome: Ongoing  9/25/2020 1846 by Vinnie Sultana RN  Outcome: Ongoing     Problem: Fatigue  Goal: Verbalize increase energy and improved vitality  9/26/2020 0051 by Katy Brock RN  Outcome: Ongoing  9/25/2020 1846 by Vinnie Sultana RN  Outcome: Ongoing     Problem: Patient Education: Go to Patient Education Activity  Goal: Patient/Family Education  9/26/2020 0051 by Katy Brock RN  Outcome: Ongoing  9/25/2020 1846 by Vinnie Sultana RN  Outcome: Ongoing     Problem: Skin Integrity:  Goal: Will show no infection signs and symptoms  Description: Will show no infection signs and symptoms  Outcome: Ongoing  Goal: Absence of new skin breakdown  Description: Absence of new skin breakdown  Outcome: Ongoing

## 2020-09-26 NOTE — PROGRESS NOTES
Physician Progress Note      Pravin Sequeira  CSN #:                  [id number]  :                       1940  ADMIT DATE:       2020 10:33 PM  [address] [place] [facility] [place] DATE:  RESPONDING  PROVIDER #:        Faith Guevara MD          QUERY TEXT:    Dear Hospitalist    Pt admitted with Metabolic Encephalopathy. Pt noted to have Covid on admission   from previous visit . If possible, please document in progress notes and   discharge summary the relationship, if any, between *** and ***. The medical record reflects the following:  Risk Factors: Metabolic encephalopathy, Covid , VICKY with ATN, CHF  Clinical Indicators:\" Remains stable for dc. \"Awaiting ECF that can accept   COVID positive patient. Altered mental state with encephalopathy patient was   positive for COVID initially \"Case management is working on finding a facility   that will accept him as he was still COVID positive as of  \"  Was   positive Covid on admission and finally tested Negative on , Pt. Was   Admitted for : Altered mental state, Metabolic encephalopathy, , He is waking   up with stimulus. Received Haldol overnight. Currently on restraints  Treatment: Repeated testing, covid unit, isolation, Haldol . restraints    Thank you Belgica Rodríguez RN,CDS  Options provided:  -- Metabolic Encephalopathy due to Covid  -- Metabolic Encephalopathy unrelated to Covid  -- Other - I will add my own diagnosis  -- Disagree - Not applicable / Not valid  -- Disagree - Clinically unable to determine / Unknown  -- Refer to Clinical Documentation Reviewer    PROVIDER RESPONSE TEXT:    Metabolic encephalopathy, could be related to COVID (sequelae, late effects).     Query created by: Jose Raul Coello on 2020 3:39 PM      Electronically signed by:  Faith Guevara MD 2020 10:31 AM

## 2020-09-26 NOTE — PROGRESS NOTES
Hospitalist Progress Note      Name:  Nissa Cullen /Age/Sex: 1940  (79 y.o. male)   MRN & CSN:  [id number] & [id number] Admission Date/Time: 2020 10:33 PM   Location:  -A PCP: Eliverto Paget, MD       Hospital Day: 8        Assessment and Plan:       Update for  -has been medically stable for discharge since . Doing fairly well today. Discussed with  today. Encephalopathy  -He is on opioids at home  -He is on BuSpar and SSRIs  -He has an VICKY  -Haldol as needed for agitation-it seemed to work well last admission  -Orthostatic vital signs once patient is not altered  -Minimize auditory stimulation  -On  mental status changes much better, and he is medically stable for discharge. Case management is working on finding a facility that will accept him as he was still COVID positive as of     Acute kidney injury on chronic kidney disease  -Nephrology consult appreciated  -Holding Spironolactone and losartan  -Monitor BMP    Elevated troponin  -Minimal elevation, doubt ACS    History of liver cirrhosis  -Ammonia level is 34    Recent COVID- infection  -repeat test pending    Other problems    Diabetes mellitus type 2, on insulin  -Amaryl discontinued  -Started on renally adjusted Lantus and insulin correction scale  -Monitor blood sugar    Hypertension  -Holding meds as above    Coronary artery disease  -Continue home meds except as above    Left ear pain - will follow up    Subjective:     Patient having left ear pain on , but on  ear pain resolved    On  he was doing well when I saw him. He denied any complaints. On  he was sitting up in the chair and watching a program about [place] father who passed away. He explained a little bit about what he was watching. He had no complaints. He was in good spirits.  - sitting up in the chair at the side of the bed, smiling at times    Objective:        Intake/Output Summary

## 2020-09-26 NOTE — PROGRESS NOTES
Progress Note( Dr. Luba Borja)    Subjective:   Admit Date: 9/19/2020  PCP: Agustin Flores MD    Saw the patient on 9/25/2020 but made the note later    Admitted For : Altered mental state with encephalopathy patient was positive for COVID initially  Consulted For: Control of blood glucose    Interval History: Is much better able to breathe better as his most recent COVID is negative    Denies any chest pains,   Has SOB . Denies nausea or vomiting. Eating better  No new bowel or bladder symptoms. Intake/Output Summary (Last 24 hours) at 9/26/2020 1551  Last data filed at 9/25/2020 2200  Gross per 24 hour   Intake --   Output 550 ml   Net -550 ml       DATA    CBC:   No results for input(s): WBC, HGB, PLT in the last 72 hours. CMP:  Recent Labs     09/24/20  0300 09/25/20  1220 09/26/20  0505    135 135   K 4.6 4.6 4.7    101 101   CO2 23 23 23   BUN 58* 47* 44*   CREATININE 2.3* 1.8* 1.7*   CALCIUM 9.2 9.1 9.3   LABALBU 3.8  --  4.0     Lipids:   Lab Results   Component Value Date    CHOL 129 05/14/2013    HDL 28 05/14/2013    TRIG 220 05/14/2013     Glucose:  Recent Labs     09/26/20  0139 09/26/20  0804 09/26/20  1201   POCGLU 144* 168* 136*     [id number]:  Lab Results   Component Value Date    LABA1C 8.8 08/09/2020     High Sensitivity TSH:   Lab Results   Component Value Date    TSHHS 2.230 08/09/2020     Free T3:   Lab Results   Component Value Date    FT3 2.3 10/19/2015     Free T4:  Lab Results   Component Value Date    T4FREE 1.08 10/19/2015     .        Scheduled Medicines   Medications:    insulin glargine  40 Units Subcutaneous Nightly    insulin lispro  15 Units Subcutaneous TID WC    insulin lispro  0-12 Units Subcutaneous 2 times per day    insulin lispro  0-12 Units Subcutaneous TID WC    amLODIPine  10 mg Oral BID    aspirin  81 mg Oral Daily    atorvastatin  20 mg Oral Daily    busPIRone  10 mg Oral TID    cholestyramine light  1 packet Oral TID WC    citalopram  20 mg Oral Daily    clopidogrel  75 mg Oral Daily    fluticasone  1 spray Nasal BID    metoprolol tartrate  50 mg Oral BID    pantoprazole  40 mg Oral QAM AC    sodium chloride flush  10 mL Intravenous 2 times per day    magnesium oxide  400 mg Oral Daily    heparin (porcine)  5,000 Units Subcutaneous TID      Infusions:    dextrose           Objective:   Vitals: /76   Pulse 80   Temp 97.6 °F (36.4 °C) (Oral)   Resp 14   Ht 5' 10\" (1.778 m)   Wt 224 lb 3.3 oz (101.7 kg)   SpO2 98%   BMI 32.17 kg/m²   General appearance: alert and cooperative with exam  Neck: no JVD or bruit  Thyroid : Normal lobes   Lungs: Has Vesicular Breath sounds has some wheezing and rales  Heart:  regular rate and rhythm  Abdomen: soft, non-tender; bowel sounds normal; no masses,  no organomegaly  Musculoskeletal: Normal  Extremities: extremities normal, , no edema  Neurologic:  Awake, alert, oriented to name, place and time. Cranial nerves II-XII are grossly intact. Motor is  intact. Sensory neuropathy. ,  and gait is normal.    Assessment:     Patient Active Problem List:     Essential hypertension, benign     Type 2 diabetes mellitus, with long-term current use of insulin ([address]     Other and unspecified hyperlipidemia     Chest pain     Coronary atherosclerosis     Chronic kidney disease, stage III (moderate) (HCC)     Cellulitis of leg     Cellulitis of lower leg     Lethargy     Acute respiratory failure with hypoxia and hypercapnia (HCC)     Altered mental status, unspecified     Hypoglycemia     Acute kidney injury ([address]     Generalized weakness     Liver cirrhosis (HCC)     Depression     Chronic pain     Encephalopathy      Plan:     1. Reviewed POC blood glucose . Labs and X ray results   2. Reviewed Current Medicines   3. On meal/ Correction bolus Humalog/ Basal Lantus Insulin regime /   4. Monitor Blood glucose frequently   5. Modified  the dose of Insulin/ other medicines as needed   6. Will follow     .      Billie Almazan Hao Owens MD

## 2020-09-26 NOTE — PROGRESS NOTES
Hospitalist Progress Note      Name:  Storm Ruiz /Age/Sex: 1940  (79 y.o. male)   MRN & CSN:  [id number] & [id number] Admission Date/Time: 2020 10:33 PM   Location:  -A PCP: Jens Gray MD       Hospital Day: 7        Assessment and Plan:       Update for -OT saw patient and recommended SNF. PT evaluation pending. Remains medically stable for discharge to the next level of care. Encephalopathy  -He is on opioids at home  -He is on BuSpar and SSRIs  -He has an VICKY  -Haldol as needed for agitation-it seemed to work well last admission  -Orthostatic vital signs once patient is not altered  -Minimize auditory stimulation  -On  mental status changes much better, and he is medically stable for discharge. Case management is working on finding a facility that will accept him as he was still COVID positive as of     Acute kidney injury on chronic kidney disease  -Nephrology consult appreciated  -Holding Spironolactone and losartan  -Monitor BMP    Elevated troponin  -Minimal elevation, doubt ACS    History of liver cirrhosis  -Ammonia level is 34    Recent COVID-19 infection  -repeat test pending    Other problems    Diabetes mellitus type 2, on insulin  -Amaryl discontinued  -Started on renally adjusted Lantus and insulin correction scale  -Monitor blood sugar    Hypertension  -Holding meds as above    Coronary artery disease  -Continue home meds except as above    Left ear pain - will follow up    Subjective:     Patient having left ear pain on , but on  ear pain resolved    On  he was doing well when I saw him. He denied any complaints. On  he was sitting up in the chair and watching a program about [place] Perdomo father who passed away. He explained a little bit about what he was watching. He had no complaints. He was in good spirits. Objective:        Intake/Output Summary (Last 24 hours) at 2020  Last data filed at 2020 1957  Gross per 24 hour   Intake 240 ml   Output 600 ml   Net -360 ml      Vitals:   Vitals:    09/25/20 2143   BP: (!) 150/73   Pulse:    Resp:    Temp:    SpO2:      Physical Exam:      Physical Exam  Constitutional:       Appearance: He is obese. Pulmonary:      Effort: Pulmonary effort is normal.   Skin:     General: Skin is dry. Body mass index is 31.71 kg/m².         Medications:   Medications:    insulin glargine  40 Units Subcutaneous Nightly    insulin lispro  15 Units Subcutaneous TID WC    insulin lispro  0-12 Units Subcutaneous 2 times per day    insulin lispro  0-12 Units Subcutaneous TID WC    amLODIPine  10 mg Oral BID    aspirin  81 mg Oral Daily    atorvastatin  20 mg Oral Daily    busPIRone  10 mg Oral TID    cholestyramine light  1 packet Oral TID WC    citalopram  20 mg Oral Daily    clopidogrel  75 mg Oral Daily    fluticasone  1 spray Nasal BID    metoprolol tartrate  50 mg Oral BID    pantoprazole  40 mg Oral QAM AC    sodium chloride flush  10 mL Intravenous 2 times per day    magnesium oxide  400 mg Oral Daily    heparin (porcine)  5,000 Units Subcutaneous TID      Infusions:    dextrose       PRN Meds: oxyCODONE-acetaminophen, 1 tablet, Q8H PRN  glucose, 15 g, PRN  dextrose, 12.5 g, PRN  glucagon (rDNA), 1 mg, PRN  dextrose, 100 mL/hr, PRN  sodium chloride flush, 10 mL, PRN  acetaminophen, 650 mg, Q6H PRN    Or  acetaminophen, 650 mg, Q6H PRN  polyethylene glycol, 17 g, Daily PRN  promethazine, 12.5 mg, Q6H PRN    Or  ondansetron, 4 mg, Q6H PRN  haloperidol lactate, 0.5 mg, Q6H PRN          Electronically signed by Sana Mccarty MD on 9/25/2020 at 9:56 PM

## 2020-09-26 NOTE — CARE COORDINATION
CM received call from Dr Carey Comer to discuss discharge planning. Pt had a rapid covid on 9/23 which was positive. Per PT/OT notes recommend SNF placement. CM called Supervisor regarding a facility that maybe accepting Pos Covid. CM awaiting response. CM called [facility] and left VM. CM will continue to follow for discharge plan for SNF placement. CM collaborated with Dr Carey Comer and VO for a repeat covid completed.     CM called [facility] and they do have a covid unit and are able to accept pts that are positive. CM spoke with dtr and she is going to look at Facility and then CM will follow up .  CM received message from supervisor that the plan in place is to poss switch to inpt SNF [facility]

## 2020-09-26 NOTE — PROGRESS NOTES
Patient assisted back to bed, patient confused. Patient re-oriented and reminded patient of time,date, and place. Bed alarm turned on, call light with in reach.

## 2020-09-26 NOTE — PROGRESS NOTES
 fluticasone  1 spray Nasal BID    metoprolol tartrate  50 mg Oral BID    pantoprazole  40 mg Oral QAM AC    sodium chloride flush  10 mL Intravenous 2 times per day    magnesium oxide  400 mg Oral Daily    heparin (porcine)  5,000 Units Subcutaneous TID      Infusions:    dextrose           Objective:   Vitals: /76   Pulse 80   Temp 97.6 °F (36.4 °C) (Oral)   Resp 14   Ht 5' 10\" (1.778 m)   Wt 224 lb 3.3 oz (101.7 kg)   SpO2 98%   BMI 32.17 kg/m²   General appearance: alert and cooperative with exam  Neck: no JVD or bruit  Thyroid : Normal lobes   Lungs: Has Vesicular Breath sounds has some wheezing and rales  Heart:  regular rate and rhythm  Abdomen: soft, non-tender; bowel sounds normal; no masses,  no organomegaly  Musculoskeletal: Normal  Extremities: extremities normal, , no edema  Neurologic:  Awake, alert, oriented to name, place and time. Cranial nerves II-XII are grossly intact. Motor is  intact. Sensory neuropathy. ,  and gait is normal.    Assessment:     Patient Active Problem List:     Essential hypertension, benign     Type 2 diabetes mellitus, with long-term current use of insulin ([address]     Other and unspecified hyperlipidemia     Chest pain     Coronary atherosclerosis     Chronic kidney disease, stage III (moderate) (HCC)     Cellulitis of leg     Cellulitis of lower leg     Lethargy     Acute respiratory failure with hypoxia and hypercapnia (HCC)     Altered mental status, unspecified     Hypoglycemia     Acute kidney injury ([address]     Generalized weakness     Liver cirrhosis (HCC)     Depression     Chronic pain     Encephalopathy      Plan:     1. Reviewed POC blood glucose . Labs and X ray results   2. Reviewed Current Medicines   3. On meal/ Correction bolus Humalog/ Basal Lantus Insulin regime /   4. Monitor Blood glucose frequently   5. Modified  the dose of Insulin/ other medicines as needed   6. Will follow     .      Patricia Gonzalez MD

## 2020-09-26 NOTE — PROGRESS NOTES
Nephrology Progress Note  9/26/2020 10:28 AM  Subjective:   Admit Date: 9/19/2020  PCP: Aysha Hernandez MD  Interval History: pt appear doing better and denies new issues. Diet: DIET CARB CONTROL; Carb Control: 4 carb choices (60 gms)/meal  Dietary Nutrition Supplements: Diabetic Oral Supplement  Pain is:Mild      Data:   Scheduled Meds:   insulin glargine  40 Units Subcutaneous Nightly    insulin lispro  15 Units Subcutaneous TID WC    insulin lispro  0-12 Units Subcutaneous 2 times per day    insulin lispro  0-12 Units Subcutaneous TID WC    amLODIPine  10 mg Oral BID    aspirin  81 mg Oral Daily    atorvastatin  20 mg Oral Daily    busPIRone  10 mg Oral TID    cholestyramine light  1 packet Oral TID WC    citalopram  20 mg Oral Daily    clopidogrel  75 mg Oral Daily    fluticasone  1 spray Nasal BID    metoprolol tartrate  50 mg Oral BID    pantoprazole  40 mg Oral QAM AC    sodium chloride flush  10 mL Intravenous 2 times per day    magnesium oxide  400 mg Oral Daily    heparin (porcine)  5,000 Units Subcutaneous TID     Continuous Infusions:   dextrose       PRN Meds:oxyCODONE-acetaminophen, glucose, dextrose, glucagon (rDNA), dextrose, sodium chloride flush, acetaminophen **OR** acetaminophen, polyethylene glycol, promethazine **OR** ondansetron, haloperidol lactate  I/O last 3 completed shifts: In: 240 [P.O.:240]  Out: 550 [Urine:550]  No intake/output data recorded. Intake/Output Summary (Last 24 hours) at 9/26/2020 1028  Last data filed at 9/25/2020 2200  Gross per 24 hour   Intake --   Output 550 ml   Net -550 ml     CBC:   No results for input(s): WBC, HGB, PLT in the last 72 hours.   BMP:    Recent Labs     09/24/20  0300 09/25/20  1220 09/26/20  0505    135 135   K 4.6 4.6 4.7    101 101   CO2 23 23 23   BUN 58* 47* 44*   CREATININE 2.3* 1.8* 1.7*   GLUCOSE 156* 130* 115*     Hepatic:   No results for input(s): AST, ALT, ALB, BILITOT, ALKPHOS in the last 72 hours.  Troponin: No results for input(s): TROPONINI in the last 72 hours. BNP: No results for input(s): BNP in the last 72 hours. Lipids: No results for input(s): CHOL, HDL in the last 72 hours. Invalid input(s): LDLCALCU  ABGs:   Lab Results   Component Value Date    PO2ART 70 09/21/2020    YMC8VEI 38.0 09/21/2020     INR:   No results for input(s): INR in the last 72 hours. Renal Labs  Albumin:    Lab Results   Component Value Date    LABALBU 4.0 09/26/2020     Calcium:    Lab Results   Component Value Date    CALCIUM 9.3 09/26/2020     Phosphorus:    Lab Results   Component Value Date    PHOS 3.3 09/26/2020     U/A:    Lab Results   Component Value Date    NITRU NEGATIVE 09/20/2020    COLORU YELLOW 09/20/2020    WBCUA 1 09/20/2020    RBCUA NONE SEEN 09/20/2020    MUCUS RARE 09/20/2020    TRICHOMONAS NONE SEEN 09/20/2020    BACTERIA RARE 09/20/2020    CLARITYU CLEAR 09/20/2020    SPECGRAV 1.018 09/20/2020    UROBILINOGEN NORMAL 09/20/2020    BILIRUBINUR NEGATIVE 09/20/2020    BLOODU NEGATIVE 09/20/2020    KETUA NEGATIVE 09/20/2020     ABG:    Lab Results   Component Value Date    KUG5OUW 38.0 09/21/2020    PO2ART 70 09/21/2020    MUK6CZY 22.0 09/21/2020     HgBA1c:    Lab Results   Component Value Date    LABA1C 8.8 08/09/2020     Microalbumen/Creatinine ratio:  No components found for: RUCREAT          Objective:   Vitals: /76   Pulse 80   Temp 97.6 °F (36.4 °C) (Oral)   Resp 14   Ht 5' 10\" (1.778 m)   Wt 224 lb 3.3 oz (101.7 kg)   SpO2 98%   BMI 32.17 kg/m²   General appearance: awake weak  HEENT: Head: Normal, normocephalic, atraumatic.   Neck: supple, symmetrical, trachea midline  Lungs: diminished breath sounds bilaterally  Heart: S1, S2 normal  Abdomen: abnormal findings:  soft nt  Extremities: edema trace  Neurologic: Mental status: alertness: alert      Patient Active Problem List:     Essential hypertension, benign     Type 2 diabetes mellitus, with long-term current use of insulin ([facility]     Other and unspecified hyperlipidemia     Chest pain     Coronary atherosclerosis     Chronic kidney disease, stage III (moderate) (HCC)     Cellulitis of leg     Cellulitis of lower leg     Lethargy     Acute respiratory failure with hypoxia and hypercapnia (HCC)     Altered mental status, unspecified     Hypoglycemia     Acute kidney injury ([facility]     Generalized weakness     Liver cirrhosis (HCC)     Depression     Chronic pain     Encephalopathy    Assessment and Plan:      IMP:  1 arf from atn on ckd 3  2 hx covid +  3 sp fall with confusion  4 Dm2  5 htn      Plan     1 renal holding stable monitor  2 no fever and breathing stable  3 affect better  4 ssi  5 bp stable overall  Will follow and better               Marino Rivera MD

## 2020-09-27 LAB
ALBUMIN SERPL-MCNC: 4.1 GM/DL (ref 3.4–5)
ANION GAP SERPL CALCULATED.3IONS-SCNC: 13 MMOL/L (ref 4–16)
BUN BLDV-MCNC: 42 MG/DL (ref 6–23)
CALCIUM SERPL-MCNC: 9.4 MG/DL (ref 8.3–10.6)
CHLORIDE BLD-SCNC: 102 MMOL/L (ref 99–110)
CO2: 20 MMOL/L (ref 21–32)
CREAT SERPL-MCNC: 1.8 MG/DL (ref 0.9–1.3)
GFR AFRICAN AMERICAN: 44 ML/MIN/1.73M2
GFR NON-AFRICAN AMERICAN: 37 ML/MIN/1.73M2
GLUCOSE BLD-MCNC: 177 MG/DL (ref 70–99)
GLUCOSE BLD-MCNC: 178 MG/DL (ref 70–99)
GLUCOSE BLD-MCNC: 224 MG/DL (ref 70–99)
GLUCOSE BLD-MCNC: 227 MG/DL (ref 70–99)
GLUCOSE BLD-MCNC: 84 MG/DL (ref 70–99)
PHOSPHORUS: 3.9 MG/DL (ref 2.5–4.9)
POTASSIUM SERPL-SCNC: 4.8 MMOL/L (ref 3.5–5.1)
SODIUM BLD-SCNC: 135 MMOL/L (ref 135–145)

## 2020-09-27 PROCEDURE — 80069 RENAL FUNCTION PANEL: CPT

## 2020-09-27 PROCEDURE — 6370000000 HC RX 637 (ALT 250 FOR IP): Performed by: STUDENT IN AN ORGANIZED HEALTH CARE EDUCATION/TRAINING PROGRAM

## 2020-09-27 PROCEDURE — 36592 COLLECT BLOOD FROM PICC: CPT

## 2020-09-27 PROCEDURE — 82962 GLUCOSE BLOOD TEST: CPT

## 2020-09-27 PROCEDURE — 6360000002 HC RX W HCPCS: Performed by: INTERNAL MEDICINE

## 2020-09-27 PROCEDURE — 6370000000 HC RX 637 (ALT 250 FOR IP): Performed by: INTERNAL MEDICINE

## 2020-09-27 PROCEDURE — 2580000003 HC RX 258: Performed by: STUDENT IN AN ORGANIZED HEALTH CARE EDUCATION/TRAINING PROGRAM

## 2020-09-27 PROCEDURE — 1200000000 HC SEMI PRIVATE

## 2020-09-27 RX ORDER — INSULIN GLARGINE 100 [IU]/ML
45 INJECTION, SOLUTION SUBCUTANEOUS NIGHTLY
Status: DISCONTINUED | OUTPATIENT
Start: 2020-09-27 | End: 2020-09-28 | Stop reason: HOSPADM

## 2020-09-27 RX ADMIN — ASPIRIN 81 MG CHEWABLE TABLET 81 MG: 81 TABLET CHEWABLE at 08:23

## 2020-09-27 RX ADMIN — PROMETHAZINE HYDROCHLORIDE 12.5 MG: 25 TABLET ORAL at 20:12

## 2020-09-27 RX ADMIN — PANTOPRAZOLE SODIUM 40 MG: 40 TABLET, DELAYED RELEASE ORAL at 08:22

## 2020-09-27 RX ADMIN — AMLODIPINE BESYLATE 10 MG: 10 TABLET ORAL at 20:12

## 2020-09-27 RX ADMIN — SODIUM CHLORIDE, PRESERVATIVE FREE 10 ML: 5 INJECTION INTRAVENOUS at 20:13

## 2020-09-27 RX ADMIN — BUSPIRONE HYDROCHLORIDE 10 MG: 10 TABLET ORAL at 20:24

## 2020-09-27 RX ADMIN — ACETAMINOPHEN 650 MG: 325 TABLET ORAL at 14:44

## 2020-09-27 RX ADMIN — OXYCODONE HYDROCHLORIDE AND ACETAMINOPHEN 1 TABLET: 7.5; 325 TABLET ORAL at 08:23

## 2020-09-27 RX ADMIN — PROMETHAZINE HYDROCHLORIDE 12.5 MG: 25 TABLET ORAL at 00:46

## 2020-09-27 RX ADMIN — BUSPIRONE HYDROCHLORIDE 10 MG: 10 TABLET ORAL at 08:23

## 2020-09-27 RX ADMIN — INSULIN LISPRO 4 UNITS: 100 INJECTION, SOLUTION INTRAVENOUS; SUBCUTANEOUS at 17:29

## 2020-09-27 RX ADMIN — INSULIN LISPRO 2 UNITS: 100 INJECTION, SOLUTION INTRAVENOUS; SUBCUTANEOUS at 08:26

## 2020-09-27 RX ADMIN — METOPROLOL TARTRATE 50 MG: 50 TABLET, FILM COATED ORAL at 08:22

## 2020-09-27 RX ADMIN — HEPARIN SODIUM 5000 UNITS: 5000 INJECTION INTRAVENOUS; SUBCUTANEOUS at 14:44

## 2020-09-27 RX ADMIN — BUSPIRONE HYDROCHLORIDE 10 MG: 10 TABLET ORAL at 14:44

## 2020-09-27 RX ADMIN — HEPARIN SODIUM 5000 UNITS: 5000 INJECTION INTRAVENOUS; SUBCUTANEOUS at 20:14

## 2020-09-27 RX ADMIN — METOPROLOL TARTRATE 50 MG: 50 TABLET, FILM COATED ORAL at 20:12

## 2020-09-27 RX ADMIN — CITALOPRAM HYDROBROMIDE 20 MG: 20 TABLET ORAL at 08:23

## 2020-09-27 RX ADMIN — CLOPIDOGREL BISULFATE 75 MG: 75 TABLET ORAL at 08:22

## 2020-09-27 RX ADMIN — INSULIN GLARGINE 45 UNITS: 100 INJECTION, SOLUTION SUBCUTANEOUS at 20:16

## 2020-09-27 RX ADMIN — MAGNESIUM OXIDE 400 MG (241.3 MG MAGNESIUM) TABLET 400 MG: TABLET at 08:22

## 2020-09-27 RX ADMIN — OXYCODONE HYDROCHLORIDE AND ACETAMINOPHEN 1 TABLET: 7.5; 325 TABLET ORAL at 20:12

## 2020-09-27 RX ADMIN — HEPARIN SODIUM 5000 UNITS: 5000 INJECTION INTRAVENOUS; SUBCUTANEOUS at 08:22

## 2020-09-27 RX ADMIN — ATORVASTATIN CALCIUM 20 MG: 20 TABLET, FILM COATED ORAL at 08:23

## 2020-09-27 RX ADMIN — AMLODIPINE BESYLATE 10 MG: 10 TABLET ORAL at 08:22

## 2020-09-27 RX ADMIN — CHOLESTYRAMINE 4 G: 4 POWDER, FOR SUSPENSION ORAL at 08:22

## 2020-09-27 RX ADMIN — SODIUM CHLORIDE, PRESERVATIVE FREE 10 ML: 5 INJECTION INTRAVENOUS at 08:23

## 2020-09-27 ASSESSMENT — PAIN SCALES - WONG BAKER
WONGBAKER_NUMERICALRESPONSE: 0

## 2020-09-27 ASSESSMENT — PAIN DESCRIPTION - DESCRIPTORS
DESCRIPTORS: ACHING;DISCOMFORT
DESCRIPTORS: HEADACHE

## 2020-09-27 ASSESSMENT — PAIN SCALES - GENERAL
PAINLEVEL_OUTOF10: 3
PAINLEVEL_OUTOF10: 3

## 2020-09-27 ASSESSMENT — PAIN - FUNCTIONAL ASSESSMENT: PAIN_FUNCTIONAL_ASSESSMENT: PREVENTS OR INTERFERES SOME ACTIVE ACTIVITIES AND ADLS

## 2020-09-27 ASSESSMENT — PAIN DESCRIPTION - LOCATION
LOCATION: HEAD
LOCATION: GENERALIZED
LOCATION: BACK

## 2020-09-27 ASSESSMENT — PAIN DESCRIPTION - PROGRESSION: CLINICAL_PROGRESSION: NOT CHANGED

## 2020-09-27 ASSESSMENT — PAIN DESCRIPTION - FREQUENCY: FREQUENCY: CONTINUOUS

## 2020-09-27 ASSESSMENT — PAIN DESCRIPTION - PAIN TYPE: TYPE: CHRONIC PAIN

## 2020-09-27 NOTE — PROGRESS NOTES
Progress Note( Dr. Ty Valencia)    Subjective:   Admit Date: 9/19/2020  PCP: Aysha Hernandez MD    Saw the patient on 9/25/2020 but made the note later    Admitted For : Altered mental state with encephalopathy patient was positive for COVID initially  Consulted For: Control of blood glucose    Interval History: Is much better able to breathe better as his most recent COVID is negative    Denies any chest pains,   Has SOB . Denies nausea or vomiting. Eating better  No new bowel or bladder symptoms. Intake/Output Summary (Last 24 hours) at 9/27/2020 0831  Last data filed at 9/27/2020 0533  Gross per 24 hour   Intake --   Output 1150 ml   Net -1150 ml       DATA    CBC:   No results for input(s): WBC, HGB, PLT in the last 72 hours. CMP:  Recent Labs     09/25/20  1220 09/26/20  0505 09/27/20  0522    135 135   K 4.6 4.7 4.8    101 102   CO2 23 23 20*   BUN 47* 44* 42*   CREATININE 1.8* 1.7* 1.8*   CALCIUM 9.1 9.3 9.4   LABALBU  --  4.0 4.1     Lipids:   Lab Results   Component Value Date    CHOL 129 05/14/2013    HDL 28 05/14/2013    TRIG 220 05/14/2013     Glucose:  Recent Labs     09/26/20  1704 09/26/20  1955 09/27/20  0813   POCGLU 86 175* 178*     TjoalpedeyC3N:  Lab Results   Component Value Date    LABA1C 8.8 08/09/2020     High Sensitivity TSH:   Lab Results   Component Value Date    TSHHS 2.230 08/09/2020     Free T3:   Lab Results   Component Value Date    FT3 2.3 10/19/2015     Free T4:  Lab Results   Component Value Date    T4FREE 1.08 10/19/2015     .        Scheduled Medicines   Medications:    insulin glargine  45 Units Subcutaneous Nightly    insulin lispro  15 Units Subcutaneous TID WC    insulin lispro  0-12 Units Subcutaneous 2 times per day    insulin lispro  0-12 Units Subcutaneous TID WC    amLODIPine  10 mg Oral BID    aspirin  81 mg Oral Daily    atorvastatin  20 mg Oral Daily    busPIRone  10 mg Oral TID    cholestyramine light  1 packet Oral TID WC    citalopram  20 mg Oral Daily    clopidogrel  75 mg Oral Daily    fluticasone  1 spray Nasal BID    metoprolol tartrate  50 mg Oral BID    pantoprazole  40 mg Oral QAM AC    sodium chloride flush  10 mL Intravenous 2 times per day    magnesium oxide  400 mg Oral Daily    heparin (porcine)  5,000 Units Subcutaneous TID      Infusions:    dextrose           Objective:   Vitals: BP (!) 154/88   Pulse 82   Temp 97.7 °F (36.5 °C) (Oral)   Resp 22   Ht 5' 10\" (1.778 m)   Wt 225 lb (102.1 kg)   SpO2 98%   BMI 32.28 kg/m²   General appearance: alert and cooperative with exam  Neck: no JVD or bruit  Thyroid : Normal lobes   Lungs: Has Vesicular Breath sounds has some wheezing and rales  Heart:  regular rate and rhythm  Abdomen: soft, non-tender; bowel sounds normal; no masses,  no organomegaly  Musculoskeletal: Normal  Extremities: extremities normal, , no edema  Neurologic:  Awake, alert, oriented to name, place and time. Cranial nerves II-XII are grossly intact. Motor is  intact. Sensory neuropathy. ,  and gait is normal.    Assessment:     Patient Active Problem List:     Essential hypertension, benign     Type 2 diabetes mellitus, with long-term current use of insulin ([address]     Other and unspecified hyperlipidemia     Chest pain     Coronary atherosclerosis     Chronic kidney disease, stage III (moderate) (HCC)     Cellulitis of leg     Cellulitis of lower leg     Lethargy     Acute respiratory failure with hypoxia and hypercapnia (HCC)     Altered mental status, unspecified     Hypoglycemia     Acute kidney injury ([address]     Generalized weakness     Liver cirrhosis (HCC)     Depression     Chronic pain     Encephalopathy      Plan:     1. Reviewed POC blood glucose . Labs and X ray results   2. Reviewed Current Medicines   3. On meal/ Correction bolus Humalog/ Basal Lantus Insulin regime /   4. Monitor Blood glucose frequently   5. Modified  the dose of Insulin/ other medicines as needed   6. Will follow     .      [facility] Cintia Cutler MD

## 2020-09-27 NOTE — CARE COORDINATION
CM called dtr Olivia, unable to speak at this time. CM will return call later this morning. [address]  1140 CM called Olivia and she and the family are in agreement for [facility]. 1143 CM called pts room and no answer. CM called [facility] and spoke with Olivia. [phone] . CM informed that as of yesterday facility has positive covid cases and [facility] and nursing are NOT accepting pts. CM informed CM supervisor regarding the above information. [address]  Discharge plan is to flip pt to [facility] tomorrow. CM called and updated dtr.  [address]

## 2020-09-27 NOTE — PROGRESS NOTES
Nephrology Progress Note  9/27/2020 9:28 AM  Subjective:   Admit Date: 9/19/2020  PCP: Kinga Huerta MD  Interval History: pt doing ok today and nad    Diet: DIET CARB CONTROL; Carb Control: 4 carb choices (60 gms)/meal  Dietary Nutrition Supplements: Diabetic Oral Supplement  Pain is:Mild      Data:   Scheduled Meds:   insulin glargine  45 Units Subcutaneous Nightly    insulin lispro  15 Units Subcutaneous TID WC    insulin lispro  0-12 Units Subcutaneous 2 times per day    insulin lispro  0-12 Units Subcutaneous TID WC    amLODIPine  10 mg Oral BID    aspirin  81 mg Oral Daily    atorvastatin  20 mg Oral Daily    busPIRone  10 mg Oral TID    cholestyramine light  1 packet Oral TID WC    citalopram  20 mg Oral Daily    clopidogrel  75 mg Oral Daily    fluticasone  1 spray Nasal BID    metoprolol tartrate  50 mg Oral BID    pantoprazole  40 mg Oral QAM AC    sodium chloride flush  10 mL Intravenous 2 times per day    magnesium oxide  400 mg Oral Daily    heparin (porcine)  5,000 Units Subcutaneous TID     Continuous Infusions:   dextrose       PRN Meds:oxyCODONE-acetaminophen, glucose, dextrose, glucagon (rDNA), dextrose, sodium chloride flush, acetaminophen **OR** acetaminophen, polyethylene glycol, promethazine **OR** ondansetron, haloperidol lactate  I/O last 3 completed shifts:  In: -   Out: 1150 [Urine:1150]  No intake/output data recorded. Intake/Output Summary (Last 24 hours) at 9/27/2020 0928  Last data filed at 9/27/2020 0533  Gross per 24 hour   Intake --   Output 1150 ml   Net -1150 ml     CBC:   No results for input(s): WBC, HGB, PLT in the last 72 hours. BMP:    Recent Labs     09/25/20  1220 09/26/20  0505 09/27/20  0522    135 135   K 4.6 4.7 4.8    101 102   CO2 23 23 20*   BUN 47* 44* 42*   CREATININE 1.8* 1.7* 1.8*   GLUCOSE 130* 115* 177*     Hepatic:   No results for input(s): AST, ALT, ALB, BILITOT, ALKPHOS in the last 72 hours.   Troponin: No results for input(s): TROPONINI in the last 72 hours. BNP: No results for input(s): BNP in the last 72 hours. Lipids: No results for input(s): CHOL, HDL in the last 72 hours. Invalid input(s): LDLCALCU  ABGs:   Lab Results   Component Value Date    PO2ART 70 09/21/2020    CHQ7CVA 38.0 09/21/2020     INR:   No results for input(s): INR in the last 72 hours. Renal Labs  Albumin:    Lab Results   Component Value Date    LABALBU 4.1 09/27/2020     Calcium:    Lab Results   Component Value Date    CALCIUM 9.4 09/27/2020     Phosphorus:    Lab Results   Component Value Date    PHOS 3.9 09/27/2020     U/A:    Lab Results   Component Value Date    NITRU NEGATIVE 09/20/2020    COLORU YELLOW 09/20/2020    WBCUA 1 09/20/2020    RBCUA NONE SEEN 09/20/2020    MUCUS RARE 09/20/2020    TRICHOMONAS NONE SEEN 09/20/2020    BACTERIA RARE 09/20/2020    CLARITYU CLEAR 09/20/2020    SPECGRAV 1.018 09/20/2020    UROBILINOGEN NORMAL 09/20/2020    BILIRUBINUR NEGATIVE 09/20/2020    BLOODU NEGATIVE 09/20/2020    KETUA NEGATIVE 09/20/2020     ABG:    Lab Results   Component Value Date    ITO7AWC 38.0 09/21/2020    PO2ART 70 09/21/2020    XLW3FOJ 22.0 09/21/2020     HgBA1c:    Lab Results   Component Value Date    LABA1C 8.8 08/09/2020     Microalbumen/Creatinine ratio:  No components found for: RUCREAT          Objective:   Vitals: BP (!) 154/88   Pulse 82   Temp 97.7 °F (36.5 °C) (Oral)   Resp 22   Ht 5' 10\" (1.778 m)   Wt 225 lb (102.1 kg)   SpO2 98%   BMI 32.28 kg/m²   General appearance: awake weak  HEENT: Head: Normal, normocephalic, atraumatic.   Neck: supple, symmetrical, trachea midline  Lungs: diminished breath sounds bilaterally  Heart: S1, S2 normal  Abdomen: abnormal findings:  soft nt  Extremities: edema trace  Neurologic: Mental status: alertness: alert      Patient Active Problem List:     Essential hypertension, benign     Type 2 diabetes mellitus, with long-term current use of insulin ([address]     Other and unspecified hyperlipidemia     Chest pain     Coronary atherosclerosis     Chronic kidney disease, stage III (moderate) (HCC)     Cellulitis of leg     Cellulitis of lower leg     Lethargy     Acute respiratory failure with hypoxia and hypercapnia (HCC)     Altered mental status, unspecified     Hypoglycemia     Acute kidney injury ([facility]     Generalized weakness     Liver cirrhosis (HCC)     Depression     Chronic pain     Encephalopathy    Assessment and Plan:      IMP:  1 arf from atn on ckd 3  2 hx covid +  3 sp fall with confusion  4 Dm2  5 htn      Plan     1 renal holding stable and monitor not uremic and likely the current baseline 1.7-1.8  2 treating covid and 9/23 still +  3 await plan for rehab  4 ssi  5 bp overall stable and anxious will monitor  Work on ecf on [employer] and await covid negative unless covid + facility he can go to               Jesse Chowdhury MD

## 2020-09-28 ENCOUNTER — HOSPITAL ENCOUNTER (INPATIENT)
Age: 80
LOS: 3 days | Discharge: HOME HEALTH | DRG: 947 | End: 2020-10-01
Attending: INTERNAL MEDICINE | Admitting: INTERNAL MEDICINE
Payer: MEDICARE

## 2020-09-28 VITALS
WEIGHT: 225 LBS | RESPIRATION RATE: 23 BRPM | HEART RATE: 86 BPM | HEIGHT: 70 IN | DIASTOLIC BLOOD PRESSURE: 59 MMHG | TEMPERATURE: 97.7 F | BODY MASS INDEX: 32.21 KG/M2 | SYSTOLIC BLOOD PRESSURE: 99 MMHG | OXYGEN SATURATION: 98 %

## 2020-09-28 PROBLEM — U07.1 COVID-19: Status: ACTIVE | Noted: 2020-09-28

## 2020-09-28 LAB
GLUCOSE BLD-MCNC: 143 MG/DL (ref 70–99)
GLUCOSE BLD-MCNC: 173 MG/DL (ref 70–99)
GLUCOSE BLD-MCNC: 195 MG/DL (ref 70–99)
GLUCOSE BLD-MCNC: 199 MG/DL (ref 70–99)
GLUCOSE BLD-MCNC: 229 MG/DL (ref 70–99)
SARS-COV-2: NOT DETECTED
SOURCE: NORMAL

## 2020-09-28 PROCEDURE — 6370000000 HC RX 637 (ALT 250 FOR IP): Performed by: INTERNAL MEDICINE

## 2020-09-28 PROCEDURE — 82962 GLUCOSE BLOOD TEST: CPT

## 2020-09-28 PROCEDURE — 2580000003 HC RX 258: Performed by: INTERNAL MEDICINE

## 2020-09-28 PROCEDURE — 6360000002 HC RX W HCPCS: Performed by: INTERNAL MEDICINE

## 2020-09-28 PROCEDURE — U0002 COVID-19 LAB TEST NON-CDC: HCPCS

## 2020-09-28 PROCEDURE — 2580000003 HC RX 258: Performed by: STUDENT IN AN ORGANIZED HEALTH CARE EDUCATION/TRAINING PROGRAM

## 2020-09-28 PROCEDURE — 6370000000 HC RX 637 (ALT 250 FOR IP): Performed by: STUDENT IN AN ORGANIZED HEALTH CARE EDUCATION/TRAINING PROGRAM

## 2020-09-28 PROCEDURE — 94761 N-INVAS EAR/PLS OXIMETRY MLT: CPT

## 2020-09-28 PROCEDURE — 1200000002 HC SEMI PRIVATE SWING BED

## 2020-09-28 RX ORDER — INSULIN GLARGINE 100 [IU]/ML
45 INJECTION, SOLUTION SUBCUTANEOUS NIGHTLY
Status: CANCELLED | OUTPATIENT
Start: 2020-09-28

## 2020-09-28 RX ORDER — PROMETHAZINE HYDROCHLORIDE 25 MG/1
12.5 TABLET ORAL EVERY 6 HOURS PRN
Status: DISCONTINUED | OUTPATIENT
Start: 2020-09-28 | End: 2020-10-01 | Stop reason: HOSPADM

## 2020-09-28 RX ORDER — POLYETHYLENE GLYCOL 3350 17 G/17G
17 POWDER, FOR SOLUTION ORAL DAILY PRN
Status: DISCONTINUED | OUTPATIENT
Start: 2020-09-28 | End: 2020-10-01 | Stop reason: HOSPADM

## 2020-09-28 RX ORDER — AMLODIPINE BESYLATE 10 MG/1
10 TABLET ORAL 2 TIMES DAILY
Status: CANCELLED | OUTPATIENT
Start: 2020-09-28

## 2020-09-28 RX ORDER — NICOTINE POLACRILEX 4 MG
15 LOZENGE BUCCAL PRN
Status: CANCELLED | OUTPATIENT
Start: 2020-09-28

## 2020-09-28 RX ORDER — FLUTICASONE PROPIONATE 50 MCG
1 SPRAY, SUSPENSION (ML) NASAL 2 TIMES DAILY
Status: CANCELLED | OUTPATIENT
Start: 2020-09-28

## 2020-09-28 RX ORDER — HEPARIN SODIUM 5000 [USP'U]/ML
5000 INJECTION, SOLUTION INTRAVENOUS; SUBCUTANEOUS 3 TIMES DAILY
Status: DISCONTINUED | OUTPATIENT
Start: 2020-09-28 | End: 2020-10-01 | Stop reason: HOSPADM

## 2020-09-28 RX ORDER — INSULIN GLARGINE 100 [IU]/ML
45 INJECTION, SOLUTION SUBCUTANEOUS NIGHTLY
Status: DISCONTINUED | OUTPATIENT
Start: 2020-09-28 | End: 2020-09-29

## 2020-09-28 RX ORDER — POLYETHYLENE GLYCOL 3350 17 G/17G
17 POWDER, FOR SOLUTION ORAL DAILY PRN
Status: CANCELLED | OUTPATIENT
Start: 2020-09-28

## 2020-09-28 RX ORDER — DEXTROSE MONOHYDRATE 25 G/50ML
12.5 INJECTION, SOLUTION INTRAVENOUS PRN
Status: CANCELLED | OUTPATIENT
Start: 2020-09-28

## 2020-09-28 RX ORDER — ATORVASTATIN CALCIUM 20 MG/1
20 TABLET, FILM COATED ORAL DAILY
Status: CANCELLED | OUTPATIENT
Start: 2020-09-29

## 2020-09-28 RX ORDER — ONDANSETRON 2 MG/ML
4 INJECTION INTRAMUSCULAR; INTRAVENOUS EVERY 6 HOURS PRN
Status: DISCONTINUED | OUTPATIENT
Start: 2020-09-28 | End: 2020-10-01 | Stop reason: HOSPADM

## 2020-09-28 RX ORDER — ONDANSETRON 2 MG/ML
4 INJECTION INTRAMUSCULAR; INTRAVENOUS EVERY 6 HOURS PRN
Status: CANCELLED | OUTPATIENT
Start: 2020-09-28

## 2020-09-28 RX ORDER — MAGNESIUM OXIDE 400 MG/1
400 TABLET ORAL DAILY
Status: DISCONTINUED | OUTPATIENT
Start: 2020-09-29 | End: 2020-10-01 | Stop reason: HOSPADM

## 2020-09-28 RX ORDER — ACETAMINOPHEN 325 MG/1
650 TABLET ORAL EVERY 6 HOURS PRN
Status: CANCELLED | OUTPATIENT
Start: 2020-09-28

## 2020-09-28 RX ORDER — SODIUM CHLORIDE 0.9 % (FLUSH) 0.9 %
10 SYRINGE (ML) INJECTION EVERY 12 HOURS SCHEDULED
Status: DISCONTINUED | OUTPATIENT
Start: 2020-09-28 | End: 2020-10-01 | Stop reason: HOSPADM

## 2020-09-28 RX ORDER — OXYCODONE AND ACETAMINOPHEN 7.5; 325 MG/1; MG/1
1 TABLET ORAL EVERY 8 HOURS PRN
Status: DISCONTINUED | OUTPATIENT
Start: 2020-09-28 | End: 2020-10-01 | Stop reason: HOSPADM

## 2020-09-28 RX ORDER — SODIUM CHLORIDE 0.9 % (FLUSH) 0.9 %
10 SYRINGE (ML) INJECTION EVERY 12 HOURS SCHEDULED
Status: CANCELLED | OUTPATIENT
Start: 2020-09-28

## 2020-09-28 RX ORDER — NICOTINE POLACRILEX 4 MG
15 LOZENGE BUCCAL PRN
Status: DISCONTINUED | OUTPATIENT
Start: 2020-09-28 | End: 2020-10-01 | Stop reason: HOSPADM

## 2020-09-28 RX ORDER — ACETAMINOPHEN 650 MG/1
650 SUPPOSITORY RECTAL EVERY 6 HOURS PRN
Status: CANCELLED | OUTPATIENT
Start: 2020-09-28

## 2020-09-28 RX ORDER — ACETAMINOPHEN 325 MG/1
650 TABLET ORAL EVERY 6 HOURS PRN
Status: DISCONTINUED | OUTPATIENT
Start: 2020-09-28 | End: 2020-10-01 | Stop reason: HOSPADM

## 2020-09-28 RX ORDER — CLOPIDOGREL BISULFATE 75 MG/1
75 TABLET ORAL DAILY
Status: CANCELLED | OUTPATIENT
Start: 2020-09-29

## 2020-09-28 RX ORDER — PROMETHAZINE HYDROCHLORIDE 25 MG/1
12.5 TABLET ORAL EVERY 6 HOURS PRN
Status: CANCELLED | OUTPATIENT
Start: 2020-09-28

## 2020-09-28 RX ORDER — DEXTROSE MONOHYDRATE 50 MG/ML
100 INJECTION, SOLUTION INTRAVENOUS PRN
Status: DISCONTINUED | OUTPATIENT
Start: 2020-09-28 | End: 2020-10-01 | Stop reason: HOSPADM

## 2020-09-28 RX ORDER — CLOPIDOGREL BISULFATE 75 MG/1
75 TABLET ORAL DAILY
Status: DISCONTINUED | OUTPATIENT
Start: 2020-09-29 | End: 2020-10-01 | Stop reason: HOSPADM

## 2020-09-28 RX ORDER — METOPROLOL TARTRATE 50 MG/1
50 TABLET, FILM COATED ORAL 2 TIMES DAILY
Status: CANCELLED | OUTPATIENT
Start: 2020-09-28

## 2020-09-28 RX ORDER — DEXTROSE MONOHYDRATE 25 G/50ML
12.5 INJECTION, SOLUTION INTRAVENOUS PRN
Status: DISCONTINUED | OUTPATIENT
Start: 2020-09-28 | End: 2020-10-01 | Stop reason: HOSPADM

## 2020-09-28 RX ORDER — SODIUM CHLORIDE 0.9 % (FLUSH) 0.9 %
10 SYRINGE (ML) INJECTION PRN
Status: CANCELLED | OUTPATIENT
Start: 2020-09-28

## 2020-09-28 RX ORDER — METOPROLOL TARTRATE 50 MG/1
50 TABLET, FILM COATED ORAL 2 TIMES DAILY
Status: DISCONTINUED | OUTPATIENT
Start: 2020-09-28 | End: 2020-10-01 | Stop reason: HOSPADM

## 2020-09-28 RX ORDER — AMLODIPINE BESYLATE 10 MG/1
10 TABLET ORAL 2 TIMES DAILY
Status: DISCONTINUED | OUTPATIENT
Start: 2020-09-28 | End: 2020-10-01 | Stop reason: HOSPADM

## 2020-09-28 RX ORDER — MAGNESIUM OXIDE 400 MG/1
400 TABLET ORAL DAILY
Status: CANCELLED | OUTPATIENT
Start: 2020-09-29

## 2020-09-28 RX ORDER — ASPIRIN 81 MG/1
81 TABLET, CHEWABLE ORAL DAILY
Status: DISCONTINUED | OUTPATIENT
Start: 2020-09-29 | End: 2020-10-01 | Stop reason: HOSPADM

## 2020-09-28 RX ORDER — PANTOPRAZOLE SODIUM 40 MG/1
40 TABLET, DELAYED RELEASE ORAL
Status: CANCELLED | OUTPATIENT
Start: 2020-09-29

## 2020-09-28 RX ORDER — ACETAMINOPHEN 650 MG/1
650 SUPPOSITORY RECTAL EVERY 6 HOURS PRN
Status: DISCONTINUED | OUTPATIENT
Start: 2020-09-28 | End: 2020-10-01 | Stop reason: HOSPADM

## 2020-09-28 RX ORDER — CITALOPRAM 20 MG/1
20 TABLET ORAL DAILY
Status: DISCONTINUED | OUTPATIENT
Start: 2020-09-29 | End: 2020-10-01 | Stop reason: HOSPADM

## 2020-09-28 RX ORDER — CHOLESTYRAMINE LIGHT 4 G/5.7G
1 POWDER, FOR SUSPENSION ORAL
Status: DISCONTINUED | OUTPATIENT
Start: 2020-09-28 | End: 2020-10-01 | Stop reason: HOSPADM

## 2020-09-28 RX ORDER — SODIUM CHLORIDE 0.9 % (FLUSH) 0.9 %
10 SYRINGE (ML) INJECTION PRN
Status: DISCONTINUED | OUTPATIENT
Start: 2020-09-28 | End: 2020-10-01 | Stop reason: HOSPADM

## 2020-09-28 RX ORDER — DEXTROSE MONOHYDRATE 50 MG/ML
100 INJECTION, SOLUTION INTRAVENOUS PRN
Status: CANCELLED | OUTPATIENT
Start: 2020-09-28

## 2020-09-28 RX ORDER — BUSPIRONE HYDROCHLORIDE 10 MG/1
10 TABLET ORAL 3 TIMES DAILY
Status: DISCONTINUED | OUTPATIENT
Start: 2020-09-28 | End: 2020-10-01 | Stop reason: HOSPADM

## 2020-09-28 RX ORDER — OXYCODONE AND ACETAMINOPHEN 7.5; 325 MG/1; MG/1
1 TABLET ORAL EVERY 8 HOURS PRN
Status: CANCELLED | OUTPATIENT
Start: 2020-09-28

## 2020-09-28 RX ORDER — PANTOPRAZOLE SODIUM 40 MG/1
40 TABLET, DELAYED RELEASE ORAL
Status: DISCONTINUED | OUTPATIENT
Start: 2020-09-29 | End: 2020-10-01 | Stop reason: HOSPADM

## 2020-09-28 RX ORDER — CHOLESTYRAMINE LIGHT 4 G/5.7G
1 POWDER, FOR SUSPENSION ORAL
Status: CANCELLED | OUTPATIENT
Start: 2020-09-28

## 2020-09-28 RX ORDER — HEPARIN SODIUM 5000 [USP'U]/ML
5000 INJECTION, SOLUTION INTRAVENOUS; SUBCUTANEOUS 3 TIMES DAILY
Status: CANCELLED | OUTPATIENT
Start: 2020-09-28

## 2020-09-28 RX ORDER — BUSPIRONE HYDROCHLORIDE 10 MG/1
10 TABLET ORAL 3 TIMES DAILY
Status: CANCELLED | OUTPATIENT
Start: 2020-09-28

## 2020-09-28 RX ORDER — CITALOPRAM 20 MG/1
20 TABLET ORAL DAILY
Status: CANCELLED | OUTPATIENT
Start: 2020-09-29

## 2020-09-28 RX ORDER — ASPIRIN 81 MG/1
81 TABLET, CHEWABLE ORAL DAILY
Status: CANCELLED | OUTPATIENT
Start: 2020-09-29

## 2020-09-28 RX ORDER — FLUTICASONE PROPIONATE 50 MCG
1 SPRAY, SUSPENSION (ML) NASAL 2 TIMES DAILY
Status: DISCONTINUED | OUTPATIENT
Start: 2020-09-28 | End: 2020-10-01 | Stop reason: HOSPADM

## 2020-09-28 RX ORDER — ATORVASTATIN CALCIUM 20 MG/1
20 TABLET, FILM COATED ORAL NIGHTLY
Status: DISCONTINUED | OUTPATIENT
Start: 2020-09-29 | End: 2020-10-01 | Stop reason: HOSPADM

## 2020-09-28 RX ADMIN — BUSPIRONE HYDROCHLORIDE 10 MG: 10 TABLET ORAL at 09:19

## 2020-09-28 RX ADMIN — AMLODIPINE BESYLATE 10 MG: 10 TABLET ORAL at 20:41

## 2020-09-28 RX ADMIN — HEPARIN SODIUM 5000 UNITS: 5000 INJECTION INTRAVENOUS; SUBCUTANEOUS at 14:33

## 2020-09-28 RX ADMIN — CHOLESTYRAMINE 4 G: 4 POWDER, FOR SUSPENSION ORAL at 09:18

## 2020-09-28 RX ADMIN — ASPIRIN 81 MG CHEWABLE TABLET 81 MG: 81 TABLET CHEWABLE at 09:18

## 2020-09-28 RX ADMIN — CHOLESTYRAMINE 4 G: 4 POWDER, FOR SUSPENSION ORAL at 18:28

## 2020-09-28 RX ADMIN — OXYCODONE HYDROCHLORIDE AND ACETAMINOPHEN 1 TABLET: 7.5; 325 TABLET ORAL at 18:16

## 2020-09-28 RX ADMIN — HEPARIN SODIUM 5000 UNITS: 5000 INJECTION INTRAVENOUS; SUBCUTANEOUS at 20:42

## 2020-09-28 RX ADMIN — PROMETHAZINE HYDROCHLORIDE 12.5 MG: 25 TABLET ORAL at 20:41

## 2020-09-28 RX ADMIN — CLOPIDOGREL BISULFATE 75 MG: 75 TABLET ORAL at 09:18

## 2020-09-28 RX ADMIN — FLUTICASONE PROPIONATE 1 SPRAY: 50 SPRAY, METERED NASAL at 09:26

## 2020-09-28 RX ADMIN — ATORVASTATIN CALCIUM 20 MG: 20 TABLET, FILM COATED ORAL at 09:18

## 2020-09-28 RX ADMIN — BUSPIRONE HYDROCHLORIDE 10 MG: 10 TABLET ORAL at 14:32

## 2020-09-28 RX ADMIN — OXYCODONE HYDROCHLORIDE AND ACETAMINOPHEN 1 TABLET: 7.5; 325 TABLET ORAL at 04:45

## 2020-09-28 RX ADMIN — INSULIN GLARGINE 45 UNITS: 100 INJECTION, SOLUTION SUBCUTANEOUS at 20:43

## 2020-09-28 RX ADMIN — SODIUM CHLORIDE, PRESERVATIVE FREE 10 ML: 5 INJECTION INTRAVENOUS at 09:26

## 2020-09-28 RX ADMIN — INSULIN LISPRO 2 UNITS: 100 INJECTION, SOLUTION INTRAVENOUS; SUBCUTANEOUS at 09:22

## 2020-09-28 RX ADMIN — SODIUM CHLORIDE, PRESERVATIVE FREE 10 ML: 5 INJECTION INTRAVENOUS at 20:41

## 2020-09-28 RX ADMIN — BUSPIRONE HYDROCHLORIDE 10 MG: 10 TABLET ORAL at 20:41

## 2020-09-28 RX ADMIN — METOPROLOL TARTRATE 50 MG: 50 TABLET, FILM COATED ORAL at 20:41

## 2020-09-28 RX ADMIN — CITALOPRAM HYDROBROMIDE 20 MG: 20 TABLET ORAL at 09:18

## 2020-09-28 RX ADMIN — MAGNESIUM OXIDE 400 MG (241.3 MG MAGNESIUM) TABLET 400 MG: TABLET at 09:18

## 2020-09-28 RX ADMIN — HEPARIN SODIUM 5000 UNITS: 5000 INJECTION INTRAVENOUS; SUBCUTANEOUS at 09:21

## 2020-09-28 RX ADMIN — ACETAMINOPHEN 650 MG: 325 TABLET ORAL at 02:12

## 2020-09-28 RX ADMIN — PANTOPRAZOLE SODIUM 40 MG: 40 TABLET, DELAYED RELEASE ORAL at 04:45

## 2020-09-28 RX ADMIN — FLUTICASONE PROPIONATE 1 SPRAY: 50 SPRAY, METERED NASAL at 20:43

## 2020-09-28 RX ADMIN — AMLODIPINE BESYLATE 10 MG: 10 TABLET ORAL at 09:19

## 2020-09-28 ASSESSMENT — PAIN SCALES - WONG BAKER
WONGBAKER_NUMERICALRESPONSE: 0

## 2020-09-28 ASSESSMENT — PAIN DESCRIPTION - DESCRIPTORS: DESCRIPTORS: ACHING;DISCOMFORT;CONSTANT

## 2020-09-28 ASSESSMENT — PAIN DESCRIPTION - FREQUENCY: FREQUENCY: CONTINUOUS

## 2020-09-28 ASSESSMENT — PAIN DESCRIPTION - PROGRESSION: CLINICAL_PROGRESSION: NOT CHANGED

## 2020-09-28 ASSESSMENT — PAIN - FUNCTIONAL ASSESSMENT: PAIN_FUNCTIONAL_ASSESSMENT: PREVENTS OR INTERFERES SOME ACTIVE ACTIVITIES AND ADLS

## 2020-09-28 ASSESSMENT — PAIN SCALES - GENERAL
PAINLEVEL_OUTOF10: 5
PAINLEVEL_OUTOF10: 8
PAINLEVEL_OUTOF10: 7

## 2020-09-28 ASSESSMENT — PAIN DESCRIPTION - PAIN TYPE: TYPE: CHRONIC PAIN

## 2020-09-28 ASSESSMENT — PAIN DESCRIPTION - LOCATION
LOCATION: BACK
LOCATION: BACK

## 2020-09-28 ASSESSMENT — PAIN DESCRIPTION - ONSET: ONSET: ON-GOING

## 2020-09-28 NOTE — CARE COORDINATION
[facility]  [facility] due to the need for   [ X ] Therapy; physical and occupational; for decreased strength, balance and self     care activities. [ ] [facility]   [facility]  [facility]  [ ] Wound care   Luciano Meza lives [ ] Alone   [ ] With Spouse  [ X ] Other:   and plans on returning there at discharge. Luciano Meza prefers this facility for skilled care. Luciano Meza will require skilled care on a daily basis beginning 09/28/2020, if medically stable.         Estimated length of stay [ X ] 1-2 weeks   [ ] 2-3 weeks  [ ] 3-4 weeks       Ciera Escalona MSN, RN, JOSSY Villeda-RN   of Case Management   Date: 09/28/2020             Cosigned by:    Revision History

## 2020-09-28 NOTE — CARE COORDINATION
Notified patient's nurses Lucas Lugo that patient will be discharge/readmit to Emergency [facility] Bed on COVID unit in same room # 2010 and she voiced understanding. Phoned and notified patient 's daughter Ibis Schaffer as well.

## 2020-09-28 NOTE — PROGRESS NOTES
Nephrology Progress Note  9/28/2020 11:43 AM  Subjective:   Admit Date: 9/19/2020  PCP: Jayda Spring MD  Interval History: pt seen this am prior to transfer and doing well    Diet: DIET CARB CONTROL; Carb Control: 4 carb choices (60 gms)/meal  Dietary Nutrition Supplements: Diabetic Oral Supplement  Pain is:Mild      Data:   Scheduled Meds:    Continuous Infusions:    PRN Meds:  I/O last 3 completed shifts: In: 740 [P.O.:740]  Out: 100 [Urine:100]  I/O this shift:  In: 240 [P.O.:240]  Out: -     Intake/Output Summary (Last 24 hours) at 9/28/2020 1143  Last data filed at 9/28/2020 0918  Gross per 24 hour   Intake 980 ml   Output 100 ml   Net 880 ml     CBC:   No results for input(s): WBC, HGB, PLT in the last 72 hours. BMP:    Recent Labs     09/25/20  1220 09/26/20  0505 09/27/20  0522    135 135   K 4.6 4.7 4.8    101 102   CO2 23 23 20*   BUN 47* 44* 42*   CREATININE 1.8* 1.7* 1.8*   GLUCOSE 130* 115* 177*     Hepatic:   No results for input(s): AST, ALT, ALB, BILITOT, ALKPHOS in the last 72 hours. Troponin: No results for input(s): TROPONINI in the last 72 hours. BNP: No results for input(s): BNP in the last 72 hours. Lipids: No results for input(s): CHOL, HDL in the last 72 hours. Invalid input(s): LDLCALCU  ABGs:   Lab Results   Component Value Date    PO2ART 70 09/21/2020    KOC4KSE 38.0 09/21/2020     INR:   No results for input(s): INR in the last 72 hours.   Renal Labs  Albumin:    Lab Results   Component Value Date    LABALBU 4.1 09/27/2020     Calcium:    Lab Results   Component Value Date    CALCIUM 9.4 09/27/2020     Phosphorus:    Lab Results   Component Value Date    PHOS 3.9 09/27/2020     U/A:    Lab Results   Component Value Date    NITRU NEGATIVE 09/20/2020    COLORU YELLOW 09/20/2020    WBCUA 1 09/20/2020    RBCUA NONE SEEN 09/20/2020    MUCUS RARE 09/20/2020    TRICHOMONAS NONE SEEN 09/20/2020    BACTERIA RARE 09/20/2020    CLARITYU CLEAR 09/20/2020    SPECGRAV 1.018 09/20/2020    UROBILINOGEN NORMAL 09/20/2020    BILIRUBINUR NEGATIVE 09/20/2020    BLOODU NEGATIVE 09/20/2020    KETUA NEGATIVE 09/20/2020     ABG:    Lab Results   Component Value Date    FHX1XEW 38.0 09/21/2020    PO2ART 70 09/21/2020    AUX8AKJ 22.0 09/21/2020     HgBA1c:    Lab Results   Component Value Date    LABA1C 8.8 08/09/2020     Microalbumen/Creatinine ratio:  No components found for: RUCREAT          Objective:   Vitals: BP (!) 99/59   Pulse 86   Temp 97.7 °F (36.5 °C) (Oral)   Resp 23   Ht 5' 10\" (1.778 m)   Wt 225 lb (102.1 kg)   SpO2 98%   BMI 32.28 kg/m²   General appearance: awake weak  HEENT: Head: Normal, normocephalic, atraumatic.   Neck: supple, symmetrical, trachea midline  Lungs: diminished breath sounds bilaterally  Heart: S1, S2 normal  Abdomen: abnormal findings:  soft nt  Extremities: edema trace  Neurologic: Mental status: alertness: alert      Patient Active Problem List:     Essential hypertension, benign     Type 2 diabetes mellitus, with long-term current use of insulin (HCC)     Other and unspecified hyperlipidemia     Chest pain     Coronary atherosclerosis     Chronic kidney disease, stage III (moderate) (HCC)     Cellulitis of leg     Cellulitis of lower leg     Lethargy     Acute respiratory failure with hypoxia and hypercapnia (HCC)     Altered mental status, unspecified     Hypoglycemia     Acute kidney injury (HCC)     Generalized weakness     Liver cirrhosis (HCC)     Depression     Chronic pain     Encephalopathy    Assessment and Plan:      IMP:  1 arf from atn on ckd 3  2 hx covid +  3 sp fall with confusion  4 Dm2  5 htn      Plan     1 renal stable to [facility] 3-4 [facility]  2 treat in swing bed  3 rehab as able  4 ssi  5 bp stable low monitor  Ok to Patricia Phillips MD

## 2020-09-28 NOTE — CARE COORDINATION
SWING BED PROGRAM PRE-ADMISSION ASSESSMENT    Patient Name: Gopal Khan      : 1940  (78 y.o.) Gender: male   Expected Discharge>SWING Date: 20   MRN: [id number]      Inpatient Admission Date: 2020 Date & Time of Referral: 2020         Referred By: Lexie Valdez MD Referred from:  [] [facility]   [x] Other:     # of Skilled Care Days Used in Last 60 days: 0 Insurance: [x]  Medicare                      [x]  Secondary - Type: [facility]     Present Condition/Diagnosis:    Hypomagnesemia [E83.42]  Encephalopathy [G93.40]  Generalized weakness [R53.1]  Acute kidney injury ([facility] [N17.9]  Fall, initial encounter [W19. XXXA]  Altered mental status, unspecified altered mental status type [R41.82]      Previous Medical History:   Past Medical History:   Diagnosis Date    Arthritis     Diabetes mellitus ([facility]     GERD (gastroesophageal reflux disease)     Gout     left ankle and right shoulder    Hyperlipidemia     Hypertension     [facility]     May 2013            Nurse Assessment:  Last Vital Signs: /68   Pulse 69   Temp 97.6 °F (36.4 °C) (Oral)   Resp 14   Ht 5' 10\" (1.778 m)   Wt 225 lb (102.1 kg)   SpO2 98%   BMI 32.28 kg/m²     Last documented pain score (0-10 scale): Pain Level: 7  Last Weight:   Wt Readings from Last 1 Encounters:   20 225 lb (102.1 kg)       Scheduled Off-site Appointments: N/A    IV Access:  - Central Venous Catheter  - site  right, insertion date: 20    Drains/Lines/Tubes: N/A    Wound Care Documentation and Therapy: N/A       Wound Vac Required: No     Urinary Catheter: None   Colostomy/Ileostomy/Ileal Conduit: No         Treatments at the Time of Hospital Discharge:   Respiratory Treatments: none  Oxygen Therapy:  is not on home oxygen therapy.     Rehab Therapies:    Weight Bearing Status/Restrictions: No weight bearing restirctions  Other Medical Equipment (for information only, NOT a DME order):  walker  Other Treatments: N/A    Requires Dialysis: No    COGNITIVE/BEHAVIORAL  Change in cognitive status in last 90 days:  ( )no change  ( ) improved  (X) deteriorated  Behavioral changes in last seven days:  ( ) wandering  ( ) verbally abusive  ( )phys. Abusive                                                                         ( ) socially inappropriate  ( ) resists care  ADL Performance Last Seven (7) Days (Please Score)   Patients performance over all shifts during last seven (7) days   0 = Independent - No help or oversight  1 = Supervision - Oversight, encouragement or cueing provided  2 = Limited Assist - Receives physical help in guided maneuvering of limbs or other non-weight bearing assistance  3 = Extensive Assist - Patient performs part of the activity, weight bearing support was provided  4 = Dependence = Full staff performance of activity *NOTE: USE THE FOLLOWING SCORING FOR EATING ONLY:  1 = Supervision or Independent  2 = Limited Assist  3 = Dependent or Extensive Assist     SCORE   BED MOBILITY - How client moves to and from lying position, turns side to side, and positions body while in bed 1   TRANSFER - How resident moves between surfaces - to/from: bed, chair, wheelchair, standing position (EXCLUDE to/from bath/toilet) 2   TOILET USE - How client uses the toilet room (or commode, bedpan, urinal);transfers on/off toilet, cleanses, changes pad, manages ostomy or catheter, adjusts clothes 2   EATING (SCORE 1-3 ONLY*) - How client eats and drinks (regardless of skill).  Includes intake of nourishment by other means (e.g., tube feeding, total parenteral nutrition) 2   Estimated Pre-Admission ADL Value: 7     PATIENT WILL RECEIVE THE FOLLOWING SKILLED SERVICES AS A SWING BED PATIENT:       REHABILITATION (PT/OT/SP)    [] ULTRA HIGH 720 or more minutes minimum per week of at least two (2) disciplines - 1st for at least five (5) days and 2nd for at least three (3) days   [] VERY HIGH 500 or more minutes minimum per week of at least one (1) discipline for at least five (5) days   [] HIGH 325 or more minutes minimum per week of at least one (1) discipline for at least five (5) days   [x] MEDIUM 150 or more minutes minimum per week at least five (5) days of any combination with three (3) therapies   [] LOW Restorative nursing at least six (6) days, two (2) activities, or therapies for at least three (3) days at least forty-five (45) minute per week minimum services. EXTENSIVE SERVICES   [] Tracheostomy Care   [] Ventilator/Respirator   [x] Infection Isolation   SPECIAL CARE HIGH   [] MS with ADL greater than or equal to 10  [] Quadriplegic with ADL greater than or equal to 5  [] emphysema/COPD and shortness of breath when lying flat  [] Fever w/at least one (1) of the following: [] dehydration [] pneumonia [] vomiting [] weight loss  [] feeding tube  [] Septicemia  [] Coma (not awake & completely dependent in ADL)  [] Diabetes and injections seven (7) days and Dr. order change two (2) or more days.   [] Parenteral/IV feeding  [] respiratory therapy for seven (7) days   SPECIAL CARE LOW:   [x] Respiratory Therapy  [] Ulcers (2+sites all stages) w/treatment  [] Multiple Sclerosis  [] Cerebral Palsy  [] Parkinsons Disease  [] Oxygen Therapy  [] Extensive care services w/ADL less than 6  [] Fever w/at least one (1) of the following: [] dehydration [] pneumonia [] vomiting [] weight loss  [] Foot Infection or open lesions on the foot with treatment  [] tube-feeding - calories greater than or equal to 51% or tube feeding with total calories greater than or equal to 26% and fluid parental or enteral intake of greater than or equal to 50 ml per day   CLINICALLY COMPLEX   CURRENTLY:  [] Pneumonia  [] Hemiplegics with ADL with ADL Sum greater than or equal to 10  [] IV medications delivered post admission in the SNF  [] Surgical wounds or open lesions w/treatment      EVALUATORS SIGNATURE:Ciera Escalona DATE: 9/28/2020       [x] ACCEPTED FOR ADMISSION ON:  09/28/2020                              ELOS:  [x] 1-2wks  [] 2-3wks  [] 3-4wks   [] ADMISSION DENIED BASED ON:    [] [address]  STAFF COORDINATOR

## 2020-09-28 NOTE — PROGRESS NOTES
Hospitalist Progress Note      Name:  Nissa Cullen /Age/Sex: 1940  (79 y.o. male)   MRN & CSN:  [id number] & [id number] Admission Date/Time: 2020 10:33 PM   Location:  -A PCP: Eliverto Paget, MD       Hospital Day: 9        Assessment and Plan:       Update for  -has been medically stable for discharge since . Doing fairly well today again. Discussed with  today again. We remain unable to find a skilled nursing facility that will take him due to positive COVID cases and area facilities and an ongoing pandemic. Patient may be changed to a swing bed in the morning. Encephalopathy  -He is on opioids at home  -He is on BuSpar and SSRIs  -He has an VICKY  -Haldol as needed for agitation-it seemed to work well last admission  -Orthostatic vital signs once patient is not altered  -Minimize auditory stimulation  -On  mental status changes much better, and he is medically stable for discharge. Case management is working on finding a facility that will accept him as he was still COVID positive as of     Acute kidney injury on chronic kidney disease  -Nephrology consult appreciated  -Holding Spironolactone and losartan  -Monitor BMP    Elevated troponin  -Minimal elevation, doubt ACS    History of liver cirrhosis  -Ammonia level is 34    Recent COVID-19 infection  -repeat test pending    Other problems    Diabetes mellitus type 2, on insulin  -Amaryl discontinued  -Started on renally adjusted Lantus and insulin correction scale  -Monitor blood sugar    Hypertension  -Holding meds as above    Coronary artery disease  -Continue home meds except as above    Left ear pain - will follow up    Subjective:     Patient having left ear pain on , but on  ear pain resolved    On  he was doing well when I saw him. He denied any complaints. On  he was sitting up in the chair and watching a program about Kathie Lone father who passed away.   He explained a little bit about what he was watching. He had no complaints. He was in good spirits. 9/26 - sitting up in the chair at the side of the bed, smiling at times    9/27 -he had the relaxation channel on him -and lotion seen with penguins in [facility]. He wanted the repositioned, but could not articulate exactly what was bothering him. I lowered the bed elevated, and he seemed satisfied. Objective: Intake/Output Summary (Last 24 hours) at 9/27/2020 2247  Last data filed at 9/27/2020 1535  Gross per 24 hour   Intake 240 ml   Output 1200 ml   Net -960 ml      Vitals:   Vitals:    09/27/20 2003   BP:    Pulse: 74   Resp:    Temp:    SpO2:      Physical Exam:      Physical Exam  Constitutional:       Appearance: He is obese. Pulmonary:      Effort: Pulmonary effort is normal.   Skin:     General: Skin is dry. Body mass index is 32.28 kg/m².         Medications:   Medications:    insulin glargine  45 Units Subcutaneous Nightly    insulin lispro  15 Units Subcutaneous TID WC    insulin lispro  0-12 Units Subcutaneous 2 times per day    insulin lispro  0-12 Units Subcutaneous TID WC    amLODIPine  10 mg Oral BID    aspirin  81 mg Oral Daily    atorvastatin  20 mg Oral Daily    busPIRone  10 mg Oral TID    cholestyramine light  1 packet Oral TID WC    citalopram  20 mg Oral Daily    clopidogrel  75 mg Oral Daily    fluticasone  1 spray Nasal BID    metoprolol tartrate  50 mg Oral BID    pantoprazole  40 mg Oral QAM AC    sodium chloride flush  10 mL Intravenous 2 times per day    magnesium oxide  400 mg Oral Daily    heparin (porcine)  5,000 Units Subcutaneous TID      Infusions:    dextrose       PRN Meds: oxyCODONE-acetaminophen, 1 tablet, Q8H PRN  glucose, 15 g, PRN  dextrose, 12.5 g, PRN  glucagon (rDNA), 1 mg, PRN  dextrose, 100 mL/hr, PRN  sodium chloride flush, 10 mL, PRN  acetaminophen, 650 mg, Q6H PRN    Or  acetaminophen, 650 mg, Q6H PRN  polyethylene glycol, 17 g, Daily PRN  promethazine, 12.5 mg, Q6H PRN    Or  ondansetron, 4 mg, Q6H PRN  haloperidol lactate, 0.5 mg, Q6H PRN          Electronically signed by Suad Gray MD on 9/27/2020 at 10:47 PM

## 2020-09-29 LAB
GLUCOSE BLD-MCNC: 100 MG/DL (ref 70–99)
GLUCOSE BLD-MCNC: 110 MG/DL (ref 70–99)
GLUCOSE BLD-MCNC: 110 MG/DL (ref 70–99)
GLUCOSE BLD-MCNC: 220 MG/DL (ref 70–99)
GLUCOSE BLD-MCNC: 221 MG/DL (ref 70–99)
GLUCOSE BLD-MCNC: 60 MG/DL (ref 70–99)
GLUCOSE BLD-MCNC: 60 MG/DL (ref 70–99)
SARS-COV-2, NAAT: DETECTED

## 2020-09-29 PROCEDURE — 97162 PT EVAL MOD COMPLEX 30 MIN: CPT

## 2020-09-29 PROCEDURE — 94761 N-INVAS EAR/PLS OXIMETRY MLT: CPT

## 2020-09-29 PROCEDURE — 1200000002 HC SEMI PRIVATE SWING BED

## 2020-09-29 PROCEDURE — 97112 NEUROMUSCULAR REEDUCATION: CPT

## 2020-09-29 PROCEDURE — 6370000000 HC RX 637 (ALT 250 FOR IP): Performed by: INTERNAL MEDICINE

## 2020-09-29 PROCEDURE — 82962 GLUCOSE BLOOD TEST: CPT

## 2020-09-29 PROCEDURE — 6360000002 HC RX W HCPCS: Performed by: INTERNAL MEDICINE

## 2020-09-29 PROCEDURE — 97530 THERAPEUTIC ACTIVITIES: CPT

## 2020-09-29 PROCEDURE — 2580000003 HC RX 258: Performed by: INTERNAL MEDICINE

## 2020-09-29 RX ORDER — INSULIN GLARGINE 100 [IU]/ML
30 INJECTION, SOLUTION SUBCUTANEOUS NIGHTLY
Status: DISCONTINUED | OUTPATIENT
Start: 2020-09-29 | End: 2020-10-01 | Stop reason: HOSPADM

## 2020-09-29 RX ADMIN — PROMETHAZINE HYDROCHLORIDE 12.5 MG: 25 TABLET ORAL at 20:06

## 2020-09-29 RX ADMIN — CITALOPRAM HYDROBROMIDE 20 MG: 20 TABLET ORAL at 07:57

## 2020-09-29 RX ADMIN — ASPIRIN 81 MG CHEWABLE TABLET 81 MG: 81 TABLET CHEWABLE at 07:57

## 2020-09-29 RX ADMIN — SODIUM CHLORIDE, PRESERVATIVE FREE 10 ML: 5 INJECTION INTRAVENOUS at 07:58

## 2020-09-29 RX ADMIN — OXYCODONE HYDROCHLORIDE AND ACETAMINOPHEN 1 TABLET: 7.5; 325 TABLET ORAL at 07:57

## 2020-09-29 RX ADMIN — METOPROLOL TARTRATE 50 MG: 50 TABLET, FILM COATED ORAL at 20:06

## 2020-09-29 RX ADMIN — MAGNESIUM OXIDE 400 MG (241.3 MG MAGNESIUM) TABLET 400 MG: TABLET at 07:58

## 2020-09-29 RX ADMIN — DEXTROSE 15 G: 15 GEL ORAL at 02:16

## 2020-09-29 RX ADMIN — BUSPIRONE HYDROCHLORIDE 10 MG: 10 TABLET ORAL at 14:18

## 2020-09-29 RX ADMIN — PANTOPRAZOLE SODIUM 40 MG: 40 TABLET, DELAYED RELEASE ORAL at 07:57

## 2020-09-29 RX ADMIN — AMLODIPINE BESYLATE 10 MG: 10 TABLET ORAL at 20:06

## 2020-09-29 RX ADMIN — BUSPIRONE HYDROCHLORIDE 10 MG: 10 TABLET ORAL at 07:57

## 2020-09-29 RX ADMIN — METOPROLOL TARTRATE 50 MG: 50 TABLET, FILM COATED ORAL at 07:58

## 2020-09-29 RX ADMIN — CLOPIDOGREL BISULFATE 75 MG: 75 TABLET ORAL at 07:57

## 2020-09-29 RX ADMIN — ATORVASTATIN CALCIUM 20 MG: 20 TABLET, FILM COATED ORAL at 20:05

## 2020-09-29 RX ADMIN — CHOLESTYRAMINE 4 G: 4 POWDER, FOR SUSPENSION ORAL at 07:57

## 2020-09-29 RX ADMIN — INSULIN GLARGINE 30 UNITS: 100 INJECTION, SOLUTION SUBCUTANEOUS at 20:26

## 2020-09-29 RX ADMIN — HEPARIN SODIUM 5000 UNITS: 5000 INJECTION INTRAVENOUS; SUBCUTANEOUS at 07:58

## 2020-09-29 RX ADMIN — SODIUM CHLORIDE, PRESERVATIVE FREE 10 ML: 5 INJECTION INTRAVENOUS at 20:07

## 2020-09-29 RX ADMIN — OXYCODONE HYDROCHLORIDE AND ACETAMINOPHEN 1 TABLET: 7.5; 325 TABLET ORAL at 16:49

## 2020-09-29 RX ADMIN — HEPARIN SODIUM 5000 UNITS: 5000 INJECTION INTRAVENOUS; SUBCUTANEOUS at 20:07

## 2020-09-29 RX ADMIN — AMLODIPINE BESYLATE 10 MG: 10 TABLET ORAL at 07:57

## 2020-09-29 RX ADMIN — BUSPIRONE HYDROCHLORIDE 10 MG: 10 TABLET ORAL at 20:05

## 2020-09-29 ASSESSMENT — PAIN DESCRIPTION - PAIN TYPE: TYPE: CHRONIC PAIN

## 2020-09-29 ASSESSMENT — PAIN SCALES - GENERAL
PAINLEVEL_OUTOF10: 6
PAINLEVEL_OUTOF10: 6
PAINLEVEL_OUTOF10: 0
PAINLEVEL_OUTOF10: 6

## 2020-09-29 ASSESSMENT — PAIN DESCRIPTION - DESCRIPTORS: DESCRIPTORS: ACHING

## 2020-09-29 ASSESSMENT — PAIN DESCRIPTION - LOCATION: LOCATION: BACK

## 2020-09-29 ASSESSMENT — PAIN DESCRIPTION - FREQUENCY: FREQUENCY: CONTINUOUS

## 2020-09-29 ASSESSMENT — PAIN DESCRIPTION - ONSET: ONSET: ON-GOING

## 2020-09-29 NOTE — PLAN OF CARE
infection signs and symptoms  Description: Will show no infection signs and symptoms  Outcome: Ongoing  Goal: Absence of new skin breakdown  Description: Absence of new skin breakdown  Outcome: Ongoing     Problem: Falls - Risk of:  Goal: Will remain free from falls  Description: Will remain free from falls  Outcome: Ongoing  Goal: Absence of physical injury  Description: Absence of physical injury  Outcome: Ongoing

## 2020-09-29 NOTE — PROGRESS NOTES
Progress Note( Dr. Pierre Salazar)    Subjective:   Admit Date: 9/28/2020  PCP: Madeline Wilcox MD    Saw the patient on 9/25/2020 but made the note later    Admitted For : Altered mental state with encephalopathy patient was positive for COVID initially  Consulted For: Control of blood glucose    Interval History: Is much better able to breathe better as his most recent COVID is negative  Patient was discharged from regular [address] [place], [place] [address]e unit  He was admitted to some sort of rehab unit but kept in the same room as there is no separate rehab available for Elvin positive patients    Denies any chest pains,   Has SOB . Denies nausea or vomiting. Eating better  No new bowel or bladder symptoms. Intake/Output Summary (Last 24 hours) at 9/28/2020 2041  Last data filed at 9/28/2020 1243  Gross per 24 hour   Intake 240 ml   Output --   Net 240 ml       DATA    CBC:   No results for input(s): WBC, HGB, PLT in the last 72 hours. CMP:  Recent Labs     09/26/20  0505 09/27/20  0522    135   K 4.7 4.8    102   CO2 23 20*   BUN 44* 42*   CREATININE 1.7* 1.8*   CALCIUM 9.3 9.4   LABALBU 4.0 4.1     Lipids:   Lab Results   Component Value Date    CHOL 129 05/14/2013    HDL 28 05/14/2013    TRIG 220 05/14/2013     Glucose:  Recent Labs     09/28/20  0751 09/28/20  1202 09/28/20  1714   POCGLU 195* 173* 229*     [id number]:  Lab Results   Component Value Date    LABA1C 8.8 08/09/2020     High Sensitivity TSH:   Lab Results   Component Value Date    TSHHS 2.230 08/09/2020     Free T3:   Lab Results   Component Value Date    FT3 2.3 10/19/2015     Free T4:  Lab Results   Component Value Date    T4FREE 1.08 10/19/2015     .        Scheduled Medicines   Medications:    sodium chloride flush  10 mL Intravenous 2 times per day    amLODIPine  10 mg Oral BID    [START ON 9/29/2020] aspirin  81 mg Oral Daily    [START ON 9/29/2020] atorvastatin  20 mg Oral Nightly    busPIRone  10 mg Oral TID    cholestyramine light  1 packet Oral TID     [START ON 9/29/2020] citalopram  20 mg Oral Daily    [START ON 9/29/2020] clopidogrel  75 mg Oral Daily    fluticasone  1 spray Nasal BID    heparin (porcine)  5,000 Units Subcutaneous TID    insulin glargine  45 Units Subcutaneous Nightly    insulin lispro  0-12 Units Subcutaneous TID     insulin lispro  0-12 Units Subcutaneous 2 times per day    insulin lispro  15 Units Subcutaneous TID     [START ON 9/29/2020] magnesium oxide  400 mg Oral Daily    metoprolol tartrate  50 mg Oral BID    [START ON 9/29/2020] pantoprazole  40 mg Oral QAM AC      Infusions:    dextrose           Objective:   Vitals: /79   Pulse 94   Temp 98.6 °F (37 °C) (Oral)   Resp 15   Ht 5' 10\" (1.778 m)   Wt 225 lb 1.4 oz (102.1 kg)   SpO2 98%   BMI 32.30 kg/m²   General appearance: alert and cooperative with exam  Neck: no JVD or bruit  Thyroid : Normal lobes   Lungs: Has Vesicular Breath sounds has some wheezing and rales  Heart:  regular rate and rhythm  Abdomen: soft, non-tender; bowel sounds normal; no masses,  no organomegaly  Musculoskeletal: Normal  Extremities: extremities normal, , no edema  Neurologic:  Awake, alert, oriented to name, place and time. Cranial nerves II-XII are grossly intact. Motor is  intact. Sensory neuropathy. ,  and gait is normal.    Assessment:     Patient Active Problem List:     Essential hypertension, benign     Type 2 diabetes mellitus, with long-term current use of insulin ([facility] [place] [facility]     Other and unspecified hyperlipidemia     Chest pain     Coronary atherosclerosis     Chronic kidney disease, stage III (moderate) (HCC)     Cellulitis of leg     Cellulitis of lower leg     Lethargy     Acute respiratory failure with hypoxia and hypercapnia (HCC)     Altered mental status, unspecified     Hypoglycemia     Acute kidney injury ([address]     Generalized weakness     Liver cirrhosis (HCC)     Depression     Chronic pain     Encephalopathy      Plan:     1.  Reviewed POC

## 2020-09-29 NOTE — CONSULTS
Mobility skills performance:  Moves largely without physical assist.  indep bed mob, supervision sup-sit-stand from bed/chair/toilet and multiple reps. amb with RW in room for 50 ft with supervision, safely. amb backwards with HHA with CGA for 10 feet and slight unsteady. · Balance:  No losses. [facility] 6 Clicks Inpatient Mobility:  AM-PAC Mobility Inpatient   How much difficulty turning over in bed?: None  How much difficulty sitting down on / standing up from a chair with arms?: None  How much difficulty moving from lying on back to sitting on side of bed?: None  How much help from another person moving to and from a bed to a chair?: None  How much help from another person needed to walk in hospital room?: A Little  How much help from another person for climbing 3-5 steps with a railing?: A Little  AM-[facility] Inpatient Mobility Raw Score : 22  AM-PAC Inpatient T-Scale Score : 53.28  Mobility Inpatient CMS 0-100% Score: 20.91  Mobility Inpatient CMS G-Code Modifier : CJ  [facility] 6 Clicks Goal:  24  [place] [facility] Mobility Assessment Tool Score:  3  patient requires assistive device and/or staff assistance for ambulation -- use AD and/or staff support  BMAT Goal:  4    Assessment:  Conditions Requiring Skilled Therapeutic Intervention  Body structures, Functions, Activity limitations: Decreased functional mobility , Decreased posture, Decreased high-level IADLs, Decreased strength  Assessment: pleasant male admitted to [facility] swing bed unit from acute care following admission for COVID19.  performed well at eval today, markedly better than at last week's acute care eval.  has evolving functional status and no significant pulmonary symptoms today. did not observe cognitive impairment during this visit. most appropriate for [place] [address] PT service (patient agrees), and appropriate for some therapy service during his swing admission. Treatment Diagnosis: impaired functional mobility.   Prognosis: Good  Decision Making: Medium Complexity  REQUIRES PT FOLLOW UP: Yes  Skilled physical therapy services are appropriate in the acute care setting. There are no significant educational impairments or barriers to learning which would prevent participation with service. Discharge recommendations:  home with assistance. home with home care PT services, S level 1 or 2..      Equipment recommendations:  front-wheeled rolling walker  Goals:  Achieve goals within one week. BMAT and AMPAC as per above. 5 rep sit-stand test in under 20 seconds  Single leg stance on each/either LE for 5 seconds, unsupported  indep with functional HEP. Plan:  Plan  Times per week: 4+  Treatment to include patient/caregiver education, therapeutic activity training, gait training, neuromuscular re-education, therapeutic exercise and self care training for home management. Recommendations for nursing mobility:  supervision  Mobility homework:  out of bed for meals accompany to bathroom for voiding ambulate 4-6 times daily, with least restrictive device  Treatment today:    Therapeutic Activity Training:   Therapeutic activity training was instructed today. Cues were given for safety, sequence, UE/LE placement, awareness, and balance. Activities performed today included bed mobility training, sup-sit, sit-stand, SPT, toilet xfer  Neuro-Muscular re-education:  Cues were given for position, posture, kinesthetic sense, safety, recruitment, and rationale. Cues were verbal and/or tactile. Standing postural corrections at LB and hips. Leaning and multidirectional stepping with CGA  Time in:  1314  Time out:  1350  Timed treatment minutes:  26  Total treatment time:  36    Electronically signed by:   Ricky Royal PT  9/29/2020, 3:18 PM

## 2020-09-29 NOTE — PROGRESS NOTES
[address] [place] [address]  HOSPITALIST PROGRESS NOTE                       Name:  Aubrey Kay /Age/Sex: 1940  (79 y.o. male)   MRN & CSN:  [id number] & [id number] Admission Date/Time: 2020 11:27 AM   Location:  -A Attending:  Gucci Lyn MD                                                  HPI  Aubrey Kay is a 79 y.o. male who was admitted on 20-encephalopathy    SUBJECTIVE  -awake, interactive, no acute events overnight. 10 point review of systems reviewed and negative unless noted above. ALLERGIES:   Allergies   Allergen Reactions    Sulfa Antibiotics Hives       PCP: Lily Burr MD    PAST MEDICAL HISTORY, SURGICAL HISTORY, SOCIAL HISTORY and  HOME MEDICATIONS all reviewed. OBJECTIVE  Vitals:    20 1630 20 2037 20 0331 20 0754   BP:  123/79 123/69 (!) 143/93   Pulse:  94 76 75   Resp:  15 18 15   Temp:  98.6 °F (37 °C) 97.5 °F (36.4 °C) 98 °F (36.7 °C)   TempSrc:  Oral Oral Oral   SpO2:    97%   Weight: 225 lb 1.4 oz (102.1 kg)  204 lb 12.9 oz (92.9 kg)    Height: 5' 10\" (1.778 m)          PHYSICAL EXAM   GEN Awake male, sitting upright in bed in no apparent distress. EYES Pupils are equally round. No scleral erythema, discharge, or conjunctivitis. HENT Mucous membranes are moist. Oral pharynx without exudates, no evidence of thrush. NECK Supple, no apparent thyromegaly or masses. RESP Clear to auscultation, no wheezes, rales or rhonchi. Symmetric chest movement while on room air. CARDIO/VASC S1/S2 auscultated. Regular rate without appreciable murmurs, rubs, or gallops. No JVD or carotid bruits. Peripheral pulses equal bilaterally and palpable. No peripheral edema. GI Abdomen is soft without significant tenderness, masses, or guarding. Bowel sounds are normoactive. Rectal exam deferred.  No costovertebral angle tenderness. Normal appearing external genitalia. Hong catheter is not present.   HEME/LYMPH No palpable cervical lymphadenopathy and no hepatosplenomegaly. No petechiae or ecchymoses. MSK Spontaneous movement of all extremities. No gross joint deformities. SKIN Normal coloration, warm, dry. NEURO Cranial nerves appear grossly intact, normal speech, no lateralizing weakness. INTAKE: In: -   Out: 300   OUTPUT: In: -   Out: 300 [Urine:300]    LABS  No results for input(s): WBC, HGB, HCT, PLT in the last 72 hours. Recent Labs     09/27/20  0522      K 4.8      CO2 20*   PHOS 3.9   BUN 42*   CREATININE 1.8*     No results for input(s): AST, ALT, ALB, BILIDIR, BILITOT, ALKPHOS in the last 72 hours. No results for input(s): INR in the last 72 hours. No results for input(s): CKTOTAL, CKMB, CKMBINDEX, TROPONINT in the last 72 hours.        Abnormal labs for today noted      Imaging:     ECHO:    Microbiology:  Blood culture:    Urine culture:    Sputum culture:    Procedures done this admission:    MEDS  Scheduled Meds:   insulin glargine  30 Units Subcutaneous Nightly    sodium chloride flush  10 mL Intravenous 2 times per day    amLODIPine  10 mg Oral BID    aspirin  81 mg Oral Daily    atorvastatin  20 mg Oral Nightly    busPIRone  10 mg Oral TID    cholestyramine light  1 packet Oral TID WC    citalopram  20 mg Oral Daily    clopidogrel  75 mg Oral Daily    fluticasone  1 spray Nasal BID    heparin (porcine)  5,000 Units Subcutaneous TID    insulin lispro  0-12 Units Subcutaneous TID     insulin lispro  0-12 Units Subcutaneous 2 times per day    insulin lispro  15 Units Subcutaneous TID     magnesium oxide  400 mg Oral Daily    metoprolol tartrate  50 mg Oral BID    pantoprazole  40 mg Oral QAM AC     Continuous Infusions:   dextrose       PRN Meds:acetaminophen **OR** acetaminophen, polyethylene glycol, promethazine **OR** ondansetron, sodium chloride flush, dextrose, dextrose, glucagon (rDNA), glucose, oxyCODONE-acetaminophen        ASSESSMENT and PLAN  Hospital Day: 2      Encephalopathy- improved, at baseline       Acute kidney injury on chronic kidney disease  -Nephrology consult appreciated  -Holding Spironolactone and losartan  -Monitor BMP     Elevated troponin  -Minimal elevation, doubt ACS     History of liver cirrhosis  -Ammonia level is 34     Recent COVID-19 infection- stable       Other problems     Diabetes mellitus type 2, on insulin  -Amaryl discontinued  -Started on renally adjusted Lantus and insulin correction scale  -Monitor blood sugar     Hypertension  -Holding meds as above     Coronary artery disease  -Continue home meds except as above         -he is now in swing bed- in COVID unit- periodic monitoring of labs       Disp:     Diet DIET CARB CONTROL; Carb Control: 4 carb choices (60 gms)/meal  Dietary Nutrition Supplements: Diabetic Oral Supplement   DVT Prophylaxis [] Lovenox, []  Heparin, [] SCDs, [] Ambulation   GI Prophylaxis [] PPI,  [] H2 Blocker,  [] Carafate,  [] Diet/Tube Feeds   Code Status Full Code   Disposition Patient requires continued admission due to debility   CMS Level of Risk [] Low, [x] Moderate,[]  High  Patient's risk as above due to debility     ALINA FORRESTER MD 9/29/2020 10:50 AM

## 2020-09-29 NOTE — PROGRESS NOTES
Hospitalist    Discussed with case management, changed to Swing bed today. Patient has been medically stable for discharge since 9/23. Transfer to ECF was difficult due to Covid positive status. Covid sample collected 9/26 now came back negative - result obtained 9/28. Another Covid sample collected 9/28 for dc planning - once have 2 negative samples he can then be transferred to a regular ECF. Discussed with case management.

## 2020-09-29 NOTE — H&P
History and Physical      Name:  Brooklyn Frias /Age/Sex: 1940  (79 y.o. male)   MRN & CSN:  [id number] & [id number] Admission Date/Time: 2020 11:27 AM   Location:  -A PCP: Jeanmarie Mishra MD       Hospital Day: 1    Assessment and Plan:   Brooklyn Frias is a 79 y.o.  male  who presents with:    Encephalopathy, metabolic  -This is felt to be related to an acute kidney injury superimposed on a background of chronic Percocet therapy. Per OARRS report he takes 7.5 mg Percocet 4 times a day. He is also on BuSpar and citalopram.  He is now debilitated and needs therapy. He will be admitted to the Swing bed unit for therapy. PT/OT has been ordered. Will work on placement which has been difficult because he has a persistently positive COVID test  as long as 46 days after his initial positive test which was on . If he does not improve, there is a possibility that he may be able to go back home. Recent COVID infection-diagnosed .  -COVID test was again positive on   -We will repeat COVID today  in anticipation of possible placement    Acute kidney injury with stage III chronic kidney disease  -Creatinine was 3.7 upon admission and is now improved to 1.8. With follow-up with nephrology    Cirrhosis of the liver  -Ammonia level was normal while here  -He is not on lactulose or rifaximin at this time  -He established care with Dr. Taya Wan of  in early [facility] follow-up with him    Hypertension  Diabetes mellitus type 2  Hyperlipidemia  Obesity  History of tobacco use-quit decades ago  Coronary artery disease  Severe LEROY - will order AutoPAP  Chronic back pain, on 4 Percocet a day  Chronic venous insufficiency. History of Present Illness:     Chief Complaint:   Brooklyn Frias is a 79 y.o.  male  who presents with debility. Tai Jackson is a pleasant 56-year-old who presented to ED on  from his residence. He presented with confusion.   He was recently diagnosed with a COVID infection on August 8 and had a 5-day admission in early August.  He was again admitted in the middle of August for another 4 days. For this admission, his creatinine was noted to be 3.7, which is far above his baseline. He was seen by nephrology and treated for an VICKY. He is now improved. However, he is debilitated and placement into a facility is difficult because of a persistently positive COVID test as of September 23. He will now be switched into a Swing bed. Pain level: Was 0 out of 10 when I saw him today. 10-14 point ROS reviewed negative, unless as noted above  -No vomiting or fever reported at this time. Objective:     Temperature 97.5  Pulse 85  Respirations 22  Blood pressure 104/64    Physical Exam:    GEN Awake male, sitting upright in bed, well-developed  EYES  No scleral erythema, discharge, or conjunctivitis. RESP respiratory effort is nonlabored at rest  GI nondistended   Hong catheter is not present. HEME/LYMPH No obvious petechiae or ecchymoses. SKIN Normal coloration on face  NEURO Cranial nerves appear grossly intact, normal speech  PSYCH he is pleasant and cooperative today. Past Medical History: PMHx:   Past Medical History:   Diagnosis Date    Arthritis     Diabetes mellitus ([facility] [address]     GERD (gastroesophageal reflux disease)     Gout     left ankle and right shoulder    Hyperlipidemia     Hypertension     MI (myocardial infarction) [facility])     May 2013     PSHx:  has a past surgical history that includes Total knee arthroplasty (Bilateral); back surgery; Cholecystectomy; Cardiac surgery; Dilatation, esophagus; sinus surgery; joint replacement; Hand surgery; and sinus surgery. Allergies: Allergies   Allergen Reactions    Sulfa Antibiotics Hives     Home Medications:   Prior to Admission medications    Medication Sig Start Date End Date Taking?  Authorizing Provider   amLODIPine (NORVASC) 5 MG tablet Take 1 tablet by mouth 2 times daily 8/13/20 Brock Mina MD   chlorthalidone (HYGROTON) 25 MG tablet Take 0.5 tablets by mouth daily 8/14/20   Brock Mina MD   spironolactone (ALDACTONE) 25 MG tablet Take 1 tablet by mouth daily 8/14/20   Brock Mina MD   busPIRone (BUSPAR) 10 MG tablet Take 10 mg by mouth 3 times daily    Historical Provider, MD   clotrimazole (LOTRIMIN) 1 % cream  11/4/19   Historical Provider, MD   Calcium Carb-Cholecalciferol (CALCIUM-VITAMIN D) 500-200 MG-UNIT per tablet Take 1 tablet by mouth 2 times daily (with meals)    Historical Provider, MD   cholestyramine (QUESTRAN) 4 g packet Take 1 packet by mouth 3 times daily (with meals)    Historical Provider, MD   metoprolol tartrate (LOPRESSOR) 25 MG tablet Take 50 mg by mouth 2 times daily    Historical Provider, MD   pantoprazole sodium (PROTONIX) 40 MG PACK packet Take 40 mg by mouth every morning (before breakfast)    Historical Provider, MD   colestipol (COLESTID) 1 g tablet Take 1 g by mouth 3 times daily    Historical Provider, MD   losartan (COZAAR) 100 MG tablet Take 100 mg by mouth daily     Historical Provider, MD   insulin aspart (NOVOLOG) 100 UNIT/ML injection vial Inject into the skin 3 times daily (before meals)    Historical Provider, MD   atorvastatin (LIPITOR) 20 MG tablet Take 20 mg by mouth daily. Historical Provider, MD   clopidogrel (PLAVIX) 75 MG tablet Take 75 mg by mouth daily. Historical Provider, MD   fenofibrate 160 MG tablet Take 160 mg by mouth daily. Historical Provider, MD   aspirin 325 MG tablet Take 81 mg by mouth daily. Historical Provider, MD   oxyCODONE-acetaminophen (PERCOCET) 7.5-325 MG per tablet Take 1 tablet by mouth every 8 hours as needed. Historical Provider, MD   fluticasone (FLONASE) 50 MCG/ACT nasal spray 1 spray by Nasal route 2 times daily. Historical Provider, MD   glimepiride (AMARYL) 4 MG tablet Take 4 mg by mouth 2 times daily.     Historical Provider, MD   insulin glargine (LANTUS) 100 UNIT/ML injection Inject 60 Units into the skin every evening     Historical Provider, MD   citalopram (CELEXA) 10 MG tablet Take 20 mg by mouth daily.     Historical Provider, MD     FHx:I did not ask about this due to encephalopathy  SHx:   Social History     Socioeconomic History    Marital status:      Spouse name: Not on file    Number of children: Not on file    Years of education: Not on file    Highest education level: Not on file   Occupational History    Not on file   Social Needs    Financial resource strain: Not on file    Food insecurity     Worry: Not on file     Inability: Not on file    Transportation needs     Medical: Not on file     Non-medical: Not on file   Tobacco Use    Smoking status: Never Smoker    Smokeless tobacco: Never Used   Substance and Sexual Activity    Alcohol use: No    Drug use: No    Sexual activity: Not on file   Lifestyle    Physical activity     Days per week: Not on file     Minutes per session: Not on file    Stress: Not on file   Relationships    Social connections     Talks on phone: Not on file     Gets together: Not on file     Attends [employer] service: Not on file     Active member of club or organization: Not on file     Attends meetings of clubs or organizations: Not on file     Relationship status: Not on file    Intimate partner violence     Fear of current or ex partner: Not on file     Emotionally abused: Not on file     Physically abused: Not on file     Forced sexual activity: Not on file   Other Topics Concern    Not on file   Social History Narrative    Not on file       Old chart review -Echo done August 11 shows an ejection fraction of greater than 60% and elevated LVOT velocities with AMIRA    Creatinine is 1.8 on September 27

## 2020-09-29 NOTE — CARE COORDINATION
Patient is on COVID unit in the Emergency Swing Bed at [facility] . Patient is from home with his daughter and son in law and normally uses either a cane or walker for mobility. Patient is normally able to perform his own ADL's at home. Patient has PCP and insurance to assist with RX coverage. Case Management following to assist with any potential discharge needs.

## 2020-09-30 LAB
GLUCOSE BLD-MCNC: 153 MG/DL (ref 70–99)
GLUCOSE BLD-MCNC: 176 MG/DL (ref 70–99)
GLUCOSE BLD-MCNC: 177 MG/DL (ref 70–99)
GLUCOSE BLD-MCNC: 237 MG/DL (ref 70–99)
GLUCOSE BLD-MCNC: 93 MG/DL (ref 70–99)

## 2020-09-30 PROCEDURE — 87070 CULTURE OTHR SPECIMN AEROBIC: CPT

## 2020-09-30 PROCEDURE — 87075 CULTR BACTERIA EXCEPT BLOOD: CPT

## 2020-09-30 PROCEDURE — 6370000000 HC RX 637 (ALT 250 FOR IP): Performed by: INTERNAL MEDICINE

## 2020-09-30 PROCEDURE — 1200000002 HC SEMI PRIVATE SWING BED

## 2020-09-30 PROCEDURE — 97165 OT EVAL LOW COMPLEX 30 MIN: CPT

## 2020-09-30 PROCEDURE — 94761 N-INVAS EAR/PLS OXIMETRY MLT: CPT

## 2020-09-30 PROCEDURE — 82962 GLUCOSE BLOOD TEST: CPT

## 2020-09-30 PROCEDURE — 97535 SELF CARE MNGMENT TRAINING: CPT

## 2020-09-30 PROCEDURE — 2580000003 HC RX 258: Performed by: INTERNAL MEDICINE

## 2020-09-30 PROCEDURE — 6360000002 HC RX W HCPCS: Performed by: INTERNAL MEDICINE

## 2020-09-30 RX ADMIN — CHOLESTYRAMINE 4 G: 4 POWDER, FOR SUSPENSION ORAL at 11:15

## 2020-09-30 RX ADMIN — METOPROLOL TARTRATE 50 MG: 50 TABLET, FILM COATED ORAL at 08:55

## 2020-09-30 RX ADMIN — BUSPIRONE HYDROCHLORIDE 10 MG: 10 TABLET ORAL at 08:55

## 2020-09-30 RX ADMIN — INSULIN GLARGINE 30 UNITS: 100 INJECTION, SOLUTION SUBCUTANEOUS at 22:23

## 2020-09-30 RX ADMIN — BUSPIRONE HYDROCHLORIDE 10 MG: 10 TABLET ORAL at 22:22

## 2020-09-30 RX ADMIN — CHOLESTYRAMINE 4 G: 4 POWDER, FOR SUSPENSION ORAL at 08:54

## 2020-09-30 RX ADMIN — HEPARIN SODIUM 5000 UNITS: 5000 INJECTION INTRAVENOUS; SUBCUTANEOUS at 15:38

## 2020-09-30 RX ADMIN — CLOPIDOGREL BISULFATE 75 MG: 75 TABLET ORAL at 08:55

## 2020-09-30 RX ADMIN — FLUTICASONE PROPIONATE 1 SPRAY: 50 SPRAY, METERED NASAL at 22:23

## 2020-09-30 RX ADMIN — FLUTICASONE PROPIONATE 1 SPRAY: 50 SPRAY, METERED NASAL at 09:04

## 2020-09-30 RX ADMIN — ASPIRIN 81 MG CHEWABLE TABLET 81 MG: 81 TABLET CHEWABLE at 08:54

## 2020-09-30 RX ADMIN — ACETAMINOPHEN 650 MG: 325 TABLET ORAL at 23:21

## 2020-09-30 RX ADMIN — BUSPIRONE HYDROCHLORIDE 10 MG: 10 TABLET ORAL at 15:38

## 2020-09-30 RX ADMIN — OXYCODONE HYDROCHLORIDE AND ACETAMINOPHEN 1 TABLET: 7.5; 325 TABLET ORAL at 12:05

## 2020-09-30 RX ADMIN — HEPARIN SODIUM 5000 UNITS: 5000 INJECTION INTRAVENOUS; SUBCUTANEOUS at 08:52

## 2020-09-30 RX ADMIN — HEPARIN SODIUM 5000 UNITS: 5000 INJECTION INTRAVENOUS; SUBCUTANEOUS at 22:23

## 2020-09-30 RX ADMIN — METOPROLOL TARTRATE 50 MG: 50 TABLET, FILM COATED ORAL at 22:22

## 2020-09-30 RX ADMIN — SODIUM CHLORIDE, PRESERVATIVE FREE 10 ML: 5 INJECTION INTRAVENOUS at 09:05

## 2020-09-30 RX ADMIN — AMLODIPINE BESYLATE 10 MG: 10 TABLET ORAL at 22:22

## 2020-09-30 RX ADMIN — PANTOPRAZOLE SODIUM 40 MG: 40 TABLET, DELAYED RELEASE ORAL at 06:29

## 2020-09-30 RX ADMIN — ATORVASTATIN CALCIUM 20 MG: 20 TABLET, FILM COATED ORAL at 22:23

## 2020-09-30 RX ADMIN — AMLODIPINE BESYLATE 10 MG: 10 TABLET ORAL at 08:55

## 2020-09-30 RX ADMIN — CITALOPRAM HYDROBROMIDE 20 MG: 20 TABLET ORAL at 08:55

## 2020-09-30 RX ADMIN — MAGNESIUM OXIDE 400 MG (241.3 MG MAGNESIUM) TABLET 400 MG: TABLET at 08:55

## 2020-09-30 ASSESSMENT — PAIN - FUNCTIONAL ASSESSMENT: PAIN_FUNCTIONAL_ASSESSMENT: ACTIVITIES ARE NOT PREVENTED

## 2020-09-30 ASSESSMENT — PAIN DESCRIPTION - ORIENTATION: ORIENTATION: LOWER

## 2020-09-30 ASSESSMENT — PAIN DESCRIPTION - ONSET: ONSET: ON-GOING

## 2020-09-30 ASSESSMENT — PAIN SCALES - GENERAL
PAINLEVEL_OUTOF10: 0
PAINLEVEL_OUTOF10: 3
PAINLEVEL_OUTOF10: 4
PAINLEVEL_OUTOF10: 7

## 2020-09-30 ASSESSMENT — PAIN DESCRIPTION - PROGRESSION: CLINICAL_PROGRESSION: GRADUALLY WORSENING

## 2020-09-30 ASSESSMENT — PAIN DESCRIPTION - LOCATION: LOCATION: BACK

## 2020-09-30 ASSESSMENT — PAIN DESCRIPTION - PAIN TYPE: TYPE: CHRONIC PAIN

## 2020-09-30 ASSESSMENT — PAIN DESCRIPTION - DESCRIPTORS: DESCRIPTORS: ACHING

## 2020-09-30 ASSESSMENT — PAIN DESCRIPTION - FREQUENCY: FREQUENCY: CONTINUOUS

## 2020-09-30 NOTE — PROGRESS NOTES
[address] [place] [address]  HOSPITALIST PROGRESS NOTE                       Name:  Imelda Solis /Age/Sex: 1940  (79 y.o. male)   MRN & CSN:  [id number] & [id number] Admission Date/Time: 2020 11:27 AM   Location:  -A Attending:  Sonia Arriaza MD                                                  HPI  Imelda Solis is a 79 y.o. male who was admitted on 20-encephalopathy    SUBJECTIVE  No acute issues overnight, having break fast when seen, in good spirits    10 point review of systems reviewed and negative unless noted above. ALLERGIES:   Allergies   Allergen Reactions    Sulfa Antibiotics Hives       PCP: Flakita Hoang MD    PAST MEDICAL HISTORY, SURGICAL HISTORY, SOCIAL HISTORY and  HOME MEDICATIONS all reviewed. OBJECTIVE  Vitals:    20 0354 20 0756 20 0820 20 1100   BP: 115/64 124/78  124/71   Pulse: 69 73  72   Resp: 15 17 17 21   Temp: 98.4 °F (36.9 °C) 98.6 °F (37 °C)  97.9 °F (36.6 °C)   TempSrc: Oral Oral  Oral   SpO2: 98% 93% 98% 96%   Weight:       Height:           PHYSICAL EXAM   GEN Awake male, sitting upright in bed in no apparent distress. EYES Pupils are equally round. No scleral erythema, discharge, or conjunctivitis. HENT Mucous membranes are moist. Oral pharynx without exudates, no evidence of thrush. NECK Supple, no apparent thyromegaly or masses. RESP Clear to auscultation, no wheezes, rales or rhonchi. Symmetric chest movement while on room air. CARDIO/VASC S1/S2 auscultated. Regular rate without appreciable murmurs, rubs, or gallops. No JVD or carotid bruits. Peripheral pulses equal bilaterally and palpable. No peripheral edema. GI Abdomen is soft without significant tenderness, masses, or guarding. Bowel sounds are normoactive. Rectal exam deferred.  No costovertebral angle tenderness. Normal appearing external genitalia. Hong catheter is not present. HEME/LYMPH No palpable cervical lymphadenopathy and no hepatosplenomegaly.  No petechiae or ecchymoses. MSK Spontaneous movement of all extremities. No gross joint deformities. SKIN Normal coloration, warm, dry. NEURO Cranial nerves appear grossly intact, normal speech, no lateralizing weakness. INTAKE: No intake/output data recorded. OUTPUT: No intake/output data recorded. LABS  No results for input(s): WBC, HGB, HCT, PLT in the last 72 hours. No results for input(s): NA, K, CL, CO2, PHOS, BUN, CREATININE in the last 72 hours. Invalid input(s): CA  No results for input(s): AST, ALT, ALB, BILIDIR, BILITOT, ALKPHOS in the last 72 hours. No results for input(s): INR in the last 72 hours. No results for input(s): CKTOTAL, CKMB, CKMBINDEX, TROPONINT in the last 72 hours.        Abnormal labs for today noted      Imaging:     ECHO:    Microbiology:  Blood culture:    Urine culture:    Sputum culture:    Procedures done this admission:    MEDS  Scheduled Meds:   insulin glargine  30 Units Subcutaneous Nightly    sodium chloride flush  10 mL Intravenous 2 times per day    amLODIPine  10 mg Oral BID    aspirin  81 mg Oral Daily    atorvastatin  20 mg Oral Nightly    busPIRone  10 mg Oral TID    cholestyramine light  1 packet Oral TID WC    citalopram  20 mg Oral Daily    clopidogrel  75 mg Oral Daily    fluticasone  1 spray Nasal BID    heparin (porcine)  5,000 Units Subcutaneous TID    insulin lispro  0-12 Units Subcutaneous TID     insulin lispro  0-12 Units Subcutaneous 2 times per day    insulin lispro  15 Units Subcutaneous TID     magnesium oxide  400 mg Oral Daily    metoprolol tartrate  50 mg Oral BID    pantoprazole  40 mg Oral QAM AC     Continuous Infusions:   dextrose       PRN Meds:acetaminophen **OR** acetaminophen, polyethylene glycol, promethazine **OR** ondansetron, sodium chloride flush, dextrose, dextrose, glucagon (rDNA), glucose, oxyCODONE-acetaminophen        ASSESSMENT and PLAN  Hospital Day: 3      Encephalopathy- improved, at baseline     Acute kidney injury on chronic kidney disease  -Nephrology consult appreciated  -Holding Spironolactone and losartan  -Monitor BMP     Elevated troponin  -Minimal elevation, doubt ACS     History of liver cirrhosis  -Ammonia level is 34     Recent COVID-19 infection- stable       Other problems     Diabetes mellitus type 2, on insulin  -Amaryl discontinued  -Started on renally adjusted Lantus and insulin correction scale  -Monitor blood sugar     Hypertension  -Holding meds as above     Coronary artery disease  -Continue home meds except as above         -he is now in swing bed- in COVID unit- periodic monitoring of labs       Disp:     Diet DIET CARB CONTROL; Carb Control: 4 carb choices (60 gms)/meal  Dietary Nutrition Supplements: Diabetic Oral Supplement   DVT Prophylaxis [] Lovenox, []  Heparin, [] SCDs, [] Ambulation   GI Prophylaxis [] PPI,  [] H2 Blocker,  [] Carafate,  [] Diet/Tube Feeds   Code Status Full Code   Disposition Patient requires continued admission due to debility   CMS Level of Risk [] Low, [x] Moderate,[]  High  Patient's risk as above due to debility     ALINA FORRESTER MD 9/30/2020 12:57 PM

## 2020-09-30 NOTE — PROGRESS NOTES
CVC site noted with yellow/green drainage. Site shown to Dr. Blaine Bhandari while rounding. Per MD remove CVC at this time, no IV access needed at this time. Will continue to monitor.

## 2020-09-30 NOTE — PROGRESS NOTES
Occupational [address] [place] [address] CARE OCCUPATIONAL THERAPY EVALUATION  Eh Goodwin, 1940, 2010/2010-A, 9/30/2020    History  Kashia:  There were no encounter diagnoses. Patient  has a past medical history of Arthritis, Diabetes mellitus ([facility] [address] GERD (gastroesophageal reflux disease), Gout, Hyperlipidemia, Hypertension, and MI (myocardial infarction) ([facility] [address] Patient  has a past surgical history that includes Total knee arthroplasty (Bilateral); back surgery; Cholecystectomy; Cardiac surgery; Dilatation, esophagus; sinus surgery; joint replacement; Hand surgery; and sinus surgery. Subjective:  Patient states:  \"I've been getting up and walking\"    Pain:  Denies. Communication with other providers:  Handoff to RN  Restrictions: JOANNA in room, Droplet Plus, General Precautions, Fall Risk    Home Setup/Prior level of function    Occupational profile (relevant social history and personal factors):  lives with family/caregiver, typically independent mobility , independent with ADLs, lives in single floor home and uses rolling walker     Examination of body systems (includes body structures/functions, activity/participation limitations):  · Observation:  Sitting upright in chair upon arrival, JOANNA in room, agreeable to therapy  · Vision:  Glasses  · Hearing:  [place]  · Cardiopulmonary:  No 02 needs. 02 saturation remained above 90% during functional mobility      Body Systems and functions:  · ROM R/L:  WFL.     · Strength R/L:  4/5,   · Sensation: WFL  · Tone: Normal  · Coordination: WFL  · Perception: WNL    Activities of Daily Living (ADLs):  · Feeding: [place] (upper dentures)  · Grooming: SBA (oral care in stand at sink requiring VCs to problem solve \"placing toothpaste on toothbrush\")  · UB bathing: Supervision  · LB bathing: SBA  · UB dressing: Supervision  · LB dressing: SBA  · Toileting: SBA    Cognitive and Psychosocial Functioning:  · Overall cognitive status: Decreased safety awareness, decreased problem solving (mod VCs to problem solve), decreased short term memory, AxO to self, place and situation not time  · Affect: Pleasantly confused       Mobility:  · Supine to sit:  DNT  · Transfers: CGA from reclining chair up to RW  · Sitting balance:  Supervision. · Standing balance:  SBA w/ RW.  · Functional Mobility: CGA w/ RW ~300 feet   · Toilet/Shower Transfers: DNT             AM-PAC Daily Activity Inpatient   How much help for putting on and taking off regular lower body clothing?: A Little  How much help for Bathing?: A Little  How much help for Toileting?: A Little  How much help for putting on and taking off regular upper body clothing?: None  How much help for taking care of personal grooming?: A Little  How much help for eating meals?: None  AM-[facility] Inpatient Daily Activity Raw Score: 20  AM-PAC Inpatient ADL T-Scale Score : 42.03  ADL Inpatient CMS 0-100% Score: 38.32  ADL Inpatient CMS G-Code Modifier : CJ    Treatment:  Self Care Training:   Cues were given for safety, sequence, UE/LE placement, visual cues, and balance. Activities performed today included grooming    Therapeutic Activity Training:   Therapeutic activity training was instructed today. Cues were given for safety, sequence, UE/LE placement, awareness, and balance. Activities performed today included STS, functional mobility, stand to sit      Safety: patient left in chair with chair alarm, call light within reach, RN notified, gait belt used. Assessment:  Pt is a 80 yo male admitted from home for COVID-19. Pt at baseline is Independent for ADLs needs assistance for high level IADLs and is Independent for functional transfers/mobility w/ AD. Pt currently presents w/ deficits in ADL and high level IADL independence, functional activity tolerance, dynamic sitting and standing balance and tolerance and functional transfers, BUE strength and cognition.  Pt would benefit from continued acute care OT services w/ discharge to home w/ home health OT S1 and assist PRN  Complexity: Low  Prognosis: Good, no significant barriers to participation at this time.    Plan  Times per week: 4x+  Times per day: Daily  Current Treatment Recommendations: Strengthening, Endurance Training, Patient/Caregiver Education & Training, Equipment Evaluation, Education, & procurement, Self-Care / ADL, Balance Training, Pain Management, ROM, Home Management Training, Functional Mobility Training, Safety Education & Training, Cognitive Reorientation, Cognitive/Perceptual Training, Positioning     Equipment: defer    Goals:  Pt goal: go home  Time Frame for STGs: discharge  Goal 1: Pt will perform UE ADLs Independent  Goal 2: Pt will perform LE ADLs Independent  Goal 3: Pt will perform toileting Independent  Goal 4: Pt will perform functional transfer w/ AD [place]  Goal 5: Pt will perform functional mobility w/ AD [place]  Goal 6: Pt will perform therex/theract in order to increase functional activity tolerance and dynamic standing balance    Treatment plan:  Pt will perform functional task in stand reaching in all 3 planes in order to increase dynamic standing balance and functional activity tolerance    Recommendations for NURSING activity: Up to chair for all 3 meals and up to standard commode for all toileting needs    Time:   Time in: 1415  Time out: 1449  Timed treatment minutes: 10 minutes  Total time: 25 minutes    Electronically signed by:    Allan Lee OTLE/L [id number]  4:21 PM,9/30/2020

## 2020-09-30 NOTE — PROGRESS NOTES
TID     citalopram  20 mg Oral Daily    clopidogrel  75 mg Oral Daily    fluticasone  1 spray Nasal BID    heparin (porcine)  5,000 Units Subcutaneous TID    insulin lispro  0-12 Units Subcutaneous TID     insulin lispro  0-12 Units Subcutaneous 2 times per day    insulin lispro  15 Units Subcutaneous TID     magnesium oxide  400 mg Oral Daily    metoprolol tartrate  50 mg Oral BID    pantoprazole  40 mg Oral QAM AC      Infusions:    dextrose           Objective:   Vitals: /64   Pulse 69   Temp 98.4 °F (36.9 °C) (Oral)   Resp 15   Ht 5' 10\" (1.778 m)   Wt 204 lb 12.9 oz (92.9 kg)   SpO2 98%   BMI 29.39 kg/m²   General appearance: alert and cooperative with exam  Neck: no JVD or bruit  Thyroid : Normal lobes   Lungs: Has Vesicular Breath sounds has some wheezing and rales  Heart:  regular rate and rhythm  Abdomen: soft, non-tender; bowel sounds normal; no masses,  no organomegaly  Musculoskeletal: Normal  Extremities: extremities normal, , no edema  Neurologic:  Awake, alert, oriented to name, place and time. Cranial nerves II-XII are grossly intact. Motor is  intact. Sensory neuropathy. ,  and gait is normal.    Assessment:     Patient Active Problem List:     Essential hypertension, benign     Type 2 diabetes mellitus, with long-term current use of insulin ([address]     Other and unspecified hyperlipidemia     Chest pain     Coronary atherosclerosis     Chronic kidney disease, stage III (moderate) (HCC)     Cellulitis of leg     Cellulitis of lower leg     Lethargy     Acute respiratory failure with hypoxia and hypercapnia (HCC)     Altered mental status, unspecified     Hypoglycemia     Acute kidney injury ([address]     Generalized weakness     Liver cirrhosis (HCC)     Depression     Chronic pain     Encephalopathy      Plan:     1. Reviewed POC blood glucose . Labs and X ray results   2. Reviewed Current Medicines   3.  On meal/ Correction bolus Humalog/ Basal Lantus Insulin regime / 4. Monitor Blood glucose frequently   5. Modified  the dose of Insulin/ other medicines as needed  6. Discharge from regular [facility] unit and readmitted to rehab unit but kept in the same room  7. Will follow     .      Darinel Pino MD

## 2020-09-30 NOTE — PROGRESS NOTES
Progress Note( Dr. Alyssa Castañeda)    Subjective:   Admit Date: 9/28/2020  PCP: Benjamin Jesus MD    Saw the patient on 9/25/2020 but made the note later    Admitted For : Altered mental state with encephalopathy patient was positive for COVID initially  Consulted For: Control of blood glucose    Interval History: Is much better able to breathe better as his most recent COVID is negative  Patient was discharged from regular [address] unit  He was admitted to some sort of rehab unit but kept in the same room as there is no separate rehab available for Elvin positive patients    Denies any chest pains,   Has SOB . Denies nausea or vomiting. Eating better  No new bowel or bladder symptoms. Intake/Output Summary (Last 24 hours) at 9/29/2020 2154  Last data filed at 9/29/2020 0756  Gross per 24 hour   Intake --   Output 300 ml   Net -300 ml       DATA    CBC:   No results for input(s): WBC, HGB, PLT in the last 72 hours. CMP:  Recent Labs     09/27/20  0522      K 4.8      CO2 20*   BUN 42*   CREATININE 1.8*   CALCIUM 9.4   LABALBU 4.1     Lipids:   Lab Results   Component Value Date    CHOL 129 05/14/2013    HDL 28 05/14/2013    TRIG 220 05/14/2013     Glucose:  Recent Labs     09/29/20  1217 09/29/20  1648 09/29/20 2004   POCGLU 220* 110* 221*     [id number]:  Lab Results   Component Value Date    LABA1C 8.8 08/09/2020     High Sensitivity TSH:   Lab Results   Component Value Date    TSHHS 2.230 08/09/2020     Free T3:   Lab Results   Component Value Date    FT3 2.3 10/19/2015     Free T4:  Lab Results   Component Value Date    T4FREE 1.08 10/19/2015     .        Scheduled Medicines   Medications:    insulin glargine  30 Units Subcutaneous Nightly    sodium chloride flush  10 mL Intravenous 2 times per day    amLODIPine  10 mg Oral BID    aspirin  81 mg Oral Daily    atorvastatin  20 mg Oral Nightly    busPIRone  10 mg Oral TID    cholestyramine light  1 packet Oral TID WC    citalopram  20 mg Oral Daily    clopidogrel  75 mg Oral Daily    fluticasone  1 spray Nasal BID    heparin (porcine)  5,000 Units Subcutaneous TID    insulin lispro  0-12 Units Subcutaneous TID     insulin lispro  0-12 Units Subcutaneous 2 times per day    insulin lispro  15 Units Subcutaneous TID     magnesium oxide  400 mg Oral Daily    metoprolol tartrate  50 mg Oral BID    pantoprazole  40 mg Oral QAM AC      Infusions:    dextrose           Objective:   Vitals: /70   Pulse 69   Temp 98.5 °F (36.9 °C) (Oral)   Resp 13   Ht 5' 10\" (1.778 m)   Wt 204 lb 12.9 oz (92.9 kg)   SpO2 96%   BMI 29.39 kg/m²   General appearance: alert and cooperative with exam  Neck: no JVD or bruit  Thyroid : Normal lobes   Lungs: Has Vesicular Breath sounds has some wheezing and rales  Heart:  regular rate and rhythm  Abdomen: soft, non-tender; bowel sounds normal; no masses,  no organomegaly  Musculoskeletal: Normal  Extremities: extremities normal, , no edema  Neurologic:  Awake, alert, oriented to name, place and time. Cranial nerves II-XII are grossly intact. Motor is  intact. Sensory neuropathy. ,  and gait is normal.    Assessment:     Patient Active Problem List:     Essential hypertension, benign     Type 2 diabetes mellitus, with long-term current use of insulin ([address]     Other and unspecified hyperlipidemia     Chest pain     Coronary atherosclerosis     Chronic kidney disease, stage III (moderate) (HCC)     Cellulitis of leg     Cellulitis of lower leg     Lethargy     Acute respiratory failure with hypoxia and hypercapnia (HCC)     Altered mental status, unspecified     Hypoglycemia     Acute kidney injury ([address]     Generalized weakness     Liver cirrhosis (HCC)     Depression     Chronic pain     Encephalopathy      Plan:     1. Reviewed POC blood glucose . Labs and X ray results   2. Reviewed Current Medicines   3. On meal/ Correction bolus Humalog/ Basal Lantus Insulin regime /   4.  Monitor Blood glucose frequently   5. Modified  the dose of Insulin/ other medicines as needed  6. Discharge from regular [facility] unit and readmitted to rehab unit but kept in the same room  7. Will follow     .      Wen Rutledge MD

## 2020-09-30 NOTE — DISCHARGE SUMMARY
Discharge Summary    Name:  Travis Choi /Age/Sex: 1940  (79 y.o. male)   MRN & CSN:  [id number] & [id number] Admission Date/Time: 2020 10:33 PM   Attending:  No att. providers found Discharging Physician: Maryann Ballard MD     HPI:     Per H&P:  Chief Complaint: Encephalopathy     Travis Choi is a 79 y.o.  male with a PMH of essential hypertension, DM 2 on insulin, CAD, liver cirrhosis, depression, chronic pain, and CKD stage III, who presents with confusion and status post fall. Patient was recently hospitalized and did not complete treatment and left AMA on 2020. At that time patient was significantly ill with acute hypoxic respiratory failure secondary to COVID-19. At that time patient also had encephalopathy. According to ED provider patient has been living with his daughter and states he had experienced progressive weakness and confusion over the last few days. Patient had difficulty with ambulation ultimately leading to a fall at home today. Daughter states he is normally completely oriented. Upon entering the room and trying to have a discussion with the patient the patient was difficult to arouse and apparently got 1 mg Ativan prior to my arrival.  Patient was able to tell me he fell and hit his head but was unable to provide any meaningful history. Discussed case with ED provider. Hospital Course:     Matthew Rahman is a 79year old who presented to ED on  from home via EMS with confusion. He was recently here in the hospital from  until  and again  until  with hypoxia and a COVID infection. At this time, he was admitted to the [facility] unit. While here he was treated for an VICKY. This probably resulted in a metabolic encephalopathy superimposed on a background of chronic opioid therapy and cirrhosis with CKD III. His condition improved while here. He does have baseline cognitive impairment. He was here in the hospital for a total of 9 days.   He needed skilled care, but it was difficult to find a facility that would accept him since he had a persistently positive COVID test as of September 23 (initial positive test as long ago as August 8 or about 46 days ago). He was doing well, and has now been transferred to Swing bed at [facility] (he remained in his same room). Problem list    Encephalopathy, metabolic  -Likely related to VICKY  -He is on opioids, BuSpar, and SSRI's  -He does have cirrhosis    Acute kidney injury with chronic kidney disease  -cr was 3.7 on admission and improved to 1.8 prior to dc    Cirrhosis of the liver  Hypertension  HLD  Diabetes mellitus type 2  Obesity Body mass index is 32.28 kg/m². History of tob use - quit decades ago  Coronary artery disease  Severe LEROY  Chronic back pain - on 4 Percocet per day   Chronic venous insufficiency    The patient expressed appropriate understanding of and agreement with the discharge recommendations, medications, and plan. Consults this admission:  IP CONSULT TO HOSPITALIST  IP CONSULT TO NEPHROLOGY  IP CONSULT TO GENERAL SURGERY  IP CONSULT TO NEUROLOGY  IP CONSULT TO ENDOCRINOLOGY    Discharge Instruction:   Follow up appointments: nephrology  Primary care physician:  within 2 weeks    Diet:  diabetic diet   Activity: activity as tolerated  Disposition: Discharged to:   SWING BED at [facility]  Condition on discharge: [place] in EHR at the time of admission:      [address] Medication Instructions [facility]:[id number]    Printed on:09/30/20 7720   Medication Information                      amLODIPine (NORVASC) 5 MG tablet  Take 1 tablet by mouth 2 times daily             aspirin 325 MG tablet  Take 81 mg by mouth daily. atorvastatin (LIPITOR) 20 MG tablet  Take 20 mg by mouth daily.              busPIRone (BUSPAR) 10 MG tablet  Take 10 mg by mouth 3 times daily             Calcium Carb-Cholecalciferol (CALCIUM-VITAMIN D) 500-200 MG-UNIT per tablet  Take 1 tablet by mouth 2 times daily (with meals)             chlorthalidone (HYGROTON) 25 MG tablet  Take 0.5 tablets by mouth daily             cholestyramine (QUESTRAN) 4 g packet  Take 1 packet by mouth 3 times daily (with meals)             citalopram (CELEXA) 10 MG tablet  Take 20 mg by mouth daily. clopidogrel (PLAVIX) 75 MG tablet  Take 75 mg by mouth daily. clotrimazole (LOTRIMIN) 1 % cream               colestipol (COLESTID) 1 g tablet  Take 1 g by mouth 3 times daily             fenofibrate 160 MG tablet  Take 160 mg by mouth daily. fluticasone (FLONASE) 50 MCG/ACT nasal spray  1 spray by Nasal route 2 times daily. glimepiride (AMARYL) 4 MG tablet  Take 4 mg by mouth 2 times daily. insulin aspart (NOVOLOG) 100 UNIT/ML injection vial  Inject into the skin 3 times daily (before meals)             insulin glargine (LANTUS) 100 UNIT/ML injection  Inject 60 Units into the skin every evening              losartan (COZAAR) 100 MG tablet  Take 100 mg by mouth daily              metoprolol tartrate (LOPRESSOR) 25 MG tablet  Take 50 mg by mouth 2 times daily             oxyCODONE-acetaminophen (PERCOCET) 7.5-325 MG per tablet  Take 1 tablet by mouth every 8 hours as needed. pantoprazole sodium (PROTONIX) 40 MG PACK packet  Take 40 mg by mouth every morning (before breakfast)             spironolactone (ALDACTONE) 25 MG tablet  Take 1 tablet by mouth daily                 Objective Findings at Discharge:   BP (!) 99/59   Pulse 86   Temp 97.7 °F (36.5 °C) (Oral)   Resp 23   Ht 5' 10\" (1.778 m)   Wt 225 lb (102.1 kg)   SpO2 98%   BMI 32.28 kg/m²            PHYSICAL EXAM   GEN Awake male, sitting upright in bed in no apparent distress. Appears given age.     LABS:    CBC:   Lab Results   Component Value Date    WBC 5.6 09/21/2020    HGB 14.1 09/21/2020    HCT 40.9 09/21/2020    MCV 88.3 09/21/2020     09/21/2020     BMP:   Lab Results   Component Value Date     09/27/2020    K 4.8 09/27/2020     09/27/2020    CO2 20 09/27/2020    PHOS 3.9 09/27/2020    BUN 42 09/27/2020    CREATININE 1.8 09/27/2020    CALCIUM 9.4 09/27/2020     IMAGING:    XR CHEST PORTABLE [[id number]]  Collected: 09/20/20 1400       Order Status: Completed  Updated: 09/20/20 1416      Narrative:       EXAMINATION:   ONE X-RAY VIEW OF THE CHEST     9/20/2020 12:39 pm     COMPARISON:   Chest radiograph 09/19/2020. HISTORY:   ORDERING SYSTEM PROVIDED HISTORY: s/p CVC   TECHNOLOGIST PROVIDED HISTORY:   Reason for exam:->s/p CVC   Reason for Exam: CVC   Acuity: Acute   Type of Exam: Subsequent/Follow-up     FINDINGS:   Mild rotation. Stable mediastinal and cardiac silhouettes. Stable lung   volumes with no acute consolidation or effusion. No pneumothorax or free   subdiaphragmatic air. Right subclavian CVC with tip along the distribution   of the proximal superior vena cava. Redemonstration of densely calcified   right hilar lymph nodes consistent with remote healed granulomatous disease. Impression:       Right CVC with tip in the region of the proximal to mid superior vena cava. No acute pneumothorax. CT LUMBAR SPINE WO CONTRAST [[id number]]  Collected: 09/20/20 0043      Order Status: Completed  Updated: 09/20/20 0050      Narrative:       EXAMINATION:   CT OF THE LUMBAR SPINE WITHOUT CONTRAST  9/20/2020     TECHNIQUE:   CT of the lumbar spine was performed without the administration of   intravenous contrast. Multiplanar reformatted images are provided for review. Dose modulation, iterative reconstruction, and/or weight based adjustment of   the mA/kV was utilized to reduce the radiation dose to as low as reasonably   achievable.      COMPARISON:   08/08/2020     HISTORY:   ORDERING SYSTEM PROVIDED HISTORY: fall   TECHNOLOGIST PROVIDED HISTORY:   Reason for exam:->fall   Reason for Exam: fall     FINDINGS:   BONES/ALIGNMENT: There is normal alignment of the spine. The vertebral body   heights are maintained. No osseous destructive lesion is seen. DEGENERATIVE CHANGES: Multilevel degenerative changes of the lumbar spine   with spinal canal stenosis at L2-3, L3-4, L4-5, and L5-S1. Multilevel neural   foraminal narrowing. SOFT TISSUES/RETROPERITONEUM: No paraspinal mass is seen. Impression:       1. No acute abnormality of the lumbar spine. 2. Multilevel degenerative changes. CT THORACIC SPINE WO CONTRAST [[id number]]  Collected: 09/20/20 0042      Order Status: Completed  Updated: 09/20/20 0050      Narrative:       EXAMINATION:   CT OF THE THORACIC SPINE WITHOUT CONTRAST  9/20/2020 12:11 am:     TECHNIQUE:   CT of the thoracic spine was performed without the administration of   intravenous contrast. Multiplanar reformatted images are provided for review. Dose modulation, iterative reconstruction, and/or weight based adjustment of   the mA/kV was utilized to reduce the radiation dose to as low as reasonably   achievable. COMPARISON:   08/08/2020     HISTORY:   ORDERING SYSTEM PROVIDED HISTORY: fall   TECHNOLOGIST PROVIDED HISTORY:   Reason for exam:->fall     FINDINGS:   BONES/ALIGNMENT: No evidence of acute fracture or traumatic malalignment of   the thoracic spine. DEGENERATIVE CHANGES: No bony canal stenosis. Multilevel thoracic spine   anterior disc osteophyte complexes are similar to the comparison study. SOFT TISSUES: No paraspinal mass. Dependent airspace opacities bilaterally   are favored to be related to subsegmental atelectasis. There are coronary   artery calcifications. Impression:       No evidence of acute fracture or traumatic malalignment of the thoracic spine.       CT CERVICAL SPINE WO CONTRAST [[id number]]  Collected: 09/20/20 0042      Order Status: Completed  Updated: 09/20/20 0050      Narrative:       EXAMINATION:   CT OF THE CERVICAL SPINE WITHOUT CONTRAST 9/20/2020 12:11 am     TECHNIQUE:   CT of the cervical spine was performed without the administration of   intravenous contrast. Multiplanar reformatted images are provided for review. Dose modulation, iterative reconstruction, and/or weight based adjustment of   the mA/kV was utilized to reduce the radiation dose to as low as reasonably   achievable. COMPARISON:   None. HISTORY:   ORDERING SYSTEM PROVIDED HISTORY: fall   TECHNOLOGIST PROVIDED HISTORY:   Reason for exam:->fall     FINDINGS:   BONES/ALIGNMENT: There is no acute fracture or traumatic malalignment. Cervical vertebral body heights and alignment are normal.     DEGENERATIVE CHANGES: There are multilevel degenerative changes in the   cervical spine. At C2-C3, there is a small central disc   protrusion/herniation. At C3-C4, there is moderate left facet arthropathy. At C4-C5, there is a moderate disc herniation more prominent to the right of   midline causing mass effect on the cervical spinal cord (axial image 37-38). At C5-C6, there is a broad-based mixed protrusion/herniation more prominent   left paracentrally causing moderate to severe cord flattening (axial image   43). At C6-C7, there is a broad-based mixed protrusion/herniation more   prominent left paracentrally causing moderate cord flattening (axial image   47). SOFT TISSUES: There is no prevertebral soft tissue swelling. Impression:       No acute abnormality of the cervical spine. Multilevel degenerative changes as described above. If clinically indicated,   correlation with MRI would be helpful.       CT HEAD WO CONTRAST [[id number]]  Collected: 09/20/20 0038      Order Status: Completed  Updated: 09/20/20 0045      Narrative:       EXAMINATION:   CT OF THE HEAD WITHOUT CONTRAST  9/20/2020 12:11 am     TECHNIQUE:   CT of the head was performed without the administration of intravenous   contrast. Dose modulation, iterative reconstruction, and/or weight based   adjustment of the mA/kV was utilized to reduce identified. The heart   size is probably within normal limits. Pelvic x-ray: No acute fracture or hip dislocation is visualized. Impression:       No acute posttraumatic abnormality detected. XR CHEST PORTABLE [[id number]]  Collected: 09/19/20 2333      Order Status: Completed  Updated: 09/19/20 2338      Narrative:       EXAMINATION:   ONE XRAY VIEW OF THE CHEST; ONE XRAY VIEW OF THE PELVIS     9/19/2020 11:16 pm; 9/19/2020 11:17 pm     COMPARISON:   None. HISTORY:   ORDERING SYSTEM PROVIDED HISTORY: fall   TECHNOLOGIST PROVIDED HISTORY:   Reason for exam:->fall   Reason for Exam: fall   Acuity: Acute   Type of Exam: Initial; ORDERING SYSTEM PROVIDED HISTORY: fall   TECHNOLOGIST PROVIDED HISTORY:   Reason for exam:->fall   Reason for Exam: fall   Acuity: Acute     FINDINGS:   Chest x-ray: The patient is rotated limiting evaluation of the mediastinum. No pneumothorax or pulmonary contusion or effusion is identified. The heart   size is probably within normal limits. Pelvic x-ray: No acute fracture or hip dislocation is visualized. Impression:       No acute posttraumatic abnormality detected.           Discharge Time of 35 minutes    Electronically signed by Latoya Pool MD on 9/30/2020 at 6:27 AM

## 2020-10-01 VITALS
HEART RATE: 62 BPM | BODY MASS INDEX: 32.26 KG/M2 | TEMPERATURE: 97.6 F | SYSTOLIC BLOOD PRESSURE: 126 MMHG | OXYGEN SATURATION: 99 % | DIASTOLIC BLOOD PRESSURE: 54 MMHG | RESPIRATION RATE: 15 BRPM | WEIGHT: 225.31 LBS | HEIGHT: 70 IN

## 2020-10-01 LAB
GLUCOSE BLD-MCNC: 150 MG/DL (ref 70–99)
GLUCOSE BLD-MCNC: 168 MG/DL (ref 70–99)
GLUCOSE BLD-MCNC: 233 MG/DL (ref 70–99)

## 2020-10-01 PROCEDURE — 82962 GLUCOSE BLOOD TEST: CPT

## 2020-10-01 PROCEDURE — 94761 N-INVAS EAR/PLS OXIMETRY MLT: CPT

## 2020-10-01 PROCEDURE — 97116 GAIT TRAINING THERAPY: CPT

## 2020-10-01 PROCEDURE — 6370000000 HC RX 637 (ALT 250 FOR IP): Performed by: INTERNAL MEDICINE

## 2020-10-01 PROCEDURE — 6360000002 HC RX W HCPCS: Performed by: INTERNAL MEDICINE

## 2020-10-01 PROCEDURE — 97530 THERAPEUTIC ACTIVITIES: CPT

## 2020-10-01 RX ORDER — INSULIN GLARGINE 100 [IU]/ML
30 INJECTION, SOLUTION SUBCUTANEOUS EVERY EVENING
Qty: 1 VIAL | Refills: 3 | Status: SHIPPED | OUTPATIENT
Start: 2020-10-01

## 2020-10-01 RX ORDER — MAGNESIUM OXIDE 400 MG/1
400 TABLET ORAL DAILY
Qty: 30 TABLET | Refills: 1 | Status: SHIPPED | OUTPATIENT
Start: 2020-10-02

## 2020-10-01 RX ORDER — POLYETHYLENE GLYCOL 3350 17 G/17G
17 POWDER, FOR SOLUTION ORAL DAILY PRN
Qty: 527 G | Refills: 1 | Status: SHIPPED | OUTPATIENT
Start: 2020-10-01 | End: 2020-10-31

## 2020-10-01 RX ADMIN — CITALOPRAM HYDROBROMIDE 20 MG: 20 TABLET ORAL at 09:02

## 2020-10-01 RX ADMIN — METOPROLOL TARTRATE 50 MG: 50 TABLET, FILM COATED ORAL at 09:02

## 2020-10-01 RX ADMIN — HEPARIN SODIUM 5000 UNITS: 5000 INJECTION INTRAVENOUS; SUBCUTANEOUS at 13:48

## 2020-10-01 RX ADMIN — ACETAMINOPHEN 650 MG: 325 TABLET ORAL at 05:41

## 2020-10-01 RX ADMIN — FLUTICASONE PROPIONATE 1 SPRAY: 50 SPRAY, METERED NASAL at 09:04

## 2020-10-01 RX ADMIN — HEPARIN SODIUM 5000 UNITS: 5000 INJECTION INTRAVENOUS; SUBCUTANEOUS at 09:01

## 2020-10-01 RX ADMIN — BUSPIRONE HYDROCHLORIDE 10 MG: 10 TABLET ORAL at 09:02

## 2020-10-01 RX ADMIN — CLOPIDOGREL BISULFATE 75 MG: 75 TABLET ORAL at 09:01

## 2020-10-01 RX ADMIN — AMLODIPINE BESYLATE 10 MG: 10 TABLET ORAL at 09:01

## 2020-10-01 RX ADMIN — MAGNESIUM OXIDE 400 MG (241.3 MG MAGNESIUM) TABLET 400 MG: TABLET at 09:01

## 2020-10-01 RX ADMIN — BUSPIRONE HYDROCHLORIDE 10 MG: 10 TABLET ORAL at 13:48

## 2020-10-01 RX ADMIN — PANTOPRAZOLE SODIUM 40 MG: 40 TABLET, DELAYED RELEASE ORAL at 05:41

## 2020-10-01 RX ADMIN — ASPIRIN 81 MG CHEWABLE TABLET 81 MG: 81 TABLET CHEWABLE at 09:01

## 2020-10-01 ASSESSMENT — PAIN SCALES - GENERAL
PAINLEVEL_OUTOF10: 7
PAINLEVEL_OUTOF10: 0
PAINLEVEL_OUTOF10: 0

## 2020-10-01 NOTE — PROGRESS NOTES
[facility] Liaison spoke w/pt's daughter Allison Rojas & is aware of discharge & will initiate [facility].

## 2020-10-01 NOTE — CARE COORDINATION
Notified by Mikey Foster Director of Case Management [facility] that per therapy patient has met his goals and is ready to discharge. Notified patient's daughter Yin Frankel and Keila Jara at [facility]. Yin Frankel voiced understanding and stated that she will be in after work around 5:30 or 6pm to get patient.

## 2020-10-01 NOTE — DISCHARGE SUMMARY
mouth 2 times daily             apixaban (ELIQUIS) 2.5 MG TABS tablet  Take 1 tablet by mouth 2 times daily             aspirin 325 MG tablet  Take 81 mg by mouth daily. atorvastatin (LIPITOR) 20 MG tablet  Take 20 mg by mouth daily. busPIRone (BUSPAR) 10 MG tablet  Take 10 mg by mouth 3 times daily             Calcium Carb-Cholecalciferol (CALCIUM-VITAMIN D) 500-200 MG-UNIT per tablet  Take 1 tablet by mouth 2 times daily (with meals)             cholestyramine (QUESTRAN) 4 g packet  Take 1 packet by mouth 3 times daily (with meals)             citalopram (CELEXA) 10 MG tablet  Take 20 mg by mouth daily. clopidogrel (PLAVIX) 75 MG tablet  Take 75 mg by mouth daily. clotrimazole (LOTRIMIN) 1 % cream               fenofibrate 160 MG tablet  Take 160 mg by mouth daily. fluticasone (FLONASE) 50 MCG/ACT nasal spray  1 spray by Nasal route 2 times daily. insulin aspart (NOVOLOG) 100 UNIT/ML injection vial  Inject into the skin 3 times daily (before meals)             insulin glargine (LANTUS) 100 UNIT/ML injection vial  Inject 30 Units into the skin every evening             magnesium oxide (MAG-OX) 400 MG tablet  Take 1 tablet by mouth daily             metoprolol tartrate (LOPRESSOR) 25 MG tablet  Take 50 mg by mouth 2 times daily             oxyCODONE-acetaminophen (PERCOCET) 7.5-325 MG per tablet  Take 1 tablet by mouth every 8 hours as needed. pantoprazole sodium (PROTONIX) 40 MG PACK packet  Take 40 mg by mouth every morning (before breakfast)             polyethylene glycol (GLYCOLAX) 17 g packet  Take 17 g by mouth daily as needed for Constipation                 Objective Findings at Discharge:   BP (!) 111/52   Pulse 62   Temp 98.7 °F (37.1 °C) (Oral)   Resp 15   Ht 5' 10\" (1.778 m)   Wt 225 lb 5 oz (102.2 kg)   SpO2 99%   BMI 32.33 kg/m²            PHYSICAL EXAM   GEN Awake male, laying in bed in no apparent distress. EYES Pupils are equally round. No scleral discharge  HENT Atraumatic and symmetric head  NECK No apparent thyromegaly  RESP Symmetric chest movement while on room air. CARDIO/VASC Peripheral pulses equal bilaterally and palpable. GI Abdomen is not distended. Rectal exam deferred.  Hong catheter is not present. HEME/LYMPH No petechiae or ecchymoses. MSK Spontaneous movement of BL upper extremities  SKIN Normal coloration, warm, dry. NEURO Cranial nerves appear grossly intact  PSYCH Awake, alert. BMP/CBC  No results for input(s): NA, K, CL, CO2, BUN, CREATININE, WBC, HEMOGLOBIN, HCT, PLT in the last 72 hours.     Invalid input(s): GLU  SIGNIFICANT IMAGING AND LABS:      Discharge Time of 31 minutes    Electronically signed by Senthil Cochran MD on 10/1/2020 at 3:09 PM

## 2020-10-01 NOTE — CARE COORDINATION
Phoned and spoke with patient's daughter Yoel Mcclellan regarding discharge planning. CM let her know that therapy is recommending home with  Teressa Agarwal and she is agreeable and chose [facility]. Referral made to [facility] [place]. Discharge plan for patient is to return home with family and [place].

## 2020-10-01 NOTE — PROGRESS NOTES
[address] [place] [address]  HOSPITALIST PROGRESS NOTE                       Name:  Ever Long /Age/Sex: 1940  (79 y.o. male)   MRN & CSN:  [id number] & [id number] Admission Date/Time: 2020 11:27 AM   Location:  -A Attending:  Colton Jalloh MD                                                  HPI  Ever Long is a 79 y.o. male who was admitted on 20-encephalopathy    SUBJECTIVE  No acute issues overnight, out of bed on chair when seen. Denies shortness of breath. 10 point review of systems reviewed and negative unless noted above. ALLERGIES:   Allergies   Allergen Reactions    Sulfa Antibiotics Hives       PCP: Case Leslie MD    PAST MEDICAL HISTORY, SURGICAL HISTORY, SOCIAL HISTORY and  HOME MEDICATIONS all reviewed. OBJECTIVE  Vitals:    20 2107 10/01/20 0200 10/01/20 0815   BP: 117/89  120/67 (!) 111/52   Pulse: 81  62    Resp: 8 27 15    Temp: 98.4 °F (36.9 °C)  98.4 °F (36.9 °C) 98.7 °F (37.1 °C)   TempSrc: Oral  Oral Oral   SpO2: 99%  99% 99%   Weight:   225 lb 5 oz (102.2 kg)    Height:           PHYSICAL EXAM   GEN Awake male, sitting upright in bed in no apparent distress. EYES Pupils are equally round. No scleral erythema, discharge, or conjunctivitis. HENT Mucous membranes are moist. Oral pharynx without exudates, no evidence of thrush. NECK Supple, no apparent thyromegaly or masses. RESP Clear to auscultation, no wheezes, rales or rhonchi. Symmetric chest movement while on room air. CARDIO/VASC S1/S2 auscultated. Regular rate without appreciable murmurs, rubs, or gallops. No JVD or carotid bruits. Peripheral pulses equal bilaterally and palpable. No peripheral edema. GI Abdomen is soft without significant tenderness, masses, or guarding. Bowel sounds are normoactive. Rectal exam deferred.  No costovertebral angle tenderness. Normal appearing external genitalia. Hong catheter is not present.   HEME/LYMPH No palpable cervical lymphadenopathy and no hepatosplenomegaly. No petechiae or ecchymoses. MSK Spontaneous movement of all extremities. No gross joint deformities. SKIN Normal coloration, warm, dry. NEURO Cranial nerves appear grossly intact, normal speech, no lateralizing weakness. INTAKE: In: 480 [P.O.:480]  Out: -   OUTPUT: In: 480   Out: -     LABS  No results for input(s): WBC, HGB, HCT, PLT in the last 72 hours. No results for input(s): NA, K, CL, CO2, PHOS, BUN, CREATININE in the last 72 hours. Invalid input(s): CA  No results for input(s): AST, ALT, ALB, BILIDIR, BILITOT, ALKPHOS in the last 72 hours. No results for input(s): INR in the last 72 hours. No results for input(s): CKTOTAL, CKMB, CKMBINDEX, TROPONINT in the last 72 hours.        Abnormal labs for today noted      Imaging:     ECHO:    Microbiology:  Blood culture:    Urine culture:    Sputum culture:    Procedures done this admission:    MEDS  Scheduled Meds:   insulin glargine  30 Units Subcutaneous Nightly    sodium chloride flush  10 mL Intravenous 2 times per day    amLODIPine  10 mg Oral BID    aspirin  81 mg Oral Daily    atorvastatin  20 mg Oral Nightly    busPIRone  10 mg Oral TID    cholestyramine light  1 packet Oral TID WC    citalopram  20 mg Oral Daily    clopidogrel  75 mg Oral Daily    fluticasone  1 spray Nasal BID    heparin (porcine)  5,000 Units Subcutaneous TID    insulin lispro  0-12 Units Subcutaneous TID WC    insulin lispro  0-12 Units Subcutaneous 2 times per day    insulin lispro  15 Units Subcutaneous TID WC    magnesium oxide  400 mg Oral Daily    metoprolol tartrate  50 mg Oral BID    pantoprazole  40 mg Oral QAM AC     Continuous Infusions:   dextrose       PRN Meds:acetaminophen **OR** acetaminophen, polyethylene glycol, promethazine **OR** ondansetron, sodium chloride flush, dextrose, dextrose, glucagon (rDNA), glucose, oxyCODONE-acetaminophen        ASSESSMENT and PLAN  Hospital Day: 4      Encephalopathy- improved, at baseline     Acute kidney injury on chronic kidney disease  -Nephrology consult appreciated  -Holding Spironolactone and losartan  -Monitor BMP     Elevated troponin  -Minimal elevation, doubt ACS     History of liver cirrhosis  -Ammonia level is 34     Recent COVID-19 infection- stable       Other problems     Diabetes mellitus type 2, on insulin  -Amaryl discontinued  -Started on renally adjusted Lantus and insulin correction scale  -Monitor blood sugar     Hypertension  -Holding meds as above     Coronary artery disease  -Continue home meds except as above         -he is now in swing bed- in COVID unit- periodic monitoring of labs    Will check labs in am       Disp:     Diet DIET CARB CONTROL; Carb Control: 4 carb choices (60 gms)/meal  Dietary Nutrition Supplements: Diabetic Oral Supplement   DVT Prophylaxis [] Lovenox, []  Heparin, [] SCDs, [] Ambulation   GI Prophylaxis [] PPI,  [] H2 Blocker,  [] Carafate,  [] Diet/Tube Feeds   Code Status Full Code   Disposition Patient requires continued admission due to debility   CMS Level of Risk [] Low, [x] Moderate,[]  High  Patient's risk as above due to debility     ALINA FORRESTER MD 10/1/2020 9:08 AM

## 2020-10-01 NOTE — CONSULTS
Physical Therapy Treatment Note  Name: Hi Dior MRN: [id number] :   1940   Date:  10/1/2020   Admission Date: 2020 Room:  -A   Restrictions/Precautions:        droplet plus  Communication with other providers:  D/w RNCM lead, patient's RN  Subjective:  Patient states:  Agreeable to eval.  Remembers me from couple days ago. No concerns. Interested in home soon. States son told patient that patient with dementia. [therapist offered support and encouragement]  Pain:   Location, Type, Intensity (0/10 to 10/10):  0/10  Objective:    Observation:  Alert, oriented, pleasant, participatory. Tolerant of activity. Engaged in session. Moves largely without physical assist.  Performed all basic xfers, toileting, toilet xfers, hygiene, ambulation with RW device for 150 feet. Treatment, including education/measures:  Therapeutic Activity Training:   Therapeutic activity training was instructed today. Cues were given for safety, sequence, UE/LE placement, awareness, and balance. Activities performed today included bed mobility training, sup-sit, sit-stand, SPT. Self Care Training:   Cues were given for safety, sequence, UE/LE placement, visual cues, and balance. Activities performed today included dressing, toileting, hand hygiene, and grooming. Gait Training:  Cues were given for safety, sequence, device management, balance, posture, awareness, path. Trained with RW  Assessment / Impression:       Patient's tolerance of treatment:  excellent   Adverse Reaction: none. Normal activity tolerance  Significant change in status and impact:  Improving steadily, met goals for BMAT, AMPAC, 5 rep sit-stand test, and activity  For HEP goals, prog towards single leg stance goal, can do 4 seconds. Barriers to improvement:  none  Plan for Next Session:    N/a, completed therapy components of swing admission. Discussed with RNCM and RN, both of which support transition home.     Time in:

## 2020-10-01 NOTE — PROGRESS NOTES
Discussed with both patient and daughter about discharge plan, patient feels good to go home, and his daughter ready for him. She verbalized understanding his COVID positivity-will take necessary precautions. [facility] ordered, to follow up with PCP for outpatient testing.

## 2020-10-05 LAB
CULTURE: NORMAL
Lab: NORMAL
SPECIMEN: NORMAL

## 2021-01-14 ENCOUNTER — HOSPITAL ENCOUNTER (OUTPATIENT)
Age: 81
Discharge: HOME | End: 2021-01-14
Payer: MEDICARE

## 2021-01-14 LAB
ANION GAP SERPL CALCULATED.3IONS-SCNC: 10 MMOL/L (ref 4–16)
BUN BLDV-MCNC: 23 MG/DL (ref 6–23)
CALCIUM SERPL-MCNC: 8.7 MG/DL (ref 8.3–10.6)
CHLORIDE BLD-SCNC: 104 MMOL/L (ref 99–110)
CO2: 20 MMOL/L (ref 21–32)
CREAT SERPL-MCNC: 1.5 MG/DL (ref 0.9–1.3)
GFR AFRICAN AMERICAN: 55 ML/MIN/1.73M2
GFR NON-AFRICAN AMERICAN: 45 ML/MIN/1.73M2
GLUCOSE BLD-MCNC: 187 MG/DL (ref 70–99)
POTASSIUM SERPL-SCNC: 5.3 MMOL/L (ref 3.5–5.1)
SODIUM BLD-SCNC: 134 MMOL/L (ref 135–145)

## 2021-01-14 PROCEDURE — 80048 BASIC METABOLIC PNL TOTAL CA: CPT

## 2021-01-14 PROCEDURE — 36415 COLL VENOUS BLD VENIPUNCTURE: CPT

## 2022-02-08 NOTE — PROGRESS NOTES
Discussed importance of compliance with ocular medications and follow up exams to prevent loss of vision. Physical Therapy  [address] [facility] ACUTE CARE PHYSICAL THERAPY EVALUATION  Kylie Waller, 1940, 2010/2010-A, 9/25/2020    History  [place]:  The primary encounter diagnosis was Altered mental status, unspecified altered mental status type. Diagnoses of Acute kidney injury ([address] Generalized weakness, Fall, initial encounter, and Hypomagnesemia were also pertinent to this visit. Patient  has a past medical history of Arthritis, Diabetes mellitus ([address] GERD (gastroesophageal reflux disease), Gout, Hyperlipidemia, Hypertension, and MI (myocardial infarction) ([address] Patient  has a past surgical history that includes Total knee arthroplasty (Bilateral); back surgery; Cholecystectomy; Cardiac surgery; Dilatation, esophagus; sinus surgery; joint replacement; Hand surgery; and sinus surgery. Subjective:  Patient states: \"Its depressing sitting in this bed all day\"   Pain:  8/10 lower legs   Communication with other providers:  Handoff to RN, co-eval with Erik MAY   Restrictions: droplet plus (COVID 19), falls, chair alarm     Home Setup/Prior level of function  Social/Functional History  Lives With: Family(Lives with daughter, son in law, and son.  Pt reports he has 5 children and 3 have Russ's Disease.)  Type of Home: House  Home Layout: One level  Home Access: Level entry  Bathroom Shower/Tub: Walk-in shower, Shower chair with back  [employer]: Handicap height  Bathroom Equipment: Grab bars in shower, Grab bars around toilet  Home Equipment: [employer], 4 wheeled walker, [employer]  ADL Assistance: Independent  Homemaking Assistance: Independent  Homemaking Responsibilities: Yes  Ambulation Assistance: Independent([place] wtih 4ww)  Transfer Assistance: Independent  Active : Yes  Mode of Transportation: Car  Leisure & Hobbies: plays cards with his son    Examination of body systems (includes body structures/functions, activity/participation limitations):  · Observation: supine in bed upon arrival. Agreeable and cooperative to work with therapy   · Vision:  WFL, glasses  · Hearing:  Slightly [place]   · Cardiopulmonary:  Stable vitals throughout session. · Cognition: Oriented x 3 but demonstrated intermittent confusion throughout session. Dec insight and safety awareness. See OT/SLP note for further evaluation. Musculoskeletal  · ROM R/L:  [facility] BLEs  · Strength R/L:  Right ankle 4+/5, Left ankle 4-/5, bilat knees and hips grossly 4/5. Minimal strength deficits observed in function and endurance. · Neuro:  Numbness/tingling to bilat feet     Mobility/treatment:   · Rolling L/R:  NT   · Supine to sit: CGA for safety with HOB elevated ~60 deg. Inc effort and use of bed rail   · Transfers:   · Sit to stand: CGA for safety from EOB (impulsive. ... grabbing from walker to stand). Saul from low toilet seat. Multiple cues for sequencing and use of grab bar   · Stand to sit: Saul for eccentric control to toilet and recliner. VCs for hand sequencing to grab bar/arm rests for support  · Step pivot: CGA for safety with RW. VCs for sequencing full pivot turn with AD prior to sitting. Performed 2x   · Sitting balance:  SBA at EOB and toilet, static and light dynamic   · Standing balance:  CGA for safety while performing light dynamic activity with hand hygiene tasks. No UE support at times. Reported fatigue, especially to his back with prolonged standing. Tolerated ~3 minutes   · Gait: ~10ft + 15ft with RW CGA for safety. Slightly fwd flexed posture with slow pace. No major LOB noted. Fatigue limiting further distance at this time   · Educated pt on POC, role of PT, DME use, discharge recommendations. VCs for sequencing, posture, weight shift, UE placement to inc safety and indep with mobility. [facility] 6 Clicks Inpatient Mobility:  AM-PAC Inpatient Mobility Raw Score : 17    Safety: patient left in chair with alarm, call light within reach, RN notified, gait belt used.     Assessment:  Pt is a 79year old male admitted with AMS, progressive weakness, and a fall. Pt is COVID 19 positive at this time. Recommend subacute rehab once medically stable. At baseline he is [place] with gross mobility and ADLs. He is currently performing below his typical baseline and would benefit from continued therapy to address his current deficits, dec potential fall risk ,and restore function. Complexity: Moderate  Prognosis: Good, no significant barriers to participation at this time.  Plan Times per week: 3+/week  Discharge Recommendations: Subacute/Skilled Nursing Facility  Equipment: continue to assess     Goals:  Short term goals  Time Frame for Short term goals: 1 week  Short term goal 1: Pt will perform sit><supine SBA  Short term goal 2: Pt will transition sit><stand SBA from all surfaces  Short term goal 3: Pt will transfer between surfaces with RW SBA  Short term goal 4: Pt will ambulate 75ft with RW SBA  Short term goal 5: Pt will perform light dynamic standing activity, no UE support x 5 minutes SBA       Treatment plan:  Bed mobility, transfers, balance, gait, TA, TX     Recommendations for NURSING mobility: ambulate to the bathroom with RW and gait belt     Time:   Time in: 0935  Time out: 1013  Timed treatment minutes: 23  Total time: 38    Electronically signed by:    Pennie Smith, [id number]  9/25/2020, 12:43 PM

## 2022-11-27 ENCOUNTER — HOSPITAL ENCOUNTER (EMERGENCY)
Age: 82
Discharge: HOME | End: 2022-11-27
Attending: EMERGENCY MEDICINE
Payer: MEDICARE

## 2022-11-27 ENCOUNTER — APPOINTMENT (OUTPATIENT)
Dept: GENERAL RADIOLOGY | Age: 82
End: 2022-11-27
Payer: MEDICARE

## 2022-11-27 VITALS
HEIGHT: 70 IN | OXYGEN SATURATION: 96 % | TEMPERATURE: 98.8 F | HEART RATE: 78 BPM | SYSTOLIC BLOOD PRESSURE: 182 MMHG | WEIGHT: 250 LBS | RESPIRATION RATE: 15 BRPM | DIASTOLIC BLOOD PRESSURE: 57 MMHG | BODY MASS INDEX: 35.79 KG/M2

## 2022-11-27 DIAGNOSIS — R60.9 PERIPHERAL EDEMA: Primary | ICD-10-CM

## 2022-11-27 LAB
ALBUMIN SERPL-MCNC: 3.2 GM/DL (ref 3.4–5)
ALP BLD-CCNC: 80 IU/L (ref 40–129)
ALT SERPL-CCNC: 19 U/L (ref 10–40)
ANION GAP SERPL CALCULATED.3IONS-SCNC: 9 MMOL/L (ref 4–16)
AST SERPL-CCNC: 22 IU/L (ref 15–37)
BILIRUB SERPL-MCNC: 0.5 MG/DL (ref 0–1)
BUN BLDV-MCNC: 43 MG/DL (ref 6–23)
CALCIUM SERPL-MCNC: 8.7 MG/DL (ref 8.3–10.6)
CHLORIDE BLD-SCNC: 104 MMOL/L (ref 99–110)
CO2: 25 MMOL/L (ref 21–32)
CREAT SERPL-MCNC: 1.5 MG/DL (ref 0.9–1.3)
EKG ATRIAL RATE: 80 BPM
EKG DIAGNOSIS: NORMAL
EKG P AXIS: 49 DEGREES
EKG P-R INTERVAL: 200 MS
EKG Q-T INTERVAL: 390 MS
EKG QRS DURATION: 100 MS
EKG QTC CALCULATION (BAZETT): 449 MS
EKG R AXIS: 14 DEGREES
EKG T AXIS: 66 DEGREES
EKG VENTRICULAR RATE: 80 BPM
GFR SERPL CREATININE-BSD FRML MDRD: 46 ML/MIN/1.73M2
GLUCOSE BLD-MCNC: 219 MG/DL (ref 70–99)
HCT VFR BLD CALC: 45.1 % (ref 42–52)
HEMOGLOBIN: 14.3 GM/DL (ref 13.5–18)
LIPASE: 18 IU/L (ref 13–60)
MAGNESIUM: 1.9 MG/DL (ref 1.8–2.4)
MCH RBC QN AUTO: 29.7 PG (ref 27–31)
MCHC RBC AUTO-ENTMCNC: 31.7 % (ref 32–36)
MCV RBC AUTO: 93.6 FL (ref 78–100)
PDW BLD-RTO: 14.2 % (ref 11.7–14.9)
PLATELET # BLD: 113 K/CU MM (ref 140–440)
PMV BLD AUTO: 10.9 FL (ref 7.5–11.1)
POTASSIUM SERPL-SCNC: 5.2 MMOL/L (ref 3.5–5.1)
PRO-BNP: 1371 PG/ML
RBC # BLD: 4.82 M/CU MM (ref 4.6–6.2)
SODIUM BLD-SCNC: 138 MMOL/L (ref 135–145)
TOTAL PROTEIN: 6.1 GM/DL (ref 6.4–8.2)
TROPONIN T: 0.05 NG/ML
WBC # BLD: 3.9 K/CU MM (ref 4–10.5)

## 2022-11-27 PROCEDURE — 83880 ASSAY OF NATRIURETIC PEPTIDE: CPT

## 2022-11-27 PROCEDURE — 99285 EMERGENCY DEPT VISIT HI MDM: CPT

## 2022-11-27 PROCEDURE — 80053 COMPREHEN METABOLIC PANEL: CPT

## 2022-11-27 PROCEDURE — 93005 ELECTROCARDIOGRAM TRACING: CPT | Performed by: EMERGENCY MEDICINE

## 2022-11-27 PROCEDURE — 84484 ASSAY OF TROPONIN QUANT: CPT

## 2022-11-27 PROCEDURE — 83690 ASSAY OF LIPASE: CPT

## 2022-11-27 PROCEDURE — 83735 ASSAY OF MAGNESIUM: CPT

## 2022-11-27 PROCEDURE — 85027 COMPLETE CBC AUTOMATED: CPT

## 2022-11-27 PROCEDURE — 71045 X-RAY EXAM CHEST 1 VIEW: CPT

## 2022-11-27 PROCEDURE — 93010 ELECTROCARDIOGRAM REPORT: CPT | Performed by: INTERNAL MEDICINE

## 2022-11-27 ASSESSMENT — PAIN DESCRIPTION - ORIENTATION: ORIENTATION: LEFT;RIGHT

## 2022-11-27 ASSESSMENT — PAIN - FUNCTIONAL ASSESSMENT: PAIN_FUNCTIONAL_ASSESSMENT: 0-10

## 2022-11-27 ASSESSMENT — PAIN DESCRIPTION - LOCATION: LOCATION: LEG

## 2022-11-27 ASSESSMENT — PAIN DESCRIPTION - DESCRIPTORS: DESCRIPTORS: CRAMPING

## 2022-11-27 NOTE — ED PROVIDER NOTES
EMERGENCY DEPARTMENT ENCOUNTER      CHIEF COMPLAINT:   Leg swelling    HPI: Lara Wilson is a 81 y.o. male who presents to the Emergency Department, via EMS, for evaluation of increasing bilateral lower extremity swelling. The patient states that his legs have been more swollen than usual.  They have been painful at times. He states that they feel tight and crampy. He denies any associated shortness of breath, but was hypoxic per EMS and was placed on supplemental oxygen. The patient states that the leg swelling is making it difficult for him to walk. He is prescribed Lasix but has not been taking it because it causes him to urinate frequently. The patient denies a history of DVT or PE. The patient denies fevers, chills, chest pain, shortness of breath, abdominal pain, numbness, tingling, weakness, or any other complaints. REVIEW OF SYSTEMS:  CONSTITUTIONAL:  Denies fever, chills, weight loss or weakness  EYES:  Denies photophobia or discharge  ENT:  Denies sore throat or ear pain  CARDIOVASCULAR:  Denies chest pain, palpitations or swelling  RESPIRATORY:  Denies cough or shortness of breath  GI: Denies abdominal pain, nausea, vomiting, or diarrhea  MUSCULOSKELETAL: See HPI  SKIN: See HPI  NEUROLOGIC:  Denies headache, focal weakness or sensory changes  All systems negative except as marked. \"Remaining review of systems reviewed and negative. I have reviewed the nursing triage documentation and agree unless otherwise noted below. \"    PAST MEDICAL HISTORY:   Past Medical History:   Diagnosis Date    Arthritis     Diabetes mellitus ([facility] [address]     GERD (gastroesophageal reflux disease)     Gout     left ankle and right shoulder    Hyperlipidemia     Hypertension     MI (myocardial infarction) [facility])     May 2013       CURRENT MEDICATIONS:   Home medications reviewed.     SURGICAL HISTORY:   Past Surgical History:   Procedure Laterality Date    BACK SURGERY      CARDIAC SURGERY      stents x 1    CHOLECYSTECTOMY May 2013    DILATATION, ESOPHAGUS      HAND SURGERY      JOINT REPLACEMENT      SINUS SURGERY      sinus surgery x 2    SINUS SURGERY      TOTAL KNEE ARTHROPLASTY Bilateral        FAMILY HISTORY:   History reviewed. No pertinent family history. SOCIAL HISTORY:   Social History     Socioeconomic History    Marital status:      Spouse name: Not on file    Number of children: Not on file    Years of education: Not on file    Highest education level: Not on file   Occupational History    Not on file   Tobacco Use    Smoking status: Never    Smokeless tobacco: Never   Vaping Use    Vaping Use: Never used   Substance and Sexual Activity    Alcohol use: No    Drug use: No    Sexual activity: Not on file   Other Topics Concern    Not on file   Social History Narrative    Not on file     Social Determinants of Health     Financial Resource Strain: Not on file   Food Insecurity: Not on file   Transportation Needs: Not on file   Physical Activity: Not on file   Stress: Not on file   Social Connections: Not on file   Intimate Partner Violence: Not on file   Housing Stability: Not on file       ALLERGIES: Sulfa antibiotics    PHYSICAL EXAM:  VITAL SIGNS:   ED Triage Vitals   Enc Vitals Group      BP 11/27/22 1059 (!) 182/57      Heart Rate 11/27/22 1022 78      Resp 11/27/22 1022 15      Temp 11/27/22 1026 98.8 °F (37.1 °C)      Temp Source 11/27/22 1026 Oral      SpO2 11/27/22 1022 96 %      Weight 11/27/22 1022 250 lb (113.4 kg)      Height 11/27/22 1022 5' 10\" (1.778 m)      Head Circumference --       Peak Flow --       Pain Score --       Pain Loc --       Pain Edu? --       Excl.  in [address]? --      Constitutional:  Non-toxic appearance  HENT: Normocephalic, Atraumatic  Eyes: PERRL, conjunctiva normal   Neck: Normal range of motion, No tenderness, Supple, No stridor, No lymphadenopathy  Cardiovascular:  Normal heart rate, Normal rhythm  Pulmonary/Chest:  Normal breath sounds, No respiratory distress, No wheezing  Abdomen: Bowel sounds normal, Soft, No tenderness, No masses, No pulsatile masses  Extremities: There is pitting edema noted to bilateral lower extremities from the feet up to the knees, the peripheral pulses are strong, Capillary refill is brisk, there is normal motor and sensory function, the distal extremities are pink, warm, and well perfused, there is no cyanosis  Skin:  Warm, Dry, No erythema, No rash      EKG: Interpreted by me  Compared to 8/17/2020  Rhythm: normal sinus   Rate: normal 80  Axis: normal  Ectopy: none  Conduction: normal  ST Segments: no acute change  T Waves: no acute change  Clinical Impression: normal sinus rhythm    Radiology / Procedures:  XR CHEST PORTABLE (Final result)  Result time 11/27/22 12:17:35  Final result by Geroge Kanner, MD (11/27/22 12:17:35)                Impression:    No acute abnormality. Narrative:    EXAMINATION:   ONE XRAY VIEW OF THE CHEST     11/27/2022 11:35 am     COMPARISON:   Chest radiograph September 2020. HISTORY:   ORDERING SYSTEM PROVIDED HISTORY: Chest Pain   TECHNOLOGIST PROVIDED HISTORY:   Reason for exam:->Chest Pain   Reason for Exam: cp     FINDINGS:   Single view provided. Mild rotation to the left. The mediastinal, cardiac,   and hilar silhouettes are stable with evidence of hilar remote healed   granulomatous disease. Aortic arch atherosclerosis. Normal lung volumes   with no diffuse interstitial edema or acute consolidation. No effusion or   pneumothorax. No pulmonary mass. No free subdiaphragmatic air.                Labs Reviewed   CBC - Abnormal; Notable for the following components:       Result Value    WBC 3.9 (*)     MCHC 31.7 (*)     Platelets 485 (*)     All other components within normal limits   COMPREHENSIVE METABOLIC PANEL - Abnormal; Notable for the following components:    Potassium 5.2 (*)     BUN 43 (*)     Creatinine 1.5 (*)     Est, Glom Filt Rate 46 (*)     Glucose 219 (*)     Albumin 3.2 (*)     Total Protein 6.1 (*)     All other components within normal limits   TROPONIN - Abnormal; Notable for the following components:    Troponin T 0.048 (*)     All other components within normal limits   BRAIN NATRIURETIC PEPTIDE - Abnormal; Notable for the following components:    Pro-BNP 1,371 (*)     All other components within normal limits   MAGNESIUM   LIPASE       ED COURSE & MEDICAL DECISION MAKING:  Pertinent Labs & Imaging studies reviewed. (See chart for details)  On exam, the patient is afebrile and nontoxic appearing. He is hypertensive with a known history of hypertension and is asymptomatic. He is otherwise hemodynamically stable and neurologically intact. EKG shows a normal sinus rhythm with no ST elevation or depression. Labs are obtained and are significant for chronic kidney disease, very slight hyperkalemia, and elevated glucose with a known history of diabetes and a chronically elevated troponin. There are no other clinically significant lab abnormalities. Chest x-ray shows no acute abnormality. I suspect that the patient has acute on chronic peripheral edema secondary to not taking Lasix. I have a low suspicion for DVT, acute fracture, dislocation, compartment syndrome, necrotizing fasciitis, or neurovascular injury. I discussed admission to the hospital versus outpatient management with the patient and he really wanted to go home. I discussed with him that he needs to take the Lasix. He was placed on oxygen per EMS when this is removed, the patient is not hypoxic. He was having trouble ambulating at home, but is able to ambulate here without difficulty. Oxygen saturation remains normal with exertion. I feel that the patient is stable for outpatient management with follow up in 2-3 days. Return precautions are given. The patient verbalized understanding, was agreeable with plan, and the patient was discharged home in stable condition. Clinical Impression:  1.  Peripheral edema        Disposition referral (if applicable):  MD Jeremi [address]  [facility] [address]   [phone]    Schedule an appointment as soon as possible for a visit       [facility] Emergency Department  [phone]  Go to   If symptoms worsen    Disposition medications (if applicable):  Discharge Medication List as of 11/27/2022  2:19 PM            Comment: Please note this report has been produced using speech recognition software and may contain errors related to that system including errors in grammar, punctuation, and spelling, as well as words and phrases that may be inappropriate. If there are any questions or concerns please feel free to contact the dictating provider for clarification.         Yuniel Walker MD  12/02/22 [id number]

## 2022-11-27 NOTE — ED NOTES
Patient discharged to home at this time. Discharge instructions and follow up care discussed, patient voices understanding.       Ashlyn Douglas RN  11/27/22 0493

## 2023-03-29 ENCOUNTER — APPOINTMENT (OUTPATIENT)
Dept: GENERAL RADIOLOGY | Age: 83
End: 2023-03-29
Payer: MEDICARE

## 2023-03-29 ENCOUNTER — HOSPITAL ENCOUNTER (INPATIENT)
Age: 83
LOS: 3 days | Discharge: HOME | End: 2023-04-01
Attending: STUDENT IN AN ORGANIZED HEALTH CARE EDUCATION/TRAINING PROGRAM | Admitting: STUDENT IN AN ORGANIZED HEALTH CARE EDUCATION/TRAINING PROGRAM
Payer: MEDICARE

## 2023-03-29 ENCOUNTER — APPOINTMENT (OUTPATIENT)
Dept: CT IMAGING | Age: 83
End: 2023-03-29
Payer: MEDICARE

## 2023-03-29 DIAGNOSIS — R09.02 HYPOXIA: ICD-10-CM

## 2023-03-29 DIAGNOSIS — R60.9 PERIPHERAL EDEMA: ICD-10-CM

## 2023-03-29 DIAGNOSIS — J81.0 ACUTE PULMONARY EDEMA (HCC): Primary | ICD-10-CM

## 2023-03-29 DIAGNOSIS — J90 PLEURAL EFFUSION, BILATERAL: ICD-10-CM

## 2023-03-29 DIAGNOSIS — N18.30 STAGE 3 CHRONIC KIDNEY DISEASE, UNSPECIFIED WHETHER STAGE 3A OR 3B CKD (HCC): ICD-10-CM

## 2023-03-29 PROBLEM — J96.01 ACUTE HYPOXEMIC RESPIRATORY FAILURE (HCC): Status: ACTIVE | Noted: 2023-03-29

## 2023-03-29 LAB
ALBUMIN SERPL-MCNC: 4 GM/DL (ref 3.4–5)
ALP BLD-CCNC: 112 IU/L (ref 40–129)
ALT SERPL-CCNC: 24 U/L (ref 10–40)
ANION GAP SERPL CALCULATED.3IONS-SCNC: 10 MMOL/L (ref 4–16)
AST SERPL-CCNC: 25 IU/L (ref 15–37)
BASE EXCESS: 4 (ref 0–3.3)
BASOPHILS ABSOLUTE: 0 K/CU MM
BASOPHILS RELATIVE PERCENT: 0.8 % (ref 0–1)
BILIRUB SERPL-MCNC: 0.5 MG/DL (ref 0–1)
BUN SERPL-MCNC: 44 MG/DL (ref 6–23)
CALCIUM SERPL-MCNC: 8.6 MG/DL (ref 8.3–10.6)
CHLORIDE BLD-SCNC: 105 MMOL/L (ref 99–110)
CO2: 23 MMOL/L (ref 21–32)
COMMENT: ABNORMAL
CREAT SERPL-MCNC: 1.7 MG/DL (ref 0.9–1.3)
DIFFERENTIAL TYPE: ABNORMAL
EOSINOPHILS ABSOLUTE: 0.1 K/CU MM
EOSINOPHILS RELATIVE PERCENT: 3.5 % (ref 0–3)
GFR SERPL CREATININE-BSD FRML MDRD: 40 ML/MIN/1.73M2
GLUCOSE SERPL-MCNC: 176 MG/DL (ref 70–99)
HCO3 VENOUS: 24.8 MMOL/L (ref 19–25)
HCT VFR BLD CALC: 45.8 % (ref 42–52)
HEMOGLOBIN: 14.4 GM/DL (ref 13.5–18)
IMMATURE NEUTROPHIL %: 2.3 % (ref 0–0.43)
LACTATE: 0.9 MMOL/L (ref 0.5–1.9)
LYMPHOCYTES ABSOLUTE: 1 K/CU MM
LYMPHOCYTES RELATIVE PERCENT: 25.4 % (ref 24–44)
MCH RBC QN AUTO: 30.6 PG (ref 27–31)
MCHC RBC AUTO-ENTMCNC: 31.4 % (ref 32–36)
MCV RBC AUTO: 97.4 FL (ref 78–100)
MONOCYTES ABSOLUTE: 0.4 K/CU MM
MONOCYTES RELATIVE PERCENT: 10.1 % (ref 0–4)
NUCLEATED RBC %: 0 %
O2 SAT, VEN: 91.8 % (ref 50–70)
PCO2, VEN: 62 MMHG (ref 38–52)
PDW BLD-RTO: 14.8 % (ref 11.7–14.9)
PH VENOUS: 7.21 (ref 7.32–7.42)
PLATELET # BLD: 155 K/CU MM (ref 140–440)
PMV BLD AUTO: 10.5 FL (ref 7.5–11.1)
PO2, VEN: 83 MMHG (ref 28–48)
POTASSIUM SERPL-SCNC: 4.9 MMOL/L (ref 3.5–5.1)
PRO-BNP: 1792 PG/ML
RAPID INFLUENZA  B AGN: NEGATIVE
RAPID INFLUENZA A AGN: NEGATIVE
RBC # BLD: 4.7 M/CU MM (ref 4.6–6.2)
SARS-COV-2 RDRP RESP QL NAA+PROBE: NOT DETECTED
SEGMENTED NEUTROPHILS ABSOLUTE COUNT: 2.3 K/CU MM
SEGMENTED NEUTROPHILS RELATIVE PERCENT: 57.9 % (ref 36–66)
SODIUM BLD-SCNC: 138 MMOL/L (ref 135–145)
SOURCE: NORMAL
TOTAL IMMATURE NEUTOROPHIL: 0.09 K/CU MM
TOTAL NUCLEATED RBC: 0 K/CU MM
TOTAL PROTEIN: 7.1 GM/DL (ref 6.4–8.2)
TROPONIN T: 0.03 NG/ML
WBC # BLD: 4 K/CU MM (ref 4–10.5)

## 2023-03-29 PROCEDURE — 71045 X-RAY EXAM CHEST 1 VIEW: CPT

## 2023-03-29 PROCEDURE — 2060000000 HC ICU INTERMEDIATE R&B

## 2023-03-29 PROCEDURE — 87804 INFLUENZA ASSAY W/OPTIC: CPT

## 2023-03-29 PROCEDURE — 83605 ASSAY OF LACTIC ACID: CPT

## 2023-03-29 PROCEDURE — 6360000002 HC RX W HCPCS: Performed by: PHYSICIAN ASSISTANT

## 2023-03-29 PROCEDURE — 87635 SARS-COV-2 COVID-19 AMP PRB: CPT

## 2023-03-29 PROCEDURE — 84484 ASSAY OF TROPONIN QUANT: CPT

## 2023-03-29 PROCEDURE — 80053 COMPREHEN METABOLIC PANEL: CPT

## 2023-03-29 PROCEDURE — 99285 EMERGENCY DEPT VISIT HI MDM: CPT

## 2023-03-29 PROCEDURE — 71275 CT ANGIOGRAPHY CHEST: CPT

## 2023-03-29 PROCEDURE — 6360000004 HC RX CONTRAST MEDICATION: Performed by: PHYSICIAN ASSISTANT

## 2023-03-29 PROCEDURE — 85025 COMPLETE CBC W/AUTO DIFF WBC: CPT

## 2023-03-29 PROCEDURE — 83880 ASSAY OF NATRIURETIC PEPTIDE: CPT

## 2023-03-29 PROCEDURE — 96374 THER/PROPH/DIAG INJ IV PUSH: CPT

## 2023-03-29 PROCEDURE — 82805 BLOOD GASES W/O2 SATURATION: CPT

## 2023-03-29 PROCEDURE — 93005 ELECTROCARDIOGRAM TRACING: CPT | Performed by: STUDENT IN AN ORGANIZED HEALTH CARE EDUCATION/TRAINING PROGRAM

## 2023-03-29 RX ORDER — FUROSEMIDE 10 MG/ML
40 INJECTION INTRAMUSCULAR; INTRAVENOUS ONCE
Status: COMPLETED | OUTPATIENT
Start: 2023-03-29 | End: 2023-03-29

## 2023-03-29 RX ORDER — SODIUM CHLORIDE 0.9 % (FLUSH) 0.9 %
5-40 SYRINGE (ML) INJECTION 2 TIMES DAILY
Status: DISCONTINUED | OUTPATIENT
Start: 2023-03-29 | End: 2023-03-30

## 2023-03-29 RX ADMIN — FUROSEMIDE 40 MG: 10 INJECTION, SOLUTION INTRAVENOUS at 19:33

## 2023-03-29 RX ADMIN — IOPAMIDOL 75 ML: 755 INJECTION, SOLUTION INTRAVENOUS at 21:10

## 2023-03-29 NOTE — ED PROVIDER NOTES
Emergency Department Encounter  [facility] 4N    Patient: Skip Wells  MRN: [id number]  : 1940  Date of Evaluation: 3/29/2023  ED Provider: Keny Murdock PA-C    Chief Complaint       Chief Complaint   Patient presents with    Shortness of Breath     For about a week       Margaret Vargas is a 81 y.o. male who presents to the emergency department for feeling generally unwell and short of breath. Onset was a week ago. Constant, worsening. Arrived hypoxic to the ED--he does not use oxygen at baseline. He denies any chest pain, cough, congestion. Denies any fever or chills. He has had increased LE edema. Denies n/v/d. No known sick contacts. ROS     CONSTITUTIONAL:  Denies fever. EYES:  Denies visual changes. HEAD:  Denies headache. ENT:  Denies earache, nasal congestion, sore throat. NECK:  Denies neck pain. RESPIRATORY:  + shortness of breath. CARDIOVASCULAR:  Denies chest pain. GI:  Denies nausea or vomiting. :  Denies urinary symptoms. MUSCULOSKELETAL:  + LE edema. BACK:  Denies back pain. INTEGUMENT:  Denies skin changes. LYMPHATIC:  Denies lymphadenopathy. NEUROLOGIC:  Denies any numbness/tingling.     Past History     Past Medical History:   Diagnosis Date    Arthritis     Diabetes mellitus (HCC)     GERD (gastroesophageal reflux disease)     Gout     left ankle and right shoulder    Hyperlipidemia     Hypertension     MI (myocardial infarction) [facility])     May 2013     Past Surgical History:   Procedure Laterality Date    BACK SURGERY      CARDIAC SURGERY      stents x 1    CHOLECYSTECTOMY      May 2013    DILATATION, ESOPHAGUS      HAND SURGERY      JOINT REPLACEMENT      SINUS SURGERY      sinus surgery x 2    SINUS SURGERY      TOTAL KNEE ARTHROPLASTY Bilateral      Social History     Socioeconomic History    Marital status:    Tobacco Use    Smoking status: Never    Smokeless tobacco: Never   Vaping Use    Vaping Use: Never used   Substance and Sexual Activity COMPARISON: CTA pulmonary arteries August 8, 2020; chest radiograph March 29, 2023 HISTORY: ORDERING SYSTEM PROVIDED HISTORY: hypoxia, sob, further eval from cxr TECHNOLOGIST PROVIDED HISTORY: Reason for exam:->hypoxia, sob, further eval from cxr Decision Support Exception - unselect if not a suspected or confirmed emergency medical condition->Emergency Medical Condition (MA) FINDINGS: Pulmonary Arteries: No pulmonary embolism identified within the right or left main pulmonary artery are segmental branches. Evaluation of the subsegmental branches limited due to bolus timing and respiratory motion artifact. No large embolus distally within limits of the exam.  Main pulmonary artery is normal in caliber. Mediastinum: Multiple shotty mediastinal nodes present which are non pathologically enlarged may be reactive. Calcified right hilar nodes suggesting sequela of remote granulomatous disease. The heart and pericardium demonstrate no acute abnormality. The heart is enlarged. No pericardial effusion. There is no acute abnormality of the thoracic aorta. Lungs/pleura: Findings suggesting pulmonary edema with moderate-sized bilateral pleural effusions and associated atelectasis. No pneumothorax. Upper Abdomen: Limited images of the upper abdomen demonstrate a nodular contour of the liver suggesting cirrhosis. The gallbladder is surgically absent. Soft Tissues/Bones: No acute bone or soft tissue abnormality. 1. No pulmonary embolism identified within the right or left main pulmonary artery are segmental branches. Evaluation of the subsegmental branches limited due to bolus timing and respiratory motion artifact. No large embolus distally within limits of the exam. 2. Pulmonary edema with moderate bilateral effusions and atelectasis. 3. Cirrhotic morphology of the liver. Procedures/EKG:   Please see attending physician's note for interpretation.     ED Course and MDM     Chief Complaint/HPI

## 2023-03-29 NOTE — ED PROVIDER NOTES
EKG shows sinus rhythm at 60. Left axis with poor R wave pression. No ST elevation or depression. T waves do appear somewhat hyperacute but appearance is overall stable from prior EKG.      Patience Zuñiga DO  03/29/23 1940

## 2023-03-30 LAB
ANION GAP SERPL CALCULATED.3IONS-SCNC: 7 MMOL/L (ref 4–16)
BUN SERPL-MCNC: 39 MG/DL (ref 6–23)
CALCIUM SERPL-MCNC: 8.9 MG/DL (ref 8.3–10.6)
CHLORIDE BLD-SCNC: 107 MMOL/L (ref 99–110)
CO2: 27 MMOL/L (ref 21–32)
CREAT SERPL-MCNC: 1.6 MG/DL (ref 0.9–1.3)
EKG ATRIAL RATE: 60 BPM
EKG DIAGNOSIS: NORMAL
EKG P AXIS: 11 DEGREES
EKG P-R INTERVAL: 186 MS
EKG Q-T INTERVAL: 436 MS
EKG QRS DURATION: 110 MS
EKG QTC CALCULATION (BAZETT): 436 MS
EKG R AXIS: -23 DEGREES
EKG T AXIS: 59 DEGREES
EKG VENTRICULAR RATE: 60 BPM
ESTIMATED AVERAGE GLUCOSE: 189 MG/DL
GFR SERPL CREATININE-BSD FRML MDRD: 43 ML/MIN/1.73M2
GLUCOSE BLD-MCNC: 124 MG/DL (ref 70–99)
GLUCOSE BLD-MCNC: 183 MG/DL (ref 70–99)
GLUCOSE BLD-MCNC: 239 MG/DL (ref 70–99)
GLUCOSE BLD-MCNC: 266 MG/DL (ref 70–99)
GLUCOSE BLD-MCNC: 83 MG/DL (ref 70–99)
GLUCOSE SERPL-MCNC: 144 MG/DL (ref 70–99)
HAV IGM SERPL QL IA: NON REACTIVE
HBA1C MFR BLD: 8.2 % (ref 4.2–6.3)
HBV CORE IGM SERPL QL IA: NON REACTIVE
HBV SURFACE AG SERPL QL IA: NON REACTIVE
HCV AB SERPL QL IA: NON REACTIVE
LV EF: 58 %
LVEF MODALITY: NORMAL
MAGNESIUM: 2.3 MG/DL (ref 1.8–2.4)
POTASSIUM SERPL-SCNC: 4.5 MMOL/L (ref 3.5–5.1)
SODIUM BLD-SCNC: 141 MMOL/L (ref 135–145)
T4 FREE SERPL-MCNC: 0.88 NG/DL (ref 0.9–1.8)
TROPONIN T: 0.02 NG/ML
TSH SERPL DL<=0.005 MIU/L-ACNC: 2.86 UIU/ML (ref 0.27–4.2)

## 2023-03-30 PROCEDURE — 2700000000 HC OXYGEN THERAPY PER DAY

## 2023-03-30 PROCEDURE — 84484 ASSAY OF TROPONIN QUANT: CPT

## 2023-03-30 PROCEDURE — 36415 COLL VENOUS BLD VENIPUNCTURE: CPT

## 2023-03-30 PROCEDURE — 2060000000 HC ICU INTERMEDIATE R&B

## 2023-03-30 PROCEDURE — 80074 ACUTE HEPATITIS PANEL: CPT

## 2023-03-30 PROCEDURE — 82962 GLUCOSE BLOOD TEST: CPT

## 2023-03-30 PROCEDURE — 93010 ELECTROCARDIOGRAM REPORT: CPT | Performed by: INTERNAL MEDICINE

## 2023-03-30 PROCEDURE — 2580000003 HC RX 258: Performed by: STUDENT IN AN ORGANIZED HEALTH CARE EDUCATION/TRAINING PROGRAM

## 2023-03-30 PROCEDURE — 6360000002 HC RX W HCPCS: Performed by: STUDENT IN AN ORGANIZED HEALTH CARE EDUCATION/TRAINING PROGRAM

## 2023-03-30 PROCEDURE — 84439 ASSAY OF FREE THYROXINE: CPT

## 2023-03-30 PROCEDURE — 80048 BASIC METABOLIC PNL TOTAL CA: CPT

## 2023-03-30 PROCEDURE — 6370000000 HC RX 637 (ALT 250 FOR IP): Performed by: STUDENT IN AN ORGANIZED HEALTH CARE EDUCATION/TRAINING PROGRAM

## 2023-03-30 PROCEDURE — 84443 ASSAY THYROID STIM HORMONE: CPT

## 2023-03-30 PROCEDURE — 83735 ASSAY OF MAGNESIUM: CPT

## 2023-03-30 PROCEDURE — 94761 N-INVAS EAR/PLS OXIMETRY MLT: CPT

## 2023-03-30 PROCEDURE — 2580000003 HC RX 258: Performed by: PHYSICIAN ASSISTANT

## 2023-03-30 PROCEDURE — 82805 BLOOD GASES W/O2 SATURATION: CPT

## 2023-03-30 PROCEDURE — 93306 TTE W/DOPPLER COMPLETE: CPT

## 2023-03-30 PROCEDURE — 83036 HEMOGLOBIN GLYCOSYLATED A1C: CPT

## 2023-03-30 RX ORDER — INSULIN LISPRO 100 [IU]/ML
0-4 INJECTION, SOLUTION INTRAVENOUS; SUBCUTANEOUS
Status: DISCONTINUED | OUTPATIENT
Start: 2023-03-30 | End: 2023-04-01 | Stop reason: HOSPADM

## 2023-03-30 RX ORDER — METOPROLOL TARTRATE 50 MG/1
50 TABLET, FILM COATED ORAL 2 TIMES DAILY
Status: DISCONTINUED | OUTPATIENT
Start: 2023-03-31 | End: 2023-03-30

## 2023-03-30 RX ORDER — CITALOPRAM 20 MG/1
20 TABLET ORAL DAILY
Status: DISCONTINUED | OUTPATIENT
Start: 2023-03-30 | End: 2023-04-01 | Stop reason: HOSPADM

## 2023-03-30 RX ORDER — SODIUM CHLORIDE 0.9 % (FLUSH) 0.9 %
5-40 SYRINGE (ML) INJECTION EVERY 12 HOURS SCHEDULED
Status: DISCONTINUED | OUTPATIENT
Start: 2023-03-30 | End: 2023-04-01 | Stop reason: HOSPADM

## 2023-03-30 RX ORDER — ONDANSETRON 2 MG/ML
4 INJECTION INTRAMUSCULAR; INTRAVENOUS EVERY 6 HOURS PRN
Status: DISCONTINUED | OUTPATIENT
Start: 2023-03-30 | End: 2023-04-01 | Stop reason: HOSPADM

## 2023-03-30 RX ORDER — FENOFIBRATE 160 MG/1
160 TABLET ORAL DAILY
Status: DISCONTINUED | OUTPATIENT
Start: 2023-03-30 | End: 2023-04-01 | Stop reason: HOSPADM

## 2023-03-30 RX ORDER — ENOXAPARIN SODIUM 100 MG/ML
30 INJECTION SUBCUTANEOUS 2 TIMES DAILY
Status: DISCONTINUED | OUTPATIENT
Start: 2023-03-30 | End: 2023-04-01 | Stop reason: HOSPADM

## 2023-03-30 RX ORDER — ACETAMINOPHEN 325 MG/1
650 TABLET ORAL EVERY 6 HOURS PRN
Status: DISCONTINUED | OUTPATIENT
Start: 2023-03-30 | End: 2023-04-01 | Stop reason: HOSPADM

## 2023-03-30 RX ORDER — CLOPIDOGREL BISULFATE 75 MG/1
75 TABLET ORAL DAILY
Status: DISCONTINUED | OUTPATIENT
Start: 2023-03-30 | End: 2023-04-01 | Stop reason: HOSPADM

## 2023-03-30 RX ORDER — OXYCODONE AND ACETAMINOPHEN 7.5; 325 MG/1; MG/1
1 TABLET ORAL EVERY 8 HOURS PRN
Status: DISCONTINUED | OUTPATIENT
Start: 2023-03-30 | End: 2023-04-01 | Stop reason: HOSPADM

## 2023-03-30 RX ORDER — METOPROLOL TARTRATE 50 MG/1
50 TABLET, FILM COATED ORAL 2 TIMES DAILY
Status: DISCONTINUED | OUTPATIENT
Start: 2023-03-30 | End: 2023-04-01 | Stop reason: HOSPADM

## 2023-03-30 RX ORDER — ATORVASTATIN CALCIUM 10 MG/1
20 TABLET, FILM COATED ORAL DAILY
Status: DISCONTINUED | OUTPATIENT
Start: 2023-03-30 | End: 2023-04-01 | Stop reason: HOSPADM

## 2023-03-30 RX ORDER — SODIUM CHLORIDE 0.9 % (FLUSH) 0.9 %
5-40 SYRINGE (ML) INJECTION PRN
Status: DISCONTINUED | OUTPATIENT
Start: 2023-03-30 | End: 2023-04-01 | Stop reason: HOSPADM

## 2023-03-30 RX ORDER — INSULIN GLARGINE 100 [IU]/ML
15 INJECTION, SOLUTION SUBCUTANEOUS NIGHTLY
Status: DISCONTINUED | OUTPATIENT
Start: 2023-03-30 | End: 2023-04-01 | Stop reason: HOSPADM

## 2023-03-30 RX ORDER — BUSPIRONE HYDROCHLORIDE 5 MG/1
10 TABLET ORAL 3 TIMES DAILY
Status: DISCONTINUED | OUTPATIENT
Start: 2023-03-30 | End: 2023-04-01 | Stop reason: HOSPADM

## 2023-03-30 RX ORDER — AMLODIPINE BESYLATE 5 MG/1
5 TABLET ORAL 2 TIMES DAILY
Status: DISCONTINUED | OUTPATIENT
Start: 2023-03-31 | End: 2023-03-30

## 2023-03-30 RX ORDER — PANTOPRAZOLE SODIUM 40 MG/1
40 TABLET, DELAYED RELEASE ORAL
Status: DISCONTINUED | OUTPATIENT
Start: 2023-03-30 | End: 2023-04-01 | Stop reason: HOSPADM

## 2023-03-30 RX ORDER — DEXTROSE MONOHYDRATE 100 MG/ML
INJECTION, SOLUTION INTRAVENOUS CONTINUOUS PRN
Status: DISCONTINUED | OUTPATIENT
Start: 2023-03-30 | End: 2023-04-01 | Stop reason: HOSPADM

## 2023-03-30 RX ORDER — SODIUM CHLORIDE 9 MG/ML
INJECTION, SOLUTION INTRAVENOUS PRN
Status: DISCONTINUED | OUTPATIENT
Start: 2023-03-30 | End: 2023-04-01 | Stop reason: HOSPADM

## 2023-03-30 RX ORDER — AMLODIPINE BESYLATE 5 MG/1
5 TABLET ORAL 2 TIMES DAILY
Status: DISCONTINUED | OUTPATIENT
Start: 2023-03-30 | End: 2023-04-01 | Stop reason: HOSPADM

## 2023-03-30 RX ORDER — GLUCAGON 1 MG/ML
1 KIT INJECTION PRN
Status: DISCONTINUED | OUTPATIENT
Start: 2023-03-30 | End: 2023-04-01 | Stop reason: HOSPADM

## 2023-03-30 RX ORDER — ACETAMINOPHEN 650 MG/1
650 SUPPOSITORY RECTAL EVERY 6 HOURS PRN
Status: DISCONTINUED | OUTPATIENT
Start: 2023-03-30 | End: 2023-04-01 | Stop reason: HOSPADM

## 2023-03-30 RX ORDER — INSULIN LISPRO 100 [IU]/ML
0-4 INJECTION, SOLUTION INTRAVENOUS; SUBCUTANEOUS EVERY EVENING
Status: DISCONTINUED | OUTPATIENT
Start: 2023-03-30 | End: 2023-04-01 | Stop reason: HOSPADM

## 2023-03-30 RX ORDER — ONDANSETRON 4 MG/1
4 TABLET, ORALLY DISINTEGRATING ORAL EVERY 8 HOURS PRN
Status: DISCONTINUED | OUTPATIENT
Start: 2023-03-30 | End: 2023-04-01 | Stop reason: HOSPADM

## 2023-03-30 RX ORDER — FUROSEMIDE 10 MG/ML
40 INJECTION INTRAMUSCULAR; INTRAVENOUS 2 TIMES DAILY
Status: DISCONTINUED | OUTPATIENT
Start: 2023-03-30 | End: 2023-04-01 | Stop reason: HOSPADM

## 2023-03-30 RX ORDER — ASPIRIN 81 MG/1
81 TABLET, CHEWABLE ORAL DAILY
Status: DISCONTINUED | OUTPATIENT
Start: 2023-03-30 | End: 2023-04-01 | Stop reason: HOSPADM

## 2023-03-30 RX ORDER — TRAMADOL HYDROCHLORIDE 50 MG/1
50 TABLET ORAL ONCE
Status: DISCONTINUED | OUTPATIENT
Start: 2023-03-30 | End: 2023-04-01 | Stop reason: HOSPADM

## 2023-03-30 RX ORDER — POLYETHYLENE GLYCOL 3350 17 G/17G
17 POWDER, FOR SOLUTION ORAL DAILY PRN
Status: DISCONTINUED | OUTPATIENT
Start: 2023-03-30 | End: 2023-04-01 | Stop reason: HOSPADM

## 2023-03-30 RX ADMIN — AMLODIPINE BESYLATE 5 MG: 5 TABLET ORAL at 20:06

## 2023-03-30 RX ADMIN — ENOXAPARIN SODIUM 30 MG: 100 INJECTION SUBCUTANEOUS at 09:23

## 2023-03-30 RX ADMIN — SODIUM CHLORIDE, PRESERVATIVE FREE 10 ML: 5 INJECTION INTRAVENOUS at 00:10

## 2023-03-30 RX ADMIN — FENOFIBRATE 160 MG: 160 TABLET, FILM COATED ORAL at 09:23

## 2023-03-30 RX ADMIN — INSULIN LISPRO 1 UNITS: 100 INJECTION, SOLUTION INTRAVENOUS; SUBCUTANEOUS at 17:34

## 2023-03-30 RX ADMIN — METOPROLOL TARTRATE 50 MG: 50 TABLET, FILM COATED ORAL at 20:06

## 2023-03-30 RX ADMIN — FUROSEMIDE 40 MG: 10 INJECTION, SOLUTION INTRAMUSCULAR; INTRAVENOUS at 17:38

## 2023-03-30 RX ADMIN — AMLODIPINE BESYLATE 5 MG: 5 TABLET ORAL at 09:23

## 2023-03-30 RX ADMIN — PANTOPRAZOLE SODIUM 40 MG: 40 TABLET, DELAYED RELEASE ORAL at 04:04

## 2023-03-30 RX ADMIN — OXYCODONE AND ACETAMINOPHEN 1 TABLET: 7.5; 325 TABLET ORAL at 11:45

## 2023-03-30 RX ADMIN — CITALOPRAM HYDROBROMIDE 20 MG: 20 TABLET ORAL at 09:23

## 2023-03-30 RX ADMIN — ASPIRIN 81 MG 81 MG: 81 TABLET ORAL at 09:23

## 2023-03-30 RX ADMIN — ATORVASTATIN CALCIUM 20 MG: 10 TABLET, FILM COATED ORAL at 09:23

## 2023-03-30 RX ADMIN — OXYCODONE AND ACETAMINOPHEN 1 TABLET: 7.5; 325 TABLET ORAL at 03:45

## 2023-03-30 RX ADMIN — FUROSEMIDE 40 MG: 10 INJECTION, SOLUTION INTRAMUSCULAR; INTRAVENOUS at 11:41

## 2023-03-30 RX ADMIN — CLOPIDOGREL BISULFATE 75 MG: 75 TABLET ORAL at 09:23

## 2023-03-30 RX ADMIN — INSULIN GLARGINE 15 UNITS: 100 INJECTION, SOLUTION SUBCUTANEOUS at 20:10

## 2023-03-30 RX ADMIN — OXYCODONE AND ACETAMINOPHEN 1 TABLET: 7.5; 325 TABLET ORAL at 20:06

## 2023-03-30 RX ADMIN — METOPROLOL TARTRATE 50 MG: 50 TABLET, FILM COATED ORAL at 09:23

## 2023-03-30 RX ADMIN — ENOXAPARIN SODIUM 30 MG: 100 INJECTION SUBCUTANEOUS at 20:08

## 2023-03-30 RX ADMIN — SODIUM CHLORIDE, PRESERVATIVE FREE 10 ML: 5 INJECTION INTRAVENOUS at 21:26

## 2023-03-30 RX ADMIN — BUSPIRONE HYDROCHLORIDE 10 MG: 5 TABLET ORAL at 14:32

## 2023-03-30 RX ADMIN — BUSPIRONE HYDROCHLORIDE 10 MG: 5 TABLET ORAL at 09:23

## 2023-03-30 RX ADMIN — BUSPIRONE HYDROCHLORIDE 10 MG: 5 TABLET ORAL at 20:06

## 2023-03-30 ASSESSMENT — PAIN - FUNCTIONAL ASSESSMENT: PAIN_FUNCTIONAL_ASSESSMENT: PREVENTS OR INTERFERES SOME ACTIVE ACTIVITIES AND ADLS

## 2023-03-30 ASSESSMENT — PAIN SCALES - GENERAL
PAINLEVEL_OUTOF10: 7
PAINLEVEL_OUTOF10: 7
PAINLEVEL_OUTOF10: 3

## 2023-03-30 ASSESSMENT — PAIN DESCRIPTION - ORIENTATION
ORIENTATION: LOWER
ORIENTATION: LOWER
ORIENTATION: RIGHT;LEFT;LOWER
ORIENTATION: RIGHT;LEFT;LOWER

## 2023-03-30 ASSESSMENT — PAIN DESCRIPTION - LOCATION
LOCATION: BACK;LEG
LOCATION: BACK;LEG
LOCATION: BACK
LOCATION: BACK

## 2023-03-30 ASSESSMENT — PAIN DESCRIPTION - DESCRIPTORS
DESCRIPTORS: DISCOMFORT
DESCRIPTORS: ACHING;DISCOMFORT;THROBBING

## 2023-03-30 NOTE — ED NOTES
ED TO INPATIENT SBAR HANDOFF    Patient Name: Bernice Qureshi   :  1940  81 y.o. MRN:  [id number]  Preferred Name  VIA [facility]  ED Room #:  ED27/ED-27  Family/Caregiver Present yes   Restraints no   Sitter no   Sepsis Risk Score Sepsis Risk Score: 1.63    Situation  Code Status: Prior Full Code. Allergies: Sulfa antibiotics  Weight: No data found. Arrived from: home  Chief Complaint:   Chief Complaint   Patient presents with    Shortness of Breath     For about a week     Imaging:   XR CHEST PORTABLE   Final Result   Bilateral pleural effusions right greater than left with bibasilar pulmonary   opacities right greater than left that could represent atelectasis or   infection         CTA PULMONARY W CONTRAST    (Results Pending)     Abnormal labs:   Abnormal Labs Reviewed   CBC WITH AUTO DIFFERENTIAL - Abnormal; Notable for the following components:       Result Value    MCHC 31.4 (*)     Monocytes % 10.1 (*)     Eosinophils % 3.5 (*)     Immature Neutrophil % 2.3 (*)     All other components within normal limits   COMPREHENSIVE METABOLIC PANEL - Abnormal; Notable for the following components:    BUN 44 (*)     Creatinine 1.7 (*)     Est, Glom Filt Rate 40 (*)     Glucose 176 (*)     All other components within normal limits   TROPONIN - Abnormal; Notable for the following components:    Troponin T 0.028 (*)     All other components within normal limits   BLOOD GAS, VENOUS - Abnormal; Notable for the following components:    pH, [place] 7.21 (*)     pCO2, [place] 62 (*)     pO2, [place] 83 (*)     Base Excess 4 (*)     O2 Sat, [place] 91.8 (*)     All other components within normal limits   BRAIN NATRIURETIC PEPTIDE - Abnormal; Notable for the following components:    Pro-BNP 1,792 (*)     All other components within normal limits     Critical values: no     Abnormal Assessment Findings: 81 y.o. male who presents to the emergency department for feeling generally unwell and short of breath. Onset was a week ago.   Constant, worsening. Arrived hypoxic to the ED--he does not use oxygen at baseline. He denies any chest pain, cough, congestion. Denies any fever or chills. He has had increased LE edema. Denies n/v/d. No known sick contacts.       Background  History:   Past Medical History:   Diagnosis Date    Arthritis     Diabetes mellitus (HCC)     GERD (gastroesophageal reflux disease)     Gout     left ankle and right shoulder    Hyperlipidemia     Hypertension     MI (myocardial infarction) [facility])     May 2013       Assessment    Vitals/MEWS: MEWS Score: 1  Level of Consciousness: Alert (0)   Vitals:    03/29/23 1756 03/29/23 1811 03/29/23 1826 03/29/23 2124   BP: (!) 142/58 (!) 142/51 (!) 145/53 (!) 149/53   Pulse: 59 58 58 56   Resp:    16   Temp:    98.2 °F (36.8 °C)   TempSrc:    Oral   SpO2: 92% 93% 95% 98%     FiO2 (%): nasal cannula for respiratory distress  O2 Flow Rate: O2 Device: Nasal cannula O2 Flow Rate (L/min): 4 L/min  Cardiac Rhythm: NSR  Pain Assessment:  [] Verbal [] Carloz Bras Scale  Pain Scale:    Last documented pain score (0-10 scale)    Last documented pain medication administered:   Mental Status: alert  NIH Score: NIH     C-SSRS: Risk of Suicide: No Risk  Bedside swallow:    [place] Coma Scale (GCS): [place] Coma Scale  Eye Opening: Spontaneous  Best Verbal Response: Oriented  Best Motor Response: Obeys commands  Watson Coma Scale Score: 15  Active LDA's:   Peripheral IV 03/29/23 Right Antecubital (Active)   Site Assessment Clean, dry & intact 03/29/23 1808   Line Status Blood return noted;Normal saline locked;Specimen collected 03/29/23 1808   Phlebitis Assessment No symptoms 03/29/23 1808   Infiltration Assessment 0 03/29/23 1808   Alcohol Cap Used No 03/29/23 1808   Dressing Status New dressing applied 03/29/23 1808   Dressing Type Transparent 03/29/23 1808   Dressing Intervention New 03/29/23 1808     PO Status: Regular  Pertinent or High Risk Medications/Drips: no   If Yes, please provide details:

## 2023-03-30 NOTE — PROGRESS NOTES
Physician Progress Note      Isabel Hernandez  CSN #:                  [id number]  :                       1940  ADMIT DATE:       3/29/2023 5:37 PM  [address] DATE:  RESPONDING  PROVIDER #:        Rocky Gross MD          QUERY TEXT:    Pt admitted with CHF. Pt noted to also have HTN,CAD. If possible, please   document in progress notes and discharge summary the etiology of CHF, if able   to be determined. The medical record reflects the following:  Risk Factors: CHF,HTN, CAD  Clinical Indicators: /126, 150/57, 142/58, ProBNP 1,792 H&P states   \"pulmonary edema with moderate bilateral effusions\", swelling in his legs,   decreased urine output, SOB  Treatment: IV Lasix, PO Plavix    Thank you,  Lupe Lugo [phone]  Options provided:  -- CHF due to Hypertensive Heart Disease  -- CHF due to CAD  -- CHF due to HTN and CAD  -- Other - I will add my own diagnosis  -- Disagree - Not applicable / Not valid  -- Disagree - Clinically unable to determine / Unknown  -- Refer to Clinical Documentation Reviewer    PROVIDER RESPONSE TEXT:    This patient has CHF due to HTN and CAD.     Query created by: Donte Hein on 3/30/2023 1:11 PM      Electronically signed by:  Rocky Gross MD 3/30/2023 1:15 PM

## 2023-03-30 NOTE — PROGRESS NOTES
V2.0  [facility] Hospitalist Progress Note      Name:  Cristopher Line /Age/Sex: 1940  (81 y.o. male)   MRN & CSN:  [id number] & [id number] Encounter Date/Time: 3/30/2023 9:46 AM EDT    Location:  [address] [place] [address] PCP: Marcia Dorsey MD       Hospital Day: 2    Assessment and Plan:     Acute hypoxic hypercapnic respiratory failure 2/2  Acute on Chronic Diastolic heart failure  Could be triggered by viral infection. Presented with  dyspnea, dyspnea on Exertion, orthopnea, PND, O2 req on 4L from room air, bilateral LE edema 2+. ProBNP 1792. CTPA pulmonary edema, moderate pleural effusion. Non-Compliant with home lasix. Last ECHO 2020 SJ>45%, grade I diastolic, mild to moderate MR. VBG pH 7.21, pCO2 62; pt not distress, no sign of AMS, will repeat VBG   -  40 mg lasix IV mg BID  - wean O2 as tolerated  - continue home metoprolol 50 mg BID  - strict intake/output, daily weights  - tele  - repeat ECHO Complete, pending   - TSH, free T4     VICKY - improving. Cr 1.7 on admission. Likely pre-renal 2/2 renal congestion from volume overload. - diuresis as above  - avoid nephrotoxins  - monitor for now     Type II diabetes. On home Lantus 30U QHS, Lispro TID w meals. - will do Lantus 15U QHS  - LDSSI  - check HA1C  - monitor glucose     Essential HTN. -150's. On home amlodipine, metoprolol  - resume home meds     Cirrhosis. CT abd reveals cirrhosis. Pt is scheduled to undergo EGD and colonoscopy outpatient  - check hepatitis panel  - defer further management to outpatient     CAD. Elevated troponin on admission. Troponin 0.028 on admission. Likely demand type II from volume overload. S/p stents   Last cath 10/30/2019 non-obstructive CAD  ECG on admission with no ischemic changes. - continue home ASA, Plavix, statin  - serial troponin trended down   - tele     Depression  On home Buspar, citalopram      Hx of LEROY. On CPAP but doesn't use it all the time. - home CPAP     HLD. No recent labs.  Pt on MMOL/L    Chloride 107 99 - 110 mMol/L    CO2 27 21 - 32 MMOL/L    Anion Gap 7 4 - 16    BUN 39 (H) 6 - 23 MG/DL    Creatinine 1.6 (H) 0.9 - 1.3 MG/DL    Est, Glom Filt Rate 43 (L) >60 mL/min/1.73m2    Glucose 144 (H) 70 - 99 MG/DL    Calcium 8.9 8.3 - 10.6 MG/DL   Magnesium    Collection Time: 03/30/23  1:02 AM   Result Value Ref Range    Magnesium 2.3 1.8 - 2.4 mg/dl   TSH without Reflex    Collection Time: 03/30/23  1:02 AM   Result Value Ref Range    TSH, High Sensitivity 2.860 0.270 - 4.20 uIu/ml   T4, free    Collection Time: 03/30/23  1:02 AM   Result Value Ref Range    T4 Free 0.88 (L) 0.9 - 1.8 NG/DL   Troponin    Collection Time: 03/30/23  1:02 AM   Result Value Ref Range    Troponin T 0.018 (H) <0.01 NG/ML   POCT Glucose    Collection Time: 03/30/23  2:22 AM   Result Value Ref Range    POC Glucose 124 (H) 70 - 99 MG/DL   Hemoglobin A1C    Collection Time: 03/30/23  4:28 AM   Result Value Ref Range    Hemoglobin A1C 8.2 (H) 4.2 - 6.3 %    eAG 189 mg/dL   Hepatitis Panel, Acute    Collection Time: 03/30/23  4:28 AM   Result Value Ref Range    Hepatitis B Surface Ag NON REACTIVE NON REACTIVE    Hep A IgM NON REACTIVE NON REACTIVE    Hep B Core Ab, IgM NON REACTIVE NON REACTIVE    Hepatitis C Ab NON REACTIVE NON REACTIVE   POCT Glucose    Collection Time: 03/30/23  8:02 AM   Result Value Ref Range    POC Glucose 83 70 - 99 MG/DL        Imaging/Diagnostics Last 24 Hours   XR CHEST PORTABLE    Result Date: 3/29/2023  EXAMINATION: ONE XRAY VIEW OF THE CHEST 3/29/2023 6:28 pm COMPARISON: 11/27/2022 HISTORY: ORDERING SYSTEM PROVIDED HISTORY: sob TECHNOLOGIST PROVIDED HISTORY: Reason for exam:->sob Reason for Exam: sob Additional signs and symptoms: na Relevant Medical/Surgical History: diabetes, hypertension FINDINGS: Cardiomegaly. Bibasilar pulmonary opacities. No pulmonary vascular congestion. No pneumothorax. Bilateral pleural effusions.      Bilateral pleural effusions right greater than left with bibasilar

## 2023-03-30 NOTE — PROGRESS NOTES
Nutrition Education    Educated on Heart failure nutrition therapy   Learners: Patient  Readiness: Acceptance  Method: Explanation, Handout, and Teachback  Response: Demonstrated Understanding and Needs Reinforcement  Contact name and number provided.     Gerardo Rees RD, NICOLE  Contact Number: [phone]

## 2023-03-30 NOTE — H&P
V2.0  History and Physical      Name:  Coco Casillas /Age/Sex: 1940  (81 y.o. male)   MRN & CSN:  [id number] & [id number] Encounter Date/Time: 3/30/2023 10:31 PM EDT   Location:  [address] [place] [address] PCP: Lalo Hardy MD       Hospital Day: 2      History of Present Illness:     Chief Complaint: SOB    Coco Casillas is a 81 y.o. male with PMH of CAD, CHF, HTN, DM II who presents with SOB. Pt states he has been having SOB past week progressively getting worse. Worse on exertion, laying flat and endorses PND. Also complaints of chest tightness. Pt reports he started having URI symptoms resolving now before the onset of his SOB. Endorses nausea no vomiting. Had diarrhea last BM 2 days ago improving. Also endorses swelling in his legs and decrease in his urination. Pt states he is not taking the water pill as he should. Endorses chronic dysphagia for years, was supposd to go for endoscopy and colonoscopy but postponed given his SOB. Denies lightheadedness, dizziness, fever, night sweats, chills, palpitations, abd pain,  dysuria. At ED, pt was afebrile, hemodynamically stable; sating 85% on room air placed on 4L NC. Labs remarkable for Cr 1.7, glucose 176, ProBNP 1792, troponin 0.028. Negative rapid flu and Covid-19. Had VBG pH 7.21, pCO2 62. Had CTPA negative for PE but showed pulmonary edema with moderate bilateral effusions. Pt received 40 mg IV lasix. Assessment and Plan:   Acute hypoxic hypercapnic respiratory failure 2/2  Acute on Chronic Diastolic heart failure  Could be triggered by viral infection. Presented with  dyspnea, dyspnea on Exertion, orthopnea, PND, O2 req on 4L from room air, bilateral LE edema 2+. ProBNP 1792. CTPA pulmonary edema, moderate pleural effusion. Non-Compliant with home lasix. Last ECHO 2020 XM>91%, grade I diastolic, mild to moderate MR.   VBG pH 7.21, pCO2 62; pt not distress, no sign of AMS, will repeat VBG   -  40 mg lasix IV mg BID  - wean O2 as tolerated  - Take 1 tablet by mouth every 8 hours as needed. Historical Provider, MD   fluticasone (FLONASE) 50 MCG/ACT nasal spray 1 spray by Nasal route 2 times daily. Historical Provider, MD   citalopram (CELEXA) 10 MG tablet Take 20 mg by mouth daily. Historical Provider, MD       Physical Exam: Need 8 Elements   Physical Exam     General: NAD  Eyes: EOMI  ENT: neck supple  Cardiovascular: murmur, Regular rate. Respiratory: bilateral rales, decreasing breathing sounds  Gastrointestinal: Soft, non tender  Genitourinary: no suprapubic tenderness  Musculoskeletal: bilateral edema 2+ w tenderness  Skin: warm, dry  Neuro: Alert. Psych: Mood appropriate. Past Medical History:   PMHx   Past Medical History:   Diagnosis Date    Arthritis     Diabetes mellitus ([address]     GERD (gastroesophageal reflux disease)     Gout     left ankle and right shoulder    Hyperlipidemia     Hypertension     MI (myocardial infarction) [facility])     May 2013     PSHX:  has a past surgical history that includes Total knee arthroplasty (Bilateral); back surgery; Cholecystectomy; Cardiac surgery; Dilatation, esophagus; sinus surgery; joint replacement; Hand surgery; and sinus surgery. Allergies: Allergies   Allergen Reactions    Sulfa Antibiotics Hives     Fam HX:  family history is not on file.   Soc HX:   Social History     Socioeconomic History    Marital status:    Tobacco Use    Smoking status: Never    Smokeless tobacco: Never   Vaping Use    Vaping Use: Never used   Substance and Sexual Activity    Alcohol use: No    Drug use: No       Medications:   Medications:    insulin lispro  0-4 Units SubCUTAneous TID     insulin lispro  0-4 Units SubCUTAneous QPM    [START ON 3/31/2023] amLODIPine  5 mg Oral BID    atorvastatin  20 mg Oral Daily    busPIRone  10 mg Oral TID    citalopram  20 mg Oral Daily    clopidogrel  75 mg Oral Daily    insulin glargine  15 Units SubCUTAneous Nightly    [START ON 3/31/2023] metoprolol tartrate  50

## 2023-03-30 NOTE — PROGRESS NOTES
4 Eyes Skin Assessment     NAME:  Alanis Ferrer  YOB: 1940  MEDICAL RECORD NUMBER:  [id number]    The patient is being assessed for  Admission    I agree that One RN has performed a thorough Head to Toe Skin Assessment on the patient. ALL assessment sites listed below have been assessed. Areas assessed by both nurses:    Head, Face, Ears, Shoulders, Back, Chest, Arms, Elbows, Hands, Sacrum. Buttock, Coccyx, Ischium, and Legs. Feet and Heels        Does the Patient have a Wound?  No noted wound(s)       Yomi Prevention initiated by RN: Yes   Wound Care Orders initiated by RN: No    Pressure Injury (Stage 3,4, Unstageable, DTI, NWPT, and Complex wounds) if present, place referral order by RN under : No    New and Established Ostomies, if present place, referral order under : No      Nurse 1 eSignature: Electronically signed by Clem Bailey RN on 3/30/23 at 1:08 AM EDT    **SHARE this note so that the co-signing nurse can place an eSignature**    Nurse 2 eSignature: Electronically signed by Talha Voss RN on 3/30/23 at 4:54 AM EDT

## 2023-03-30 NOTE — CONSULTS
(MSJ)  Weight Used for Protein Requirements: Ideal  Protein (g/day): 75-91 (1-1.2 g/kg)  Method Used for Fluid Requirements: 1 ml/kcal  Fluid (ml/day): 2500    Nutrition Diagnosis:   Limited adherence to nutrition-related recommendations related to lack or limited access to food as evidenced by  (does not cook for self often, will not eat MOW when provided, buys canned goods)    Nutrition Interventions:   Food and/or Nutrient Delivery: Modify Current Diet  Nutrition Education/Counseling: Survival skills/brief education completed (Heart failure nutrition therapy)  Coordination of Nutrition Care: Continue to monitor while inpatient       Goals:     Goals: prior to discharge, Teach back diet education, Meet at least 75% of estimated needs       Nutrition Monitoring and Evaluation:   Behavioral-Environmental Outcomes: None Identified  Food/Nutrient Intake Outcomes: Diet Advancement/Tolerance, Food and Nutrient Intake, Supplement Intake  Physical Signs/Symptoms Outcomes: Biochemical Data, Fluid Status or Edema, Nutrition Focused Physical Findings, Weight, Meal Time Behavior    Discharge Planning:    Continue current diet     Marry Winston RD, LD  Contact: [phone]

## 2023-03-31 LAB
ANION GAP SERPL CALCULATED.3IONS-SCNC: 8 MMOL/L (ref 4–16)
BUN SERPL-MCNC: 35 MG/DL (ref 6–23)
CALCIUM SERPL-MCNC: 9 MG/DL (ref 8.3–10.6)
CHLORIDE BLD-SCNC: 104 MMOL/L (ref 99–110)
CHOLEST SERPL-MCNC: 152 MG/DL
CO2: 30 MMOL/L (ref 21–32)
CREAT SERPL-MCNC: 1.7 MG/DL (ref 0.9–1.3)
GFR SERPL CREATININE-BSD FRML MDRD: 40 ML/MIN/1.73M2
GLUCOSE BLD-MCNC: 135 MG/DL (ref 70–99)
GLUCOSE BLD-MCNC: 251 MG/DL (ref 70–99)
GLUCOSE BLD-MCNC: 252 MG/DL (ref 70–99)
GLUCOSE BLD-MCNC: 277 MG/DL (ref 70–99)
GLUCOSE SERPL-MCNC: 165 MG/DL (ref 70–99)
HDLC SERPL-MCNC: 38 MG/DL
LDLC SERPL CALC-MCNC: 89 MG/DL
MAGNESIUM: 2 MG/DL (ref 1.8–2.4)
POTASSIUM SERPL-SCNC: 4.5 MMOL/L (ref 3.5–5.1)
SODIUM BLD-SCNC: 142 MMOL/L (ref 135–145)
TRIGL SERPL-MCNC: 123 MG/DL

## 2023-03-31 PROCEDURE — 94761 N-INVAS EAR/PLS OXIMETRY MLT: CPT

## 2023-03-31 PROCEDURE — 6370000000 HC RX 637 (ALT 250 FOR IP): Performed by: NURSE PRACTITIONER

## 2023-03-31 PROCEDURE — 83735 ASSAY OF MAGNESIUM: CPT

## 2023-03-31 PROCEDURE — 2700000000 HC OXYGEN THERAPY PER DAY

## 2023-03-31 PROCEDURE — 6370000000 HC RX 637 (ALT 250 FOR IP): Performed by: STUDENT IN AN ORGANIZED HEALTH CARE EDUCATION/TRAINING PROGRAM

## 2023-03-31 PROCEDURE — 36415 COLL VENOUS BLD VENIPUNCTURE: CPT

## 2023-03-31 PROCEDURE — 80061 LIPID PANEL: CPT

## 2023-03-31 PROCEDURE — 82962 GLUCOSE BLOOD TEST: CPT

## 2023-03-31 PROCEDURE — 80048 BASIC METABOLIC PNL TOTAL CA: CPT

## 2023-03-31 PROCEDURE — 6360000002 HC RX W HCPCS: Performed by: STUDENT IN AN ORGANIZED HEALTH CARE EDUCATION/TRAINING PROGRAM

## 2023-03-31 PROCEDURE — 2060000000 HC ICU INTERMEDIATE R&B

## 2023-03-31 PROCEDURE — 2580000003 HC RX 258: Performed by: STUDENT IN AN ORGANIZED HEALTH CARE EDUCATION/TRAINING PROGRAM

## 2023-03-31 RX ADMIN — BUSPIRONE HYDROCHLORIDE 10 MG: 5 TABLET ORAL at 13:13

## 2023-03-31 RX ADMIN — INSULIN LISPRO 2 UNITS: 100 INJECTION, SOLUTION INTRAVENOUS; SUBCUTANEOUS at 18:01

## 2023-03-31 RX ADMIN — FUROSEMIDE 40 MG: 10 INJECTION, SOLUTION INTRAMUSCULAR; INTRAVENOUS at 08:45

## 2023-03-31 RX ADMIN — BUSPIRONE HYDROCHLORIDE 10 MG: 5 TABLET ORAL at 08:45

## 2023-03-31 RX ADMIN — SODIUM CHLORIDE, PRESERVATIVE FREE 10 ML: 5 INJECTION INTRAVENOUS at 08:46

## 2023-03-31 RX ADMIN — CLOPIDOGREL BISULFATE 75 MG: 75 TABLET ORAL at 08:44

## 2023-03-31 RX ADMIN — METOPROLOL TARTRATE 50 MG: 50 TABLET, FILM COATED ORAL at 08:44

## 2023-03-31 RX ADMIN — AMLODIPINE BESYLATE 5 MG: 5 TABLET ORAL at 20:13

## 2023-03-31 RX ADMIN — ATORVASTATIN CALCIUM 20 MG: 10 TABLET, FILM COATED ORAL at 08:44

## 2023-03-31 RX ADMIN — INSULIN LISPRO 2 UNITS: 100 INJECTION, SOLUTION INTRAVENOUS; SUBCUTANEOUS at 13:12

## 2023-03-31 RX ADMIN — OXYCODONE AND ACETAMINOPHEN 1 TABLET: 7.5; 325 TABLET ORAL at 04:12

## 2023-03-31 RX ADMIN — ENOXAPARIN SODIUM 30 MG: 100 INJECTION SUBCUTANEOUS at 20:14

## 2023-03-31 RX ADMIN — ASPIRIN 81 MG 81 MG: 81 TABLET ORAL at 08:44

## 2023-03-31 RX ADMIN — PANTOPRAZOLE SODIUM 40 MG: 40 TABLET, DELAYED RELEASE ORAL at 06:17

## 2023-03-31 RX ADMIN — METOPROLOL TARTRATE 50 MG: 50 TABLET, FILM COATED ORAL at 20:13

## 2023-03-31 RX ADMIN — AMLODIPINE BESYLATE 5 MG: 5 TABLET ORAL at 08:44

## 2023-03-31 RX ADMIN — SODIUM CHLORIDE, PRESERVATIVE FREE 10 ML: 5 INJECTION INTRAVENOUS at 20:14

## 2023-03-31 RX ADMIN — OXYCODONE AND ACETAMINOPHEN 1 TABLET: 7.5; 325 TABLET ORAL at 20:14

## 2023-03-31 RX ADMIN — ENOXAPARIN SODIUM 30 MG: 100 INJECTION SUBCUTANEOUS at 08:45

## 2023-03-31 RX ADMIN — BUSPIRONE HYDROCHLORIDE 10 MG: 5 TABLET ORAL at 20:13

## 2023-03-31 RX ADMIN — FENOFIBRATE 160 MG: 160 TABLET, FILM COATED ORAL at 08:44

## 2023-03-31 RX ADMIN — INSULIN GLARGINE 15 UNITS: 100 INJECTION, SOLUTION SUBCUTANEOUS at 20:14

## 2023-03-31 RX ADMIN — CITALOPRAM HYDROBROMIDE 20 MG: 20 TABLET ORAL at 08:44

## 2023-03-31 RX ADMIN — FUROSEMIDE 40 MG: 10 INJECTION, SOLUTION INTRAMUSCULAR; INTRAVENOUS at 17:16

## 2023-03-31 ASSESSMENT — PAIN DESCRIPTION - DESCRIPTORS: DESCRIPTORS: ACHING

## 2023-03-31 ASSESSMENT — PAIN SCALES - GENERAL
PAINLEVEL_OUTOF10: 0
PAINLEVEL_OUTOF10: 7

## 2023-03-31 ASSESSMENT — PAIN DESCRIPTION - LOCATION
LOCATION: LEG
LOCATION: BACK;LEG

## 2023-03-31 ASSESSMENT — PAIN DESCRIPTION - ORIENTATION: ORIENTATION: MID;LOWER;LEFT;RIGHT

## 2023-03-31 ASSESSMENT — PAIN DESCRIPTION - PAIN TYPE: TYPE: CHRONIC PAIN

## 2023-03-31 ASSESSMENT — PAIN DESCRIPTION - FREQUENCY: FREQUENCY: CONTINUOUS

## 2023-03-31 ASSESSMENT — PAIN DESCRIPTION - ONSET: ONSET: ON-GOING

## 2023-03-31 ASSESSMENT — PAIN - FUNCTIONAL ASSESSMENT: PAIN_FUNCTIONAL_ASSESSMENT: ACTIVITIES ARE NOT PREVENTED

## 2023-03-31 NOTE — PROGRESS NOTES
V2.0  [facility] Hospitalist Progress Note      Name:  Amarjit Benjamin /Age/Sex: 1940  (81 y.o. male)   MRN & CSN:  [id number] & [id number] Encounter Date/Time: 3/31/2023 9:46 AM EDT    Location:  [facility] PCP: Froy Villasenor MD       Hospital Day: 3    Assessment and Plan:     Acute hypoxic hypercapnic respiratory failure 2/2  Acute on Chronic Diastolic heart failure  Could be triggered by viral infection. Presented with  dyspnea, dyspnea on Exertion, orthopnea, PND, O2 req on 4L from room air initially on admission, bilateral LE edema 2+. ProBNP 1792. CTPA pulmonary edema, moderate pleural effusion. Non-Compliant with home lasix. Last ECHO 2020 HB>28%, grade I diastolic, mild to moderate MR. VBG pH 7.21, pCO2 62; pt not distress, no sign of AMS, will repeat VBG   -  40 mg lasix IV mg BID  - wean O2 as tolerated, currently on 1 L from baseline of none  - continue home metoprolol 50 mg BID  - strict intake/output, daily weights  - tele  - repeat ECHO Complete, pending   - TSH, free T4     VICKY - improving. Cr 1.7 on admission. Likely pre-renal 2/2 renal congestion from volume overload. - diuresis as above  - avoid nephrotoxins  - monitor for now     Type II diabetes. On home Lantus 30U QHS, Lispro TID w meals. - will do Lantus 15U QHS  - LDSSI  - check HA1C  - monitor glucose     Essential HTN. -150's. On home amlodipine, metoprolol  - resume home meds     Cirrhosis. CT abd reveals cirrhosis. Pt is scheduled to undergo EGD and colonoscopy outpatient  - check hepatitis panel  - defer further management to outpatient     CAD. Elevated troponin on admission. Troponin 0.028 on admission. Likely demand type II from volume overload. S/p stents   Last cath 10/30/2019 non-obstructive CAD  ECG on admission with no ischemic changes. - continue home ASA, Plavix, statin  - serial troponin trended down   - tele     Depression  On home Buspar, citalopram      Hx of LEROY.  On CPAP but doesn't limits of the exam.  Main pulmonary artery is normal in caliber. Mediastinum: Multiple shotty mediastinal nodes present which are non pathologically enlarged may be reactive. Calcified right hilar nodes suggesting sequela of remote granulomatous disease. The heart and pericardium demonstrate no acute abnormality. The heart is enlarged. No pericardial effusion. There is no acute abnormality of the thoracic aorta. Lungs/pleura: Findings suggesting pulmonary edema with moderate-sized bilateral pleural effusions and associated atelectasis. No pneumothorax. Upper Abdomen: Limited images of the upper abdomen demonstrate a nodular contour of the liver suggesting cirrhosis. The gallbladder is surgically absent. Soft Tissues/Bones: No acute bone or soft tissue abnormality. 1. No pulmonary embolism identified within the right or left main pulmonary artery are segmental branches. Evaluation of the subsegmental branches limited due to bolus timing and respiratory motion artifact. No large embolus distally within limits of the exam. 2. Pulmonary edema with moderate bilateral effusions and atelectasis. 3. Cirrhotic morphology of the liver.        Electronically signed by Live Novak MD on 3/31/2023 at 11:46 AM

## 2023-03-31 NOTE — PROGRESS NOTES
Met with patient and Son. Introduced myself as the Heart failure education R.N. Patient lives with daughter and RODOLFO. Patient and daughter share shopping and cooking duties. Patient reports eating \"a lot of soup and TV dinners. \"       Admitting diagnosis-Acute hypoxic respiratory failure   Cardiologist- MARIBEL Moore  Heart Failure Education Nurse consulted- yes   Ejection fraction  555-60% as of 3/30/23 with grade II DD  Pro BNP-1,792 on 3/29/23  Hospital follow up appt-  MARIBEL Moore on 4/10  Patient informed of appointment and appointment added to AVS  Cardiac rehabilitation referral-N/A   ICD information-N/A   Pneumonia vaccine- N/A   PCP- [facility]  Patient has a digital scale? Yes   Transportation- has transportation    Able to obtain medications without difficulty? - Yes- Patient denies difficulty in obtaining  medications. States he quit taking Lasix due to frequent urination. Advised not to stop taking a medication without notifying provider. Heart failure specific Medications-  Norvasc, Metoprolol,  Lasix  Reviewed Heart failure patient education book with patient./ son  Questions answered. Patient's heart failure medications reviewed and information given on each. Reviewed the Stop Light Handout with patient and instructed when to call his provider. Patient and son were engaged and attentive during education session. Reviewed Healthy Eating for people with Diabetes and Kidney disease. The following handouts were reviewed with patient and patient was given a copy of the following : Heart Failure education booklet, the 'Stop Light' Handout,  a link to the [employer] Association's Healthier Living with Heart Failure Interactive workbook and a list of heart failure related education available on the hospital TV and how to access it.

## 2023-04-01 VITALS
WEIGHT: 251.1 LBS | SYSTOLIC BLOOD PRESSURE: 152 MMHG | BODY MASS INDEX: 35.95 KG/M2 | OXYGEN SATURATION: 92 % | RESPIRATION RATE: 16 BRPM | HEART RATE: 79 BPM | DIASTOLIC BLOOD PRESSURE: 62 MMHG | TEMPERATURE: 97.8 F | HEIGHT: 70 IN

## 2023-04-01 LAB
AMMONIA: 46 UMOL/L (ref 16–60)
ANION GAP SERPL CALCULATED.3IONS-SCNC: 11 MMOL/L (ref 4–16)
BUN SERPL-MCNC: 34 MG/DL (ref 6–23)
CALCIUM SERPL-MCNC: 9 MG/DL (ref 8.3–10.6)
CHLORIDE BLD-SCNC: 98 MMOL/L (ref 99–110)
CO2: 33 MMOL/L (ref 21–32)
CREAT SERPL-MCNC: 1.5 MG/DL (ref 0.9–1.3)
GFR SERPL CREATININE-BSD FRML MDRD: 46 ML/MIN/1.73M2
GLUCOSE BLD-MCNC: 208 MG/DL (ref 70–99)
GLUCOSE SERPL-MCNC: 227 MG/DL (ref 70–99)
MAGNESIUM: 1.8 MG/DL (ref 1.8–2.4)
POTASSIUM SERPL-SCNC: 4.1 MMOL/L (ref 3.5–5.1)
SODIUM BLD-SCNC: 142 MMOL/L (ref 135–145)

## 2023-04-01 PROCEDURE — 82962 GLUCOSE BLOOD TEST: CPT

## 2023-04-01 PROCEDURE — 6370000000 HC RX 637 (ALT 250 FOR IP): Performed by: STUDENT IN AN ORGANIZED HEALTH CARE EDUCATION/TRAINING PROGRAM

## 2023-04-01 PROCEDURE — 36415 COLL VENOUS BLD VENIPUNCTURE: CPT

## 2023-04-01 PROCEDURE — 2580000003 HC RX 258: Performed by: STUDENT IN AN ORGANIZED HEALTH CARE EDUCATION/TRAINING PROGRAM

## 2023-04-01 PROCEDURE — 6360000002 HC RX W HCPCS: Performed by: NURSE PRACTITIONER

## 2023-04-01 PROCEDURE — 80048 BASIC METABOLIC PNL TOTAL CA: CPT

## 2023-04-01 PROCEDURE — 6360000002 HC RX W HCPCS: Performed by: STUDENT IN AN ORGANIZED HEALTH CARE EDUCATION/TRAINING PROGRAM

## 2023-04-01 PROCEDURE — 83735 ASSAY OF MAGNESIUM: CPT

## 2023-04-01 PROCEDURE — 94761 N-INVAS EAR/PLS OXIMETRY MLT: CPT

## 2023-04-01 PROCEDURE — 82140 ASSAY OF AMMONIA: CPT

## 2023-04-01 RX ORDER — FUROSEMIDE 40 MG/1
40 TABLET ORAL DAILY
Qty: 60 TABLET | Refills: 3 | Status: SHIPPED | OUTPATIENT
Start: 2023-04-01

## 2023-04-01 RX ORDER — HYDRALAZINE HYDROCHLORIDE 20 MG/ML
10 INJECTION INTRAMUSCULAR; INTRAVENOUS ONCE
Status: COMPLETED | OUTPATIENT
Start: 2023-04-01 | End: 2023-04-01

## 2023-04-01 RX ADMIN — SODIUM CHLORIDE, PRESERVATIVE FREE 10 ML: 5 INJECTION INTRAVENOUS at 10:02

## 2023-04-01 RX ADMIN — OXYCODONE AND ACETAMINOPHEN 1 TABLET: 7.5; 325 TABLET ORAL at 04:42

## 2023-04-01 RX ADMIN — PANTOPRAZOLE SODIUM 40 MG: 40 TABLET, DELAYED RELEASE ORAL at 04:42

## 2023-04-01 RX ADMIN — METOPROLOL TARTRATE 50 MG: 50 TABLET, FILM COATED ORAL at 10:01

## 2023-04-01 RX ADMIN — CLOPIDOGREL BISULFATE 75 MG: 75 TABLET ORAL at 10:02

## 2023-04-01 RX ADMIN — ATORVASTATIN CALCIUM 20 MG: 10 TABLET, FILM COATED ORAL at 10:01

## 2023-04-01 RX ADMIN — ONDANSETRON 4 MG: 4 TABLET, ORALLY DISINTEGRATING ORAL at 06:52

## 2023-04-01 RX ADMIN — ASPIRIN 81 MG 81 MG: 81 TABLET ORAL at 10:01

## 2023-04-01 RX ADMIN — ENOXAPARIN SODIUM 30 MG: 100 INJECTION SUBCUTANEOUS at 10:00

## 2023-04-01 RX ADMIN — BUSPIRONE HYDROCHLORIDE 10 MG: 5 TABLET ORAL at 10:01

## 2023-04-01 RX ADMIN — FUROSEMIDE 40 MG: 10 INJECTION, SOLUTION INTRAMUSCULAR; INTRAVENOUS at 10:02

## 2023-04-01 RX ADMIN — FENOFIBRATE 160 MG: 160 TABLET, FILM COATED ORAL at 10:01

## 2023-04-01 RX ADMIN — CITALOPRAM HYDROBROMIDE 20 MG: 20 TABLET ORAL at 10:02

## 2023-04-01 RX ADMIN — AMLODIPINE BESYLATE 5 MG: 5 TABLET ORAL at 10:01

## 2023-04-01 RX ADMIN — INSULIN LISPRO 1 UNITS: 100 INJECTION, SOLUTION INTRAVENOUS; SUBCUTANEOUS at 10:08

## 2023-04-01 RX ADMIN — HYDRALAZINE HYDROCHLORIDE 10 MG: 20 INJECTION INTRAMUSCULAR; INTRAVENOUS at 04:47

## 2023-04-01 ASSESSMENT — PAIN SCALES - GENERAL
PAINLEVEL_OUTOF10: 6
PAINLEVEL_OUTOF10: 0
PAINLEVEL_OUTOF10: 0

## 2023-04-01 NOTE — DISCHARGE SUMMARY
V2.0  Discharge Summary    Name:  Patricia Montes /Age/Sex: 1940 (81 y.o. male)   Admit Date: 3/29/2023  Discharge Date: 23    MRN & CSN:  [id number] & [id number] Encounter Date and Time 23 10:31 AM EDT    Attending:  Aracelis Saavedra MD Discharging Provider: Aracelis Saavedra MD       Hospital Course:     Brief HPI: Patricia Montes is a 81 y.o. male with PMH of CAD, CHF, HTN, DM II who presents with SOB. Pt states he has been having SOB past week progressively getting worse. Worse on exertion, laying flat and endorses PND. Also complaints of chest tightness. Pt reports he started having URI symptoms resolving now before the onset of his SOB. Endorses nausea no vomiting. Had diarrhea last BM 2 days ago improving. Also endorses swelling in his legs and decrease in his urination. Pt states he is not taking the water pill as he should. Endorses chronic dysphagia for years, was supposd to go for endoscopy and colonoscopy but postponed given his SOB. Denies lightheadedness, dizziness, fever, night sweats, chills, palpitations, abd pain,  dysuria. At ED, pt was afebrile, hemodynamically stable; sating 85% on room air placed on 4L NC. Labs remarkable for Cr 1.7, glucose 176, ProBNP 1792, troponin 0.028. Negative rapid flu and Covid-19. Had VBG pH 7.21, pCO2 62. Had CTPA negative for PE but showed pulmonary edema with moderate bilateral effusions. Pt received 40 mg IV lasix. Brief Problem Based Course:   Acute hypoxic hypercapnic respiratory failure 2/2  Acute on Chronic Diastolic heart failure  Could be triggered by viral infection. Presented with  dyspnea, dyspnea on Exertion, orthopnea, PND, O2 req on 4L from room air initially on admission, bilateral LE edema 2+. ProBNP 1792. CTPA pulmonary edema, moderate pleural effusion. Non-Compliant with home lasix. Last ECHO 2020 QE>63%, grade I diastolic, mild to moderate MR.   VBG pH 7.21, pCO2 62; pt not distress, no sign of AMS, will repeat VBG   -

## 2023-04-01 NOTE — PROGRESS NOTES
Discharge instructions given to patient. No questions at this time. Patient discharging with belongings and discharge instructions. Patient discharging home via wheelchair with self care and family help.

## 2023-04-01 NOTE — CONSULTS
Endocrinology   Consult Note  3/29/2023  5:37 PM     Primary Care provider: Seferino Perez MD     Referring physician:  Omkar Field MD     Dear Doctor Deb Grace for the Consult     Pt. Was Admitted for : Shortness of breath    Reason for Consult: Better control of blood glucose and possibly future follow-up in the office      History Obtained From:  Patient/ EMR       HISTORY OF PRESENT ILLNESS:                The patient is a 81 y.o. male with significant past medical history of arthritis, diabetes mellitus, GERD, hypertension, hyperlipidemia, CAD and had his cardiac stent with congestive heart failure,, liver cirrhosis multiple orthopedic procedures done including back surgery hip replacement arthroplasty in complaining of severe shortness of breath patient was was treated conservatively. As a running high blood glucose level was consulted for better control of diabetes mellitus. ROS:   Pt's ROS done in detail. Abnormal ROS are noted in Medical and Surgical History Section below: Other Medical History:        Diagnosis Date    Arthritis     Diabetes mellitus (HCC)     GERD (gastroesophageal reflux disease)     Gout     left ankle and right shoulder    Hyperlipidemia     Hypertension     MI (myocardial infarction) [facility])     May 2013     Surgical History:        Procedure Laterality Date    BACK SURGERY      CARDIAC SURGERY      stents x 1    CHOLECYSTECTOMY      May 2013    DILATATION, ESOPHAGUS      HAND SURGERY      JOINT REPLACEMENT      SINUS SURGERY      sinus surgery x 2    SINUS SURGERY      TOTAL KNEE ARTHROPLASTY Bilateral        Allergies:  Sulfa antibiotics    Family History:   No family history on file.   REVIEW OF SYSTEMS:  Review of System Done as noted above     PHYSICAL EXAM:      Vitals:    BP (!) 152/62   Pulse 79   Temp 97.8 °F (36.6 °C) (Oral)   Resp 16   Ht 5' 10\" (1.778 m)   Wt 251 lb 1.6 oz (113.9 kg)   SpO2 92%   BMI 36.03 kg/m²     CONSTITUTIONAL:  awake, branches. Evaluation of the subsegmental branches   limited due to bolus timing and respiratory motion artifact. No large embolus   distally within limits of the exam.   2. Pulmonary edema with moderate bilateral effusions and atelectasis. 3. Cirrhotic morphology of the liver. XR CHEST PORTABLE   Final Result   Bilateral pleural effusions right greater than left with bibasilar pulmonary   opacities right greater than left that could represent atelectasis or   infection                Scheduled Medicines   Medications:    insulin lispro  0-4 Units SubCUTAneous TID WC    insulin lispro  0-4 Units SubCUTAneous QPM    atorvastatin  20 mg Oral Daily    busPIRone  10 mg Oral TID    citalopram  20 mg Oral Daily    clopidogrel  75 mg Oral Daily    insulin glargine  15 Units SubCUTAneous Nightly    pantoprazole  40 mg Oral QAM AC    sodium chloride flush  5-40 mL IntraVENous 2 times per day    enoxaparin  30 mg SubCUTAneous BID    furosemide  40 mg IntraVENous BID    aspirin  81 mg Oral Daily    fenofibrate  160 mg Oral Daily    amLODIPine  5 mg Oral BID    metoprolol tartrate  50 mg Oral BID    traMADol  50 mg Oral Once      Infusions:    dextrose      sodium chloride           IMPRESSION    Patient Active Problem List   Diagnosis    Essential hypertension, benign    Type 2 diabetes mellitus, with long-term current use of insulin (HCC)    Other and unspecified hyperlipidemia    Chest pain    Coronary atherosclerosis    Chronic kidney disease, stage III (moderate) (HCC)    Cellulitis of leg    Cellulitis of lower leg    Lethargy    Acute respiratory failure with hypoxia and hypercapnia (HCC)    Altered mental status, unspecified    Hypoglycemia    Acute kidney injury ([address]    Generalized weakness    Liver cirrhosis (HCC)    Depression    Chronic pain    Encephalopathy    COVID-19    Acute hypoxemic respiratory failure ([address]         RECOMMENDATIONS:      Reviewed POC blood glucose .  Labs and X ray results   Reviewed

## 2023-04-01 NOTE — PLAN OF CARE
Problem: Discharge Planning  Goal: Discharge to home or other facility with appropriate resources  3/30/2023 1556 by Sarah Mcgill LPN  Outcome: Progressing  Flowsheets (Taken 3/30/2023 0919)  Discharge to home or other facility with appropriate resources:   Identify barriers to discharge with patient and caregiver   Arrange for needed discharge resources and transportation as appropriate   Identify discharge learning needs (meds, wound care, etc)   Refer to discharge planning if patient needs post-hospital services based on physician order or complex needs related to functional status, cognitive ability or social support system  3/30/2023 0303 by Angely Brantley RN  Outcome: Progressing     Problem: Safety - Adult  Goal: Free from fall injury  3/30/2023 1556 by Sarah Mcgill LPN  Outcome: Progressing  3/30/2023 0303 by Angely Brantley RN  Outcome: Progressing     Problem: Chronic Conditions and Co-morbidities  Goal: Patient's chronic conditions and co-morbidity symptoms are monitored and maintained or improved  Outcome: Progressing     Problem: Pain  Goal: Verbalizes/displays adequate comfort level or baseline comfort level  Outcome: Progressing     Problem: Nutrition Deficit:  Goal: Optimize nutritional status  Outcome: Progressing
Problem: Discharge Planning  Goal: Discharge to home or other facility with appropriate resources  Outcome: Progressing     Problem: Safety - Adult  Goal: Free from fall injury  Outcome: Progressing
Problem: Discharge Planning  Goal: Discharge to home or other facility with appropriate resources  Outcome: Progressing  Flowsheets (Taken 3/31/2023 2000)  Discharge to home or other facility with appropriate resources: Identify barriers to discharge with patient and caregiver     Problem: Safety - Adult  Goal: Free from fall injury  Outcome: Progressing     Problem: Chronic Conditions and Co-morbidities  Goal: Patient's chronic conditions and co-morbidity symptoms are monitored and maintained or improved  Outcome: Progressing  Flowsheets (Taken 3/31/2023 2000)  Care Plan - Patient's Chronic Conditions and Co-Morbidity Symptoms are Monitored and Maintained or Improved: Monitor and assess patient's chronic conditions and comorbid symptoms for stability, deterioration, or improvement     Problem: Pain  Goal: Verbalizes/displays adequate comfort level or baseline comfort level  Outcome: Progressing  Flowsheets (Taken 3/31/2023 2000)  Verbalizes/displays adequate comfort level or baseline comfort level: Encourage patient to monitor pain and request assistance     Problem: Nutrition Deficit:  Goal: Optimize nutritional status  Outcome: Progressing
monitor pain and request assistance  4/1/2023 0200 by Brian Lacy LPN  Outcome: Progressing  Flowsheets (Taken 3/31/2023 2000)  Verbalizes/displays adequate comfort level or baseline comfort level: Encourage patient to monitor pain and request assistance     Problem: Nutrition Deficit:  Goal: Optimize nutritional status  4/1/2023 3599 by Ashlyn Love RN  Outcome: Progressing  4/1/2023 0200 by Brian Lacy LPN  Outcome: Progressing

## 2024-04-18 ENCOUNTER — APPOINTMENT (OUTPATIENT)
Dept: GENERAL RADIOLOGY | Age: 84
End: 2024-04-18
Payer: MEDICARE

## 2024-04-18 ENCOUNTER — HOSPITAL ENCOUNTER (INPATIENT)
Age: 84
LOS: 1 days | Discharge: HOME | End: 2024-04-19
Attending: EMERGENCY MEDICINE | Admitting: STUDENT IN AN ORGANIZED HEALTH CARE EDUCATION/TRAINING PROGRAM
Payer: MEDICARE

## 2024-04-18 ENCOUNTER — APPOINTMENT (OUTPATIENT)
Dept: CT IMAGING | Age: 84
End: 2024-04-18
Payer: MEDICARE

## 2024-04-18 DIAGNOSIS — J96.01 ACUTE HYPOXEMIC RESPIRATORY FAILURE (HCC): ICD-10-CM

## 2024-04-18 DIAGNOSIS — Z79.4 TYPE 2 DIABETES MELLITUS WITH STAGE 3 CHRONIC KIDNEY DISEASE, WITH LONG-TERM CURRENT USE OF INSULIN, UNSPECIFIED WHETHER STAGE 3A OR 3B CKD (HCC): ICD-10-CM

## 2024-04-18 DIAGNOSIS — R79.89 ELEVATED TROPONIN: ICD-10-CM

## 2024-04-18 DIAGNOSIS — I95.9 HYPOTENSION, UNSPECIFIED HYPOTENSION TYPE: ICD-10-CM

## 2024-04-18 DIAGNOSIS — R07.9 CHEST PAIN, UNSPECIFIED TYPE: Primary | ICD-10-CM

## 2024-04-18 DIAGNOSIS — E11.22 TYPE 2 DIABETES MELLITUS WITH STAGE 3 CHRONIC KIDNEY DISEASE, WITH LONG-TERM CURRENT USE OF INSULIN, UNSPECIFIED WHETHER STAGE 3A OR 3B CKD (HCC): ICD-10-CM

## 2024-04-18 DIAGNOSIS — N18.30 TYPE 2 DIABETES MELLITUS WITH STAGE 3 CHRONIC KIDNEY DISEASE, WITH LONG-TERM CURRENT USE OF INSULIN, UNSPECIFIED WHETHER STAGE 3A OR 3B CKD (HCC): ICD-10-CM

## 2024-04-18 LAB
ALBUMIN SERPL-MCNC: 3.9 GM/DL (ref 3.4–5)
ALP BLD-CCNC: 86 IU/L (ref 40–128)
ALT SERPL-CCNC: 27 U/L (ref 10–40)
ANION GAP SERPL CALCULATED.3IONS-SCNC: 15 MMOL/L (ref 7–16)
AST SERPL-CCNC: 22 IU/L (ref 15–37)
BASOPHILS ABSOLUTE: 0 K/CU MM
BASOPHILS RELATIVE PERCENT: 0.9 % (ref 0–1)
BILIRUB SERPL-MCNC: 0.6 MG/DL (ref 0–1)
BUN SERPL-MCNC: 69 MG/DL (ref 6–23)
CALCIUM SERPL-MCNC: 9 MG/DL (ref 8.3–10.6)
CHLORIDE BLD-SCNC: 93 MMOL/L (ref 99–110)
CO2: 24 MMOL/L (ref 21–32)
CREAT SERPL-MCNC: 2 MG/DL (ref 0.9–1.3)
DIFFERENTIAL TYPE: ABNORMAL
EOSINOPHILS ABSOLUTE: 0.1 K/CU MM
EOSINOPHILS RELATIVE PERCENT: 3 % (ref 0–3)
GFR SERPL CREATININE-BSD FRML MDRD: 33 ML/MIN/1.73M2
GLUCOSE BLD-MCNC: 223 MG/DL (ref 70–99)
GLUCOSE BLD-MCNC: 247 MG/DL (ref 70–99)
GLUCOSE SERPL-MCNC: 371 MG/DL (ref 70–99)
HCT VFR BLD CALC: 42.3 % (ref 42–52)
HEMOGLOBIN: 13.7 GM/DL (ref 13.5–18)
IMMATURE NEUTROPHIL %: 1.7 % (ref 0–0.43)
LACTATE: 2.6 MMOL/L (ref 0.5–1.9)
LYMPHOCYTES ABSOLUTE: 1.5 K/CU MM
LYMPHOCYTES RELATIVE PERCENT: 32.1 % (ref 24–44)
MAGNESIUM: 1.9 MG/DL (ref 1.8–2.4)
MCH RBC QN AUTO: 29.6 PG (ref 27–31)
MCHC RBC AUTO-ENTMCNC: 32.4 % (ref 32–36)
MCV RBC AUTO: 91.4 FL (ref 78–100)
MONOCYTES ABSOLUTE: 0.4 K/CU MM
MONOCYTES RELATIVE PERCENT: 8.6 % (ref 0–4)
NEUTROPHILS RELATIVE PERCENT: 53.7 % (ref 36–66)
NUCLEATED RBC %: 0 %
PDW BLD-RTO: 13.4 % (ref 11.7–14.9)
PLATELET # BLD: 130 K/CU MM (ref 140–440)
PMV BLD AUTO: 10.6 FL (ref 7.5–11.1)
POTASSIUM SERPL-SCNC: 4.4 MMOL/L (ref 3.5–5.1)
PRO-BNP: 632.6 PG/ML
RBC # BLD: 4.63 M/CU MM (ref 4.6–6.2)
SEGMENTED NEUTROPHILS ABSOLUTE COUNT: 2.5 K/CU MM
SODIUM BLD-SCNC: 132 MMOL/L (ref 135–145)
TOTAL CK: 128 IU/L (ref 38–174)
TOTAL IMMATURE NEUTOROPHIL: 0.08 K/CU MM
TOTAL NUCLEATED RBC: 0 K/CU MM
TOTAL PROTEIN: 6.6 GM/DL (ref 6.4–8.2)
TROPONIN, HIGH SENSITIVITY: 89 NG/L (ref 0–22)
TROPONIN, HIGH SENSITIVITY: 89 NG/L (ref 0–22)
WBC # BLD: 4.6 K/CU MM (ref 4–10.5)

## 2024-04-18 PROCEDURE — 93005 ELECTROCARDIOGRAM TRACING: CPT | Performed by: EMERGENCY MEDICINE

## 2024-04-18 PROCEDURE — 94761 N-INVAS EAR/PLS OXIMETRY MLT: CPT

## 2024-04-18 PROCEDURE — 6360000004 HC RX CONTRAST MEDICATION: Performed by: EMERGENCY MEDICINE

## 2024-04-18 PROCEDURE — 99285 EMERGENCY DEPT VISIT HI MDM: CPT

## 2024-04-18 PROCEDURE — 71045 X-RAY EXAM CHEST 1 VIEW: CPT

## 2024-04-18 PROCEDURE — 6360000002 HC RX W HCPCS: Performed by: STUDENT IN AN ORGANIZED HEALTH CARE EDUCATION/TRAINING PROGRAM

## 2024-04-18 PROCEDURE — 2580000003 HC RX 258: Performed by: STUDENT IN AN ORGANIZED HEALTH CARE EDUCATION/TRAINING PROGRAM

## 2024-04-18 PROCEDURE — 73030 X-RAY EXAM OF SHOULDER: CPT

## 2024-04-18 PROCEDURE — 2580000003 HC RX 258: Performed by: EMERGENCY MEDICINE

## 2024-04-18 PROCEDURE — 85730 THROMBOPLASTIN TIME PARTIAL: CPT

## 2024-04-18 PROCEDURE — 6370000000 HC RX 637 (ALT 250 FOR IP): Performed by: STUDENT IN AN ORGANIZED HEALTH CARE EDUCATION/TRAINING PROGRAM

## 2024-04-18 PROCEDURE — 2140000000 HC CCU INTERMEDIATE R&B

## 2024-04-18 PROCEDURE — 96360 HYDRATION IV INFUSION INIT: CPT

## 2024-04-18 PROCEDURE — 84484 ASSAY OF TROPONIN QUANT: CPT

## 2024-04-18 PROCEDURE — 80053 COMPREHEN METABOLIC PANEL: CPT

## 2024-04-18 PROCEDURE — 82550 ASSAY OF CK (CPK): CPT

## 2024-04-18 PROCEDURE — 6370000000 HC RX 637 (ALT 250 FOR IP): Performed by: EMERGENCY MEDICINE

## 2024-04-18 PROCEDURE — 71275 CT ANGIOGRAPHY CHEST: CPT

## 2024-04-18 PROCEDURE — 85610 PROTHROMBIN TIME: CPT

## 2024-04-18 PROCEDURE — 85025 COMPLETE CBC W/AUTO DIFF WBC: CPT

## 2024-04-18 PROCEDURE — 82962 GLUCOSE BLOOD TEST: CPT

## 2024-04-18 PROCEDURE — 83880 ASSAY OF NATRIURETIC PEPTIDE: CPT

## 2024-04-18 PROCEDURE — 70450 CT HEAD/BRAIN W/O DYE: CPT

## 2024-04-18 PROCEDURE — 83735 ASSAY OF MAGNESIUM: CPT

## 2024-04-18 PROCEDURE — 83605 ASSAY OF LACTIC ACID: CPT

## 2024-04-18 RX ORDER — GABAPENTIN 300 MG/1
300 CAPSULE ORAL 3 TIMES DAILY
Status: DISCONTINUED | OUTPATIENT
Start: 2024-04-18 | End: 2024-04-19 | Stop reason: HOSPADM

## 2024-04-18 RX ORDER — ONDANSETRON 2 MG/ML
4 INJECTION INTRAMUSCULAR; INTRAVENOUS EVERY 6 HOURS PRN
Status: DISCONTINUED | OUTPATIENT
Start: 2024-04-18 | End: 2024-04-19 | Stop reason: HOSPADM

## 2024-04-18 RX ORDER — TIZANIDINE 2 MG/1
2 TABLET ORAL 2 TIMES DAILY PRN
Status: DISCONTINUED | OUTPATIENT
Start: 2024-04-18 | End: 2024-04-19 | Stop reason: HOSPADM

## 2024-04-18 RX ORDER — INSULIN LISPRO 100 [IU]/ML
0-4 INJECTION, SOLUTION INTRAVENOUS; SUBCUTANEOUS
Status: DISCONTINUED | OUTPATIENT
Start: 2024-04-18 | End: 2024-04-19 | Stop reason: HOSPADM

## 2024-04-18 RX ORDER — CARVEDILOL 25 MG/1
25 TABLET ORAL 2 TIMES DAILY WITH MEALS
Status: ON HOLD | COMMUNITY
Start: 2024-04-03 | End: 2024-04-19 | Stop reason: HOSPADM

## 2024-04-18 RX ORDER — LIDOCAINE 4 G/G
1 PATCH TOPICAL DAILY
Status: DISCONTINUED | OUTPATIENT
Start: 2024-04-18 | End: 2024-04-19 | Stop reason: HOSPADM

## 2024-04-18 RX ORDER — GLUCAGON 1 MG/ML
1 KIT INJECTION PRN
Status: DISCONTINUED | OUTPATIENT
Start: 2024-04-18 | End: 2024-04-19 | Stop reason: HOSPADM

## 2024-04-18 RX ORDER — GABAPENTIN 800 MG/1
800 TABLET ORAL 3 TIMES DAILY
COMMUNITY
Start: 2024-04-17

## 2024-04-18 RX ORDER — 0.9 % SODIUM CHLORIDE 0.9 %
1000 INTRAVENOUS SOLUTION INTRAVENOUS ONCE
Status: DISCONTINUED | OUTPATIENT
Start: 2024-04-18 | End: 2024-04-19

## 2024-04-18 RX ORDER — POLYETHYLENE GLYCOL 3350 17 G/17G
17 POWDER, FOR SOLUTION ORAL DAILY PRN
Status: DISCONTINUED | OUTPATIENT
Start: 2024-04-18 | End: 2024-04-19 | Stop reason: HOSPADM

## 2024-04-18 RX ORDER — CARVEDILOL 6.25 MG/1
3.12 TABLET ORAL 2 TIMES DAILY WITH MEALS
Status: DISCONTINUED | OUTPATIENT
Start: 2024-04-18 | End: 2024-04-19

## 2024-04-18 RX ORDER — 0.9 % SODIUM CHLORIDE 0.9 %
1000 INTRAVENOUS SOLUTION INTRAVENOUS ONCE
Status: COMPLETED | OUTPATIENT
Start: 2024-04-18 | End: 2024-04-18

## 2024-04-18 RX ORDER — ENOXAPARIN SODIUM 100 MG/ML
30 INJECTION SUBCUTANEOUS 2 TIMES DAILY
Status: DISCONTINUED | OUTPATIENT
Start: 2024-04-18 | End: 2024-04-19 | Stop reason: HOSPADM

## 2024-04-18 RX ORDER — ONDANSETRON 4 MG/1
4 TABLET, ORALLY DISINTEGRATING ORAL EVERY 8 HOURS PRN
Status: DISCONTINUED | OUTPATIENT
Start: 2024-04-18 | End: 2024-04-19 | Stop reason: HOSPADM

## 2024-04-18 RX ORDER — OXYCODONE AND ACETAMINOPHEN 7.5; 325 MG/1; MG/1
1 TABLET ORAL EVERY 6 HOURS PRN
Status: DISCONTINUED | OUTPATIENT
Start: 2024-04-18 | End: 2024-04-19 | Stop reason: HOSPADM

## 2024-04-18 RX ORDER — LIRAGLUTIDE 6 MG/ML
1.2 INJECTION SUBCUTANEOUS
COMMUNITY
Start: 2024-04-09

## 2024-04-18 RX ORDER — SODIUM CHLORIDE 9 MG/ML
INJECTION, SOLUTION INTRAVENOUS PRN
Status: DISCONTINUED | OUTPATIENT
Start: 2024-04-18 | End: 2024-04-19 | Stop reason: HOSPADM

## 2024-04-18 RX ORDER — CLOPIDOGREL BISULFATE 75 MG/1
75 TABLET ORAL DAILY
Status: DISCONTINUED | OUTPATIENT
Start: 2024-04-18 | End: 2024-04-19 | Stop reason: HOSPADM

## 2024-04-18 RX ORDER — CHOLESTYRAMINE LIGHT 4 G/5.7G
4 POWDER, FOR SUSPENSION ORAL DAILY
Status: DISCONTINUED | OUTPATIENT
Start: 2024-04-18 | End: 2024-04-19 | Stop reason: HOSPADM

## 2024-04-18 RX ORDER — ACETAMINOPHEN 650 MG/1
650 SUPPOSITORY RECTAL EVERY 6 HOURS PRN
Status: DISCONTINUED | OUTPATIENT
Start: 2024-04-18 | End: 2024-04-19 | Stop reason: HOSPADM

## 2024-04-18 RX ORDER — ACETAMINOPHEN 325 MG/1
650 TABLET ORAL EVERY 6 HOURS PRN
Status: DISCONTINUED | OUTPATIENT
Start: 2024-04-18 | End: 2024-04-19 | Stop reason: HOSPADM

## 2024-04-18 RX ORDER — FLUOXETINE 10 MG/1
40 CAPSULE ORAL DAILY
Status: DISCONTINUED | OUTPATIENT
Start: 2024-04-19 | End: 2024-04-19 | Stop reason: HOSPADM

## 2024-04-18 RX ORDER — DEXTROSE MONOHYDRATE 100 MG/ML
INJECTION, SOLUTION INTRAVENOUS CONTINUOUS PRN
Status: DISCONTINUED | OUTPATIENT
Start: 2024-04-18 | End: 2024-04-19 | Stop reason: HOSPADM

## 2024-04-18 RX ORDER — INSULIN LISPRO 100 [IU]/ML
0-4 INJECTION, SOLUTION INTRAVENOUS; SUBCUTANEOUS NIGHTLY
Status: DISCONTINUED | OUTPATIENT
Start: 2024-04-18 | End: 2024-04-19 | Stop reason: HOSPADM

## 2024-04-18 RX ORDER — SODIUM CHLORIDE 0.9 % (FLUSH) 0.9 %
5-40 SYRINGE (ML) INJECTION PRN
Status: DISCONTINUED | OUTPATIENT
Start: 2024-04-18 | End: 2024-04-19 | Stop reason: HOSPADM

## 2024-04-18 RX ORDER — INSULIN GLARGINE 100 [IU]/ML
30 INJECTION, SOLUTION SUBCUTANEOUS 2 TIMES DAILY
Status: DISCONTINUED | OUTPATIENT
Start: 2024-04-18 | End: 2024-04-19

## 2024-04-18 RX ORDER — SODIUM CHLORIDE 0.9 % (FLUSH) 0.9 %
5-40 SYRINGE (ML) INJECTION EVERY 12 HOURS SCHEDULED
Status: DISCONTINUED | OUTPATIENT
Start: 2024-04-18 | End: 2024-04-19 | Stop reason: HOSPADM

## 2024-04-18 RX ORDER — TIZANIDINE 2 MG/1
2 TABLET ORAL 2 TIMES DAILY PRN
COMMUNITY
Start: 2024-04-17

## 2024-04-18 RX ORDER — ATORVASTATIN CALCIUM 40 MG/1
40 TABLET, FILM COATED ORAL NIGHTLY
Status: DISCONTINUED | OUTPATIENT
Start: 2024-04-18 | End: 2024-04-19 | Stop reason: HOSPADM

## 2024-04-18 RX ADMIN — ENOXAPARIN SODIUM 30 MG: 100 INJECTION SUBCUTANEOUS at 21:09

## 2024-04-18 RX ADMIN — SODIUM CHLORIDE 1000 ML: 9 INJECTION, SOLUTION INTRAVENOUS at 14:45

## 2024-04-18 RX ADMIN — OXYCODONE AND ACETAMINOPHEN 1 TABLET: 7.5; 325 TABLET ORAL at 23:51

## 2024-04-18 RX ADMIN — SODIUM CHLORIDE, PRESERVATIVE FREE 10 ML: 5 INJECTION INTRAVENOUS at 21:11

## 2024-04-18 RX ADMIN — ATORVASTATIN CALCIUM 40 MG: 40 TABLET, FILM COATED ORAL at 20:56

## 2024-04-18 RX ADMIN — IOPAMIDOL 80 ML: 755 INJECTION, SOLUTION INTRAVENOUS at 15:16

## 2024-04-18 RX ADMIN — INSULIN GLARGINE 30 UNITS: 100 INJECTION, SOLUTION SUBCUTANEOUS at 21:08

## 2024-04-18 RX ADMIN — GABAPENTIN 300 MG: 300 CAPSULE ORAL at 20:56

## 2024-04-18 RX ADMIN — INSULIN LISPRO 1 UNITS: 100 INJECTION, SOLUTION INTRAVENOUS; SUBCUTANEOUS at 18:19

## 2024-04-18 ASSESSMENT — PAIN DESCRIPTION - PAIN TYPE
TYPE: ACUTE PAIN
TYPE: ACUTE PAIN

## 2024-04-18 ASSESSMENT — PAIN DESCRIPTION - LOCATION
LOCATION: SHOULDER
LOCATION: ARM;BACK
LOCATION: CHEST

## 2024-04-18 ASSESSMENT — PAIN SCALES - GENERAL
PAINLEVEL_OUTOF10: 6
PAINLEVEL_OUTOF10: 7
PAINLEVEL_OUTOF10: 9

## 2024-04-18 ASSESSMENT — PAIN DESCRIPTION - FREQUENCY: FREQUENCY: CONTINUOUS

## 2024-04-18 ASSESSMENT — PAIN - FUNCTIONAL ASSESSMENT
PAIN_FUNCTIONAL_ASSESSMENT: PREVENTS OR INTERFERES SOME ACTIVE ACTIVITIES AND ADLS
PAIN_FUNCTIONAL_ASSESSMENT: ACTIVITIES ARE NOT PREVENTED

## 2024-04-18 ASSESSMENT — PAIN DESCRIPTION - ORIENTATION
ORIENTATION: LEFT

## 2024-04-18 ASSESSMENT — ENCOUNTER SYMPTOMS
SHORTNESS OF BREATH: 0
WHEEZING: 0
ABDOMINAL PAIN: 0
VOMITING: 0
NAUSEA: 0

## 2024-04-18 ASSESSMENT — PAIN DESCRIPTION - DESCRIPTORS
DESCRIPTORS: ACHING;SORE
DESCRIPTORS: SHARP
DESCRIPTORS: SHARP;ACHING

## 2024-04-18 NOTE — ED TRIAGE NOTES
Pt complaining of chest pain on arrival. Pt states the pain shoots down his arm and is in his back. Pt very unsteady on his feet, states this is baseline. Pt rates pain 9/10. Pt took a percocet, zanaflex, and gabapentin PTA

## 2024-04-18 NOTE — ED NOTES
Medication History  [facility]    Patient Name: Abelardo Marquis 1940     Medication history has been completed by: Fariha Cerrato CP    Source(s) of information: patient insurance claims retail pharmacy     Primary Care Physician: Georgie Hirsch MD     Pharmacy: [facility]    Allergies as of 04/18/2024 - Fully Reviewed 04/18/2024   Allergen Reaction Noted    Sulfa antibiotics Hives 05/12/2013        Prior to Admission medications    Medication Sig Start Date End Date Taking? Authorizing Provider   carvedilol (COREG) 25 MG tablet Take 1 tablet by mouth 2 times daily (with meals) 4/3/24  Yes Tj Morris MD   tiZANidine (ZANAFLEX) 2 MG tablet Take 1 tablet by mouth 2 times daily as needed 4/17/24  Yes jT Morris MD   VICTOZA 18 MG/3ML SOPN SC injection Inject 1.2 mg into the skin Daily with lunch 4/9/24  Yes Tj Morris MD   gabapentin (NEURONTIN) 800 MG tablet Take 1 tablet by mouth 3 times daily. 4/17/24  Yes Tj Morris MD   amLODIPine (NORVASC) 2.5 MG tablet Take 1 tablet by mouth daily 4/8/24   Cecile Murray MD   losartan-hydroCHLOROthiazide (HYZAAR) 100-25 MG per tablet Take 1 tablet by mouth daily 4/5/24   Cecile Murray MD   Semaglutide,0.25 or 0.5MG/DOS, 2 MG/3ML SOPN Inject 0.25 mg into the skin once a week 1/5/24   Cecile Murray MD   insulin glargine (BASAGLAR KWIKPEN) 100 UNIT/ML injection pen Inject 40-50 Units into the skin 2 times daily Patient reports he does sliding scale    Tj Morris MD   FLUoxetine (PROZAC) 40 MG capsule Take 1 capsule by mouth daily    Tj Morris MD   empagliflozin (JARDIANCE) 25 MG tablet Take 1 tablet by mouth daily    Tj Morris MD   metFORMIN (GLUCOPHAGE) 500 MG tablet Take 1 tablet by mouth 2 times daily (with meals)    Tj Morris MD   vitamin B-12 (CYANOCOBALAMIN) 1000 MCG tablet Take 1 tablet by mouth daily    Tj Morris

## 2024-04-18 NOTE — ED NOTES
ED TO INPATIENT SBAR HANDOFF    Patient Name: Abelardo Marquis   :  1940  83 y.o.   Preferred Name  Abelardo  Family/Caregiver Present no   Restraints no   C-SSRS:    Sitter no   Sepsis Risk Score Sepsis Risk Score: 1.69      Situation  Chief Complaint   Patient presents with    Chest Pain     Pt took Percocet, zanaflex, and gabapentin PTA, took nitro yesterday      Brief Description of Patient's Condition: Pt came into ED because he was having CP. Pt states that he moved a box of bibles and after that he hurt his left arm and the pain is going into his left shoulder and neck  Mental Status: oriented, alert, coherent, logical, thought processes intact, and able to concentrate and follow conversation  Arrived from: home    Imaging:   CTA CHEST W CONTRAST   Final Result      1.  No change. No evidence of aneurysm or dissection. No central pulmonary arterial filling defect.   2.  No change. Cirrhosis. Splenomegaly.   3.  No suspicious pulmonary lesion. Low lung volumes. Mild dependent groundglass opacities and atelectasis.      ----------PERT DATA----------      Data reported only if pulmonary embolism is present:      Most central extent of clot:   NA      RV/LV ratio:   NA      ----------PERT DATA----------      Electronically signed by Everardo Bonner DO      CT HEAD WO CONTRAST   Final Result      No change. No acute intracranial hemorrhage.       Electronically signed by Everardo Bonner DO      XR SHOULDER LEFT (MIN 2 VIEWS)   Final Result      No acute findings      Electronically signed by Eric James MD      XR CHEST PORTABLE   Final Result      1. No evidence for acute cardiopulmonary disease         Electronically signed by Eric James MD        Abnormal labs:   Abnormal Labs Reviewed   CBC WITH AUTO DIFFERENTIAL - Abnormal; Notable for the following components:       Result Value    Platelets 130 (*)     Monocytes % 8.6 (*)     Immature Neutrophil % 1.7 (*)     All other components within normal limits  10044    Electronically signed by: Electronically signed by Mary Beth Aguillon RN on 4/18/2024 at 4:51 PM

## 2024-04-18 NOTE — ED PROVIDER NOTES
[facility]  EMERGENCY DEPARTMENT ENCOUNTER      Pt Name: Abelardo Marquis  MRN: [id number]  Birthdate 1940  Date of evaluation: 4/18/2024  Provider: DONNA Ryan - FRANCISCA  PCP: Georgie Hirsch MD  Note Started: 1:18 PM EDT 4/18/24    I am the Primary Clinician of Record.   I have seen and evaluated this patient with my supervising physician Wesley Aguirre DO.  CHIEF COMPLAINT       Chief Complaint   Patient presents with    Chest Pain     Pt took Percocet, zanaflex, and gabapentin PTA, took nitro yesterday      HISTORY OF PRESENT ILLNESS: 1 or more Elements   History from : Patient    Limitations to history : None    Abelardo Marquis is a 83 y.o. male with past medical history of with history of hypertension, CAD, stage III CKD and diabetes melitis, who presents to the ER with chief complaint of chest pain. He reports he has pain is in the left shoulder. It radiates down the left arm and into his left neck.  He has have been having intermittent left sided chest pain.  He took nitro for it yesterday.  Last episode of chest pain 2 hours prior to coming to the emergency department.. He reports generalized weakness stating he can't get the energy to get up and around, shower or do other adls at this point     I have reviewed the nursing triage documentation and agree unless otherwise noted.    REVIEW OF SYSTEMS :    Review of Systems   Constitutional:  Positive for fatigue. Negative for fever.   Respiratory:  Negative for shortness of breath and wheezing.    Cardiovascular:  Positive for chest pain and leg swelling.   Gastrointestinal:  Negative for abdominal pain, nausea and vomiting.   Musculoskeletal:  Positive for neck pain.   Neurological:  Positive for headaches.     Positives and Pertinent negatives as per HPI.   SURGICAL HISTORY     Past Surgical History:   Procedure Laterality Date    BACK SURGERY      CARDIAC SURGERY      stents x 1    CHOLECYSTECTOMY      May 2013    DILATATION, ESOPHAGUS

## 2024-04-18 NOTE — ED PROVIDER NOTES
TRISH Aguirre D.O. am the primary physician of record. I personally saw the patient and made/approved the management plan and take responsibility for the patient management. I independently examined and evaluated Ableardo Marquis.    In brief their history revealed a 83 y.o. male with past medical history of with history of hypertension, CAD, stage III CKD and diabetes melitis, who presents to the ER with chief complaint of chest pain. He reports he has pain is in the left shoulder. It radiates down the left arm and into his left neck.  He has have been having intermittent left sided chest pain.  He took nitro for it yesterday.  Last episode of chest pain 2 hours prior to coming to the emergency department.. He reports generalized weakness stating he can't get the energy to get up and around, shower or do other adls at this point       Their focused exam revealed alert oriented male resting in bed no distress normocephalic atraumatic sclera clear lungs clear heart regular rate rhythm 2+ pulses throughout no carotid bruits no facial droop no slurred speech.  Paresthesias down left arm.  Decreased range of motion of left shoulder secondary to pain, weakness does have left shoulder pain on palpation no obvious deformity or trauma.  Breath sounds are clear abdomen soft nontender bowel sounds present normal.  Otherwise 5-5 strength throughout skin has no other rash or swelling cranial nerves otherwise intact.    ED course/MDM: Patient seen with NP please see her note.  Patient complained of chest pain left shoulder pain, left arm numbness and weakness headache.  He said symptoms for the past couple days.  This all started after moving a box of Bibles.  He denies any injury or trauma otherwise.  No other questions or concerns he does have a history of heart disease.  Also history of kidney disease.  He does have some left shoulder pain on palpation decreased range of motion.  He has good pulses subjective  paresthesias down left arm.  Breath sounds are clear abdomen soft nontender.  Did do broad workup including EKG troponin imaging.  Cannot do contrast study because of kidney function of his head neck CTA of the chest shows no dissection no PE troponin is elevated 89 stayed the same for repeat.  Did talk to cardiology recommends trending troponins before putting on heparin EKG is normal x 2 no STEMI.  Due to his symptoms of chest pain left shoulder pain shortness of breath will admit to hospital medicine who I talked to cardiology will follow.  He was hypotensive on arrival unclear etiology he took a nitro yesterday has taken some most relaxers and pain medicine, denies other questions or concerns patient otherwise stable admitted..    12 lead EKG per my interpretation:  Normal Sinus Rhythm 69 first degree AV block  Axis is   Normal  QTc is   480  There is no specific T wave changes appreciated.  There is no specific ST wave changes appreciated.    Prior EKG to compare with was available and similar to previous      12 lead EKG per my interpretation #2:  Normal Sinus Rhythm 65  Axis is   Normal  QTc is   476  There is no specific T wave changes appreciated.  There is no specific ST wave changes appreciated.    Prior EKG to compare with was not available       All diagnostic, treatment, and disposition decisions were made by myself in conjunction with the Advanced Practice Provider.    For all further details of the patient's emergency department visit, please see the Advanced Practice Provider's documentation.       Wesley Aguirre,   04/18/24 1803

## 2024-04-18 NOTE — H&P
PRN  sodium chloride, , PRN  ondansetron, 4 mg, Q8H PRN   Or  ondansetron, 4 mg, Q6H PRN  polyethylene glycol, 17 g, Daily PRN  acetaminophen, 650 mg, Q6H PRN   Or  acetaminophen, 650 mg, Q6H PRN        Labs      CBC:   Recent Labs     04/18/24  1319   WBC 4.6   HGB 13.7   *     BMP:    Recent Labs     04/18/24  1319   *   K 4.4   CL 93*   CO2 24   BUN 69*   CREATININE 2.0*   GLUCOSE 371*     Hepatic:   Recent Labs     04/18/24  1319   AST 22   ALT 27   BILITOT 0.6   ALKPHOS 86     Lipids:   Lab Results   Component Value Date/Time    CHOL 152 03/31/2023 04:27 AM    HDL 38 03/31/2023 04:27 AM    TRIG 123 03/31/2023 04:27 AM     Hemoglobin A1C:   Lab Results   Component Value Date/Time    LABA1C 8.2 03/30/2023 04:28 AM     TSH: No results found for: \"TSH\"  Troponin:   Lab Results   Component Value Date/Time    TROPONINT 0.018 03/30/2023 01:02 AM    TROPONINT 0.028 03/29/2023 06:07 PM    TROPONINT 0.048 11/27/2022 10:56 AM     Lactic Acid: No results for input(s): \"LACTA\" in the last 72 hours.  BNP:   Recent Labs     04/18/24  1319   PROBNP 632.6*     UA:  Lab Results   Component Value Date/Time    NITRU NEGATIVE 09/20/2020 02:50 AM    COLORU YELLOW 05/23/2023 10:19 AM    COLORU YELLOW 09/20/2020 02:50 AM    WBCUA 0-5 05/23/2023 10:19 AM    RBCUA 0-2 05/23/2023 10:19 AM    MUCUS RARE 09/20/2020 02:50 AM    TRICHOMONAS NONE SEEN 09/20/2020 02:50 AM    BACTERIA NONE SEEN 05/23/2023 10:19 AM    BACTERIA RARE 09/20/2020 02:50 AM    CLARITYU CLEAR 05/23/2023 10:19 AM    SPECGRAV 1.018 09/20/2020 02:50 AM    LEUKOCYTESUR NEGATIVE 05/23/2023 10:19 AM    UROBILINOGEN <2 05/23/2023 10:19 AM    BILIRUBINUR NEGATIVE 05/23/2023 10:19 AM    BLOODU NEGATIVE 09/20/2020 02:50 AM    GLUCOSEU >1000 05/23/2023 10:19 AM    KETUA NEGATIVE 05/23/2023 10:19 AM     Urine Cultures: No results found for: \"LABURIN\"  Blood Cultures: No results found for: \"BC\"  No results found for: \"BLOODCULT2\"  Organism: No results found for:  abnormality. Chronic elevation of the left hemidiaphragm. OTHER: None.     1. No evidence for acute cardiopulmonary disease Electronically signed by Eric James MD        Electronically signed by Nacho Mora MD on 4/18/2024 at 5:15 PM

## 2024-04-19 ENCOUNTER — APPOINTMENT (OUTPATIENT)
Dept: NON INVASIVE DIAGNOSTICS | Age: 84
End: 2024-04-19
Attending: INTERNAL MEDICINE
Payer: MEDICARE

## 2024-04-19 VITALS
OXYGEN SATURATION: 97 % | WEIGHT: 224 LBS | RESPIRATION RATE: 18 BRPM | HEIGHT: 71 IN | TEMPERATURE: 98 F | DIASTOLIC BLOOD PRESSURE: 57 MMHG | HEART RATE: 75 BPM | SYSTOLIC BLOOD PRESSURE: 135 MMHG | BODY MASS INDEX: 31.36 KG/M2

## 2024-04-19 LAB
ANION GAP SERPL CALCULATED.3IONS-SCNC: 18 MMOL/L (ref 7–16)
APTT: 40 SECONDS (ref 25.1–37.1)
BASOPHILS ABSOLUTE: 0 K/CU MM
BASOPHILS RELATIVE PERCENT: 0.6 % (ref 0–1)
BUN SERPL-MCNC: 65 MG/DL (ref 6–23)
CALCIUM SERPL-MCNC: 8.3 MG/DL (ref 8.3–10.6)
CHLORIDE BLD-SCNC: 100 MMOL/L (ref 99–110)
CO2: 19 MMOL/L (ref 21–32)
CREAT SERPL-MCNC: 1.7 MG/DL (ref 0.9–1.3)
DIFFERENTIAL TYPE: ABNORMAL
ECHO AV AREA PEAK VELOCITY: 3.8 CM2
ECHO AV AREA VTI: 3.2 CM2
ECHO AV AREA/BSA PEAK VELOCITY: 1.7 CM2/M2
ECHO AV AREA/BSA VTI: 1.5 CM2/M2
ECHO AV MEAN GRADIENT: 59 MMHG
ECHO AV MEAN VELOCITY: 3.4 M/S
ECHO AV PEAK GRADIENT: 108 MMHG
ECHO AV PEAK VELOCITY: 5.2 M/S
ECHO AV VELOCITY RATIO: 0.92
ECHO AV VTI: 109 CM
ECHO BSA: 2.25 M2
ECHO LA AREA 4C: 23.1 CM2
ECHO LA DIAMETER INDEX: 1.5 CM/M2
ECHO LA DIAMETER: 3.3 CM
ECHO LA MAJOR AXIS: 6.3 CM
ECHO LA VOL MOD A4C: 71 ML (ref 18–58)
ECHO LA VOLUME INDEX MOD A4C: 32 ML/M2 (ref 16–34)
ECHO LV E' LATERAL VELOCITY: 8 CM/S
ECHO LV E' SEPTAL VELOCITY: 5 CM/S
ECHO LV EDV A4C: 125 ML
ECHO LV EDV INDEX A4C: 57 ML/M2
ECHO LV EJECTION FRACTION A4C: 57 %
ECHO LV ESV A4C: 54 ML
ECHO LV ESV INDEX A4C: 25 ML/M2
ECHO LV FRACTIONAL SHORTENING: 54 % (ref 28–44)
ECHO LV INTERNAL DIMENSION DIASTOLE INDEX: 2.45 CM/M2
ECHO LV INTERNAL DIMENSION DIASTOLIC: 5.4 CM (ref 4.2–5.9)
ECHO LV INTERNAL DIMENSION SYSTOLIC INDEX: 1.14 CM/M2
ECHO LV INTERNAL DIMENSION SYSTOLIC: 2.5 CM
ECHO LV IVSD: 1.1 CM (ref 0.6–1)
ECHO LV MASS 2D: 234.8 G (ref 88–224)
ECHO LV MASS INDEX 2D: 106.7 G/M2 (ref 49–115)
ECHO LV POSTERIOR WALL DIASTOLIC: 1.1 CM (ref 0.6–1)
ECHO LV RELATIVE WALL THICKNESS RATIO: 0.41
ECHO LVOT AREA: 4.2 CM2
ECHO LVOT AV VTI INDEX: 0.78
ECHO LVOT DIAM: 2.3 CM
ECHO LVOT MEAN GRADIENT: 55 MMHG
ECHO LVOT PEAK GRADIENT: 91 MMHG
ECHO LVOT PEAK VELOCITY: 4.8 M/S
ECHO LVOT STROKE VOLUME INDEX: 159.7 ML/M2
ECHO LVOT SV: 351.3 ML
ECHO LVOT VTI: 84.6 CM
ECHO MV A VELOCITY: 1.26 M/S
ECHO MV E VELOCITY: 1.07 M/S
ECHO MV E/A RATIO: 0.85
ECHO MV E/E' LATERAL: 13.38
ECHO MV E/E' RATIO (AVERAGED): 17.39
EKG ATRIAL RATE: 65 BPM
EKG ATRIAL RATE: 70 BPM
EKG DIAGNOSIS: NORMAL
EKG DIAGNOSIS: NORMAL
EKG P AXIS: 35 DEGREES
EKG P AXIS: 58 DEGREES
EKG P-R INTERVAL: 224 MS
EKG P-R INTERVAL: 238 MS
EKG Q-T INTERVAL: 452 MS
EKG Q-T INTERVAL: 458 MS
EKG QRS DURATION: 102 MS
EKG QRS DURATION: 102 MS
EKG QTC CALCULATION (BAZETT): 476 MS
EKG QTC CALCULATION (BAZETT): 488 MS
EKG R AXIS: 13 DEGREES
EKG R AXIS: 6 DEGREES
EKG T AXIS: 103 DEGREES
EKG T AXIS: 83 DEGREES
EKG VENTRICULAR RATE: 65 BPM
EKG VENTRICULAR RATE: 70 BPM
EOSINOPHILS ABSOLUTE: 0.1 K/CU MM
EOSINOPHILS RELATIVE PERCENT: 2.5 % (ref 0–3)
GFR SERPL CREATININE-BSD FRML MDRD: 40 ML/MIN/1.73M2
GLUCOSE BLD-MCNC: 440 MG/DL (ref 70–99)
GLUCOSE BLD-MCNC: 465 MG/DL (ref 70–99)
GLUCOSE SERPL-MCNC: 325 MG/DL (ref 70–99)
HCT VFR BLD CALC: 41.4 % (ref 42–52)
HEMOGLOBIN: 13.3 GM/DL (ref 13.5–18)
IMMATURE NEUTROPHIL %: 1.2 % (ref 0–0.43)
INR BLD: 1.1 INDEX
LYMPHOCYTES ABSOLUTE: 1.4 K/CU MM
LYMPHOCYTES RELATIVE PERCENT: 29.5 % (ref 24–44)
MCH RBC QN AUTO: 30.1 PG (ref 27–31)
MCHC RBC AUTO-ENTMCNC: 32.1 % (ref 32–36)
MCV RBC AUTO: 93.7 FL (ref 78–100)
MONOCYTES ABSOLUTE: 0.4 K/CU MM
MONOCYTES RELATIVE PERCENT: 9.1 % (ref 0–4)
NEUTROPHILS RELATIVE PERCENT: 57.1 % (ref 36–66)
NUCLEATED RBC %: 0 %
PDW BLD-RTO: 13.6 % (ref 11.7–14.9)
PLATELET # BLD: 114 K/CU MM (ref 140–440)
PMV BLD AUTO: 10.6 FL (ref 7.5–11.1)
POTASSIUM SERPL-SCNC: 4.6 MMOL/L (ref 3.5–5.1)
PROTHROMBIN TIME: 14.9 SECONDS (ref 11.7–14.5)
RBC # BLD: 4.42 M/CU MM (ref 4.6–6.2)
SEGMENTED NEUTROPHILS ABSOLUTE COUNT: 2.7 K/CU MM
SODIUM BLD-SCNC: 137 MMOL/L (ref 135–145)
TOTAL IMMATURE NEUTOROPHIL: 0.06 K/CU MM
TOTAL NUCLEATED RBC: 0 K/CU MM
TOTAL RETICULOCYTE COUNT: 0.08 K/CU MM
TROPONIN, HIGH SENSITIVITY: 63 NG/L (ref 0–22)
WBC # BLD: 4.8 K/CU MM (ref 4–10.5)

## 2024-04-19 PROCEDURE — 99223 1ST HOSP IP/OBS HIGH 75: CPT | Performed by: INTERNAL MEDICINE

## 2024-04-19 PROCEDURE — 85730 THROMBOPLASTIN TIME PARTIAL: CPT

## 2024-04-19 PROCEDURE — 6360000002 HC RX W HCPCS: Performed by: STUDENT IN AN ORGANIZED HEALTH CARE EDUCATION/TRAINING PROGRAM

## 2024-04-19 PROCEDURE — 6370000000 HC RX 637 (ALT 250 FOR IP): Performed by: STUDENT IN AN ORGANIZED HEALTH CARE EDUCATION/TRAINING PROGRAM

## 2024-04-19 PROCEDURE — 36415 COLL VENOUS BLD VENIPUNCTURE: CPT

## 2024-04-19 PROCEDURE — C8929 TTE W OR WO FOL WCON,DOPPLER: HCPCS

## 2024-04-19 PROCEDURE — 85610 PROTHROMBIN TIME: CPT

## 2024-04-19 PROCEDURE — 84484 ASSAY OF TROPONIN QUANT: CPT

## 2024-04-19 PROCEDURE — 94761 N-INVAS EAR/PLS OXIMETRY MLT: CPT

## 2024-04-19 PROCEDURE — 93306 TTE W/DOPPLER COMPLETE: CPT | Performed by: INTERNAL MEDICINE

## 2024-04-19 PROCEDURE — 82962 GLUCOSE BLOOD TEST: CPT

## 2024-04-19 PROCEDURE — 6370000000 HC RX 637 (ALT 250 FOR IP): Performed by: INTERNAL MEDICINE

## 2024-04-19 PROCEDURE — 6360000004 HC RX CONTRAST MEDICATION

## 2024-04-19 PROCEDURE — 80048 BASIC METABOLIC PNL TOTAL CA: CPT

## 2024-04-19 PROCEDURE — 85025 COMPLETE CBC W/AUTO DIFF WBC: CPT

## 2024-04-19 PROCEDURE — 93010 ELECTROCARDIOGRAM REPORT: CPT | Performed by: INTERNAL MEDICINE

## 2024-04-19 PROCEDURE — 2580000003 HC RX 258: Performed by: STUDENT IN AN ORGANIZED HEALTH CARE EDUCATION/TRAINING PROGRAM

## 2024-04-19 RX ORDER — ISOSORBIDE MONONITRATE 30 MG/1
30 TABLET, EXTENDED RELEASE ORAL DAILY
Status: DISCONTINUED | OUTPATIENT
Start: 2024-04-19 | End: 2024-04-19 | Stop reason: HOSPADM

## 2024-04-19 RX ORDER — INSULIN GLARGINE 100 [IU]/ML
40 INJECTION, SOLUTION SUBCUTANEOUS 2 TIMES DAILY
Status: DISCONTINUED | OUTPATIENT
Start: 2024-04-19 | End: 2024-04-19 | Stop reason: HOSPADM

## 2024-04-19 RX ORDER — ISOSORBIDE MONONITRATE 30 MG/1
30 TABLET, EXTENDED RELEASE ORAL DAILY
Qty: 30 TABLET | Refills: 3 | Status: SHIPPED | OUTPATIENT
Start: 2024-04-20

## 2024-04-19 RX ORDER — INSULIN LISPRO 100 [IU]/ML
10 INJECTION, SOLUTION INTRAVENOUS; SUBCUTANEOUS
Status: DISCONTINUED | OUTPATIENT
Start: 2024-04-19 | End: 2024-04-19 | Stop reason: HOSPADM

## 2024-04-19 RX ORDER — INSULIN LISPRO 100 [IU]/ML
5 INJECTION, SOLUTION INTRAVENOUS; SUBCUTANEOUS
Status: DISCONTINUED | OUTPATIENT
Start: 2024-04-19 | End: 2024-04-19

## 2024-04-19 RX ADMIN — INSULIN GLARGINE 30 UNITS: 100 INJECTION, SOLUTION SUBCUTANEOUS at 09:19

## 2024-04-19 RX ADMIN — PERFLUTREN 2 ML: 6.52 INJECTION, SUSPENSION INTRAVENOUS at 13:07

## 2024-04-19 RX ADMIN — INSULIN LISPRO 4 UNITS: 100 INJECTION, SOLUTION INTRAVENOUS; SUBCUTANEOUS at 09:18

## 2024-04-19 RX ADMIN — CLOPIDOGREL BISULFATE 75 MG: 75 TABLET ORAL at 09:20

## 2024-04-19 RX ADMIN — ENOXAPARIN SODIUM 30 MG: 100 INJECTION SUBCUTANEOUS at 09:20

## 2024-04-19 RX ADMIN — CHOLESTYRAMINE 4 G: 4 POWDER, FOR SUSPENSION ORAL at 09:18

## 2024-04-19 RX ADMIN — GABAPENTIN 300 MG: 300 CAPSULE ORAL at 09:19

## 2024-04-19 RX ADMIN — INSULIN LISPRO 5 UNITS: 100 INJECTION, SOLUTION INTRAVENOUS; SUBCUTANEOUS at 11:50

## 2024-04-19 RX ADMIN — OXYCODONE AND ACETAMINOPHEN 1 TABLET: 7.5; 325 TABLET ORAL at 06:29

## 2024-04-19 RX ADMIN — ISOSORBIDE MONONITRATE 30 MG: 30 TABLET, EXTENDED RELEASE ORAL at 09:47

## 2024-04-19 RX ADMIN — INSULIN LISPRO 10 UNITS: 100 INJECTION, SOLUTION INTRAVENOUS; SUBCUTANEOUS at 11:57

## 2024-04-19 RX ADMIN — GABAPENTIN 300 MG: 300 CAPSULE ORAL at 13:23

## 2024-04-19 RX ADMIN — OXYCODONE AND ACETAMINOPHEN 1 TABLET: 7.5; 325 TABLET ORAL at 13:23

## 2024-04-19 RX ADMIN — FLUOXETINE 40 MG: 10 CAPSULE ORAL at 09:19

## 2024-04-19 RX ADMIN — SODIUM CHLORIDE, PRESERVATIVE FREE 10 ML: 5 INJECTION INTRAVENOUS at 09:20

## 2024-04-19 RX ADMIN — CARVEDILOL 3.12 MG: 6.25 TABLET, FILM COATED ORAL at 09:20

## 2024-04-19 RX ADMIN — INSULIN LISPRO 4 UNITS: 100 INJECTION, SOLUTION INTRAVENOUS; SUBCUTANEOUS at 11:50

## 2024-04-19 ASSESSMENT — ENCOUNTER SYMPTOMS
TROUBLE SWALLOWING: 0
SHORTNESS OF BREATH: 0
VOICE CHANGE: 0
WHEEZING: 0
ABDOMINAL PAIN: 0
BACK PAIN: 0
SINUS PRESSURE: 0
VOMITING: 0
SINUS PAIN: 0
SORE THROAT: 0
NAUSEA: 0
COLOR CHANGE: 0
CHEST TIGHTNESS: 0
COUGH: 0
DIARRHEA: 0
CONSTIPATION: 0

## 2024-04-19 ASSESSMENT — PAIN DESCRIPTION - DESCRIPTORS
DESCRIPTORS: ACHING
DESCRIPTORS: SORE

## 2024-04-19 ASSESSMENT — PAIN - FUNCTIONAL ASSESSMENT: PAIN_FUNCTIONAL_ASSESSMENT: PREVENTS OR INTERFERES SOME ACTIVE ACTIVITIES AND ADLS

## 2024-04-19 ASSESSMENT — PAIN SCALES - WONG BAKER: WONGBAKER_NUMERICALRESPONSE: NO HURT

## 2024-04-19 ASSESSMENT — PAIN DESCRIPTION - LOCATION
LOCATION: SHOULDER
LOCATION: BACK;SHOULDER

## 2024-04-19 ASSESSMENT — PAIN SCALES - GENERAL
PAINLEVEL_OUTOF10: 8
PAINLEVEL_OUTOF10: 0
PAINLEVEL_OUTOF10: 7

## 2024-04-19 ASSESSMENT — PAIN DESCRIPTION - ORIENTATION
ORIENTATION: LEFT;MID
ORIENTATION: LEFT

## 2024-04-19 NOTE — DISCHARGE INSTRUCTIONS
Please discontinue taking your Coreg and start metoprolol instead.  Please follow-up with cardiology in the next week.     The hospital cardiologist recommend speaking to your doctor about doing a MANUEL as an outpatient.

## 2024-04-19 NOTE — PLAN OF CARE
Problem: Discharge Planning  Goal: Discharge to home or other facility with appropriate resources  4/19/2024 1434 by Toyin Peterson RN  Outcome: Progressing  4/19/2024 0631 by Aysha Knutson RN  Outcome: Progressing     Problem: Safety - Adult  Goal: Free from fall injury  4/19/2024 1434 by Toyin Peterson RN  Outcome: Progressing  4/19/2024 0631 by Aysha Knutson, RN  Outcome: Progressing

## 2024-04-19 NOTE — PROGRESS NOTES
V2.0  [facility] Hospitalist Progress Note      Name:  Abelardo Marquis /Age/Sex: 1940  (83 y.o. male)   MRN & CSN:  [id number] & [id number] Encounter Date/Time: 2024 12:17 PM EDT    Location:  [address] [place] [address]-A PCP: Georgie Hirsch MD       Hospital Day: 2    Assessment and Plan:   Abelardo Marquis is a 83 y.o. male  who presents with Chest pain      Plan:    Chest pain, CAD status post stents  -Troponin elevated to 89.  EKG without any ST-T changes.  -Cardiology was consulted.    -had cardiac cath on 2023 which had severe proximal LAD lesion and was stented.  -Continue Plavix and statin.  -Follow-up echocardiogram     Hypotension  -Holding home losartan, hydrochlorothiazide, amlodipine.  Resuscitated IV fluids.  Decrease dose of beta-blocker.     Type 2 diabetes mellitus  -Restart insulin Lantus at low-dose.  Sliding scale insulin.  Hypoglycemia protocol ordered.     Chronic pain  -Continue Percocet.     CKD stage IIIa  -Creatinine around baseline.     History of cirrhosis  -Appears compensated.    Diet ADULT DIET; Regular; 4 carb choices (60 gm/meal)   DVT Prophylaxis [x] Lovenox, []  Heparin, [] SCDs, [] Ambulation,    [] Eliquis, [] Xarelto  [] Coumadin [] other   Code Status Full Code   Disposition From: Home  Expected Disposition: Home  Estimated Date of Discharge: 1 to 2 days  Patient requires continued admission due to cardiology eval for chest pain   Surrogate Decision Maker/ POA      Personally reviewed Lab Studies and Imaging     Discussed management of the case with cardiology who recommended echocardiogram    EKG interpreted personally and results no acute ST changes    Imaging that was interpreted personally includes CTA and results no pulmonary embolism    Subjective:     Chief Complaint: Chest Pain (Pt took Percocet, zanaflex, and gabapentin PTA, took nitro yesterday )       Patient is now chest pain-free.  Will follow-up echocardiogram for next steps.    Review of Systems:    Review of Systems  SINUSES AND MASTOIDS: Medial antrostomy defects. Mild paranasal sinus and mastoid air cell opacification. ORBITS: Surgically absent ocular lenses. EXTRACRANIAL SOFT TISSUES: Normal.     No change. No acute intracranial hemorrhage. Electronically signed by Everardo Bonner DO    XR SHOULDER LEFT (MIN 2 VIEWS)    Result Date: 4/18/2024  EXAM: Left shoulder 3 views INDICATION: left shoulder pain COMPARISON: None FINDINGS: Osseous structures are intact. There is no dislocation. Narrowing of the subacromial space suggestive of chronic rotator cuff insufficiency. No evidence for significant arthritis.     No acute findings Electronically signed by Eric James MD    XR CHEST PORTABLE    Result Date: 4/18/2024  EXAM: PORTABLE AP CHEST X-RAY, 4/18/2024 INDICATION: Chest pain, COMPARISON: 4/10/2023 FINDINGS: HEART / MEDIASTINUM: Stable LUNGS/PLEURA: No focal consolidation, pulmonary edema, pneumothorax or pleural effusion; lungs are expiratory BONES / SOFT TISSUES: No acute abnormality. Chronic elevation of the left hemidiaphragm. OTHER: None.     1. No evidence for acute cardiopulmonary disease Electronically signed by Eric James MD      Comment: Please note this report has been produced using speech recognition software and may contain errors related to that system including errors in grammar, punctuation, and spelling, as well as words and phrases that may be inappropriate. If there are any questions or concerns please feel free to contact the dictating provider for clarification.     Electronically signed by Juan Luis Hamm MD on 4/19/2024 at 12:17 PM

## 2024-04-19 NOTE — CONSULTS
INPATIENT CARDIOLOGY CONSULT NOTE         Reason for consultation:  ? CP, Left shoulder Pain     History of present illness:Abelardo is a 83 y.o.year old who is admitted with   Chief Complaint   Patient presents with    Chest Pain     Pt took Percocet, zanaflex, and gabapentin PTA, took nitro yesterday         Patient is a 83-year-old male who follows up with Dr. Thaddeus Moore, [facility]  Patient has prior medical history significant for PCI 2013 ramus intermedius,  PCI LAD at [facility] after IFR of 0.86 by Dr. Thaddeus Moore    Patient presents to the hospital with chief complaint of primarily left shoulder pain, he also complains of intermittent chest pain.  He has tried Percocet gabapentin teen as well as nitro without much help.    EKG shows sinus rhythm  Cardiac troponin positive however no downtrend and non-ACS.       Pertinent Lab Personally Review     Recent Labs     04/19/24  0352   WBC 4.8   HGB 13.3*   HCT 41.4*   *      Recent Labs     04/19/24  0352      K 4.6      CO2 19*   BUN 65*   CREATININE 1.7*     Recent Labs     04/18/24  1319   AST 22   ALT 27   BILITOT 0.6   ALKPHOS 86     Lab Results   Component Value Date    PROBNP 632.6 (H) 04/18/2024    PROBNP 812.0 (H) 04/10/2023    PROBNP 1,792 (H) 03/29/2023         Past medical history:    has a past medical history of Arthritis, Diabetes mellitus (HCC), GERD (gastroesophageal reflux disease), Gout, Hyperlipidemia, Hypertension, and MI (myocardial infarction) ([facility]).  Past surgical history:   has a past surgical history that includes Total knee arthroplasty (Bilateral); back surgery; Cholecystectomy; Cardiac surgery; Dilatation, esophagus; sinus surgery; joint replacement; Hand surgery; and sinus surgery.  Social History:   reports that he has never smoked. He has never used smokeless tobacco. He reports that he does not drink alcohol and does not use drugs.  Family history:   no family history of CAD, STROKE of  30 mg at 04/19/24 0920    ondansetron (ZOFRAN-ODT) disintegrating tablet 4 mg  4 mg Oral Q8H PRN Nacho Mora MD        Or    ondansetron (ZOFRAN) injection 4 mg  4 mg IntraVENous Q6H PRN Nacho Mora MD        polyethylene glycol (GLYCOLAX) packet 17 g  17 g Oral Daily PRN Nacho Mora MD        acetaminophen (TYLENOL) tablet 650 mg  650 mg Oral Q6H PRN Nacho Mora MD        Or    acetaminophen (TYLENOL) suppository 650 mg  650 mg Rectal Q6H PRN Nacho Mora MD             Review of Systems:     Constitutional: No Weight Loss   Eyes: No Decreased Vision  ENT: No Headaches   Cardiovascular:  no chest pain,  no dyspnea on exertion,   no palpitations or loss of consciousness  Respiratory: No cough or wheezing    Gastrointestinal: No abdominal pain  Genitourinary: No dysuria, trouble voiding   Musculoskeletal:  No gait disturbance, weakness or joint complaints  Integumentary: No rash or pruritis  Neurological:  No TIA or stroke symptoms  Psychiatric: No anxiety    Endocrine: No malaise, fatigue   Hematologic/Lymphatic: No bleeding problems, blood clots   Allergic/Immunologic: No nasal congestion     All other systems were reviewed and were negative otherwise.         Physical Examination:      Vitals:    04/19/24 1400   BP:    Pulse: 75   Resp: 18   Temp:    SpO2: 97%      Wt Readings from Last 3 Encounters:   04/19/24 101.6 kg (224 lb)   04/05/24 111.1 kg (245 lb)   01/05/24 113.9 kg (251 lb)     Body mass index is 31.68 kg/m².    General Appearance:  No distress  Constitutional:  Well developed   HEENT:  Normocephalic, Atraumatic   Eyes:   No discharge.   Respiratory:    Normal breath sounds,  No respiratory distress, No wheezing    No chest wall Tenderness   Cardiovascular: S1-S2  no murmurs auscultated.  Pedal pulses are normal. Nopedal edema  GI:  Soft Non tender, non distended.   Musculoskeletal:   No tenderness   Integument:  Warm   Lymphatic:  No lymphadenopathy noted.   Neurologic:  Alert

## 2024-04-19 NOTE — CARE COORDINATION
04/19/24 1243   Service Assessment   Patient Orientation Alert and Oriented   Cognition Alert   History Provided By Patient   Primary Caregiver Self   Support Systems Family Members   Patient's Healthcare Decision Maker is: Legal Next of Kin   PCP Verified by CM Yes   Prior Functional Level Assistance with the following:;Mobility   Current Functional Level Assistance with the following:;Mobility   Can patient return to prior living arrangement Yes   Ability to make needs known: Good   Family able to assist with home care needs: Yes   Would you like for me to discuss the discharge plan with any other family members/significant others, and if so, who? No   Financial Resources Medicare   Community Resources None     CM in to see Pt to initiate discharge planning.  Pt from home.  Pt states he is independent with ADL's and drives.  Pt denies any needs at this time.  CM following

## 2024-04-22 ENCOUNTER — CARE COORDINATION (OUTPATIENT)
Dept: CASE MANAGEMENT | Age: 84
End: 2024-04-22

## 2024-04-22 NOTE — CARE COORDINATION
Interventions     Transportation Support: Declined      DME Assistance: Declined     Senior Services: Declined             Discussed follow-up appointments. If no appointment was previously scheduled, appointment scheduling offered: Yes.   Is follow up appointment scheduled within 7 days of discharge? Yes. 4/25/24    Follow Up  Future Appointments   Date Time Provider Department Center   7/12/2024 12:45 PM Cecile Murray MD [facility]       Care Transition Nurse provided contact information.  Plan for follow-up call in 5-7 days based on severity of symptoms and risk factors.  Plan for next call: symptom management-chest pain/pressure?  follow-up appointment-4/25/24 PCP?  medication management-questions?    Nadeen Gomez RN

## 2024-04-26 NOTE — DISCHARGE SUMMARY
V2.0  Discharge Summary    Name:  Abelardo Marquis /Age/Sex: 1940 (83 y.o. male)   Admit Date: 2024  Discharge Date: 24    MRN & CSN:  [id number] & [id number] Encounter Date and Time 24 2:55 PM EDT    Attending:  No att. providers found Discharging Provider: Juan Luis Hamm MD       Hospital Course:     Brief HPI: Abelardo Marquis is a 83 y.o. male  who presents with left-sided chest pain that radiated to the left arm.  Reports he is not active due to back pain but pain was not changed with activity.  Pain does not improve with rest or home gabapentin, Zanaflex and Percocet.  Reports pain similar to last time he had a stent placed but not as severe.  Denies any nausea, vomiting, lightheadedness or dizziness.     Brief Problem Based Course:     Chest pain, CAD status post stents  Troponin elevated to 89.  EKG without any ST-T changes. Cardiology was consulted.    had cardiac cath on 2023 which had severe proximal LAD lesion and was stented.  Continue Plavix and statin.     Type 2 diabetes mellitus  Continue insulin therapy     Chronic pain  -Continue Percocet.     CKD stage IIIa  Creatinine around baseline.     History of cirrhosis  Appears compensated.      The patient expressed appropriate understanding of, and agreement with the discharge recommendations, medications, and plan.     Consults this admission:  None    Discharge Diagnosis:     Chest pain      Discharge Instruction:   Follow up appointments: Cardiology  Primary care physician: Georgie Hirsch MD within 2 weeks  Diet: cardiac diet   Activity: activity as tolerated  Disposition: Discharged to:   [x]Home, [][facility], []SNF, []Acute Rehab, []Hospice  Condition on discharge: Stable  Labs and Tests to be Followed up as an outpatient by PCP or Specialist:     Discharge Medications:        Medication List        START taking these medications      isosorbide mononitrate 30 MG extended release tablet  Commonly known as: IMDUR  Take 1

## 2024-04-29 ENCOUNTER — CARE COORDINATION (OUTPATIENT)
Dept: CARE COORDINATION | Age: 84
End: 2024-04-29

## 2024-04-29 NOTE — CARE COORDINATION
Care Transitions Outreach Attempt    Attempted to reach patient for transitions of care follow up. Unable to reach patient.    Patient: Abelardo Marquis Patient : 1940 MRN: <[id number]>    Last Discharge Facility       Date Complaint Diagnosis Description Type Department Provider    24 Chest Pain Chest pain, unspecified type ... ED to Hosp-Admission (Discharged) (ADMITTED) [facility]          Noted following upcoming appointments from discharge chart review:   BS follow up appointment(s):   Future Appointments   Date Time Provider Department Center   2024 12:45 PM Cecile Murray MD [facility]     Non-[facility]  follow up appointment(s): NA

## 2024-05-02 ENCOUNTER — APPOINTMENT (OUTPATIENT)
Dept: CT IMAGING | Age: 84
DRG: 378 | End: 2024-05-02
Payer: MEDICARE

## 2024-05-02 ENCOUNTER — APPOINTMENT (OUTPATIENT)
Dept: GENERAL RADIOLOGY | Age: 84
DRG: 378 | End: 2024-05-02
Payer: MEDICARE

## 2024-05-02 ENCOUNTER — CARE COORDINATION (OUTPATIENT)
Dept: CASE MANAGEMENT | Age: 84
End: 2024-05-02

## 2024-05-02 ENCOUNTER — HOSPITAL ENCOUNTER (INPATIENT)
Age: 84
LOS: 4 days | Discharge: HOME HEALTH | DRG: 378 | End: 2024-05-06
Attending: INTERNAL MEDICINE | Admitting: INTERNAL MEDICINE
Payer: MEDICARE

## 2024-05-02 DIAGNOSIS — M79.602 PAIN IN LEFT ARM: ICD-10-CM

## 2024-05-02 DIAGNOSIS — Z79.4 TYPE 2 DIABETES MELLITUS WITH HYPERGLYCEMIA, WITH LONG-TERM CURRENT USE OF INSULIN (HCC): ICD-10-CM

## 2024-05-02 DIAGNOSIS — D64.9 ANEMIA, UNSPECIFIED TYPE: ICD-10-CM

## 2024-05-02 DIAGNOSIS — E03.9 HYPOTHYROIDISM, UNSPECIFIED TYPE: ICD-10-CM

## 2024-05-02 DIAGNOSIS — K92.2 GASTROINTESTINAL HEMORRHAGE, UNSPECIFIED GASTROINTESTINAL HEMORRHAGE TYPE: Primary | ICD-10-CM

## 2024-05-02 DIAGNOSIS — E11.65 TYPE 2 DIABETES MELLITUS WITH HYPERGLYCEMIA, WITH LONG-TERM CURRENT USE OF INSULIN (HCC): ICD-10-CM

## 2024-05-02 DIAGNOSIS — N18.31 STAGE 3A CHRONIC KIDNEY DISEASE (HCC): ICD-10-CM

## 2024-05-02 LAB
ALBUMIN SERPL-MCNC: 3.4 GM/DL (ref 3.4–5)
ALP BLD-CCNC: 57 IU/L (ref 40–128)
ALT SERPL-CCNC: 13 U/L (ref 10–40)
ANION GAP SERPL CALCULATED.3IONS-SCNC: 13 MMOL/L (ref 7–16)
AST SERPL-CCNC: 17 IU/L (ref 15–37)
B-OH-BUTYR SERPL-MCNC: 3.1 MG/DL (ref 0–3)
BASE EXCESS: 1 (ref 0–3)
BASOPHILS ABSOLUTE: 0 K/CU MM
BASOPHILS RELATIVE PERCENT: 0.7 % (ref 0–1)
BILIRUB SERPL-MCNC: 0.5 MG/DL (ref 0–1)
BILIRUBIN, URINE: NEGATIVE MG/DL
BLOOD, URINE: NEGATIVE
BUN SERPL-MCNC: 66 MG/DL (ref 6–23)
CALCIUM SERPL-MCNC: 8.6 MG/DL (ref 8.3–10.6)
CHLORIDE BLD-SCNC: 102 MMOL/L (ref 99–110)
CLARITY: CLEAR
CO2: 23 MMOL/L (ref 21–32)
COLOR: YELLOW
COMMENT UA: ABNORMAL
COMMENT: ABNORMAL
CREAT SERPL-MCNC: 1.5 MG/DL (ref 0.9–1.3)
DIFFERENTIAL TYPE: ABNORMAL
EOSINOPHILS ABSOLUTE: 0.1 K/CU MM
EOSINOPHILS RELATIVE PERCENT: 2.1 % (ref 0–3)
GFR, ESTIMATED: 46 ML/MIN/1.73M2
GLUCOSE BLD-MCNC: 235 MG/DL (ref 70–99)
GLUCOSE BLD-MCNC: 304 MG/DL (ref 70–99)
GLUCOSE SERPL-MCNC: 257 MG/DL (ref 70–99)
GLUCOSE URINE: >1000 MG/DL
HCO3 VENOUS: 26.6 MMOL/L (ref 22–29)
HCT VFR BLD CALC: 23.2 % (ref 42–52)
HCT VFR BLD CALC: 24 % (ref 42–52)
HEMOGLOBIN: 7.4 GM/DL (ref 13.5–18)
HEMOGLOBIN: 7.5 GM/DL (ref 13.5–18)
IMMATURE NEUTROPHIL %: 2.1 % (ref 0–0.43)
KETONES, URINE: NEGATIVE MG/DL
LEUKOCYTE ESTERASE, URINE: NEGATIVE
LYMPHOCYTES ABSOLUTE: 1.2 K/CU MM
LYMPHOCYTES RELATIVE PERCENT: 27.6 % (ref 24–44)
MAGNESIUM: 2.1 MG/DL (ref 1.8–2.4)
MCH RBC QN AUTO: 31.2 PG (ref 27–31)
MCHC RBC AUTO-ENTMCNC: 31.9 % (ref 32–36)
MCV RBC AUTO: 97.9 FL (ref 78–100)
MONOCYTES ABSOLUTE: 0.3 K/CU MM
MONOCYTES RELATIVE PERCENT: 7.5 % (ref 0–4)
NEUTROPHILS ABSOLUTE: 2.6 K/CU MM
NEUTROPHILS RELATIVE PERCENT: 60 % (ref 36–66)
NITRITE URINE, QUANTITATIVE: NEGATIVE
NUCLEATED RBC %: 0 %
O2 SAT, VEN: 66.8 % (ref 50–70)
PCO2, VEN: 54 MMHG (ref 41–51)
PDW BLD-RTO: 16.4 % (ref 11.7–14.9)
PH VENOUS: 7.3 (ref 7.32–7.43)
PH, URINE: 6 (ref 5–8)
PLATELET # BLD: 116 K/CU MM (ref 140–440)
PMV BLD AUTO: 10.8 FL (ref 7.5–11.1)
PO2, VEN: 41 MMHG (ref 28–48)
POTASSIUM SERPL-SCNC: 4.8 MMOL/L (ref 3.5–5.1)
PRO-BNP: 477.8 PG/ML
PROTEIN UA: NEGATIVE MG/DL
RBC # BLD: 2.37 M/CU MM (ref 4.6–6.2)
SODIUM BLD-SCNC: 138 MMOL/L (ref 135–145)
SPECIFIC GRAVITY UA: <1.005 (ref 1–1.03)
T4 FREE SERPL-MCNC: 0.98 NG/DL (ref 0.9–1.8)
TOTAL IMMATURE NEUTOROPHIL: 0.09 K/CU MM
TOTAL NUCLEATED RBC: 0 K/CU MM
TOTAL PROTEIN: 5.4 GM/DL (ref 6.4–8.2)
TROPONIN, HIGH SENSITIVITY: 61 NG/L (ref 0–22)
TROPONIN, HIGH SENSITIVITY: 72 NG/L (ref 0–22)
TROPONIN, HIGH SENSITIVITY: 79 NG/L (ref 0–22)
TROPONIN, HIGH SENSITIVITY: 85 NG/L (ref 0–22)
TSH SERPL DL<=0.005 MIU/L-ACNC: 7.11 UIU/ML (ref 0.27–4.2)
UROBILINOGEN, URINE: 0.2 MG/DL (ref 0.2–1)
WBC # BLD: 4.4 K/CU MM (ref 4–10.5)

## 2024-05-02 PROCEDURE — 2580000003 HC RX 258

## 2024-05-02 PROCEDURE — 80053 COMPREHEN METABOLIC PANEL: CPT

## 2024-05-02 PROCEDURE — 74174 CTA ABD&PLVS W/CONTRAST: CPT

## 2024-05-02 PROCEDURE — 84439 ASSAY OF FREE THYROXINE: CPT

## 2024-05-02 PROCEDURE — 6360000004 HC RX CONTRAST MEDICATION

## 2024-05-02 PROCEDURE — 82010 KETONE BODYS QUAN: CPT

## 2024-05-02 PROCEDURE — 84443 ASSAY THYROID STIM HORMONE: CPT

## 2024-05-02 PROCEDURE — 96361 HYDRATE IV INFUSION ADD-ON: CPT

## 2024-05-02 PROCEDURE — 6370000000 HC RX 637 (ALT 250 FOR IP): Performed by: INTERNAL MEDICINE

## 2024-05-02 PROCEDURE — 94761 N-INVAS EAR/PLS OXIMETRY MLT: CPT

## 2024-05-02 PROCEDURE — 83735 ASSAY OF MAGNESIUM: CPT

## 2024-05-02 PROCEDURE — 71045 X-RAY EXAM CHEST 1 VIEW: CPT

## 2024-05-02 PROCEDURE — 96374 THER/PROPH/DIAG INJ IV PUSH: CPT

## 2024-05-02 PROCEDURE — 85018 HEMOGLOBIN: CPT

## 2024-05-02 PROCEDURE — 84484 ASSAY OF TROPONIN QUANT: CPT

## 2024-05-02 PROCEDURE — 6360000002 HC RX W HCPCS

## 2024-05-02 PROCEDURE — 82805 BLOOD GASES W/O2 SATURATION: CPT

## 2024-05-02 PROCEDURE — 96372 THER/PROPH/DIAG INJ SC/IM: CPT

## 2024-05-02 PROCEDURE — 85025 COMPLETE CBC W/AUTO DIFF WBC: CPT

## 2024-05-02 PROCEDURE — 36415 COLL VENOUS BLD VENIPUNCTURE: CPT

## 2024-05-02 PROCEDURE — 1200000000 HC SEMI PRIVATE

## 2024-05-02 PROCEDURE — 83880 ASSAY OF NATRIURETIC PEPTIDE: CPT

## 2024-05-02 PROCEDURE — 2580000003 HC RX 258: Performed by: INTERNAL MEDICINE

## 2024-05-02 PROCEDURE — 93005 ELECTROCARDIOGRAM TRACING: CPT

## 2024-05-02 PROCEDURE — 81003 URINALYSIS AUTO W/O SCOPE: CPT

## 2024-05-02 PROCEDURE — 85014 HEMATOCRIT: CPT

## 2024-05-02 PROCEDURE — 82962 GLUCOSE BLOOD TEST: CPT

## 2024-05-02 PROCEDURE — 99285 EMERGENCY DEPT VISIT HI MDM: CPT

## 2024-05-02 PROCEDURE — C9113 INJ PANTOPRAZOLE SODIUM, VIA: HCPCS

## 2024-05-02 PROCEDURE — 6360000002 HC RX W HCPCS: Performed by: INTERNAL MEDICINE

## 2024-05-02 PROCEDURE — 96375 TX/PRO/DX INJ NEW DRUG ADDON: CPT

## 2024-05-02 PROCEDURE — C9113 INJ PANTOPRAZOLE SODIUM, VIA: HCPCS | Performed by: INTERNAL MEDICINE

## 2024-05-02 RX ORDER — POTASSIUM CHLORIDE 7.45 MG/ML
10 INJECTION INTRAVENOUS PRN
Status: DISCONTINUED | OUTPATIENT
Start: 2024-05-02 | End: 2024-05-06 | Stop reason: HOSPADM

## 2024-05-02 RX ORDER — INSULIN GLARGINE 100 [IU]/ML
15 INJECTION, SOLUTION SUBCUTANEOUS 2 TIMES DAILY
Status: DISCONTINUED | OUTPATIENT
Start: 2024-05-02 | End: 2024-05-05

## 2024-05-02 RX ORDER — INSULIN LISPRO 100 [IU]/ML
0-8 INJECTION, SOLUTION INTRAVENOUS; SUBCUTANEOUS
Status: DISCONTINUED | OUTPATIENT
Start: 2024-05-02 | End: 2024-05-06 | Stop reason: HOSPADM

## 2024-05-02 RX ORDER — CHOLESTYRAMINE LIGHT 4 G/5.7G
4 POWDER, FOR SUSPENSION ORAL DAILY
Status: DISCONTINUED | OUTPATIENT
Start: 2024-05-03 | End: 2024-05-06 | Stop reason: HOSPADM

## 2024-05-02 RX ORDER — LANOLIN ALCOHOL/MO/W.PET/CERES
1000 CREAM (GRAM) TOPICAL DAILY
Status: DISCONTINUED | OUTPATIENT
Start: 2024-05-03 | End: 2024-05-06 | Stop reason: HOSPADM

## 2024-05-02 RX ORDER — DEXTROSE MONOHYDRATE 100 MG/ML
INJECTION, SOLUTION INTRAVENOUS CONTINUOUS PRN
Status: DISCONTINUED | OUTPATIENT
Start: 2024-05-02 | End: 2024-05-06 | Stop reason: HOSPADM

## 2024-05-02 RX ORDER — ACETAMINOPHEN 650 MG/1
650 SUPPOSITORY RECTAL EVERY 6 HOURS PRN
Status: DISCONTINUED | OUTPATIENT
Start: 2024-05-02 | End: 2024-05-06 | Stop reason: HOSPADM

## 2024-05-02 RX ORDER — PANTOPRAZOLE SODIUM 40 MG/10ML
40 INJECTION, POWDER, LYOPHILIZED, FOR SOLUTION INTRAVENOUS 2 TIMES DAILY
Status: DISCONTINUED | OUTPATIENT
Start: 2024-05-02 | End: 2024-05-04

## 2024-05-02 RX ORDER — ONDANSETRON 4 MG/1
4 TABLET, ORALLY DISINTEGRATING ORAL EVERY 8 HOURS PRN
Status: DISCONTINUED | OUTPATIENT
Start: 2024-05-02 | End: 2024-05-06 | Stop reason: HOSPADM

## 2024-05-02 RX ORDER — FLUOXETINE HYDROCHLORIDE 20 MG/1
40 CAPSULE ORAL DAILY
Status: DISCONTINUED | OUTPATIENT
Start: 2024-05-03 | End: 2024-05-06 | Stop reason: HOSPADM

## 2024-05-02 RX ORDER — RANOLAZINE 500 MG/1
500 TABLET, EXTENDED RELEASE ORAL DAILY
Status: DISCONTINUED | OUTPATIENT
Start: 2024-05-02 | End: 2024-05-02 | Stop reason: SDUPTHER

## 2024-05-02 RX ORDER — POLYETHYLENE GLYCOL 3350 17 G/17G
17 POWDER, FOR SOLUTION ORAL DAILY PRN
Status: DISCONTINUED | OUTPATIENT
Start: 2024-05-02 | End: 2024-05-06 | Stop reason: HOSPADM

## 2024-05-02 RX ORDER — MORPHINE SULFATE 4 MG/ML
4 INJECTION, SOLUTION INTRAMUSCULAR; INTRAVENOUS ONCE
Status: COMPLETED | OUTPATIENT
Start: 2024-05-02 | End: 2024-05-02

## 2024-05-02 RX ORDER — OCTREOTIDE ACETATE 100 UG/ML
50 INJECTION, SOLUTION INTRAVENOUS; SUBCUTANEOUS ONCE
Status: COMPLETED | OUTPATIENT
Start: 2024-05-02 | End: 2024-05-02

## 2024-05-02 RX ORDER — SODIUM CHLORIDE 0.9 % (FLUSH) 0.9 %
5-40 SYRINGE (ML) INJECTION EVERY 12 HOURS SCHEDULED
Status: DISCONTINUED | OUTPATIENT
Start: 2024-05-02 | End: 2024-05-06 | Stop reason: HOSPADM

## 2024-05-02 RX ORDER — ACETAMINOPHEN 325 MG/1
650 TABLET ORAL EVERY 6 HOURS PRN
Status: DISCONTINUED | OUTPATIENT
Start: 2024-05-02 | End: 2024-05-06 | Stop reason: HOSPADM

## 2024-05-02 RX ORDER — INSULIN LISPRO 100 [IU]/ML
0-4 INJECTION, SOLUTION INTRAVENOUS; SUBCUTANEOUS NIGHTLY
Status: DISCONTINUED | OUTPATIENT
Start: 2024-05-02 | End: 2024-05-06 | Stop reason: HOSPADM

## 2024-05-02 RX ORDER — SODIUM CHLORIDE 0.9 % (FLUSH) 0.9 %
5-40 SYRINGE (ML) INJECTION PRN
Status: DISCONTINUED | OUTPATIENT
Start: 2024-05-02 | End: 2024-05-06 | Stop reason: HOSPADM

## 2024-05-02 RX ORDER — 0.9 % SODIUM CHLORIDE 0.9 %
1000 INTRAVENOUS SOLUTION INTRAVENOUS ONCE
Status: COMPLETED | OUTPATIENT
Start: 2024-05-02 | End: 2024-05-02

## 2024-05-02 RX ORDER — MAGNESIUM SULFATE IN WATER 40 MG/ML
2000 INJECTION, SOLUTION INTRAVENOUS PRN
Status: DISCONTINUED | OUTPATIENT
Start: 2024-05-02 | End: 2024-05-06 | Stop reason: HOSPADM

## 2024-05-02 RX ORDER — PANTOPRAZOLE SODIUM 40 MG/10ML
80 INJECTION, POWDER, LYOPHILIZED, FOR SOLUTION INTRAVENOUS ONCE
Status: COMPLETED | OUTPATIENT
Start: 2024-05-02 | End: 2024-05-02

## 2024-05-02 RX ORDER — GABAPENTIN 400 MG/1
800 CAPSULE ORAL 3 TIMES DAILY
Status: DISCONTINUED | OUTPATIENT
Start: 2024-05-02 | End: 2024-05-06 | Stop reason: HOSPADM

## 2024-05-02 RX ORDER — POTASSIUM CHLORIDE 20 MEQ/1
40 TABLET, EXTENDED RELEASE ORAL PRN
Status: DISCONTINUED | OUTPATIENT
Start: 2024-05-02 | End: 2024-05-06 | Stop reason: HOSPADM

## 2024-05-02 RX ORDER — FLUTICASONE PROPIONATE 50 MCG
1 SPRAY, SUSPENSION (ML) NASAL DAILY PRN
Status: DISCONTINUED | OUTPATIENT
Start: 2024-05-02 | End: 2024-05-06 | Stop reason: HOSPADM

## 2024-05-02 RX ORDER — ISOSORBIDE MONONITRATE 30 MG/1
30 TABLET, EXTENDED RELEASE ORAL DAILY
Status: DISCONTINUED | OUTPATIENT
Start: 2024-05-02 | End: 2024-05-06 | Stop reason: HOSPADM

## 2024-05-02 RX ORDER — ATORVASTATIN CALCIUM 40 MG/1
40 TABLET, FILM COATED ORAL NIGHTLY
Status: DISCONTINUED | OUTPATIENT
Start: 2024-05-02 | End: 2024-05-06 | Stop reason: HOSPADM

## 2024-05-02 RX ORDER — ONDANSETRON 2 MG/ML
4 INJECTION INTRAMUSCULAR; INTRAVENOUS EVERY 6 HOURS PRN
Status: DISCONTINUED | OUTPATIENT
Start: 2024-05-02 | End: 2024-05-06 | Stop reason: HOSPADM

## 2024-05-02 RX ORDER — GLUCAGON 1 MG/ML
1 KIT INJECTION PRN
Status: DISCONTINUED | OUTPATIENT
Start: 2024-05-02 | End: 2024-05-06 | Stop reason: HOSPADM

## 2024-05-02 RX ORDER — PANTOPRAZOLE SODIUM 40 MG/10ML
40 INJECTION, POWDER, LYOPHILIZED, FOR SOLUTION INTRAVENOUS 2 TIMES DAILY
Status: DISCONTINUED | OUTPATIENT
Start: 2024-05-02 | End: 2024-05-02 | Stop reason: SDUPTHER

## 2024-05-02 RX ORDER — SODIUM CHLORIDE 9 MG/ML
INJECTION, SOLUTION INTRAVENOUS PRN
Status: DISCONTINUED | OUTPATIENT
Start: 2024-05-02 | End: 2024-05-06 | Stop reason: HOSPADM

## 2024-05-02 RX ORDER — OXYCODONE AND ACETAMINOPHEN 7.5; 325 MG/1; MG/1
1 TABLET ORAL 4 TIMES DAILY
Status: DISCONTINUED | OUTPATIENT
Start: 2024-05-02 | End: 2024-05-06 | Stop reason: HOSPADM

## 2024-05-02 RX ADMIN — WATER 1000 MG: 1 INJECTION INTRAMUSCULAR; INTRAVENOUS; SUBCUTANEOUS at 15:16

## 2024-05-02 RX ADMIN — GABAPENTIN 800 MG: 400 CAPSULE ORAL at 18:39

## 2024-05-02 RX ADMIN — OXYCODONE AND ACETAMINOPHEN 1 TABLET: 7.5; 325 TABLET ORAL at 18:39

## 2024-05-02 RX ADMIN — ATORVASTATIN CALCIUM 40 MG: 40 TABLET, FILM COATED ORAL at 22:22

## 2024-05-02 RX ADMIN — INSULIN GLARGINE 15 UNITS: 100 INJECTION, SOLUTION SUBCUTANEOUS at 22:18

## 2024-05-02 RX ADMIN — PANTOPRAZOLE SODIUM 40 MG: 40 INJECTION, POWDER, FOR SOLUTION INTRAVENOUS at 22:20

## 2024-05-02 RX ADMIN — GABAPENTIN 800 MG: 400 CAPSULE ORAL at 22:19

## 2024-05-02 RX ADMIN — MORPHINE SULFATE 4 MG: 4 INJECTION, SOLUTION INTRAMUSCULAR; INTRAVENOUS at 13:56

## 2024-05-02 RX ADMIN — PANTOPRAZOLE SODIUM 80 MG: 40 INJECTION, POWDER, FOR SOLUTION INTRAVENOUS at 15:10

## 2024-05-02 RX ADMIN — OCTREOTIDE ACETATE 25 MCG/HR: 500 INJECTION, SOLUTION INTRAVENOUS; SUBCUTANEOUS at 15:20

## 2024-05-02 RX ADMIN — METOPROLOL TARTRATE 25 MG: 25 TABLET, FILM COATED ORAL at 22:19

## 2024-05-02 RX ADMIN — INSULIN LISPRO 4 UNITS: 100 INJECTION, SOLUTION INTRAVENOUS; SUBCUTANEOUS at 22:36

## 2024-05-02 RX ADMIN — SODIUM CHLORIDE, PRESERVATIVE FREE 10 ML: 5 INJECTION INTRAVENOUS at 22:20

## 2024-05-02 RX ADMIN — OXYCODONE AND ACETAMINOPHEN 1 TABLET: 7.5; 325 TABLET ORAL at 22:19

## 2024-05-02 RX ADMIN — OCTREOTIDE ACETATE 50 MCG: 100 INJECTION, SOLUTION INTRAVENOUS; SUBCUTANEOUS at 15:09

## 2024-05-02 RX ADMIN — IOPAMIDOL 75 ML: 755 INJECTION, SOLUTION INTRAVENOUS at 15:08

## 2024-05-02 RX ADMIN — SODIUM CHLORIDE 1000 ML: 9 INJECTION, SOLUTION INTRAVENOUS at 13:56

## 2024-05-02 RX ADMIN — INSULIN LISPRO 2 UNITS: 100 INJECTION, SOLUTION INTRAVENOUS; SUBCUTANEOUS at 18:41

## 2024-05-02 ASSESSMENT — PAIN DESCRIPTION - ORIENTATION
ORIENTATION: LOWER

## 2024-05-02 ASSESSMENT — PAIN DESCRIPTION - DESCRIPTORS
DESCRIPTORS: ACHING

## 2024-05-02 ASSESSMENT — PAIN SCALES - GENERAL
PAINLEVEL_OUTOF10: 7
PAINLEVEL_OUTOF10: 4
PAINLEVEL_OUTOF10: 7
PAINLEVEL_OUTOF10: 0

## 2024-05-02 ASSESSMENT — PAIN DESCRIPTION - FREQUENCY
FREQUENCY: CONTINUOUS
FREQUENCY: INTERMITTENT
FREQUENCY: CONTINUOUS

## 2024-05-02 ASSESSMENT — PAIN DESCRIPTION - LOCATION
LOCATION: BACK

## 2024-05-02 ASSESSMENT — PAIN - FUNCTIONAL ASSESSMENT: PAIN_FUNCTIONAL_ASSESSMENT: 0-10

## 2024-05-02 ASSESSMENT — PAIN DESCRIPTION - ONSET
ONSET: ON-GOING

## 2024-05-02 ASSESSMENT — PAIN DESCRIPTION - PAIN TYPE
TYPE: CHRONIC PAIN

## 2024-05-02 NOTE — PROGRESS NOTES
4 Eyes Skin Assessment     NAME:  Abelardo Marquis  YOB: 1940  MEDICAL RECORD NUMBER:  [id number]    The patient is being assessed for  Admission    I agree that at least one RN has performed a thorough Head to Toe Skin Assessment on the patient. ALL assessment sites listed below have been assessed.      Areas assessed by both nurses:    Head, Face, Ears, Shoulders, Back, Chest, Arms, Elbows, Hands, Sacrum. Buttock, Coccyx, Ischium, Legs. Feet and Heels, and Under Medical Devices         Does the Patient have a Wound? No noted wound(s)       Yomi Prevention initiated by RN: No  Wound Care Orders initiated by RN: No    Pressure Injury (Stage 3,4, Unstageable, DTI, NWPT, and Complex wounds) if present, place Wound referral order by RN under : No    New Ostomies, if present place, Ostomy referral order under : No     Nurse 1 eSignature: Electronically signed by Devante Lake RN on 5/2/24 at 5:07 PM EDT    **SHARE this note so that the co-signing nurse can place an eSignature**    Nurse 2 eSignature: Electronically signed by Chen Guevara RN on 5/2/24 at 7:40 PM EDT

## 2024-05-02 NOTE — ED NOTES
ED TO INPATIENT SBAR HANDOFF    Patient Name: Abelardo Marquis   :  1940  83 y.o.   Preferred Name  Abelardo  Family/Caregiver Present yes   Restraints no   C-SSRS: Risk of Suicide: No Risk  Sitter no   Sepsis Risk Score Sepsis Risk Score: 2.63      Situation  Chief Complaint   Patient presents with    Hyperglycemia     411 via EMS    Fatigue     X4 days      Brief Description of Patient's Condition: patient came in with concern of generalized weakness, rectal bleeding; hyperglycemia   Mental Status: oriented and alert  Arrived from: home    Imaging:   XR CHEST PORTABLE   Final Result      No acute cardiopulmonary findings.      Electronically signed by Jose Maria Ochoa MD      CTA ABDOMEN PELVIS W WO CONTRAST    (Results Pending)     Abnormal labs:   Abnormal Labs Reviewed   CBC WITH AUTO DIFFERENTIAL - Abnormal; Notable for the following components:       Result Value    RBC 2.37 (*)     Hemoglobin 7.4 (*)     Hematocrit 23.2 (*)     MCH 31.2 (*)     MCHC 31.9 (*)     RDW 16.4 (*)     Platelets 116 (*)     Monocytes % 7.5 (*)     Immature Neutrophil % 2.1 (*)     All other components within normal limits   COMPREHENSIVE METABOLIC PANEL - Abnormal; Notable for the following components:    Glucose 257 (*)     BUN 66 (*)     Creatinine 1.5 (*)     Est, Glom Filt Rate 46 (*)     Total Protein 5.4 (*)     All other components within normal limits   TROPONIN - Abnormal; Notable for the following components:    Troponin, High Sensitivity 85 (*)     All other components within normal limits   TROPONIN - Abnormal; Notable for the following components:    Troponin, High Sensitivity 79 (*)     All other components within normal limits   BRAIN NATRIURETIC PEPTIDE - Abnormal; Notable for the following components:    Pro-.8 (*)     All other components within normal limits   URINALYSIS WITH REFLEX TO CULTURE - Abnormal; Notable for the following components:    Glucose, Ur >1000 (*)     All other components within normal limits

## 2024-05-02 NOTE — ED PROVIDER NOTES
[facility] EMERGENCY DEPARTMENT  EMERGENCY DEPARTMENT ENCOUNTER        Pt Name: Abelardo Marquis  MRN: [id number]  Birthdate 1940  Date of evaluation: 5/2/2024  Provider: DONNA Clark - FRANCISCA  PCP: Georgie Hirsch MD  Note Started: 1:16 PM EDT 5/2/24      PATEL. I have evaluated this patient.        CHIEF COMPLAINT       Chief Complaint   Patient presents with    Hyperglycemia     411 via EMS    Fatigue     X4 days        HISTORY OF PRESENT ILLNESS: 1 or more Elements     History From: Patient    Limitations to history : None    Social Determinants Significantly Affecting Health : None    Chief Complaint: Fatigue hyperglycemia    Abelardo Marquis is a 83 y.o. male history of CKD stage III cirrhosis, who presents to the ED stating that for the past 4 days he has had increased fatigue to the point he is unable to walk back and forth to the bathroom and has been using a urinal.  States he has not been taking his insulin today as prescribed.  States is also been having dark-colored stools for the past 2 weeks.  Denies any nausea abdominal pain diarrhea or constipation.  Denies any fevers body aches chills or shortness of breath.  States he has been having bowel movements regularly.  Denies any chest pain states he does have chronic pain in his arms that he takes Percocets for.  States he is on Plavix currently.    Nursing Notes were all reviewed and agreed with or any disagreements were addressed in the HPI.    REVIEW OF SYSTEMS :      Review of Systems    Positives and Pertinent negatives as per HPI.     SURGICAL HISTORY     Past Surgical History:   Procedure Laterality Date    BACK SURGERY      CARDIAC SURGERY      stents x 1    CHOLECYSTECTOMY      May 2013    DILATATION, ESOPHAGUS      HAND SURGERY      JOINT REPLACEMENT      SINUS SURGERY      sinus surgery x 2    SINUS SURGERY      TOTAL KNEE ARTHROPLASTY Bilateral        CURRENTMEDICATIONS       Previous Medications

## 2024-05-02 NOTE — CARE COORDINATION
Noted upon Care Transitions outreach attempt, Pt has been readmitted to [facility] today (5/2/24) for GIB & high blood sugar. Per protocol, current Care Transitions episode closed. CTN team will follow up on hospital discharge if eligible.           Nadeen Gomez RN [phone]

## 2024-05-02 NOTE — H&P
V2.0  History and Physical      Name:  Abelardo Marquis /Age/Sex: 1940  (83 y.o. male)   MRN & CSN:  [id number] & [id number] Encounter Date/Time: 2024 4:49 PM EDT   Location:  -A PCP: Georgie Hirsch MD       Hospital Day: 1    Assessment and Plan:   Abelardo Marquis is a 83 y.o. male with a pmh of  who presents with Acute anemia    Hospital Problems             Last Modified POA    * (Principal) Acute anemia 2024 Yes       Acute anemia  GI bleed  Hemoglobin on presentation 7.4G/DL, hemoglobin on 2024 13.3 G/DL  GI consulted from emergency room.  Discussed with GI.  Plan to continue with IV PPI twice daily  Patient does not appear to have any history of esophageal varices (has history of liver cirrhosis).  Octreotide drip was initiated from the ED, will discontinue  Continue to monitor hemoglobin closely and transfuse if hemoglobin less than 7G/DL      History of liver cirrhosis  Compensated  Likely due to JEONG    CKD stage III  Creatinine on presentation 1.5 Mg/DL, close to baseline  Avoid nephrotoxins    History of CAD  S/p PCI last year  On Plavix at home, will hold for now  Resume as able    History of chronic back pain  On Percocet, resume    History of type II DM  On Victoza, semaglutide, Jardiance, Basaglar 40 to 50 units twice daily at home  Will resume at 15 units twice daily due to diet restrictions    History of hypertension  On losartan hydrochlorothiazide, Imdur, Lopressor, continue    History of depression  On Prozac, continue    Chronic back pain  On gabapentin 800 mg 3 times daily, continue    Discussed regarding CODE STATUS and patient elects to be full code      Disposition:   Current Living situation:   Expected Disposition:   Estimated D/C:     Diet ADULT DIET; Clear Liquid  Diet NPO   DVT Prophylaxis [] Lovenox, []  Heparin, [] SCDs, [] Ambulation,  [] Eliquis, [] Xarelto, [] Coumadin   Code Status Full Code   Surrogate Decision Maker/ POLISANDRO Katie Hager , daughter

## 2024-05-03 ENCOUNTER — ANESTHESIA EVENT (OUTPATIENT)
Dept: ENDOSCOPY | Age: 84
End: 2024-05-03
Payer: MEDICARE

## 2024-05-03 LAB
ALBUMIN SERPL-MCNC: 3.4 GM/DL (ref 3.4–5)
ALP BLD-CCNC: 57 IU/L (ref 40–128)
ALT SERPL-CCNC: 13 U/L (ref 10–40)
AMMONIA: 19 UMOL/L (ref 16–60)
ANION GAP SERPL CALCULATED.3IONS-SCNC: 10 MMOL/L (ref 7–16)
APTT: 30.3 SECONDS (ref 25.1–37.1)
AST SERPL-CCNC: 18 IU/L (ref 15–37)
BASOPHILS ABSOLUTE: 0 K/CU MM
BASOPHILS RELATIVE PERCENT: 0.8 % (ref 0–1)
BILIRUB SERPL-MCNC: 0.4 MG/DL (ref 0–1)
BUN SERPL-MCNC: 62 MG/DL (ref 6–23)
CALCIUM SERPL-MCNC: 8.4 MG/DL (ref 8.3–10.6)
CHLORIDE BLD-SCNC: 100 MMOL/L (ref 99–110)
CO2: 26 MMOL/L (ref 21–32)
CREAT SERPL-MCNC: 1.8 MG/DL (ref 0.9–1.3)
DIFFERENTIAL TYPE: ABNORMAL
EKG ATRIAL RATE: 68 BPM
EKG DIAGNOSIS: NORMAL
EKG P AXIS: 52 DEGREES
EKG P-R INTERVAL: 208 MS
EKG Q-T INTERVAL: 482 MS
EKG QRS DURATION: 100 MS
EKG QTC CALCULATION (BAZETT): 512 MS
EKG R AXIS: 4 DEGREES
EKG T AXIS: 76 DEGREES
EKG VENTRICULAR RATE: 68 BPM
EOSINOPHILS ABSOLUTE: 0.2 K/CU MM
EOSINOPHILS RELATIVE PERCENT: 3.7 % (ref 0–3)
GFR, ESTIMATED: 37 ML/MIN/1.73M2
GLUCOSE BLD-MCNC: 239 MG/DL (ref 70–99)
GLUCOSE BLD-MCNC: 249 MG/DL (ref 70–99)
GLUCOSE BLD-MCNC: 258 MG/DL (ref 70–99)
GLUCOSE BLD-MCNC: 379 MG/DL (ref 70–99)
GLUCOSE BLD-MCNC: 399 MG/DL (ref 70–99)
GLUCOSE SERPL-MCNC: 180 MG/DL (ref 70–99)
HCT VFR BLD CALC: 23.5 % (ref 42–52)
HCT VFR BLD CALC: 24.6 % (ref 42–52)
HEMOGLOBIN: 7.3 GM/DL (ref 13.5–18)
HEMOGLOBIN: 7.7 GM/DL (ref 13.5–18)
IMMATURE NEUTROPHIL %: 2.6 % (ref 0–0.43)
INR BLD: 1.1 INDEX
LYMPHOCYTES ABSOLUTE: 1.7 K/CU MM
LYMPHOCYTES RELATIVE PERCENT: 34.5 % (ref 24–44)
MCH RBC QN AUTO: 30.8 PG (ref 27–31)
MCHC RBC AUTO-ENTMCNC: 31.1 % (ref 32–36)
MCV RBC AUTO: 99.2 FL (ref 78–100)
MONOCYTES ABSOLUTE: 0.5 K/CU MM
MONOCYTES RELATIVE PERCENT: 10.8 % (ref 0–4)
NEUTROPHILS ABSOLUTE: 2.4 K/CU MM
NEUTROPHILS RELATIVE PERCENT: 47.6 % (ref 36–66)
NUCLEATED RBC %: 0.4 %
PDW BLD-RTO: 17 % (ref 11.7–14.9)
PLATELET # BLD: 120 K/CU MM (ref 140–440)
PMV BLD AUTO: 10.4 FL (ref 7.5–11.1)
POTASSIUM SERPL-SCNC: 4.2 MMOL/L (ref 3.5–5.1)
PROTHROMBIN TIME: 15 SECONDS (ref 11.7–14.5)
RBC # BLD: 2.37 M/CU MM (ref 4.6–6.2)
SODIUM BLD-SCNC: 136 MMOL/L (ref 135–145)
TOTAL IMMATURE NEUTOROPHIL: 0.13 K/CU MM
TOTAL NUCLEATED RBC: 0 K/CU MM
TOTAL PROTEIN: 5.1 GM/DL (ref 6.4–8.2)
TROPONIN, HIGH SENSITIVITY: 83 NG/L (ref 0–22)
WBC # BLD: 4.9 K/CU MM (ref 4–10.5)

## 2024-05-03 PROCEDURE — 84484 ASSAY OF TROPONIN QUANT: CPT

## 2024-05-03 PROCEDURE — 6370000000 HC RX 637 (ALT 250 FOR IP): Performed by: SPECIALIST

## 2024-05-03 PROCEDURE — 36415 COLL VENOUS BLD VENIPUNCTURE: CPT

## 2024-05-03 PROCEDURE — 86922 COMPATIBILITY TEST ANTIGLOB: CPT

## 2024-05-03 PROCEDURE — 82962 GLUCOSE BLOOD TEST: CPT

## 2024-05-03 PROCEDURE — 82140 ASSAY OF AMMONIA: CPT

## 2024-05-03 PROCEDURE — 36430 TRANSFUSION BLD/BLD COMPNT: CPT

## 2024-05-03 PROCEDURE — 6370000000 HC RX 637 (ALT 250 FOR IP): Performed by: INTERNAL MEDICINE

## 2024-05-03 PROCEDURE — 85610 PROTHROMBIN TIME: CPT

## 2024-05-03 PROCEDURE — 30233N1 TRANSFUSION OF NONAUTOLOGOUS RED BLOOD CELLS INTO PERIPHERAL VEIN, PERCUTANEOUS APPROACH: ICD-10-PCS | Performed by: STUDENT IN AN ORGANIZED HEALTH CARE EDUCATION/TRAINING PROGRAM

## 2024-05-03 PROCEDURE — 1200000000 HC SEMI PRIVATE

## 2024-05-03 PROCEDURE — 86850 RBC ANTIBODY SCREEN: CPT

## 2024-05-03 PROCEDURE — 85014 HEMATOCRIT: CPT

## 2024-05-03 PROCEDURE — 80053 COMPREHEN METABOLIC PANEL: CPT

## 2024-05-03 PROCEDURE — 93010 ELECTROCARDIOGRAM REPORT: CPT | Performed by: INTERNAL MEDICINE

## 2024-05-03 PROCEDURE — 6360000002 HC RX W HCPCS: Performed by: INTERNAL MEDICINE

## 2024-05-03 PROCEDURE — 85730 THROMBOPLASTIN TIME PARTIAL: CPT

## 2024-05-03 PROCEDURE — P9016 RBC LEUKOCYTES REDUCED: HCPCS

## 2024-05-03 PROCEDURE — C9113 INJ PANTOPRAZOLE SODIUM, VIA: HCPCS | Performed by: INTERNAL MEDICINE

## 2024-05-03 PROCEDURE — 86901 BLOOD TYPING SEROLOGIC RH(D): CPT

## 2024-05-03 PROCEDURE — 85025 COMPLETE CBC W/AUTO DIFF WBC: CPT

## 2024-05-03 PROCEDURE — 85018 HEMOGLOBIN: CPT

## 2024-05-03 PROCEDURE — 86900 BLOOD TYPING SEROLOGIC ABO: CPT

## 2024-05-03 PROCEDURE — 2580000003 HC RX 258: Performed by: INTERNAL MEDICINE

## 2024-05-03 PROCEDURE — 94761 N-INVAS EAR/PLS OXIMETRY MLT: CPT

## 2024-05-03 PROCEDURE — 80048 BASIC METABOLIC PNL TOTAL CA: CPT

## 2024-05-03 RX ORDER — SODIUM CHLORIDE 9 MG/ML
INJECTION, SOLUTION INTRAVENOUS PRN
Status: COMPLETED | OUTPATIENT
Start: 2024-05-03 | End: 2024-05-04

## 2024-05-03 RX ORDER — INSULIN LISPRO 100 [IU]/ML
5 INJECTION, SOLUTION INTRAVENOUS; SUBCUTANEOUS
Status: DISCONTINUED | OUTPATIENT
Start: 2024-05-03 | End: 2024-05-05

## 2024-05-03 RX ADMIN — OXYCODONE AND ACETAMINOPHEN 1 TABLET: 7.5; 325 TABLET ORAL at 17:06

## 2024-05-03 RX ADMIN — INSULIN LISPRO 5 UNITS: 100 INJECTION, SOLUTION INTRAVENOUS; SUBCUTANEOUS at 18:28

## 2024-05-03 RX ADMIN — Medication 1000 MCG: at 10:07

## 2024-05-03 RX ADMIN — INSULIN GLARGINE 15 UNITS: 100 INJECTION, SOLUTION SUBCUTANEOUS at 10:02

## 2024-05-03 RX ADMIN — GABAPENTIN 800 MG: 400 CAPSULE ORAL at 13:25

## 2024-05-03 RX ADMIN — ATORVASTATIN CALCIUM 40 MG: 40 TABLET, FILM COATED ORAL at 22:04

## 2024-05-03 RX ADMIN — CHOLESTYRAMINE 4 G: 4 POWDER, FOR SUSPENSION ORAL at 10:00

## 2024-05-03 RX ADMIN — INSULIN GLARGINE 15 UNITS: 100 INJECTION, SOLUTION SUBCUTANEOUS at 22:15

## 2024-05-03 RX ADMIN — POLYETHYLENE GLYCOL 3350, SODIUM SULFATE ANHYDROUS, SODIUM BICARBONATE, SODIUM CHLORIDE, POTASSIUM CHLORIDE 4000 ML: 236; 22.74; 6.74; 5.86; 2.97 POWDER, FOR SOLUTION ORAL at 18:28

## 2024-05-03 RX ADMIN — OXYCODONE AND ACETAMINOPHEN 1 TABLET: 7.5; 325 TABLET ORAL at 09:57

## 2024-05-03 RX ADMIN — SODIUM CHLORIDE, PRESERVATIVE FREE 10 ML: 5 INJECTION INTRAVENOUS at 22:04

## 2024-05-03 RX ADMIN — GABAPENTIN 800 MG: 400 CAPSULE ORAL at 22:04

## 2024-05-03 RX ADMIN — FLUOXETINE HYDROCHLORIDE 40 MG: 20 CAPSULE ORAL at 10:00

## 2024-05-03 RX ADMIN — SODIUM CHLORIDE, PRESERVATIVE FREE 10 ML: 5 INJECTION INTRAVENOUS at 10:03

## 2024-05-03 RX ADMIN — OXYCODONE AND ACETAMINOPHEN 1 TABLET: 7.5; 325 TABLET ORAL at 13:25

## 2024-05-03 RX ADMIN — INSULIN LISPRO 2 UNITS: 100 INJECTION, SOLUTION INTRAVENOUS; SUBCUTANEOUS at 10:02

## 2024-05-03 RX ADMIN — OXYCODONE AND ACETAMINOPHEN 1 TABLET: 7.5; 325 TABLET ORAL at 22:03

## 2024-05-03 RX ADMIN — METOPROLOL TARTRATE 25 MG: 25 TABLET, FILM COATED ORAL at 22:04

## 2024-05-03 RX ADMIN — PANTOPRAZOLE SODIUM 40 MG: 40 INJECTION, POWDER, FOR SOLUTION INTRAVENOUS at 22:04

## 2024-05-03 RX ADMIN — GABAPENTIN 800 MG: 400 CAPSULE ORAL at 09:57

## 2024-05-03 RX ADMIN — INSULIN LISPRO 8 UNITS: 100 INJECTION, SOLUTION INTRAVENOUS; SUBCUTANEOUS at 17:09

## 2024-05-03 RX ADMIN — INSULIN LISPRO 8 UNITS: 100 INJECTION, SOLUTION INTRAVENOUS; SUBCUTANEOUS at 12:10

## 2024-05-03 RX ADMIN — PANTOPRAZOLE SODIUM 40 MG: 40 INJECTION, POWDER, FOR SOLUTION INTRAVENOUS at 10:03

## 2024-05-03 NOTE — PROGRESS NOTES
05/03/24 1112   Encounter Summary   Encounter Overview/Reason Initial Encounter;Spiritual/Emotional Needs;Palliative Care   Service Provided For Patient and family together   Referral/Consult From Palliative Care;Nurse   Support System Family members   Last Encounter  05/03/24  (PT struggles with health issues, he likes to talk, he expressed his feelings and his rebekah, he trusts in God and finds meaning through God, he wanted prayer, concerned about if God will heal him and if his rebekah was weak due to illness, high risk readmit)   Complexity of Encounter Low   Begin Time 1051   End Time  1114   Total Time Calculated 23 min   Spiritual/Emotional needs   Type Spiritual Support;Emotional Distress   Grief, Loss, and Adjustments   Type Adjustment to illness   Palliative Care   Type Palliative Care, Initial/Spiritual Assessment   Assessment/Intervention/Outcome   Assessment Anxious;Concerns with suffering;Coping;Hopeful;Despair;Powerlessness;Stress overload   Intervention Active listening;Discussed belief system/Protestant practices/rebekah;Discussed illness injury and it’s impact;Discussed relationship with God;Discussed meaning/purpose;Explored/Affirmed feelings, thoughts, concerns;Explored Coping Skills/Resources;Nurtured Hope;Prayer (assurance of)/[place];Sustaining Presence/Ministry of presence   Outcome Connection/Belonging;Coping;Encouraged;Engaged in conversation;Expressed Gratitude;Optimistic   Plan and Referrals   Plan/Referrals Continue Support (comment)

## 2024-05-03 NOTE — CONSENT
Informed Consent for Blood Component Transfusion Note    I have discussed with the patient the rationale for blood component transfusion; its benefits in treating or preventing fatigue, organ damage, or death; and its risk which includes mild transfusion reactions, rare risk of blood borne infection, or more serious but rare reactions. I have discussed the alternatives to transfusion, including the risk and consequences of not receiving transfusion. The patient had an opportunity to ask questions and had agreed to proceed with transfusion of blood components.    Electronically signed by Marcia Mathews MD on 5/3/24 at 2:05 PM EDT

## 2024-05-03 NOTE — PLAN OF CARE
Problem: Safety - Adult  Goal: Free from fall injury  5/2/2024 2331 by Scout Mays RN  Outcome: Progressing  5/2/2024 1658 by Devante Lake RN  Outcome: Progressing     Problem: Discharge Planning  Goal: Discharge to home or other facility with appropriate resources  5/2/2024 2331 by Scout Mays RN  Outcome: Progressing  5/2/2024 1658 by Devante Lake RN  Outcome: Progressing     Problem: Pain  Goal: Verbalizes/displays adequate comfort level or baseline comfort level  5/2/2024 2331 by Scout Mays RN  Outcome: Progressing  5/2/2024 1658 by Devante Lake RN  Outcome: Progressing     Problem: ABCDS Injury Assessment  Goal: Absence of physical injury  5/2/2024 2331 by Scout Mays RN  Outcome: Progressing  5/2/2024 1658 by Devante Lake RN  Outcome: Progressing     Problem: Chronic Conditions and Co-morbidities  Goal: Patient's chronic conditions and co-morbidity symptoms are monitored and maintained or improved  Outcome: Progressing

## 2024-05-03 NOTE — CARE COORDINATION
CM called Christiana at [facility] and informed her of the referral. They confirmed they received the fax.   When pt is discharged he will be going home with [facility]  Upon discharge pt will be going home with [facility] home care  Please fax H/P, D/S.  AVS, order, and face sheet to [phone]  Please call [phone] and inform of discharge

## 2024-05-03 NOTE — PROGRESS NOTES
Physician Progress Note      PATIENT:               BRYNN NATHAN  CSN #:                  [id number]  :                       1940  ADMIT DATE:       2024 1:10 PM  DISCH DATE:  RESPONDING  PROVIDER #:        Marcia Mathews MD          QUERY TEXT:    Internal Medicine,    Pt admitted with suspected GIB and has anemia documented. If possible, please   document in progress notes and discharge summary further specificity regarding   the acuity and type of anemia:    The medical record reflects the following:  Risk Factors: plavix  Clinical Indicators: documentation of anemia and GIB  Treatment: labs, imaging, GI consult, PPI, medical management    Thank you,  Judy Atkinson RN [facility]  [phone]  Options provided:  -- Anemia due to acute blood loss  -- Anemia due to acute on chronic blood loss  -- Other - I will add my own diagnosis  -- Disagree - Not applicable / Not valid  -- Disagree - Clinically unable to determine / Unknown  -- Refer to Clinical Documentation Reviewer    PROVIDER RESPONSE TEXT:    This patient has acute blood loss anemia.    Query created by: Judy Atkinson on 5/3/2024 9:58 AM      Electronically signed by:  Marcia Mathews MD 5/3/2024 2:02 PM

## 2024-05-03 NOTE — PLAN OF CARE
Problem: Safety - Adult  Goal: Free from fall injury  5/3/2024 0820 by Devante Lake RN  Outcome: Progressing  5/2/2024 2331 by Scout Mays RN  Outcome: Progressing     Problem: Discharge Planning  Goal: Discharge to home or other facility with appropriate resources  5/3/2024 0820 by Devante Lake RN  Outcome: Progressing  5/2/2024 2331 by Scout Mays RN  Outcome: Progressing     Problem: Pain  Goal: Verbalizes/displays adequate comfort level or baseline comfort level  5/3/2024 0820 by Devante Lake RN  Outcome: Progressing  5/2/2024 2331 by Scout Mays RN  Outcome: Progressing     Problem: ABCDS Injury Assessment  Goal: Absence of physical injury  5/3/2024 0820 by Devante Lake RN  Outcome: Progressing  5/2/2024 2331 by Scout Mays RN  Outcome: Progressing     Problem: Chronic Conditions and Co-morbidities  Goal: Patient's chronic conditions and co-morbidity symptoms are monitored and maintained or improved  5/3/2024 0820 by Devante Lake RN  Outcome: Progressing  5/2/2024 2331 by Scout Mays RN  Outcome: Progressing

## 2024-05-03 NOTE — CONSULTS
[address] [place] [address]                              CONSULTATION      PATIENT NAME: BRYNN NATHAN                 : 1940  MED REC NO: [id number]                      ROOM: 3014  ACCOUNT NO: [id number]                       ADMIT DATE: 2024  PROVIDER: Navi Castillo MD      CHIEF COMPLAINT:  History of severe anemia with melenic stool; rule out GI bleeding.    HISTORY OF PRESENT ILLNESS:  The patient is an 83-year-old white gentleman, patient known to me, last seen in consult on  with past medical history significant for hypertension; diabetes mellitus; coronary artery disease, status post PTCA with coronary stents in place; chronic kidney disease, stage 3; hyperlipidemia; obesity; tobacco abuse; chronic back pain; gout; and history of chronic liver disease; who presented to the emergency room with pain in the left upper extremity and some retrosternal chest discomfort.  The patient also complained of fatigue and dyspnea on exertion.  The blood workup done upon admission comprised a Chem profile, which was remarkable for BUN of 66 and creatinine was 1.4.  The patient's LFTs are within normal limits and the CBC shows a WBC count of 4.4, hemoglobin 7.4, and platelet count of 116,000.  The patient does give history of blackish stools for the past 2 weeks, but there is no history of abdominal pain, nausea, vomiting, or hematemesis.  The patient is hemodynamically stable.    The patient has had about 4 colonoscopies done by Dr. Álvarez in the past and also had a colonoscopy with polypectomy by myself on the , and last year the patient had an EGD done for dysphagia with esophageal dilatation and colonoscopy by Dr. Daisha Mora.    The patient also has history of chronic liver disease diagnosed on imaging.  His other workup in the past has been negative for hepatitis A, B, and C serologies

## 2024-05-03 NOTE — PROGRESS NOTES
V2.0    [facility] [place]      Name:  Abelardo Marquis /Age/Sex: 1940  (83 y.o. male)   MRN & CSN:  [id number] & [id number] Encounter Date/Time: 5/3/2024 7:57 AM EDT   Location:  -A PCP: Georgie Hirsch MD     Attending:Marcia Mathews*       Hospital Day: 2    Assessment and Recommendations   Abelardo Marquis is a 83 y.o. male with pmh of CAD, hypertension, hyperlipidemia, chronic back pain, and type II DM  who presents with Acute anemia      Plan:   Acute anemia  GI bleed  Hemoglobin on presentation 7.4G/DL, hemoglobin on 2024 13.3 G/DL, prior was 15 on 1/3/24  --Continue PPI  --Hgb stable at 7.3, will give 1 unit of blood given the acute drop and patient reporting light-headedness and weakness  --GI on board, EGC and colonoscopy tomorrow    Chest Pain  Patient reporting intermittent pain in his chest and left shoulder  --Trop trend  85-->79-->72-->61  --Chronically elevated likely in setting of CKD  --Initial EKG NSR, no concern for ischemia, f/u repoeat EKG and monitor on tele      History of liver cirrhosis  Appears compensated  Likely due to JEONG     CKD stage III  Creatinine on presentation 1.5 Mg/DL, close to baseline 1.5-1.8  --Currently 1.8, continue to trend  --Avoid nephrotoxins     History of CAD  S/p PCI last year  --Holding plavix d/t bleed, resume as able     History of chronic back pain  --Continue Percocet and gabapentin     History of type II DM  On Victoza, semaglutide, Jardiance, Basaglar 40 to 50 units twice daily at home  --Continue accucheks, A1c from 3/30/24 8.2  --Continue 15 units twice daily due to diet restrictions, cover with SSI and hypoglycemia protocol     History of hypertension  --Hold antihypertensives at this time, given low normotensive BP      History of depression  --Continue Prozac       Diet ADULT DIET; Clear Liquid  Diet NPO   DVT Prophylaxis [] Lovenox, []  Heparin, [x] SCDs, [] Ambulation,  [] Eliquis, [] Xarelto  [] Coumadin   Code Status

## 2024-05-03 NOTE — PROGRESS NOTES
DOING FAIR NO ACTIVE GIT BLEEDING C/O GEN WEAKNESS ON CLD  VITALS STABLE   LABS NOTED HB 7.3 INR 1.1  WILL CPM EGD / COLONOSCOPY IN AM  D/W PT AND SISTER

## 2024-05-03 NOTE — CARE COORDINATION
05/03/24 0811   Service Assessment   Patient Orientation Alert and Oriented;Person;Place;Situation   Cognition Alert   History Provided By Patient   Primary Caregiver Self   Support Systems Children;Family Members   Patient's Healthcare Decision Maker is: Legal Next of Kin   PCP Verified by CM Yes   Last Visit to PCP Within last 6 months   Prior Functional Level Mobility;Assistance with the following:;Other (see comment)  (Pt uses cane and rollator walker for mobility)   Current Functional Level Other (see comment);Assistance with the following:;Mobility   Can patient return to prior living arrangement Yes   Ability to make needs known: Good   Family able to assist with home care needs: Yes   Would you like for me to discuss the discharge plan with any other family members/significant others, and if so, who? Yes  (permission to speak with Eric, son in room)   Condition of Participation: Discharge Planning   The Patient and/or Patient Representative was provided with a Choice of Provider? Patient   The Patient and/Or Patient Representative agree with the Discharge Plan? Yes     CM into see pt and son Eric, to initiate a safe discharge plan. Cm introduced self and explained role of CM. Permission to speak with Eric present. Pt is kind, alert and oriented. Pt lives with his dtr and son in law. Pt's home is a two story but pt is able to stay on one level with bed/ bath. Pt has 10 CECILY with rail. Pt has a PCP at [facility]. Goes to local pain management. DME includes Rollator and cane. Pt able to care for own ADL's. Pt has glucometer and all supplies.Pt is well loved and supported by family. Pt is able to drive and if not has transportation per family for groceries/ appointments and meds. CM discussed discharge plan and [facility] and would [facility]. CM made referral to [facility] and faxed information.   Discharge plan is home with dtr. Pcp/ insurance/ transportation. [facility]  CM provided card and

## 2024-05-04 ENCOUNTER — ANESTHESIA (OUTPATIENT)
Dept: ENDOSCOPY | Age: 84
End: 2024-05-04
Payer: MEDICARE

## 2024-05-04 LAB
ABO/RH: NORMAL
ALBUMIN SERPL-MCNC: 3.8 GM/DL (ref 3.4–5)
ALP BLD-CCNC: 72 IU/L (ref 40–128)
ALT SERPL-CCNC: 21 U/L (ref 10–40)
ANION GAP SERPL CALCULATED.3IONS-SCNC: 12 MMOL/L (ref 7–16)
ANTIBODY SCREEN: NEGATIVE
APTT: 31.9 SECONDS (ref 25.1–37.1)
AST SERPL-CCNC: 38 IU/L (ref 15–37)
BASOPHILS ABSOLUTE: 0.1 K/CU MM
BASOPHILS RELATIVE PERCENT: 1 % (ref 0–1)
BILIRUB SERPL-MCNC: 0.8 MG/DL (ref 0–1)
BUN SERPL-MCNC: 50 MG/DL (ref 6–23)
CALCIUM SERPL-MCNC: 8.6 MG/DL (ref 8.3–10.6)
CHLORIDE BLD-SCNC: 100 MMOL/L (ref 99–110)
CO2: 26 MMOL/L (ref 21–32)
COMPONENT: NORMAL
CREAT SERPL-MCNC: 1.6 MG/DL (ref 0.9–1.3)
CROSSMATCH RESULT: NORMAL
DIFFERENTIAL TYPE: ABNORMAL
EOSINOPHILS ABSOLUTE: 0.2 K/CU MM
EOSINOPHILS RELATIVE PERCENT: 4.2 % (ref 0–3)
GFR, ESTIMATED: 43 ML/MIN/1.73M2
GLUCOSE BLD-MCNC: 241 MG/DL (ref 70–99)
GLUCOSE BLD-MCNC: 246 MG/DL (ref 70–99)
GLUCOSE BLD-MCNC: 262 MG/DL (ref 70–99)
GLUCOSE BLD-MCNC: 270 MG/DL (ref 70–99)
GLUCOSE BLD-MCNC: 297 MG/DL (ref 70–99)
GLUCOSE SERPL-MCNC: 225 MG/DL (ref 70–99)
HCT VFR BLD CALC: 26.8 % (ref 42–52)
HCT VFR BLD CALC: 29.7 % (ref 42–52)
HEMOGLOBIN: 8.8 GM/DL (ref 13.5–18)
HEMOGLOBIN: 9.6 GM/DL (ref 13.5–18)
IMMATURE NEUTROPHIL %: 3 % (ref 0–0.43)
INR BLD: 1.1 INDEX
LYMPHOCYTES ABSOLUTE: 1.4 K/CU MM
LYMPHOCYTES RELATIVE PERCENT: 27 % (ref 24–44)
MCH RBC QN AUTO: 31.3 PG (ref 27–31)
MCHC RBC AUTO-ENTMCNC: 32.3 % (ref 32–36)
MCV RBC AUTO: 96.7 FL (ref 78–100)
MONOCYTES ABSOLUTE: 0.5 K/CU MM
MONOCYTES RELATIVE PERCENT: 9 % (ref 0–4)
NEUTROPHILS ABSOLUTE: 2.9 K/CU MM
NEUTROPHILS RELATIVE PERCENT: 55.8 % (ref 36–66)
NUCLEATED RBC %: 0.4 %
PDW BLD-RTO: 16.3 % (ref 11.7–14.9)
PLATELET # BLD: 139 K/CU MM (ref 140–440)
PMV BLD AUTO: 10.6 FL (ref 7.5–11.1)
POTASSIUM SERPL-SCNC: 3.9 MMOL/L (ref 3.5–5.1)
PROTHROMBIN TIME: 14.5 SECONDS (ref 11.7–14.5)
RBC # BLD: 3.07 M/CU MM (ref 4.6–6.2)
REASON FOR REJECTION: NORMAL
REJECTED TEST: NORMAL
SODIUM BLD-SCNC: 138 MMOL/L (ref 135–145)
STATUS: NORMAL
TOTAL IMMATURE NEUTOROPHIL: 0.16 K/CU MM
TOTAL NUCLEATED RBC: 0 K/CU MM
TOTAL PROTEIN: 5.7 GM/DL (ref 6.4–8.2)
TRANSFUSION STATUS: NORMAL
TROPONIN, HIGH SENSITIVITY: 76 NG/L (ref 0–22)
UNIT DIVISION: 0
UNIT NUMBER: NORMAL
WBC # BLD: 5.3 K/CU MM (ref 4–10.5)

## 2024-05-04 PROCEDURE — 1200000000 HC SEMI PRIVATE

## 2024-05-04 PROCEDURE — 85018 HEMOGLOBIN: CPT

## 2024-05-04 PROCEDURE — 85014 HEMATOCRIT: CPT

## 2024-05-04 PROCEDURE — 80048 BASIC METABOLIC PNL TOTAL CA: CPT

## 2024-05-04 PROCEDURE — 0DBH8ZZ EXCISION OF CECUM, VIA NATURAL OR ARTIFICIAL OPENING ENDOSCOPIC: ICD-10-PCS | Performed by: SPECIALIST

## 2024-05-04 PROCEDURE — 2500000003 HC RX 250 WO HCPCS

## 2024-05-04 PROCEDURE — 3609010600 HC COLONOSCOPY POLYPECTOMY SNARE/COLD BIOPSY: Performed by: SPECIALIST

## 2024-05-04 PROCEDURE — 3700000001 HC ADD 15 MINUTES (ANESTHESIA): Performed by: SPECIALIST

## 2024-05-04 PROCEDURE — 6360000002 HC RX W HCPCS

## 2024-05-04 PROCEDURE — 0DBL8ZZ EXCISION OF TRANSVERSE COLON, VIA NATURAL OR ARTIFICIAL OPENING ENDOSCOPIC: ICD-10-PCS | Performed by: SPECIALIST

## 2024-05-04 PROCEDURE — 6370000000 HC RX 637 (ALT 250 FOR IP): Performed by: STUDENT IN AN ORGANIZED HEALTH CARE EDUCATION/TRAINING PROGRAM

## 2024-05-04 PROCEDURE — 94761 N-INVAS EAR/PLS OXIMETRY MLT: CPT

## 2024-05-04 PROCEDURE — C9113 INJ PANTOPRAZOLE SODIUM, VIA: HCPCS | Performed by: SPECIALIST

## 2024-05-04 PROCEDURE — 6370000000 HC RX 637 (ALT 250 FOR IP): Performed by: INTERNAL MEDICINE

## 2024-05-04 PROCEDURE — 84484 ASSAY OF TROPONIN QUANT: CPT

## 2024-05-04 PROCEDURE — 0DJ08ZZ INSPECTION OF UPPER INTESTINAL TRACT, VIA NATURAL OR ARTIFICIAL OPENING ENDOSCOPIC: ICD-10-PCS | Performed by: SPECIALIST

## 2024-05-04 PROCEDURE — 85025 COMPLETE CBC W/AUTO DIFF WBC: CPT

## 2024-05-04 PROCEDURE — 2580000003 HC RX 258: Performed by: STUDENT IN AN ORGANIZED HEALTH CARE EDUCATION/TRAINING PROGRAM

## 2024-05-04 PROCEDURE — 3609017100 HC EGD: Performed by: SPECIALIST

## 2024-05-04 PROCEDURE — 80053 COMPREHEN METABOLIC PANEL: CPT

## 2024-05-04 PROCEDURE — 36415 COLL VENOUS BLD VENIPUNCTURE: CPT

## 2024-05-04 PROCEDURE — 6360000002 HC RX W HCPCS: Performed by: SPECIALIST

## 2024-05-04 PROCEDURE — 2580000003 HC RX 258: Performed by: INTERNAL MEDICINE

## 2024-05-04 PROCEDURE — 2580000003 HC RX 258: Performed by: SPECIALIST

## 2024-05-04 PROCEDURE — 82962 GLUCOSE BLOOD TEST: CPT

## 2024-05-04 PROCEDURE — 85730 THROMBOPLASTIN TIME PARTIAL: CPT

## 2024-05-04 PROCEDURE — 88305 TISSUE EXAM BY PATHOLOGIST: CPT

## 2024-05-04 PROCEDURE — 85610 PROTHROMBIN TIME: CPT

## 2024-05-04 PROCEDURE — 2709999900 HC NON-CHARGEABLE SUPPLY: Performed by: SPECIALIST

## 2024-05-04 PROCEDURE — 6370000000 HC RX 637 (ALT 250 FOR IP): Performed by: SPECIALIST

## 2024-05-04 PROCEDURE — 3700000000 HC ANESTHESIA ATTENDED CARE: Performed by: SPECIALIST

## 2024-05-04 RX ORDER — PROPOFOL 10 MG/ML
INJECTION, EMULSION INTRAVENOUS PRN
Status: DISCONTINUED | OUTPATIENT
Start: 2024-05-04 | End: 2024-05-04 | Stop reason: SDUPTHER

## 2024-05-04 RX ORDER — LIDOCAINE HYDROCHLORIDE 20 MG/ML
INJECTION, SOLUTION INFILTRATION; PERINEURAL PRN
Status: DISCONTINUED | OUTPATIENT
Start: 2024-05-04 | End: 2024-05-04 | Stop reason: SDUPTHER

## 2024-05-04 RX ORDER — PANTOPRAZOLE SODIUM 40 MG/10ML
40 INJECTION, POWDER, LYOPHILIZED, FOR SOLUTION INTRAVENOUS DAILY
Status: DISCONTINUED | OUTPATIENT
Start: 2024-05-04 | End: 2024-05-06 | Stop reason: HOSPADM

## 2024-05-04 RX ADMIN — INSULIN LISPRO 2 UNITS: 100 INJECTION, SOLUTION INTRAVENOUS; SUBCUTANEOUS at 12:26

## 2024-05-04 RX ADMIN — PHENYLEPHRINE HYDROCHLORIDE 100 MCG: 10 INJECTION INTRAVENOUS at 09:00

## 2024-05-04 RX ADMIN — METOPROLOL TARTRATE 25 MG: 25 TABLET, FILM COATED ORAL at 21:03

## 2024-05-04 RX ADMIN — PROPOFOL 330 MG: 10 INJECTION, EMULSION INTRAVENOUS at 08:14

## 2024-05-04 RX ADMIN — PHENYLEPHRINE HYDROCHLORIDE 100 MCG: 10 INJECTION INTRAVENOUS at 08:25

## 2024-05-04 RX ADMIN — CHOLESTYRAMINE 4 G: 4 POWDER, FOR SUSPENSION ORAL at 10:31

## 2024-05-04 RX ADMIN — METOPROLOL TARTRATE 25 MG: 25 TABLET, FILM COATED ORAL at 10:33

## 2024-05-04 RX ADMIN — PHENYLEPHRINE HYDROCHLORIDE 100 MCG: 10 INJECTION INTRAVENOUS at 08:53

## 2024-05-04 RX ADMIN — ACETAMINOPHEN 650 MG: 325 TABLET ORAL at 15:56

## 2024-05-04 RX ADMIN — INSULIN GLARGINE 15 UNITS: 100 INJECTION, SOLUTION SUBCUTANEOUS at 10:43

## 2024-05-04 RX ADMIN — INSULIN LISPRO 4 UNITS: 100 INJECTION, SOLUTION INTRAVENOUS; SUBCUTANEOUS at 10:34

## 2024-05-04 RX ADMIN — ATORVASTATIN CALCIUM 40 MG: 40 TABLET, FILM COATED ORAL at 21:04

## 2024-05-04 RX ADMIN — INSULIN LISPRO 5 UNITS: 100 INJECTION, SOLUTION INTRAVENOUS; SUBCUTANEOUS at 12:27

## 2024-05-04 RX ADMIN — Medication 1000 MCG: at 10:30

## 2024-05-04 RX ADMIN — LIDOCAINE HYDROCHLORIDE 100 MG: 20 INJECTION, SOLUTION INFILTRATION; PERINEURAL at 08:14

## 2024-05-04 RX ADMIN — PHENYLEPHRINE HYDROCHLORIDE 200 MCG: 10 INJECTION INTRAVENOUS at 08:30

## 2024-05-04 RX ADMIN — INSULIN LISPRO 5 UNITS: 100 INJECTION, SOLUTION INTRAVENOUS; SUBCUTANEOUS at 10:33

## 2024-05-04 RX ADMIN — OXYCODONE AND ACETAMINOPHEN 1 TABLET: 7.5; 325 TABLET ORAL at 21:04

## 2024-05-04 RX ADMIN — PANTOPRAZOLE SODIUM 40 MG: 40 INJECTION, POWDER, FOR SOLUTION INTRAVENOUS at 10:31

## 2024-05-04 RX ADMIN — PHENYLEPHRINE HYDROCHLORIDE 100 MCG: 10 INJECTION INTRAVENOUS at 08:23

## 2024-05-04 RX ADMIN — INSULIN GLARGINE 15 UNITS: 100 INJECTION, SOLUTION SUBCUTANEOUS at 21:04

## 2024-05-04 RX ADMIN — PHENYLEPHRINE HYDROCHLORIDE 200 MCG: 10 INJECTION INTRAVENOUS at 09:09

## 2024-05-04 RX ADMIN — PHENYLEPHRINE HYDROCHLORIDE 100 MCG: 10 INJECTION INTRAVENOUS at 08:42

## 2024-05-04 RX ADMIN — FLUOXETINE HYDROCHLORIDE 40 MG: 20 CAPSULE ORAL at 10:30

## 2024-05-04 RX ADMIN — INSULIN LISPRO 2 UNITS: 100 INJECTION, SOLUTION INTRAVENOUS; SUBCUTANEOUS at 17:46

## 2024-05-04 RX ADMIN — PHENYLEPHRINE HYDROCHLORIDE 100 MCG: 10 INJECTION INTRAVENOUS at 09:03

## 2024-05-04 RX ADMIN — SODIUM CHLORIDE, PRESERVATIVE FREE 10 ML: 5 INJECTION INTRAVENOUS at 21:04

## 2024-05-04 RX ADMIN — INSULIN LISPRO 5 UNITS: 100 INJECTION, SOLUTION INTRAVENOUS; SUBCUTANEOUS at 17:45

## 2024-05-04 RX ADMIN — PHENYLEPHRINE HYDROCHLORIDE 100 MCG: 10 INJECTION INTRAVENOUS at 08:17

## 2024-05-04 RX ADMIN — SODIUM CHLORIDE, PRESERVATIVE FREE 10 ML: 5 INJECTION INTRAVENOUS at 10:33

## 2024-05-04 RX ADMIN — GABAPENTIN 800 MG: 400 CAPSULE ORAL at 21:04

## 2024-05-04 RX ADMIN — SODIUM CHLORIDE: 9 INJECTION, SOLUTION INTRAVENOUS at 08:08

## 2024-05-04 ASSESSMENT — PAIN DESCRIPTION - ORIENTATION: ORIENTATION: LEFT

## 2024-05-04 ASSESSMENT — PAIN SCALES - GENERAL
PAINLEVEL_OUTOF10: 6
PAINLEVEL_OUTOF10: 7
PAINLEVEL_OUTOF10: 5

## 2024-05-04 ASSESSMENT — PAIN DESCRIPTION - DESCRIPTORS: DESCRIPTORS: ACHING;SORE

## 2024-05-04 ASSESSMENT — PAIN - FUNCTIONAL ASSESSMENT: PAIN_FUNCTIONAL_ASSESSMENT: ACTIVITIES ARE NOT PREVENTED

## 2024-05-04 ASSESSMENT — PAIN SCALES - WONG BAKER
WONGBAKER_NUMERICALRESPONSE: HURTS LITTLE MORE
WONGBAKER_NUMERICALRESPONSE: NO HURT

## 2024-05-04 ASSESSMENT — PAIN DESCRIPTION - LOCATION: LOCATION: SHOULDER

## 2024-05-04 NOTE — ANESTHESIA PRE PROCEDURE
05/03/2024 01:45 AM       HCG (If Applicable): No results found for: \"PREGTESTUR\", \"PREGSERUM\", \"HCG\", \"HCGQUANT\"     ABGs:   Lab Results   Component Value Date/Time    PO2ART 70 09/21/2020 01:00 PM    OIO3FUX 38.0 09/21/2020 01:00 PM    GTO8JRC 22.0 09/21/2020 01:00 PM        Type & Screen (If Applicable):  No results found for: \"LABABO\"    Drug/Infectious Status (If Applicable):  Lab Results   Component Value Date/Time    HEPCAB NON REACTIVE 03/30/2023 04:28 AM       COVID-19 Screening (If Applicable):   Lab Results   Component Value Date/Time    COVID19 NOT DETECTED 03/29/2023 06:16 PM    COVID19 NOT DETECTED 09/26/2020 02:48 PM           Anesthesia Evaluation  Patient summary reviewed  Airway:           Dental:          Pulmonary:Negative Pulmonary ROS                             ROS comment: CXR 5/2/2024:  IMPRESSION:     No acute cardiopulmonary findings.     Cardiovascular:    (+) hypertension:, past MI: > 6 months, CAD:, CABG/stent:, hyperlipidemia               ROS comment: Echo 4/19/2024:  Left Ventricle Hyperdynamic left ventricular systolic function with a visually estimated EF of 65 - 70%. Left ventricle size is normal. Mildly increased wall thickness. Normal wall motion. Indeterminate diastolic function due to E-A reversal.  Left Atrium Left atrium size is normal.  Right Ventricle Not well visualized.  Right Atrium Not well visualized.  Aortic Valve Not well visualized.  Mitral Valve Annular calcification of the mitral valve. Severe systolic anterior motion of the anterior leaflet with increased pressure gradients. Trace regurgitation.  Tricuspid Valve Trace regurgitation.  Pulmonic Valve The pulmonic valve was not well visualized.  Aorta Normal sized abdominal aorta.  IVC/Hepatic Veins IVC size is normal.  Pericardium No pericardial effusion.    Chest Pain  Patient reporting intermittent pain in his chest and left shoulder  --Trop trend  85-->79-->72-->61  --Chronically elevated likely in setting of 
wall motion. Indeterminate diastolic function due to E-A reversal.  Left Atrium Left atrium size is normal.  Right Ventricle Not well visualized.  Right Atrium Not well visualized.  Aortic Valve Not well visualized.  Mitral Valve Annular calcification of the mitral valve. Severe systolic anterior motion of the anterior leaflet with increased pressure gradients. Trace regurgitation.  Tricuspid Valve Trace regurgitation.  Pulmonic Valve The pulmonic valve was not well visualized.  Aorta Normal sized abdominal aorta.  IVC/Hepatic Veins IVC size is normal.  Pericardium No pericardial effusion.    Chest Pain  Patient reporting intermittent pain in his chest and left shoulder  --Trop trend  85-->79-->72-->61  --Chronically elevated likely in setting of CKD  --Initial EKG NSR, no concern for ischemia, f/u repoeat EKG and monitor on tele       Neuro/Psych:   (+) depression/anxiety             GI/Hepatic/Renal:   (+) GERD:, liver disease:, renal disease: CRI, bowel prep, morbid obesity         ROS comment: History of liver cirrhosis  Acute anemia  GI bleed  .   Endo/Other:    (+) DiabetesType II DM, blood dyscrasia: anemia and anticoagulation therapy:..                 Abdominal:   (+) obese          Vascular: negative vascular ROS.         Other Findings:         Anesthesia Plan      MAC     ASA 4       Induction: intravenous.      Anesthetic plan and risks discussed with patient.      Plan discussed with CRNA.                  DONNA Pascal - CRNA   5/4/2024       Pre Anesthesia Assessment complete. Chart reviewed on 5/4/2024

## 2024-05-04 NOTE — BRIEF OP NOTE
Brief Postoperative Note      Abelardo Marquis is a 83 y.o. male     Pre-operative Diagnosis: ANEMIA / GIT BLEEDING    Post-operative Diagnosis:  GASTRITIS --DUODENAL DIVERTICULUM /   10 DIMINUTIVE  COLON POLYPS--D.COLI--HDS    Procedure: EGD / COLONOSCOPY WITH POLYPECTOMY    Anesthesia: MAC    Surgeons/Assistants:Navi Castillo MD     Estimated Blood Loss: MINIMAL    Complications: None    Specimens: were obtained  REC-- CPM F/U H/H --F/U COLONOSCOPY IN 3 YRS-CE AS OUTPT    Navi Castillo MD   5/4/2024   9:13 AM

## 2024-05-04 NOTE — PROCEDURES
[facility] [address] [place] [address]                               COLONOSCOPY      PATIENT NAME: BRYNN NATHAN                 : 1940  MED REC NO: [id number]                      ROOM: [facility] NO: [id number]                       ADMIT DATE: 2024  PROVIDER: Navi Castillo MD    COLONOSCOPY WITH POLYPECTOMY    DATE OF PROCEDURE:        CHIEF COMPLAINT:  History of anemia with GI bleeding; rule out lower GI bleeding.    PREMEDICATION:  Please refer to the anesthesiologist's notes.    DESCRIPTION OF PROCEDURE:  The patient was placed in the left lateral decubitus position.  Rectal examination was performed.    Rectal examination:  Rectal examination revealed the rectal mucosa, which was felt to be normal.  There was evidence of external hemorrhoids but no fissures or fistulas were seen.    The video Olympus colonoscope was subsequently introduced into the rectum and was advanced all the way in the cecum.  The ileocecal valve and the appendiceal orifice were identified.  The colon prep was excellent.    The colonoscope was advanced through the ileocecal valve into the terminal ileum and the distal 10 cm of the terminal ileum was examined, which was unremarkable.    Ten diminutive colon polyps were noted as described below.  1. 5 mm sessile polyp noted in cecum; cold polypectomy performed with a snare and the polyp was completely resected and retrieved.  2. Nine diminutive 3-5 mm size polyps noted scattered in the transverse colon and in the left colon and; cold polypectomy performed with a snare.  All the polyps were completely resected, but only 4 of the polyps could be retrieved.  The rest were lost in the stool.    The colonoscope was retroflexed in the rectum and the anorectal junction was carefully examined and grade 1-2 internal hemorrhoids were seen.    POSTOPERATIVE DIAGNOSES:    1. Ten diminutive colon polyps noted as 
COLONOSCOPY REPORT DICTATED #[id number]  
EGD REPORT DICTATED #[id number]  
second portion.  3. No blood recent or old noted in the upper gastrointestinal tract.  4. No evidence of esophageal or gastric varices.    RECOMMENDATIONS:    1. We will continue present management.  2. We will proceed with colonoscopy for further workup of the patient's anemia.  3. If the colonoscopy is negative, the patient will need a small bowel evaluation as well.    The patient tolerated the procedure well.  There were no postop complications.  The blood loss during the procedure was nil.          AMIRA EDWARDS MD      D:  05/04/2024 08:25:15     T:  05/04/2024 08:49:47     AR/ROBYN  Job #:  [id number]     Doc#:  [id number]    CC:   Ellyn Patel NP

## 2024-05-04 NOTE — PLAN OF CARE
Problem: Safety - Adult  Goal: Free from fall injury  5/4/2024 1236 by Xu Manzanares RN  Outcome: Progressing  5/4/2024 0240 by Scout Mays RN  Outcome: Progressing     Problem: Discharge Planning  Goal: Discharge to home or other facility with appropriate resources  5/4/2024 1236 by Xu Manzanares RN  Outcome: Progressing  5/4/2024 0240 by Scout Mays RN  Outcome: Progressing     Problem: Pain  Goal: Verbalizes/displays adequate comfort level or baseline comfort level  5/4/2024 1236 by Xu Manzanares RN  Outcome: Progressing  5/4/2024 0240 by Scout Mays RN  Outcome: Progressing     Problem: ABCDS Injury Assessment  Goal: Absence of physical injury  5/4/2024 1236 by Xu Manzanares RN  Outcome: Progressing  5/4/2024 0240 by Scout Mays RN  Outcome: Progressing     Problem: Chronic Conditions and Co-morbidities  Goal: Patient's chronic conditions and co-morbidity symptoms are monitored and maintained or improved  5/4/2024 1236 by Xu Manzanares RN  Outcome: Progressing  5/4/2024 0240 by Scout Mays RN  Outcome: Progressing     Problem: Skin/Tissue Integrity  Goal: Absence of new skin breakdown  Description: 1.  Monitor for areas of redness and/or skin breakdown  2.  Assess vascular access sites hourly  3.  Every 4-6 hours minimum:  Change oxygen saturation probe site  4.  Every 4-6 hours:  If on nasal continuous positive airway pressure, respiratory therapy assess nares and determine need for appliance change or resting period.  Outcome: Progressing

## 2024-05-04 NOTE — ANESTHESIA POSTPROCEDURE EVALUATION
Department of Anesthesiology  Postprocedure Note    Patient: Abelardo Marquis  MRN: [id number]  YOB: 1940  Date of evaluation: 5/4/2024    Procedure Summary       Date: 05/04/24 Room / Location: [place] [address] / [facility]    Anesthesia Start: 0808 Anesthesia Stop: 0914    Procedures:       ESOPHAGOGASTRODUODENOSCOPY DIAGNOSTIC ONLY      COLONOSCOPY POLYPECTOMY SNARE BIOPSY Diagnosis:       Anemia, unspecified type      Gastrointestinal hemorrhage, unspecified gastrointestinal hemorrhage type      (Anemia, unspecified type [D64.9])      (Gastrointestinal hemorrhage, unspecified gastrointestinal hemorrhage type [K92.2])    Surgeons: Navi Castillo MD Responsible Provider: Shahram Lowry MD    Anesthesia Type: MAC ASA Status: 4            Anesthesia Type: No value filed.    Taylor Phase I:      Taylor Phase II:      Anesthesia Post Evaluation    Patient location during evaluation: bedside  Patient participation: complete - patient participated  Level of consciousness: awake  Pain score: 0  Airway patency: patent  Nausea & Vomiting: no nausea and no vomiting  Cardiovascular status: blood pressure returned to baseline and hemodynamically stable  Respiratory status: acceptable and room air  Hydration status: euvolemic  Pain management: adequate    No notable events documented.

## 2024-05-04 NOTE — PROGRESS NOTES
V2.0    [facility] [place]      Name:  Abelardo Marquis /Age/Sex: 1940  (83 y.o. male)   MRN & CSN:  [id number] & [id number] Encounter Date/Time: 5/3/2024 7:57 AM EDT   Location:  -A PCP: Georgie Hirsch MD     Attending:Marcia Mathews*       Hospital Day: 2    Assessment and Recommendations   Abelardo Marquis is a 83 y.o. male with pmh of CAD, hypertension, hyperlipidemia, chronic back pain, and type II DM  who presents with Acute anemia      Plan:   Acute anemia  GI bleed  Hemoglobin on presentation 7.4G/DL, hemoglobin on 2024 13.3 G/DL, prior was 15 on 1/3/24  --Continue PPI  --Hgb was 7.3 yesterday morning, he s/p 1 unit of blood given the acute drop and patient reporting light-headedness and weakness  --Hgb 9.6 this AM, continue to monitor and transfuse for hgb <7  --GI on board, EGD and colonoscopy completed, noted to have gastritis, duodenal diverticulum in 2nd portion, no active or old bleeding in the upper GIT, 10 polyps , completely resected but only 4 retrieved , diffuse diverticulosis, mixed hemorrhoids and no active bleeding in the colon.  --Planned for OP capsule endoscopy    Chest Pain - resolved  Patient reporting intermittent pain in his chest and left shoulder  --Trop trend  85-->79-->72-->61-->83-->76  --Chronically elevated likely in setting of CKD  --Initial EKG NSR, no concern for ischemia, repeat EKG noted continue to monitor on tele      History of liver cirrhosis  Appears compensated  Likely due to JEONG     CKD stage III  Creatinine on presentation 1.5 Mg/DL, baseline 1.5-1.8  --Currently 1.6, continue to trend  --Avoid nephrotoxins     History of CAD  S/p PCI last year  --Holding plavix d/t bleed, resume as able     History of chronic back pain  --Continue Percocet and gabapentin     History of type II DM  On Victoza, semaglutide, Jardiance, Basaglar 40 to 50 units twice daily at home  --Continue accucheks, A1c from 3/30/24 8.2  --Continue basal-bolus, cover

## 2024-05-05 ENCOUNTER — APPOINTMENT (OUTPATIENT)
Dept: ULTRASOUND IMAGING | Age: 84
DRG: 378 | End: 2024-05-05
Payer: MEDICARE

## 2024-05-05 PROBLEM — K92.2 GASTROINTESTINAL HEMORRHAGE: Status: ACTIVE | Noted: 2024-05-05

## 2024-05-05 LAB
ANION GAP SERPL CALCULATED.3IONS-SCNC: 9 MMOL/L (ref 7–16)
BASOPHILS ABSOLUTE: 0 K/CU MM
BASOPHILS RELATIVE PERCENT: 0.3 % (ref 0–1)
BUN SERPL-MCNC: 37 MG/DL (ref 6–23)
CALCIUM SERPL-MCNC: 7.9 MG/DL (ref 8.3–10.6)
CHLORIDE BLD-SCNC: 105 MMOL/L (ref 99–110)
CO2: 26 MMOL/L (ref 21–32)
CREAT SERPL-MCNC: 1.5 MG/DL (ref 0.9–1.3)
DIFFERENTIAL TYPE: ABNORMAL
EOSINOPHILS ABSOLUTE: 0.1 K/CU MM
EOSINOPHILS RELATIVE PERCENT: 2.2 % (ref 0–3)
GFR, ESTIMATED: 46 ML/MIN/1.73M2
GLUCOSE BLD-MCNC: 242 MG/DL (ref 70–99)
GLUCOSE BLD-MCNC: 271 MG/DL (ref 70–99)
GLUCOSE BLD-MCNC: 344 MG/DL (ref 70–99)
GLUCOSE BLD-MCNC: 392 MG/DL (ref 70–99)
GLUCOSE BLD-MCNC: 395 MG/DL (ref 70–99)
GLUCOSE SERPL-MCNC: 270 MG/DL (ref 70–99)
HCT VFR BLD CALC: 24.5 % (ref 42–52)
HCT VFR BLD CALC: 28 % (ref 42–52)
HEMOGLOBIN: 7.7 GM/DL (ref 13.5–18)
HEMOGLOBIN: 8.9 GM/DL (ref 13.5–18)
IMMATURE NEUTROPHIL %: 1.1 % (ref 0–0.43)
LYMPHOCYTES ABSOLUTE: 0.9 K/CU MM
LYMPHOCYTES RELATIVE PERCENT: 24.4 % (ref 24–44)
MCH RBC QN AUTO: 30.7 PG (ref 27–31)
MCHC RBC AUTO-ENTMCNC: 31.4 % (ref 32–36)
MCV RBC AUTO: 97.6 FL (ref 78–100)
MONOCYTES ABSOLUTE: 0.3 K/CU MM
MONOCYTES RELATIVE PERCENT: 9.4 % (ref 0–4)
NEUTROPHILS ABSOLUTE: 2.3 K/CU MM
NEUTROPHILS RELATIVE PERCENT: 62.6 % (ref 36–66)
NUCLEATED RBC %: 0 %
PDW BLD-RTO: 16.1 % (ref 11.7–14.9)
PLATELET # BLD: 106 K/CU MM (ref 140–440)
PMV BLD AUTO: 10.4 FL (ref 7.5–11.1)
POTASSIUM SERPL-SCNC: 4.5 MMOL/L (ref 3.5–5.1)
RBC # BLD: 2.51 M/CU MM (ref 4.6–6.2)
SODIUM BLD-SCNC: 140 MMOL/L (ref 135–145)
TOTAL IMMATURE NEUTOROPHIL: 0.04 K/CU MM
TOTAL NUCLEATED RBC: 0 K/CU MM
WBC # BLD: 3.6 K/CU MM (ref 4–10.5)

## 2024-05-05 PROCEDURE — 85025 COMPLETE CBC W/AUTO DIFF WBC: CPT

## 2024-05-05 PROCEDURE — 82962 GLUCOSE BLOOD TEST: CPT

## 2024-05-05 PROCEDURE — 6370000000 HC RX 637 (ALT 250 FOR IP): Performed by: STUDENT IN AN ORGANIZED HEALTH CARE EDUCATION/TRAINING PROGRAM

## 2024-05-05 PROCEDURE — 2580000003 HC RX 258: Performed by: INTERNAL MEDICINE

## 2024-05-05 PROCEDURE — 6370000000 HC RX 637 (ALT 250 FOR IP): Performed by: INTERNAL MEDICINE

## 2024-05-05 PROCEDURE — 6360000002 HC RX W HCPCS: Performed by: SPECIALIST

## 2024-05-05 PROCEDURE — C9113 INJ PANTOPRAZOLE SODIUM, VIA: HCPCS | Performed by: SPECIALIST

## 2024-05-05 PROCEDURE — 85014 HEMATOCRIT: CPT

## 2024-05-05 PROCEDURE — 85018 HEMOGLOBIN: CPT

## 2024-05-05 PROCEDURE — 1200000000 HC SEMI PRIVATE

## 2024-05-05 PROCEDURE — 36415 COLL VENOUS BLD VENIPUNCTURE: CPT

## 2024-05-05 PROCEDURE — 94761 N-INVAS EAR/PLS OXIMETRY MLT: CPT

## 2024-05-05 PROCEDURE — 80048 BASIC METABOLIC PNL TOTAL CA: CPT

## 2024-05-05 PROCEDURE — 93971 EXTREMITY STUDY: CPT

## 2024-05-05 PROCEDURE — 99223 1ST HOSP IP/OBS HIGH 75: CPT | Performed by: INTERNAL MEDICINE

## 2024-05-05 RX ORDER — INSULIN LISPRO 100 [IU]/ML
8 INJECTION, SOLUTION INTRAVENOUS; SUBCUTANEOUS ONCE
Status: COMPLETED | OUTPATIENT
Start: 2024-05-05 | End: 2024-05-05

## 2024-05-05 RX ORDER — CLOPIDOGREL BISULFATE 75 MG/1
75 TABLET ORAL DAILY
Status: DISCONTINUED | OUTPATIENT
Start: 2024-05-05 | End: 2024-05-06 | Stop reason: HOSPADM

## 2024-05-05 RX ORDER — INSULIN LISPRO 100 [IU]/ML
8 INJECTION, SOLUTION INTRAVENOUS; SUBCUTANEOUS
Status: DISCONTINUED | OUTPATIENT
Start: 2024-05-05 | End: 2024-05-06 | Stop reason: HOSPADM

## 2024-05-05 RX ORDER — INSULIN GLARGINE 100 [IU]/ML
20 INJECTION, SOLUTION SUBCUTANEOUS 2 TIMES DAILY
Status: DISCONTINUED | OUTPATIENT
Start: 2024-05-05 | End: 2024-05-06 | Stop reason: HOSPADM

## 2024-05-05 RX ADMIN — ATORVASTATIN CALCIUM 40 MG: 40 TABLET, FILM COATED ORAL at 20:17

## 2024-05-05 RX ADMIN — Medication 1000 MCG: at 08:52

## 2024-05-05 RX ADMIN — INSULIN GLARGINE 15 UNITS: 100 INJECTION, SOLUTION SUBCUTANEOUS at 08:50

## 2024-05-05 RX ADMIN — OXYCODONE AND ACETAMINOPHEN 1 TABLET: 7.5; 325 TABLET ORAL at 01:58

## 2024-05-05 RX ADMIN — GABAPENTIN 800 MG: 400 CAPSULE ORAL at 08:34

## 2024-05-05 RX ADMIN — OXYCODONE AND ACETAMINOPHEN 1 TABLET: 7.5; 325 TABLET ORAL at 18:24

## 2024-05-05 RX ADMIN — PANTOPRAZOLE SODIUM 40 MG: 40 INJECTION, POWDER, FOR SOLUTION INTRAVENOUS at 08:35

## 2024-05-05 RX ADMIN — SODIUM CHLORIDE, PRESERVATIVE FREE 10 ML: 5 INJECTION INTRAVENOUS at 20:18

## 2024-05-05 RX ADMIN — CHOLESTYRAMINE 4 G: 4 POWDER, FOR SUSPENSION ORAL at 08:34

## 2024-05-05 RX ADMIN — METOPROLOL TARTRATE 25 MG: 25 TABLET, FILM COATED ORAL at 08:34

## 2024-05-05 RX ADMIN — SODIUM CHLORIDE, PRESERVATIVE FREE 10 ML: 5 INJECTION INTRAVENOUS at 08:50

## 2024-05-05 RX ADMIN — OXYCODONE AND ACETAMINOPHEN 1 TABLET: 7.5; 325 TABLET ORAL at 23:45

## 2024-05-05 RX ADMIN — INSULIN LISPRO 8 UNITS: 100 INJECTION, SOLUTION INTRAVENOUS; SUBCUTANEOUS at 16:40

## 2024-05-05 RX ADMIN — OXYCODONE AND ACETAMINOPHEN 1 TABLET: 7.5; 325 TABLET ORAL at 13:08

## 2024-05-05 RX ADMIN — METOPROLOL TARTRATE 25 MG: 25 TABLET, FILM COATED ORAL at 20:17

## 2024-05-05 RX ADMIN — INSULIN LISPRO 8 UNITS: 100 INJECTION, SOLUTION INTRAVENOUS; SUBCUTANEOUS at 13:08

## 2024-05-05 RX ADMIN — GABAPENTIN 800 MG: 400 CAPSULE ORAL at 20:17

## 2024-05-05 RX ADMIN — INSULIN LISPRO 8 UNITS: 100 INJECTION, SOLUTION INTRAVENOUS; SUBCUTANEOUS at 18:25

## 2024-05-05 RX ADMIN — FLUOXETINE HYDROCHLORIDE 40 MG: 20 CAPSULE ORAL at 08:34

## 2024-05-05 RX ADMIN — INSULIN LISPRO 5 UNITS: 100 INJECTION, SOLUTION INTRAVENOUS; SUBCUTANEOUS at 08:50

## 2024-05-05 RX ADMIN — CLOPIDOGREL BISULFATE 75 MG: 75 TABLET ORAL at 13:11

## 2024-05-05 RX ADMIN — INSULIN GLARGINE 20 UNITS: 100 INJECTION, SOLUTION SUBCUTANEOUS at 20:17

## 2024-05-05 RX ADMIN — INSULIN LISPRO 5 UNITS: 100 INJECTION, SOLUTION INTRAVENOUS; SUBCUTANEOUS at 13:09

## 2024-05-05 RX ADMIN — GABAPENTIN 800 MG: 400 CAPSULE ORAL at 13:08

## 2024-05-05 RX ADMIN — INSULIN LISPRO 4 UNITS: 100 INJECTION, SOLUTION INTRAVENOUS; SUBCUTANEOUS at 08:50

## 2024-05-05 RX ADMIN — OXYCODONE AND ACETAMINOPHEN 1 TABLET: 7.5; 325 TABLET ORAL at 06:50

## 2024-05-05 ASSESSMENT — PAIN DESCRIPTION - ORIENTATION
ORIENTATION: LEFT;UPPER
ORIENTATION: LEFT;LOWER
ORIENTATION: RIGHT;LEFT
ORIENTATION: LEFT;UPPER
ORIENTATION: LEFT

## 2024-05-05 ASSESSMENT — PAIN DESCRIPTION - LOCATION
LOCATION: BACK
LOCATION: SHOULDER
LOCATION: BACK;SHOULDER

## 2024-05-05 ASSESSMENT — PAIN DESCRIPTION - DESCRIPTORS
DESCRIPTORS: ACHING
DESCRIPTORS: CRAMPING;SPASM
DESCRIPTORS: ACHING;DISCOMFORT;CRAMPING
DESCRIPTORS: ACHING

## 2024-05-05 ASSESSMENT — PAIN SCALES - GENERAL
PAINLEVEL_OUTOF10: 5
PAINLEVEL_OUTOF10: 6
PAINLEVEL_OUTOF10: 7

## 2024-05-05 NOTE — PROGRESS NOTES
DOING WELL NO ABD COMPLAINTS NO ACTIVE GIT BLEEDING  NO BM SINCE THE COLONOSCOPY  VITALS STABLE   LABS NOTED HB 7.7  WILL CPM F/U PATH REPORT D/W DR CHILEL RESTART PLAVIX F/U  H/H AND IF STABLE POSSIBLE D/C IN AM D/W PT AND RN CE AS OUTPT

## 2024-05-05 NOTE — PROGRESS NOTES
V2.0    [facility] [place]      Name:  Abelardo Marquis /Age/Sex: 1940  (83 y.o. male)   MRN & CSN:  [id number] & [id number] Encounter Date/Time: 5/3/2024 7:57 AM EDT   Location:  -A PCP: Georgie Hirsch MD     Attending:Marcia Mathews*       Hospital Day: 2    Assessment and Recommendations   bAelardo Marquis is a 83 y.o. male with pmh of CAD, hypertension, hyperlipidemia, chronic back pain, and type II DM  who presents with Acute anemia      Plan:   Acute anemia  GI bleed  Hemoglobin on presentation 7.4G/DL, hemoglobin on 2024 13.3 G/DL, prior was 15 on 1/3/24  --Continue PPI  --Hgb as low as 7.3, he s/p 1 unit of blood given the acute drop and patient reporting light-headedness and weakness  --Hgb up to 9.6 post transfusion, now downtrending again and currently at 7.7, continue to monitor and transfuse for hgb <7  --GI on board, EGD and colonoscopy completed, noted to have gastritis, duodenal diverticulum in 2nd portion, no active or old bleeding in the upper GIT, 10 polyps , completely resected but only 4 retrieved , diffuse diverticulosis, mixed hemorrhoids and no active bleeding in the colon.  --Planned for OP capsule endoscopy  --Per Cardiology restart Plavix and monitor hgb    Chest Pain  Patient continues reporting intermittent pain in his chest and left shoulder  --Trop trend  85-->79-->72-->61-->83-->76  --Chronically elevated likely in setting of CKD  --Initial EKG NSR, no concern for ischemia, repeat EKG noted continue to monitor on tele  --Cards consult    LUE swollen  Patient had had chronic left shoulder pain, currently endorsing swelling and pain in the entire arm  --Vasc dup with superficial  sub-occlusive venous thrombosis in the left cephalic vein at site of ?PIV      History of liver cirrhosis  Appears compensated  Likely due to JEONG     CKD stage III  Creatinine on presentation 1.5 Mg/DL, baseline 1.5-1.8  --Currently stable at 1.5, continue to trend  --Avoid

## 2024-05-05 NOTE — CONSULTS
INPATIENT CARDIOLOGY CONSULT NOTE         Reason for consultation:   Chest pain    History of present illness:Abelardo is a 83 y.o.year old who is admitted with   Chief Complaint   Patient presents with    Hyperglycemia     411 via EMS    Fatigue     X4 days        Patient is a 83-year-old gentleman who previously followed up with Dr. Thaddeus Moore at [place] [facility]  Admitted last month 4/19/2024, evaluated by myself.  Please refer to recent H&P.    Cardiology is consulted to evaluate patient for chest pain  Patient denies any chest pain  He complains primarily of left shoulder pain.    Patient has prior medical history significant for PCI 2013 ramus intermedius,  PCI LAD at [facility] after IFR of 0.86 by Dr. Thaddeus Moore     EKG shows sinus rhythm, without any ischemic changes  Cardiac troponin is elevated, chronically with an non-ACS trend  Cardiac troponin positive however no downtrend and non-ACS.    Hemoglobin noted to be around 7.7        Pertinent Lab Personally Review     Recent Labs     05/05/24  0202   WBC 3.6*   HGB 7.7*   HCT 24.5*   *      Recent Labs     05/05/24  0202      K 4.5      CO2 26   BUN 37*   CREATININE 1.5*     Recent Labs     05/04/24  0309   AST 38*   ALT 21   BILITOT 0.8   ALKPHOS 72     Lab Results   Component Value Date    PROBNP 477.8 (H) 05/02/2024    PROBNP 632.6 (H) 04/18/2024    PROBNP 812.0 (H) 04/10/2023         Past medical history:    has a past medical history of Arthritis, Diabetes mellitus (HCC), GERD (gastroesophageal reflux disease), Gout, Hyperlipidemia, Hypertension, and MI (myocardial infarction) ([facility]).  Past surgical history:   has a past surgical history that includes Total knee arthroplasty (Bilateral); back surgery; Cholecystectomy; Cardiac surgery; Dilatation, esophagus; sinus surgery; joint replacement; Hand surgery; and sinus surgery.  Social History:   reports that he has never smoked. He has never used smokeless tobacco. He

## 2024-05-05 NOTE — PLAN OF CARE
Problem: Safety - Adult  Goal: Free from fall injury  5/5/2024 0032 by Jorge Padron RN  Outcome: Progressing  5/4/2024 1236 by Xu Manzanares RN  Outcome: Progressing     Problem: Discharge Planning  Goal: Discharge to home or other facility with appropriate resources  5/5/2024 0032 by Jorge Padron RN  Outcome: Progressing  5/4/2024 1236 by Xu Manzanares RN  Outcome: Progressing     Problem: Pain  Goal: Verbalizes/displays adequate comfort level or baseline comfort level  5/5/2024 0032 by Jorge Padron RN  Outcome: Progressing  5/4/2024 1236 by Xu Manzanares RN  Outcome: Progressing     Problem: ABCDS Injury Assessment  Goal: Absence of physical injury  5/5/2024 0032 by Jorge Padron RN  Outcome: Progressing  5/4/2024 1236 by Xu Manzanares RN  Outcome: Progressing     Problem: Chronic Conditions and Co-morbidities  Goal: Patient's chronic conditions and co-morbidity symptoms are monitored and maintained or improved  5/5/2024 0032 by Jorge Padron RN  Outcome: Progressing  5/4/2024 1236 by Xu Manzanares RN  Outcome: Progressing     Problem: Skin/Tissue Integrity  Goal: Absence of new skin breakdown  Description: 1.  Monitor for areas of redness and/or skin breakdown  2.  Assess vascular access sites hourly  3.  Every 4-6 hours minimum:  Change oxygen saturation probe site  4.  Every 4-6 hours:  If on nasal continuous positive airway pressure, respiratory therapy assess nares and determine need for appliance change or resting period.  5/5/2024 0032 by Jorge Padron RN  Outcome: Progressing  5/4/2024 1236 by Xu Manzanares RN  Outcome: Progressing

## 2024-05-06 ENCOUNTER — APPOINTMENT (OUTPATIENT)
Dept: GENERAL RADIOLOGY | Age: 84
DRG: 378 | End: 2024-05-06
Payer: MEDICARE

## 2024-05-06 VITALS
HEIGHT: 70 IN | SYSTOLIC BLOOD PRESSURE: 130 MMHG | RESPIRATION RATE: 17 BRPM | OXYGEN SATURATION: 96 % | WEIGHT: 224 LBS | BODY MASS INDEX: 32.07 KG/M2 | DIASTOLIC BLOOD PRESSURE: 60 MMHG | HEART RATE: 69 BPM | TEMPERATURE: 98.1 F

## 2024-05-06 PROBLEM — K92.2 GASTROINTESTINAL HEMORRHAGE: Status: RESOLVED | Noted: 2024-05-05 | Resolved: 2024-05-06

## 2024-05-06 PROBLEM — D64.9 ACUTE ANEMIA: Status: RESOLVED | Noted: 2024-05-02 | Resolved: 2024-05-06

## 2024-05-06 LAB
ANION GAP SERPL CALCULATED.3IONS-SCNC: 12 MMOL/L (ref 7–16)
BUN SERPL-MCNC: 36 MG/DL (ref 6–23)
CALCIUM SERPL-MCNC: 7.7 MG/DL (ref 8.3–10.6)
CHLORIDE BLD-SCNC: 109 MMOL/L (ref 99–110)
CO2: 17 MMOL/L (ref 21–32)
CREAT SERPL-MCNC: 1.4 MG/DL (ref 0.9–1.3)
GFR, ESTIMATED: 50 ML/MIN/1.73M2
GLUCOSE BLD-MCNC: 255 MG/DL (ref 70–99)
GLUCOSE BLD-MCNC: 275 MG/DL (ref 70–99)
GLUCOSE BLD-MCNC: 302 MG/DL (ref 70–99)
GLUCOSE SERPL-MCNC: 236 MG/DL (ref 70–99)
HCT VFR BLD CALC: 25.3 % (ref 42–52)
HCT VFR BLD CALC: 29.2 % (ref 42–52)
HEMOGLOBIN: 8 GM/DL (ref 13.5–18)
HEMOGLOBIN: 8.4 GM/DL (ref 13.5–18)
MCH RBC QN AUTO: 31 PG (ref 27–31)
MCH RBC QN AUTO: 31.1 PG (ref 27–31)
MCHC RBC AUTO-ENTMCNC: 28.8 % (ref 32–36)
MCHC RBC AUTO-ENTMCNC: 31.6 % (ref 32–36)
MCV RBC AUTO: 107.7 FL (ref 78–100)
MCV RBC AUTO: 98.4 FL (ref 78–100)
PDW BLD-RTO: 16 % (ref 11.7–14.9)
PDW BLD-RTO: 16.2 % (ref 11.7–14.9)
PLATELET # BLD: 103 K/CU MM (ref 140–440)
PLATELET # BLD: 71 K/CU MM (ref 140–440)
PMV BLD AUTO: 10.7 FL (ref 7.5–11.1)
PMV BLD AUTO: 12 FL (ref 7.5–11.1)
POTASSIUM SERPL-SCNC: 4.4 MMOL/L (ref 3.5–5.1)
RBC # BLD: 2.57 M/CU MM (ref 4.6–6.2)
RBC # BLD: 2.71 M/CU MM (ref 4.6–6.2)
SODIUM BLD-SCNC: 138 MMOL/L (ref 135–145)
WBC # BLD: 3.1 K/CU MM (ref 4–10.5)
WBC # BLD: 3.9 K/CU MM (ref 4–10.5)

## 2024-05-06 PROCEDURE — 6360000002 HC RX W HCPCS: Performed by: SPECIALIST

## 2024-05-06 PROCEDURE — 94761 N-INVAS EAR/PLS OXIMETRY MLT: CPT

## 2024-05-06 PROCEDURE — 85027 COMPLETE CBC AUTOMATED: CPT

## 2024-05-06 PROCEDURE — 6370000000 HC RX 637 (ALT 250 FOR IP): Performed by: STUDENT IN AN ORGANIZED HEALTH CARE EDUCATION/TRAINING PROGRAM

## 2024-05-06 PROCEDURE — 2580000003 HC RX 258: Performed by: INTERNAL MEDICINE

## 2024-05-06 PROCEDURE — 6370000000 HC RX 637 (ALT 250 FOR IP): Performed by: INTERNAL MEDICINE

## 2024-05-06 PROCEDURE — 36415 COLL VENOUS BLD VENIPUNCTURE: CPT

## 2024-05-06 PROCEDURE — C9113 INJ PANTOPRAZOLE SODIUM, VIA: HCPCS | Performed by: SPECIALIST

## 2024-05-06 PROCEDURE — 73030 X-RAY EXAM OF SHOULDER: CPT

## 2024-05-06 PROCEDURE — 80048 BASIC METABOLIC PNL TOTAL CA: CPT

## 2024-05-06 PROCEDURE — 82962 GLUCOSE BLOOD TEST: CPT

## 2024-05-06 RX ORDER — PANTOPRAZOLE SODIUM 40 MG/1
40 TABLET, DELAYED RELEASE ORAL
Qty: 60 TABLET | Refills: 1 | Status: SHIPPED | OUTPATIENT
Start: 2024-05-06

## 2024-05-06 RX ADMIN — METOPROLOL TARTRATE 25 MG: 25 TABLET, FILM COATED ORAL at 08:35

## 2024-05-06 RX ADMIN — SODIUM CHLORIDE, PRESERVATIVE FREE 10 ML: 5 INJECTION INTRAVENOUS at 08:40

## 2024-05-06 RX ADMIN — INSULIN LISPRO 4 UNITS: 100 INJECTION, SOLUTION INTRAVENOUS; SUBCUTANEOUS at 08:40

## 2024-05-06 RX ADMIN — INSULIN GLARGINE 20 UNITS: 100 INJECTION, SOLUTION SUBCUTANEOUS at 08:40

## 2024-05-06 RX ADMIN — INSULIN LISPRO 4 UNITS: 100 INJECTION, SOLUTION INTRAVENOUS; SUBCUTANEOUS at 18:36

## 2024-05-06 RX ADMIN — GABAPENTIN 800 MG: 400 CAPSULE ORAL at 08:35

## 2024-05-06 RX ADMIN — INSULIN LISPRO 8 UNITS: 100 INJECTION, SOLUTION INTRAVENOUS; SUBCUTANEOUS at 08:40

## 2024-05-06 RX ADMIN — FLUOXETINE HYDROCHLORIDE 40 MG: 20 CAPSULE ORAL at 08:35

## 2024-05-06 RX ADMIN — OXYCODONE AND ACETAMINOPHEN 1 TABLET: 7.5; 325 TABLET ORAL at 06:09

## 2024-05-06 RX ADMIN — ISOSORBIDE MONONITRATE 30 MG: 30 TABLET, EXTENDED RELEASE ORAL at 08:35

## 2024-05-06 RX ADMIN — OXYCODONE AND ACETAMINOPHEN 1 TABLET: 7.5; 325 TABLET ORAL at 13:13

## 2024-05-06 RX ADMIN — GABAPENTIN 800 MG: 400 CAPSULE ORAL at 13:13

## 2024-05-06 RX ADMIN — INSULIN LISPRO 6 UNITS: 100 INJECTION, SOLUTION INTRAVENOUS; SUBCUTANEOUS at 13:14

## 2024-05-06 RX ADMIN — Medication 1000 MCG: at 08:36

## 2024-05-06 RX ADMIN — INSULIN LISPRO 8 UNITS: 100 INJECTION, SOLUTION INTRAVENOUS; SUBCUTANEOUS at 18:36

## 2024-05-06 RX ADMIN — PANTOPRAZOLE SODIUM 40 MG: 40 INJECTION, POWDER, FOR SOLUTION INTRAVENOUS at 08:39

## 2024-05-06 RX ADMIN — INSULIN LISPRO 8 UNITS: 100 INJECTION, SOLUTION INTRAVENOUS; SUBCUTANEOUS at 13:14

## 2024-05-06 RX ADMIN — OXYCODONE AND ACETAMINOPHEN 1 TABLET: 7.5; 325 TABLET ORAL at 18:31

## 2024-05-06 RX ADMIN — CHOLESTYRAMINE 4 G: 4 POWDER, FOR SUSPENSION ORAL at 08:35

## 2024-05-06 RX ADMIN — CLOPIDOGREL BISULFATE 75 MG: 75 TABLET ORAL at 08:39

## 2024-05-06 ASSESSMENT — PAIN - FUNCTIONAL ASSESSMENT: PAIN_FUNCTIONAL_ASSESSMENT: ACTIVITIES ARE NOT PREVENTED

## 2024-05-06 ASSESSMENT — PAIN SCALES - GENERAL
PAINLEVEL_OUTOF10: 7
PAINLEVEL_OUTOF10: 7
PAINLEVEL_OUTOF10: 8

## 2024-05-06 ASSESSMENT — PAIN DESCRIPTION - LOCATION
LOCATION: SHOULDER
LOCATION: SHOULDER;BACK
LOCATION: SHOULDER;BACK

## 2024-05-06 ASSESSMENT — PAIN DESCRIPTION - ORIENTATION
ORIENTATION: LEFT
ORIENTATION: LOWER;LEFT

## 2024-05-06 ASSESSMENT — PAIN DESCRIPTION - DESCRIPTORS
DESCRIPTORS: ACHING
DESCRIPTORS: ACHING;CRAMPING;SPASM
DESCRIPTORS: ACHING

## 2024-05-06 NOTE — PROGRESS NOTES
Physician Progress Note      PATIENT:               BRYNN NATHAN  [facility] #:                  [id number]  :                       1940  ADMIT DATE:       2024 1:10 PM  DISCH DATE:  RESPONDING  PROVIDER #:        Marcia Mathews MD          QUERY TEXT:    Internal Medicine,    Patient admitted with GI bleeding, EGD was completed with no cause for   bleeding found. If possible, please document in progress notes and discharge   summary the cause of the GI bleeding:    The medical record reflects the following:  Risk Factors: multiple  Clinical Indicators: EGD and colonoscopy showed diverticulosis of duodenum and   colon, colon polyps, internal hemorrhoids, gastritis with no active bleeding  Treatment: labs, imaging, GI consult, PPI, PRBC's    Thank you,  Judy Atkinson RN CDS  [phone]  Options provided:  -- GI bleeding due to colon polyps  -- GI bleeding due to hemorrhoids  -- GI bleeding due to duodenal diverticulosis  -- GI bleeding due to colon diverticulosis  -- GI bleeding due to gastritis  -- GI bleeding due to ##please document cause, please document cause.  -- Other - I will add my own diagnosis  -- Disagree - Not applicable / Not valid  -- Disagree - Clinically unable to determine / Unknown  -- Refer to Clinical Documentation Reviewer    PROVIDER RESPONSE TEXT:    Provider is clinically unable to determine a response to this query.  No clear stigmata of bleeding, planned for further work up as OP    Query created by: Judy Atkinson on 2024 2:20 PM      Electronically signed by:  Marcia Mathews MD 2024 3:15 PM

## 2024-05-06 NOTE — DISCHARGE SUMMARY
05/02/2024 02:25 PM    UROBILINOGEN 0.2 05/02/2024 02:25 PM    BILIRUBINUR NEGATIVE 05/02/2024 02:25 PM    BLOODU NEGATIVE 05/02/2024 02:25 PM    GLUCOSEU >1000 05/02/2024 02:25 PM    KETUA NEGATIVE 05/02/2024 02:25 PM     Urine Cultures: No results found for: \"LABURIN\"  Blood Cultures: No results found for: \"BC\"  No results found for: \"BLOODCULT2\"  Organism: No results found for: \"ORG\"    Time Spent Discharging patient 50 minutes    Electronically signed by Marcia Mathews MD on 5/6/2024 at 1:31 PM

## 2024-05-06 NOTE — PROGRESS NOTES
DOING WELL NO ABD COMPLAINTS HAD BM DARKISH IN COLOR DUE TO OLD BLOOD  VITALS STABLE   LABS NOTED HB 8.9 PATH REPORT PENDING  POSSIBLE D/C TODAY WILL F/U AS OUTPT

## 2024-05-06 NOTE — CARE COORDINATION
LSW was informed that pt may be discharged today with [facility].  LSW PS Daiana with [facility] and informed her of the possible discharge today.  CM will need a inpt HC order for nursing and PT/OT.

## 2024-05-06 NOTE — PROGRESS NOTES
Outpatient Pharmacy Progress Note for Meds-to-Beds    Total number of Prescriptions Filled: 1    Additional Documentation:  Medications given to nurse Anders to provide to patient      Thank you for letting us serve your patients.  [facility] - [address] [address]    Phone: [phone]    Fax: [phone]

## 2024-05-06 NOTE — DISCHARGE INSTRUCTIONS
Please continue to take medications as prescribed  Continue to follow up with cardiology, gastroenterology and your PCP  Have your PCP send you for an orthopedic evaluation as there is concern for a rotator cuff tear  If symptoms recur or you get lightheaded or dizzy, please call GI or return to the nearest ED

## 2024-05-07 ENCOUNTER — CARE COORDINATION (OUTPATIENT)
Dept: CASE MANAGEMENT | Age: 84
End: 2024-05-07

## 2024-05-07 DIAGNOSIS — D64.9 ACUTE ANEMIA: Primary | ICD-10-CM

## 2024-05-07 PROCEDURE — 1111F DSCHRG MED/CURRENT MED MERGE: CPT | Performed by: FAMILY MEDICINE

## 2024-05-07 NOTE — CARE COORDINATION
importance of HFU appt needed within 7 days. Has appt with PCP on 5/13/24. He may drive to appt himself, or son can as back up.     Care Transition Nurse reviewed discharge instructions, medical action plan, and red flags with patient who verbalized understanding. The patient was given an opportunity to ask questions and does not have any further questions or concerns at this time. Were discharge instructions available to patient? Yes. Reviewed appropriate site of care based on symptoms and resources available to patient including: PCP  Specialist  Urgent care clinics  When to call 911  Twt[facility]ks Messaging. The patient agrees to contact the PCP office for questions related to their healthcare.     Advance Care Planning:   Does patient have an Advance Directive: not on file.    Medication reconciliation was performed with patient, who verbalizes understanding of administration of home medications. Medications reviewed, 1111F entered: yes    Was patient discharged with a pulse oximeter? no    Non-face-to-face services provided:  Reviewed and followed up on pending diagnostic tests and treatments-EGD  Education of patient/family/caregiver/guardian to support self-management-GIB, Anemia  Assessment and support for treatment adherence and medication management-med review    Offered patient enrollment in the Remote Patient Monitoring (RPM) program for in-home monitoring: Patient is not eligible for RPM program because: affiliate provider.    Care Transitions 24 Hour Call    Schedule Follow Up Appointment with PCP: Completed  Do you have a copy of your discharge instructions?: Yes  Do you have all of your prescriptions and are they filled?: Yes  Have you been contacted by a [facility] Pharmacist?: No  Have you scheduled your follow up appointment?: Yes  How are you going to get to your appointment?: Car - family or friend to transport  Do you feel like you have everything you need to keep you well at home?: Yes  Care Transitions

## 2024-05-08 LAB
EKG ATRIAL RATE: 73 BPM
EKG DIAGNOSIS: NORMAL
EKG Q-T INTERVAL: 456 MS
EKG QRS DURATION: 104 MS
EKG QTC CALCULATION (BAZETT): 492 MS
EKG R AXIS: 10 DEGREES
EKG T AXIS: 53 DEGREES
EKG VENTRICULAR RATE: 70 BPM

## 2024-05-13 ENCOUNTER — CARE COORDINATION (OUTPATIENT)
Dept: CASE MANAGEMENT | Age: 84
End: 2024-05-13

## 2024-05-13 NOTE — CARE COORDINATION
Care Transitions Follow Up Call    Patient: Abelardo Marquis  Patient : 1940   MRN: <[id number]>  Reason for Admission:  GIB-Anemia   Discharge Date: 24 RARS: Readmission Risk Score: 20      Attempted to contact patient for follow up transition call. Left voicemail message to return call with an update on condition since discharge. Contact information provided. Will continue to follow up.      Follow Up  Future Appointments   Date Time Provider Department Center   2024 12:45 PM Cecile Murray MD [id number]       Care Transitions Subsequent and Final Call    Subsequent and Final Calls  Care Transitions Interventions     Transportation Support: Declined      DME Assistance: Declined     Senior Services: Declined    Diabetes Education: Completed      Disease Association: Completed      Other Interventions:             Xiomara Conway LPN

## 2024-05-15 ENCOUNTER — APPOINTMENT (OUTPATIENT)
Dept: GENERAL RADIOLOGY | Age: 84
DRG: 812 | End: 2024-05-15
Payer: MEDICARE

## 2024-05-15 ENCOUNTER — HOSPITAL ENCOUNTER (INPATIENT)
Age: 84
LOS: 3 days | Discharge: HOME HEALTH | DRG: 812 | End: 2024-05-18
Attending: EMERGENCY MEDICINE | Admitting: STUDENT IN AN ORGANIZED HEALTH CARE EDUCATION/TRAINING PROGRAM
Payer: MEDICARE

## 2024-05-15 DIAGNOSIS — D64.9 ACUTE ANEMIA: ICD-10-CM

## 2024-05-15 DIAGNOSIS — E08.65 DIABETES MELLITUS DUE TO UNDERLYING CONDITION WITH HYPERGLYCEMIA, WITH LONG-TERM CURRENT USE OF INSULIN (HCC): ICD-10-CM

## 2024-05-15 DIAGNOSIS — N18.30 STAGE 3 CHRONIC KIDNEY DISEASE, UNSPECIFIED WHETHER STAGE 3A OR 3B CKD (HCC): Chronic | ICD-10-CM

## 2024-05-15 DIAGNOSIS — I95.9 HYPOTENSION, UNSPECIFIED HYPOTENSION TYPE: ICD-10-CM

## 2024-05-15 DIAGNOSIS — Z79.4 DIABETES MELLITUS DUE TO UNDERLYING CONDITION WITH HYPERGLYCEMIA, WITH LONG-TERM CURRENT USE OF INSULIN (HCC): ICD-10-CM

## 2024-05-15 DIAGNOSIS — N17.9 ACUTE RENAL FAILURE SUPERIMPOSED ON STAGE 3 CHRONIC KIDNEY DISEASE, UNSPECIFIED ACUTE RENAL FAILURE TYPE, UNSPECIFIED WHETHER STAGE 3A OR 3B CKD (HCC): Primary | ICD-10-CM

## 2024-05-15 DIAGNOSIS — N18.30 ACUTE RENAL FAILURE SUPERIMPOSED ON STAGE 3 CHRONIC KIDNEY DISEASE, UNSPECIFIED ACUTE RENAL FAILURE TYPE, UNSPECIFIED WHETHER STAGE 3A OR 3B CKD (HCC): Primary | ICD-10-CM

## 2024-05-15 LAB
ALBUMIN SERPL-MCNC: 3.5 GM/DL (ref 3.4–5)
ALP BLD-CCNC: 70 IU/L (ref 40–128)
ALT SERPL-CCNC: 14 U/L (ref 10–40)
ANION GAP SERPL CALCULATED.3IONS-SCNC: 10 MMOL/L (ref 7–16)
AST SERPL-CCNC: 19 IU/L (ref 15–37)
BASOPHILS ABSOLUTE: 0 K/CU MM
BASOPHILS RELATIVE PERCENT: 0.6 % (ref 0–1)
BILIRUB SERPL-MCNC: 0.3 MG/DL (ref 0–1)
BUN SERPL-MCNC: 59 MG/DL (ref 6–23)
CALCIUM SERPL-MCNC: 7.9 MG/DL (ref 8.3–10.6)
CHLORIDE BLD-SCNC: 101 MMOL/L (ref 99–110)
CO2: 26 MMOL/L (ref 21–32)
CREAT SERPL-MCNC: 1.9 MG/DL (ref 0.9–1.3)
DIFFERENTIAL TYPE: ABNORMAL
EKG ATRIAL RATE: 57 BPM
EKG DIAGNOSIS: NORMAL
EKG P AXIS: 51 DEGREES
EKG Q-T INTERVAL: 466 MS
EKG QRS DURATION: 106 MS
EKG QTC CALCULATION (BAZETT): 457 MS
EKG R AXIS: 8 DEGREES
EKG T AXIS: 65 DEGREES
EKG VENTRICULAR RATE: 58 BPM
EOSINOPHILS ABSOLUTE: 0.1 K/CU MM
EOSINOPHILS RELATIVE PERCENT: 4.2 % (ref 0–3)
FERRITIN: 20 NG/ML (ref 30–400)
FOLATE SERPL-MCNC: 19.2 NG/ML (ref 3.1–17.5)
GFR, ESTIMATED: 35 ML/MIN/1.73M2
GLUCOSE BLD-MCNC: 174 MG/DL (ref 70–99)
GLUCOSE BLD-MCNC: 183 MG/DL (ref 70–99)
GLUCOSE BLD-MCNC: 284 MG/DL (ref 70–99)
GLUCOSE SERPL-MCNC: 271 MG/DL (ref 70–99)
HCT VFR BLD CALC: 21.7 % (ref 42–52)
HCT VFR BLD CALC: 23.2 % (ref 42–52)
HEMOGLOBIN: 6.5 GM/DL (ref 13.5–18)
HEMOGLOBIN: 7.1 GM/DL (ref 13.5–18)
IMMATURE NEUTROPHIL %: 0.9 % (ref 0–0.43)
IRON: 34 UG/DL (ref 59–158)
LIPASE: 40 IU/L (ref 13–60)
LYMPHOCYTES ABSOLUTE: 0.9 K/CU MM
LYMPHOCYTES RELATIVE PERCENT: 26.4 % (ref 24–44)
MAGNESIUM: 2.4 MG/DL (ref 1.8–2.4)
MCH RBC QN AUTO: 29.7 PG (ref 27–31)
MCHC RBC AUTO-ENTMCNC: 30.6 % (ref 32–36)
MCV RBC AUTO: 97.1 FL (ref 78–100)
MONOCYTES ABSOLUTE: 0.4 K/CU MM
MONOCYTES RELATIVE PERCENT: 11.1 % (ref 0–4)
NEUTROPHILS ABSOLUTE: 1.9 K/CU MM
NEUTROPHILS RELATIVE PERCENT: 56.8 % (ref 36–66)
NUCLEATED RBC %: 0 %
PCT TRANSFERRIN: 11 % (ref 10–44)
PDW BLD-RTO: 15.3 % (ref 11.7–14.9)
PLATELET # BLD: 139 K/CU MM (ref 140–440)
PMV BLD AUTO: 10.3 FL (ref 7.5–11.1)
POTASSIUM SERPL-SCNC: 4 MMOL/L (ref 3.5–5.1)
RBC # BLD: 2.39 M/CU MM (ref 4.6–6.2)
SODIUM BLD-SCNC: 137 MMOL/L (ref 135–145)
TOTAL IMMATURE NEUTOROPHIL: 0.03 K/CU MM
TOTAL IRON BINDING CAPACITY: 306 UG/DL (ref 250–450)
TOTAL NUCLEATED RBC: 0 K/CU MM
TOTAL PROTEIN: 5.9 GM/DL (ref 6.4–8.2)
UNSATURATED IRON BINDING CAPACITY: 272 UG/DL (ref 110–370)
VITAMIN B-12: 1238 PG/ML (ref 211–911)
WBC # BLD: 3.3 K/CU MM (ref 4–10.5)

## 2024-05-15 PROCEDURE — 93005 ELECTROCARDIOGRAM TRACING: CPT | Performed by: EMERGENCY MEDICINE

## 2024-05-15 PROCEDURE — 83036 HEMOGLOBIN GLYCOSYLATED A1C: CPT

## 2024-05-15 PROCEDURE — 86900 BLOOD TYPING SEROLOGIC ABO: CPT

## 2024-05-15 PROCEDURE — 82746 ASSAY OF FOLIC ACID SERUM: CPT

## 2024-05-15 PROCEDURE — 85014 HEMATOCRIT: CPT

## 2024-05-15 PROCEDURE — 84145 PROCALCITONIN (PCT): CPT

## 2024-05-15 PROCEDURE — 85018 HEMOGLOBIN: CPT

## 2024-05-15 PROCEDURE — 71045 X-RAY EXAM CHEST 1 VIEW: CPT

## 2024-05-15 PROCEDURE — 1200000000 HC SEMI PRIVATE

## 2024-05-15 PROCEDURE — 99285 EMERGENCY DEPT VISIT HI MDM: CPT

## 2024-05-15 PROCEDURE — 82962 GLUCOSE BLOOD TEST: CPT

## 2024-05-15 PROCEDURE — 80053 COMPREHEN METABOLIC PANEL: CPT

## 2024-05-15 PROCEDURE — 83735 ASSAY OF MAGNESIUM: CPT

## 2024-05-15 PROCEDURE — 82607 VITAMIN B-12: CPT

## 2024-05-15 PROCEDURE — 85025 COMPLETE CBC W/AUTO DIFF WBC: CPT

## 2024-05-15 PROCEDURE — 36415 COLL VENOUS BLD VENIPUNCTURE: CPT

## 2024-05-15 PROCEDURE — 83550 IRON BINDING TEST: CPT

## 2024-05-15 PROCEDURE — 83540 ASSAY OF IRON: CPT

## 2024-05-15 PROCEDURE — 86850 RBC ANTIBODY SCREEN: CPT

## 2024-05-15 PROCEDURE — 6370000000 HC RX 637 (ALT 250 FOR IP): Performed by: STUDENT IN AN ORGANIZED HEALTH CARE EDUCATION/TRAINING PROGRAM

## 2024-05-15 PROCEDURE — 86922 COMPATIBILITY TEST ANTIGLOB: CPT

## 2024-05-15 PROCEDURE — 93010 ELECTROCARDIOGRAM REPORT: CPT | Performed by: INTERNAL MEDICINE

## 2024-05-15 PROCEDURE — 86901 BLOOD TYPING SEROLOGIC RH(D): CPT

## 2024-05-15 PROCEDURE — 94761 N-INVAS EAR/PLS OXIMETRY MLT: CPT

## 2024-05-15 PROCEDURE — 83690 ASSAY OF LIPASE: CPT

## 2024-05-15 PROCEDURE — 2580000003 HC RX 258: Performed by: STUDENT IN AN ORGANIZED HEALTH CARE EDUCATION/TRAINING PROGRAM

## 2024-05-15 PROCEDURE — 82728 ASSAY OF FERRITIN: CPT

## 2024-05-15 RX ORDER — DEXTROSE MONOHYDRATE 100 MG/ML
INJECTION, SOLUTION INTRAVENOUS CONTINUOUS PRN
Status: DISCONTINUED | OUTPATIENT
Start: 2024-05-15 | End: 2024-05-18 | Stop reason: HOSPADM

## 2024-05-15 RX ORDER — POTASSIUM CHLORIDE 20 MEQ/1
40 TABLET, EXTENDED RELEASE ORAL PRN
Status: DISCONTINUED | OUTPATIENT
Start: 2024-05-15 | End: 2024-05-18 | Stop reason: HOSPADM

## 2024-05-15 RX ORDER — ATORVASTATIN CALCIUM 40 MG/1
40 TABLET, FILM COATED ORAL NIGHTLY
Status: DISCONTINUED | OUTPATIENT
Start: 2024-05-15 | End: 2024-05-18 | Stop reason: HOSPADM

## 2024-05-15 RX ORDER — FUROSEMIDE 20 MG/1
40 TABLET ORAL DAILY
Status: DISCONTINUED | OUTPATIENT
Start: 2024-05-15 | End: 2024-05-16

## 2024-05-15 RX ORDER — ISOSORBIDE MONONITRATE 30 MG/1
30 TABLET, EXTENDED RELEASE ORAL DAILY
Status: DISCONTINUED | OUTPATIENT
Start: 2024-05-15 | End: 2024-05-18 | Stop reason: HOSPADM

## 2024-05-15 RX ORDER — MAGNESIUM SULFATE IN WATER 40 MG/ML
2000 INJECTION, SOLUTION INTRAVENOUS PRN
Status: DISCONTINUED | OUTPATIENT
Start: 2024-05-15 | End: 2024-05-18 | Stop reason: HOSPADM

## 2024-05-15 RX ORDER — ACETAMINOPHEN 650 MG/1
650 SUPPOSITORY RECTAL EVERY 6 HOURS PRN
Status: DISCONTINUED | OUTPATIENT
Start: 2024-05-15 | End: 2024-05-18 | Stop reason: HOSPADM

## 2024-05-15 RX ORDER — SODIUM CHLORIDE 9 MG/ML
INJECTION, SOLUTION INTRAVENOUS PRN
Status: DISCONTINUED | OUTPATIENT
Start: 2024-05-15 | End: 2024-05-18 | Stop reason: HOSPADM

## 2024-05-15 RX ORDER — 0.9 % SODIUM CHLORIDE 0.9 %
1000 INTRAVENOUS SOLUTION INTRAVENOUS ONCE
Status: DISCONTINUED | OUTPATIENT
Start: 2024-05-15 | End: 2024-05-16

## 2024-05-15 RX ORDER — INSULIN GLARGINE 100 [IU]/ML
20 INJECTION, SOLUTION SUBCUTANEOUS 2 TIMES DAILY
Status: DISCONTINUED | OUTPATIENT
Start: 2024-05-15 | End: 2024-05-16

## 2024-05-15 RX ORDER — OXYCODONE AND ACETAMINOPHEN 7.5; 325 MG/1; MG/1
1 TABLET ORAL EVERY 6 HOURS PRN
Status: DISCONTINUED | OUTPATIENT
Start: 2024-05-15 | End: 2024-05-18 | Stop reason: HOSPADM

## 2024-05-15 RX ORDER — ONDANSETRON 2 MG/ML
4 INJECTION INTRAMUSCULAR; INTRAVENOUS EVERY 6 HOURS PRN
Status: DISCONTINUED | OUTPATIENT
Start: 2024-05-15 | End: 2024-05-18 | Stop reason: HOSPADM

## 2024-05-15 RX ORDER — INSULIN LISPRO 100 [IU]/ML
0-4 INJECTION, SOLUTION INTRAVENOUS; SUBCUTANEOUS NIGHTLY
Status: DISCONTINUED | OUTPATIENT
Start: 2024-05-15 | End: 2024-05-17

## 2024-05-15 RX ORDER — ENOXAPARIN SODIUM 100 MG/ML
30 INJECTION SUBCUTANEOUS 2 TIMES DAILY
Status: DISCONTINUED | OUTPATIENT
Start: 2024-05-16 | End: 2024-05-18 | Stop reason: HOSPADM

## 2024-05-15 RX ORDER — GABAPENTIN 400 MG/1
400 CAPSULE ORAL 2 TIMES DAILY
Status: DISCONTINUED | OUTPATIENT
Start: 2024-05-15 | End: 2024-05-18 | Stop reason: HOSPADM

## 2024-05-15 RX ORDER — POLYETHYLENE GLYCOL 3350 17 G/17G
17 POWDER, FOR SOLUTION ORAL DAILY PRN
Status: DISCONTINUED | OUTPATIENT
Start: 2024-05-15 | End: 2024-05-18 | Stop reason: HOSPADM

## 2024-05-15 RX ORDER — PANTOPRAZOLE SODIUM 40 MG/1
40 TABLET, DELAYED RELEASE ORAL
Status: DISCONTINUED | OUTPATIENT
Start: 2024-05-15 | End: 2024-05-18 | Stop reason: HOSPADM

## 2024-05-15 RX ORDER — GLUCAGON 1 MG/ML
1 KIT INJECTION PRN
Status: DISCONTINUED | OUTPATIENT
Start: 2024-05-15 | End: 2024-05-18 | Stop reason: HOSPADM

## 2024-05-15 RX ORDER — INSULIN LISPRO 100 [IU]/ML
0-8 INJECTION, SOLUTION INTRAVENOUS; SUBCUTANEOUS
Status: DISCONTINUED | OUTPATIENT
Start: 2024-05-15 | End: 2024-05-17

## 2024-05-15 RX ORDER — POTASSIUM CHLORIDE 7.45 MG/ML
10 INJECTION INTRAVENOUS PRN
Status: DISCONTINUED | OUTPATIENT
Start: 2024-05-15 | End: 2024-05-18 | Stop reason: HOSPADM

## 2024-05-15 RX ORDER — METOPROLOL TARTRATE 50 MG/1
25 TABLET, FILM COATED ORAL 2 TIMES DAILY
Status: DISCONTINUED | OUTPATIENT
Start: 2024-05-15 | End: 2024-05-18 | Stop reason: HOSPADM

## 2024-05-15 RX ORDER — LANOLIN ALCOHOL/MO/W.PET/CERES
1000 CREAM (GRAM) TOPICAL DAILY
Status: DISCONTINUED | OUTPATIENT
Start: 2024-05-16 | End: 2024-05-18 | Stop reason: HOSPADM

## 2024-05-15 RX ORDER — CLOPIDOGREL BISULFATE 75 MG/1
75 TABLET ORAL DAILY
Status: DISCONTINUED | OUTPATIENT
Start: 2024-05-16 | End: 2024-05-16

## 2024-05-15 RX ORDER — SODIUM CHLORIDE 0.9 % (FLUSH) 0.9 %
5-40 SYRINGE (ML) INJECTION EVERY 12 HOURS SCHEDULED
Status: DISCONTINUED | OUTPATIENT
Start: 2024-05-15 | End: 2024-05-18 | Stop reason: HOSPADM

## 2024-05-15 RX ORDER — ACETAMINOPHEN 325 MG/1
650 TABLET ORAL EVERY 6 HOURS PRN
Status: DISCONTINUED | OUTPATIENT
Start: 2024-05-15 | End: 2024-05-18 | Stop reason: HOSPADM

## 2024-05-15 RX ORDER — FLUOXETINE HYDROCHLORIDE 20 MG/1
40 CAPSULE ORAL DAILY
Status: DISCONTINUED | OUTPATIENT
Start: 2024-05-16 | End: 2024-05-18 | Stop reason: HOSPADM

## 2024-05-15 RX ORDER — SODIUM CHLORIDE 0.9 % (FLUSH) 0.9 %
5-40 SYRINGE (ML) INJECTION PRN
Status: DISCONTINUED | OUTPATIENT
Start: 2024-05-15 | End: 2024-05-18 | Stop reason: HOSPADM

## 2024-05-15 RX ORDER — ONDANSETRON 4 MG/1
4 TABLET, ORALLY DISINTEGRATING ORAL EVERY 8 HOURS PRN
Status: DISCONTINUED | OUTPATIENT
Start: 2024-05-15 | End: 2024-05-18 | Stop reason: HOSPADM

## 2024-05-15 RX ADMIN — SODIUM CHLORIDE, PRESERVATIVE FREE 10 ML: 5 INJECTION INTRAVENOUS at 22:30

## 2024-05-15 RX ADMIN — ATORVASTATIN CALCIUM 40 MG: 40 TABLET, FILM COATED ORAL at 20:34

## 2024-05-15 RX ADMIN — GABAPENTIN 400 MG: 400 CAPSULE ORAL at 20:33

## 2024-05-15 RX ADMIN — INSULIN GLARGINE 20 UNITS: 100 INJECTION, SOLUTION SUBCUTANEOUS at 20:33

## 2024-05-15 RX ADMIN — PANTOPRAZOLE SODIUM 40 MG: 40 TABLET, DELAYED RELEASE ORAL at 17:43

## 2024-05-15 RX ADMIN — OXYCODONE AND ACETAMINOPHEN 1 TABLET: 7.5; 325 TABLET ORAL at 17:43

## 2024-05-15 ASSESSMENT — PAIN - FUNCTIONAL ASSESSMENT
PAIN_FUNCTIONAL_ASSESSMENT: ACTIVITIES ARE NOT PREVENTED
PAIN_FUNCTIONAL_ASSESSMENT: ACTIVITIES ARE NOT PREVENTED
PAIN_FUNCTIONAL_ASSESSMENT: 0-10

## 2024-05-15 ASSESSMENT — PAIN DESCRIPTION - FREQUENCY
FREQUENCY: CONTINUOUS
FREQUENCY: CONTINUOUS

## 2024-05-15 ASSESSMENT — PAIN DESCRIPTION - PAIN TYPE
TYPE: CHRONIC PAIN
TYPE: CHRONIC PAIN

## 2024-05-15 ASSESSMENT — PAIN DESCRIPTION - DESCRIPTORS
DESCRIPTORS: DISCOMFORT;NAGGING
DESCRIPTORS: ACHING;CRAMPING;DISCOMFORT

## 2024-05-15 ASSESSMENT — PAIN DESCRIPTION - LOCATION
LOCATION: BACK
LOCATION: BACK

## 2024-05-15 ASSESSMENT — PAIN DESCRIPTION - ONSET
ONSET: ON-GOING
ONSET: ON-GOING

## 2024-05-15 ASSESSMENT — PAIN SCALES - GENERAL
PAINLEVEL_OUTOF10: 5
PAINLEVEL_OUTOF10: 7
PAINLEVEL_OUTOF10: 6
PAINLEVEL_OUTOF10: 7

## 2024-05-15 ASSESSMENT — PAIN DESCRIPTION - ORIENTATION
ORIENTATION: LOWER
ORIENTATION: LOWER

## 2024-05-15 ASSESSMENT — PAIN SCALES - WONG BAKER: WONGBAKER_NUMERICALRESPONSE: HURTS LITTLE MORE

## 2024-05-15 NOTE — H&P
No   Housing Stability: Low Risk  (5/2/2024)    Housing Stability Vital Sign     Unable to Pay for Housing in the Last Year: No     Number of Places Lived in the Last Year: 1     Unstable Housing in the Last Year: No       Medications:   Medications:    sodium chloride  1,000 mL IntraVENous Once    atorvastatin  40 mg Oral Nightly    [START ON 5/16/2024] clopidogrel  75 mg Oral Daily    [START ON 5/16/2024] empagliflozin  25 mg Oral Daily    [START ON 5/16/2024] FLUoxetine  40 mg Oral Daily    [Held by provider] furosemide  40 mg Oral Daily    gabapentin  400 mg Oral BID    insulin glargine  20 Units SubCUTAneous BID    [Held by provider] isosorbide mononitrate  30 mg Oral Daily    metoprolol tartrate  25 mg Oral BID    pantoprazole  40 mg Oral BID AC    [START ON 5/16/2024] vitamin B-12  1,000 mcg Oral Daily    sodium chloride flush  5-40 mL IntraVENous 2 times per day    [START ON 5/16/2024] enoxaparin  30 mg SubCUTAneous BID    insulin lispro  0-8 Units SubCUTAneous TID WC    insulin lispro  0-4 Units SubCUTAneous Nightly      Infusions:    sodium chloride      dextrose       PRN Meds: oxyCODONE-acetaminophen, 1 tablet, Q6H PRN  sodium chloride flush, 5-40 mL, PRN  sodium chloride, , PRN  potassium chloride, 40 mEq, PRN   Or  potassium alternative oral replacement, 40 mEq, PRN   Or  potassium chloride, 10 mEq, PRN  magnesium sulfate, 2,000 mg, PRN  ondansetron, 4 mg, Q8H PRN   Or  ondansetron, 4 mg, Q6H PRN  polyethylene glycol, 17 g, Daily PRN  acetaminophen, 650 mg, Q6H PRN   Or  acetaminophen, 650 mg, Q6H PRN  glucose, 4 tablet, PRN  dextrose bolus, 125 mL, PRN   Or  dextrose bolus, 250 mL, PRN  glucagon (rDNA), 1 mg, PRN  dextrose, , Continuous PRN        Labs      CBC:   Recent Labs     05/15/24  1149   WBC 3.3*   HGB 7.1*   *     BMP:    Recent Labs     05/15/24  1149      K 4.0      CO2 26   BUN 59*   CREATININE 1.9*   GLUCOSE 271*     Hepatic:   Recent Labs     05/15/24  1149   AST 19  2:11 PM

## 2024-05-15 NOTE — PROGRESS NOTES
4 Eyes Skin Assessment     NAME:  Abelardo Marquis  YOB: 1940  MEDICAL RECORD NUMBER:  [id number]    The patient is being assessed for  Admission    I agree that at least one RN has performed a thorough Head to Toe Skin Assessment on the patient. ALL assessment sites listed below have been assessed.      Areas assessed by both nurses:    Head, Face, Ears, Shoulders, Back, Chest, Arms, Elbows, Hands, Sacrum. Buttock, Coccyx, Ischium, Legs. Feet and Heels, and Under Medical Devices         Does the Patient have a Wound? No noted wound(s)       Pt has vascular ward bilateral lower legs with edema    Yomi Prevention initiated by RN: No  Wound Care Orders initiated by RN: No    Pressure Injury (Stage 3,4, Unstageable, DTI, NWPT, and Complex wounds) if present, place Wound referral order by RN under : No    New Ostomies, if present place, Ostomy referral order under : No     Nurse 1 eSignature: Electronically signed by Alannah Dimas RN on 5/15/24 at 5:47 PM EDT    **SHARE this note so that the co-signing nurse can place an eSignature**    Nurse 2 eSignature: Electronically signed by Chen Guevara RN on 5/15/24 at 5:49 PM EDT

## 2024-05-15 NOTE — ED NOTES
ED TO INPATIENT SBAR HANDOFF    Patient Name: Abelardo Marquis   :  1940  83 y.o.   Preferred Name    Family/Caregiver Present yes   Restraints  NO  C-SSRS: Risk of Suicide: No Risk  Sitter no   Sepsis Risk Score Sepsis Risk Score: 3.16      Situation  Chief Complaint   Patient presents with    Fatigue     For the last few days.     Hypotension    Hyperglycemia     445 per EMS,  for us pt states he took 60 unit of insulin at home.      Brief Description of Patient's Condition: pt arrives to ED w/ c/o fatigue, hyperglycemia and hypotensive. There are   Mental Status: oriented, alert, coherent, logical, thought processes intact, and able to concentrate and follow conversation  Arrived from: home    Imaging:   XR CHEST PORTABLE   Final Result      No acute cardiopulmonary disease.      Electronically signed by Sung Garcia MD        Abnormal labs:   Abnormal Labs Reviewed   COMPREHENSIVE METABOLIC PANEL - Abnormal; Notable for the following components:       Result Value    Glucose 271 (*)     BUN 59 (*)     Creatinine 1.9 (*)     Est, Glom Filt Rate 35 (*)     Calcium 7.9 (*)     Total Protein 5.9 (*)     All other components within normal limits   CBC WITH AUTO DIFFERENTIAL - Abnormal; Notable for the following components:    WBC 3.3 (*)     RBC 2.39 (*)     Hemoglobin 7.1 (*)     Hematocrit 23.2 (*)     MCHC 30.6 (*)     RDW 15.3 (*)     Platelets 139 (*)     Monocytes % 11.1 (*)     Eosinophils % 4.2 (*)     Immature Neutrophil % 0.9 (*)     All other components within normal limits       Background  History:   Past Medical History:   Diagnosis Date    Arthritis     Diabetes mellitus (HCC)     GERD (gastroesophageal reflux disease)     Gout     left ankle and right shoulder    Hyperlipidemia     Hypertension     MI (myocardial infarction) ([facility])     May 2013       Assessment    Vitals: MEWS Score: 1  Level of Consciousness: Alert (0)   Vitals:    05/15/24 1146 05/15/24 1147   BP: (!) 104/42    Pulse: 61     Resp: 16    Temp: 97.6 °F (36.4 °C)    TempSrc: Temporal    SpO2: 98%    Weight:  104.3 kg (230 lb)     PO Status: Nothing by Mouth  O2 Flow Rate: O2 Device: None (Room air)    Cardiac Rhythm:   Last documented pain medication administered:   NIH Score: NIH     Active LDA's:   Peripheral IV 05/15/24 Left;Proximal Forearm (Active)   Site Assessment Clean, dry & intact 05/15/24 1157       Pertinent or High Risk Medications/Drips: no   If Yes, please provide details:   Blood Product Administration: no  If Yes, please provide details:     Recommendation    Incomplete orders   Additional Comments:   If any further questions, please call Sending RN at [phone]    Electronically signed by: Electronically signed by Meghana Blakely RN on 5/15/2024 at 1:18 PM

## 2024-05-15 NOTE — ED PROVIDER NOTES
(Non-Medical): No   Physical Activity: Not on file   Stress: Not on file   Social Connections: Not on file   Intimate Partner Violence: Not on file   Housing Stability: Low Risk  (5/2/2024)    Housing Stability Vital Sign     Unable to Pay for Housing in the Last Year: No     Number of Places Lived in the Last Year: 1     Unstable Housing in the Last Year: No     Current Facility-Administered Medications   Medication Dose Route Frequency Provider Last Rate Last Admin    sodium chloride 0.9 % bolus 1,000 mL  1,000 mL IntraVENous Once Alejandra Garcia, DO         Current Outpatient Medications   Medication Sig Dispense Refill    pantoprazole (PROTONIX) 40 MG tablet Take 1 tablet by mouth 2 times daily (before meals) 60 tablet 1    metoprolol tartrate (LOPRESSOR) 25 MG tablet Take 1 tablet by mouth 2 times daily 60 tablet 3    isosorbide mononitrate (IMDUR) 30 MG extended release tablet Take 1 tablet by mouth daily 30 tablet 3    tiZANidine (ZANAFLEX) 2 MG tablet Take 1 tablet by mouth 2 times daily as needed      VICTOZA 18 MG/3ML SOPN SC injection Inject 1.2 mg into the skin Daily with lunch      gabapentin (NEURONTIN) 800 MG tablet Take 1 tablet by mouth 3 times daily.      Semaglutide,0.25 or 0.5MG/DOS, 2 MG/3ML SOPN Inject 0.25 mg into the skin once a week (Patient not taking: Reported on 5/7/2024) 4 mL 2    insulin glargine (BASAGLAR KWIKPEN) 100 UNIT/ML injection pen Inject 40-50 Units into the skin 2 times daily Patient reports he does sliding scale      FLUoxetine (PROZAC) 40 MG capsule Take 1 capsule by mouth daily      empagliflozin (JARDIANCE) 25 MG tablet Take 1 tablet by mouth daily      metFORMIN (GLUCOPHAGE) 500 MG tablet Take 1 tablet by mouth 2 times daily (with meals)      vitamin B-12 (CYANOCOBALAMIN) 1000 MCG tablet Take 1 tablet by mouth daily      colesevelam (WELCHOL) 625 MG tablet Take 2 tablets by mouth 2 times daily (with meals) 04/18/24 Prescription noted for 3 tablets BID patient  cardiologist)    Medications   sodium chloride 0.9 % bolus 1,000 mL (has no administration in time range)       MDM:  Abelardo Marquis is a 83 y.o. male with history of hypertension, type 2 diabetes, coronary atherosclerosis, chronic kidney disease stage III, liver cirrhosis depression chronic pain respiratory failure gout hyperlipidemia acid reflux arthritis that presents to the emergency department via EMS from home for hypotension and hyperglycemia and weakness.  Patient states his home health nurse came this morning and his blood pressure was low 2 times and he had elevated blood sugar.  Patient states when he got up this morning to check his blood sugars in the 300s.  He states he did give himself 16 units of insulin.  He states he ate spaghetti and 3 pieces of bread this morning and drank a Coke.  Patient states he does not feel bad at all.  Denies any chest pain shortness of nausea vomiting diarrhea abdominal pain fever chills cough not dizzy lightheaded or passing out.    On physical exam patient appears in no acute distress.  Patient does have some hypotension.  Patient has some diminished breath sounds in the lung bases, morbidly obese abdomen nontender, chronic edema bilateral lower extremities, positive murmur,    Differential diagnose include dehydration, orthostatic hypotension, diabetic ketoacidosis    Patient was ordered laboratory studies, urinalysis,, 1 L bolus of normal saline IV fluids    Review of medical records patient was just admitted to the [facility] May 2, 2024 for GI bleed.  Patient received a unit of packed red blood cells.    During my evaluation patient blood pressure down to 92/44.  Laboratory studies have revealed hyperglycemia glucose of 271 but normal anion gap patient not in DKA.  Patient with chronic anemia hemoglobin of 7.1.  Patient with acute on chronic renal insufficiency creatinine of 1.9 BUN of 59.  Chest x-ray with no acute cardiopulmonary

## 2024-05-16 ENCOUNTER — CARE COORDINATION (OUTPATIENT)
Dept: CASE MANAGEMENT | Age: 84
End: 2024-05-16

## 2024-05-16 LAB
ANION GAP SERPL CALCULATED.3IONS-SCNC: 14 MMOL/L (ref 7–16)
BASOPHILS ABSOLUTE: 0 K/CU MM
BASOPHILS RELATIVE PERCENT: 0.6 % (ref 0–1)
BUN SERPL-MCNC: 54 MG/DL (ref 6–23)
CALCIUM SERPL-MCNC: 7.8 MG/DL (ref 8.3–10.6)
CHLORIDE BLD-SCNC: 102 MMOL/L (ref 99–110)
CO2: 25 MMOL/L (ref 21–32)
CREAT SERPL-MCNC: 1.8 MG/DL (ref 0.9–1.3)
DIFFERENTIAL TYPE: ABNORMAL
EOSINOPHILS ABSOLUTE: 0.1 K/CU MM
EOSINOPHILS RELATIVE PERCENT: 2.4 % (ref 0–3)
ESTIMATED AVERAGE GLUCOSE: 171 MG/DL
GFR, ESTIMATED: 37 ML/MIN/1.73M2
GLUCOSE BLD-MCNC: 327 MG/DL (ref 70–99)
GLUCOSE BLD-MCNC: 328 MG/DL (ref 70–99)
GLUCOSE BLD-MCNC: 388 MG/DL (ref 70–99)
GLUCOSE BLD-MCNC: 394 MG/DL (ref 70–99)
GLUCOSE BLD-MCNC: 403 MG/DL (ref 70–99)
GLUCOSE SERPL-MCNC: 284 MG/DL (ref 70–99)
HBA1C MFR BLD: 7.6 % (ref 4.2–6.3)
HCT VFR BLD CALC: 25.9 % (ref 42–52)
HCT VFR BLD CALC: 27.7 % (ref 42–52)
HEMOGLOBIN: 7.8 GM/DL (ref 13.5–18)
HEMOGLOBIN: 8.5 GM/DL (ref 13.5–18)
IMMATURE NEUTROPHIL %: 1.5 % (ref 0–0.43)
LYMPHOCYTES ABSOLUTE: 1 K/CU MM
LYMPHOCYTES RELATIVE PERCENT: 30.4 % (ref 24–44)
MCH RBC QN AUTO: 28.7 PG (ref 27–31)
MCHC RBC AUTO-ENTMCNC: 30.1 % (ref 32–36)
MCV RBC AUTO: 95.2 FL (ref 78–100)
MONOCYTES ABSOLUTE: 0.4 K/CU MM
MONOCYTES RELATIVE PERCENT: 11.9 % (ref 0–4)
NEUTROPHILS ABSOLUTE: 1.8 K/CU MM
NEUTROPHILS RELATIVE PERCENT: 53.2 % (ref 36–66)
NUCLEATED RBC %: 0 %
PDW BLD-RTO: 15.3 % (ref 11.7–14.9)
PLATELET # BLD: 131 K/CU MM (ref 140–440)
PMV BLD AUTO: 10.5 FL (ref 7.5–11.1)
POTASSIUM SERPL-SCNC: 4.3 MMOL/L (ref 3.5–5.1)
PROCALCITONIN SERPL-MCNC: 0.15 NG/ML
RBC # BLD: 2.72 M/CU MM (ref 4.6–6.2)
SODIUM BLD-SCNC: 141 MMOL/L (ref 135–145)
TOTAL IMMATURE NEUTOROPHIL: 0.05 K/CU MM
TOTAL NUCLEATED RBC: 0 K/CU MM
TOTAL RETICULOCYTE COUNT: 0.11 K/CU MM
WBC # BLD: 3.3 K/CU MM (ref 4–10.5)

## 2024-05-16 PROCEDURE — 80048 BASIC METABOLIC PNL TOTAL CA: CPT

## 2024-05-16 PROCEDURE — P9016 RBC LEUKOCYTES REDUCED: HCPCS

## 2024-05-16 PROCEDURE — 97162 PT EVAL MOD COMPLEX 30 MIN: CPT

## 2024-05-16 PROCEDURE — 94761 N-INVAS EAR/PLS OXIMETRY MLT: CPT

## 2024-05-16 PROCEDURE — 1200000000 HC SEMI PRIVATE

## 2024-05-16 PROCEDURE — 2580000003 HC RX 258: Performed by: STUDENT IN AN ORGANIZED HEALTH CARE EDUCATION/TRAINING PROGRAM

## 2024-05-16 PROCEDURE — 6360000002 HC RX W HCPCS: Performed by: STUDENT IN AN ORGANIZED HEALTH CARE EDUCATION/TRAINING PROGRAM

## 2024-05-16 PROCEDURE — 97166 OT EVAL MOD COMPLEX 45 MIN: CPT

## 2024-05-16 PROCEDURE — 30233N1 TRANSFUSION OF NONAUTOLOGOUS RED BLOOD CELLS INTO PERIPHERAL VEIN, PERCUTANEOUS APPROACH: ICD-10-PCS | Performed by: STUDENT IN AN ORGANIZED HEALTH CARE EDUCATION/TRAINING PROGRAM

## 2024-05-16 PROCEDURE — 6370000000 HC RX 637 (ALT 250 FOR IP): Performed by: STUDENT IN AN ORGANIZED HEALTH CARE EDUCATION/TRAINING PROGRAM

## 2024-05-16 PROCEDURE — 85018 HEMOGLOBIN: CPT

## 2024-05-16 PROCEDURE — 36430 TRANSFUSION BLD/BLD COMPNT: CPT

## 2024-05-16 PROCEDURE — 85025 COMPLETE CBC W/AUTO DIFF WBC: CPT

## 2024-05-16 PROCEDURE — 85014 HEMATOCRIT: CPT

## 2024-05-16 PROCEDURE — 36415 COLL VENOUS BLD VENIPUNCTURE: CPT

## 2024-05-16 PROCEDURE — 97116 GAIT TRAINING THERAPY: CPT

## 2024-05-16 PROCEDURE — 82962 GLUCOSE BLOOD TEST: CPT

## 2024-05-16 PROCEDURE — 97535 SELF CARE MNGMENT TRAINING: CPT

## 2024-05-16 RX ORDER — INSULIN GLARGINE 100 [IU]/ML
25 INJECTION, SOLUTION SUBCUTANEOUS 2 TIMES DAILY
Status: DISCONTINUED | OUTPATIENT
Start: 2024-05-16 | End: 2024-05-18 | Stop reason: HOSPADM

## 2024-05-16 RX ORDER — FUROSEMIDE 20 MG/1
40 TABLET ORAL DAILY
Status: DISCONTINUED | OUTPATIENT
Start: 2024-05-16 | End: 2024-05-18 | Stop reason: HOSPADM

## 2024-05-16 RX ORDER — ASPIRIN 81 MG/1
81 TABLET, CHEWABLE ORAL DAILY
Status: DISCONTINUED | OUTPATIENT
Start: 2024-05-16 | End: 2024-05-18 | Stop reason: HOSPADM

## 2024-05-16 RX ORDER — CLOPIDOGREL BISULFATE 75 MG/1
75 TABLET ORAL DAILY
Status: DISCONTINUED | OUTPATIENT
Start: 2024-05-16 | End: 2024-05-18 | Stop reason: HOSPADM

## 2024-05-16 RX ADMIN — INSULIN LISPRO 8 UNITS: 100 INJECTION, SOLUTION INTRAVENOUS; SUBCUTANEOUS at 17:36

## 2024-05-16 RX ADMIN — ENOXAPARIN SODIUM 30 MG: 100 INJECTION SUBCUTANEOUS at 09:44

## 2024-05-16 RX ADMIN — EMPAGLIFLOZIN 25 MG: 10 TABLET, FILM COATED ORAL at 09:43

## 2024-05-16 RX ADMIN — OXYCODONE AND ACETAMINOPHEN 1 TABLET: 7.5; 325 TABLET ORAL at 01:25

## 2024-05-16 RX ADMIN — ASPIRIN 81 MG: 81 TABLET, CHEWABLE ORAL at 14:05

## 2024-05-16 RX ADMIN — SODIUM CHLORIDE, PRESERVATIVE FREE 10 ML: 5 INJECTION INTRAVENOUS at 20:16

## 2024-05-16 RX ADMIN — INSULIN LISPRO 4 UNITS: 100 INJECTION, SOLUTION INTRAVENOUS; SUBCUTANEOUS at 20:11

## 2024-05-16 RX ADMIN — FUROSEMIDE 40 MG: 20 TABLET ORAL at 11:19

## 2024-05-16 RX ADMIN — INSULIN GLARGINE 25 UNITS: 100 INJECTION, SOLUTION SUBCUTANEOUS at 19:59

## 2024-05-16 RX ADMIN — INSULIN LISPRO 8 UNITS: 100 INJECTION, SOLUTION INTRAVENOUS; SUBCUTANEOUS at 12:37

## 2024-05-16 RX ADMIN — OXYCODONE AND ACETAMINOPHEN 1 TABLET: 7.5; 325 TABLET ORAL at 12:25

## 2024-05-16 RX ADMIN — SODIUM CHLORIDE, PRESERVATIVE FREE 10 ML: 5 INJECTION INTRAVENOUS at 09:45

## 2024-05-16 RX ADMIN — OXYCODONE AND ACETAMINOPHEN 1 TABLET: 7.5; 325 TABLET ORAL at 20:14

## 2024-05-16 RX ADMIN — METOPROLOL TARTRATE 25 MG: 50 TABLET, FILM COATED ORAL at 09:40

## 2024-05-16 RX ADMIN — PANTOPRAZOLE SODIUM 40 MG: 40 TABLET, DELAYED RELEASE ORAL at 09:43

## 2024-05-16 RX ADMIN — PANTOPRAZOLE SODIUM 40 MG: 40 TABLET, DELAYED RELEASE ORAL at 16:23

## 2024-05-16 RX ADMIN — INSULIN GLARGINE 20 UNITS: 100 INJECTION, SOLUTION SUBCUTANEOUS at 09:45

## 2024-05-16 RX ADMIN — ATORVASTATIN CALCIUM 40 MG: 40 TABLET, FILM COATED ORAL at 19:59

## 2024-05-16 RX ADMIN — ENOXAPARIN SODIUM 30 MG: 100 INJECTION SUBCUTANEOUS at 19:59

## 2024-05-16 RX ADMIN — GABAPENTIN 400 MG: 400 CAPSULE ORAL at 19:59

## 2024-05-16 RX ADMIN — METOPROLOL TARTRATE 25 MG: 50 TABLET, FILM COATED ORAL at 19:59

## 2024-05-16 RX ADMIN — FLUOXETINE HYDROCHLORIDE 40 MG: 20 CAPSULE ORAL at 09:40

## 2024-05-16 RX ADMIN — ACETAMINOPHEN 650 MG: 325 TABLET ORAL at 03:32

## 2024-05-16 RX ADMIN — Medication 1000 MCG: at 09:44

## 2024-05-16 RX ADMIN — CLOPIDOGREL BISULFATE 75 MG: 75 TABLET ORAL at 11:19

## 2024-05-16 RX ADMIN — IRON SUCROSE 300 MG: 20 INJECTION, SOLUTION INTRAVENOUS at 09:54

## 2024-05-16 RX ADMIN — GABAPENTIN 400 MG: 400 CAPSULE ORAL at 09:40

## 2024-05-16 ASSESSMENT — PAIN DESCRIPTION - LOCATION
LOCATION: GENERALIZED
LOCATION: BACK
LOCATION: ARM;BACK
LOCATION: GENERALIZED

## 2024-05-16 ASSESSMENT — PAIN SCALES - GENERAL
PAINLEVEL_OUTOF10: 4
PAINLEVEL_OUTOF10: 8
PAINLEVEL_OUTOF10: 5
PAINLEVEL_OUTOF10: 7
PAINLEVEL_OUTOF10: 4
PAINLEVEL_OUTOF10: 7
PAINLEVEL_OUTOF10: 6
PAINLEVEL_OUTOF10: 7

## 2024-05-16 ASSESSMENT — PAIN SCALES - WONG BAKER
WONGBAKER_NUMERICALRESPONSE: HURTS LITTLE MORE

## 2024-05-16 ASSESSMENT — PAIN DESCRIPTION - DESCRIPTORS
DESCRIPTORS: ACHING
DESCRIPTORS: ACHING
DESCRIPTORS: ACHING;BURNING;CRAMPING

## 2024-05-16 ASSESSMENT — PAIN DESCRIPTION - ORIENTATION
ORIENTATION: LOWER
ORIENTATION: LEFT

## 2024-05-16 ASSESSMENT — PAIN - FUNCTIONAL ASSESSMENT: PAIN_FUNCTIONAL_ASSESSMENT: ACTIVITIES ARE NOT PREVENTED

## 2024-05-16 NOTE — PROGRESS NOTES
[facility] ACUTE CARE OCCUPATIONAL THERAPY EVALUATION  Abelardo Marquis, 1940, 3012/3012-A, 5/16/2024    Discharge Recommendation: [facility]     History  [place]:  The primary encounter diagnosis was Acute renal failure superimposed on stage 3 chronic kidney disease, unspecified acute renal failure type, unspecified whether stage 3a or 3b CKD (HCC). Diagnoses of Hypotension, unspecified hypotension type, Diabetes mellitus due to underlying condition with hyperglycemia, with long-term current use of insulin (HCC), and Acute anemia were also pertinent to this visit.  Patient  has a past medical history of Arthritis, Diabetes mellitus ([facility]), GERD (gastroesophageal reflux disease), Gout, Hyperlipidemia, Hypertension, and MI (myocardial infarction) ([facility]).  Patient  has a past surgical history that includes Total knee arthroplasty (Bilateral); back surgery; Cholecystectomy; Cardiac surgery; Dilatation, esophagus; sinus surgery; joint replacement; Hand surgery; sinus surgery; Upper gastrointestinal endoscopy (N/A, 5/4/2024); and Colonoscopy (N/A, 5/4/2024).    Subjective:  Patient states:  \"I have gotten a steroid in this arm before\"   Pain:  complaint of pain in R shoulder during AROM movement past 45d.    Communication: RN, CM, co-eval with PT, OT present throughout session   Restrictions: fall risk    Home Setup/Prior level of function  Social/Functional History  Lives With: Family (daughter and )  Type of Home: House  Home Layout: Two level (stays on 1st floor)  Home Access: Stairs to enter with rails  Entrance Stairs - Number of Steps: 2 steps in back entrance  Bathroom Shower/Tub: Walk-in shower (corner)  Bathroom Equipment:  (outside of shower)  Home Equipment: Cane, Grab bars, Rollator  Has the patient had two or more falls in the past year or any fall with injury in the past year?: No  ADL Assistance: Independent (sits on rollator during grooming tasks)  Homemaking Assistance: Needs assistance (can

## 2024-05-16 NOTE — PROGRESS NOTES
Physician Progress Note      PATIENT:               BRYNN NATHAN  [facility] #:                  [id number]  :                       1940  ADMIT DATE:       5/15/2024 11:42 AM  DISCH DATE:  RESPONDING  PROVIDER #:        Gustavo Mar MD          QUERY TEXT:    Patient admitted with hypotension.  Noted documentation of VICKY on CKD III in   H&P.  In order to support the diagnosis of VICKY, please include additional   clinical indicators in your documentation.? Or please document if the   diagnosis of VICKY has been ruled out after further study.    The medical record reflects the following:  Risk Factors: dehydration, anemia  Clinical Indicators: Cr 1.9 on admission, baseline 1.4-1.6 noted in PN ,   creatinine 1.8 on   Treatment: serial labs, I&O's, avoid nephrotoxins, supportive care    Thank you,  Mary Beth Tesfaye RN, CDS  [email or web address]    Defined by Kidney Disease Improving Global Outcomes (KDIGO) clinical practice   guideline for acute kidney injury:  -Increase in SCr by greater than or equal to 0.3 mg/dl within 48 hours; or  -Increase or decrease in SCr to greater than or equal to 1.5 times baseline,   which is known or presumed to have occurred within the prior 7 days; or  -Urine volume < 0.5ml/kg/h for 6 hours.  Options provided:  -- Acute kidney injury ruled out after study  -- Acute kidney injury evidenced by, Please document evidence as well as a   numerical baseline creatinine, if known.  -- Other - I will add my own diagnosis  -- Disagree - Not applicable / Not valid  -- Disagree - Clinically unable to determine / Unknown  -- Refer to Clinical Documentation Reviewer    PROVIDER RESPONSE TEXT:    Acute kidney injury was ruled out after study.    Query created by: Mary Beth Tesfaye on 2024 1:40 PM      Electronically signed by:  Gustavo Mar MD 2024 2:48 PM

## 2024-05-16 NOTE — CARE COORDINATION
Abelardo admitted to [facility] with Hypotension/VICKY/CKD. Per protocol, Care Transitions episode closed & name taken off of care team. CTN team will follow up on hospital discharge if eligible.       Nadeen Gomez RN [phone]

## 2024-05-16 NOTE — CONSULTS
[facility] ACUTE CARE PHYSICAL THERAPY EVALUATION  Abelardo Marquis, 1940, 3012/3012-A, 5/16/2024    History  [place]:  The primary encounter diagnosis was Acute renal failure superimposed on stage 3 chronic kidney disease, unspecified acute renal failure type, unspecified whether stage 3a or 3b CKD (HCC). Diagnoses of Hypotension, unspecified hypotension type, Diabetes mellitus due to underlying condition with hyperglycemia, with long-term current use of insulin (HCC), and Acute anemia were also pertinent to this visit.  Patient  has a past medical history of Arthritis, Diabetes mellitus (HCC), GERD (gastroesophageal reflux disease), Gout, Hyperlipidemia, Hypertension, and MI (myocardial infarction) ([facility]).  Patient  has a past surgical history that includes Total knee arthroplasty (Bilateral); back surgery; Cholecystectomy; Cardiac surgery; Dilatation, esophagus; sinus surgery; joint replacement; Hand surgery; sinus surgery; Upper gastrointestinal endoscopy (N/A, 5/4/2024); and Colonoscopy (N/A, 5/4/2024).    Recommendation: home with assist from family for higher level ADLs/IADLs and stair navigation, initial use of walker, and HH PT     Subjective:  Patient states: \"I can walk a bit. I just get tired\"     Pain:  denies   Communication with other providers:  RN, co-eval with Margaret MAY + Student Bren MAY   Restrictions: general precautions, falls     Home Setup/Prior level of function  Social/Functional History  Lives With: Family (daughter and )  Type of Home: House  Home Layout: Two level (stays on 1st floor)  Home Access: Stairs to enter with rails  Entrance Stairs - Number of Steps: 2 steps in back entrance  Bathroom Shower/Tub: Walk-in shower (corner)  Bathroom Equipment:  (outside of shower)  Home Equipment: Cane, Grab bars, Rollator  Has the patient had two or more falls in the past year or any fall with injury in the past year?: No  ADL Assistance: Independent (sits on rollator during  grooming tasks)  Homemaking Assistance: Needs assistance (can microwave, daughter does laundry)  Homemaking Responsibilities: Yes  Ambulation Assistance: Independent (use of SPC)  Active : Yes (son in law takes him to appointments or he drives himself)    Examination of body systems (includes body structures/functions, activity/participation limitations):  Observation:  In the bathroom with nursing assistants upon arrival. Cooperative with therapy. Swelling to bilat LES   Vision:  WFL, glasses  Hearing:  WFL  Cardiopulmonary:  Stable vitals on room air   Orientation: WFL     Musculoskeletal  ROM R/L:  WFL BLEs  Strength R/L:  BLEs grossly 4/5    Neuro:  WFL     Mobility/treatment:   Rolling L/R:  NT   Supine to sit:  NT  Transfers:   Sit to stand: CGA for safety from toilet and recliner  Stand to sit: CGA for safety to recliner   Step pivot: CGA for safety with SPC 1x and RW 1x   Sitting balance:  SBA for safety from edge of recliner and toilet without UE support while managing shoes and hygiene tasks    Standing balance:  CGA for safety while managing hand hygiene at the sink with single to no UE support  Gait: ~3ft with SPC CGA + ~50ft x 2 with RW CGA. Fair and consistent pace with reciprocal gait pattern. No major LOB noted. Limited distance with generalized fatigue. Minimal Vcs for body positioning within ROMAN of RW for improved upright trunk.   Educated pt on POC, role of PT, DME use, discharge. Cues provided for sequencing to inc safety and indep with mobility.     [facility] 6 Clicks Inpatient Mobility:  AM-PAC Inpatient Mobility Raw Score : 18    Safety: patient left in chair with alarm, call light within reach,  gait belt used.    Assessment:  Pt is an 83 year old male admitted with hypotension and fatigue. Recommend home with family support PRN, use of walker, and HH PT once medically stable. At baseline he is indep with gross mobility and ADLS. He performed well this date though slightly below baseline

## 2024-05-16 NOTE — CONSULTS
[place] [address] [facility] [place] [id number]                              CONSULTATION      PATIENT NAME: BRYNN NATHAN                 : 1940  MED REC NO: [id number]                      ROOM: 3012  ACCOUNT NO: [id number]                       ADMIT DATE: 05/15/2024  PROVIDER: Navi Castillo MD      CHIEF COMPLAINT:  History of severe anemia with dark-colored stools, rule out GI bleeding.    HISTORY OF PRESENT ILLNESS:  The patient is an 83-year-old white gentleman, patient known to me from his prior hospitalization, last seen in consult on May 2, 2024, with past medical history significant for hypertension; diabetes mellitus; coronary artery disease, status post PTCA with coronary stents placed, last stents in 2023; chronic kidney disease; hyperlipidemia; obesity; history of chronic liver disease with stigmata of portal hypertension; tobacco abuse; and chronic back pain and gout.  The patient was recently hospitalized for anemia and dark-colored stools, and was discharged home on May 6, 2024, but presented yesterday to the emergency room with hypotension.  The patient has a visiting nurse who checks on him at home and noted his blood pressure to be in 90s.  The patient also complains of brownish-black stools, which were blackish in color during his last hospitalization.    There is no history of abdominal pain, nausea, vomiting, hematemesis, anorexia, or weight loss.  The patient is on Plavix because of previous PCI with stents placed in 2023.    In the emergency room, the patient had a CBC drawn which showed WBC count of 3.3, hemoglobin was 7.1, platelet count was 139,000, and after hydration, the patient's hemoglobin dropped to 6.5 g%.  He was transfused with 1 unit of packed RBC.  Today, the patient's hemoglobin is 7.8 g%.  The Chem profile is significant for BUN of 54, creatinine 1.8, and the patient's LFTs are within normal  discharge for outpatient capsule endoscopy.  5. May resume Plavix since the patient had stents placed in August 2023.  6. The case and plan have been discussed in detail with the patient and all his questions have been answered.          AMIRA EDWARDS MD      D:  05/16/2024 12:56:11     T:  05/16/2024 13:32:01     AR/ROBYN  Job #:  [id number]     Doc#:  [id number]

## 2024-05-16 NOTE — CONSENT
Informed Consent for Blood Component Transfusion Note    I have discussed with the patient the rationale for blood component transfusion; its benefits in treating or preventing fatigue, organ damage, or death; and its risk which includes mild transfusion reactions, rare risk of blood borne infection, or more serious but rare reactions. I have discussed the alternatives to transfusion, including the risk and consequences of not receiving transfusion. The patient had an opportunity to ask questions and had agreed to proceed with transfusion of blood components.    Electronically signed by Andrea Naegele, APRN - CNP on 5/15/24 at 9:36 PM EDT

## 2024-05-16 NOTE — CARE COORDINATION
Spoke with pt in room. From home with dgt and son in law. No changes since last admission. Was seen by CM on 5/03/24. Has needed DME and support at home. Recently gave up his drivers license as a precaution. Dgt and RODOLFO can transport. Has PCP and insurance. Active with [facility]. Wants to return with [facility].  Denies further needs. Discharge plan home with [facility].   05/16/24 1002   Service Assessment   Patient Orientation Alert and Oriented   Cognition Alert   History Provided By Patient;Medical Record   Primary Caregiver Self   Support Systems Children;Family Members   Patient's Healthcare Decision Maker is: Legal Next of Kin   PCP Verified by CM Yes   Prior Functional Level Independent in ADLs/IADLs   Current Functional Level Independent in ADLs/IADLs   Can patient return to prior living arrangement Yes   Ability to make needs known: Good   Family able to assist with home care needs: Yes   Would you like for me to discuss the discharge plan with any other family members/significant others, and if so, who? No   Financial Resources Medicare   Community Resources ECF/Home Care   Social/Functional History   Lives With Family   Condition of Participation: Discharge Planning   The Plan for Transition of Care is related to the following treatment goals: home with [facility]   The Patient and/or Patient Representative was provided with a Choice of Provider? Patient   The Patient and/Or Patient Representative agree with the Discharge Plan? Yes   Freedom of Choice list was provided with basic dialogue that supports the patient's individualized plan of care/goals, treatment preferences, and shares the quality data associated with the providers?  Yes

## 2024-05-16 NOTE — PROGRESS NOTES
05/16/24 1143   Encounter Summary   Encounter Overview/Reason Initial Encounter;Spiritual/Emotional Needs   Service Provided For Patient   Referral/Consult From [facility]   Support System Children   Last Encounter  05/16/24  (PT struggles with not having energy, deals with heart issues, he finds german in his rebekah and comfort in his family, his concern is for his family, he wanted prayer, high risk readmit.)   Complexity of Encounter Low   Begin Time 1126   End Time  1144   Total Time Calculated 18 min   Spiritual/Emotional needs   Type Spiritual Support;Emotional Distress   Grief, Loss, and Adjustments   Type Adjustment to illness   Assessment/Intervention/Outcome   Assessment Anxious;Concerns with suffering;Coping;Hopeful;Powerlessness   Intervention Active listening;Discussed belief system/Yazdanism practices/rebekah;Discussed illness injury and it’s impact;Discussed meaning/purpose;Discussed relationship with God;Nurtured Hope;Prayer (assurance of)/Sandy;Sustaining Presence/Ministry of presence   Outcome Comfort;Coping;Expressed Gratitude;Less anxious, Less agitated   Plan and Referrals   Plan/Referrals Continue Support (comment)

## 2024-05-16 NOTE — PROGRESS NOTES
protocol  - MDSSI  - increase lantus 25U BID for now; increase as appropriate  - f/u  HA1C     CKD III. Cr 1.9 (baseline 1.4-1.6)  Received IVF in the ED  Cr 1.8 today.   - avoid nephrotoxins  - resume home lasix given leg edema; monitor BP  - strict I/o     Essential HTN.   On home lasix, Imdur, metoprolol  - resume metoprolol w parameters  - d/c Imdur   - resume lasix       Generalized weakness. Has HHC but pt reports worsening generalized weakness.   - PT/OT     Cirrhosis  Appears compensated  Likely due to JEONG     History of chronic back pain  --Continue Percocet and gabapentin      Personally reviewed Lab Studies and Imaging   Drugs that require monitoring for toxicity include lovenox and the method of monitoring was cbc      Diet ADULT DIET; Regular; 4 carb choices (60 gm/meal); Low Fat/Low Chol/High Fiber/GALINA   DVT Prophylaxis [x] Lovenox, []  Heparin, [] SCDs, [] Ambulation,  [] Eliquis, [] Xarelto  [] Coumadin   Code Status Full Code   Disposition From: home  Expected Disposition: TBD  Estimated Date of Discharge: 1-2 days  Patient requires continued admission due to anemia workup, monitor kidney function   Surrogate Decision Maker/ POA      Review of Systems:    Review of Systems    See above    Objective:     Intake/Output Summary (Last 24 hours) at 5/16/2024 1140  Last data filed at 5/16/2024 0815  Gross per 24 hour   Intake 625.5 ml   Output --   Net 625.5 ml        Vitals:   Vitals:    05/16/24 1119   BP: (!) 150/58   Pulse:    Resp:    Temp:    SpO2:        Physical Exam:     General: NAD  Eyes: EOMI  ENT: neck supple  Cardiovascular: Regular rate.  Respiratory: Clear to auscultation  Gastrointestinal: Soft, non tender  Genitourinary: no suprapubic tenderness  Musculoskeletal: bilateral LE 1+ edema  Skin: warm, dry  Neuro: Alert.  Psych: Mood appropriate.     Medications:   Medications:    iron sucrose  300 mg IntraVENous Q24H    [Held by provider] clopidogrel  75 mg Oral Daily    furosemide  40 mg    Ferritin    Collection Time: 05/15/24  8:15 PM   Result Value Ref Range    Ferritin 20 (L) 30 - 400 NG/ML   Hemoglobin and Hematocrit    Collection Time: 05/15/24  8:15 PM   Result Value Ref Range    Hemoglobin 6.5 (LL) 13.5 - 18.0 GM/DL    Hematocrit 21.7 (L) 42 - 52 %   Iron and TIBC    Collection Time: 05/15/24  8:15 PM   Result Value Ref Range    Iron 34 (L) 59 - 158 ug/dL    UIBC 272 110 - 370 ug/dL    TIBC 306 250 - 450 ug/dL    Transferrin % 11 10 - 44 %   Procalcitonin    Collection Time: 05/15/24  8:15 PM   Result Value Ref Range    Procalcitonin 0.147    Vitamin B12 & Folate    Collection Time: 05/15/24  8:15 PM   Result Value Ref Range    Vitamin B-12 1238 (H) 211 - 911 pg/ml    Folate 19.2 (H) 3.1 - 17.5 NG/ML   TYPE AND SCREEN    Collection Time: 05/15/24 11:54 PM   Result Value Ref Range    ABO/Rh O POSITIVE     Antibody Screen NEGATIVE     Unit Number [id number]     Component LEUKOREDUCED PACKED CELL     Unit Divison 00     Status ISSUED     Transfusion Status OK TO TRANSFUSE     Crossmatch Result COMPATIBLE    CBC with Auto Differential    Collection Time: 05/16/24  6:00 AM   Result Value Ref Range    WBC 3.3 (L) 4.0 - 10.5 K/CU MM    RBC 2.72 (L) 4.6 - 6.2 M/CU MM    Hemoglobin 7.8 (L) 13.5 - 18.0 GM/DL    Hematocrit 25.9 (L) 42 - 52 %    MCV 95.2 78 - 100 FL    MCH 28.7 27 - 31 PG    MCHC 30.1 (L) 32.0 - 36.0 %    RDW 15.3 (H) 11.7 - 14.9 %    Platelets 131 (L) 140 - 440 K/CU MM    MPV 10.5 7.5 - 11.1 FL    Differential Type AUTOMATED DIFFERENTIAL     Neutrophils % 53.2 36 - 66 %    Lymphocytes % 30.4 24 - 44 %    Monocytes % 11.9 (H) 0 - 4 %    Eosinophils % 2.4 0 - 3 %    Basophils % 0.6 0 - 1 %    Neutrophils Absolute 1.8 K/CU MM    Lymphocytes Absolute 1.0 K/CU MM    Monocytes Absolute 0.4 K/CU MM    Eosinophils Absolute 0.1 K/CU MM    Basophils Absolute 0.0 K/CU MM    Nucleated RBC % 0.0 %    Total Nucleated RBC 0.0 K/CU MM    TRC 0.1069 K/CU MM    Total Immature Neutrophil 0.05 K/CU MM

## 2024-05-17 LAB
ABO/RH: NORMAL
ALBUMIN SERPL-MCNC: 3.5 GM/DL (ref 3.4–5)
ALP BLD-CCNC: 68 IU/L (ref 40–128)
ALT SERPL-CCNC: 13 U/L (ref 10–40)
ANION GAP SERPL CALCULATED.3IONS-SCNC: 8 MMOL/L (ref 7–16)
ANTIBODY SCREEN: NEGATIVE
APTT: 41.4 SECONDS (ref 25.1–37.1)
AST SERPL-CCNC: 16 IU/L (ref 15–37)
BASOPHILS ABSOLUTE: 0 K/CU MM
BASOPHILS RELATIVE PERCENT: 0.7 % (ref 0–1)
BILIRUB SERPL-MCNC: 0.4 MG/DL (ref 0–1)
BUN SERPL-MCNC: 44 MG/DL (ref 6–23)
CALCIUM SERPL-MCNC: 8.4 MG/DL (ref 8.3–10.6)
CHLORIDE BLD-SCNC: 103 MMOL/L (ref 99–110)
CO2: 31 MMOL/L (ref 21–32)
COMPONENT: NORMAL
CREAT SERPL-MCNC: 1.6 MG/DL (ref 0.9–1.3)
CROSSMATCH RESULT: NORMAL
DIFFERENTIAL TYPE: ABNORMAL
EOSINOPHILS ABSOLUTE: 0.1 K/CU MM
EOSINOPHILS RELATIVE PERCENT: 2.7 % (ref 0–3)
GFR, ESTIMATED: 43 ML/MIN/1.73M2
GLUCOSE BLD-MCNC: 228 MG/DL (ref 70–99)
GLUCOSE BLD-MCNC: 360 MG/DL (ref 70–99)
GLUCOSE BLD-MCNC: 366 MG/DL (ref 70–99)
GLUCOSE BLD-MCNC: 374 MG/DL (ref 70–99)
GLUCOSE SERPL-MCNC: 189 MG/DL (ref 70–99)
HCT VFR BLD CALC: 25 % (ref 42–52)
HEMOGLOBIN: 7.7 GM/DL (ref 13.5–18)
IMMATURE NEUTROPHIL %: 2 % (ref 0–0.43)
INR BLD: 1.1 INDEX
LYMPHOCYTES ABSOLUTE: 0.9 K/CU MM
LYMPHOCYTES RELATIVE PERCENT: 29.3 % (ref 24–44)
MCH RBC QN AUTO: 28.7 PG (ref 27–31)
MCHC RBC AUTO-ENTMCNC: 30.8 % (ref 32–36)
MCV RBC AUTO: 93.3 FL (ref 78–100)
MONOCYTES ABSOLUTE: 0.3 K/CU MM
MONOCYTES RELATIVE PERCENT: 11.6 % (ref 0–4)
NEUTROPHILS ABSOLUTE: 1.6 K/CU MM
NEUTROPHILS RELATIVE PERCENT: 53.7 % (ref 36–66)
NUCLEATED RBC %: 0 %
PDW BLD-RTO: 15.4 % (ref 11.7–14.9)
PLATELET # BLD: 127 K/CU MM (ref 140–440)
PMV BLD AUTO: 10.2 FL (ref 7.5–11.1)
POTASSIUM SERPL-SCNC: 4.3 MMOL/L (ref 3.5–5.1)
PROTHROMBIN TIME: 15 SECONDS (ref 11.7–14.5)
RBC # BLD: 2.68 M/CU MM (ref 4.6–6.2)
SODIUM BLD-SCNC: 142 MMOL/L (ref 135–145)
STATUS: NORMAL
TOTAL IMMATURE NEUTOROPHIL: 0.06 K/CU MM
TOTAL NUCLEATED RBC: 0 K/CU MM
TOTAL PROTEIN: 5.3 GM/DL (ref 6.4–8.2)
TRANSFUSION STATUS: NORMAL
UNIT DIVISION: 0
UNIT NUMBER: NORMAL
WBC # BLD: 2.9 K/CU MM (ref 4–10.5)

## 2024-05-17 PROCEDURE — 6370000000 HC RX 637 (ALT 250 FOR IP): Performed by: STUDENT IN AN ORGANIZED HEALTH CARE EDUCATION/TRAINING PROGRAM

## 2024-05-17 PROCEDURE — 6360000002 HC RX W HCPCS: Performed by: STUDENT IN AN ORGANIZED HEALTH CARE EDUCATION/TRAINING PROGRAM

## 2024-05-17 PROCEDURE — 94761 N-INVAS EAR/PLS OXIMETRY MLT: CPT

## 2024-05-17 PROCEDURE — 80053 COMPREHEN METABOLIC PANEL: CPT

## 2024-05-17 PROCEDURE — 36415 COLL VENOUS BLD VENIPUNCTURE: CPT

## 2024-05-17 PROCEDURE — 85730 THROMBOPLASTIN TIME PARTIAL: CPT

## 2024-05-17 PROCEDURE — 85610 PROTHROMBIN TIME: CPT

## 2024-05-17 PROCEDURE — 6370000000 HC RX 637 (ALT 250 FOR IP): Performed by: INTERNAL MEDICINE

## 2024-05-17 PROCEDURE — 1200000000 HC SEMI PRIVATE

## 2024-05-17 PROCEDURE — 80048 BASIC METABOLIC PNL TOTAL CA: CPT

## 2024-05-17 PROCEDURE — 2580000003 HC RX 258: Performed by: STUDENT IN AN ORGANIZED HEALTH CARE EDUCATION/TRAINING PROGRAM

## 2024-05-17 PROCEDURE — 85025 COMPLETE CBC W/AUTO DIFF WBC: CPT

## 2024-05-17 PROCEDURE — 82962 GLUCOSE BLOOD TEST: CPT

## 2024-05-17 RX ORDER — INSULIN LISPRO 100 [IU]/ML
0-4 INJECTION, SOLUTION INTRAVENOUS; SUBCUTANEOUS NIGHTLY
Status: DISCONTINUED | OUTPATIENT
Start: 2024-05-17 | End: 2024-05-18

## 2024-05-17 RX ORDER — INSULIN LISPRO 100 [IU]/ML
0-16 INJECTION, SOLUTION INTRAVENOUS; SUBCUTANEOUS
Status: DISCONTINUED | OUTPATIENT
Start: 2024-05-17 | End: 2024-05-18

## 2024-05-17 RX ADMIN — SODIUM CHLORIDE, PRESERVATIVE FREE 10 ML: 5 INJECTION INTRAVENOUS at 08:28

## 2024-05-17 RX ADMIN — OXYCODONE AND ACETAMINOPHEN 1 TABLET: 7.5; 325 TABLET ORAL at 17:02

## 2024-05-17 RX ADMIN — METOPROLOL TARTRATE 25 MG: 50 TABLET, FILM COATED ORAL at 08:28

## 2024-05-17 RX ADMIN — INSULIN GLARGINE 25 UNITS: 100 INJECTION, SOLUTION SUBCUTANEOUS at 20:00

## 2024-05-17 RX ADMIN — INSULIN LISPRO 8 UNITS: 100 INJECTION, SOLUTION INTRAVENOUS; SUBCUTANEOUS at 12:39

## 2024-05-17 RX ADMIN — METOPROLOL TARTRATE 25 MG: 50 TABLET, FILM COATED ORAL at 19:59

## 2024-05-17 RX ADMIN — GABAPENTIN 400 MG: 400 CAPSULE ORAL at 19:59

## 2024-05-17 RX ADMIN — OXYCODONE AND ACETAMINOPHEN 1 TABLET: 7.5; 325 TABLET ORAL at 08:35

## 2024-05-17 RX ADMIN — ASPIRIN 81 MG: 81 TABLET, CHEWABLE ORAL at 08:28

## 2024-05-17 RX ADMIN — OXYCODONE AND ACETAMINOPHEN 1 TABLET: 7.5; 325 TABLET ORAL at 23:25

## 2024-05-17 RX ADMIN — CLOPIDOGREL BISULFATE 75 MG: 75 TABLET ORAL at 08:28

## 2024-05-17 RX ADMIN — ATORVASTATIN CALCIUM 40 MG: 40 TABLET, FILM COATED ORAL at 19:59

## 2024-05-17 RX ADMIN — EMPAGLIFLOZIN 25 MG: 10 TABLET, FILM COATED ORAL at 08:28

## 2024-05-17 RX ADMIN — PANTOPRAZOLE SODIUM 40 MG: 40 TABLET, DELAYED RELEASE ORAL at 17:02

## 2024-05-17 RX ADMIN — PANTOPRAZOLE SODIUM 40 MG: 40 TABLET, DELAYED RELEASE ORAL at 08:30

## 2024-05-17 RX ADMIN — ENOXAPARIN SODIUM 30 MG: 100 INJECTION SUBCUTANEOUS at 08:28

## 2024-05-17 RX ADMIN — IRON SUCROSE 300 MG: 20 INJECTION, SOLUTION INTRAVENOUS at 08:32

## 2024-05-17 RX ADMIN — INSULIN LISPRO 16 UNITS: 100 INJECTION, SOLUTION INTRAVENOUS; SUBCUTANEOUS at 17:32

## 2024-05-17 RX ADMIN — INSULIN LISPRO 2 UNITS: 100 INJECTION, SOLUTION INTRAVENOUS; SUBCUTANEOUS at 08:28

## 2024-05-17 RX ADMIN — OXYCODONE AND ACETAMINOPHEN 1 TABLET: 7.5; 325 TABLET ORAL at 01:32

## 2024-05-17 RX ADMIN — Medication 1000 MCG: at 08:28

## 2024-05-17 RX ADMIN — GABAPENTIN 400 MG: 400 CAPSULE ORAL at 08:27

## 2024-05-17 RX ADMIN — ENOXAPARIN SODIUM 30 MG: 100 INJECTION SUBCUTANEOUS at 20:00

## 2024-05-17 RX ADMIN — FLUOXETINE HYDROCHLORIDE 40 MG: 20 CAPSULE ORAL at 08:27

## 2024-05-17 RX ADMIN — INSULIN LISPRO 4 UNITS: 100 INJECTION, SOLUTION INTRAVENOUS; SUBCUTANEOUS at 20:00

## 2024-05-17 RX ADMIN — SODIUM CHLORIDE, PRESERVATIVE FREE 10 ML: 5 INJECTION INTRAVENOUS at 21:00

## 2024-05-17 RX ADMIN — INSULIN GLARGINE 25 UNITS: 100 INJECTION, SOLUTION SUBCUTANEOUS at 08:29

## 2024-05-17 RX ADMIN — FUROSEMIDE 40 MG: 20 TABLET ORAL at 08:28

## 2024-05-17 ASSESSMENT — PAIN SCALES - GENERAL
PAINLEVEL_OUTOF10: 7
PAINLEVEL_OUTOF10: 8
PAINLEVEL_OUTOF10: 7
PAINLEVEL_OUTOF10: 7
PAINLEVEL_OUTOF10: 4
PAINLEVEL_OUTOF10: 7

## 2024-05-17 ASSESSMENT — PAIN SCALES - WONG BAKER
WONGBAKER_NUMERICALRESPONSE: NO HURT
WONGBAKER_NUMERICALRESPONSE: HURTS LITTLE MORE
WONGBAKER_NUMERICALRESPONSE: NO HURT
WONGBAKER_NUMERICALRESPONSE: HURTS LITTLE MORE
WONGBAKER_NUMERICALRESPONSE: HURTS LITTLE MORE
WONGBAKER_NUMERICALRESPONSE: NO HURT

## 2024-05-17 ASSESSMENT — PAIN DESCRIPTION - FREQUENCY: FREQUENCY: CONTINUOUS

## 2024-05-17 ASSESSMENT — PAIN DESCRIPTION - PAIN TYPE
TYPE: CHRONIC PAIN
TYPE: CHRONIC PAIN

## 2024-05-17 ASSESSMENT — PAIN - FUNCTIONAL ASSESSMENT
PAIN_FUNCTIONAL_ASSESSMENT: ACTIVITIES ARE NOT PREVENTED

## 2024-05-17 ASSESSMENT — PAIN DESCRIPTION - LOCATION
LOCATION: BACK
LOCATION: BACK
LOCATION: FOOT
LOCATION: GENERALIZED
LOCATION: BACK;ARM

## 2024-05-17 ASSESSMENT — PAIN DESCRIPTION - DESCRIPTORS
DESCRIPTORS: BURNING;PINS AND NEEDLES
DESCRIPTORS: ACHING;DISCOMFORT
DESCRIPTORS: ACHING
DESCRIPTORS: ACHING;DISCOMFORT

## 2024-05-17 ASSESSMENT — PAIN DESCRIPTION - ORIENTATION
ORIENTATION: LOWER
ORIENTATION: LOWER
ORIENTATION: RIGHT
ORIENTATION: LOWER;LEFT

## 2024-05-17 ASSESSMENT — PAIN DESCRIPTION - ONSET: ONSET: ON-GOING

## 2024-05-17 NOTE — PROGRESS NOTES
DOING WELL NO ABD COMPLAINTS STOOLS DARK IN COLOR RECEIVED IRON INFUSION STARTED ON PLAVIX BECAUSE OF PTCA  VITALS STABLE   LABS NOTED  HB 7.7   WILL CPM F/U H/H  CE AS OUTPT

## 2024-05-17 NOTE — PLAN OF CARE
Problem: Discharge Planning  Goal: Discharge to home or other facility with appropriate resources  5/17/2024 1218 by Lilli Rutledge RN  Outcome: Progressing  Flowsheets (Taken 5/17/2024 0407 by Porfirio Ch RN)  Discharge to home or other facility with appropriate resources: Identify barriers to discharge with patient and caregiver  5/17/2024 0405 by Porfirio Ch RN  Outcome: Progressing     Problem: Pain  Goal: Verbalizes/displays adequate comfort level or baseline comfort level  5/17/2024 1218 by Lilli Rutledge RN  Outcome: Progressing  5/17/2024 0405 by Porfirio Ch RN  Outcome: Progressing     Problem: Chronic Conditions and Co-morbidities  Goal: Patient's chronic conditions and co-morbidity symptoms are monitored and maintained or improved  5/17/2024 1218 by Lilli Rutledge RN  Outcome: Progressing  5/17/2024 0405 by Porfirio Ch RN  Outcome: Progressing     Problem: Safety - Adult  Goal: Free from fall injury  5/17/2024 1218 by Lilli Rutledge RN  Outcome: Progressing  5/17/2024 0405 by Porfirio Ch RN  Outcome: Progressing     Problem: Cardiovascular - Adult  Goal: Maintains optimal cardiac output and hemodynamic stability  5/17/2024 1218 by Lilli Rutledge RN  Outcome: Progressing  5/17/2024 0405 by Porfirio Ch RN  Outcome: Progressing  Goal: Absence of cardiac dysrhythmias or at baseline  5/17/2024 1218 by Lilli Rutledge RN  Outcome: Progressing  5/17/2024 0405 by Porfirio Ch RN  Outcome: Progressing     Problem: Musculoskeletal - Adult  Goal: Return mobility to safest level of function  5/17/2024 1218 by Lilli Rutledge RN  Outcome: Progressing  5/17/2024 0405 by Porfirio Ch RN  Outcome: Progressing     Problem: Metabolic/Fluid and Electrolytes - Adult  Goal: Electrolytes maintained within normal limits  5/17/2024 1218 by Lilli Rutledge RN  Outcome: Progressing  5/17/2024 0405 by Porfirio Ch RN  Outcome:  Progressing  Goal: Hemodynamic stability and optimal renal function maintained  5/17/2024 1218 by Lilli Rutledge RN  Outcome: Progressing  5/17/2024 0405 by Porfirio Ch RN  Outcome: Progressing  Goal: Glucose maintained within prescribed range  5/17/2024 1218 by Lilli Rutledge RN  Outcome: Progressing  5/17/2024 0405 by Porfirio Ch RN  Outcome: Progressing     Problem: Hematologic - Adult  Goal: Maintains hematologic stability  5/17/2024 1218 by Lilli Rutledge RN  Outcome: Progressing  5/17/2024 0405 by Porfirio Ch RN  Outcome: Progressing     Problem: Skin/Tissue Integrity  Goal: Absence of new skin breakdown  Description: 1.  Monitor for areas of redness and/or skin breakdown  2.  Assess vascular access sites hourly  3.  Every 4-6 hours minimum:  Change oxygen saturation probe site  4.  Every 4-6 hours:  If on nasal continuous positive airway pressure, respiratory therapy assess nares and determine need for appliance change or resting period.  5/17/2024 1218 by Lilli Rutledge RN  Outcome: Progressing  5/17/2024 0405 by Porfirio Ch RN  Outcome: Progressing

## 2024-05-17 NOTE — PROGRESS NOTES
chest pain/hypotension COMPARISON: CT chest pulmonary angiogram March 29, 2023 TECHNIQUE: Axial CT imaging obtained through the chest prior to and following administration of IV contrast. Axial images, multiplanar reformatted images and 3D post-processing, maximum intensity projection images were obtained for CT angiographic technique.   Individualized dose optimization technique was used in order to meet ALARA standards for radiation dose reduction.  In addition to vendor specific dose reduction algorithms, the dose reduction techniques vary based on the specific scanner utilized but frequently include automated exposure control, adjustment of the mA and/or kV according to patient size, and use of iterative reconstruction technique. CONTRAST: 80 mL Omnipaque 370  IV FINDINGS: LUNGS / AIRWAYS: Airways are patent.  Lower lung volumes. Mild dependent groundglass opacities. Mild elevation left hemidiaphragm. Mild bibasilar atelectasis. No suspicious pulmonary lesion. PLEURA: No pleural effusions or significant pleural thickening. AORTA: No change. No evidence of aneurysm or dissection. Normal-sized main pulmonary internal trunk. No central pulmonary arterial filling defect. No change. Atherosclerosis. HEART: Heart is not enlarged. MEDIASTINUM/LYMPH NODES: No lymphadenopathy. Calcified mediastinal lymph nodes. CHEST WALL / LOWER NECK: No significant abnormality. UPPER ABDOMEN: No change. Calcified hepatic and splenic granula. Cirrhosis. Splenomegaly. Cholecystectomy. Small gastric fundal diverticulum. BONES: No significant abnormality.     1.  No change. No evidence of aneurysm or dissection. No central pulmonary arterial filling defect. 2.  No change. Cirrhosis. Splenomegaly. 3.  No suspicious pulmonary lesion. Low lung volumes. Mild dependent groundglass opacities and atelectasis. ----------PERT DATA---------- Data reported only if pulmonary embolism is present: Most central extent of clot: NA RV/LV ratio: NA  Electronically signed by Eric James MD         Electronically signed by Nirav Keen MD on 5/17/2024 at 1:25 PM

## 2024-05-18 VITALS
OXYGEN SATURATION: 94 % | DIASTOLIC BLOOD PRESSURE: 53 MMHG | HEIGHT: 70 IN | SYSTOLIC BLOOD PRESSURE: 138 MMHG | BODY MASS INDEX: 32.86 KG/M2 | RESPIRATION RATE: 16 BRPM | HEART RATE: 62 BPM | WEIGHT: 229.5 LBS | TEMPERATURE: 98.1 F

## 2024-05-18 LAB
ANION GAP SERPL CALCULATED.3IONS-SCNC: 11 MMOL/L (ref 7–16)
BUN SERPL-MCNC: 39 MG/DL (ref 6–23)
CALCIUM SERPL-MCNC: 8.1 MG/DL (ref 8.3–10.6)
CHLORIDE BLD-SCNC: 99 MMOL/L (ref 99–110)
CO2: 27 MMOL/L (ref 21–32)
CREAT SERPL-MCNC: 1.5 MG/DL (ref 0.9–1.3)
GFR, ESTIMATED: 46 ML/MIN/1.73M2
GLUCOSE BLD-MCNC: 243 MG/DL (ref 70–99)
GLUCOSE BLD-MCNC: 264 MG/DL (ref 70–99)
GLUCOSE BLD-MCNC: 400 MG/DL (ref 70–99)
GLUCOSE BLD-MCNC: 426 MG/DL (ref 70–99)
GLUCOSE SERPL-MCNC: 419 MG/DL (ref 70–99)
HCT VFR BLD CALC: 25.3 % (ref 42–52)
HEMOGLOBIN: 7.6 GM/DL (ref 13.5–18)
POTASSIUM SERPL-SCNC: 3.8 MMOL/L (ref 3.5–5.1)
SODIUM BLD-SCNC: 137 MMOL/L (ref 135–145)

## 2024-05-18 PROCEDURE — 2580000003 HC RX 258: Performed by: STUDENT IN AN ORGANIZED HEALTH CARE EDUCATION/TRAINING PROGRAM

## 2024-05-18 PROCEDURE — 97530 THERAPEUTIC ACTIVITIES: CPT

## 2024-05-18 PROCEDURE — 6370000000 HC RX 637 (ALT 250 FOR IP): Performed by: STUDENT IN AN ORGANIZED HEALTH CARE EDUCATION/TRAINING PROGRAM

## 2024-05-18 PROCEDURE — 80048 BASIC METABOLIC PNL TOTAL CA: CPT

## 2024-05-18 PROCEDURE — 36415 COLL VENOUS BLD VENIPUNCTURE: CPT

## 2024-05-18 PROCEDURE — 94761 N-INVAS EAR/PLS OXIMETRY MLT: CPT

## 2024-05-18 PROCEDURE — 85014 HEMATOCRIT: CPT

## 2024-05-18 PROCEDURE — 97535 SELF CARE MNGMENT TRAINING: CPT

## 2024-05-18 PROCEDURE — 6360000002 HC RX W HCPCS: Performed by: STUDENT IN AN ORGANIZED HEALTH CARE EDUCATION/TRAINING PROGRAM

## 2024-05-18 PROCEDURE — 82962 GLUCOSE BLOOD TEST: CPT

## 2024-05-18 PROCEDURE — 6370000000 HC RX 637 (ALT 250 FOR IP): Performed by: INTERNAL MEDICINE

## 2024-05-18 PROCEDURE — 85018 HEMOGLOBIN: CPT

## 2024-05-18 RX ORDER — ASPIRIN 81 MG/1
81 TABLET, CHEWABLE ORAL DAILY
Qty: 30 TABLET | Refills: 0 | Status: SHIPPED | OUTPATIENT
Start: 2024-05-19

## 2024-05-18 RX ORDER — FERROUS SULFATE 325(65) MG
325 TABLET ORAL EVERY OTHER DAY
Qty: 15 TABLET | Refills: 0 | Status: SHIPPED | OUTPATIENT
Start: 2024-05-18 | End: 2024-06-17

## 2024-05-18 RX ORDER — RANOLAZINE 500 MG/1
500 TABLET, EXTENDED RELEASE ORAL DAILY
Qty: 30 TABLET | Refills: 0 | Status: SHIPPED | OUTPATIENT
Start: 2024-05-18 | End: 2024-06-17

## 2024-05-18 RX ORDER — INSULIN LISPRO 100 [IU]/ML
0-16 INJECTION, SOLUTION INTRAVENOUS; SUBCUTANEOUS
Status: DISCONTINUED | OUTPATIENT
Start: 2024-05-18 | End: 2024-05-18 | Stop reason: HOSPADM

## 2024-05-18 RX ORDER — INSULIN LISPRO 100 [IU]/ML
0-4 INJECTION, SOLUTION INTRAVENOUS; SUBCUTANEOUS NIGHTLY
Status: DISCONTINUED | OUTPATIENT
Start: 2024-05-18 | End: 2024-05-18 | Stop reason: HOSPADM

## 2024-05-18 RX ADMIN — CLOPIDOGREL BISULFATE 75 MG: 75 TABLET ORAL at 09:33

## 2024-05-18 RX ADMIN — OXYCODONE AND ACETAMINOPHEN 1 TABLET: 7.5; 325 TABLET ORAL at 05:47

## 2024-05-18 RX ADMIN — INSULIN LISPRO 4 UNITS: 100 INJECTION, SOLUTION INTRAVENOUS; SUBCUTANEOUS at 09:40

## 2024-05-18 RX ADMIN — SODIUM CHLORIDE, PRESERVATIVE FREE 10 ML: 5 INJECTION INTRAVENOUS at 09:41

## 2024-05-18 RX ADMIN — FLUOXETINE HYDROCHLORIDE 40 MG: 20 CAPSULE ORAL at 09:34

## 2024-05-18 RX ADMIN — Medication 1000 MCG: at 09:34

## 2024-05-18 RX ADMIN — EMPAGLIFLOZIN 25 MG: 10 TABLET, FILM COATED ORAL at 09:35

## 2024-05-18 RX ADMIN — ASPIRIN 81 MG: 81 TABLET, CHEWABLE ORAL at 09:35

## 2024-05-18 RX ADMIN — INSULIN LISPRO 16 UNITS: 100 INJECTION, SOLUTION INTRAVENOUS; SUBCUTANEOUS at 12:53

## 2024-05-18 RX ADMIN — GABAPENTIN 400 MG: 400 CAPSULE ORAL at 09:33

## 2024-05-18 RX ADMIN — OXYCODONE AND ACETAMINOPHEN 1 TABLET: 7.5; 325 TABLET ORAL at 13:09

## 2024-05-18 RX ADMIN — PANTOPRAZOLE SODIUM 40 MG: 40 TABLET, DELAYED RELEASE ORAL at 05:47

## 2024-05-18 RX ADMIN — METOPROLOL TARTRATE 25 MG: 50 TABLET, FILM COATED ORAL at 09:34

## 2024-05-18 RX ADMIN — INSULIN GLARGINE 25 UNITS: 100 INJECTION, SOLUTION SUBCUTANEOUS at 09:41

## 2024-05-18 RX ADMIN — FUROSEMIDE 40 MG: 20 TABLET ORAL at 09:34

## 2024-05-18 RX ADMIN — INSULIN HUMAN 10 UNITS: 100 INJECTION, SOLUTION PARENTERAL at 15:12

## 2024-05-18 RX ADMIN — IRON SUCROSE 300 MG: 20 INJECTION, SOLUTION INTRAVENOUS at 09:54

## 2024-05-18 RX ADMIN — ENOXAPARIN SODIUM 30 MG: 100 INJECTION SUBCUTANEOUS at 09:41

## 2024-05-18 ASSESSMENT — PAIN DESCRIPTION - LOCATION
LOCATION: BACK
LOCATION: BACK;ARM

## 2024-05-18 ASSESSMENT — PAIN SCALES - GENERAL
PAINLEVEL_OUTOF10: 5
PAINLEVEL_OUTOF10: 7
PAINLEVEL_OUTOF10: 7

## 2024-05-18 ASSESSMENT — PAIN SCALES - WONG BAKER: WONGBAKER_NUMERICALRESPONSE: NO HURT

## 2024-05-18 ASSESSMENT — PAIN DESCRIPTION - DESCRIPTORS
DESCRIPTORS: SORE
DESCRIPTORS: ACHING;SORE

## 2024-05-18 ASSESSMENT — PAIN DESCRIPTION - ONSET: ONSET: ON-GOING

## 2024-05-18 ASSESSMENT — PAIN DESCRIPTION - FREQUENCY: FREQUENCY: CONTINUOUS

## 2024-05-18 NOTE — DISCHARGE INSTRUCTIONS
Internal Medicine Discharge Instruction    Discharge to:  Home  Diet: ADULT DIET; Regular; 4 carb choices (60 gm/meal); Low Fat/Low Chol/High Fiber/GALINA  Activity: As tolerated       Be compliant with medications  Please take medications as prescribed   Please get blood work done in a few days - home health care should draw them for you   Please take iron pills - if you have constipation from it, please let your PCP know         Electronically signed by Nirav Keen MD on 5/18/2024 at 1:59 PM

## 2024-05-18 NOTE — PROGRESS NOTES
Pt to be discharged. AVS, discharge summary, and Home care order faxed to [facility] and  [facility] was called to inform of discharge. AVS discussed with pt and all questions answered. PIV removed with no complications. LG

## 2024-05-18 NOTE — FLOWSHEET NOTE
Physical Therapy Treatment Note  Name: Abelardo Marquis MRN: [id number] :   1940   Date:  2024   Admission Date: 5/15/2024 Room:  [address] [place] [address]   Restrictions/Precautions:          General, Fall Risk   Communication with other providers:  PT ok per nsg (Zachary). Spoke with nsg (Zachary) that patient would like pain medication and also IV keeps getting occluded.   Subjective:  Patient states:  \"I don't want that belt on me. Don't put it on me. I already signed a waiver to not wear it.\"   Pain:   Location, Type, Intensity (0/10 to 10/10):  7/10 (low back).   Objective:    Observation:  pt seen in semi-moyer's position in bed at beginning of treatment. Pt requesting to use bathroom. Pt adamantly refused gait belt despite education on hospital policy. Pt stated he signed a waiver to not wear it. Pt requested to used restroom. After using restroom, pt requested to return to bed. Pt declined gait training and bed exercises.     Treatment, including education/measures:    Transfers:   Supine->sit mod I with bed rail  Sit->supine: mod I with bed rail  Sit<->stand mod I with rollator    Toilet Transfer: mod I with rollator     Gait: pt amb 15'+15' mod I with rollator. Pt adamantly refused gait training in hallways.     Exercises:   Adamantly Refused    Assessment / Impression:    Patient's tolerance of treatment:  Fair    Adverse Reaction: no  Significant change in status and impact:  no  Barriers to improvement:  Self-limiting behavior. Pt refused to participate in therapy and bed exercises.     Plan for Next Session:    Gait; transfers; exercises    Time in:  1112  Time out:  1126  Timed treatment minutes:  14  Total treatment time:  14    Previously filed items:  Social/Functional History  Lives With: Family (daughter and )  Type of Home: House  Home Layout: Two level (stays on 1st floor)  Home Access: Stairs to enter with rails  Entrance Stairs - Number of Steps: 2 steps in back entrance  Bathroom Shower/Tub:

## 2024-05-18 NOTE — PROGRESS NOTES
Occupational Therapy Treatment Note    Name: Abelardo Marquis MRN: [id number] :   1940   Date:  2024   Admission Date: 5/15/2024 Room:  [address] [place] [address]-A     Primary Problem:  The primary encounter diagnosis was Acute renal failure superimposed on stage 3 chronic kidney disease, unspecified acute renal failure type, unspecified whether stage 3a or 3b CKD ([facility]). Diagnoses of Hypotension, unspecified hypotension type, Diabetes mellitus due to underlying condition with hyperglycemia, with long-term current use of insulin ([facility]), and Acute anemia were also pertinent to this visit.  Patient  has a past medical history of Arthritis, Diabetes mellitus ([facility]), GERD (gastroesophageal reflux disease), Gout, Hyperlipidemia, Hypertension, and MI (myocardial infarction) ([facility]).    Restrictions/Precautions:  General Fall Risk    Communication with other providers: Per chart review and Nurse patient is appropriate for therapeutic intervention.     Subjective:  Patient states:  Agreeable to OT therapy  Pain:   Location, Type, Intensity (0/10 to 10/10):  5/10 back    Objective:    Observation: In semi moyer's position upon arrival.   Objective Measures:  Alert and oriented    Treatment, including education:  Therapeutic Activity Training:   Therapeutic activity training was instructed today.  Cues were given for safety, sequence, UE/LE placement, awareness, and balance.    Bed mobility:CGA  Sitting balance:good on EOB  Sit/stand transfers:SBA utilizing RW  Functional Mobility: SBA utilizing RW around the room<>bathroom    Activities performed today included bed mobility training, transfers, functional mobility  to increase strength, activity tolerance to facilitate IND c ADL tasks, func transfers / mobility with G safety awareness carryover    Self Care Training:   Cues were given for safety, sequence, UE/LE placement, visual cues, and balance.    Activities performed today included toileting, personal hygiene, and grooming task.

## 2024-05-18 NOTE — DISCHARGE SUMMARY
Please use this note as a progress note for the day if the patient does not discharge today      Discharge Summary    Name:  Abelardo Marquis /Age/Sex: 1940  (83 y.o. 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  male  who presents with Hypotension    Patient admitted on 5/15/2024 11:42 AM    Per H+P:  Abelardo Marquis is a 83 y.o. male with PMH of CKD, DM II, HTN, cirrhosis, chronic anemia who presents with low blood pressure. Home health nurse was at pt's home where his BP was noted to be in the 70's. Pt was asymptomatic. No n/v/d. Reports he still has dark stool but improving compared to previous admission.      At ED, pt was afebrile, hemodynamically stable 's but on repeat was 90's; on room air. Labs Na 137, Cr 1.9, glucose 271, Ca 7.9, WBC 3.3, Hgb 7.1. CXR no acute changes. Discussed with ED, will admit pt to med surg  for hypotension  workup and management.     Hypotension. Suspect 2/2 medications, dehydration and anemia. No sign of gross GI bleed.   Received 1L NS in the ED.  - hold giving more IVF as pt has edema, cirrhosis  - manage anemia as below     Acute on chronic anemia.  No sign of gross GI bleed. Endorses ongoing dark stool.   1 PRBC transfused this admission. Hb 7.6 today and stable   Suspect drop due to low iron not on supplements  Recent EGD/colonoscopy gastritis, duodenal diverticulum in 2nd portion, no active or old bleeding in the upper GIT, 10 polyps , completely resected but only 4 retrieved , diffuse diverticulosis, mixed hemorrhoids and no active bleeding in the colon  Pt is supposed to  DO    Vascular duplex upper extremity venous left    Result Date: 5/5/2024  History: Left arm pain. COMPARISON: None. FINDINGS: Grayscale, color Doppler, and spectral Doppler sonography of the left upper extremity was performed. There is nonocclusive superficial venous thrombosis within the left cephalic vein at the level of the left forearm. The more proximal aspect of  the left cephalic vein in the proximal left upper extremity is patent. The left internal jugular vein is color Doppler patent with normal venous waveform. Left subclavian vein is color Doppler patent with normal venous waveform. The left axillary, left brachial, left basilic, left radial, and left ulnar veins are easily compressible and/or color Doppler patent with normal venous waveforms on spectral Doppler sonography.     1. A focal area of subocclusive superficial venous thrombosis in the left cephalic vein at the level of the left forearm reportedly at an IV site. 2. The veins of the left upper extremity are otherwise patent. Electronically signed by Dima Nagy MD    CTA ABDOMEN PELVIS W WO CONTRAST    Result Date: 5/2/2024  CTA ABDOMEN AND PELVIS WITH CONTRAST HISTORY: Lower GI bleed COMPARISON: 5/19/2023 TECHNICAL FACTORS: Post CTA imaging was performed through the abdomen and pelvis. 100 cc of Omnipaque 350 was administered. FINDINGS: Lung bases appear clear of acute infiltrate or pleural effusion. Abdominal aorta shows normal caliber with mild scattered atherosclerotic changes noted. The celiac trunk, SMA, and bilateral renal arteries appear patent with mild atherosclerotic calcification seen at the origin. FAZAL is likewise patent. The bilateral iliac vessels appear normal bilaterally. Liver shows uniform attenuation with no focal mass lesion or biliary ductal dilation. There is diffusely nodular contour of the hepatic parenchyma consistent with cirrhotic disease. Gallbladder is surgically absent. Spleen is within normal limits in size.

## 2024-05-18 NOTE — PLAN OF CARE
Problem: Discharge Planning  Goal: Discharge to home or other facility with appropriate resources  Outcome: Progressing     Problem: Pain  Goal: Verbalizes/displays adequate comfort level or baseline comfort level  Outcome: Progressing  Flowsheets (Taken 5/17/2024 1953)  Verbalizes/displays adequate comfort level or baseline comfort level: Encourage patient to monitor pain and request assistance     Problem: Safety - Adult  Goal: Free from fall injury  Outcome: Progressing     Problem: Cardiovascular - Adult  Goal: Maintains optimal cardiac output and hemodynamic stability  Outcome: Progressing     Problem: Safety - Adult  Goal: Free from fall injury  Outcome: Progressing

## 2024-05-20 ENCOUNTER — CARE COORDINATION (OUTPATIENT)
Dept: CASE MANAGEMENT | Age: 84
End: 2024-05-20

## 2024-05-20 NOTE — CARE COORDINATION
Care Transitions Outreach Attempt    Call within 2 business days of discharge: Yes     Patient: Abelardo Marquis Patient : 1940 MRN: [id number]    Last Discharge Facility       Date Complaint Diagnosis Description Type Department Provider    5/15/24 Fatigue; Hypotension; Hyperglycemia Acute renal failure superimposed on stage 3 chronic kidney disease, unspecified acute renal failure type, unspecified whether stage 3a or 3b CKD (HCC) ... ED to Hosp-Admission (Discharged) (ADMITTED) [facility] 3E Nirav Keen MD; Jose, ...   Noted following upcoming appointments from discharge chart review:   [facility] follow up appointment(s):   Future Appointments   Date Time Provider Department Center   2024 12:45 PM Cecile Murray MD [facility]     1st attempt to reach for Care Transition discharge call unsuccessful. HIPAA compliant message left requesting call back. No HIPAA form on file for approved EC. No MyChart.

## 2024-05-21 ENCOUNTER — CARE COORDINATION (OUTPATIENT)
Dept: CASE MANAGEMENT | Age: 84
End: 2024-05-21

## 2024-05-21 NOTE — CARE COORDINATION
Care Transitions Outreach Attempt    Call within 2 business days of discharge: Yes     Patient: Abelardo Marquis Patient : 1940 MRN: [id number]    Last Discharge Facility       Date Complaint Diagnosis Description Type Department Provider    5/15/24 Fatigue; Hypotension; Hyperglycemia Acute renal failure superimposed on stage 3 chronic kidney disease, unspecified acute renal failure type, unspecified whether stage 3a or 3b CKD (HCC) ... ED to Hosp-Admission (Discharged) (ADMITTED) [facility] 3E Nirav Keen MD; Jose, ...     Future Appointments   Date Time Provider Department Center   2024 12:45 PM Cecile Murray MD [facility]     2nd unsuccessful attempt to reach for Care Transition discharge call. HIPAA compliant message left requesting call back. CT program closed per protocol, no further outreach scheduled.

## 2024-05-27 NOTE — ED NOTES
Physical Therapy Treatment Note           Name: Josep Angulo    : 1948 (75 y.o.)  MRN: [id number]           TREATMENT SUMMARY AND RECOMMENDATIONS:    PT Received On: 24  PT Start Time: 930     PT Stop Time: 955  PT Total Time (min): 25 min     Subjective Assessment:  x No complaints  Lethargic    Awake, alert, cooperative  Uncooperative    Agitated  c/o pain    Appropriate  c/o fatigue    Confused  Treated at bedside     Emotionally labile x Treated in gym/dept.    Impulsive  Other:    Flat affect       Therapy Precautions:    Cognitive deficits  Spinal precautions    Collar - hard  Sternal precautions    Collar - soft   TLSO    Fall risk  LSO    Hip precautions - posterior  Knee immobilizer    Hip precautions - anterior  WBAT    Impaired communication  Partial weightbearing    Oxygen  TTWB    PEG tube  NWB    Visual deficits  Other:    Hearing deficits          Treatment Objectives:     Mobility Training:   Assist level Comments    Bed mobility     Transfer CGA WC-recliner with RW    Gait     Sit to stand transitions CGA Sit-stand 5 reps x 2 with B UE use   Sitting balance     Standing balance      Wheelchair mobility     Car transfer     Other:          Therapeutic Exercise:   Exercise Sets Reps Comments   B LE exer sitting  2#B wt  20 reps TKE, abd/add, knee lifts    L LE exer standing  1 20 B UE supported- abd/add, knee lifts                   Additional Comments:  Pt requested BM after treatment     Assessment: Patient tolerated session well    PT Plan: continue  Revisions made to plan of care: No    GOALS:   Multidisciplinary Problems       Physical Therapy Goals          Problem: Physical Therapy    Goal Priority Disciplines Outcome Goal Variances Interventions   Physical Therapy Goal     PT, PT/OT Progressing     Description: Description: Goals to be met by: Discharge       Patient will increase functional independence with mobility by performin. Sit to stand transfer with  Handoff report given to Jazmin WEBER. RN will resume care of patient at this time.       Sukhi Mc RN  03/27/19 1917 Supervision  2. Bed to chair transfer with Supervision using Rolling Walker  3. Wheelchair propulsion x 300 feet with Modified [place] using bilateral uppper extremities.   4. Gait x 50 feet at SPV levels using RW with or without prosthesis (currently poor fitting and needs  follow op)                         Skilled PT Minutes Provided: 25   Communication with Treatment Team:     Equipment recommendations:       At end of treatment, patient remained:  x Up in chair     Up in wheelchair in room    In bed   x With alarm activated    Bed rails up   x Call bell in reach     Family/friends present    Restraints secured properly    In bathroom with CNA/RN notified    Nurse aware    In gym with therapist/tech    Other:

## 2024-05-30 ENCOUNTER — APPOINTMENT (OUTPATIENT)
Dept: GENERAL RADIOLOGY | Age: 84
DRG: 378 | End: 2024-05-30
Payer: MEDICARE

## 2024-05-30 ENCOUNTER — HOSPITAL ENCOUNTER (INPATIENT)
Age: 84
LOS: 4 days | Discharge: HOME HEALTH | DRG: 378 | End: 2024-06-03
Attending: STUDENT IN AN ORGANIZED HEALTH CARE EDUCATION/TRAINING PROGRAM | Admitting: STUDENT IN AN ORGANIZED HEALTH CARE EDUCATION/TRAINING PROGRAM
Payer: MEDICARE

## 2024-05-30 DIAGNOSIS — N18.9 ACUTE KIDNEY INJURY SUPERIMPOSED ON CKD (HCC): ICD-10-CM

## 2024-05-30 DIAGNOSIS — N17.9 ACUTE KIDNEY INJURY SUPERIMPOSED ON CKD (HCC): ICD-10-CM

## 2024-05-30 DIAGNOSIS — R73.9 HYPERGLYCEMIA: ICD-10-CM

## 2024-05-30 DIAGNOSIS — D64.9 ANEMIA REQUIRING TRANSFUSIONS: Primary | ICD-10-CM

## 2024-05-30 PROBLEM — K92.2 GI BLEED: Status: ACTIVE | Noted: 2024-05-30

## 2024-05-30 LAB
ANION GAP SERPL CALCULATED.3IONS-SCNC: 16 MMOL/L (ref 7–16)
APTT: 34.5 SECONDS (ref 25.1–37.1)
BASOPHILS ABSOLUTE: 0 K/CU MM
BASOPHILS RELATIVE PERCENT: 0.9 % (ref 0–1)
BILIRUBIN, URINE: NEGATIVE MG/DL
BLOOD, URINE: NEGATIVE
BUN SERPL-MCNC: 50 MG/DL (ref 6–23)
CALCIUM SERPL-MCNC: 8 MG/DL (ref 8.3–10.6)
CHLORIDE BLD-SCNC: 100 MMOL/L (ref 99–110)
CLARITY: CLEAR
CO2: 21 MMOL/L (ref 21–32)
COLOR: YELLOW
COMMENT UA: ABNORMAL
CREAT SERPL-MCNC: 2.4 MG/DL (ref 0.9–1.3)
CREAT UR-MCNC: 71.3 MG/DL (ref 39–259)
DIFFERENTIAL TYPE: ABNORMAL
EOSINOPHILS ABSOLUTE: 0.1 K/CU MM
EOSINOPHILS RELATIVE PERCENT: 2.8 % (ref 0–3)
FERRITIN: 104 NG/ML (ref 30–400)
GFR, ESTIMATED: 26 ML/MIN/1.73M2
GLUCOSE BLD-MCNC: 386 MG/DL (ref 70–99)
GLUCOSE SERPL-MCNC: 405 MG/DL (ref 70–99)
GLUCOSE URINE: >1000 MG/DL
HCT VFR BLD CALC: 24.2 % (ref 42–52)
HEMOGLOBIN: 6.9 GM/DL (ref 13.5–18)
IMMATURE NEUTROPHIL %: 3.9 % (ref 0–0.43)
INR BLD: 1.1 INDEX
IRON: 45 UG/DL (ref 59–158)
KETONES, URINE: NEGATIVE MG/DL
LEUKOCYTE ESTERASE, URINE: NEGATIVE
LYMPHOCYTES ABSOLUTE: 1.4 K/CU MM
LYMPHOCYTES RELATIVE PERCENT: 31.9 % (ref 24–44)
MAGNESIUM: 2.2 MG/DL (ref 1.8–2.4)
MCH RBC QN AUTO: 29.9 PG (ref 27–31)
MCHC RBC AUTO-ENTMCNC: 28.5 % (ref 32–36)
MCV RBC AUTO: 104.8 FL (ref 78–100)
MONOCYTES ABSOLUTE: 0.4 K/CU MM
MONOCYTES RELATIVE PERCENT: 8.9 % (ref 0–4)
NEUTROPHILS ABSOLUTE: 2.3 K/CU MM
NEUTROPHILS RELATIVE PERCENT: 51.6 % (ref 36–66)
NITRITE URINE, QUANTITATIVE: NEGATIVE
NUCLEATED RBC %: 0 %
PCT TRANSFERRIN: 14 % (ref 10–44)
PDW BLD-RTO: 19.8 % (ref 11.7–14.9)
PH, URINE: 5 (ref 5–8)
PLATELET # BLD: 208 K/CU MM (ref 140–440)
PMV BLD AUTO: 10.9 FL (ref 7.5–11.1)
POTASSIUM SERPL-SCNC: 4.3 MMOL/L (ref 3.5–5.1)
PRO-BNP: 718.6 PG/ML
PROTEIN UA: NEGATIVE MG/DL
PROTHROMBIN TIME: 14.4 SECONDS (ref 11.7–14.5)
RBC # BLD: 2.31 M/CU MM (ref 4.6–6.2)
SODIUM BLD-SCNC: 137 MMOL/L (ref 135–145)
SODIUM URINE: 37 MMOL/L (ref 35–167)
SPECIFIC GRAVITY UA: 1.01 (ref 1–1.03)
TOTAL IMMATURE NEUTOROPHIL: 0.17 K/CU MM
TOTAL IRON BINDING CAPACITY: 314 UG/DL (ref 250–450)
TOTAL NUCLEATED RBC: 0 K/CU MM
TROPONIN, HIGH SENSITIVITY: 71 NG/L (ref 0–22)
TROPONIN, HIGH SENSITIVITY: 78 NG/L (ref 0–22)
UNSATURATED IRON BINDING CAPACITY: 269 UG/DL (ref 110–370)
UROBILINOGEN, URINE: 0.2 MG/DL (ref 0.2–1)
WBC # BLD: 4.4 K/CU MM (ref 4–10.5)

## 2024-05-30 PROCEDURE — 86850 RBC ANTIBODY SCREEN: CPT

## 2024-05-30 PROCEDURE — 6360000002 HC RX W HCPCS: Performed by: STUDENT IN AN ORGANIZED HEALTH CARE EDUCATION/TRAINING PROGRAM

## 2024-05-30 PROCEDURE — 99285 EMERGENCY DEPT VISIT HI MDM: CPT

## 2024-05-30 PROCEDURE — 82962 GLUCOSE BLOOD TEST: CPT

## 2024-05-30 PROCEDURE — 2580000003 HC RX 258: Performed by: STUDENT IN AN ORGANIZED HEALTH CARE EDUCATION/TRAINING PROGRAM

## 2024-05-30 PROCEDURE — C9113 INJ PANTOPRAZOLE SODIUM, VIA: HCPCS | Performed by: STUDENT IN AN ORGANIZED HEALTH CARE EDUCATION/TRAINING PROGRAM

## 2024-05-30 PROCEDURE — 85018 HEMOGLOBIN: CPT

## 2024-05-30 PROCEDURE — 2140000000 HC CCU INTERMEDIATE R&B

## 2024-05-30 PROCEDURE — 85025 COMPLETE CBC W/AUTO DIFF WBC: CPT

## 2024-05-30 PROCEDURE — 83735 ASSAY OF MAGNESIUM: CPT

## 2024-05-30 PROCEDURE — P9016 RBC LEUKOCYTES REDUCED: HCPCS

## 2024-05-30 PROCEDURE — 71045 X-RAY EXAM CHEST 1 VIEW: CPT

## 2024-05-30 PROCEDURE — 93005 ELECTROCARDIOGRAM TRACING: CPT | Performed by: STUDENT IN AN ORGANIZED HEALTH CARE EDUCATION/TRAINING PROGRAM

## 2024-05-30 PROCEDURE — 80048 BASIC METABOLIC PNL TOTAL CA: CPT

## 2024-05-30 PROCEDURE — 82728 ASSAY OF FERRITIN: CPT

## 2024-05-30 PROCEDURE — 81003 URINALYSIS AUTO W/O SCOPE: CPT

## 2024-05-30 PROCEDURE — 84484 ASSAY OF TROPONIN QUANT: CPT

## 2024-05-30 PROCEDURE — 86901 BLOOD TYPING SEROLOGIC RH(D): CPT

## 2024-05-30 PROCEDURE — 82570 ASSAY OF URINE CREATININE: CPT

## 2024-05-30 PROCEDURE — 86922 COMPATIBILITY TEST ANTIGLOB: CPT

## 2024-05-30 PROCEDURE — 86900 BLOOD TYPING SEROLOGIC ABO: CPT

## 2024-05-30 PROCEDURE — 85014 HEMATOCRIT: CPT

## 2024-05-30 PROCEDURE — 83880 ASSAY OF NATRIURETIC PEPTIDE: CPT

## 2024-05-30 PROCEDURE — 84300 ASSAY OF URINE SODIUM: CPT

## 2024-05-30 PROCEDURE — 83550 IRON BINDING TEST: CPT

## 2024-05-30 PROCEDURE — 85730 THROMBOPLASTIN TIME PARTIAL: CPT

## 2024-05-30 PROCEDURE — 83540 ASSAY OF IRON: CPT

## 2024-05-30 PROCEDURE — 85610 PROTHROMBIN TIME: CPT

## 2024-05-30 RX ORDER — INSULIN LISPRO 100 [IU]/ML
0-4 INJECTION, SOLUTION INTRAVENOUS; SUBCUTANEOUS
Status: DISCONTINUED | OUTPATIENT
Start: 2024-05-31 | End: 2024-06-03 | Stop reason: HOSPADM

## 2024-05-30 RX ORDER — FLUOXETINE 10 MG/1
40 CAPSULE ORAL DAILY
Status: DISCONTINUED | OUTPATIENT
Start: 2024-05-31 | End: 2024-06-03 | Stop reason: HOSPADM

## 2024-05-30 RX ORDER — FUROSEMIDE 40 MG/1
40 TABLET ORAL DAILY
Status: DISCONTINUED | OUTPATIENT
Start: 2024-05-31 | End: 2024-06-03 | Stop reason: HOSPADM

## 2024-05-30 RX ORDER — GABAPENTIN 100 MG/1
100 CAPSULE ORAL 2 TIMES DAILY
Status: DISCONTINUED | OUTPATIENT
Start: 2024-05-30 | End: 2024-06-03 | Stop reason: HOSPADM

## 2024-05-30 RX ORDER — RANOLAZINE 500 MG/1
500 TABLET, EXTENDED RELEASE ORAL DAILY
Status: DISCONTINUED | OUTPATIENT
Start: 2024-05-31 | End: 2024-06-03 | Stop reason: HOSPADM

## 2024-05-30 RX ORDER — ASPIRIN 81 MG/1
81 TABLET, CHEWABLE ORAL DAILY
Status: DISCONTINUED | OUTPATIENT
Start: 2024-05-31 | End: 2024-06-03 | Stop reason: HOSPADM

## 2024-05-30 RX ORDER — INSULIN LISPRO 100 [IU]/ML
0-4 INJECTION, SOLUTION INTRAVENOUS; SUBCUTANEOUS NIGHTLY
Status: DISCONTINUED | OUTPATIENT
Start: 2024-05-30 | End: 2024-06-03 | Stop reason: HOSPADM

## 2024-05-30 RX ORDER — ISOSORBIDE MONONITRATE 30 MG/1
15 TABLET, EXTENDED RELEASE ORAL DAILY
Status: DISCONTINUED | OUTPATIENT
Start: 2024-05-31 | End: 2024-06-03 | Stop reason: HOSPADM

## 2024-05-30 RX ORDER — SODIUM CHLORIDE 9 MG/ML
INJECTION, SOLUTION INTRAVENOUS PRN
Status: DISCONTINUED | OUTPATIENT
Start: 2024-05-30 | End: 2024-06-03 | Stop reason: HOSPADM

## 2024-05-30 RX ORDER — ONDANSETRON 2 MG/ML
4 INJECTION INTRAMUSCULAR; INTRAVENOUS EVERY 6 HOURS PRN
Status: DISCONTINUED | OUTPATIENT
Start: 2024-05-30 | End: 2024-06-03 | Stop reason: HOSPADM

## 2024-05-30 RX ORDER — INSULIN GLARGINE 100 [IU]/ML
10 INJECTION, SOLUTION SUBCUTANEOUS NIGHTLY
Status: DISCONTINUED | OUTPATIENT
Start: 2024-05-30 | End: 2024-06-03 | Stop reason: HOSPADM

## 2024-05-30 RX ORDER — ACETAMINOPHEN 325 MG/1
650 TABLET ORAL EVERY 6 HOURS PRN
Status: DISCONTINUED | OUTPATIENT
Start: 2024-05-30 | End: 2024-06-03 | Stop reason: HOSPADM

## 2024-05-30 RX ORDER — DEXTROSE MONOHYDRATE 100 MG/ML
INJECTION, SOLUTION INTRAVENOUS CONTINUOUS PRN
Status: DISCONTINUED | OUTPATIENT
Start: 2024-05-30 | End: 2024-06-03 | Stop reason: HOSPADM

## 2024-05-30 RX ORDER — 0.9 % SODIUM CHLORIDE 0.9 %
1000 INTRAVENOUS SOLUTION INTRAVENOUS ONCE
Status: COMPLETED | OUTPATIENT
Start: 2024-05-30 | End: 2024-05-30

## 2024-05-30 RX ORDER — FERROUS SULFATE 325(65) MG
325 TABLET ORAL EVERY OTHER DAY
Status: DISCONTINUED | OUTPATIENT
Start: 2024-05-31 | End: 2024-06-03 | Stop reason: HOSPADM

## 2024-05-30 RX ORDER — PANTOPRAZOLE SODIUM 40 MG/10ML
80 INJECTION, POWDER, LYOPHILIZED, FOR SOLUTION INTRAVENOUS ONCE
Status: COMPLETED | OUTPATIENT
Start: 2024-05-30 | End: 2024-05-30

## 2024-05-30 RX ORDER — GLUCAGON 1 MG/ML
1 KIT INJECTION PRN
Status: DISCONTINUED | OUTPATIENT
Start: 2024-05-30 | End: 2024-06-03 | Stop reason: HOSPADM

## 2024-05-30 RX ORDER — ATORVASTATIN CALCIUM 40 MG/1
40 TABLET, FILM COATED ORAL NIGHTLY
Status: DISCONTINUED | OUTPATIENT
Start: 2024-05-30 | End: 2024-06-03 | Stop reason: HOSPADM

## 2024-05-30 RX ORDER — CLOPIDOGREL BISULFATE 75 MG/1
75 TABLET ORAL DAILY
Status: DISCONTINUED | OUTPATIENT
Start: 2024-05-31 | End: 2024-06-03 | Stop reason: HOSPADM

## 2024-05-30 RX ORDER — OXYCODONE AND ACETAMINOPHEN 7.5; 325 MG/1; MG/1
1 TABLET ORAL 4 TIMES DAILY
Status: DISCONTINUED | OUTPATIENT
Start: 2024-05-30 | End: 2024-05-30

## 2024-05-30 RX ORDER — CHOLESTYRAMINE LIGHT 4 G/5.7G
4 POWDER, FOR SUSPENSION ORAL DAILY
Status: DISCONTINUED | OUTPATIENT
Start: 2024-05-31 | End: 2024-06-03 | Stop reason: HOSPADM

## 2024-05-30 RX ORDER — OXYCODONE AND ACETAMINOPHEN 7.5; 325 MG/1; MG/1
1 TABLET ORAL EVERY 6 HOURS PRN
Status: DISCONTINUED | OUTPATIENT
Start: 2024-05-30 | End: 2024-06-03 | Stop reason: HOSPADM

## 2024-05-30 RX ORDER — ACETAMINOPHEN 650 MG/1
650 SUPPOSITORY RECTAL EVERY 6 HOURS PRN
Status: DISCONTINUED | OUTPATIENT
Start: 2024-05-30 | End: 2024-06-03 | Stop reason: HOSPADM

## 2024-05-30 RX ORDER — ONDANSETRON 4 MG/1
4 TABLET, ORALLY DISINTEGRATING ORAL EVERY 8 HOURS PRN
Status: DISCONTINUED | OUTPATIENT
Start: 2024-05-30 | End: 2024-06-03 | Stop reason: HOSPADM

## 2024-05-30 RX ORDER — TIZANIDINE 4 MG/1
2 TABLET ORAL 2 TIMES DAILY PRN
Status: DISCONTINUED | OUTPATIENT
Start: 2024-05-30 | End: 2024-06-03 | Stop reason: HOSPADM

## 2024-05-30 RX ADMIN — PANTOPRAZOLE SODIUM 80 MG: 40 INJECTION, POWDER, FOR SOLUTION INTRAVENOUS at 21:13

## 2024-05-30 RX ADMIN — SODIUM CHLORIDE 1000 ML: 9 INJECTION, SOLUTION INTRAVENOUS at 21:13

## 2024-05-30 ASSESSMENT — PAIN DESCRIPTION - LOCATION: LOCATION: BACK

## 2024-05-30 ASSESSMENT — PAIN - FUNCTIONAL ASSESSMENT: PAIN_FUNCTIONAL_ASSESSMENT: 0-10

## 2024-05-30 ASSESSMENT — PAIN SCALES - GENERAL
PAINLEVEL_OUTOF10: 8
PAINLEVEL_OUTOF10: 0

## 2024-05-30 ASSESSMENT — PAIN DESCRIPTION - ORIENTATION: ORIENTATION: LOWER

## 2024-05-30 NOTE — ED PROVIDER NOTES
Emergency Department Encounter        Pt Name: Abelardo Marquis  MRN: [id number]  Birthdate 1940  Date of evaluation: 5/30/2024  ED Physician: Martin Joya MD    CHIEF COMPLAINT     Triage Chief Complaint:   Dizziness (Sent by home health nurse due to low BP and feeling lightheaded all day. Pt has been having black stool for about 1.5 months. Is supposed to see GI for scope but hasn't been able to get in. Has had transfusions in past. )      HISTORY OF PRESENT ILLNESS & REVIEW OF SYSTEMS     History obtained from the patient and staff and family member at bedside.    Abelardo Marquis is a 84 y.o. male who presents to the emergency department for evaluation of weakness and fatigue.  Says that he was sent in by his home health nurse.  Says that he is feeling some generalized weakness and fatigue.  Says he is very tired.  Says that when he stands up he gets lightheaded.  Says he has not eaten a ton today.  Says that he used to be on Plavix but has not taken it today.  Denies any fevers chills.  Denies any cough chest pain shortness of breath.  Denies abdominal pain nausea vomiting diarrhea constipation.  Says that he has dark stools, says that he has had transfusions in the past because of it.  Says that he used to be black like coffee but now they actually have lightened up and they are more just darker.  Says he has required transfusions in the past and is currently on an iron supplement every other day.        Patient denies any new Headache, Fever, Chills, Cough, Chest pain, Shortness of breath, Abdominal pain, Nausea, Vomiting, Diarrhea, Constipation, and Leg swelling.    The patient has no other acute complaints at this time.  Review of systems as above.          PAST MED/SURG/SOCIAL/FAM HISTORY & ALLERGY & MEDICATIONS     Past Medical History:   Diagnosis Date    Arthritis     Diabetes mellitus (HCC)     GERD (gastroesophageal reflux disease)     Gout     left ankle and right shoulder    Hyperlipidemia

## 2024-05-31 ENCOUNTER — ANESTHESIA EVENT (OUTPATIENT)
Dept: ENDOSCOPY | Age: 84
End: 2024-05-31
Payer: MEDICARE

## 2024-05-31 LAB
ANION GAP SERPL CALCULATED.3IONS-SCNC: 10 MMOL/L (ref 7–16)
BUN SERPL-MCNC: 50 MG/DL (ref 6–23)
CALCIUM SERPL-MCNC: 7.8 MG/DL (ref 8.3–10.6)
CHLORIDE BLD-SCNC: 106 MMOL/L (ref 99–110)
CO2: 25 MMOL/L (ref 21–32)
CREAT SERPL-MCNC: 2.1 MG/DL (ref 0.9–1.3)
EKG ATRIAL RATE: 72 BPM
EKG DIAGNOSIS: NORMAL
EKG Q-T INTERVAL: 448 MS
EKG QRS DURATION: 106 MS
EKG QTC CALCULATION (BAZETT): 493 MS
EKG R AXIS: 4 DEGREES
EKG T AXIS: 44 DEGREES
EKG VENTRICULAR RATE: 73 BPM
GFR, ESTIMATED: 31 ML/MIN/1.73M2
GLUCOSE BLD-MCNC: 273 MG/DL (ref 70–99)
GLUCOSE BLD-MCNC: 282 MG/DL (ref 70–99)
GLUCOSE BLD-MCNC: 300 MG/DL (ref 70–99)
GLUCOSE BLD-MCNC: 359 MG/DL (ref 70–99)
GLUCOSE SERPL-MCNC: 260 MG/DL (ref 70–99)
HCT VFR BLD CALC: 23.3 % (ref 42–52)
HCT VFR BLD CALC: 27.7 % (ref 42–52)
HEMOGLOBIN: 6.8 GM/DL (ref 13.5–18)
HEMOGLOBIN: 8.1 GM/DL (ref 13.5–18)
POTASSIUM SERPL-SCNC: 4.3 MMOL/L (ref 3.5–5.1)
SODIUM BLD-SCNC: 141 MMOL/L (ref 135–145)

## 2024-05-31 PROCEDURE — 82962 GLUCOSE BLOOD TEST: CPT

## 2024-05-31 PROCEDURE — 2140000000 HC CCU INTERMEDIATE R&B

## 2024-05-31 PROCEDURE — 85018 HEMOGLOBIN: CPT

## 2024-05-31 PROCEDURE — P9016 RBC LEUKOCYTES REDUCED: HCPCS

## 2024-05-31 PROCEDURE — 6370000000 HC RX 637 (ALT 250 FOR IP): Performed by: STUDENT IN AN ORGANIZED HEALTH CARE EDUCATION/TRAINING PROGRAM

## 2024-05-31 PROCEDURE — 36415 COLL VENOUS BLD VENIPUNCTURE: CPT

## 2024-05-31 PROCEDURE — C9113 INJ PANTOPRAZOLE SODIUM, VIA: HCPCS | Performed by: STUDENT IN AN ORGANIZED HEALTH CARE EDUCATION/TRAINING PROGRAM

## 2024-05-31 PROCEDURE — 85014 HEMATOCRIT: CPT

## 2024-05-31 PROCEDURE — 97166 OT EVAL MOD COMPLEX 45 MIN: CPT

## 2024-05-31 PROCEDURE — 6360000002 HC RX W HCPCS: Performed by: STUDENT IN AN ORGANIZED HEALTH CARE EDUCATION/TRAINING PROGRAM

## 2024-05-31 PROCEDURE — 97162 PT EVAL MOD COMPLEX 30 MIN: CPT

## 2024-05-31 PROCEDURE — 2580000003 HC RX 258: Performed by: STUDENT IN AN ORGANIZED HEALTH CARE EDUCATION/TRAINING PROGRAM

## 2024-05-31 PROCEDURE — 94761 N-INVAS EAR/PLS OXIMETRY MLT: CPT

## 2024-05-31 PROCEDURE — 93010 ELECTROCARDIOGRAM REPORT: CPT | Performed by: INTERNAL MEDICINE

## 2024-05-31 PROCEDURE — 80048 BASIC METABOLIC PNL TOTAL CA: CPT

## 2024-05-31 PROCEDURE — A4216 STERILE WATER/SALINE, 10 ML: HCPCS | Performed by: STUDENT IN AN ORGANIZED HEALTH CARE EDUCATION/TRAINING PROGRAM

## 2024-05-31 PROCEDURE — 36430 TRANSFUSION BLD/BLD COMPNT: CPT

## 2024-05-31 RX ORDER — SODIUM CHLORIDE 9 MG/ML
INJECTION, SOLUTION INTRAVENOUS PRN
Status: DISCONTINUED | OUTPATIENT
Start: 2024-05-31 | End: 2024-05-31

## 2024-05-31 RX ORDER — SODIUM CHLORIDE 9 MG/ML
INJECTION, SOLUTION INTRAVENOUS PRN
Status: COMPLETED | OUTPATIENT
Start: 2024-05-31 | End: 2024-06-01

## 2024-05-31 RX ADMIN — RANOLAZINE 500 MG: 500 TABLET, EXTENDED RELEASE ORAL at 08:15

## 2024-05-31 RX ADMIN — INSULIN GLARGINE 10 UNITS: 100 INJECTION, SOLUTION SUBCUTANEOUS at 20:44

## 2024-05-31 RX ADMIN — INSULIN LISPRO 4 UNITS: 100 INJECTION, SOLUTION INTRAVENOUS; SUBCUTANEOUS at 12:27

## 2024-05-31 RX ADMIN — ATORVASTATIN CALCIUM 40 MG: 40 TABLET, FILM COATED ORAL at 20:44

## 2024-05-31 RX ADMIN — GABAPENTIN 100 MG: 100 CAPSULE ORAL at 00:01

## 2024-05-31 RX ADMIN — ATORVASTATIN CALCIUM 40 MG: 40 TABLET, FILM COATED ORAL at 00:01

## 2024-05-31 RX ADMIN — ISOSORBIDE MONONITRATE 15 MG: 30 TABLET, EXTENDED RELEASE ORAL at 08:14

## 2024-05-31 RX ADMIN — METOPROLOL TARTRATE 25 MG: 25 TABLET, FILM COATED ORAL at 00:01

## 2024-05-31 RX ADMIN — GABAPENTIN 100 MG: 100 CAPSULE ORAL at 08:14

## 2024-05-31 RX ADMIN — METOPROLOL TARTRATE 25 MG: 25 TABLET, FILM COATED ORAL at 20:44

## 2024-05-31 RX ADMIN — SODIUM CHLORIDE, PRESERVATIVE FREE 40 MG: 5 INJECTION INTRAVENOUS at 08:13

## 2024-05-31 RX ADMIN — SODIUM CHLORIDE, PRESERVATIVE FREE 40 MG: 5 INJECTION INTRAVENOUS at 20:44

## 2024-05-31 RX ADMIN — INSULIN LISPRO 2 UNITS: 100 INJECTION, SOLUTION INTRAVENOUS; SUBCUTANEOUS at 08:32

## 2024-05-31 RX ADMIN — FERROUS SULFATE TAB 325 MG (65 MG ELEMENTAL FE) 325 MG: 325 (65 FE) TAB at 08:15

## 2024-05-31 RX ADMIN — OXYCODONE AND ACETAMINOPHEN 1 TABLET: 7.5; 325 TABLET ORAL at 00:57

## 2024-05-31 RX ADMIN — METOPROLOL TARTRATE 25 MG: 25 TABLET, FILM COATED ORAL at 08:14

## 2024-05-31 RX ADMIN — CHOLESTYRAMINE 4 G: 4 POWDER, FOR SUSPENSION ORAL at 08:14

## 2024-05-31 RX ADMIN — ONDANSETRON 4 MG: 4 TABLET, ORALLY DISINTEGRATING ORAL at 18:40

## 2024-05-31 RX ADMIN — FLUOXETINE HYDROCHLORIDE 40 MG: 10 CAPSULE ORAL at 08:14

## 2024-05-31 RX ADMIN — OXYCODONE AND ACETAMINOPHEN 1 TABLET: 7.5; 325 TABLET ORAL at 17:33

## 2024-05-31 RX ADMIN — INSULIN LISPRO 4 UNITS: 100 INJECTION, SOLUTION INTRAVENOUS; SUBCUTANEOUS at 00:00

## 2024-05-31 RX ADMIN — OXYCODONE AND ACETAMINOPHEN 1 TABLET: 7.5; 325 TABLET ORAL at 08:15

## 2024-05-31 RX ADMIN — INSULIN LISPRO 4 UNITS: 100 INJECTION, SOLUTION INTRAVENOUS; SUBCUTANEOUS at 17:32

## 2024-05-31 RX ADMIN — GABAPENTIN 100 MG: 100 CAPSULE ORAL at 20:44

## 2024-05-31 ASSESSMENT — PAIN DESCRIPTION - ONSET
ONSET: ON-GOING

## 2024-05-31 ASSESSMENT — PAIN SCALES - GENERAL
PAINLEVEL_OUTOF10: 7
PAINLEVEL_OUTOF10: 7
PAINLEVEL_OUTOF10: 6
PAINLEVEL_OUTOF10: 7
PAINLEVEL_OUTOF10: 10

## 2024-05-31 ASSESSMENT — PAIN DESCRIPTION - ORIENTATION
ORIENTATION: LOWER

## 2024-05-31 ASSESSMENT — PAIN DESCRIPTION - LOCATION
LOCATION: BACK

## 2024-05-31 ASSESSMENT — PAIN DESCRIPTION - DESCRIPTORS
DESCRIPTORS: ACHING

## 2024-05-31 ASSESSMENT — PAIN DESCRIPTION - PAIN TYPE
TYPE: CHRONIC PAIN

## 2024-05-31 ASSESSMENT — PAIN DESCRIPTION - DIRECTION
RADIATING_TOWARDS: NON-RADIATING

## 2024-05-31 ASSESSMENT — PAIN DESCRIPTION - FREQUENCY
FREQUENCY: CONTINUOUS

## 2024-05-31 NOTE — ED NOTES
ED TO INPATIENT SBAR HANDOFF    Patient Name: Abelardo Marquis   :  1940  84 y.o.   Preferred Name  Abelardo  Family/Caregiver Present yes   Restraints no   C-SSRS: Risk of Suicide: No Risk  Sitter no   Sepsis Risk Score Sepsis Risk Score: 1.87      Situation  Chief Complaint   Patient presents with    Dizziness     Sent by home health nurse due to low BP and feeling lightheaded all day. Pt has been having black stool for about 1.5 months. Is supposed to see GI for scope but hasn't been able to get in. Has had transfusions in past.      Brief Description of Patient's Condition: Pt came in for dizziness and light headed. Pt sent in by home health nurse and reports having black stool between 1-2 months. Started blood in the ED.  Mental Status: oriented  Arrived from: home    Imaging:   XR CHEST PORTABLE   Final Result      No change. Enlarged cardiac silhouette.      Electronically signed by Everardo Bonner DO        Abnormal labs:   Abnormal Labs Reviewed   CBC WITH AUTO DIFFERENTIAL - Abnormal; Notable for the following components:       Result Value    RBC 2.31 (*)     Hemoglobin 6.9 (*)     Hematocrit 24.2 (*)     .8 (*)     MCHC 28.5 (*)     RDW 19.8 (*)     Monocytes % 8.9 (*)     Immature Neutrophil % 3.9 (*)     All other components within normal limits   BASIC METABOLIC PANEL - Abnormal; Notable for the following components:    Glucose 405 (*)     BUN 50 (*)     Creatinine 2.4 (*)     Est, Glom Filt Rate 26 (*)     Calcium 8.0 (*)     All other components within normal limits   TROPONIN - Abnormal; Notable for the following components:    Troponin, High Sensitivity 71 (*)     All other components within normal limits   URINALYSIS WITH REFLEX TO CULTURE - Abnormal; Notable for the following components:    Glucose, Ur >1000 (*)     All other components within normal limits   BRAIN NATRIURETIC PEPTIDE - Abnormal; Notable for the following components:    Pro-.6 (*)     All other components within

## 2024-05-31 NOTE — CONSENT
Informed Consent for Blood Component Transfusion Note    I have discussed with the patient the rationale for blood component transfusion; its benefits in treating or preventing fatigue, organ damage, or death; and its risk which includes mild transfusion reactions, rare risk of blood borne infection, or more serious but rare reactions. I have discussed the alternatives to transfusion, including the risk and consequences of not receiving transfusion. The patient had an opportunity to ask questions and had agreed to proceed with transfusion of blood components.    Electronically signed by Martin Joya MD on 5/30/24 at 8:44 PM EDT

## 2024-05-31 NOTE — H&P
History and Physical      Name:  Abelardo Marquis /Age/Sex: 1940  (84 y.o. male)   MRN & CSN:  [id number] & [id number] Encounter Date/Time: 2024 8:10 PM EDT   Location:  ED29/ED-29 PCP: Georgie Hirsch MD       Assessment and Plan:   Abelardo Marquis is a 84 y.o. 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     VICKY on CKD stage 3a- baseline Cr 1.4  FeNa 0.9% possibly related to low blood pressure trend, some prerenal component  UA negative for blood and protein  1L NS bolus and 1U PRBC in ER, hold off on further IVF infusion  Check bladder scan, monitor metabolic panel   Hold home Lasix and jardiance    Acute on chronic anemia   Unclear source of CLINTON, possibly small bowel AVM? Component of ACD   Hb 6.9 was 7.6 on - Recent EGD/colonoscopy reviewed  gastritis, duodenal diverticulum in 2nd portion, no active or old bleeding in the upper GI, 10 colonic polyps completely resected but only 4 retrieved , diffuse diverticulosis, mixed hemorrhoids and no active bleeding in the colon.  Recommend outpatient capsule endoscopy  Trend H/H and transfuse if Hb less than 7  IV Protonix 40 mg BID  Continue home Iron supplementation  In case of overt bleed can consult GI, keep on CLD for now    CAD w/ PCI and stent placement  and recently in   Chronically elevated Troponin, at baseline probably due to anemia and poor clearance  Hold home Aspirin and Plavix, resume based on Hb trend  Continue home Imdur, Lopressor and Ranexa    Hypertension, adjust antihypertensive regimen- continue home Lopressor, decrease dose of Imdur to 15 mg daily     IDDM-2 w/ hyperglycemia  Lantus and sliding scale insulin, hypoglycemia protocol     JEONG Cirrhosis, compensated     Chronic back pain, continue home Norco and Gabapentin PDMP reviewed    Depression, continue home

## 2024-05-31 NOTE — CARE COORDINATION
05/31/24 1415   Service Assessment   Patient Orientation Alert and Oriented   Cognition Alert   History Provided By Patient   Primary Caregiver Self   Support Systems Family Members   PCP Verified by CM Yes   Last Visit to PCP Within last 6 months   Prior Functional Level Assistance with the following:;Cooking;Housework;Shopping;Mobility   Current Functional Level Assistance with the following:;Cooking;Housework;Shopping;Mobility;Dressing;Toileting;Bathing   Can patient return to prior living arrangement Yes   Ability to make needs known: Good   Family able to assist with home care needs: Yes  (can provide 24/7 care)   Social/Functional History   Lives With Family   Type of Home House   Home Layout Two level   Bathroom Shower/Tub Walk-in shower   Bathroom Equipment Grab bars in shower   Home Equipment Walker - Standard   Receives Help From Family;Home health  (active with [facility])   Services At/After Discharge   Transition of Care Consult (CM Consult) Home Health   Condition of Participation: Discharge Planning   The Patient and/Or Patient Representative agree with the Discharge Plan? Yes     CM reviewed chart and saw pt at bedside.  Pt is pleasant, discussed PT/OT eval and recommendation.  Pt declines SNF and stated goal is home w/ daughter and son in law and [facility].  Pt gave permission to discuss safe d/c plan w/ his daughter Katie.  CM reviewed PT/PT recommendation.  Katie states he does well at home, her spouse is retired and provides 24/7 care.  They would like to continue w/ [facility] and decline SNF.

## 2024-05-31 NOTE — PLAN OF CARE
Problem: Discharge Planning  Goal: Discharge to home or other facility with appropriate resources  Outcome: Progressing  Flowsheets (Taken 5/30/2024 2343 by Marsha Galarza RN)  Discharge to home or other facility with appropriate resources: Identify barriers to discharge with patient and caregiver     Problem: Pain  Goal: Verbalizes/displays adequate comfort level or baseline comfort level  Outcome: Progressing     Problem: Safety - Adult  Goal: Free from fall injury  Outcome: Progressing

## 2024-05-31 NOTE — CONSULTS
[place] [address] [place] [id number]                              CONSULTATION      PATIENT NAME: BRYNN NATHAN                 : 1940  MED REC NO: [id number]                      ROOM: 3121  ACCOUNT NO: [id number]                       ADMIT DATE: 2024  PROVIDER: Navi Castillo MD      CHIEF COMPLAINTS:    1. History of severe anemia with blackish stools; rule out upper GI bleeding.  2. History of chronic liver disease secondary to JEONG.    HISTORY OF PRESENT ILLNESS:  The patient is an 84-year-old white male patient known to me from his multiple previous hospitalizations, last seen in consult on May 17, 2024, with past medical history significant for hypertension; diabetes mellitus; coronary artery disease, status post PTCA with coronary stents placed in 2023; history of chronic kidney disease; hyperlipidemia; obesity; history of chronic liver disease with portal hypertension secondary to JEONG; tobacco abuse; history of chronic back pain; gout, who presented to the emergency room on May 30, 2024, with generalized weakness, \"low blood pressure\" and also blackish stools.  There is no history of abdominal pain, nausea, vomiting, hematemesis, anorexia, or weight loss.  In the emergency room, the patient was evaluated and had a Chem profile drawn, which showed a BUN of 50, creatinine was 2.4, and the patient's LFTs checked on May 17, 2024, were within normal limits and the CBC showed WBC count of 4.4, hemoglobin 6.9, and platelet count of 208,000.  The patient was transfused with 2 units of packed RBCs.  Repeat hemoglobin is 8.1 g%.  The patient is hemodynamically stable and is on IV Protonix.  The patient was also scheduled as an outpatient for transcutaneous spinal cord stimulator to be placed on Trish 3, 2024.    The patient has had an extensive GI workup done in the past comprising of at least 4 colonoscopies by Dr. Álvarez in 
Consult dictated #[id number]  
CGA       Treatment plan:  Bed mobility, transfers, balance, gait, TA, TX    Recommendations for NURSING mobility: bed mobility min A    Time:   Time in: 1010  Time out: 1021  Timed treatment minutes: 0  Total time: 11    Electronically signed by:    Smitha Wong, PT  5/31/2024, 12:41 PM

## 2024-06-01 ENCOUNTER — ANESTHESIA (OUTPATIENT)
Dept: ENDOSCOPY | Age: 84
End: 2024-06-01
Payer: MEDICARE

## 2024-06-01 LAB
ALBUMIN SERPL-MCNC: 3.3 GM/DL (ref 3.4–5)
ALP BLD-CCNC: 60 IU/L (ref 40–128)
ALT SERPL-CCNC: 10 U/L (ref 10–40)
AMMONIA: 23 UMOL/L (ref 16–60)
ANION GAP SERPL CALCULATED.3IONS-SCNC: 9 MMOL/L (ref 7–16)
ANION GAP SERPL CALCULATED.3IONS-SCNC: 9 MMOL/L (ref 7–16)
APTT: 33.5 SECONDS (ref 25.1–37.1)
AST SERPL-CCNC: 15 IU/L (ref 15–37)
BASE EXCESS MIXED: 1.1 (ref 0–3)
BASOPHILS ABSOLUTE: 0 K/CU MM
BASOPHILS ABSOLUTE: 0 K/CU MM
BASOPHILS RELATIVE PERCENT: 0.3 % (ref 0–1)
BASOPHILS RELATIVE PERCENT: 0.4 % (ref 0–1)
BILIRUB SERPL-MCNC: 0.5 MG/DL (ref 0–1)
BUN SERPL-MCNC: 35 MG/DL (ref 6–23)
BUN SERPL-MCNC: 35 MG/DL (ref 6–23)
CALCIUM SERPL-MCNC: 7.4 MG/DL (ref 8.3–10.6)
CALCIUM SERPL-MCNC: 7.7 MG/DL (ref 8.3–10.6)
CHLORIDE BLD-SCNC: 106 MMOL/L (ref 99–110)
CHLORIDE BLD-SCNC: 106 MMOL/L (ref 99–110)
CO2: 24 MMOL/L (ref 21–32)
CO2: 24 MMOL/L (ref 21–32)
COMMENT: ABNORMAL
CREAT SERPL-MCNC: 1.7 MG/DL (ref 0.9–1.3)
CREAT SERPL-MCNC: 2 MG/DL (ref 0.9–1.3)
DIFFERENTIAL TYPE: ABNORMAL
DIFFERENTIAL TYPE: ABNORMAL
EOSINOPHILS ABSOLUTE: 0.1 K/CU MM
EOSINOPHILS ABSOLUTE: 0.1 K/CU MM
EOSINOPHILS RELATIVE PERCENT: 3.5 % (ref 0–3)
EOSINOPHILS RELATIVE PERCENT: 4 % (ref 0–3)
GFR, ESTIMATED: 33 ML/MIN/1.73M2
GFR, ESTIMATED: 40 ML/MIN/1.73M2
GLUCOSE BLD-MCNC: 271 MG/DL (ref 70–99)
GLUCOSE BLD-MCNC: 303 MG/DL (ref 70–99)
GLUCOSE BLD-MCNC: 354 MG/DL (ref 70–99)
GLUCOSE SERPL-MCNC: 229 MG/DL (ref 70–99)
GLUCOSE SERPL-MCNC: 285 MG/DL (ref 70–99)
HCO3 VENOUS: 28.3 MMOL/L (ref 22–29)
HCT VFR BLD CALC: 27.3 % (ref 42–52)
HCT VFR BLD CALC: 27.6 % (ref 42–52)
HEMOGLOBIN: 7.8 GM/DL (ref 13.5–18)
HEMOGLOBIN: 8 GM/DL (ref 13.5–18)
IMMATURE NEUTROPHIL %: 1.7 % (ref 0–0.43)
IMMATURE NEUTROPHIL %: 2.2 % (ref 0–0.43)
INR BLD: 1.3 INDEX
LIPASE: 18 IU/L (ref 13–60)
LYMPHOCYTES ABSOLUTE: 0.8 K/CU MM
LYMPHOCYTES ABSOLUTE: 0.8 K/CU MM
LYMPHOCYTES RELATIVE PERCENT: 28.7 % (ref 24–44)
LYMPHOCYTES RELATIVE PERCENT: 29 % (ref 24–44)
MCH RBC QN AUTO: 28.8 PG (ref 27–31)
MCH RBC QN AUTO: 29.3 PG (ref 27–31)
MCHC RBC AUTO-ENTMCNC: 28.6 % (ref 32–36)
MCHC RBC AUTO-ENTMCNC: 29 % (ref 32–36)
MCV RBC AUTO: 100.7 FL (ref 78–100)
MCV RBC AUTO: 101.1 FL (ref 78–100)
MONOCYTES ABSOLUTE: 0.2 K/CU MM
MONOCYTES ABSOLUTE: 0.2 K/CU MM
MONOCYTES RELATIVE PERCENT: 7.6 % (ref 0–4)
MONOCYTES RELATIVE PERCENT: 8.7 % (ref 0–4)
NEUTROPHILS ABSOLUTE: 1.5 K/CU MM
NEUTROPHILS ABSOLUTE: 1.7 K/CU MM
NEUTROPHILS RELATIVE PERCENT: 55.7 % (ref 36–66)
NEUTROPHILS RELATIVE PERCENT: 58.2 % (ref 36–66)
NUCLEATED RBC %: 0 %
NUCLEATED RBC %: 0 %
O2 SAT, VEN: 39.2 % (ref 50–70)
PCO2, VEN: 55 MMHG (ref 41–51)
PDW BLD-RTO: 18.3 % (ref 11.7–14.9)
PDW BLD-RTO: 18.6 % (ref 11.7–14.9)
PH VENOUS: 7.32 (ref 7.32–7.43)
PLATELET # BLD: 141 K/CU MM (ref 140–440)
PLATELET # BLD: 148 K/CU MM (ref 140–440)
PMV BLD AUTO: 10.3 FL (ref 7.5–11.1)
PMV BLD AUTO: 10.4 FL (ref 7.5–11.1)
PO2, VEN: 28 MMHG (ref 28–48)
POTASSIUM SERPL-SCNC: 4.4 MMOL/L (ref 3.5–5.1)
POTASSIUM SERPL-SCNC: 4.9 MMOL/L (ref 3.5–5.1)
PROTHROMBIN TIME: 16.1 SECONDS (ref 11.7–14.5)
RBC # BLD: 2.71 M/CU MM (ref 4.6–6.2)
RBC # BLD: 2.73 M/CU MM (ref 4.6–6.2)
SODIUM BLD-SCNC: 139 MMOL/L (ref 135–145)
SODIUM BLD-SCNC: 139 MMOL/L (ref 135–145)
TOTAL IMMATURE NEUTOROPHIL: 0.05 K/CU MM
TOTAL IMMATURE NEUTOROPHIL: 0.06 K/CU MM
TOTAL NUCLEATED RBC: 0 K/CU MM
TOTAL NUCLEATED RBC: 0 K/CU MM
TOTAL PROTEIN: 5.1 GM/DL (ref 6.4–8.2)
WBC # BLD: 2.8 K/CU MM (ref 4–10.5)
WBC # BLD: 2.9 K/CU MM (ref 4–10.5)

## 2024-06-01 PROCEDURE — A4216 STERILE WATER/SALINE, 10 ML: HCPCS | Performed by: STUDENT IN AN ORGANIZED HEALTH CARE EDUCATION/TRAINING PROGRAM

## 2024-06-01 PROCEDURE — 3609017100 HC EGD: Performed by: SPECIALIST

## 2024-06-01 PROCEDURE — 3700000000 HC ANESTHESIA ATTENDED CARE: Performed by: SPECIALIST

## 2024-06-01 PROCEDURE — 0DJ08ZZ INSPECTION OF UPPER INTESTINAL TRACT, VIA NATURAL OR ARTIFICIAL OPENING ENDOSCOPIC: ICD-10-PCS | Performed by: STUDENT IN AN ORGANIZED HEALTH CARE EDUCATION/TRAINING PROGRAM

## 2024-06-01 PROCEDURE — 80053 COMPREHEN METABOLIC PANEL: CPT

## 2024-06-01 PROCEDURE — 85025 COMPLETE CBC W/AUTO DIFF WBC: CPT

## 2024-06-01 PROCEDURE — 36415 COLL VENOUS BLD VENIPUNCTURE: CPT

## 2024-06-01 PROCEDURE — 2140000000 HC CCU INTERMEDIATE R&B

## 2024-06-01 PROCEDURE — 2500000003 HC RX 250 WO HCPCS

## 2024-06-01 PROCEDURE — 51798 US URINE CAPACITY MEASURE: CPT

## 2024-06-01 PROCEDURE — 85610 PROTHROMBIN TIME: CPT

## 2024-06-01 PROCEDURE — C9113 INJ PANTOPRAZOLE SODIUM, VIA: HCPCS | Performed by: STUDENT IN AN ORGANIZED HEALTH CARE EDUCATION/TRAINING PROGRAM

## 2024-06-01 PROCEDURE — 2580000003 HC RX 258: Performed by: STUDENT IN AN ORGANIZED HEALTH CARE EDUCATION/TRAINING PROGRAM

## 2024-06-01 PROCEDURE — 3700000001 HC ADD 15 MINUTES (ANESTHESIA): Performed by: SPECIALIST

## 2024-06-01 PROCEDURE — 6360000002 HC RX W HCPCS

## 2024-06-01 PROCEDURE — 82140 ASSAY OF AMMONIA: CPT

## 2024-06-01 PROCEDURE — 83690 ASSAY OF LIPASE: CPT

## 2024-06-01 PROCEDURE — 85730 THROMBOPLASTIN TIME PARTIAL: CPT

## 2024-06-01 PROCEDURE — 6360000002 HC RX W HCPCS: Performed by: STUDENT IN AN ORGANIZED HEALTH CARE EDUCATION/TRAINING PROGRAM

## 2024-06-01 PROCEDURE — 82962 GLUCOSE BLOOD TEST: CPT

## 2024-06-01 PROCEDURE — 2580000003 HC RX 258: Performed by: FAMILY MEDICINE

## 2024-06-01 PROCEDURE — 82805 BLOOD GASES W/O2 SATURATION: CPT

## 2024-06-01 PROCEDURE — 80048 BASIC METABOLIC PNL TOTAL CA: CPT

## 2024-06-01 PROCEDURE — 6370000000 HC RX 637 (ALT 250 FOR IP): Performed by: STUDENT IN AN ORGANIZED HEALTH CARE EDUCATION/TRAINING PROGRAM

## 2024-06-01 RX ORDER — PROPOFOL 10 MG/ML
INJECTION, EMULSION INTRAVENOUS PRN
Status: DISCONTINUED | OUTPATIENT
Start: 2024-06-01 | End: 2024-06-01 | Stop reason: SDUPTHER

## 2024-06-01 RX ORDER — LIDOCAINE HYDROCHLORIDE 20 MG/ML
INJECTION, SOLUTION EPIDURAL; INFILTRATION; INTRACAUDAL; PERINEURAL PRN
Status: DISCONTINUED | OUTPATIENT
Start: 2024-06-01 | End: 2024-06-01 | Stop reason: SDUPTHER

## 2024-06-01 RX ADMIN — SODIUM CHLORIDE: 9 INJECTION, SOLUTION INTRAVENOUS at 09:38

## 2024-06-01 RX ADMIN — OXYCODONE AND ACETAMINOPHEN 1 TABLET: 7.5; 325 TABLET ORAL at 00:10

## 2024-06-01 RX ADMIN — INSULIN LISPRO 3 UNITS: 100 INJECTION, SOLUTION INTRAVENOUS; SUBCUTANEOUS at 16:35

## 2024-06-01 RX ADMIN — ISOSORBIDE MONONITRATE 15 MG: 30 TABLET, EXTENDED RELEASE ORAL at 10:46

## 2024-06-01 RX ADMIN — OXYCODONE AND ACETAMINOPHEN 1 TABLET: 7.5; 325 TABLET ORAL at 10:46

## 2024-06-01 RX ADMIN — SODIUM CHLORIDE, PRESERVATIVE FREE 40 MG: 5 INJECTION INTRAVENOUS at 21:15

## 2024-06-01 RX ADMIN — METOPROLOL TARTRATE 25 MG: 25 TABLET, FILM COATED ORAL at 21:15

## 2024-06-01 RX ADMIN — GABAPENTIN 100 MG: 100 CAPSULE ORAL at 10:47

## 2024-06-01 RX ADMIN — INSULIN LISPRO 4 UNITS: 100 INJECTION, SOLUTION INTRAVENOUS; SUBCUTANEOUS at 13:04

## 2024-06-01 RX ADMIN — PROPOFOL 50 MG: 10 INJECTION, EMULSION INTRAVENOUS at 09:42

## 2024-06-01 RX ADMIN — ONDANSETRON 4 MG: 2 INJECTION INTRAMUSCULAR; INTRAVENOUS at 05:55

## 2024-06-01 RX ADMIN — FLUOXETINE HYDROCHLORIDE 40 MG: 10 CAPSULE ORAL at 10:46

## 2024-06-01 RX ADMIN — INSULIN GLARGINE 10 UNITS: 100 INJECTION, SOLUTION SUBCUTANEOUS at 21:16

## 2024-06-01 RX ADMIN — RANOLAZINE 500 MG: 500 TABLET, EXTENDED RELEASE ORAL at 10:47

## 2024-06-01 RX ADMIN — LIDOCAINE HYDROCHLORIDE 100 MG: 20 INJECTION, SOLUTION EPIDURAL; INFILTRATION; INTRACAUDAL; PERINEURAL at 09:42

## 2024-06-01 RX ADMIN — ATORVASTATIN CALCIUM 40 MG: 40 TABLET, FILM COATED ORAL at 21:15

## 2024-06-01 RX ADMIN — OXYCODONE AND ACETAMINOPHEN 1 TABLET: 7.5; 325 TABLET ORAL at 16:35

## 2024-06-01 RX ADMIN — OXYCODONE AND ACETAMINOPHEN 1 TABLET: 7.5; 325 TABLET ORAL at 23:50

## 2024-06-01 RX ADMIN — GABAPENTIN 100 MG: 100 CAPSULE ORAL at 21:14

## 2024-06-01 RX ADMIN — SODIUM CHLORIDE, PRESERVATIVE FREE 40 MG: 5 INJECTION INTRAVENOUS at 10:47

## 2024-06-01 RX ADMIN — METOPROLOL TARTRATE 25 MG: 25 TABLET, FILM COATED ORAL at 10:46

## 2024-06-01 ASSESSMENT — PAIN DESCRIPTION - ORIENTATION
ORIENTATION: LOWER
ORIENTATION: LOWER
ORIENTATION: MID

## 2024-06-01 ASSESSMENT — PAIN DESCRIPTION - PAIN TYPE: TYPE: CHRONIC PAIN

## 2024-06-01 ASSESSMENT — PAIN SCALES - GENERAL
PAINLEVEL_OUTOF10: 4
PAINLEVEL_OUTOF10: 7
PAINLEVEL_OUTOF10: 7
PAINLEVEL_OUTOF10: 8
PAINLEVEL_OUTOF10: 4

## 2024-06-01 ASSESSMENT — PAIN DESCRIPTION - DESCRIPTORS
DESCRIPTORS: ACHING;DISCOMFORT
DESCRIPTORS: ACHING
DESCRIPTORS: ACHING

## 2024-06-01 ASSESSMENT — PAIN DESCRIPTION - LOCATION
LOCATION: BACK

## 2024-06-01 ASSESSMENT — PAIN DESCRIPTION - ONSET: ONSET: ON-GOING

## 2024-06-01 ASSESSMENT — PAIN DESCRIPTION - FREQUENCY: FREQUENCY: INTERMITTENT

## 2024-06-01 ASSESSMENT — PAIN - FUNCTIONAL ASSESSMENT: PAIN_FUNCTIONAL_ASSESSMENT: PREVENTS OR INTERFERES SOME ACTIVE ACTIVITIES AND ADLS

## 2024-06-01 NOTE — ANESTHESIA PRE PROCEDURE
Department of Anesthesiology  Preprocedure Note       Name:  Abelardo Marquis   Age:  84 y.o.  :  1940                                          MRN:  [id number]         Date:  2024      Surgeon: Surgeon(s):  Navi Castillo MD    Procedure: Procedure(s):  ESOPHAGOGASTRODUODENOSCOPY    Medications prior to admission:   Prior to Admission medications    Medication Sig Start Date End Date Taking? Authorizing Provider   ranolazine (RANEXA) 500 MG extended release tablet Take 1 tablet by mouth daily 24  Nirav Keen MD   aspirin 81 MG chewable tablet Take 1 tablet by mouth daily 24   Nriav Keen MD   ferrous sulfate (IRON 325) 325 (65 Fe) MG tablet Take 1 tablet by mouth every other day 24  Nirav Keen MD   pantoprazole (PROTONIX) 40 MG tablet Take 1 tablet by mouth 2 times daily (before meals) 24   Marcia Mathews MD   metoprolol tartrate (LOPRESSOR) 25 MG tablet Take 1 tablet by mouth 2 times daily 24   Juan Luis Hamm MD   isosorbide mononitrate (IMDUR) 30 MG extended release tablet Take 1 tablet by mouth daily 24   Juan Luis Hamm MD   tiZANidine (ZANAFLEX) 2 MG tablet Take 1 tablet by mouth 2 times daily as needed 24   Tj Morris MD   VICTOZA 18 MG/3ML SOPN SC injection Inject 1.2 mg into the skin Daily with lunch 24   Tj Morris MD   gabapentin (NEURONTIN) 800 MG tablet Take 1 tablet by mouth 3 times daily. 24   Tj Morris MD   insulin glargine (BASAGLAR KWIKPEN) 100 UNIT/ML injection pen Inject 40-50 Units into the skin 2 times daily Patient reports he does sliding scale    Tj Morris MD   FLUoxetine (PROZAC) 40 MG capsule Take 1 capsule by mouth daily    Tj Morris MD   empagliflozin (JARDIANCE) 25 MG tablet Take 1 tablet by mouth daily    Tj Morris MD   metFORMIN (GLUCOPHAGE) 500 MG tablet Take 1 tablet by mouth 2 times daily 
ENDOSCOPY       Social History:    Social History     Tobacco Use    Smoking status: Never    Smokeless tobacco: Never   Substance Use Topics    Alcohol use: No                                Counseling given: Not Answered      Vital Signs (Current):   Vitals:    05/31/24 1515 05/31/24 1733 05/31/24 1803 05/31/24 2034   BP:    (!) 133/58   Pulse: 60   69   Resp: 18 11 18 19   Temp: 36.4 °C (97.6 °F)   36.7 °C (98.1 °F)   TempSrc: Oral   Oral   SpO2: 98%   95%   Weight:       Height:                                                  BP Readings from Last 3 Encounters:   05/31/24 (!) 133/58   05/18/24 (!) 138/53   05/06/24 130/60       NPO Status:                                                                                 BMI:   Wt Readings from Last 3 Encounters:   05/30/24 109.8 kg (242 lb 1 oz)   05/17/24 104.1 kg (229 lb 8 oz)   05/02/24 101.6 kg (224 lb)     Body mass index is 34.73 kg/m².    CBC:   Lab Results   Component Value Date/Time    WBC 4.4 05/30/2024 06:43 PM    RBC 2.31 05/30/2024 06:43 PM    HGB 8.1 05/31/2024 09:25 AM    HCT 27.7 05/31/2024 09:25 AM    .8 05/30/2024 06:43 PM    RDW 19.8 05/30/2024 06:43 PM     05/30/2024 06:43 PM       CMP:   Lab Results   Component Value Date/Time     05/31/2024 03:29 AM    K 4.3 05/31/2024 03:29 AM     05/31/2024 03:29 AM    CO2 25 05/31/2024 03:29 AM    BUN 50 05/31/2024 03:29 AM    CREATININE 2.1 05/31/2024 03:29 AM    GFRAA 55 01/14/2021 12:00 PM    AGRATIO 1.8 04/01/2024 01:06 PM    LABGLOM 31 05/31/2024 03:29 AM    LABGLOM 40 04/19/2024 03:52 AM    LABGLOM 35 11/02/2023 09:51 AM    GLUCOSE 260 05/31/2024 03:29 AM    PROT 6.7 07/03/2012 07:23 AM    CALCIUM 7.8 05/31/2024 03:29 AM    BILITOT 0.4 05/17/2024 01:25 AM    ALKPHOS 68 05/17/2024 01:25 AM    AST 16 05/17/2024 01:25 AM    ALT 13 05/17/2024 01:25 AM       POC Tests:   Recent Labs     05/31/24 2036   POCGLU 273*       Coags:   Lab Results   Component Value Date/Time

## 2024-06-01 NOTE — BRIEF OP NOTE
Brief Postoperative Note      Abelardo Marquis is a 84 y.o. male     Pre-operative Diagnosis: ANEMIA / GIT BLEEDING    Post-operative Diagnosis:  PHG / DUODENAL DIVERTICULUM / NO EV OR GV / NO BLOOD NOTED IN THE U/ GIT     Procedure: EGD    Anesthesia: MAC    Surgeons/Assistants:Navi Castillo MD     Estimated Blood Loss: NIL    Complications: None    Specimens: were not obtained    REC--   CPM / CE  AS OUTPT F/U H/H  Navi Castillo MD   6/1/2024   9:50 AM

## 2024-06-01 NOTE — OP NOTE
[facility] [address] [place] [address]                            OPERATIVE REPORT      PATIENT NAME: BRYNN NATHAN                 : 1940  MED REC NO: [id number]                      ROOM: [facility] NO: [id number]                       ADMIT DATE: 2024  PROVIDER: Navi Castillo MD      DATE OF PROCEDURE:      SURGEON:  Navi Castillo MD    PROCEDURE:  Esophagogastroduodenoscopy.    CHIEF COMPLAINT:  History of chronic liver disease with anemia and GI bleeding; rule out upper GI bleeding lesion.    PREMEDICATION:  Please refer to the anesthesiologist's notes.    DESCRIPTION OF PROCEDURE:  The patient was placed in the left lateral decubitus position and the video Olympus gastroscope was introduced in the back of the throat and was advanced into the esophagus.    Esophagus:  The mucosa of the esophagus was unremarkable.  There was no evidence of hyperemia, erosion, ulcer, stricture, Tyson's esophagus, mass lesion or esophageal varices.    Stomach:  The fundus, cardia, body, antrum, lesser curvature, greater curvature, and the pyloric regions of the stomach were examined.  The mucosa of the stomach was diffusely hyperemic, but no erosion or ulcers were seen.  The gastroscope was retroflexed and the fundus and cardia were carefully examined, and no mass lesions or gastric varices were noted.    Duodenum:  The 1st and 2nd portions of the duodenum were examined.  Incidentally, a wide-mouth duodenal diverticulum was noted in the second portion.    No blood recent or old was noted in the upper GI tract and no biopsies were obtained.    POSTOPERATIVE DIAGNOSES:    1. Mild diffuse gastritis, most likely secondary to underlying portal hypertensive gastropathy.   2. Single wide-mouth duodenal diverticulum noted in the second portion of the duodenum.    RECOMMENDATIONS:    1. We will continue present management with Protonix.  2. Monitor the

## 2024-06-01 NOTE — PROCEDURES
PROCEDURE NOTE  Date: 6/1/2024   Name: Abelardo Marquis  YOB: 1940    Procedures  EGD REPORT DICTATED #[id number]

## 2024-06-01 NOTE — ANESTHESIA POSTPROCEDURE EVALUATION
Department of Anesthesiology  Postprocedure Note    Patient: Abelardo Marquis  MRN: [id number]  YOB: 1940  Date of evaluation: 6/1/2024    Procedure Summary       Date: 06/01/24 Room / Location: [place] [address] / [facility]    Anesthesia Start: 0938 Anesthesia Stop: 0954    Procedure: ESOPHAGOGASTRODUODENOSCOPY DIAGNOSTIC ONLY Diagnosis:       Gastrointestinal hemorrhage, unspecified gastrointestinal hemorrhage type      (Gastrointestinal hemorrhage, unspecified gastrointestinal hemorrhage type [K92.2])    Surgeons: Navi Castillo MD Responsible Provider: Tatyana Cunningham MD    Anesthesia Type: MAC ASA Status: 3            Anesthesia Type: No value filed.    Taylor Phase I:      Taylor Phase II:      Anesthesia Post Evaluation    Patient location during evaluation: bedside  Patient participation: complete - patient participated  Level of consciousness: awake  Pain score: 0  Airway patency: patent  Nausea & Vomiting: no nausea and no vomiting  Cardiovascular status: blood pressure returned to baseline and hemodynamically stable  Respiratory status: acceptable and room air  Hydration status: euvolemic  Pain management: adequate    No notable events documented.

## 2024-06-02 LAB
ALBUMIN SERPL-MCNC: 3.1 GM/DL (ref 3.4–5)
ALP BLD-CCNC: 64 IU/L (ref 40–129)
ALT SERPL-CCNC: 11 U/L (ref 10–40)
ANION GAP SERPL CALCULATED.3IONS-SCNC: 10 MMOL/L (ref 7–16)
AST SERPL-CCNC: 14 IU/L (ref 15–37)
BASOPHILS ABSOLUTE: 0 K/CU MM
BASOPHILS RELATIVE PERCENT: 0.7 % (ref 0–1)
BILIRUB SERPL-MCNC: 0.5 MG/DL (ref 0–1)
BILIRUBIN, URINE: NEGATIVE MG/DL
BLOOD, URINE: NEGATIVE
BUN SERPL-MCNC: 35 MG/DL (ref 6–23)
CALCIUM SERPL-MCNC: 7.7 MG/DL (ref 8.3–10.6)
CHLORIDE BLD-SCNC: 105 MMOL/L (ref 99–110)
CLARITY: CLEAR
CO2: 24 MMOL/L (ref 21–32)
COLOR: YELLOW
COMMENT UA: ABNORMAL
CREAT SERPL-MCNC: 2 MG/DL (ref 0.9–1.3)
DIFFERENTIAL TYPE: ABNORMAL
EOSINOPHILS ABSOLUTE: 0.1 K/CU MM
EOSINOPHILS RELATIVE PERCENT: 3.6 % (ref 0–3)
GFR, ESTIMATED: 33 ML/MIN/1.73M2
GLUCOSE BLD-MCNC: 335 MG/DL (ref 70–99)
GLUCOSE BLD-MCNC: 376 MG/DL (ref 70–99)
GLUCOSE BLD-MCNC: 398 MG/DL (ref 70–99)
GLUCOSE BLD-MCNC: 464 MG/DL (ref 70–99)
GLUCOSE SERPL-MCNC: 276 MG/DL (ref 70–99)
GLUCOSE URINE: >1000 MG/DL
HCT VFR BLD CALC: 28.3 % (ref 42–52)
HEMOGLOBIN: 8.1 GM/DL (ref 13.5–18)
IMMATURE NEUTROPHIL %: 1.4 % (ref 0–0.43)
KETONES, URINE: NEGATIVE MG/DL
LEUKOCYTE ESTERASE, URINE: NEGATIVE
LYMPHOCYTES ABSOLUTE: 0.8 K/CU MM
LYMPHOCYTES RELATIVE PERCENT: 29.1 % (ref 24–44)
MCH RBC QN AUTO: 28.6 PG (ref 27–31)
MCHC RBC AUTO-ENTMCNC: 28.6 % (ref 32–36)
MCV RBC AUTO: 100 FL (ref 78–100)
MONOCYTES ABSOLUTE: 0.3 K/CU MM
MONOCYTES RELATIVE PERCENT: 9.4 % (ref 0–4)
NEUTROPHILS ABSOLUTE: 1.6 K/CU MM
NEUTROPHILS RELATIVE PERCENT: 55.8 % (ref 36–66)
NITRITE URINE, QUANTITATIVE: NEGATIVE
NUCLEATED RBC %: 0 %
PDW BLD-RTO: 17.8 % (ref 11.7–14.9)
PH, URINE: 5.5 (ref 5–8)
PLATELET # BLD: 144 K/CU MM (ref 140–440)
PMV BLD AUTO: 10.3 FL (ref 7.5–11.1)
POTASSIUM SERPL-SCNC: 4.6 MMOL/L (ref 3.5–5.1)
PROTEIN UA: NEGATIVE MG/DL
RBC # BLD: 2.83 M/CU MM (ref 4.6–6.2)
SODIUM BLD-SCNC: 139 MMOL/L (ref 135–145)
SPECIFIC GRAVITY UA: 1.02 (ref 1–1.03)
TOTAL IMMATURE NEUTOROPHIL: 0.04 K/CU MM
TOTAL NUCLEATED RBC: 0 K/CU MM
TOTAL PROTEIN: 5.1 GM/DL (ref 6.4–8.2)
UROBILINOGEN, URINE: 0.2 MG/DL (ref 0.2–1)
WBC # BLD: 2.8 K/CU MM (ref 4–10.5)

## 2024-06-02 PROCEDURE — 36415 COLL VENOUS BLD VENIPUNCTURE: CPT

## 2024-06-02 PROCEDURE — 94761 N-INVAS EAR/PLS OXIMETRY MLT: CPT

## 2024-06-02 PROCEDURE — 85025 COMPLETE CBC W/AUTO DIFF WBC: CPT

## 2024-06-02 PROCEDURE — A4216 STERILE WATER/SALINE, 10 ML: HCPCS | Performed by: STUDENT IN AN ORGANIZED HEALTH CARE EDUCATION/TRAINING PROGRAM

## 2024-06-02 PROCEDURE — 82962 GLUCOSE BLOOD TEST: CPT

## 2024-06-02 PROCEDURE — C9113 INJ PANTOPRAZOLE SODIUM, VIA: HCPCS | Performed by: STUDENT IN AN ORGANIZED HEALTH CARE EDUCATION/TRAINING PROGRAM

## 2024-06-02 PROCEDURE — 6370000000 HC RX 637 (ALT 250 FOR IP): Performed by: STUDENT IN AN ORGANIZED HEALTH CARE EDUCATION/TRAINING PROGRAM

## 2024-06-02 PROCEDURE — 2580000003 HC RX 258: Performed by: STUDENT IN AN ORGANIZED HEALTH CARE EDUCATION/TRAINING PROGRAM

## 2024-06-02 PROCEDURE — 97116 GAIT TRAINING THERAPY: CPT

## 2024-06-02 PROCEDURE — 81003 URINALYSIS AUTO W/O SCOPE: CPT

## 2024-06-02 PROCEDURE — 80053 COMPREHEN METABOLIC PANEL: CPT

## 2024-06-02 PROCEDURE — 2140000000 HC CCU INTERMEDIATE R&B

## 2024-06-02 PROCEDURE — 6360000002 HC RX W HCPCS: Performed by: STUDENT IN AN ORGANIZED HEALTH CARE EDUCATION/TRAINING PROGRAM

## 2024-06-02 RX ADMIN — INSULIN GLARGINE 10 UNITS: 100 INJECTION, SOLUTION SUBCUTANEOUS at 22:30

## 2024-06-02 RX ADMIN — GABAPENTIN 100 MG: 100 CAPSULE ORAL at 08:20

## 2024-06-02 RX ADMIN — FLUOXETINE HYDROCHLORIDE 40 MG: 10 CAPSULE ORAL at 08:20

## 2024-06-02 RX ADMIN — FUROSEMIDE 40 MG: 40 TABLET ORAL at 08:20

## 2024-06-02 RX ADMIN — METOPROLOL TARTRATE 25 MG: 25 TABLET, FILM COATED ORAL at 08:21

## 2024-06-02 RX ADMIN — OXYCODONE AND ACETAMINOPHEN 1 TABLET: 7.5; 325 TABLET ORAL at 15:36

## 2024-06-02 RX ADMIN — SODIUM CHLORIDE, PRESERVATIVE FREE 40 MG: 5 INJECTION INTRAVENOUS at 22:29

## 2024-06-02 RX ADMIN — OXYCODONE AND ACETAMINOPHEN 1 TABLET: 7.5; 325 TABLET ORAL at 08:34

## 2024-06-02 RX ADMIN — ATORVASTATIN CALCIUM 40 MG: 40 TABLET, FILM COATED ORAL at 22:28

## 2024-06-02 RX ADMIN — RANOLAZINE 500 MG: 500 TABLET, EXTENDED RELEASE ORAL at 08:21

## 2024-06-02 RX ADMIN — TIZANIDINE 2 MG: 4 TABLET ORAL at 22:28

## 2024-06-02 RX ADMIN — SODIUM CHLORIDE, PRESERVATIVE FREE 40 MG: 5 INJECTION INTRAVENOUS at 08:21

## 2024-06-02 RX ADMIN — ISOSORBIDE MONONITRATE 15 MG: 30 TABLET, EXTENDED RELEASE ORAL at 08:20

## 2024-06-02 RX ADMIN — GABAPENTIN 100 MG: 100 CAPSULE ORAL at 22:28

## 2024-06-02 RX ADMIN — METOPROLOL TARTRATE 25 MG: 25 TABLET, FILM COATED ORAL at 22:28

## 2024-06-02 RX ADMIN — INSULIN LISPRO 4 UNITS: 100 INJECTION, SOLUTION INTRAVENOUS; SUBCUTANEOUS at 22:30

## 2024-06-02 RX ADMIN — FERROUS SULFATE TAB 325 MG (65 MG ELEMENTAL FE) 325 MG: 325 (65 FE) TAB at 08:20

## 2024-06-02 RX ADMIN — INSULIN LISPRO 4 UNITS: 100 INJECTION, SOLUTION INTRAVENOUS; SUBCUTANEOUS at 16:20

## 2024-06-02 RX ADMIN — INSULIN LISPRO 3 UNITS: 100 INJECTION, SOLUTION INTRAVENOUS; SUBCUTANEOUS at 08:19

## 2024-06-02 RX ADMIN — ASPIRIN 81 MG: 81 TABLET, CHEWABLE ORAL at 08:21

## 2024-06-02 RX ADMIN — OXYCODONE AND ACETAMINOPHEN 1 TABLET: 7.5; 325 TABLET ORAL at 22:28

## 2024-06-02 RX ADMIN — INSULIN LISPRO 4 UNITS: 100 INJECTION, SOLUTION INTRAVENOUS; SUBCUTANEOUS at 11:22

## 2024-06-02 ASSESSMENT — PAIN SCALES - WONG BAKER
WONGBAKER_NUMERICALRESPONSE: NO HURT
WONGBAKER_NUMERICALRESPONSE: HURTS LITTLE MORE

## 2024-06-02 ASSESSMENT — PAIN SCALES - GENERAL
PAINLEVEL_OUTOF10: 8
PAINLEVEL_OUTOF10: 0
PAINLEVEL_OUTOF10: 6

## 2024-06-02 ASSESSMENT — PAIN DESCRIPTION - LOCATION
LOCATION: BACK;LEG
LOCATION: GENERALIZED

## 2024-06-02 ASSESSMENT — PAIN DESCRIPTION - DESCRIPTORS
DESCRIPTORS: ACHING
DESCRIPTORS: ACHING;CRAMPING;THROBBING

## 2024-06-02 ASSESSMENT — PAIN DESCRIPTION - ORIENTATION: ORIENTATION: RIGHT;LEFT;MID;UPPER;LOWER

## 2024-06-03 VITALS
RESPIRATION RATE: 17 BRPM | TEMPERATURE: 97.8 F | SYSTOLIC BLOOD PRESSURE: 134 MMHG | OXYGEN SATURATION: 99 % | BODY MASS INDEX: 34.91 KG/M2 | WEIGHT: 243.83 LBS | HEART RATE: 72 BPM | DIASTOLIC BLOOD PRESSURE: 61 MMHG | HEIGHT: 70 IN

## 2024-06-03 LAB
ABO/RH: NORMAL
ANTIBODY SCREEN: NEGATIVE
COMPONENT: NORMAL
CROSSMATCH RESULT: NORMAL
GLUCOSE BLD-MCNC: 264 MG/DL (ref 70–99)
STATUS: NORMAL
TRANSFUSION STATUS: NORMAL
UNIT DIVISION: 0
UNIT NUMBER: NORMAL

## 2024-06-03 PROCEDURE — 6360000002 HC RX W HCPCS: Performed by: STUDENT IN AN ORGANIZED HEALTH CARE EDUCATION/TRAINING PROGRAM

## 2024-06-03 PROCEDURE — 94761 N-INVAS EAR/PLS OXIMETRY MLT: CPT

## 2024-06-03 PROCEDURE — 82962 GLUCOSE BLOOD TEST: CPT

## 2024-06-03 PROCEDURE — 2580000003 HC RX 258: Performed by: STUDENT IN AN ORGANIZED HEALTH CARE EDUCATION/TRAINING PROGRAM

## 2024-06-03 PROCEDURE — A4216 STERILE WATER/SALINE, 10 ML: HCPCS | Performed by: STUDENT IN AN ORGANIZED HEALTH CARE EDUCATION/TRAINING PROGRAM

## 2024-06-03 PROCEDURE — C9113 INJ PANTOPRAZOLE SODIUM, VIA: HCPCS | Performed by: STUDENT IN AN ORGANIZED HEALTH CARE EDUCATION/TRAINING PROGRAM

## 2024-06-03 PROCEDURE — 6370000000 HC RX 637 (ALT 250 FOR IP): Performed by: STUDENT IN AN ORGANIZED HEALTH CARE EDUCATION/TRAINING PROGRAM

## 2024-06-03 RX ADMIN — TIZANIDINE 2 MG: 4 TABLET ORAL at 11:53

## 2024-06-03 RX ADMIN — FLUOXETINE HYDROCHLORIDE 40 MG: 10 CAPSULE ORAL at 08:28

## 2024-06-03 RX ADMIN — ASPIRIN 81 MG: 81 TABLET, CHEWABLE ORAL at 08:30

## 2024-06-03 RX ADMIN — CHOLESTYRAMINE 4 G: 4 POWDER, FOR SUSPENSION ORAL at 08:28

## 2024-06-03 RX ADMIN — OXYCODONE AND ACETAMINOPHEN 1 TABLET: 7.5; 325 TABLET ORAL at 08:31

## 2024-06-03 RX ADMIN — FUROSEMIDE 40 MG: 40 TABLET ORAL at 08:30

## 2024-06-03 RX ADMIN — SODIUM CHLORIDE, PRESERVATIVE FREE 40 MG: 5 INJECTION INTRAVENOUS at 08:28

## 2024-06-03 RX ADMIN — GABAPENTIN 100 MG: 100 CAPSULE ORAL at 08:30

## 2024-06-03 RX ADMIN — INSULIN LISPRO 2 UNITS: 100 INJECTION, SOLUTION INTRAVENOUS; SUBCUTANEOUS at 08:30

## 2024-06-03 RX ADMIN — RANOLAZINE 500 MG: 500 TABLET, EXTENDED RELEASE ORAL at 08:28

## 2024-06-03 RX ADMIN — ISOSORBIDE MONONITRATE 15 MG: 30 TABLET, EXTENDED RELEASE ORAL at 08:30

## 2024-06-03 RX ADMIN — METOPROLOL TARTRATE 25 MG: 25 TABLET, FILM COATED ORAL at 08:30

## 2024-06-03 ASSESSMENT — PAIN - FUNCTIONAL ASSESSMENT: PAIN_FUNCTIONAL_ASSESSMENT: PREVENTS OR INTERFERES SOME ACTIVE ACTIVITIES AND ADLS

## 2024-06-03 ASSESSMENT — PAIN DESCRIPTION - LOCATION: LOCATION: BACK;HIP;SHOULDER

## 2024-06-03 ASSESSMENT — PAIN DESCRIPTION - ORIENTATION: ORIENTATION: RIGHT;LEFT;LOWER

## 2024-06-03 ASSESSMENT — PAIN DESCRIPTION - DESCRIPTORS: DESCRIPTORS: ACHING

## 2024-06-03 ASSESSMENT — PAIN SCALES - GENERAL: PAINLEVEL_OUTOF10: 7

## 2024-06-03 NOTE — DISCHARGE INSTR - DIET

## 2024-06-03 NOTE — DISCHARGE SUMMARY
V2.0  Discharge Summary    Name:  Abelardo Marquis /Age/Sex: 1940 (84 y.o. male)   Admit Date: 2024  Discharge Date: 6/3/24    MRN & CSN:  [id number] & [id number] Encounter Date and Time 6/3/24 11:46 AM EDT    Attending:  Bg Moore MD Discharging Provider: Bg Moore MD       Hospital Course:     Brief HPI: Abelardo Marquis is a 84 y.o. 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      VICKY on CKD stage 3a- baseline Cr 1.4  FeNa 0.9% possibly related to low blood pressure trend, some prerenal component  UA negative for blood and protein  1L NS bolus and 1U PRBC in ER, hold off on further IVF infusion  Monitored BMP on a daily basis medically stable for discharge today to home with home health care  Resume home dose of Lasix and Jardiance at the time of discharge     Acute on chronic anemia   Unclear source of CLINTON, possibly small bowel AVM? Component of ACD   Hb 6.9 was 7.6 on - Recent EGD/colonoscopy reviewed  gastritis, duodenal diverticulum in 2nd portion, no active or old bleeding in the upper GI, 10 colonic polyps completely resected but only 4 retrieved , diffuse diverticulosis, mixed hemorrhoids and no active bleeding in the colon.  Recommend outpatient capsule endoscopy  Trend H/H and transfuse if Hb less than 7  IV Protonix 40 mg BID  Continue home Iron supplementation  In case of overt bleed can consult GI, keep on CLD for now  GI has been consulted awaiting recommendations  EGD done.  Medically stable for discharge recommendation to follow-up with GI outpatient  GI is recommended to stop the Plavix and continue with aspirin     CAD w/ PCI and stent placement  and recently in   Chronically elevated Troponin, at baseline probably due to anemia and poor clearance  Gi recommends to stop plavix and continue ASA     Continue home Imdur,  No bruits; no thyromegaly or nodules

## 2024-06-03 NOTE — CARE COORDINATION
CM in to see Pt to follow up on discharge planning.  Plan remains home with family and [facility].    Pt denies any needs at this time.  CM following

## 2024-06-04 ENCOUNTER — CARE COORDINATION (OUTPATIENT)
Dept: CASE MANAGEMENT | Age: 84
End: 2024-06-04

## 2024-06-04 DIAGNOSIS — D64.9 ACUTE ANEMIA: Primary | ICD-10-CM

## 2024-06-04 PROCEDURE — 1111F DSCHRG MED/CURRENT MED MERGE: CPT | Performed by: FAMILY MEDICINE

## 2024-06-04 NOTE — CARE COORDINATION
Care Transitions Note    Initial Call - Call within 2 business days of discharge: Yes     Patient Current Location:  Home: [address] [place] [address]    Care Transition Nurse contacted the patient by telephone to perform post hospital discharge assessment, verified name and  as identifiers. Provided introduction to self, and explanation of the Care Transition Nurse role.     Patient: Abelardo Marquis    Patient : 1940   MRN: [id number]    Reason for Admission: VICKY, A/C Anemia   Discharge Date: 6/3/24  RURS: Readmission Risk Score: 25.5  Facility: [facility] 24-6/3/24    Last Discharge Facility       Date Complaint Diagnosis Description Type Department Provider    24 Dizziness Anemia requiring transfusions ... ED to Hosp-Admission (Discharged) (ADMITTED) Bg Nicole MD; Viviane Joya...     Additional needs identified to be addressed with provider   Reason for Admission: VICKY, A/C Anemia   Facility: [facility] 24-6/3/24    Please contact for scheduling of 7 day hospital follow up /TCM appt due by 6/10/24     Method of communication with provider: chart routing PCP office.    Patients top risk factors for readmission: functional physical ability, medical condition-anemia, htn, dm, medication management, and utilization of services    Interventions to address risk factors:   Education: as below  Review of patient management of conditions/medications: AVS review  [facility] Notified agency of 6/3/24 hospital d/c. Faxed d/c summary, AVS to [phone] via [employer] per request   Referrals: Referral to Magda JIMÉNEZ--needing USS/MOW     Care Summary Note: Patient reports doing well since home other than fatigue. Reviewed anemia symptoms, when to contact MD, interventions; v/u. Denies brbpr, black/tarry stools, dizziness, sob, gen weakness. Advised to contact MD asap if noting such sx. Reports last bm 6/3/24, wnl. Confirmed taking Protonix, Iron. Reminded to stop

## 2024-06-05 ENCOUNTER — CARE COORDINATION (OUTPATIENT)
Dept: CARE COORDINATION | Age: 84
End: 2024-06-05

## 2024-06-05 NOTE — CARE COORDINATION
Date of referral: 6/4/24  Referral received from: ZOE Weir  Reason for referral: MOW    Attempted phone call to Pt to complete initial assessment. No answer, vm message left introducing self, requesting return call, contact number provided.     NIMA plan of care: NIMA will make next outreach attempt on 6/6

## 2024-06-06 ENCOUNTER — CARE COORDINATION (OUTPATIENT)
Dept: CASE MANAGEMENT | Age: 84
End: 2024-06-06

## 2024-06-06 ENCOUNTER — CARE COORDINATION (OUTPATIENT)
Dept: CARE COORDINATION | Age: 84
End: 2024-06-06

## 2024-06-06 NOTE — CARE COORDINATION
Care Transitions Note    Follow Up Call      Attempted to reach patient for transitions of care follow up.  Unable to reach patient.      Outreach Attempts:   HIPAA compliant voicemail left for patient.     Care Summary Note: [facility] attempted outreach for care transition follow up call. Left HIPPA compliant message and contact information for call back.     Follow Up Appointment:   Future Appointments         Provider Specialty Dept Phone    7/12/2024 12:45 PM Cecile Murray MD Nephrology [phone]            Plan for follow-up call in 2-5 days based on severity of symptoms and risk factors. Plan for next call: symptom management-.  self management-.    Florecita Nolasco LPN

## 2024-06-06 NOTE — CARE COORDINATION
Date of referral: 6/6/24  Referral received from: ZOE Weir  Reason for referral: MOW    Second attempted outreach to Pt to complete initial assessment. No answer, vm message left requesting return call, contact number provided.     NIMA plan of care: SW will make next outreach attempt on 6/11

## 2024-06-10 ENCOUNTER — CARE COORDINATION (OUTPATIENT)
Dept: CARE COORDINATION | Age: 84
End: 2024-06-10

## 2024-06-10 NOTE — CARE COORDINATION
Care Transitions Follow Up Call    Patient: Abelardo Marquis  Patient : 1940   MRN: <[id number]>  Reason for Admission: anemia; VICKY  Discharge Date: 6/3/24 RARS: Readmission Risk Score: 25.5  Attempted to reach pt for follow up call. Left message requesting call back.    Follow Up  Future Appointments   Date Time Provider Department Center   2024 12:45 PM Cecile Murray MD [id number]

## 2024-06-11 ENCOUNTER — CARE COORDINATION (OUTPATIENT)
Dept: CARE COORDINATION | Age: 84
End: 2024-06-11

## 2024-06-11 ENCOUNTER — HOSPITAL ENCOUNTER (EMERGENCY)
Age: 84
Discharge: HOME | End: 2024-06-11
Attending: EMERGENCY MEDICINE
Payer: MEDICARE

## 2024-06-11 ENCOUNTER — APPOINTMENT (OUTPATIENT)
Dept: GENERAL RADIOLOGY | Age: 84
End: 2024-06-11
Payer: MEDICARE

## 2024-06-11 VITALS
TEMPERATURE: 98.1 F | BODY MASS INDEX: 34.87 KG/M2 | WEIGHT: 243 LBS | OXYGEN SATURATION: 98 % | HEART RATE: 61 BPM | SYSTOLIC BLOOD PRESSURE: 134 MMHG | DIASTOLIC BLOOD PRESSURE: 65 MMHG | RESPIRATION RATE: 17 BRPM

## 2024-06-11 DIAGNOSIS — K92.2 GASTROINTESTINAL HEMORRHAGE, UNSPECIFIED GASTROINTESTINAL HEMORRHAGE TYPE: Primary | ICD-10-CM

## 2024-06-11 LAB
ALBUMIN SERPL-MCNC: 3.2 GM/DL (ref 3.4–5)
ALP BLD-CCNC: 71 IU/L (ref 40–129)
ALT SERPL-CCNC: 15 U/L (ref 10–40)
ANION GAP SERPL CALCULATED.3IONS-SCNC: 13 MMOL/L (ref 7–16)
BASOPHILS ABSOLUTE: 0 K/CU MM
BASOPHILS RELATIVE PERCENT: 0.9 % (ref 0–1)
BILIRUB SERPL-MCNC: 0.6 MG/DL (ref 0–1)
BUN SERPL-MCNC: 56 MG/DL (ref 6–23)
CALCIUM SERPL-MCNC: 7.9 MG/DL (ref 8.3–10.6)
CHLORIDE BLD-SCNC: 96 MMOL/L (ref 99–110)
CO2: 23 MMOL/L (ref 21–32)
CREAT SERPL-MCNC: 2.5 MG/DL (ref 0.9–1.3)
DIFFERENTIAL TYPE: ABNORMAL
EOSINOPHILS ABSOLUTE: 0.2 K/CU MM
EOSINOPHILS RELATIVE PERCENT: 5.1 % (ref 0–3)
GFR, ESTIMATED: 25 ML/MIN/1.73M2
GLUCOSE SERPL-MCNC: 261 MG/DL (ref 70–99)
HCT VFR BLD CALC: 24.6 % (ref 42–52)
HEMOGLOBIN: 7.3 GM/DL (ref 13.5–18)
IMMATURE NEUTROPHIL %: 0.6 % (ref 0–0.43)
LYMPHOCYTES ABSOLUTE: 0.9 K/CU MM
LYMPHOCYTES RELATIVE PERCENT: 29.7 % (ref 24–44)
MCH RBC QN AUTO: 28.7 PG (ref 27–31)
MCHC RBC AUTO-ENTMCNC: 29.7 % (ref 32–36)
MCV RBC AUTO: 96.9 FL (ref 78–100)
MONOCYTES ABSOLUTE: 0.3 K/CU MM
MONOCYTES RELATIVE PERCENT: 9.5 % (ref 0–4)
NEUTROPHILS ABSOLUTE: 1.7 K/CU MM
NEUTROPHILS RELATIVE PERCENT: 54.2 % (ref 36–66)
NUCLEATED RBC %: 0 %
PDW BLD-RTO: 16.9 % (ref 11.7–14.9)
PLATELET # BLD: 144 K/CU MM (ref 140–440)
PMV BLD AUTO: 10.8 FL (ref 7.5–11.1)
POTASSIUM SERPL-SCNC: 4.3 MMOL/L (ref 3.5–5.1)
RBC # BLD: 2.54 M/CU MM (ref 4.6–6.2)
SODIUM BLD-SCNC: 132 MMOL/L (ref 135–145)
TOTAL IMMATURE NEUTOROPHIL: 0.02 K/CU MM
TOTAL NUCLEATED RBC: 0 K/CU MM
TOTAL PROTEIN: 5.6 GM/DL (ref 6.4–8.2)
TROPONIN, HIGH SENSITIVITY: 76 NG/L (ref 0–22)
TROPONIN, HIGH SENSITIVITY: 82 NG/L (ref 0–22)
WBC # BLD: 3.2 K/CU MM (ref 4–10.5)

## 2024-06-11 PROCEDURE — 86850 RBC ANTIBODY SCREEN: CPT

## 2024-06-11 PROCEDURE — 80053 COMPREHEN METABOLIC PANEL: CPT

## 2024-06-11 PROCEDURE — P9016 RBC LEUKOCYTES REDUCED: HCPCS

## 2024-06-11 PROCEDURE — 6360000002 HC RX W HCPCS: Performed by: SPECIALIST

## 2024-06-11 PROCEDURE — 84484 ASSAY OF TROPONIN QUANT: CPT

## 2024-06-11 PROCEDURE — 96374 THER/PROPH/DIAG INJ IV PUSH: CPT

## 2024-06-11 PROCEDURE — 86900 BLOOD TYPING SEROLOGIC ABO: CPT

## 2024-06-11 PROCEDURE — C9113 INJ PANTOPRAZOLE SODIUM, VIA: HCPCS | Performed by: SPECIALIST

## 2024-06-11 PROCEDURE — 85025 COMPLETE CBC W/AUTO DIFF WBC: CPT

## 2024-06-11 PROCEDURE — 99285 EMERGENCY DEPT VISIT HI MDM: CPT

## 2024-06-11 PROCEDURE — 96361 HYDRATE IV INFUSION ADD-ON: CPT

## 2024-06-11 PROCEDURE — 93005 ELECTROCARDIOGRAM TRACING: CPT | Performed by: STUDENT IN AN ORGANIZED HEALTH CARE EDUCATION/TRAINING PROGRAM

## 2024-06-11 PROCEDURE — 86922 COMPATIBILITY TEST ANTIGLOB: CPT

## 2024-06-11 PROCEDURE — 71045 X-RAY EXAM CHEST 1 VIEW: CPT

## 2024-06-11 PROCEDURE — 2580000003 HC RX 258: Performed by: STUDENT IN AN ORGANIZED HEALTH CARE EDUCATION/TRAINING PROGRAM

## 2024-06-11 PROCEDURE — 36430 TRANSFUSION BLD/BLD COMPNT: CPT

## 2024-06-11 PROCEDURE — 86901 BLOOD TYPING SEROLOGIC RH(D): CPT

## 2024-06-11 RX ORDER — PANTOPRAZOLE SODIUM 40 MG/10ML
40 INJECTION, POWDER, LYOPHILIZED, FOR SOLUTION INTRAVENOUS DAILY
Status: DISCONTINUED | OUTPATIENT
Start: 2024-06-11 | End: 2024-06-11 | Stop reason: HOSPADM

## 2024-06-11 RX ORDER — 0.9 % SODIUM CHLORIDE 0.9 %
1000 INTRAVENOUS SOLUTION INTRAVENOUS ONCE
Status: COMPLETED | OUTPATIENT
Start: 2024-06-11 | End: 2024-06-11

## 2024-06-11 RX ORDER — SODIUM CHLORIDE 9 MG/ML
INJECTION, SOLUTION INTRAVENOUS PRN
Status: DISCONTINUED | OUTPATIENT
Start: 2024-06-11 | End: 2024-06-11 | Stop reason: HOSPADM

## 2024-06-11 RX ADMIN — SODIUM CHLORIDE 1000 ML: 9 INJECTION, SOLUTION INTRAVENOUS at 14:04

## 2024-06-11 RX ADMIN — PANTOPRAZOLE SODIUM 40 MG: 40 INJECTION, POWDER, FOR SOLUTION INTRAVENOUS at 17:47

## 2024-06-11 NOTE — ED PROVIDER NOTES
DIFFERENTIAL     Neutrophils % 54.2 36 - 66 %    Lymphocytes % 29.7 24 - 44 %    Monocytes % 9.5 (H) 0 - 4 %    Eosinophils % 5.1 (H) 0 - 3 %    Basophils % 0.9 0 - 1 %    Neutrophils Absolute 1.7 K/CU MM    Lymphocytes Absolute 0.9 K/CU MM    Monocytes Absolute 0.3 K/CU MM    Eosinophils Absolute 0.2 K/CU MM    Basophils Absolute 0.0 K/CU MM    Nucleated RBC % 0.0 %    Total Nucleated RBC 0.0 K/CU MM    Total Immature Neutrophil 0.02 K/CU MM    Immature Neutrophil % 0.6 (H) 0 - 0.43 %   Comprehensive Metabolic Panel   Result Value Ref Range    Sodium 132 (L) 135 - 145 MMOL/L    Potassium 4.3 3.5 - 5.1 MMOL/L    Chloride 96 (L) 99 - 110 mMol/L    CO2 23 21 - 32 MMOL/L    Anion Gap 13 7 - 16    Glucose 261 (H) 70 - 99 MG/DL    BUN 56 (H) 6 - 23 MG/DL    Creatinine 2.5 (H) 0.9 - 1.3 MG/DL    Est, Glom Filt Rate 25 (L) >60 mL/min/1.73m2    Calcium 7.9 (L) 8.3 - 10.6 MG/DL    Total Protein 5.6 (L) 6.4 - 8.2 GM/DL    Albumin 3.2 (L) 3.4 - 5.0 GM/DL    Total Bilirubin 0.6 0.0 - 1.0 MG/DL    Alkaline Phosphatase 71 40 - 129 IU/L    ALT 15 10 - 40 U/L    AST 15 15 - 37 IU/L   Troponin   Result Value Ref Range    Troponin, High Sensitivity 82 (HH) 0 - 22 ng/L   Troponin   Result Value Ref Range    Troponin, High Sensitivity 76 (HH) 0 - 22 ng/L   EKG 12 Lead   Result Value Ref Range    Ventricular Rate 56 BPM    Atrial Rate 56 BPM    P-R Interval 218 ms    QRS Duration 104 ms    Q-T Interval 492 ms    QTc Calculation (Bazett) 474 ms    P Axis 31 degrees    R Axis 9 degrees    T Axis 39 degrees    Diagnosis       Sinus bradycardia with 1st degree AV block  Otherwise normal ECG  When compared with ECG of 30-MAY-2024 18:57,  Sinus rhythm has replaced Junctional rhythm     TYPE AND SCREEN   Result Value Ref Range    ABO/Rh O POSITIVE     Antibody Screen NEGATIVE     Unit Number [id number]     Component LEUKO-POOR RED CELLS     Unit Divison 00     Status ALLOCATED     Transfusion Status OK TO TRANSFUSE     Crossmatch Result

## 2024-06-11 NOTE — DISCHARGE INSTRUCTIONS
Call and schedule follow-up appointment with primary care provider.  Go to scheduled appointment this week with GI.  Return to emergency department if you have new or worsening symptoms.

## 2024-06-11 NOTE — ED TRIAGE NOTES
Pt arrives via EMS from home with c/o chronic GI bleed, states is now feeling weak.  Pt pale upon arrival

## 2024-06-11 NOTE — CONSENT
Informed Consent for Blood Component Transfusion Note    I have discussed with the patient the rationale for blood component transfusion; its benefits in treating or preventing fatigue, organ damage, or death; and its risk which includes mild transfusion reactions, rare risk of blood borne infection, or more serious but rare reactions. I have discussed the alternatives to transfusion, including the risk and consequences of not receiving transfusion. The patient had an opportunity to ask questions and had agreed to proceed with transfusion of blood components.    Electronically signed by Toni Vu DO on 6/11/24 at 1:41 PM EDT

## 2024-06-11 NOTE — CONSULTS
[place] [address] [place] [id number]                              CONSULTATION      PATIENT NAME: BRYNN NATHAN                 : 1940  MED REC NO: [id number]                      ROOM: [facility]  ACCOUNT NO: [id number]                       ADMIT DATE: 2024  PROVIDER: Navi Castillo MD      CHIEF COMPLAINT:    1. History of anemia with dark-colored stools, rule out GI bleeding.  2. History of chronic liver disease secondary to MASLD.    HISTORY OF PRESENT ILLNESS:  The patient is an 84-year-old white gentleman, patient known to me from his multiple previous hospitalizations, last seen in consult on May 31, 2024 with past medical history significant for hypertension; diabetes mellitus; coronary artery disease, status post PTCA with coronary stents in place, last stent placed in 2023; chronic kidney disease; hyperlipidemia; obesity; history of chronic liver secondary to MASLD with stigmata of portal hypertension; tobacco abuse; chronic back pain and gout.  The patient presented to the emergency room today with generalized weakness, dizziness and also recent fall.  In the emergency room, the patient was evaluated and the Chem profile is remarkable for BUN of 56, creatinine is 2.5.  LFTs are within normal limits.  The CBC showed WBC count of 3.2, hemoglobin was 7.3, platelet count was 144,000.  The patient's last hemoglobin prior to discharge during his last hospitalization on 2024 was 8.1 g percent.  The patient does give history of chronically blackish stools.  There is no history of abdominal pain, nausea, vomiting, or hematemesis.  Because the patient was symptomatic, he was transfused with 1 unit of packed RBC in the emergency room.    The patient has had an extensive GI workup done in the past comprising at least for colonoscopy by Dr. Álvarez in [place], and also had a colonoscopy with polypectomy performed by me on

## 2024-06-11 NOTE — CARE COORDINATION
Date of referral: 6/6/24  Referral received from: ZOE Weir  Reason for referral: MOW    Attempted phone call to Pt to complete initial assessment. No answer, vm message left requesting return call, contact number provided.      NIMA plan of care: SW will make next outreach attempt to Pt on 6/17.

## 2024-06-12 LAB
ABO/RH: NORMAL
ANTIBODY SCREEN: NEGATIVE
COMPONENT: NORMAL
CROSSMATCH RESULT: NORMAL
STATUS: NORMAL
TRANSFUSION STATUS: NORMAL
UNIT DIVISION: 0
UNIT NUMBER: NORMAL

## 2024-06-13 ENCOUNTER — CARE COORDINATION (OUTPATIENT)
Dept: CASE MANAGEMENT | Age: 84
End: 2024-06-13

## 2024-06-13 LAB
EKG ATRIAL RATE: 56 BPM
EKG DIAGNOSIS: NORMAL
EKG P AXIS: 31 DEGREES
EKG P-R INTERVAL: 218 MS
EKG Q-T INTERVAL: 492 MS
EKG QRS DURATION: 104 MS
EKG QTC CALCULATION (BAZETT): 474 MS
EKG R AXIS: 9 DEGREES
EKG T AXIS: 39 DEGREES
EKG VENTRICULAR RATE: 56 BPM

## 2024-06-13 PROCEDURE — 93010 ELECTROCARDIOGRAM REPORT: CPT | Performed by: INTERNAL MEDICINE

## 2024-06-13 NOTE — CARE COORDINATION
Care Transitions Note  Follow Up Call     Attempted to reach patient for transitions of care follow up.  Unable to reach patient.      Outreach Attempts:   HIPAA compliant voicemail left for patient.     Care Summary Note: Program ended at this time.    Follow Up Appointment:   Future Appointments         Provider Specialty Dept Phone    7/12/2024 12:45 PM Cecile Murray MD Nephrology [phone]            No further follow-up call indicated based on severity of symptoms and risk factors.   Ara Oneal LPN

## 2024-06-17 ENCOUNTER — CARE COORDINATION (OUTPATIENT)
Dept: CARE COORDINATION | Age: 84
End: 2024-06-17

## 2024-06-17 NOTE — CARE COORDINATION
Attempted phone call to Pt to complete initial assessment. No answer, vm message left requesting return call, contact number provided.     SW did send secure email to coworkerKelly requesting she sends UTR letter to Pt which includes contact information for MOW and this SW.     NIMA plan of care: SW will resolve from  services at this time due to inability to reach Pt.

## 2024-06-19 ENCOUNTER — APPOINTMENT (OUTPATIENT)
Dept: GENERAL RADIOLOGY | Age: 84
DRG: 377 | End: 2024-06-19
Payer: MEDICARE

## 2024-06-19 ENCOUNTER — HOSPITAL ENCOUNTER (INPATIENT)
Age: 84
LOS: 4 days | Discharge: HOME HEALTH | DRG: 377 | End: 2024-06-23
Attending: STUDENT IN AN ORGANIZED HEALTH CARE EDUCATION/TRAINING PROGRAM | Admitting: STUDENT IN AN ORGANIZED HEALTH CARE EDUCATION/TRAINING PROGRAM
Payer: MEDICARE

## 2024-06-19 DIAGNOSIS — K92.2 CHRONIC GI BLEEDING: ICD-10-CM

## 2024-06-19 DIAGNOSIS — D64.9 ANEMIA, UNSPECIFIED TYPE: Primary | ICD-10-CM

## 2024-06-19 DIAGNOSIS — R73.9 HYPERGLYCEMIA: ICD-10-CM

## 2024-06-19 DIAGNOSIS — R53.1 GENERALIZED WEAKNESS: ICD-10-CM

## 2024-06-19 LAB
ALBUMIN SERPL-MCNC: 2.8 GM/DL (ref 3.4–5)
ALP BLD-CCNC: 58 IU/L (ref 40–128)
ALT SERPL-CCNC: 10 U/L (ref 10–40)
ANION GAP SERPL CALCULATED.3IONS-SCNC: 10 MMOL/L (ref 7–16)
AST SERPL-CCNC: 12 IU/L (ref 15–37)
BASOPHILS ABSOLUTE: 0 K/CU MM
BASOPHILS RELATIVE PERCENT: 0.8 % (ref 0–1)
BILIRUB SERPL-MCNC: 0.4 MG/DL (ref 0–1)
BUN SERPL-MCNC: 41 MG/DL (ref 6–23)
CALCIUM SERPL-MCNC: 7.9 MG/DL (ref 8.3–10.6)
CHLORIDE BLD-SCNC: 99 MMOL/L (ref 99–110)
CO2: 24 MMOL/L (ref 21–32)
CREAT SERPL-MCNC: 2 MG/DL (ref 0.9–1.3)
DIFFERENTIAL TYPE: ABNORMAL
EOSINOPHILS ABSOLUTE: 0.1 K/CU MM
EOSINOPHILS RELATIVE PERCENT: 3.2 % (ref 0–3)
GFR, ESTIMATED: 33 ML/MIN/1.73M2
GLUCOSE BLD-MCNC: 407 MG/DL (ref 70–99)
GLUCOSE SERPL-MCNC: 580 MG/DL (ref 70–99)
HCT VFR BLD CALC: 20.2 % (ref 42–52)
HEMOGLOBIN: 5.7 GM/DL (ref 13.5–18)
IMMATURE NEUTROPHIL %: 1.6 % (ref 0–0.43)
LIPASE: 33 IU/L (ref 13–60)
LYMPHOCYTES ABSOLUTE: 0.7 K/CU MM
LYMPHOCYTES RELATIVE PERCENT: 29.7 % (ref 24–44)
MCH RBC QN AUTO: 27.5 PG (ref 27–31)
MCHC RBC AUTO-ENTMCNC: 28.2 % (ref 32–36)
MCV RBC AUTO: 97.6 FL (ref 78–100)
MONOCYTES ABSOLUTE: 0.2 K/CU MM
MONOCYTES RELATIVE PERCENT: 8.4 % (ref 0–4)
NEUTROPHILS ABSOLUTE: 1.4 K/CU MM
NEUTROPHILS RELATIVE PERCENT: 56.3 % (ref 36–66)
NUCLEATED RBC %: 0 %
PDW BLD-RTO: 16.2 % (ref 11.7–14.9)
PLATELET # BLD: 134 K/CU MM (ref 140–440)
PMV BLD AUTO: 10.8 FL (ref 7.5–11.1)
POTASSIUM SERPL-SCNC: 4.7 MMOL/L (ref 3.5–5.1)
RBC # BLD: 2.07 M/CU MM (ref 4.6–6.2)
SODIUM BLD-SCNC: 133 MMOL/L (ref 135–145)
TOTAL IMMATURE NEUTOROPHIL: 0.04 K/CU MM
TOTAL NUCLEATED RBC: 0 K/CU MM
TOTAL PROTEIN: 4.9 GM/DL (ref 6.4–8.2)
TROPONIN, HIGH SENSITIVITY: 78 NG/L (ref 0–22)
TROPONIN, HIGH SENSITIVITY: 78 NG/L (ref 0–22)
WBC # BLD: 2.5 K/CU MM (ref 4–10.5)

## 2024-06-19 PROCEDURE — 2140000000 HC CCU INTERMEDIATE R&B

## 2024-06-19 PROCEDURE — 96374 THER/PROPH/DIAG INJ IV PUSH: CPT

## 2024-06-19 PROCEDURE — 86901 BLOOD TYPING SEROLOGIC RH(D): CPT

## 2024-06-19 PROCEDURE — 86922 COMPATIBILITY TEST ANTIGLOB: CPT

## 2024-06-19 PROCEDURE — 86850 RBC ANTIBODY SCREEN: CPT

## 2024-06-19 PROCEDURE — 2580000003 HC RX 258: Performed by: PHYSICIAN ASSISTANT

## 2024-06-19 PROCEDURE — 93005 ELECTROCARDIOGRAM TRACING: CPT | Performed by: PHYSICIAN ASSISTANT

## 2024-06-19 PROCEDURE — 30233N1 TRANSFUSION OF NONAUTOLOGOUS RED BLOOD CELLS INTO PERIPHERAL VEIN, PERCUTANEOUS APPROACH: ICD-10-PCS | Performed by: STUDENT IN AN ORGANIZED HEALTH CARE EDUCATION/TRAINING PROGRAM

## 2024-06-19 PROCEDURE — 86900 BLOOD TYPING SEROLOGIC ABO: CPT

## 2024-06-19 PROCEDURE — 36430 TRANSFUSION BLD/BLD COMPNT: CPT

## 2024-06-19 PROCEDURE — 83036 HEMOGLOBIN GLYCOSYLATED A1C: CPT

## 2024-06-19 PROCEDURE — 84484 ASSAY OF TROPONIN QUANT: CPT

## 2024-06-19 PROCEDURE — 83690 ASSAY OF LIPASE: CPT

## 2024-06-19 PROCEDURE — 85025 COMPLETE CBC W/AUTO DIFF WBC: CPT

## 2024-06-19 PROCEDURE — 99285 EMERGENCY DEPT VISIT HI MDM: CPT

## 2024-06-19 PROCEDURE — 80053 COMPREHEN METABOLIC PANEL: CPT

## 2024-06-19 PROCEDURE — 71045 X-RAY EXAM CHEST 1 VIEW: CPT

## 2024-06-19 PROCEDURE — 82962 GLUCOSE BLOOD TEST: CPT

## 2024-06-19 PROCEDURE — P9016 RBC LEUKOCYTES REDUCED: HCPCS

## 2024-06-19 PROCEDURE — 6370000000 HC RX 637 (ALT 250 FOR IP): Performed by: PHYSICIAN ASSISTANT

## 2024-06-19 RX ORDER — OXYCODONE AND ACETAMINOPHEN 7.5; 325 MG/1; MG/1
1 TABLET ORAL EVERY 6 HOURS
Status: DISCONTINUED | OUTPATIENT
Start: 2024-06-20 | End: 2024-06-21

## 2024-06-19 RX ORDER — ACETAMINOPHEN 650 MG/1
650 SUPPOSITORY RECTAL EVERY 6 HOURS PRN
Status: DISCONTINUED | OUTPATIENT
Start: 2024-06-19 | End: 2024-06-23 | Stop reason: HOSPADM

## 2024-06-19 RX ORDER — FUROSEMIDE 40 MG/1
40 TABLET ORAL DAILY
Status: DISCONTINUED | OUTPATIENT
Start: 2024-06-20 | End: 2024-06-23 | Stop reason: HOSPADM

## 2024-06-19 RX ORDER — ACETAMINOPHEN 325 MG/1
650 TABLET ORAL EVERY 6 HOURS PRN
Status: DISCONTINUED | OUTPATIENT
Start: 2024-06-19 | End: 2024-06-23 | Stop reason: HOSPADM

## 2024-06-19 RX ORDER — INSULIN LISPRO 100 [IU]/ML
0-4 INJECTION, SOLUTION INTRAVENOUS; SUBCUTANEOUS NIGHTLY
Status: DISCONTINUED | OUTPATIENT
Start: 2024-06-20 | End: 2024-06-20

## 2024-06-19 RX ORDER — SODIUM CHLORIDE 9 MG/ML
INJECTION, SOLUTION INTRAVENOUS PRN
Status: DISCONTINUED | OUTPATIENT
Start: 2024-06-19 | End: 2024-06-23 | Stop reason: HOSPADM

## 2024-06-19 RX ORDER — ASPIRIN 81 MG/1
81 TABLET, CHEWABLE ORAL DAILY
Status: DISCONTINUED | OUTPATIENT
Start: 2024-06-20 | End: 2024-06-23 | Stop reason: HOSPADM

## 2024-06-19 RX ORDER — 0.9 % SODIUM CHLORIDE 0.9 %
500 INTRAVENOUS SOLUTION INTRAVENOUS ONCE
Status: DISCONTINUED | OUTPATIENT
Start: 2024-06-20 | End: 2024-06-23 | Stop reason: HOSPADM

## 2024-06-19 RX ORDER — RANOLAZINE 500 MG/1
500 TABLET, EXTENDED RELEASE ORAL DAILY
Status: DISCONTINUED | OUTPATIENT
Start: 2024-06-20 | End: 2024-06-23 | Stop reason: HOSPADM

## 2024-06-19 RX ORDER — ONDANSETRON 4 MG/1
4 TABLET, ORALLY DISINTEGRATING ORAL EVERY 8 HOURS PRN
Status: DISCONTINUED | OUTPATIENT
Start: 2024-06-19 | End: 2024-06-23 | Stop reason: HOSPADM

## 2024-06-19 RX ORDER — ONDANSETRON 2 MG/ML
4 INJECTION INTRAMUSCULAR; INTRAVENOUS EVERY 6 HOURS PRN
Status: DISCONTINUED | OUTPATIENT
Start: 2024-06-19 | End: 2024-06-23 | Stop reason: HOSPADM

## 2024-06-19 RX ORDER — CHOLESTYRAMINE LIGHT 4 G/5.7G
4 POWDER, FOR SUSPENSION ORAL DAILY
Status: DISCONTINUED | OUTPATIENT
Start: 2024-06-20 | End: 2024-06-23 | Stop reason: HOSPADM

## 2024-06-19 RX ORDER — GLUCAGON 1 MG/ML
1 KIT INJECTION PRN
Status: DISCONTINUED | OUTPATIENT
Start: 2024-06-19 | End: 2024-06-23 | Stop reason: HOSPADM

## 2024-06-19 RX ORDER — SODIUM CHLORIDE 0.9 % (FLUSH) 0.9 %
5-40 SYRINGE (ML) INJECTION PRN
Status: DISCONTINUED | OUTPATIENT
Start: 2024-06-19 | End: 2024-06-23 | Stop reason: HOSPADM

## 2024-06-19 RX ORDER — FLUTICASONE PROPIONATE 50 MCG
1 SPRAY, SUSPENSION (ML) NASAL 2 TIMES DAILY PRN
Status: DISCONTINUED | OUTPATIENT
Start: 2024-06-19 | End: 2024-06-23 | Stop reason: HOSPADM

## 2024-06-19 RX ORDER — DEXTROSE MONOHYDRATE 100 MG/ML
INJECTION, SOLUTION INTRAVENOUS CONTINUOUS PRN
Status: DISCONTINUED | OUTPATIENT
Start: 2024-06-19 | End: 2024-06-23 | Stop reason: HOSPADM

## 2024-06-19 RX ORDER — SODIUM CHLORIDE 0.9 % (FLUSH) 0.9 %
5-40 SYRINGE (ML) INJECTION 2 TIMES DAILY
Status: DISCONTINUED | OUTPATIENT
Start: 2024-06-20 | End: 2024-06-23 | Stop reason: HOSPADM

## 2024-06-19 RX ORDER — INSULIN LISPRO 100 [IU]/ML
0-8 INJECTION, SOLUTION INTRAVENOUS; SUBCUTANEOUS
Status: DISCONTINUED | OUTPATIENT
Start: 2024-06-20 | End: 2024-06-20

## 2024-06-19 RX ORDER — ATORVASTATIN CALCIUM 40 MG/1
40 TABLET, FILM COATED ORAL NIGHTLY
Status: DISCONTINUED | OUTPATIENT
Start: 2024-06-20 | End: 2024-06-23 | Stop reason: HOSPADM

## 2024-06-19 RX ORDER — 0.9 % SODIUM CHLORIDE 0.9 %
1000 INTRAVENOUS SOLUTION INTRAVENOUS ONCE
Status: COMPLETED | OUTPATIENT
Start: 2024-06-19 | End: 2024-06-19

## 2024-06-19 RX ORDER — INSULIN GLARGINE 100 [IU]/ML
20 INJECTION, SOLUTION SUBCUTANEOUS 2 TIMES DAILY
Status: DISCONTINUED | OUTPATIENT
Start: 2024-06-20 | End: 2024-06-20

## 2024-06-19 RX ORDER — ISOSORBIDE MONONITRATE 30 MG/1
30 TABLET, EXTENDED RELEASE ORAL DAILY
Status: DISCONTINUED | OUTPATIENT
Start: 2024-06-20 | End: 2024-06-23 | Stop reason: HOSPADM

## 2024-06-19 RX ADMIN — SODIUM CHLORIDE 1000 ML: 9 INJECTION, SOLUTION INTRAVENOUS at 18:48

## 2024-06-19 RX ADMIN — SODIUM CHLORIDE 1000 ML: 9 INJECTION, SOLUTION INTRAVENOUS at 17:36

## 2024-06-19 RX ADMIN — INSULIN HUMAN 10 UNITS: 100 INJECTION, SOLUTION PARENTERAL at 19:44

## 2024-06-19 ASSESSMENT — PAIN SCALES - GENERAL: PAINLEVEL_OUTOF10: 6

## 2024-06-19 ASSESSMENT — PAIN DESCRIPTION - LOCATION: LOCATION: BACK

## 2024-06-19 ASSESSMENT — PAIN - FUNCTIONAL ASSESSMENT: PAIN_FUNCTIONAL_ASSESSMENT: 0-10

## 2024-06-19 ASSESSMENT — PAIN DESCRIPTION - PAIN TYPE: TYPE: CHRONIC PAIN

## 2024-06-19 NOTE — ED NOTES
ED TO INPATIENT SBAR HANDOFF    Patient Name: Abelardo Marquis   :  1940  84 y.o.   Preferred Name  Abelardo  Family/Caregiver Present yes   Restraints no   C-SSRS: Risk of Suicide: No Risk  Sitter no   Sepsis Risk Score        Situation  Chief Complaint   Patient presents with    Rectal Bleeding     Pt has been having issues with GI bleeding and reports blood in stool. EMS reports that pt was suppose to have swallow study done today    Hyperglycemia     EMS reports pt pts BS is >600. Pt reports he hasn't been eating much but reports he has been taking his medications     Brief Description of Patient's Condition: Patient to the ED for rectal bleeding. Patient has had this for a few months with multiple admissions. Family stating this is his 4th transfusion this month. Patient was to have a GI procedure tomorrow morning but family found patient at home pale and confused and called EMS. Hemoglobin is 5.7 in ED. Is currently receiving 1 unit of blood in ED. BG also elevated. Patient reports BG higher than 600 for last week. Has received 1L of fluid bolus and 10 units IV insulin to correct BG. Anion gap and CO2 normal. Also has elevated troponin but is chronic.   Mental Status: oriented, alert, coherent, logical, thought processes intact, and able to concentrate and follow conversation  Arrived from: home    Imaging:   XR CHEST PORTABLE    (Results Pending)     Abnormal labs:   Abnormal Labs Reviewed   CBC WITH AUTO DIFFERENTIAL - Abnormal; Notable for the following components:       Result Value    WBC 2.5 (*)     RBC 2.07 (*)     Hemoglobin 5.7 (*)     Hematocrit 20.2 (*)     MCHC 28.2 (*)     RDW 16.2 (*)     Platelets 134 (*)     Monocytes % 8.4 (*)     Eosinophils % 3.2 (*)     Immature Neutrophil % 1.6 (*)     All other components within normal limits   COMPREHENSIVE METABOLIC PANEL - Abnormal; Notable for the following components:    Sodium 133 (*)     Glucose 580 (*)     BUN 41 (*)     Creatinine 2.0 (*)     Est,

## 2024-06-19 NOTE — CONSENT
Informed Consent for Blood Component Transfusion Note    I have discussed with the patient the rationale for blood component transfusion; its benefits in treating or preventing fatigue, organ damage, or death; and its risk which includes mild transfusion reactions, rare risk of blood borne infection, or more serious but rare reactions. I have discussed the alternatives to transfusion, including the risk and consequences of not receiving transfusion. The patient had an opportunity to ask questions and had agreed to proceed with transfusion of blood components.    Electronically signed by Iveth Ellison PA-C on 6/19/24 at 6:06 PM EDT

## 2024-06-19 NOTE — ED PROVIDER NOTES
Hypertension     MI (myocardial infarction) (HCC)     May 2013       Discussion with Other Profesionals : Dr. Sol, hospitalist, and [facility]    Social Determinants : None    Records Reviewed : Inpatient Notes from similar visits, including most recently 6/11/2024      Patient's EMR reviewed for information on past medical history, current medications, and any pertinent inpatient/outpatient encounters.      ED Course/Reassessment/Disposition:  Patient seen and examined.  His Hgb is 5.7 today, WBC count 2.5, Platelets 134.  His Cr is 2.0--consistent with his baseline.  Initial glucose 580--given fluids and insulin. Troponins are stable.  CXR is clear. Patient agreeable to transfusion.  I discussed with Dr. Sol, hospitalist, who also spoke with Dr. Castillo, GI.  We apparently do not have ability to perform capsule endoscopy as inpatient, so Dr. Castillo did recommend transfer to [facility] if possible so that patient can have procedure performed.  I did speak with [facility] transfer [facility] but after several hours we did not hear an update about acceptance.  Will admit here until patient can possibly be transferred.    Disposition Considerations (tests considered but not done, Shared Decision Making, Patient Expectation of Test or Treatment):   Admitted.    I am the Primary Clinician of Record.  Supervising physician was Dr. Le.  Patient was seen independently.        CLINICAL IMPRESSION      1. Anemia, unspecified type    2. Chronic GI bleeding    3. Generalized weakness    4. Hyperglycemia          DISPOSITION/PLAN   DISPOSITION Decision To Admit 06/19/2024 07:37:37 PM    PATIENT REFERREDTO:  No follow-up provider specified.    DISCHARGE MEDICATIONS:  New Prescriptions    No medications on file       DISCONTINUED MEDICATIONS:  Discontinued Medications    No medications on file              (Please note that portions ofthis note were completed with a voice recognition program.  Efforts were made to edit the dictations

## 2024-06-20 ENCOUNTER — APPOINTMENT (OUTPATIENT)
Dept: NUCLEAR MEDICINE | Age: 84
DRG: 377 | End: 2024-06-20
Payer: MEDICARE

## 2024-06-20 LAB
ABO/RH: NORMAL
ANTIBODY SCREEN: NEGATIVE
APTT: 32.2 SECONDS (ref 25.1–37.1)
COMPONENT: NORMAL
CROSSMATCH RESULT: NORMAL
EKG ATRIAL RATE: 54 BPM
EKG DIAGNOSIS: NORMAL
EKG P AXIS: 47 DEGREES
EKG P-R INTERVAL: 240 MS
EKG Q-T INTERVAL: 506 MS
EKG QRS DURATION: 106 MS
EKG QTC CALCULATION (BAZETT): 479 MS
EKG R AXIS: 12 DEGREES
EKG T AXIS: 42 DEGREES
EKG VENTRICULAR RATE: 54 BPM
ESTIMATED AVERAGE GLUCOSE: 143 MG/DL
GLUCOSE BLD-MCNC: 246 MG/DL (ref 70–99)
GLUCOSE BLD-MCNC: 335 MG/DL (ref 70–99)
GLUCOSE BLD-MCNC: 374 MG/DL (ref 70–99)
GLUCOSE BLD-MCNC: 383 MG/DL (ref 70–99)
GLUCOSE BLD-MCNC: 400 MG/DL (ref 70–99)
GLUCOSE BLD-MCNC: 407 MG/DL (ref 70–99)
GLUCOSE BLD-MCNC: 408 MG/DL (ref 70–99)
HBA1C MFR BLD: 6.6 % (ref 4.2–6.3)
HCT VFR BLD CALC: 26 % (ref 42–52)
HCT VFR BLD CALC: 29.1 % (ref 42–52)
HCT VFR BLD CALC: 29.4 % (ref 42–52)
HCT VFR BLD CALC: 30.2 % (ref 42–52)
HEMOGLOBIN: 7.4 GM/DL (ref 13.5–18)
HEMOGLOBIN: 8.6 GM/DL (ref 13.5–18)
HEMOGLOBIN: 8.6 GM/DL (ref 13.5–18)
HEMOGLOBIN: 8.7 GM/DL (ref 13.5–18)
INR BLD: 1.1 INDEX
IRON: 19 UG/DL (ref 59–158)
PCT TRANSFERRIN: 7 % (ref 10–44)
PROTHROMBIN TIME: 14.4 SECONDS (ref 11.7–14.5)
STATUS: NORMAL
T4 FREE SERPL-MCNC: 0.88 NG/DL (ref 0.9–1.8)
TOTAL IRON BINDING CAPACITY: 255 UG/DL (ref 250–450)
TRANSFUSION STATUS: NORMAL
TROPONIN, HIGH SENSITIVITY: 62 NG/L (ref 0–22)
TROPONIN, HIGH SENSITIVITY: 68 NG/L (ref 0–22)
TROPONIN, HIGH SENSITIVITY: 76 NG/L (ref 0–22)
TROPONIN, HIGH SENSITIVITY: 80 NG/L (ref 0–22)
TSH SERPL DL<=0.005 MIU/L-ACNC: 4.05 UIU/ML (ref 0.27–4.2)
UNIT DIVISION: 0
UNIT NUMBER: NORMAL
UNSATURATED IRON BINDING CAPACITY: 236 UG/DL (ref 110–370)

## 2024-06-20 PROCEDURE — 85014 HEMATOCRIT: CPT

## 2024-06-20 PROCEDURE — 93010 ELECTROCARDIOGRAM REPORT: CPT | Performed by: INTERNAL MEDICINE

## 2024-06-20 PROCEDURE — 6370000000 HC RX 637 (ALT 250 FOR IP): Performed by: NURSE PRACTITIONER

## 2024-06-20 PROCEDURE — 3430000000 HC RX DIAGNOSTIC RADIOPHARMACEUTICAL: Performed by: FAMILY MEDICINE

## 2024-06-20 PROCEDURE — 83540 ASSAY OF IRON: CPT

## 2024-06-20 PROCEDURE — 2140000000 HC CCU INTERMEDIATE R&B

## 2024-06-20 PROCEDURE — 84484 ASSAY OF TROPONIN QUANT: CPT

## 2024-06-20 PROCEDURE — 6370000000 HC RX 637 (ALT 250 FOR IP): Performed by: STUDENT IN AN ORGANIZED HEALTH CARE EDUCATION/TRAINING PROGRAM

## 2024-06-20 PROCEDURE — 85610 PROTHROMBIN TIME: CPT

## 2024-06-20 PROCEDURE — 6360000002 HC RX W HCPCS: Performed by: STUDENT IN AN ORGANIZED HEALTH CARE EDUCATION/TRAINING PROGRAM

## 2024-06-20 PROCEDURE — 85018 HEMOGLOBIN: CPT

## 2024-06-20 PROCEDURE — 2580000003 HC RX 258: Performed by: STUDENT IN AN ORGANIZED HEALTH CARE EDUCATION/TRAINING PROGRAM

## 2024-06-20 PROCEDURE — 84439 ASSAY OF FREE THYROXINE: CPT

## 2024-06-20 PROCEDURE — 85730 THROMBOPLASTIN TIME PARTIAL: CPT

## 2024-06-20 PROCEDURE — 78278 ACUTE GI BLOOD LOSS IMAGING: CPT

## 2024-06-20 PROCEDURE — 82962 GLUCOSE BLOOD TEST: CPT

## 2024-06-20 PROCEDURE — 36415 COLL VENOUS BLD VENIPUNCTURE: CPT

## 2024-06-20 PROCEDURE — 86800 THYROGLOBULIN ANTIBODY: CPT

## 2024-06-20 PROCEDURE — 86376 MICROSOMAL ANTIBODY EACH: CPT

## 2024-06-20 PROCEDURE — C9113 INJ PANTOPRAZOLE SODIUM, VIA: HCPCS | Performed by: STUDENT IN AN ORGANIZED HEALTH CARE EDUCATION/TRAINING PROGRAM

## 2024-06-20 PROCEDURE — 84443 ASSAY THYROID STIM HORMONE: CPT

## 2024-06-20 PROCEDURE — 94761 N-INVAS EAR/PLS OXIMETRY MLT: CPT

## 2024-06-20 PROCEDURE — 83550 IRON BINDING TEST: CPT

## 2024-06-20 PROCEDURE — 6370000000 HC RX 637 (ALT 250 FOR IP): Performed by: INTERNAL MEDICINE

## 2024-06-20 PROCEDURE — A9560 TC99M LABELED RBC: HCPCS | Performed by: FAMILY MEDICINE

## 2024-06-20 RX ORDER — INSULIN GLARGINE 100 [IU]/ML
40 INJECTION, SOLUTION SUBCUTANEOUS NIGHTLY
Status: DISCONTINUED | OUTPATIENT
Start: 2024-06-20 | End: 2024-06-20

## 2024-06-20 RX ORDER — INSULIN LISPRO 100 [IU]/ML
0-8 INJECTION, SOLUTION INTRAVENOUS; SUBCUTANEOUS
Status: DISCONTINUED | OUTPATIENT
Start: 2024-06-20 | End: 2024-06-20

## 2024-06-20 RX ORDER — INSULIN LISPRO 100 [IU]/ML
0-4 INJECTION, SOLUTION INTRAVENOUS; SUBCUTANEOUS NIGHTLY
Status: DISCONTINUED | OUTPATIENT
Start: 2024-06-20 | End: 2024-06-23 | Stop reason: HOSPADM

## 2024-06-20 RX ORDER — INSULIN GLARGINE 100 [IU]/ML
20 INJECTION, SOLUTION SUBCUTANEOUS NIGHTLY
Status: DISCONTINUED | OUTPATIENT
Start: 2024-06-20 | End: 2024-06-20

## 2024-06-20 RX ORDER — INSULIN LISPRO 100 [IU]/ML
0-4 INJECTION, SOLUTION INTRAVENOUS; SUBCUTANEOUS NIGHTLY
Status: DISCONTINUED | OUTPATIENT
Start: 2024-06-20 | End: 2024-06-20

## 2024-06-20 RX ORDER — INSULIN GLARGINE 100 [IU]/ML
20 INJECTION, SOLUTION SUBCUTANEOUS 2 TIMES DAILY
Status: DISCONTINUED | OUTPATIENT
Start: 2024-06-20 | End: 2024-06-21

## 2024-06-20 RX ORDER — INSULIN LISPRO 100 [IU]/ML
0-4 INJECTION, SOLUTION INTRAVENOUS; SUBCUTANEOUS
Status: DISCONTINUED | OUTPATIENT
Start: 2024-06-20 | End: 2024-06-20

## 2024-06-20 RX ORDER — INSULIN LISPRO 100 [IU]/ML
0-8 INJECTION, SOLUTION INTRAVENOUS; SUBCUTANEOUS
Status: DISCONTINUED | OUTPATIENT
Start: 2024-06-20 | End: 2024-06-23 | Stop reason: HOSPADM

## 2024-06-20 RX ORDER — INSULIN LISPRO 100 [IU]/ML
15 INJECTION, SOLUTION INTRAVENOUS; SUBCUTANEOUS
Status: DISCONTINUED | OUTPATIENT
Start: 2024-06-20 | End: 2024-06-20

## 2024-06-20 RX ORDER — INSULIN LISPRO 100 [IU]/ML
5 INJECTION, SOLUTION INTRAVENOUS; SUBCUTANEOUS
Status: DISCONTINUED | OUTPATIENT
Start: 2024-06-20 | End: 2024-06-21

## 2024-06-20 RX ADMIN — ATORVASTATIN CALCIUM 40 MG: 40 TABLET, FILM COATED ORAL at 01:07

## 2024-06-20 RX ADMIN — OXYCODONE AND ACETAMINOPHEN 1 TABLET: 7.5; 325 TABLET ORAL at 07:01

## 2024-06-20 RX ADMIN — SODIUM CHLORIDE, PRESERVATIVE FREE 40 MG: 5 INJECTION INTRAVENOUS at 08:22

## 2024-06-20 RX ADMIN — INSULIN LISPRO 4 UNITS: 100 INJECTION, SOLUTION INTRAVENOUS; SUBCUTANEOUS at 01:08

## 2024-06-20 RX ADMIN — METOPROLOL TARTRATE 25 MG: 25 TABLET, FILM COATED ORAL at 01:07

## 2024-06-20 RX ADMIN — INSULIN LISPRO 8 UNITS: 100 INJECTION, SOLUTION INTRAVENOUS; SUBCUTANEOUS at 20:29

## 2024-06-20 RX ADMIN — ISOSORBIDE MONONITRATE 30 MG: 30 TABLET, EXTENDED RELEASE ORAL at 08:24

## 2024-06-20 RX ADMIN — SODIUM CHLORIDE, PRESERVATIVE FREE 10 ML: 5 INJECTION INTRAVENOUS at 08:25

## 2024-06-20 RX ADMIN — INSULIN LISPRO 6 UNITS: 100 INJECTION, SOLUTION INTRAVENOUS; SUBCUTANEOUS at 08:22

## 2024-06-20 RX ADMIN — OXYCODONE AND ACETAMINOPHEN 1 TABLET: 7.5; 325 TABLET ORAL at 01:18

## 2024-06-20 RX ADMIN — INSULIN LISPRO 5 UNITS: 100 INJECTION, SOLUTION INTRAVENOUS; SUBCUTANEOUS at 17:18

## 2024-06-20 RX ADMIN — INSULIN GLARGINE 20 UNITS: 100 INJECTION, SOLUTION SUBCUTANEOUS at 01:09

## 2024-06-20 RX ADMIN — CHOLESTYRAMINE 4 G: 4 POWDER, FOR SUSPENSION ORAL at 08:23

## 2024-06-20 RX ADMIN — INSULIN LISPRO 1 UNITS: 100 INJECTION, SOLUTION INTRAVENOUS; SUBCUTANEOUS at 12:35

## 2024-06-20 RX ADMIN — SODIUM CHLORIDE, PRESERVATIVE FREE 40 MG: 5 INJECTION INTRAVENOUS at 01:07

## 2024-06-20 RX ADMIN — METOPROLOL TARTRATE 25 MG: 25 TABLET, FILM COATED ORAL at 20:32

## 2024-06-20 RX ADMIN — OXYCODONE AND ACETAMINOPHEN 1 TABLET: 7.5; 325 TABLET ORAL at 12:34

## 2024-06-20 RX ADMIN — INSULIN LISPRO 4 UNITS: 100 INJECTION, SOLUTION INTRAVENOUS; SUBCUTANEOUS at 17:17

## 2024-06-20 RX ADMIN — SODIUM CHLORIDE, PRESERVATIVE FREE 10 ML: 5 INJECTION INTRAVENOUS at 01:05

## 2024-06-20 RX ADMIN — INSULIN LISPRO 4 UNITS: 100 INJECTION, SOLUTION INTRAVENOUS; SUBCUTANEOUS at 22:06

## 2024-06-20 RX ADMIN — Medication 23.4 MILLICURIE: at 10:32

## 2024-06-20 RX ADMIN — ATORVASTATIN CALCIUM 40 MG: 40 TABLET, FILM COATED ORAL at 20:32

## 2024-06-20 RX ADMIN — SODIUM CHLORIDE, PRESERVATIVE FREE 10 ML: 5 INJECTION INTRAVENOUS at 20:28

## 2024-06-20 RX ADMIN — RANOLAZINE 500 MG: 500 TABLET, EXTENDED RELEASE ORAL at 08:23

## 2024-06-20 RX ADMIN — SODIUM CHLORIDE, PRESERVATIVE FREE 40 MG: 5 INJECTION INTRAVENOUS at 20:32

## 2024-06-20 RX ADMIN — OXYCODONE AND ACETAMINOPHEN 1 TABLET: 7.5; 325 TABLET ORAL at 17:17

## 2024-06-20 RX ADMIN — INSULIN GLARGINE 20 UNITS: 100 INJECTION, SOLUTION SUBCUTANEOUS at 20:30

## 2024-06-20 ASSESSMENT — PAIN SCALES - WONG BAKER
WONGBAKER_NUMERICALRESPONSE: HURTS A LITTLE BIT
WONGBAKER_NUMERICALRESPONSE: 2;0
WONGBAKER_NUMERICALRESPONSE: HURTS LITTLE MORE
WONGBAKER_NUMERICALRESPONSE: 2;0
WONGBAKER_NUMERICALRESPONSE: HURTS EVEN MORE
WONGBAKER_NUMERICALRESPONSE: 2;0
WONGBAKER_NUMERICALRESPONSE: HURTS A LITTLE BIT
WONGBAKER_NUMERICALRESPONSE: 2;0

## 2024-06-20 ASSESSMENT — PAIN SCALES - GENERAL
PAINLEVEL_OUTOF10: 3
PAINLEVEL_OUTOF10: 4
PAINLEVEL_OUTOF10: 7
PAINLEVEL_OUTOF10: 7
PAINLEVEL_OUTOF10: 8
PAINLEVEL_OUTOF10: 8
PAINLEVEL_OUTOF10: 1
PAINLEVEL_OUTOF10: 0
PAINLEVEL_OUTOF10: 7
PAINLEVEL_OUTOF10: 0
PAINLEVEL_OUTOF10: 7
PAINLEVEL_OUTOF10: 6
PAINLEVEL_OUTOF10: 6

## 2024-06-20 ASSESSMENT — PAIN DESCRIPTION - ONSET
ONSET: ON-GOING

## 2024-06-20 ASSESSMENT — PAIN DESCRIPTION - LOCATION
LOCATION: BACK;LEG
LOCATION: BACK
LOCATION: BACK;LEG

## 2024-06-20 ASSESSMENT — PAIN DESCRIPTION - DIRECTION: RADIATING_TOWARDS: NON RADIATING

## 2024-06-20 ASSESSMENT — PAIN DESCRIPTION - ORIENTATION
ORIENTATION: MID;LOWER
ORIENTATION: MID;LOWER
ORIENTATION: RIGHT;LEFT;MID;LOWER
ORIENTATION: MID;LOWER
ORIENTATION: MID;LOWER;RIGHT
ORIENTATION: RIGHT;LEFT
ORIENTATION: RIGHT;LEFT

## 2024-06-20 ASSESSMENT — PAIN DESCRIPTION - FREQUENCY
FREQUENCY: INTERMITTENT

## 2024-06-20 ASSESSMENT — PAIN DESCRIPTION - DESCRIPTORS
DESCRIPTORS: ACHING
DESCRIPTORS: ACHING
DESCRIPTORS: ACHING;DISCOMFORT
DESCRIPTORS: ACHING

## 2024-06-20 ASSESSMENT — PAIN DESCRIPTION - PAIN TYPE
TYPE: CHRONIC PAIN

## 2024-06-20 NOTE — CARE COORDINATION
06/20/24 1429   Service Assessment   Patient Orientation Alert and Oriented   Cognition Alert   History Provided By Patient   Primary Caregiver Self   Support Systems Children   Patient's Healthcare Decision Maker is: Legal Next of Kin   PCP Verified by CM Yes   Prior Functional Level Assistance with the following:;Mobility   Current Functional Level Assistance with the following:;Mobility   Can patient return to prior living arrangement Yes   Ability to make needs known: Good   Family able to assist with home care needs: Yes   Would you like for me to discuss the discharge plan with any other family members/significant others, and if so, who? No   Financial Resources Medicare   Community Resources ECF/Home Care     CM in to see Pt to follow up on discharge planning.  Pt from home with family and [facility].  Pt denies any needs at this time.  CM following

## 2024-06-20 NOTE — ED NOTES
Hospitalist at the bedside discussing transfer with patient and family. Patient changed into a gown at this time. All belongings given to family including glasses and phone.

## 2024-06-20 NOTE — CONSENT
Informed Consent for Blood Component Transfusion Note    I have discussed with the patient the rationale for blood component transfusion; its benefits in treating or preventing fatigue, organ damage, or death; and its risk which includes mild transfusion reactions, rare risk of blood borne infection, or more serious but rare reactions. I have discussed the alternatives to transfusion, including the risk and consequences of not receiving transfusion. The patient had an opportunity to ask questions and had agreed to proceed with transfusion of blood components.    Electronically signed by Vishal Sol MD on 6/19/24 at 8:16 PM EDT

## 2024-06-21 ENCOUNTER — CARE COORDINATION (OUTPATIENT)
Dept: CARE COORDINATION | Age: 84
End: 2024-06-21

## 2024-06-21 ENCOUNTER — ANESTHESIA EVENT (OUTPATIENT)
Dept: ENDOSCOPY | Age: 84
End: 2024-06-21
Payer: MEDICARE

## 2024-06-21 LAB
25(OH)D3 SERPL-MCNC: 14.2 NG/ML
ALBUMIN SERPL-MCNC: 3.6 GM/DL (ref 3.4–5)
ALP BLD-CCNC: 77 IU/L (ref 40–128)
ALT SERPL-CCNC: 13 U/L (ref 10–40)
AMMONIA: 27 UMOL/L (ref 16–60)
ANION GAP SERPL CALCULATED.3IONS-SCNC: 11 MMOL/L (ref 7–16)
AST SERPL-CCNC: 17 IU/L (ref 15–37)
BASOPHILS ABSOLUTE: 0 K/CU MM
BASOPHILS RELATIVE PERCENT: 0.5 % (ref 0–1)
BILIRUB SERPL-MCNC: 0.4 MG/DL (ref 0–1)
BILIRUBIN, URINE: NEGATIVE MG/DL
BLOOD, URINE: ABNORMAL
BUN SERPL-MCNC: 31 MG/DL (ref 6–23)
CALCIUM SERPL-MCNC: 8.6 MG/DL (ref 8.3–10.6)
CHLORIDE BLD-SCNC: 104 MMOL/L (ref 99–110)
CHLORIDE URINE RANDOM: 75 MMOL/L (ref 43–210)
CLARITY: CLEAR
CO2: 24 MMOL/L (ref 21–32)
COLOR: YELLOW
CREAT SERPL-MCNC: 1.9 MG/DL (ref 0.9–1.3)
DIFFERENTIAL TYPE: ABNORMAL
EOSINOPHILS ABSOLUTE: 0.2 K/CU MM
EOSINOPHILS RELATIVE PERCENT: 4.4 % (ref 0–3)
FOLATE SERPL-MCNC: 19.7 NG/ML (ref 3.1–17.5)
GFR, ESTIMATED: 35 ML/MIN/1.73M2
GLUCOSE BLD-MCNC: 193 MG/DL (ref 70–99)
GLUCOSE BLD-MCNC: 240 MG/DL (ref 70–99)
GLUCOSE BLD-MCNC: 257 MG/DL (ref 70–99)
GLUCOSE BLD-MCNC: 310 MG/DL (ref 70–99)
GLUCOSE BLD-MCNC: 375 MG/DL (ref 70–99)
GLUCOSE SERPL-MCNC: 286 MG/DL (ref 70–99)
GLUCOSE URINE: >1000 MG/DL
HCT VFR BLD CALC: 28.5 % (ref 42–52)
HCT VFR BLD CALC: 29.1 % (ref 42–52)
HCT VFR BLD CALC: 29.5 % (ref 42–52)
HCT VFR BLD CALC: 30.8 % (ref 42–52)
HEMOGLOBIN: 8.5 GM/DL (ref 13.5–18)
HEMOGLOBIN: 9.2 GM/DL (ref 13.5–18)
IMMATURE NEUTROPHIL %: 1.3 % (ref 0–0.43)
KETONES, URINE: NEGATIVE MG/DL
LEUKOCYTE ESTERASE, URINE: NEGATIVE
LYMPHOCYTES ABSOLUTE: 0.9 K/CU MM
LYMPHOCYTES RELATIVE PERCENT: 23 % (ref 24–44)
MCH RBC QN AUTO: 27.5 PG (ref 27–31)
MCHC RBC AUTO-ENTMCNC: 28.8 % (ref 32–36)
MCV RBC AUTO: 95.5 FL (ref 78–100)
MONOCYTES ABSOLUTE: 0.3 K/CU MM
MONOCYTES RELATIVE PERCENT: 8 % (ref 0–4)
NEUTROPHILS ABSOLUTE: 2.4 K/CU MM
NEUTROPHILS RELATIVE PERCENT: 62.8 % (ref 36–66)
NITRITE URINE, QUANTITATIVE: NEGATIVE
NUCLEATED RBC %: 0 %
PDW BLD-RTO: 16 % (ref 11.7–14.9)
PH, URINE: 6 (ref 5–8)
PLATELET # BLD: 174 K/CU MM (ref 140–440)
PMV BLD AUTO: 10.6 FL (ref 7.5–11.1)
POTASSIUM SERPL-SCNC: 4.7 MMOL/L (ref 3.5–5.1)
POTASSIUM, UR: 23 MMOL/L (ref 22–119)
PROTEIN UA: ABNORMAL MG/DL
RBC # BLD: 3.09 M/CU MM (ref 4.6–6.2)
RBC URINE: <1 /HPF (ref 0–3)
RPR SER QL: NON REACTIVE
RPR SER QL: NORMAL
SODIUM BLD-SCNC: 139 MMOL/L (ref 135–145)
SODIUM URINE: 88 MMOL/L (ref 35–167)
SPECIFIC GRAVITY UA: 1.02 (ref 1–1.03)
THYROGLOB AB SERPL-ACNC: <0.9 IU/ML (ref 0–4)
THYROPEROXIDASE AB SERPL-ACNC: 0.3 IU/ML (ref 0–9)
TOTAL IMMATURE NEUTOROPHIL: 0.05 K/CU MM
TOTAL NUCLEATED RBC: 0 K/CU MM
TOTAL PROTEIN: 5.9 GM/DL (ref 6.4–8.2)
UROBILINOGEN, URINE: 0.2 MG/DL (ref 0.2–1)
VITAMIN B-12: 1806 PG/ML (ref 211–911)
WBC # BLD: 3.9 K/CU MM (ref 4–10.5)
WBC UA: <1 /HPF (ref 0–2)

## 2024-06-21 PROCEDURE — 99221 1ST HOSP IP/OBS SF/LOW 40: CPT | Performed by: NURSE PRACTITIONER

## 2024-06-21 PROCEDURE — 2580000003 HC RX 258: Performed by: STUDENT IN AN ORGANIZED HEALTH CARE EDUCATION/TRAINING PROGRAM

## 2024-06-21 PROCEDURE — 82140 ASSAY OF AMMONIA: CPT

## 2024-06-21 PROCEDURE — 81001 URINALYSIS AUTO W/SCOPE: CPT

## 2024-06-21 PROCEDURE — 6370000000 HC RX 637 (ALT 250 FOR IP): Performed by: STUDENT IN AN ORGANIZED HEALTH CARE EDUCATION/TRAINING PROGRAM

## 2024-06-21 PROCEDURE — 81003 URINALYSIS AUTO W/O SCOPE: CPT

## 2024-06-21 PROCEDURE — 84540 ASSAY OF URINE/UREA-N: CPT

## 2024-06-21 PROCEDURE — 6370000000 HC RX 637 (ALT 250 FOR IP): Performed by: NURSE PRACTITIONER

## 2024-06-21 PROCEDURE — 85018 HEMOGLOBIN: CPT

## 2024-06-21 PROCEDURE — 80048 BASIC METABOLIC PNL TOTAL CA: CPT

## 2024-06-21 PROCEDURE — 84133 ASSAY OF URINE POTASSIUM: CPT

## 2024-06-21 PROCEDURE — 6360000002 HC RX W HCPCS: Performed by: STUDENT IN AN ORGANIZED HEALTH CARE EDUCATION/TRAINING PROGRAM

## 2024-06-21 PROCEDURE — 80053 COMPREHEN METABOLIC PANEL: CPT

## 2024-06-21 PROCEDURE — 86780 TREPONEMA PALLIDUM: CPT

## 2024-06-21 PROCEDURE — 82962 GLUCOSE BLOOD TEST: CPT

## 2024-06-21 PROCEDURE — 82607 VITAMIN B-12: CPT

## 2024-06-21 PROCEDURE — 82306 VITAMIN D 25 HYDROXY: CPT

## 2024-06-21 PROCEDURE — 1200000000 HC SEMI PRIVATE

## 2024-06-21 PROCEDURE — 86592 SYPHILIS TEST NON-TREP QUAL: CPT

## 2024-06-21 PROCEDURE — 94761 N-INVAS EAR/PLS OXIMETRY MLT: CPT

## 2024-06-21 PROCEDURE — 85025 COMPLETE CBC W/AUTO DIFF WBC: CPT

## 2024-06-21 PROCEDURE — 82436 ASSAY OF URINE CHLORIDE: CPT

## 2024-06-21 PROCEDURE — 82746 ASSAY OF FOLIC ACID SERUM: CPT

## 2024-06-21 PROCEDURE — 82570 ASSAY OF URINE CREATININE: CPT

## 2024-06-21 PROCEDURE — 85014 HEMATOCRIT: CPT

## 2024-06-21 PROCEDURE — 84300 ASSAY OF URINE SODIUM: CPT

## 2024-06-21 PROCEDURE — C9113 INJ PANTOPRAZOLE SODIUM, VIA: HCPCS | Performed by: STUDENT IN AN ORGANIZED HEALTH CARE EDUCATION/TRAINING PROGRAM

## 2024-06-21 PROCEDURE — 36415 COLL VENOUS BLD VENIPUNCTURE: CPT

## 2024-06-21 RX ORDER — VITAMIN B COMPLEX
6000 TABLET ORAL DAILY
Status: DISCONTINUED | OUTPATIENT
Start: 2024-06-21 | End: 2024-06-23 | Stop reason: HOSPADM

## 2024-06-21 RX ORDER — VITAMIN B COMPLEX
2000 TABLET ORAL DAILY
Status: DISCONTINUED | OUTPATIENT
Start: 2024-06-28 | End: 2024-06-23 | Stop reason: HOSPADM

## 2024-06-21 RX ORDER — OXYCODONE AND ACETAMINOPHEN 7.5; 325 MG/1; MG/1
1 TABLET ORAL EVERY 8 HOURS PRN
Status: DISCONTINUED | OUTPATIENT
Start: 2024-06-21 | End: 2024-06-23 | Stop reason: HOSPADM

## 2024-06-21 RX ORDER — FLUOXETINE HYDROCHLORIDE 20 MG/1
40 CAPSULE ORAL DAILY
Status: DISCONTINUED | OUTPATIENT
Start: 2024-06-21 | End: 2024-06-23 | Stop reason: HOSPADM

## 2024-06-21 RX ORDER — INSULIN GLARGINE 100 [IU]/ML
30 INJECTION, SOLUTION SUBCUTANEOUS 2 TIMES DAILY
Status: DISCONTINUED | OUTPATIENT
Start: 2024-06-21 | End: 2024-06-23 | Stop reason: HOSPADM

## 2024-06-21 RX ORDER — INSULIN LISPRO 100 [IU]/ML
10 INJECTION, SOLUTION INTRAVENOUS; SUBCUTANEOUS
Status: DISCONTINUED | OUTPATIENT
Start: 2024-06-21 | End: 2024-06-23 | Stop reason: HOSPADM

## 2024-06-21 RX ADMIN — ATORVASTATIN CALCIUM 40 MG: 40 TABLET, FILM COATED ORAL at 20:29

## 2024-06-21 RX ADMIN — SODIUM CHLORIDE, PRESERVATIVE FREE 10 ML: 5 INJECTION INTRAVENOUS at 20:36

## 2024-06-21 RX ADMIN — OXYCODONE AND ACETAMINOPHEN 1 TABLET: 7.5; 325 TABLET ORAL at 00:27

## 2024-06-21 RX ADMIN — INSULIN LISPRO 10 UNITS: 100 INJECTION, SOLUTION INTRAVENOUS; SUBCUTANEOUS at 09:05

## 2024-06-21 RX ADMIN — INSULIN GLARGINE 30 UNITS: 100 INJECTION, SOLUTION SUBCUTANEOUS at 09:05

## 2024-06-21 RX ADMIN — METOPROLOL TARTRATE 25 MG: 25 TABLET, FILM COATED ORAL at 09:06

## 2024-06-21 RX ADMIN — INSULIN LISPRO 10 UNITS: 100 INJECTION, SOLUTION INTRAVENOUS; SUBCUTANEOUS at 12:17

## 2024-06-21 RX ADMIN — INSULIN LISPRO 2 UNITS: 100 INJECTION, SOLUTION INTRAVENOUS; SUBCUTANEOUS at 17:56

## 2024-06-21 RX ADMIN — SODIUM CHLORIDE, PRESERVATIVE FREE 40 MG: 5 INJECTION INTRAVENOUS at 20:29

## 2024-06-21 RX ADMIN — INSULIN GLARGINE 30 UNITS: 100 INJECTION, SOLUTION SUBCUTANEOUS at 20:41

## 2024-06-21 RX ADMIN — METOPROLOL TARTRATE 25 MG: 25 TABLET, FILM COATED ORAL at 20:28

## 2024-06-21 RX ADMIN — SODIUM CHLORIDE, PRESERVATIVE FREE 40 MG: 5 INJECTION INTRAVENOUS at 09:06

## 2024-06-21 RX ADMIN — FLUOXETINE HYDROCHLORIDE 40 MG: 20 CAPSULE ORAL at 12:12

## 2024-06-21 RX ADMIN — CHOLESTYRAMINE 4 G: 4 POWDER, FOR SUSPENSION ORAL at 09:05

## 2024-06-21 RX ADMIN — Medication 6000 UNITS: at 20:35

## 2024-06-21 RX ADMIN — ISOSORBIDE MONONITRATE 30 MG: 30 TABLET, EXTENDED RELEASE ORAL at 09:06

## 2024-06-21 RX ADMIN — INSULIN LISPRO 8 UNITS: 100 INJECTION, SOLUTION INTRAVENOUS; SUBCUTANEOUS at 09:05

## 2024-06-21 RX ADMIN — INSULIN LISPRO 10 UNITS: 100 INJECTION, SOLUTION INTRAVENOUS; SUBCUTANEOUS at 17:57

## 2024-06-21 RX ADMIN — SODIUM CHLORIDE, PRESERVATIVE FREE 10 ML: 5 INJECTION INTRAVENOUS at 09:06

## 2024-06-21 RX ADMIN — INSULIN LISPRO 4 UNITS: 100 INJECTION, SOLUTION INTRAVENOUS; SUBCUTANEOUS at 12:16

## 2024-06-21 RX ADMIN — OXYCODONE AND ACETAMINOPHEN 1 TABLET: 7.5; 325 TABLET ORAL at 06:09

## 2024-06-21 RX ADMIN — ONDANSETRON 4 MG: 2 INJECTION INTRAMUSCULAR; INTRAVENOUS at 06:09

## 2024-06-21 RX ADMIN — RANOLAZINE 500 MG: 500 TABLET, EXTENDED RELEASE ORAL at 09:06

## 2024-06-21 ASSESSMENT — PAIN SCALES - GENERAL
PAINLEVEL_OUTOF10: 0
PAINLEVEL_OUTOF10: 3
PAINLEVEL_OUTOF10: 6
PAINLEVEL_OUTOF10: 6
PAINLEVEL_OUTOF10: 4
PAINLEVEL_OUTOF10: 8
PAINLEVEL_OUTOF10: 0
PAINLEVEL_OUTOF10: 7
PAINLEVEL_OUTOF10: 0

## 2024-06-21 ASSESSMENT — PAIN - FUNCTIONAL ASSESSMENT: PAIN_FUNCTIONAL_ASSESSMENT: PREVENTS OR INTERFERES SOME ACTIVE ACTIVITIES AND ADLS

## 2024-06-21 ASSESSMENT — PAIN DESCRIPTION - LOCATION
LOCATION: BACK;LEG
LOCATION: BACK
LOCATION: BACK

## 2024-06-21 ASSESSMENT — PAIN SCALES - WONG BAKER
WONGBAKER_NUMERICALRESPONSE: HURTS A LITTLE BIT
WONGBAKER_NUMERICALRESPONSE: HURTS LITTLE MORE
WONGBAKER_NUMERICALRESPONSE: HURTS EVEN MORE

## 2024-06-21 ASSESSMENT — PAIN DESCRIPTION - DESCRIPTORS
DESCRIPTORS: ACHING
DESCRIPTORS: OTHER (COMMENT)

## 2024-06-21 ASSESSMENT — PAIN DESCRIPTION - ORIENTATION
ORIENTATION: LOWER
ORIENTATION: MID;LOWER;LEFT;RIGHT

## 2024-06-21 ASSESSMENT — LIFESTYLE VARIABLES: SMOKING_STATUS: 0

## 2024-06-21 NOTE — CONSULTS
CONSULT DICTATED #[id number]  
Initial Psychiatric History and Physical    Abelardo Marquis  [id number]  6/19/2024 06/21/24    ID: Patient is a 84 yrs y.o. 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 SOS was called. He then endorsed SI with plan to hang himself which he did later retract. . Consults include endocrinology,  gastroenterology, Internal medicine. Psychiatry consulted by Vianey Graves CNP due to \"suicide ideations.\"    Li Echeverria RN- During SOS this am pt confused thinking that staff and security was holding him captive at a [facility]. Pt agitated and had stated \" I am just going to hang myself\". This RN stated you dont want to do that! He stated \"well I guess you will find out in the news paper when I do.\" This RN stated \" I am sorry that you feel that way\" Pt stated \"not as sorry as I am.\" Suicide precautions initiated. Alona RN stayed with patient until a sitter was able to be at bedside. This RN performed suicide screening questions. Pt stated \"I was just mad and running my mouth, I would not kill myself. I want to go to [facility] and you cannot [facility] if you kill yourself.\" Pt also informed this RN that he is a retired  and has prevented many suicide attempts.He verbalized that he has his family to live for but did agree that he has had times when he wished he didn't wake up but he denies any previous attempts of harming himself.      Met with patient at bedside. Sitter is present and leaves for privacy. 
  Final Result   No acute radiographic process or significant change from prior   exams.         Electronically signed by Mario NAVARRO GI BLOOD LOSS    (Results Pending)          Scheduled Medicines   Medications:    insulin lispro  0-8 Units SubCUTAneous TID WC    insulin lispro  0-4 Units SubCUTAneous Nightly    [Held by provider] aspirin  81 mg Oral Daily    atorvastatin  40 mg Oral Nightly    cholestyramine light  4 g Oral Daily    [Held by provider] empagliflozin  25 mg Oral Daily    [Held by provider] furosemide  40 mg Oral Daily    isosorbide mononitrate  30 mg Oral Daily    metoprolol tartrate  25 mg Oral BID    oxyCODONE-acetaminophen  1 tablet Oral Q6H    ranolazine  500 mg Oral Daily    insulin glargine  20 Units SubCUTAneous BID    sodium chloride flush  5-40 mL IntraVENous BID    pantoprazole (PROTONIX) 40 mg in sodium chloride (PF) 0.9 % 10 mL injection  40 mg IntraVENous BID    sodium chloride  500 mL IntraVENous Once      Infusions:    sodium chloride      dextrose      sodium chloride           IMPRESSION    Patient Active Problem List   Diagnosis    Essential hypertension, benign    Type 2 diabetes mellitus, with long-term current use of insulin (HCC)    Other and unspecified hyperlipidemia    Chest pain    Coronary atherosclerosis    Chronic kidney disease, stage III (moderate) (HCC)    Cellulitis of leg    Cellulitis of lower leg    Lethargy    Acute respiratory failure with hypoxia and hypercapnia (HCC)    Altered mental status, unspecified    Hypoglycemia    Acute kidney injury (HCC)    Generalized weakness    Liver cirrhosis (HCC)    Depression    Chronic pain    Encephalopathy    COVID-19    Acute hypoxemic respiratory failure (HCC)    Acute anemia    Hypotension    GI bleed         RECOMMENDATIONS:      Reviewed POC blood glucose . Labs and X ray results   Reviewed Home and Current Medicines   Will Start On meal/ Correction bolus Humalog/ Lantus Insulin regime   Monitor Blood glucose 
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    FAMILY HISTORY:  The patient's sister was diagnosed with carcinoma of the lung and father with lymphoma.    MEDICATIONS:  Please refer to chart (the patient is not taking NSAIDs or anticoagulants at present).    SOCIOECONOMIC HISTORY:  No history of EtOH abuse.  The patient does not smoke cigarettes.    PAST SURGICAL HISTORY:  The patient has had total knee replacement done, sinus surgery, dilatation of the esophagus, cholecystectomy, PTCA with coronary stents placed, last PCI was in August 2023, and back surgery.    ALLERGIES:  THE PATIENT IS ALLERGIC TO SULFA ANTIBIOTICS.      PHYSICAL EXAMINATION:  GENERAL:  Shows an 84-year-old white male who is obese, lying flat in bed, in no acute distress.  He is awake, alert, and oriented and pleasant to talk with.  VITAL SIGNS:  Stable.  HEENT:  Shows conjunctivae to be pale.  NECK:  Supple.  CHEST:  Clear.  HEART:  S1, S2 normal.  ABDOMEN:  Soft, nontender, nondistended.  Liver and spleen are not palpable.  RECTAL:  Deferred.  CNS:  Shows the patient to be awake, alert, and oriented.  There are no focal sensorimotor signs.  MUSCULOSKELETAL SYSTEM:  Exam shows evidence of degenerative joint disease changes.    LABORATORY DATA:  As above mentioned.    IMPRESSION:  This is an 84-year-old white male with multiple comorbidities including history of chronic liver disease secondary to metabolic dysfunction-associated steatotic liver disease, presents with sudden onset of near syncopal episode, melenic stools, and severe anemia with hemoglobin of 5.7 g percent, status post blood transfusion, with the repeat hemoglobin of 8.7.  Rule out gastrointestinal bleeding source, most likely upper

## 2024-06-22 ENCOUNTER — APPOINTMENT (OUTPATIENT)
Dept: MRI IMAGING | Age: 84
DRG: 377 | End: 2024-06-22
Payer: MEDICARE

## 2024-06-22 ENCOUNTER — ANESTHESIA (OUTPATIENT)
Dept: ENDOSCOPY | Age: 84
End: 2024-06-22
Payer: MEDICARE

## 2024-06-22 LAB
ALBUMIN SERPL-MCNC: 3.1 GM/DL (ref 3.4–5)
ALP BLD-CCNC: 67 IU/L (ref 40–128)
ALT SERPL-CCNC: 11 U/L (ref 10–40)
ANION GAP SERPL CALCULATED.3IONS-SCNC: 9 MMOL/L (ref 7–16)
APTT: 33.9 SECONDS (ref 25.1–37.1)
AST SERPL-CCNC: 14 IU/L (ref 15–37)
BASOPHILS ABSOLUTE: 0 K/CU MM
BASOPHILS RELATIVE PERCENT: 0.8 % (ref 0–1)
BILIRUB SERPL-MCNC: 0.6 MG/DL (ref 0–1)
BUN SERPL-MCNC: 23 MG/DL (ref 6–23)
CALCIUM SERPL-MCNC: 8.4 MG/DL (ref 8.3–10.6)
CHLORIDE BLD-SCNC: 108 MMOL/L (ref 99–110)
CO2: 25 MMOL/L (ref 21–32)
CREAT SERPL-MCNC: 1.7 MG/DL (ref 0.9–1.3)
CREAT UR-MCNC: 86.7 MG/DL (ref 39–259)
DIFFERENTIAL TYPE: ABNORMAL
EOSINOPHILS ABSOLUTE: 0.1 K/CU MM
EOSINOPHILS RELATIVE PERCENT: 5.4 % (ref 0–3)
GFR, ESTIMATED: 40 ML/MIN/1.73M2
GLUCOSE BLD-MCNC: 185 MG/DL (ref 70–99)
GLUCOSE BLD-MCNC: 201 MG/DL (ref 70–99)
GLUCOSE BLD-MCNC: 252 MG/DL (ref 70–99)
GLUCOSE BLD-MCNC: 261 MG/DL (ref 70–99)
GLUCOSE BLD-MCNC: 263 MG/DL (ref 70–99)
GLUCOSE SERPL-MCNC: 183 MG/DL (ref 70–99)
HCT VFR BLD CALC: 27.6 % (ref 42–52)
HEMOGLOBIN: 8.1 GM/DL (ref 13.5–18)
IMMATURE NEUTROPHIL %: 0.8 % (ref 0–0.43)
INR BLD: 1.1 INDEX
LYMPHOCYTES ABSOLUTE: 0.8 K/CU MM
LYMPHOCYTES RELATIVE PERCENT: 30.3 % (ref 24–44)
MCH RBC QN AUTO: 27.9 PG (ref 27–31)
MCHC RBC AUTO-ENTMCNC: 29.3 % (ref 32–36)
MCV RBC AUTO: 95.2 FL (ref 78–100)
MONOCYTES ABSOLUTE: 0.3 K/CU MM
MONOCYTES RELATIVE PERCENT: 10.7 % (ref 0–4)
NEUTROPHILS ABSOLUTE: 1.4 K/CU MM
NEUTROPHILS RELATIVE PERCENT: 52 % (ref 36–66)
NUCLEATED RBC %: 0 %
PDW BLD-RTO: 15.7 % (ref 11.7–14.9)
PLATELET # BLD: 136 K/CU MM (ref 140–440)
PMV BLD AUTO: 10.3 FL (ref 7.5–11.1)
POTASSIUM SERPL-SCNC: 4.5 MMOL/L (ref 3.5–5.1)
PROTHROMBIN TIME: 14.3 SECONDS (ref 11.7–14.5)
RBC # BLD: 2.9 M/CU MM (ref 4.6–6.2)
SODIUM BLD-SCNC: 142 MMOL/L (ref 135–145)
TOTAL IMMATURE NEUTOROPHIL: 0.02 K/CU MM
TOTAL NUCLEATED RBC: 0 K/CU MM
TOTAL PROTEIN: 5.4 GM/DL (ref 6.4–8.2)
WBC # BLD: 2.6 K/CU MM (ref 4–10.5)

## 2024-06-22 PROCEDURE — 3700000001 HC ADD 15 MINUTES (ANESTHESIA): Performed by: SPECIALIST

## 2024-06-22 PROCEDURE — 6360000002 HC RX W HCPCS: Performed by: STUDENT IN AN ORGANIZED HEALTH CARE EDUCATION/TRAINING PROGRAM

## 2024-06-22 PROCEDURE — 6370000000 HC RX 637 (ALT 250 FOR IP): Performed by: STUDENT IN AN ORGANIZED HEALTH CARE EDUCATION/TRAINING PROGRAM

## 2024-06-22 PROCEDURE — C9113 INJ PANTOPRAZOLE SODIUM, VIA: HCPCS | Performed by: STUDENT IN AN ORGANIZED HEALTH CARE EDUCATION/TRAINING PROGRAM

## 2024-06-22 PROCEDURE — 0DJD8ZZ INSPECTION OF LOWER INTESTINAL TRACT, VIA NATURAL OR ARTIFICIAL OPENING ENDOSCOPIC: ICD-10-PCS | Performed by: SPECIALIST

## 2024-06-22 PROCEDURE — 3609013900 HC ENTEROSCOPY: Performed by: SPECIALIST

## 2024-06-22 PROCEDURE — 3E033XZ INTRODUCTION OF VASOPRESSOR INTO PERIPHERAL VEIN, PERCUTANEOUS APPROACH: ICD-10-PCS | Performed by: STUDENT IN AN ORGANIZED HEALTH CARE EDUCATION/TRAINING PROGRAM

## 2024-06-22 PROCEDURE — 3700000000 HC ANESTHESIA ATTENDED CARE: Performed by: SPECIALIST

## 2024-06-22 PROCEDURE — 94761 N-INVAS EAR/PLS OXIMETRY MLT: CPT

## 2024-06-22 PROCEDURE — A9577 INJ MULTIHANCE: HCPCS | Performed by: STUDENT IN AN ORGANIZED HEALTH CARE EDUCATION/TRAINING PROGRAM

## 2024-06-22 PROCEDURE — 80053 COMPREHEN METABOLIC PANEL: CPT

## 2024-06-22 PROCEDURE — 6370000000 HC RX 637 (ALT 250 FOR IP): Performed by: NURSE PRACTITIONER

## 2024-06-22 PROCEDURE — 82962 GLUCOSE BLOOD TEST: CPT

## 2024-06-22 PROCEDURE — 85730 THROMBOPLASTIN TIME PARTIAL: CPT

## 2024-06-22 PROCEDURE — 2580000003 HC RX 258

## 2024-06-22 PROCEDURE — 85025 COMPLETE CBC W/AUTO DIFF WBC: CPT

## 2024-06-22 PROCEDURE — 1200000000 HC SEMI PRIVATE

## 2024-06-22 PROCEDURE — 85014 HEMATOCRIT: CPT

## 2024-06-22 PROCEDURE — 6360000002 HC RX W HCPCS: Performed by: NURSE PRACTITIONER

## 2024-06-22 PROCEDURE — 85018 HEMOGLOBIN: CPT

## 2024-06-22 PROCEDURE — 36415 COLL VENOUS BLD VENIPUNCTURE: CPT

## 2024-06-22 PROCEDURE — 70553 MRI BRAIN STEM W/O & W/DYE: CPT

## 2024-06-22 PROCEDURE — 2580000003 HC RX 258: Performed by: STUDENT IN AN ORGANIZED HEALTH CARE EDUCATION/TRAINING PROGRAM

## 2024-06-22 PROCEDURE — 0DJ08ZZ INSPECTION OF UPPER INTESTINAL TRACT, VIA NATURAL OR ARTIFICIAL OPENING ENDOSCOPIC: ICD-10-PCS | Performed by: SPECIALIST

## 2024-06-22 PROCEDURE — 2500000003 HC RX 250 WO HCPCS

## 2024-06-22 PROCEDURE — 6360000002 HC RX W HCPCS

## 2024-06-22 PROCEDURE — 85610 PROTHROMBIN TIME: CPT

## 2024-06-22 PROCEDURE — 6360000004 HC RX CONTRAST MEDICATION: Performed by: STUDENT IN AN ORGANIZED HEALTH CARE EDUCATION/TRAINING PROGRAM

## 2024-06-22 RX ORDER — GABAPENTIN 400 MG/1
400 CAPSULE ORAL 3 TIMES DAILY
Status: DISCONTINUED | OUTPATIENT
Start: 2024-06-22 | End: 2024-06-23 | Stop reason: HOSPADM

## 2024-06-22 RX ORDER — PROPOFOL 10 MG/ML
INJECTION, EMULSION INTRAVENOUS PRN
Status: DISCONTINUED | OUTPATIENT
Start: 2024-06-22 | End: 2024-06-22 | Stop reason: SDUPTHER

## 2024-06-22 RX ORDER — MORPHINE SULFATE 2 MG/ML
2 INJECTION, SOLUTION INTRAMUSCULAR; INTRAVENOUS EVERY 4 HOURS PRN
Status: DISCONTINUED | OUTPATIENT
Start: 2024-06-22 | End: 2024-06-23 | Stop reason: HOSPADM

## 2024-06-22 RX ORDER — SODIUM CHLORIDE 9 MG/ML
INJECTION, SOLUTION INTRAVENOUS CONTINUOUS PRN
Status: DISCONTINUED | OUTPATIENT
Start: 2024-06-22 | End: 2024-06-22 | Stop reason: SDUPTHER

## 2024-06-22 RX ORDER — LIDOCAINE HYDROCHLORIDE 20 MG/ML
INJECTION, SOLUTION EPIDURAL; INFILTRATION; INTRACAUDAL; PERINEURAL PRN
Status: DISCONTINUED | OUTPATIENT
Start: 2024-06-22 | End: 2024-06-22 | Stop reason: SDUPTHER

## 2024-06-22 RX ADMIN — SODIUM CHLORIDE, PRESERVATIVE FREE 10 ML: 5 INJECTION INTRAVENOUS at 08:45

## 2024-06-22 RX ADMIN — CHOLESTYRAMINE 4 G: 4 POWDER, FOR SUSPENSION ORAL at 08:42

## 2024-06-22 RX ADMIN — INSULIN LISPRO 4 UNITS: 100 INJECTION, SOLUTION INTRAVENOUS; SUBCUTANEOUS at 18:09

## 2024-06-22 RX ADMIN — METOPROLOL TARTRATE 25 MG: 25 TABLET, FILM COATED ORAL at 21:06

## 2024-06-22 RX ADMIN — OXYCODONE AND ACETAMINOPHEN 1 TABLET: 7.5; 325 TABLET ORAL at 00:02

## 2024-06-22 RX ADMIN — SODIUM CHLORIDE, PRESERVATIVE FREE 10 ML: 5 INJECTION INTRAVENOUS at 21:07

## 2024-06-22 RX ADMIN — GABAPENTIN 400 MG: 400 CAPSULE ORAL at 21:06

## 2024-06-22 RX ADMIN — PHENYLEPHRINE HYDROCHLORIDE 100 MCG: 10 INJECTION INTRAVENOUS at 07:35

## 2024-06-22 RX ADMIN — ACETAMINOPHEN 650 MG: 325 TABLET ORAL at 16:12

## 2024-06-22 RX ADMIN — INSULIN LISPRO 2 UNITS: 100 INJECTION, SOLUTION INTRAVENOUS; SUBCUTANEOUS at 08:43

## 2024-06-22 RX ADMIN — PHENYLEPHRINE HYDROCHLORIDE 100 MCG: 10 INJECTION INTRAVENOUS at 07:28

## 2024-06-22 RX ADMIN — INSULIN LISPRO 10 UNITS: 100 INJECTION, SOLUTION INTRAVENOUS; SUBCUTANEOUS at 18:10

## 2024-06-22 RX ADMIN — PROPOFOL 240 MG: 10 INJECTION, EMULSION INTRAVENOUS at 07:09

## 2024-06-22 RX ADMIN — ISOSORBIDE MONONITRATE 30 MG: 30 TABLET, EXTENDED RELEASE ORAL at 08:42

## 2024-06-22 RX ADMIN — ATORVASTATIN CALCIUM 40 MG: 40 TABLET, FILM COATED ORAL at 21:06

## 2024-06-22 RX ADMIN — Medication 6000 UNITS: at 08:56

## 2024-06-22 RX ADMIN — INSULIN LISPRO 10 UNITS: 100 INJECTION, SOLUTION INTRAVENOUS; SUBCUTANEOUS at 12:29

## 2024-06-22 RX ADMIN — INSULIN LISPRO 4 UNITS: 100 INJECTION, SOLUTION INTRAVENOUS; SUBCUTANEOUS at 12:29

## 2024-06-22 RX ADMIN — INSULIN GLARGINE 30 UNITS: 100 INJECTION, SOLUTION SUBCUTANEOUS at 08:43

## 2024-06-22 RX ADMIN — LIDOCAINE HYDROCHLORIDE 100 MG: 20 INJECTION, SOLUTION EPIDURAL; INFILTRATION; INTRACAUDAL; PERINEURAL at 07:09

## 2024-06-22 RX ADMIN — OXYCODONE AND ACETAMINOPHEN 1 TABLET: 7.5; 325 TABLET ORAL at 19:55

## 2024-06-22 RX ADMIN — RANOLAZINE 500 MG: 500 TABLET, EXTENDED RELEASE ORAL at 08:42

## 2024-06-22 RX ADMIN — INSULIN GLARGINE 30 UNITS: 100 INJECTION, SOLUTION SUBCUTANEOUS at 21:20

## 2024-06-22 RX ADMIN — GADOBENATE DIMEGLUMINE 20 ML: 529 INJECTION, SOLUTION INTRAVENOUS at 13:30

## 2024-06-22 RX ADMIN — SODIUM CHLORIDE, PRESERVATIVE FREE 40 MG: 5 INJECTION INTRAVENOUS at 08:45

## 2024-06-22 RX ADMIN — MORPHINE SULFATE 2 MG: 2 INJECTION, SOLUTION INTRAMUSCULAR; INTRAVENOUS at 23:07

## 2024-06-22 RX ADMIN — SODIUM CHLORIDE: 9 INJECTION, SOLUTION INTRAVENOUS at 07:08

## 2024-06-22 RX ADMIN — PHENYLEPHRINE HYDROCHLORIDE 100 MCG: 10 INJECTION INTRAVENOUS at 07:31

## 2024-06-22 RX ADMIN — PHENYLEPHRINE HYDROCHLORIDE 100 MCG: 10 INJECTION INTRAVENOUS at 07:23

## 2024-06-22 RX ADMIN — SODIUM CHLORIDE, PRESERVATIVE FREE 40 MG: 5 INJECTION INTRAVENOUS at 21:05

## 2024-06-22 RX ADMIN — FLUOXETINE HYDROCHLORIDE 40 MG: 20 CAPSULE ORAL at 08:42

## 2024-06-22 RX ADMIN — METOPROLOL TARTRATE 25 MG: 25 TABLET, FILM COATED ORAL at 08:43

## 2024-06-22 RX ADMIN — INSULIN LISPRO 10 UNITS: 100 INJECTION, SOLUTION INTRAVENOUS; SUBCUTANEOUS at 08:44

## 2024-06-22 RX ADMIN — OXYCODONE AND ACETAMINOPHEN 1 TABLET: 7.5; 325 TABLET ORAL at 10:31

## 2024-06-22 ASSESSMENT — PAIN DESCRIPTION - LOCATION
LOCATION: BACK;LEG
LOCATION: BACK
LOCATION: BACK;LEG
LOCATION: BACK
LOCATION: LEG;BACK
LOCATION: BACK;LEG
LOCATION: BACK;LEG

## 2024-06-22 ASSESSMENT — PAIN DESCRIPTION - PAIN TYPE
TYPE: CHRONIC PAIN

## 2024-06-22 ASSESSMENT — LIFESTYLE VARIABLES: SMOKING_STATUS: 0

## 2024-06-22 ASSESSMENT — PAIN DESCRIPTION - DESCRIPTORS
DESCRIPTORS: ACHING
DESCRIPTORS: ACHING;DISCOMFORT
DESCRIPTORS: ACHING
DESCRIPTORS: ACHING
DESCRIPTORS: ACHING;DISCOMFORT

## 2024-06-22 ASSESSMENT — PAIN SCALES - GENERAL
PAINLEVEL_OUTOF10: 5
PAINLEVEL_OUTOF10: 0
PAINLEVEL_OUTOF10: 7
PAINLEVEL_OUTOF10: 7
PAINLEVEL_OUTOF10: 0
PAINLEVEL_OUTOF10: 0
PAINLEVEL_OUTOF10: 7
PAINLEVEL_OUTOF10: 3
PAINLEVEL_OUTOF10: 7

## 2024-06-22 ASSESSMENT — PAIN DESCRIPTION - FREQUENCY
FREQUENCY: INTERMITTENT

## 2024-06-22 ASSESSMENT — PAIN DESCRIPTION - ORIENTATION
ORIENTATION: RIGHT;LEFT;MID;LOWER
ORIENTATION: MID;LOWER
ORIENTATION: RIGHT;LEFT;LOWER;MID
ORIENTATION: RIGHT;LEFT;LOWER;MID
ORIENTATION: RIGHT;LEFT;MID;LOWER

## 2024-06-22 NOTE — OP NOTE
[address] [place] [address]                            OPERATIVE REPORT      PATIENT NAME: BRYNN NATHAN                 : 1940  MED REC NO: [id number]                      ROOM: [facility]  ACCOUNT NO: [id number]                       ADMIT DATE: 2024  PROVIDER: Navi Castillo MD      DATE OF PROCEDURE:      SURGEON:  Navi Castillo MD    PROCEDURE:  Enteroscopy.    The patient is in room .    CHIEF COMPLAINT:  History of anemia and/or recurrent GI bleeding.    PREMEDICATION:  Please refer to the anesthesiologist's notes.    DESCRIPTION OF PROCEDURE:  The patient was placed in the left lateral decubitus position and the video Olympus gastroscope was introduced in the back of the throat and was advanced into the esophagus.    Esophagus:  The mucosa of the esophagus was unremarkable.  There was no evidence of hyperemia, erosion, ulcer, stricture, Tyson esophagus, or mass lesion.    Stomach:  The fundus, cardia, body, antrum, lesser curvature, greater curvature, and the pyloric regions of the stomach were examined.  The mucosa of the stomach was slightly hyperemic, but no erosion or ulcers were seen.  The gastroscope was retroflexed in the fundus and cardia was carefully examined and no mass lesions were seen.    Duodenum:  The first, second, and third portions of the duodenum were examined and the mucosa was unremarkable.  A single wide-mouth diverticulum was noted in the second portion of the duodenum.  No blood recent or old was noted and also no AVMs were seen either.  No biopsies were obtained.    After examining the upper GI tract with the gastroscope, the gastroscope was taken out and was replaced with a PD colonoscope which was introduced into the back of the throat.  Then advanced into the stomach and subsequently into the duodenum and all the way up to 1 meter and 60 centimeter into the jejunum.  The mucosa of the jejunum

## 2024-06-22 NOTE — H&P
NO CHANGE IN H AND P IN THE LAST 24 HRS  
(PROTONIX) 40 mg in sodium chloride (PF) 0.9 % 10 mL injection  40 mg IntraVENous Q12H      Infusions:    sodium chloride      dextrose      sodium chloride       PRN Meds: sodium chloride, , PRN  fluticasone, 1 spray, PRN  glucose, 4 tablet, PRN  dextrose bolus, 125 mL, PRN   Or  dextrose bolus, 250 mL, PRN  glucagon (rDNA), 1 mg, PRN  dextrose, , Continuous PRN  sodium chloride flush, 5-40 mL, PRN  sodium chloride, , PRN  ondansetron, 4 mg, Q8H PRN   Or  ondansetron, 4 mg, Q6H PRN  acetaminophen, 650 mg, Q6H PRN   Or  acetaminophen, 650 mg, Q6H PRN        Labs      CBC:   Recent Labs     06/19/24  1737   WBC 2.5*   HGB 5.7*   *     BMP:    Recent Labs     06/19/24  1737   *   K 4.7   CL 99   CO2 24   BUN 41*   CREATININE 2.0*   GLUCOSE 580*     Hepatic:   Recent Labs     06/19/24  1737   AST 12*   ALT 10   BILITOT 0.4   ALKPHOS 58     Lipids:   Lab Results   Component Value Date/Time    CHOL 152 03/31/2023 04:27 AM    HDL 38 03/31/2023 04:27 AM    TRIG 123 03/31/2023 04:27 AM     Hemoglobin A1C:   Lab Results   Component Value Date/Time    LABA1C 7.6 05/15/2024 08:15 PM     TSH: No results found for: \"TSH\"  Troponin:   Lab Results   Component Value Date/Time    TROPONINT 0.018 03/30/2023 01:02 AM    TROPONINT 0.028 03/29/2023 06:07 PM    TROPONINT 0.048 11/27/2022 10:56 AM     Lactic Acid: No results for input(s): \"LACTA\" in the last 72 hours.  BNP: No results for input(s): \"PROBNP\" in the last 72 hours.  UA:  Lab Results   Component Value Date/Time    NITRU NEGATIVE 06/02/2024 12:30 AM    COLORU YELLOW 06/02/2024 12:30 AM    PHUR 5.5 06/02/2024 12:30 AM    WBCUA 0-5 05/23/2023 10:19 AM    RBCUA 0-2 05/23/2023 10:19 AM    MUCUS RARE 09/20/2020 02:50 AM    TRICHOMONAS NONE SEEN 09/20/2020 02:50 AM    BACTERIA NONE SEEN 05/23/2023 10:19 AM    BACTERIA RARE 09/20/2020 02:50 AM    CLARITYU CLEAR 06/02/2024 12:30 AM    LEUKOCYTESUR NEGATIVE 06/02/2024 12:30 AM    UROBILINOGEN 0.2 06/02/2024 12:30 AM

## 2024-06-22 NOTE — BRIEF OP NOTE
Brief Postoperative Note      Abelardo Marquis is a 84 y.o. male     Pre-operative Diagnosis: ANEMIA / GIT BLEEDING    Post-operative Diagnosis: MILD GASTRITIS / DUODENAL AND JEJUNAL DIVERTICULUM / NO BLOOD NOTED UPTO  1M AND 60 CM OF PEDIATRIC COLONOSCOPE    Procedure: ENTEROSCOPY UPTO  1M-60 CM    Anesthesia: MAC    Surgeons/Assistants:Navi Castillo MD     Estimated Blood Loss: NIL    Complications: None    Specimens: were not obtained  REC-- CPM / CE AS OUTPT AND IF POSITIVE DARA / F/U H/H    Navi Castillo MD   6/22/2024   7:36 AM

## 2024-06-22 NOTE — PROCEDURES
PROCEDURE NOTE  Date: 6/22/2024   Name: Abelardo Marquis  YOB: 1940    Procedures ENTEROSCOPY REPORT DICTATED #[id number]

## 2024-06-22 NOTE — ANESTHESIA POSTPROCEDURE EVALUATION
Department of Anesthesiology  Postprocedure Note    Patient: Abelardo Marquis  MRN: [id number]  YOB: 1940  Date of evaluation: 6/22/2024    Procedure Summary       Date: 06/22/24 Room / Location: [place] [address] / [facility]    Anesthesia Start: 0708 Anesthesia Stop: 0742    Procedure: ENTEROSCOPY PUSH DIAGNOSTIC Diagnosis:       Anemia, unspecified type      Gastrointestinal hemorrhage, unspecified gastrointestinal hemorrhage type      (Anemia, unspecified type [D64.9])      (Gastrointestinal hemorrhage, unspecified gastrointestinal hemorrhage type [K92.2])    Surgeons: Navi Castillo MD Responsible Provider: Tru Kelly APRN - CRNA    Anesthesia Type: MAC ASA Status: 3            Anesthesia Type: No value filed.    Taylor Phase I:      Taylor Phase II:      Anesthesia Post Evaluation    Patient location during evaluation: bedside  Patient participation: complete - patient participated  Level of consciousness: awake  Pain score: 0  Airway patency: patent  Nausea & Vomiting: no nausea and no vomiting  Cardiovascular status: blood pressure returned to baseline and hemodynamically stable  Respiratory status: acceptable and room air  Hydration status: euvolemic  Pain management: adequate    No notable events documented.

## 2024-06-22 NOTE — PROGRESS NOTES
V2.0  [facility] [place]      Name:  Abelardo Marquis /Age/Sex: 1940  (84 y.o. male)   MRN & CSN:  [id number] & [id number] Encounter Date/Time: 2024 12:05 PM EDT    Location:  [address] [place] [address]- PCP: Georgie Hirsch MD       Hospital Day: 2    Assessment and Plan:   Abelardo Marquis is a 84 y.o. male who presents with GI bleed      Plan:    Acute on chronic anemia:  GI bleed likely the cause of #1:  Patient with multiple admissions and previously skills.  Was have outpatient colonoscopy.  Received here PR[facility] on presentation  GI consulted  Protonix  Monitor hemoglobin and transfuse equal 7     VICKY on CKD 3A-baseline creatinine 1.4-1.6, patient presented with creatinine 2.  Likely prerenal in the setting of anemia.    Monitor kidney function  Avoid nephrotoxins    CAD s/p PCI-hold patient's DAPT, continue with Lopressor, Imdur, Lipitor.  Will hold Lasix and Jardiance at this time given VICKY-Plavix.  On recent admission as per GI recommendation    Hypertension-continue home regimen    Type 2 diabetes   Elevated blood sugar  Lantus 20 units twice daily  5 units + scale with meals  POCT glucose checks  Hypoglycemia    Diet Diet NPO Exceptions are: Ice Chips   DVT Prophylaxis [] Lovenox, []  Heparin, [] SCDs, [] Ambulation,    [] Eliquis, [] Xarelto  [] Coumadin [] other   Code Status Full Code   Disposition From: home  Expected Disposition: TBD  Estimated Date of Discharge: TBD  Patient requires continued admission due to GI bleed   Surrogate Decision Maker/ POA      Personally reviewed Lab Studies and Imaging     Discussed management of the case with GI who recommended monitoring H&H      Subjective:     Chief Complaint: Rectal Bleeding (Pt has been having issues with GI bleeding and reports blood in stool. EMS reports that pt was suppose to have swallow study done today) and Hyperglycemia (EMS reports pt pts BS is >600. Pt reports he hasn't been eating much but reports he has been taking his 
  Physician Progress Note      PATIENT:               BRYNN NATHAN  CSN #:                  [id number]  :                       1940  ADMIT DATE:       2024 5:20 PM  DISCH DATE:  RESPONDING  PROVIDER #:        JESSI HAMM          QUERY TEXT:    Dear Dr. Hamm,  Pt admitted with GI bleed, anemia, VICKY, DM with hyperglycemia and has acute   encephalopathy documented. 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   Admitted for  GI bleed, acute on chronic blood loss anemia, VICKY on CKD.    RN notes \"trying to get out of bed\", \"combative towards staff members. Patient   stated he was \"in the [employer].\" later \"I'm going to hang myself..\" \"I'm   depressed.\", Psych eval notes \"Delirium due to electrolytes imbalance and GI   bleed. medical\". PCP notes \"increased confusion overnight.  This morning he is   pleasantly confused.  Family at bedside states that this is not his norm.\"   and \"Acute encephalopathy\" Creat=1.9, GFR=35,  Treatment: Monitor labs and blood sugars, check UA, Psych consult, bed alarm,   fall precautions, increased supervision  Thank you,  Leona Alexander RN, CDS  [email or web address]  Options provided:  -- Metabolic encephalopathy related to GIB,  anemia, VICKY, DM with   hyperglycemia  -- Encephalopathy due to other, please specify  -- Other - I will add my own diagnosis  -- Disagree - Not applicable / Not valid  -- Disagree - Clinically unable to determine / Unknown  -- Refer to Clinical Documentation Reviewer    PROVIDER RESPONSE TEXT:    This patient has metabolic encephalopathy related to GIB, anemia, VICKY, DM with   hyperglycemia.    Query 
4 Eyes Skin Assessment     NAME:  Abelardo Marquis  YOB: 1940  MEDICAL RECORD NUMBER:  [id number]    The patient is being assessed for  Admission    I agree that at least one RN has performed a thorough Head to Toe Skin Assessment on the patient. ALL assessment sites listed below have been assessed.      Areas assessed by both nurses:    Head, Face, Ears, Shoulders, Back, Chest, Arms, Elbows, Hands, Sacrum. Buttock, Coccyx, Ischium, Legs. Feet and Heels, Under Medical Devices , and Other          Does the Patient have a Wound? No noted wound(s)       Yomi Prevention initiated by RN: Yes  Wound Care Orders initiated by RN: No    Pressure Injury (Stage 3,4, Unstageable, DTI, NWPT, and Complex wounds) if present, place Wound referral order by RN under : No    New Ostomies, if present place, Ostomy referral order under : No     Nurse 1 eSignature: Electronically signed by Sadiq Waldron RN on 6/21/24 at 7:08 AM EDT    **SHARE this note so that the co-signing nurse can place an eSignature**    Nurse 2 eSignature: Electronically signed by Elin Echeverria RN on 6/21/24 at 7:51 AM EDT   
4 Eyes Skin Assessment     NAME:  Abelardo Marquis  YOB: 1940  MEDICAL RECORD NUMBER:  [id number]    The patient is being assessed for  Admission    I agree that at least one RN has performed a thorough Head to Toe Skin Assessment on the patient. ALL assessment sites listed below have been assessed.      Areas assessed by both nurses:    Head, Face, Ears, Shoulders, Back, Chest, Arms, Elbows, Hands, Sacrum. Buttock, Coccyx, Ischium, Legs. Feet and Heels, and Under Medical Devices         Does the Patient have a Wound? No noted wound(s). Skin tear to left lateral abdominal folds.       Yomi Prevention initiated by RN: Yes  Wound Care Orders initiated by RN: No    Pressure Injury (Stage 3,4, Unstageable, DTI, NWPT, and Complex wounds) if present, place Wound referral order by RN under : Yes    New Ostomies, if present place, Ostomy referral order under : No     Nurse 1 eSignature: Electronically signed by Nehemiah Corcoran RN on 6/20/24 at 3:42 AM EDT    **SHARE this note so that the co-signing nurse can place an eSignature**    Nurse 2 eSignature: Electronically signed by Pilar Garcia RN on 6/20/24 at 5:35 AM EDT   
Ayla from [facility] transfer center called the unit requesting patient update and that they are still working on getting a bed for the patient. All questions answered.  
BEHAVIOR CONCERN    Patient noted to be trying to get out of bed, triggering the bedalarm. This RN responds to the call alarm and found patient sitting at the edge of the bed. When asked if he needs help, he states that he want to go home, that he is in the [place]. Patient reoriented and redirected back to bed.  Couple of minutes later the bed alarm was triggered again and this time he insist going home again. I tryied walking him on the hallway. Upon returning from the walk, he refused to go to bed. He request sitting on the chair. Patient sat on the chair for a couple of minutes and then up again trying to exit the unit. Nurse Assistance responded to several bed alarms.   At one time, one of the RN's at the nurses station have to stop him from going out of the unit and took him back to his room. Charge RN aware of patient recent behavior concerns. She also tried on several attempts reorienting patient to no avail.     0531: Patient insisted on leaving the unit and this Nurse with other RN's were able to stop him at the nurses station. SOS called on him and his daughter also notified of patient behavior/condition.   Hospitaliost notified and Sitter order place for patient safety.  Elin and Alona currently with patient in pt room.  Family arrived and at bed side.  Will maintain patient safety and observe standard precautions.  0650: Patient transferred to ICU for Suicidal ideation watch.  Report given to ICU RN JULIETH  
Called [facility] transfer center for update. Pt has been accepted, waiting for General Medical Bed at this time. No estimate of expected wait time available  
During SOS this am pt confused thinking that staff and security was holding him captive at a [employer]. Pt agitated and had stated \" I am just going to hang myself\". This RN stated you dont want to do that! He stated \"well I guess you will find out in the news paper when I do.\" This RN stated \" I am sorry that you feel that way\" Pt stated \"not as sorry as I am.\" Suicide precautions initiated. Alona RN stayed with patient until a sitter was able to be at bedside. This RN performed suicide screening questions. Pt stated \"I was just mad and running my mouth, I would not kill myself. I want to go to heaven and you cannot go top heaven if you kill yourself.\" Pt also informed this RN that he is a retired  and has prevented many suicide attempts.He verbalized that he has his family to live for but did agree that he has had times when he wished he didn't wake up but he denies any previous attempts of harming himself. This RN transferred pt and belongings to ICU without issue. Pts daughter and son in law followed . Pt is A&O x4,compliant and calm at this time. He has been laughing with staff and understands now that he was confused previously and that staff was not trying to hold him captive. Daughter reports poor sleep for pt,she also denies her dad having baseline confusion. Suicide precautions explained to pt and family whom verbalized understanding.   
Endoscopy taking patient at this time  
Notified Dr. Castillo, pt daughter is at bedside.   
Notified by RN at this time about a consult placed at midnight for need for pressors. Case not previously discussed with provider.   Patient maintaining BP in 110s-120s after resuscitation with fluids and blood.   Consult canceled - Provider notified.   Of note, patient accepted to Medicine floor at [facility] and awaiting bed.   
PT GOT CONFUSED TODAY TRANSFERRED TO ICU FOR CLOSER MONITORING SEEN BY PSYCHIATRY   NO GROSS GIT BLEEDING  VITALS STABLE   LABS NOTED HB 8.5  WILL CPM SCHEDULE PUSH ENTEROSCOPY FOR AM D/W PT - RN FRANCINE WILL D/W DAUGHTER AS WELL  
Perfect serve sent to Dr. Hamm, BS is 375.   
Pts daughter and son in law at the bedside. Pt agreed to taking po percocet for his back pain and zofran for nausea. He is resting in the recliner in his room at this time.   
Show of Support called at 0525 due to patient out in diallo and appeared as if he was going to become combative towards staff members. Patient stated he was \"in the [employer].\" Staff attempted to contact his daughter and allow him to talk to her as a reorientation method. Daughter informed patient he was at this hospital on the third floor and to hold tight until she got here. At the conclusion of the phone call the patient expressed suicide ideation stating \"I'm going to hang myself..\" \"I'm depressed..\" \"You guys will read about me in the paper..\" Nursing staff then reached out to the provider for suicide precaution orders. Orders currently in place for constant observer at the bedside.This nurse then contacted Vianey Graves NP for Psych consult and possible ICU transfer.    Patient then deescalated and returned to room with this nurse and Rapid Nurse.     Rapid resource nurse at bedside with patient and patient expresses \"I didn't mean that.. I don't want to hang myself I want to go to [facility] and I don't believe you go there when you hang yourself..\" Patient very adamant that he did not mean it. Notified [place] Graves and psych consult has been placed. Sitter currently in place and family is at the bedside.    This nurse spoke with house supervisor and plan is to remain on [address] until psych eval is carried out. Family and patient updated of plan and chain of events that will take place as a result of the patient's SI statement. All parties agreeable and verbalize understanding.    Alona Mace RN    
Spoke with Dr. Solis via telephone regarding Critical Care Consult for persistent low BP. Current BP reviewed. Dr. Solis has not been contacted directly by provider. Consult declined at this time. Please have provider contact ICU if pt condition changes.   
moist.      Pharynx: Oropharynx is clear.   Eyes:      Extraocular Movements: Extraocular movements intact.      Conjunctiva/sclera: Conjunctivae normal.      Pupils: Pupils are equal, round, and reactive to light.   Cardiovascular:      Rate and Rhythm: Normal rate and regular rhythm.      Pulses: Normal pulses.      Heart sounds: Normal heart sounds.   Pulmonary:      Effort: Pulmonary effort is normal.   Abdominal:      General: Abdomen is flat. Bowel sounds are normal.      Palpations: Abdomen is soft.   Musculoskeletal:         General: Normal range of motion.      Cervical back: Normal range of motion and neck supple.   Skin:     General: Skin is warm and dry.   Neurological:      General: No focal deficit present.      Mental Status: He is alert and oriented to person, place, and time. Mental status is at baseline.   Psychiatric:         Mood and Affect: Mood normal.         Behavior: Behavior normal.         Thought Content: Thought content normal.         Medications:   Medications:    insulin glargine  30 Units SubCUTAneous BID    insulin lispro  10 Units SubCUTAneous TID WC    FLUoxetine  40 mg Oral Daily    insulin lispro  0-8 Units SubCUTAneous TID WC    insulin lispro  0-4 Units SubCUTAneous Nightly    [Held by provider] aspirin  81 mg Oral Daily    atorvastatin  40 mg Oral Nightly    cholestyramine light  4 g Oral Daily    [Held by provider] empagliflozin  25 mg Oral Daily    [Held by provider] furosemide  40 mg Oral Daily    isosorbide mononitrate  30 mg Oral Daily    metoprolol tartrate  25 mg Oral BID    ranolazine  500 mg Oral Daily    sodium chloride flush  5-40 mL IntraVENous BID    pantoprazole (PROTONIX) 40 mg in sodium chloride (PF) 0.9 % 10 mL injection  40 mg IntraVENous BID    sodium chloride  500 mL IntraVENous Once      Infusions:    sodium chloride      dextrose      sodium chloride       PRN Meds: oxyCODONE-acetaminophen, 1 tablet, Q8H PRN  sodium chloride, , PRN  fluticasone, 1 spray, 
   Est, Glom Filt Rate 40 (L) >60 mL/min/1.73m2    Calcium 8.4 8.3 - 10.6 MG/DL    Total Protein 5.4 (L) 6.4 - 8.2 GM/DL    Albumin 3.1 (L) 3.4 - 5.0 GM/DL    Total Bilirubin 0.6 0.0 - 1.0 MG/DL    Alkaline Phosphatase 67 40 - 128 IU/L    ALT 11 10 - 40 U/L    AST 14 (L) 15 - 37 IU/L   POCT Glucose    Collection Time: 06/22/24  8:36 AM   Result Value Ref Range    POC Glucose 201 (H) 70 - 99 MG/DL        Imaging/Diagnostics Last 24 Hours   XR CHEST PORTABLE    Result Date: 6/19/2024  Chest X-ray INDICATION: Weakness COMPARISON: Reviewed TECHNIQUE: AP/PA view of the chest was obtained. FINDINGS: Heart size enlarged. No large pleural effusions. No pneumothorax. Calcified mediastinal nodes are seen.  The heart size is normal.  The bony thorax is intact.      No acute radiographic process or significant change from prior exams. Electronically signed by Mario Triplett      Comment: Please note this report has been produced using speech recognition software and may contain errors related to that system including errors in grammar, punctuation, and spelling, as well as words and phrases that may be inappropriate. If there are any questions or concerns please feel free to contact the dictating provider for clarification.     Electronically signed by Juan Luis Hamm MD on 6/22/2024 at 11:57 AM

## 2024-06-23 VITALS
BODY MASS INDEX: 34.5 KG/M2 | OXYGEN SATURATION: 97 % | SYSTOLIC BLOOD PRESSURE: 150 MMHG | RESPIRATION RATE: 16 BRPM | HEART RATE: 58 BPM | DIASTOLIC BLOOD PRESSURE: 78 MMHG | HEIGHT: 70 IN | WEIGHT: 240.96 LBS | TEMPERATURE: 97.9 F

## 2024-06-23 LAB
ALBUMIN SERPL-MCNC: 3.3 GM/DL (ref 3.4–5)
ALP BLD-CCNC: 70 IU/L (ref 40–128)
ALT SERPL-CCNC: 13 U/L (ref 10–40)
ANION GAP SERPL CALCULATED.3IONS-SCNC: 10 MMOL/L (ref 7–16)
AST SERPL-CCNC: 21 IU/L (ref 15–37)
BASOPHILS ABSOLUTE: 0 K/CU MM
BASOPHILS RELATIVE PERCENT: 1.2 % (ref 0–1)
BILIRUB SERPL-MCNC: 0.4 MG/DL (ref 0–1)
BUN SERPL-MCNC: 21 MG/DL (ref 6–23)
CALCIUM SERPL-MCNC: 8.3 MG/DL (ref 8.3–10.6)
CHLORIDE BLD-SCNC: 107 MMOL/L (ref 99–110)
CO2: 23 MMOL/L (ref 21–32)
CREAT SERPL-MCNC: 1.5 MG/DL (ref 0.9–1.3)
DIFFERENTIAL TYPE: ABNORMAL
EOSINOPHILS ABSOLUTE: 0.1 K/CU MM
EOSINOPHILS RELATIVE PERCENT: 4.4 % (ref 0–3)
GFR, ESTIMATED: 46 ML/MIN/1.73M2
GLUCOSE BLD-MCNC: 162 MG/DL (ref 70–99)
GLUCOSE BLD-MCNC: 208 MG/DL (ref 70–99)
GLUCOSE SERPL-MCNC: 150 MG/DL (ref 70–99)
HCT VFR BLD CALC: 29.4 % (ref 42–52)
HEMOGLOBIN: 8.3 GM/DL (ref 13.5–18)
IMMATURE NEUTROPHIL %: 0.8 % (ref 0–0.43)
LYMPHOCYTES ABSOLUTE: 0.9 K/CU MM
LYMPHOCYTES RELATIVE PERCENT: 33.7 % (ref 24–44)
MCH RBC QN AUTO: 28 PG (ref 27–31)
MCHC RBC AUTO-ENTMCNC: 28.2 % (ref 32–36)
MCV RBC AUTO: 99.3 FL (ref 78–100)
MONOCYTES ABSOLUTE: 0.2 K/CU MM
MONOCYTES RELATIVE PERCENT: 9.5 % (ref 0–4)
NEUTROPHILS ABSOLUTE: 1.3 K/CU MM
NEUTROPHILS RELATIVE PERCENT: 50.4 % (ref 36–66)
NUCLEATED RBC %: 0 %
PDW BLD-RTO: 15.4 % (ref 11.7–14.9)
PLATELET # BLD: 152 K/CU MM (ref 140–440)
PMV BLD AUTO: 10.7 FL (ref 7.5–11.1)
POTASSIUM SERPL-SCNC: 4.8 MMOL/L (ref 3.5–5.1)
RBC # BLD: 2.96 M/CU MM (ref 4.6–6.2)
SODIUM BLD-SCNC: 140 MMOL/L (ref 135–145)
TOTAL IMMATURE NEUTOROPHIL: 0.02 K/CU MM
TOTAL NUCLEATED RBC: 0 K/CU MM
TOTAL PROTEIN: 5.6 GM/DL (ref 6.4–8.2)
WBC # BLD: 2.5 K/CU MM (ref 4–10.5)

## 2024-06-23 PROCEDURE — 80053 COMPREHEN METABOLIC PANEL: CPT

## 2024-06-23 PROCEDURE — 6370000000 HC RX 637 (ALT 250 FOR IP): Performed by: NURSE PRACTITIONER

## 2024-06-23 PROCEDURE — 2580000003 HC RX 258: Performed by: STUDENT IN AN ORGANIZED HEALTH CARE EDUCATION/TRAINING PROGRAM

## 2024-06-23 PROCEDURE — 6370000000 HC RX 637 (ALT 250 FOR IP): Performed by: STUDENT IN AN ORGANIZED HEALTH CARE EDUCATION/TRAINING PROGRAM

## 2024-06-23 PROCEDURE — 6360000002 HC RX W HCPCS: Performed by: STUDENT IN AN ORGANIZED HEALTH CARE EDUCATION/TRAINING PROGRAM

## 2024-06-23 PROCEDURE — C9113 INJ PANTOPRAZOLE SODIUM, VIA: HCPCS | Performed by: STUDENT IN AN ORGANIZED HEALTH CARE EDUCATION/TRAINING PROGRAM

## 2024-06-23 PROCEDURE — 85018 HEMOGLOBIN: CPT

## 2024-06-23 PROCEDURE — 85025 COMPLETE CBC W/AUTO DIFF WBC: CPT

## 2024-06-23 PROCEDURE — 94761 N-INVAS EAR/PLS OXIMETRY MLT: CPT

## 2024-06-23 PROCEDURE — 36415 COLL VENOUS BLD VENIPUNCTURE: CPT

## 2024-06-23 PROCEDURE — 82962 GLUCOSE BLOOD TEST: CPT

## 2024-06-23 PROCEDURE — 85014 HEMATOCRIT: CPT

## 2024-06-23 PROCEDURE — 80048 BASIC METABOLIC PNL TOTAL CA: CPT

## 2024-06-23 RX ADMIN — Medication 6000 UNITS: at 09:12

## 2024-06-23 RX ADMIN — METOPROLOL TARTRATE 25 MG: 25 TABLET, FILM COATED ORAL at 09:12

## 2024-06-23 RX ADMIN — FLUOXETINE HYDROCHLORIDE 40 MG: 20 CAPSULE ORAL at 09:12

## 2024-06-23 RX ADMIN — CHOLESTYRAMINE 4 G: 4 POWDER, FOR SUSPENSION ORAL at 09:10

## 2024-06-23 RX ADMIN — GABAPENTIN 400 MG: 400 CAPSULE ORAL at 14:02

## 2024-06-23 RX ADMIN — SODIUM CHLORIDE, PRESERVATIVE FREE 40 MG: 5 INJECTION INTRAVENOUS at 09:22

## 2024-06-23 RX ADMIN — INSULIN GLARGINE 30 UNITS: 100 INJECTION, SOLUTION SUBCUTANEOUS at 09:18

## 2024-06-23 RX ADMIN — RANOLAZINE 500 MG: 500 TABLET, EXTENDED RELEASE ORAL at 09:11

## 2024-06-23 RX ADMIN — INSULIN LISPRO 10 UNITS: 100 INJECTION, SOLUTION INTRAVENOUS; SUBCUTANEOUS at 14:01

## 2024-06-23 RX ADMIN — INSULIN LISPRO 10 UNITS: 100 INJECTION, SOLUTION INTRAVENOUS; SUBCUTANEOUS at 09:19

## 2024-06-23 RX ADMIN — SODIUM CHLORIDE, PRESERVATIVE FREE 10 ML: 5 INJECTION INTRAVENOUS at 09:22

## 2024-06-23 RX ADMIN — INSULIN LISPRO 2 UNITS: 100 INJECTION, SOLUTION INTRAVENOUS; SUBCUTANEOUS at 09:20

## 2024-06-23 RX ADMIN — OXYCODONE AND ACETAMINOPHEN 1 TABLET: 7.5; 325 TABLET ORAL at 09:16

## 2024-06-23 RX ADMIN — GABAPENTIN 400 MG: 400 CAPSULE ORAL at 09:11

## 2024-06-23 RX ADMIN — ISOSORBIDE MONONITRATE 30 MG: 30 TABLET, EXTENDED RELEASE ORAL at 09:11

## 2024-06-23 ASSESSMENT — PAIN DESCRIPTION - DESCRIPTORS: DESCRIPTORS: ACHING

## 2024-06-23 ASSESSMENT — PAIN SCALES - GENERAL
PAINLEVEL_OUTOF10: 4
PAINLEVEL_OUTOF10: 7

## 2024-06-23 ASSESSMENT — PAIN - FUNCTIONAL ASSESSMENT: PAIN_FUNCTIONAL_ASSESSMENT: ACTIVITIES ARE NOT PREVENTED

## 2024-06-23 ASSESSMENT — PAIN DESCRIPTION - PAIN TYPE: TYPE: CHRONIC PAIN

## 2024-06-23 ASSESSMENT — PAIN DESCRIPTION - LOCATION: LOCATION: GENERALIZED

## 2024-06-23 ASSESSMENT — PAIN DESCRIPTION - FREQUENCY: FREQUENCY: INTERMITTENT

## 2024-06-23 NOTE — DISCHARGE SUMMARY
6/23/2024  EXAM: MRI BRAIN W WO CONTRAST INDICATION: Change in mentation. COMPARISON: CT head, 4/18/2024. TECHNIQUE: Multiplanar, multisequence MR imaging of the head obtained. IV contrast: None. FINDINGS: MIDLINE STRUCTURES: The sella turcica and pituitary gland are normal. Craniovertebral alignment and cerebellar tonsils are normal. VENTRICLES: There is mild dilatation of the ventricular system with concomitant cerebral atrophy. INTRACRANIAL HEMORRHAGE: None. BRAIN PARENCHYMA: There is mild diffuse cerebral atrophy. Brain parenchyma is normal signal. There is no diffusion restriction.  No pathologic intracranial enhancement. INTRACRANIAL VASCULATURE: Major intracranial vessels are grossly patent. ORBITS: Status post bilateral intraocular lens implants. Otherwise unremarkable. BONE MARROW: Bone marrow signal within normal limits. VISUALIZED CERVICAL SPINE: Normal PARANASAL SINUSES/MASTOID BONES: The patient is status post bilateral antrostomy and uncinectomy. No acute sinusitis. 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 EXTRACRANIAL SOFT TISSUES: Normal     1. Mild cerebral atrophy. 2. Other findings as noted. Electronically signed by Merrick NAVARRO GI BLOOD LOSS    Result Date: 6/20/2024  TAGGED RED BLOOD CELL GI BLEEDING SCAN HISTORY: GI bleeding. PROCEDURE: Patient was injected with   24  mCi of technetium 99m labeled red blood cells with AP imaging obtained over time. FINDINGS: Normal vascular activity and organ activity noted.  No abnormal activity is demonstrated to suggest active gastrointestinal hemorrhage.     1. No evidence of active gastrointestinal hemorrhage. Electronically signed by Ede Gross    XR CHEST PORTABLE    Result Date: 6/19/2024  Chest X-ray INDICATION: Weakness COMPARISON: Reviewed TECHNIQUE: AP/PA view of the chest was obtained. FINDINGS: Heart size enlarged. No large pleural effusions. No pneumothorax. Calcified mediastinal nodes are seen.  The heart size is normal.  The bony thorax

## 2024-06-23 NOTE — PLAN OF CARE
Problem: Confusion  Goal: Confusion, delirium, dementia, or psychosis is improved or at baseline  Description: INTERVENTIONS:  1. Assess for possible contributors to thought disturbance, including medications, impaired vision or hearing, underlying metabolic abnormalities, dehydration, psychiatric diagnoses, and notify attending LIP  2. [place] high risk fall precautions, as indicated  3. Provide frequent short contacts to provide reality reorientation, refocusing and direction  4. Decrease environmental stimuli, including noise as appropriate  5. Monitor and intervene to maintain adequate nutrition, hydration, elimination, sleep and activity  6. If unable to ensure safety without constant attention obtain sitter and review sitter guidelines with assigned personnel  7. Initiate Psychosocial CNS and Spiritual Care consult, as indicated  Outcome: Not Progressing  Flowsheets (Taken 6/21/2024 2862)  Effect of thought disturbance (confusion, delirium, dementia, or psychosis) are managed with adequate functional status:   [place] high risk fall precautions, as indicated   Provide frequent short contacts to provide reality reorientation, refocusing and direction   Decrease environmental stimuli, including noise as appropriate     
  Problem: Discharge Planning  Goal: Discharge to home or other facility with appropriate resources  Outcome: Adequate for Discharge     Problem: Pain  Goal: Verbalizes/displays adequate comfort level or baseline comfort level  Outcome: Adequate for Discharge     Problem: Safety - Adult  Goal: Free from fall injury  Outcome: Adequate for Discharge     Problem: Cardiovascular - Adult  Goal: Maintains optimal cardiac output and hemodynamic stability  Outcome: Adequate for Discharge  Goal: Absence of cardiac dysrhythmias or at baseline  Outcome: Adequate for Discharge     Problem: Skin/Tissue Integrity - Adult  Goal: Skin integrity remains intact  Outcome: Adequate for Discharge  Goal: Incisions, wounds, or drain sites healing without S/S of infection  Outcome: Adequate for Discharge     Problem: Metabolic/Fluid and Electrolytes - Adult  Goal: Electrolytes maintained within normal limits  Outcome: Adequate for Discharge  Goal: Hemodynamic stability and optimal renal function maintained  Outcome: Adequate for Discharge  Goal: Glucose maintained within prescribed range  Outcome: Adequate for Discharge     Problem: Confusion  Goal: Confusion, delirium, dementia, or psychosis is improved or at baseline  Description: INTERVENTIONS:  1. Assess for possible contributors to thought disturbance, including medications, impaired vision or hearing, underlying metabolic abnormalities, dehydration, psychiatric diagnoses, and notify attending LIP  2. [place] high risk fall precautions, as indicated  3. Provide frequent short contacts to provide reality reorientation, refocusing and direction  4. Decrease environmental stimuli, including noise as appropriate  5. Monitor and intervene to maintain adequate nutrition, hydration, elimination, sleep and activity  6. If unable to ensure safety without constant attention obtain sitter and review sitter guidelines with assigned personnel  7. Initiate Psychosocial CNS and Spiritual Care 
  Problem: Discharge Planning  Goal: Discharge to home or other facility with appropriate resources  Outcome: Progressing     Problem: Pain  Goal: Verbalizes/displays adequate comfort level or baseline comfort level  Outcome: Progressing     Problem: Safety - Adult  Goal: Free from fall injury  Outcome: Progressing     Problem: Cardiovascular - Adult  Goal: Maintains optimal cardiac output and hemodynamic stability  Outcome: Progressing  Goal: Absence of cardiac dysrhythmias or at baseline  Outcome: Progressing     Problem: Skin/Tissue Integrity - Adult  Goal: Skin integrity remains intact  Outcome: Progressing  Goal: Incisions, wounds, or drain sites healing without S/S of infection  Outcome: Progressing     Problem: Metabolic/Fluid and Electrolytes - Adult  Goal: Electrolytes maintained within normal limits  Outcome: Progressing  Goal: Hemodynamic stability and optimal renal function maintained  Outcome: Progressing  Goal: Glucose maintained within prescribed range  Outcome: Progressing     Problem: Confusion  Goal: Confusion, delirium, dementia, or psychosis is improved or at baseline  Description: INTERVENTIONS:  1. Assess for possible contributors to thought disturbance, including medications, impaired vision or hearing, underlying metabolic abnormalities, dehydration, psychiatric diagnoses, and notify attending LIP  2. [place] high risk fall precautions, as indicated  3. Provide frequent short contacts to provide reality reorientation, refocusing and direction  4. Decrease environmental stimuli, including noise as appropriate  5. Monitor and intervene to maintain adequate nutrition, hydration, elimination, sleep and activity  6. If unable to ensure safety without constant attention obtain sitter and review sitter guidelines with assigned personnel  7. Initiate Psychosocial CNS and Spiritual Care consult, as indicated  Outcome: Progressing     Problem: Chronic Conditions and Co-morbidities  Goal: Patient's 
  Problem: Discharge Planning  Goal: Discharge to home or other facility with appropriate resources  Outcome: Progressing     Problem: Pain  Goal: Verbalizes/displays adequate comfort level or baseline comfort level  Outcome: Progressing     Problem: Safety - Adult  Goal: Free from fall injury  Outcome: Progressing     Problem: Cardiovascular - Adult  Goal: Maintains optimal cardiac output and hemodynamic stability  Outcome: Progressing  Goal: Absence of cardiac dysrhythmias or at baseline  Outcome: Progressing     Problem: Skin/Tissue Integrity - Adult  Goal: Skin integrity remains intact  Outcome: Progressing  Goal: Incisions, wounds, or drain sites healing without S/S of infection  Outcome: Progressing     Problem: Metabolic/Fluid and Electrolytes - Adult  Goal: Electrolytes maintained within normal limits  Outcome: Progressing  Goal: Hemodynamic stability and optimal renal function maintained  Outcome: Progressing  Goal: Glucose maintained within prescribed range  Outcome: Progressing     Problem: Confusion  Goal: Confusion, delirium, dementia, or psychosis is improved or at baseline  Description: INTERVENTIONS:  1. Assess for possible contributors to thought disturbance, including medications, impaired vision or hearing, underlying metabolic abnormalities, dehydration, psychiatric diagnoses, and notify attending LIP  2. [place] high risk fall precautions, as indicated  3. Provide frequent short contacts to provide reality reorientation, refocusing and direction  4. Decrease environmental stimuli, including noise as appropriate  5. Monitor and intervene to maintain adequate nutrition, hydration, elimination, sleep and activity  6. If unable to ensure safety without constant attention obtain sitter and review sitter guidelines with assigned personnel  7. Initiate Psychosocial CNS and Spiritual Care consult, as indicated  Outcome: Progressing     Problem: Chronic Conditions and Co-morbidities  Goal: Patient's 
Progressing  Goal: Glucose maintained within prescribed range  Outcome: Progressing  Flowsheets (Taken 6/20/2024 0210)  Glucose maintained within prescribed range:   Monitor blood glucose as ordered   Assess for signs and symptoms of hyperglycemia and hypoglycemia   Administer ordered medications to maintain glucose within target range     
unable to ensure safety without constant attention obtain sitter and review sitter guidelines with assigned personnel  7. Initiate Psychosocial CNS and Spiritual Care consult, as indicated  6/21/2024 0919 by Marly De, RN  Outcome: Progressing  6/21/2024 0535 by Nehemiah Corcoran, RN  Outcome: Not Progressing  Flowsheets (Taken 6/21/2024 0535)  Effect of thought disturbance (confusion, delirium, dementia, or psychosis) are managed with adequate functional status:   [place] high risk fall precautions, as indicated   Provide frequent short contacts to provide reality reorientation, refocusing and direction   Decrease environmental stimuli, including noise as appropriate

## 2024-06-23 NOTE — CARE COORDINATION
LSW spoke to RN, Liliana, who reported pt has been up ambulating without difficulty.  LSW reviewed chart and met with pt to solidify dc plans.  Pt eager to be dc'd home and reports he is feeling much better.  Pt confirmed dc plan is home with dtr and son in-law, who is retired and able to assist him PRN.  Pt requested [facility] nrg/therapy cont.  Pt reported his dtr will provide transportation home.  LSW updated RN and Dr. Hamm via phone re: POC.  LSW still awaiting HC order at this time.  Discharging RN will need to call [facility] to inform them of dc orders at [phone] and fax HC order, AVS and dc summary (if avail) to [phone]).   Electronically signed by JESSY Rosario on 6/23/2024 at 12:09 PM

## 2024-06-23 NOTE — DISCHARGE INSTR - COC
Continuity of Care Form    Patient Name: Abelardo Marquis   :  1940  MRN:  [id number]    Admit date:  2024  Discharge date:  ***    Code Status Order: Full Code   Advance Directives:   Advance Care Flowsheet Documentation       Date/Time Healthcare Directive Type of Healthcare Directive Copy in Chart Healthcare Agent Appointed Healthcare Agent's Name Healthcare Agent's Phone Number    24 0705 No, patient does not have an advance directive for healthcare treatment -- -- -- -- --            Admitting Physician:  No admitting provider for patient encounter.  PCP: Georgie Hirsch MD    Discharging Nurse: ***  Discharging Hospital Unit/Room#: 2122/2122-A  Discharging Unit Phone Number: ***    Emergency Contact:   Extended Emergency Contact Information  Primary Emergency Contact: jacquebethany  Home Phone: [phone]  Work Phone: [phone]  Mobile Phone: [phone]  Relation: Child   needed? No  Secondary Emergency Contact: pedro castano  Home Phone: [phone]  Mobile Phone: [phone]  Relation: Brother/Sister   needed? No    Past Surgical History:  Past Surgical History:   Procedure Laterality Date    BACK SURGERY      CARDIAC SURGERY      stents x 1    CHOLECYSTECTOMY      May 2013    COLONOSCOPY N/A 2024    COLONOSCOPY POLYPECTOMY SNARE BIOPSY performed by Navi Castillo MD at [facility] ENDOSCOPY    DILATATION, ESOPHAGUS      HAND SURGERY      JOINT REPLACEMENT      SINUS SURGERY      sinus surgery x 2    SINUS SURGERY      TOTAL KNEE ARTHROPLASTY Bilateral     UPPER GASTROINTESTINAL ENDOSCOPY N/A 2024    ESOPHAGOGASTRODUODENOSCOPY DIAGNOSTIC ONLY performed by Navi Castillo MD at [facility] ENDOSCOPY    UPPER GASTROINTESTINAL ENDOSCOPY N/A 2024    ESOPHAGOGASTRODUODENOSCOPY DIAGNOSTIC ONLY performed by Navi Castillo MD at [facility] ENDOSCOPY       Immunization History:   Immunization History   Administered Date(s) Administered    COVID-19, MODERNA BLUE border, Primary or

## 2024-06-24 ENCOUNTER — CARE COORDINATION (OUTPATIENT)
Dept: CARE COORDINATION | Age: 84
End: 2024-06-24

## 2024-06-24 LAB — UUN 24H UR-MCNC: 547 MG/DL

## 2024-06-24 NOTE — CARE COORDINATION
Care Transitions Note    Initial Call - Call within 2 business days of discharge: Yes    Attempted to reach patient for transitions of care follow up. Unable to reach patient.    Outreach Attempts:   Unable to leave message.     Patient: Abelardo Marquis    Patient : 1940   MRN: <[id number]>    Reason for Admission: anemia  Discharge Date: 24  RURS: Readmission Risk Score: 29.6    Last Discharge Facility       Date Complaint Diagnosis Description Type Department Provider    24 Rectal Bleeding; Hyperglycemia Anemia, unspecified type ... ED to Hosp-Admission (Discharged) (ADMITTED) [facility] ICU Juan Luis Hamm MD; [facility]...            Was this an external facility discharge? No    Follow Up Appointment:   Patient does not have a follow up appointment scheduled at time of call. No recent PCP appointments. Routed to Dr. Hirsch.   Future Appointments         Provider Specialty Dept Phone    2024 12:45 PM Cecile Murray MD Nephrology [phone]            Plan for follow-up call in 2-5 days     Zonia Saravia

## 2024-06-25 ENCOUNTER — CARE COORDINATION (OUTPATIENT)
Dept: CASE MANAGEMENT | Age: 84
End: 2024-06-25

## 2024-06-25 NOTE — CARE COORDINATION
Care Transitions Note    Initial Call - Call within 2 business days of discharge: Yes    2nd unsuccessful attempt to reach for Care Transition discharge call. HIPAA compliant message left requesting call back. No MyChart.  CT program closed per protocol, no further outreach scheduled.       Outreach Attempts:   HIPAA compliant voicemail left for patient.     Patient: Abelardo Marquis    Patient : 1940   MRN: [id number]    Reason for Admission: GIB, A/C Anemia, VICKY  Discharge Date: 24  RURS: Readmission Risk Score: 29.6    Last Discharge Facility       Date Complaint Diagnosis Description Type Department Provider    24 Rectal Bleeding; Hyperglycemia Anemia, unspecified type ... ED to Hosp-Admission (Discharged) (ADMITTED) [facility] ICU Juan Luis Hamm MD; [facility]...     Future Appointments         Provider Specialty Dept Phone    2024 12:45 PM Cecile Murray MD Nephrology [phone]            Ade Weir RN

## 2024-07-11 ENCOUNTER — HOSPITAL ENCOUNTER (INPATIENT)
Age: 84
LOS: 5 days | Discharge: HOME HEALTH | End: 2024-07-16
Attending: STUDENT IN AN ORGANIZED HEALTH CARE EDUCATION/TRAINING PROGRAM | Admitting: STUDENT IN AN ORGANIZED HEALTH CARE EDUCATION/TRAINING PROGRAM
Payer: MEDICARE

## 2024-07-11 ENCOUNTER — APPOINTMENT (OUTPATIENT)
Dept: GENERAL RADIOLOGY | Age: 84
End: 2024-07-11
Payer: MEDICARE

## 2024-07-11 DIAGNOSIS — N17.9 AKI (ACUTE KIDNEY INJURY) (HCC): ICD-10-CM

## 2024-07-11 DIAGNOSIS — I50.813 ACUTE ON CHRONIC RIGHT-SIDED CONGESTIVE HEART FAILURE (HCC): ICD-10-CM

## 2024-07-11 DIAGNOSIS — D64.9 NORMOCYTIC ANEMIA: ICD-10-CM

## 2024-07-11 DIAGNOSIS — R07.9 CHEST PAIN, UNSPECIFIED TYPE: Primary | ICD-10-CM

## 2024-07-11 DIAGNOSIS — D64.9 ACUTE ANEMIA: ICD-10-CM

## 2024-07-11 DIAGNOSIS — I95.9 HYPOTENSION, UNSPECIFIED HYPOTENSION TYPE: ICD-10-CM

## 2024-07-11 LAB
ALBUMIN SERPL-MCNC: 3.2 GM/DL (ref 3.4–5)
ALP BLD-CCNC: 67 IU/L (ref 40–129)
ALT SERPL-CCNC: 19 U/L (ref 10–40)
ANION GAP SERPL CALCULATED.3IONS-SCNC: 9 MMOL/L (ref 7–16)
AST SERPL-CCNC: 29 IU/L (ref 15–37)
BASOPHILS ABSOLUTE: 0 K/CU MM
BASOPHILS RELATIVE PERCENT: 1 % (ref 0–1)
BILIRUB SERPL-MCNC: 0.4 MG/DL (ref 0–1)
BUN SERPL-MCNC: 26 MG/DL (ref 6–23)
CALCIUM SERPL-MCNC: 8.3 MG/DL (ref 8.3–10.6)
CHLORIDE BLD-SCNC: 105 MMOL/L (ref 99–110)
CO2: 25 MMOL/L (ref 21–32)
CREAT SERPL-MCNC: 2.7 MG/DL (ref 0.9–1.3)
DIFFERENTIAL TYPE: ABNORMAL
EOSINOPHILS ABSOLUTE: 0.1 K/CU MM
EOSINOPHILS RELATIVE PERCENT: 3.1 % (ref 0–3)
GFR, ESTIMATED: 23 ML/MIN/1.73M2
GLUCOSE SERPL-MCNC: 120 MG/DL (ref 70–99)
HCT VFR BLD CALC: 28.3 % (ref 42–52)
HEMOGLOBIN: 7.8 GM/DL (ref 13.5–18)
IMMATURE NEUTROPHIL %: 1.3 % (ref 0–0.43)
LYMPHOCYTES ABSOLUTE: 1.1 K/CU MM
LYMPHOCYTES RELATIVE PERCENT: 29 % (ref 24–44)
MAGNESIUM: 2 MG/DL (ref 1.8–2.4)
MCH RBC QN AUTO: 25.1 PG (ref 27–31)
MCHC RBC AUTO-ENTMCNC: 27.6 % (ref 32–36)
MCV RBC AUTO: 91 FL (ref 78–100)
MONOCYTES ABSOLUTE: 0.5 K/CU MM
MONOCYTES RELATIVE PERCENT: 12.3 % (ref 0–4)
NEUTROPHILS ABSOLUTE: 2 K/CU MM
NEUTROPHILS RELATIVE PERCENT: 53.3 % (ref 36–66)
NUCLEATED RBC %: 0 %
PDW BLD-RTO: 15.9 % (ref 11.7–14.9)
PLATELET # BLD: 183 K/CU MM (ref 140–440)
PMV BLD AUTO: 10.6 FL (ref 7.5–11.1)
POTASSIUM SERPL-SCNC: 4.1 MMOL/L (ref 3.5–5.1)
PRO-BNP: 1334 PG/ML
RBC # BLD: 3.11 M/CU MM (ref 4.6–6.2)
SODIUM BLD-SCNC: 139 MMOL/L (ref 135–145)
TOTAL IMMATURE NEUTOROPHIL: 0.05 K/CU MM
TOTAL NUCLEATED RBC: 0 K/CU MM
TOTAL PROTEIN: 5.5 GM/DL (ref 6.4–8.2)
TROPONIN, HIGH SENSITIVITY: 82 NG/L (ref 0–22)
TROPONIN, HIGH SENSITIVITY: 86 NG/L (ref 0–22)
TROPONIN, HIGH SENSITIVITY: NORMAL NG/L (ref 0–22)
WBC # BLD: 3.8 K/CU MM (ref 4–10.5)

## 2024-07-11 PROCEDURE — 83735 ASSAY OF MAGNESIUM: CPT

## 2024-07-11 PROCEDURE — 99285 EMERGENCY DEPT VISIT HI MDM: CPT

## 2024-07-11 PROCEDURE — 71045 X-RAY EXAM CHEST 1 VIEW: CPT

## 2024-07-11 PROCEDURE — 84484 ASSAY OF TROPONIN QUANT: CPT

## 2024-07-11 PROCEDURE — 1200000000 HC SEMI PRIVATE

## 2024-07-11 PROCEDURE — 81003 URINALYSIS AUTO W/O SCOPE: CPT

## 2024-07-11 PROCEDURE — 83880 ASSAY OF NATRIURETIC PEPTIDE: CPT

## 2024-07-11 PROCEDURE — 2580000003 HC RX 258: Performed by: NURSE PRACTITIONER

## 2024-07-11 PROCEDURE — 80053 COMPREHEN METABOLIC PANEL: CPT

## 2024-07-11 PROCEDURE — 85025 COMPLETE CBC W/AUTO DIFF WBC: CPT

## 2024-07-11 PROCEDURE — 93005 ELECTROCARDIOGRAM TRACING: CPT | Performed by: NURSE PRACTITIONER

## 2024-07-11 RX ORDER — 0.9 % SODIUM CHLORIDE 0.9 %
1000 INTRAVENOUS SOLUTION INTRAVENOUS ONCE
Status: COMPLETED | OUTPATIENT
Start: 2024-07-11 | End: 2024-07-11

## 2024-07-11 RX ORDER — ASPIRIN 81 MG/1
TABLET, CHEWABLE ORAL
Status: DISPENSED
Start: 2024-07-11 | End: 2024-07-12

## 2024-07-11 RX ADMIN — SODIUM CHLORIDE 1000 ML: 9 INJECTION, SOLUTION INTRAVENOUS at 20:53

## 2024-07-11 RX ADMIN — SODIUM CHLORIDE 1000 ML: 9 INJECTION, SOLUTION INTRAVENOUS at 20:52

## 2024-07-11 ASSESSMENT — PAIN SCALES - GENERAL: PAINLEVEL_OUTOF10: 6

## 2024-07-11 ASSESSMENT — PAIN DESCRIPTION - ORIENTATION: ORIENTATION: INNER

## 2024-07-11 ASSESSMENT — PAIN DESCRIPTION - DESCRIPTORS: DESCRIPTORS: DISCOMFORT

## 2024-07-11 ASSESSMENT — PAIN - FUNCTIONAL ASSESSMENT
PAIN_FUNCTIONAL_ASSESSMENT: ACTIVITIES ARE NOT PREVENTED
PAIN_FUNCTIONAL_ASSESSMENT: 0-10

## 2024-07-11 ASSESSMENT — PAIN DESCRIPTION - PAIN TYPE: TYPE: ACUTE PAIN

## 2024-07-11 ASSESSMENT — HEART SCORE: ECG: NORMAL

## 2024-07-11 ASSESSMENT — PAIN DESCRIPTION - LOCATION: LOCATION: CHEST

## 2024-07-11 NOTE — ED NOTES
Pt arrived via ems with c/o weakness and chest pain. States these started at 10:00 AM. Pt received full dose of aspirin by ems and took 1 nitro at home. Pt states they did not help with the discomfort. Pt also has a known GI bleed.

## 2024-07-11 NOTE — ED PROVIDER NOTES
[facility] EMERGENCY DEPARTMENT  EMERGENCY DEPARTMENT ENCOUNTER      Pt Name: Abelardo Marquis  MRN: [id number]  Birthdate 1940  Date of evaluation: 7/11/2024  Provider: DONNA Ryan - FRANCISCA  PCP: Georgie Hirsch MD  Note Started: 7:20 PM EDT 7/11/24    I am the Primary Clinician of Record.   I have seen and evaluated this patient with my supervising physician No att. providers found.    CHIEF COMPLAINT       Chief Complaint   Patient presents with    Chest Pain     1 nitro at home and ASA given by ems      HISTORY OF PRESENT ILLNESS: 1 or more Elements     History from : [facility]    Limitations to history : None    Abelardo Marquis is a 84 y.o. 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  His pain is described as more of a food bolus type discomfort.  He states that he often has discomfort with eating and has had to have his esophageal dilation.  He reports that he just recently had 1.  He has been unable to eat for the past 2 days due to this discomfort.  He had 1 episode of vomiting.  He also reports chronic anemia for which she sees Dr. Castillo.  They are unable to find the reason for his anemia.  And route to the hospital the patient was provided 32 4 mg baby aspirin and nitro.  The patient reports the nitro did not change his discomfort.    Nursing Notes were all reviewed and agreed with or any disagreements were addressed in the HPI.    REVIEW OF SYSTEMS :      Review of Systems   Constitutional:  Negative for fatigue and fever.   Respiratory:  Negative for cough, chest tightness and shortness of breath.    Cardiovascular:  Positive for chest pain. Negative for leg swelling.   Gastrointestinal:  Positive for abdominal pain, nausea and

## 2024-07-12 LAB
25(OH)D3 SERPL-MCNC: 13.31 NG/ML
ANION GAP SERPL CALCULATED.3IONS-SCNC: 12 MMOL/L (ref 7–16)
BASOPHILS ABSOLUTE: 0 K/CU MM
BASOPHILS RELATIVE PERCENT: 0.7 % (ref 0–1)
BUN SERPL-MCNC: 27 MG/DL (ref 6–23)
CALCIUM SERPL-MCNC: 8.1 MG/DL (ref 8.3–10.6)
CHLORIDE BLD-SCNC: 106 MMOL/L (ref 99–110)
CO2: 23 MMOL/L (ref 21–32)
CREAT SERPL-MCNC: 2.7 MG/DL (ref 0.9–1.3)
DIFFERENTIAL TYPE: ABNORMAL
EOSINOPHILS ABSOLUTE: 0.1 K/CU MM
EOSINOPHILS RELATIVE PERCENT: 4.5 % (ref 0–3)
FOLATE SERPL-MCNC: 17.8 NG/ML (ref 3.1–17.5)
GFR, ESTIMATED: 23 ML/MIN/1.73M2
GLUCOSE BLD-MCNC: 122 MG/DL (ref 70–99)
GLUCOSE BLD-MCNC: 127 MG/DL (ref 70–99)
GLUCOSE BLD-MCNC: 176 MG/DL (ref 70–99)
GLUCOSE BLD-MCNC: 266 MG/DL (ref 70–99)
GLUCOSE BLD-MCNC: 325 MG/DL (ref 70–99)
GLUCOSE SERPL-MCNC: 178 MG/DL (ref 70–99)
HCT VFR BLD CALC: 29 % (ref 42–52)
HEMOGLOBIN: 8 GM/DL (ref 13.5–18)
IMMATURE NEUTROPHIL %: 0.7 % (ref 0–0.43)
INR BLD: 1.1 INDEX
LYMPHOCYTES ABSOLUTE: 0.9 K/CU MM
LYMPHOCYTES RELATIVE PERCENT: 32.3 % (ref 24–44)
MCH RBC QN AUTO: 25.4 PG (ref 27–31)
MCHC RBC AUTO-ENTMCNC: 27.6 % (ref 32–36)
MCV RBC AUTO: 92.1 FL (ref 78–100)
MONOCYTES ABSOLUTE: 0.3 K/CU MM
MONOCYTES RELATIVE PERCENT: 10.7 % (ref 0–4)
NEUTROPHILS ABSOLUTE: 1.5 K/CU MM
NEUTROPHILS RELATIVE PERCENT: 51.1 % (ref 36–66)
NUCLEATED RBC %: 0 %
PDW BLD-RTO: 15.9 % (ref 11.7–14.9)
PLATELET # BLD: 140 K/CU MM (ref 140–440)
PMV BLD AUTO: 10.3 FL (ref 7.5–11.1)
POTASSIUM SERPL-SCNC: 4.4 MMOL/L (ref 3.5–5.1)
PROTHROMBIN TIME: 14.7 SECONDS (ref 11.7–14.5)
RBC # BLD: 3.15 M/CU MM (ref 4.6–6.2)
SODIUM BLD-SCNC: 141 MMOL/L (ref 135–145)
TOTAL IMMATURE NEUTOROPHIL: 0.02 K/CU MM
TOTAL NUCLEATED RBC: 0 K/CU MM
TSH SERPL DL<=0.005 MIU/L-ACNC: 3.21 UIU/ML (ref 0.27–4.2)
VITAMIN B-12: >2000 PG/ML (ref 211–911)
WBC # BLD: 2.9 K/CU MM (ref 4–10.5)

## 2024-07-12 PROCEDURE — 82962 GLUCOSE BLOOD TEST: CPT

## 2024-07-12 PROCEDURE — 82306 VITAMIN D 25 HYDROXY: CPT

## 2024-07-12 PROCEDURE — 82607 VITAMIN B-12: CPT

## 2024-07-12 PROCEDURE — 2580000003 HC RX 258: Performed by: STUDENT IN AN ORGANIZED HEALTH CARE EDUCATION/TRAINING PROGRAM

## 2024-07-12 PROCEDURE — 6360000002 HC RX W HCPCS: Performed by: STUDENT IN AN ORGANIZED HEALTH CARE EDUCATION/TRAINING PROGRAM

## 2024-07-12 PROCEDURE — 85025 COMPLETE CBC W/AUTO DIFF WBC: CPT

## 2024-07-12 PROCEDURE — 85610 PROTHROMBIN TIME: CPT

## 2024-07-12 PROCEDURE — 6370000000 HC RX 637 (ALT 250 FOR IP): Performed by: STUDENT IN AN ORGANIZED HEALTH CARE EDUCATION/TRAINING PROGRAM

## 2024-07-12 PROCEDURE — 94761 N-INVAS EAR/PLS OXIMETRY MLT: CPT

## 2024-07-12 PROCEDURE — 97161 PT EVAL LOW COMPLEX 20 MIN: CPT

## 2024-07-12 PROCEDURE — 36415 COLL VENOUS BLD VENIPUNCTURE: CPT

## 2024-07-12 PROCEDURE — 97166 OT EVAL MOD COMPLEX 45 MIN: CPT

## 2024-07-12 PROCEDURE — 80048 BASIC METABOLIC PNL TOTAL CA: CPT

## 2024-07-12 PROCEDURE — 82746 ASSAY OF FOLIC ACID SERUM: CPT

## 2024-07-12 PROCEDURE — 84443 ASSAY THYROID STIM HORMONE: CPT

## 2024-07-12 PROCEDURE — 1200000000 HC SEMI PRIVATE

## 2024-07-12 RX ORDER — ENOXAPARIN SODIUM 100 MG/ML
30 INJECTION SUBCUTANEOUS EVERY EVENING
Status: DISCONTINUED | OUTPATIENT
Start: 2024-07-12 | End: 2024-07-14

## 2024-07-12 RX ORDER — ACETAMINOPHEN 650 MG/1
650 SUPPOSITORY RECTAL EVERY 6 HOURS PRN
Status: DISCONTINUED | OUTPATIENT
Start: 2024-07-12 | End: 2024-07-16 | Stop reason: HOSPADM

## 2024-07-12 RX ORDER — ISOSORBIDE MONONITRATE 30 MG/1
30 TABLET, EXTENDED RELEASE ORAL DAILY
Status: DISCONTINUED | OUTPATIENT
Start: 2024-07-12 | End: 2024-07-16 | Stop reason: HOSPADM

## 2024-07-12 RX ORDER — INSULIN LISPRO 100 [IU]/ML
0-4 INJECTION, SOLUTION INTRAVENOUS; SUBCUTANEOUS NIGHTLY
Status: DISCONTINUED | OUTPATIENT
Start: 2024-07-12 | End: 2024-07-16 | Stop reason: HOSPADM

## 2024-07-12 RX ORDER — ACETAMINOPHEN 325 MG/1
650 TABLET ORAL EVERY 6 HOURS PRN
Status: DISCONTINUED | OUTPATIENT
Start: 2024-07-12 | End: 2024-07-16 | Stop reason: HOSPADM

## 2024-07-12 RX ORDER — ONDANSETRON 2 MG/ML
4 INJECTION INTRAMUSCULAR; INTRAVENOUS EVERY 6 HOURS PRN
Status: DISCONTINUED | OUTPATIENT
Start: 2024-07-12 | End: 2024-07-16 | Stop reason: HOSPADM

## 2024-07-12 RX ORDER — FLUOXETINE 10 MG/1
40 CAPSULE ORAL DAILY
Status: DISCONTINUED | OUTPATIENT
Start: 2024-07-12 | End: 2024-07-16 | Stop reason: HOSPADM

## 2024-07-12 RX ORDER — ATORVASTATIN CALCIUM 40 MG/1
40 TABLET, FILM COATED ORAL NIGHTLY
Status: DISCONTINUED | OUTPATIENT
Start: 2024-07-12 | End: 2024-07-16 | Stop reason: HOSPADM

## 2024-07-12 RX ORDER — LANOLIN ALCOHOL/MO/W.PET/CERES
3 CREAM (GRAM) TOPICAL NIGHTLY PRN
Status: DISCONTINUED | OUTPATIENT
Start: 2024-07-12 | End: 2024-07-16 | Stop reason: HOSPADM

## 2024-07-12 RX ORDER — MAGNESIUM SULFATE IN WATER 40 MG/ML
2000 INJECTION, SOLUTION INTRAVENOUS PRN
Status: DISCONTINUED | OUTPATIENT
Start: 2024-07-12 | End: 2024-07-16 | Stop reason: HOSPADM

## 2024-07-12 RX ORDER — SODIUM CHLORIDE 0.9 % (FLUSH) 0.9 %
5-40 SYRINGE (ML) INJECTION PRN
Status: DISCONTINUED | OUTPATIENT
Start: 2024-07-12 | End: 2024-07-16 | Stop reason: HOSPADM

## 2024-07-12 RX ORDER — INSULIN GLARGINE 100 [IU]/ML
45 INJECTION, SOLUTION SUBCUTANEOUS 2 TIMES DAILY
Status: DISCONTINUED | OUTPATIENT
Start: 2024-07-12 | End: 2024-07-16 | Stop reason: HOSPADM

## 2024-07-12 RX ORDER — OXYCODONE AND ACETAMINOPHEN 7.5; 325 MG/1; MG/1
1 TABLET ORAL EVERY 6 HOURS PRN
Status: COMPLETED | OUTPATIENT
Start: 2024-07-12 | End: 2024-07-12

## 2024-07-12 RX ORDER — FUROSEMIDE 40 MG/1
40 TABLET ORAL DAILY
Status: DISCONTINUED | OUTPATIENT
Start: 2024-07-12 | End: 2024-07-13

## 2024-07-12 RX ORDER — INSULIN LISPRO 100 [IU]/ML
0-4 INJECTION, SOLUTION INTRAVENOUS; SUBCUTANEOUS
Status: DISCONTINUED | OUTPATIENT
Start: 2024-07-12 | End: 2024-07-16 | Stop reason: HOSPADM

## 2024-07-12 RX ORDER — GABAPENTIN 400 MG/1
800 CAPSULE ORAL 2 TIMES DAILY
Status: DISCONTINUED | OUTPATIENT
Start: 2024-07-12 | End: 2024-07-16 | Stop reason: HOSPADM

## 2024-07-12 RX ORDER — PANTOPRAZOLE SODIUM 40 MG/1
40 TABLET, DELAYED RELEASE ORAL
Status: DISCONTINUED | OUTPATIENT
Start: 2024-07-12 | End: 2024-07-16 | Stop reason: HOSPADM

## 2024-07-12 RX ORDER — POLYETHYLENE GLYCOL 3350 17 G/17G
17 POWDER, FOR SOLUTION ORAL DAILY PRN
Status: DISCONTINUED | OUTPATIENT
Start: 2024-07-12 | End: 2024-07-16 | Stop reason: HOSPADM

## 2024-07-12 RX ORDER — FERROUS SULFATE 325(65) MG
325 TABLET ORAL EVERY OTHER DAY
Status: DISCONTINUED | OUTPATIENT
Start: 2024-07-12 | End: 2024-07-16 | Stop reason: HOSPADM

## 2024-07-12 RX ORDER — SODIUM CHLORIDE 0.9 % (FLUSH) 0.9 %
5-40 SYRINGE (ML) INJECTION EVERY 12 HOURS SCHEDULED
Status: DISCONTINUED | OUTPATIENT
Start: 2024-07-12 | End: 2024-07-16 | Stop reason: HOSPADM

## 2024-07-12 RX ORDER — DEXTROSE MONOHYDRATE 100 MG/ML
INJECTION, SOLUTION INTRAVENOUS CONTINUOUS PRN
Status: DISCONTINUED | OUTPATIENT
Start: 2024-07-12 | End: 2024-07-16 | Stop reason: HOSPADM

## 2024-07-12 RX ORDER — RANOLAZINE 500 MG/1
500 TABLET, EXTENDED RELEASE ORAL DAILY
Status: DISCONTINUED | OUTPATIENT
Start: 2024-07-12 | End: 2024-07-16 | Stop reason: HOSPADM

## 2024-07-12 RX ORDER — CHOLESTYRAMINE LIGHT 4 G/5.7G
4 POWDER, FOR SUSPENSION ORAL DAILY
Status: DISCONTINUED | OUTPATIENT
Start: 2024-07-12 | End: 2024-07-16 | Stop reason: HOSPADM

## 2024-07-12 RX ORDER — LANOLIN ALCOHOL/MO/W.PET/CERES
1000 CREAM (GRAM) TOPICAL DAILY
Status: DISCONTINUED | OUTPATIENT
Start: 2024-07-12 | End: 2024-07-16 | Stop reason: HOSPADM

## 2024-07-12 RX ORDER — GLUCAGON 1 MG/ML
1 KIT INJECTION PRN
Status: DISCONTINUED | OUTPATIENT
Start: 2024-07-12 | End: 2024-07-16 | Stop reason: HOSPADM

## 2024-07-12 RX ORDER — POTASSIUM CHLORIDE 7.45 MG/ML
10 INJECTION INTRAVENOUS PRN
Status: DISCONTINUED | OUTPATIENT
Start: 2024-07-12 | End: 2024-07-16 | Stop reason: HOSPADM

## 2024-07-12 RX ORDER — ONDANSETRON 4 MG/1
4 TABLET, ORALLY DISINTEGRATING ORAL EVERY 8 HOURS PRN
Status: DISCONTINUED | OUTPATIENT
Start: 2024-07-12 | End: 2024-07-16 | Stop reason: HOSPADM

## 2024-07-12 RX ORDER — TIZANIDINE 4 MG/1
2 TABLET ORAL 2 TIMES DAILY PRN
Status: DISCONTINUED | OUTPATIENT
Start: 2024-07-12 | End: 2024-07-16 | Stop reason: HOSPADM

## 2024-07-12 RX ORDER — ASPIRIN 81 MG/1
81 TABLET, CHEWABLE ORAL DAILY
Status: DISCONTINUED | OUTPATIENT
Start: 2024-07-12 | End: 2024-07-16 | Stop reason: HOSPADM

## 2024-07-12 RX ORDER — SODIUM CHLORIDE, SODIUM LACTATE, POTASSIUM CHLORIDE, CALCIUM CHLORIDE 600; 310; 30; 20 MG/100ML; MG/100ML; MG/100ML; MG/100ML
INJECTION, SOLUTION INTRAVENOUS CONTINUOUS
Status: ACTIVE | OUTPATIENT
Start: 2024-07-12 | End: 2024-07-12

## 2024-07-12 RX ORDER — SODIUM CHLORIDE 9 MG/ML
INJECTION, SOLUTION INTRAVENOUS PRN
Status: DISCONTINUED | OUTPATIENT
Start: 2024-07-12 | End: 2024-07-16 | Stop reason: HOSPADM

## 2024-07-12 RX ADMIN — FLUOXETINE HYDROCHLORIDE 40 MG: 10 CAPSULE ORAL at 08:59

## 2024-07-12 RX ADMIN — CYANOCOBALAMIN TAB 1000 MCG 1000 MCG: 1000 TAB at 08:59

## 2024-07-12 RX ADMIN — ATORVASTATIN CALCIUM 40 MG: 40 TABLET, FILM COATED ORAL at 21:15

## 2024-07-12 RX ADMIN — PANTOPRAZOLE SODIUM 40 MG: 40 TABLET, DELAYED RELEASE ORAL at 18:39

## 2024-07-12 RX ADMIN — CHOLESTYRAMINE 4 G: 4 POWDER, FOR SUSPENSION ORAL at 08:59

## 2024-07-12 RX ADMIN — Medication 3 MG: at 21:18

## 2024-07-12 RX ADMIN — INSULIN LISPRO 3 UNITS: 100 INJECTION, SOLUTION INTRAVENOUS; SUBCUTANEOUS at 18:39

## 2024-07-12 RX ADMIN — EMPAGLIFLOZIN 10 MG: 10 TABLET, FILM COATED ORAL at 08:59

## 2024-07-12 RX ADMIN — ASPIRIN 81 MG: 81 TABLET, CHEWABLE ORAL at 09:00

## 2024-07-12 RX ADMIN — OXYCODONE AND ACETAMINOPHEN 1 TABLET: 7.5; 325 TABLET ORAL at 15:14

## 2024-07-12 RX ADMIN — INSULIN GLARGINE 45 UNITS: 100 INJECTION, SOLUTION SUBCUTANEOUS at 09:01

## 2024-07-12 RX ADMIN — OXYCODONE AND ACETAMINOPHEN 1 TABLET: 7.5; 325 TABLET ORAL at 00:59

## 2024-07-12 RX ADMIN — TIZANIDINE 2 MG: 4 TABLET ORAL at 21:15

## 2024-07-12 RX ADMIN — OXYCODONE AND ACETAMINOPHEN 1 TABLET: 7.5; 325 TABLET ORAL at 09:03

## 2024-07-12 RX ADMIN — PANTOPRAZOLE SODIUM 40 MG: 40 TABLET, DELAYED RELEASE ORAL at 05:51

## 2024-07-12 RX ADMIN — ENOXAPARIN SODIUM 30 MG: 100 INJECTION SUBCUTANEOUS at 00:59

## 2024-07-12 RX ADMIN — GABAPENTIN 800 MG: 400 CAPSULE ORAL at 00:59

## 2024-07-12 RX ADMIN — METOPROLOL TARTRATE 25 MG: 25 TABLET, FILM COATED ORAL at 08:59

## 2024-07-12 RX ADMIN — GABAPENTIN 800 MG: 400 CAPSULE ORAL at 08:59

## 2024-07-12 RX ADMIN — ENOXAPARIN SODIUM 30 MG: 100 INJECTION SUBCUTANEOUS at 21:16

## 2024-07-12 RX ADMIN — SODIUM CHLORIDE, PRESERVATIVE FREE 10 ML: 5 INJECTION INTRAVENOUS at 21:17

## 2024-07-12 RX ADMIN — METOPROLOL TARTRATE 25 MG: 25 TABLET, FILM COATED ORAL at 21:16

## 2024-07-12 RX ADMIN — INSULIN GLARGINE 45 UNITS: 100 INJECTION, SOLUTION SUBCUTANEOUS at 21:17

## 2024-07-12 RX ADMIN — OXYCODONE AND ACETAMINOPHEN 1 TABLET: 7.5; 325 TABLET ORAL at 21:18

## 2024-07-12 RX ADMIN — INSULIN GLARGINE 45 UNITS: 100 INJECTION, SOLUTION SUBCUTANEOUS at 00:59

## 2024-07-12 RX ADMIN — FERROUS SULFATE TAB 325 MG (65 MG ELEMENTAL FE) 325 MG: 325 (65 FE) TAB at 08:59

## 2024-07-12 RX ADMIN — SODIUM CHLORIDE, POTASSIUM CHLORIDE, SODIUM LACTATE AND CALCIUM CHLORIDE: 600; 310; 30; 20 INJECTION, SOLUTION INTRAVENOUS at 01:01

## 2024-07-12 RX ADMIN — GABAPENTIN 800 MG: 400 CAPSULE ORAL at 21:15

## 2024-07-12 ASSESSMENT — PAIN DESCRIPTION - ONSET
ONSET: ON-GOING
ONSET: SUDDEN

## 2024-07-12 ASSESSMENT — PAIN SCALES - GENERAL
PAINLEVEL_OUTOF10: 3
PAINLEVEL_OUTOF10: 7
PAINLEVEL_OUTOF10: 2
PAINLEVEL_OUTOF10: 7
PAINLEVEL_OUTOF10: 7
PAINLEVEL_OUTOF10: 3
PAINLEVEL_OUTOF10: 3
PAINLEVEL_OUTOF10: 7
PAINLEVEL_OUTOF10: 7

## 2024-07-12 ASSESSMENT — PAIN DESCRIPTION - FREQUENCY
FREQUENCY: INTERMITTENT
FREQUENCY: CONTINUOUS

## 2024-07-12 ASSESSMENT — PAIN DESCRIPTION - LOCATION
LOCATION: BACK;LEG
LOCATION: LEG
LOCATION: BACK;LEG
LOCATION: LEG;BACK

## 2024-07-12 ASSESSMENT — PAIN DESCRIPTION - DESCRIPTORS
DESCRIPTORS: ACHING
DESCRIPTORS: SPASM
DESCRIPTORS: ACHING;TINGLING;THROBBING
DESCRIPTORS: ACHING

## 2024-07-12 ASSESSMENT — PAIN DESCRIPTION - ORIENTATION
ORIENTATION: RIGHT;LEFT;LOWER
ORIENTATION: RIGHT
ORIENTATION: RIGHT;LOWER
ORIENTATION: RIGHT

## 2024-07-12 ASSESSMENT — PAIN DESCRIPTION - PAIN TYPE
TYPE: CHRONIC PAIN
TYPE: ACUTE PAIN

## 2024-07-12 NOTE — ED PROVIDER NOTES
EMERGENCY DEPARTMENT HISTORY AND PHYSICAL EXAM      Patient Name: Abelardo Marquis  MRN: [id number]  : 1940  Date of Evaluation: 2024  ED Provider:  Toni Vu DO    History of Presenting Illness     Chief Complaint:   Chief Complaint   Patient presents with    Chest Pain     1 nitro at home and ASA given by ems      Patient was seen in conjunction with Ade Chambers.    In brief, patient is a 84 y.o. male with history of diabetes and coronary artery disease who is presenting to emergency department with a chief complaint of chest pain.  Patient states for weeks he has been experiencing pain in the center of his chest that is often exacerbated by eating. Pt feels like food is getting stuck.  Patient denies fevers or chills. Pt reporting reduced PO intake.      Past History     Past Medical History:   Past Medical History:   Diagnosis Date    Arthritis     Diabetes mellitus (HCC)     GERD (gastroesophageal reflux disease)     Gout     left ankle and right shoulder    Hyperlipidemia     Hypertension     MI (myocardial infarction) (HCC)     May 2013     Surgical History:   Past Surgical History:   Procedure Laterality Date    BACK SURGERY      CARDIAC SURGERY      stents x 1    CHOLECYSTECTOMY      May 2013    COLONOSCOPY N/A 2024    COLONOSCOPY POLYPECTOMY SNARE BIOPSY performed by Navi Castillo MD at [facility] ENDOSCOPY    DILATATION, ESOPHAGUS      HAND SURGERY      JOINT REPLACEMENT      SINUS SURGERY      sinus surgery x 2    SINUS SURGERY      TOTAL KNEE ARTHROPLASTY Bilateral     UPPER GASTROINTESTINAL ENDOSCOPY N/A 2024    ESOPHAGOGASTRODUODENOSCOPY DIAGNOSTIC ONLY performed by Navi Castillo MD at [facility] ENDOSCOPY    UPPER GASTROINTESTINAL ENDOSCOPY N/A 2024    ESOPHAGOGASTRODUODENOSCOPY DIAGNOSTIC ONLY performed by Navi Castillo MD at [facility] ENDOSCOPY    UPPER GASTROINTESTINAL ENDOSCOPY N/A 2024    ENTEROSCOPY PUSH DIAGNOSTIC performed by Navi Castillo MD at [facility] ENDOSCOPY     Family  block, rate is 53, TX interval is 226, normal axis, relatively good R wave progression, no ST segment elevations greater than 1 mm in contiguous leads.      Diagnosis and Disposition     CLINICAL IMPRESSION:  1. Chest pain, unspecified type    2. VICKY (acute kidney injury) ([facility])        Disposition: Admission    Patient condition at time of disposition: Serious    Disposition medications (if applicable):  New Prescriptions    No medications on file       DISCHARGE NOTE:   Pt has been reexamined. Patient has no new complaints, changes, or physical findings.  Care plan outlined and precautions discussed.  Results were reviewed with the patient. All medications were reviewed with the patient. All of pt's questions and concerns were addressed.  Alarm symptoms and return precautions associated with chief complaint and evaluation were reviewed with the patient in detail.  The patient demonstrated adequate understanding.  Patient was given strict return precautions.  Patient agrees with plans for discharge home.      Voice Dictation Disclaimer     Comment: Please note this report has been produced using speech recognition software and may contain errors related to that system including errors in grammar, punctuation, and spelling, as well as words and phrases that may be inappropriate.  Efforts were made to edit the dictations.      Toni Garcia D.O.,   07/11/24 9832

## 2024-07-12 NOTE — CONSULTS
Nephrology Service Consultation    Patient:  Abelardo Marquis  MRN: [id number]  Consulting physician:  Vishal Sol MD  Reason for Consult: Acute renal failure and CKD with low blood pressure    History Obtained From:  patient, electronic medical record  PCP: Georgie Hirsch MD    HISTORY OF PRESENT ILLNESS:   The patient is a 84 y.o. male who presents with weakness fatigue lightheadedness with low blood pressure has underlying [place] cirrhosis with diabetes hypertension chronic kidney disease sees my partner.  Baseline creatinine closer to 1.5 has chronic peripheral edema is on diuretic therapy.  Apparently was not feeling well the last few days not eating as well presents to the hospital with low blood pressure found to have acute renal failure in the above setting.  Patient given gentle oral hydration he states he feels better this morning and has baseline stage III kidney disease with diabetes since 1995 hypertension coronary disease obstructive sleep apnea chronic pain prior kidney stones as well as cirrhosis of the liver.  In the past diuresis has not been effective in removing peripheral edema recently started on losartan hydrochlorothiazide with adjustment in Norvasc dose and now presents in the above setting with slight drop in blood pressure with creatinine increased to 2.7 admitted for workup.    Past Medical History:        Diagnosis Date    Arthritis     Diabetes mellitus (HCC)     GERD (gastroesophageal reflux disease)     Gout     left ankle and right shoulder    Hyperlipidemia     Hypertension     MI (myocardial infarction) (HCC)     May 2013       Past Surgical History:        Procedure Laterality Date    BACK SURGERY      CARDIAC SURGERY      stents x 1    CHOLECYSTECTOMY      May 2013    COLONOSCOPY N/A 5/4/2024    COLONOSCOPY POLYPECTOMY SNARE BIOPSY performed by Navi Castillo MD at [facility] ENDOSCOPY    DILATATION, ESOPHAGUS      HAND SURGERY      JOINT REPLACEMENT      SINUS SURGERY      sinus  R53.1    Liver cirrhosis (HCC) K74.60    Depression F32.A    Chronic pain G89.29    Encephalopathy G93.40    COVID-19 U07.1    Acute hypoxemic respiratory failure (HCC) J96.01    Acute anemia D64.9    Hypotension I95.9    GI bleed K92.2    VICKY (acute kidney injury) (HCC) N17.9     Impression plan  #1 CKD 3 with acute renal failure baseline creatinine 1.5  #2 type 2 diabetes  #3 low blood pressure in setting of hypertension  #4 coronary disease  #5 JEONG cirrhosis    Plan  #1 gentle IV fluids and hydration if needed avoid diuretics for today monitor renal function  #2 monitor control glucose  #3 monitor blood pressure now improved make sure no other signs of infection or other pathology hold ACE ARB's and diuretics  #4 cardiac status monitor no chest pain  #5 in setting of cirrhosis peripheral edema hold diuresis today and can stay on jardiance for now  Will follow  Electronically signed by ELIAS BADILLO MD on 7/12/2024 at 1:51 PM

## 2024-07-12 NOTE — PROGRESS NOTES
Comprehensive Nutrition Assessment    Type and Reason for Visit:  Initial, Positive Nutrition Screen (wt loss, poor intake)    Nutrition Recommendations/Plan:   Continue current diet  Modify supplement to low yulisa high protein, between meals as needed     Malnutrition Assessment:  Malnutrition Status:  Insufficient data (07/12/24 6529)    Context:  Acute Illness       Nutrition Assessment:    Pt admitted with VICKY. 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 Pt reports poor po intake and wt loss PTA. Feeling better with some improvement in po intake here. No recent wt loss noted, fluid fluctuation is likely. Will modify oral supplement for lower sugar variety and continue to follow as moderate nutrition risk.    Nutrition Related Findings:    BUN 27, Cr 2.7, GFR 23, Glu 176, A1c 6.6   Wound Type: None       Current Nutrition Intake & Therapies:    Average Meal Intake: 26-50%, 51-75%  Average Supplements Intake: Unable to assess  ADULT DIET; Regular  ADULT ORAL NUTRITION SUPPLEMENT; Breakfast, Lunch, Dinner; Standard High Calorie/High Protein Oral Supplement    Anthropometric Measures:  Height: 177.8 cm (5' 10\")  Ideal Body Weight (IBW): 166 lbs (75 kg)    Admission Body Weight: 106.6 kg (235 lb 0.2 oz)  Current Body Weight: 106.6 kg (235 lb 0.2 oz),   IBW.    Current BMI (kg/m2): 33.7  Usual Body Weight: 101.6 kg (224 lb) (4/19/24)  % Weight Change (Calculated): 4.9                    BMI Categories: Obese Class 1 (BMI 30.0-34.9)    Estimated Daily Nutrient Needs:  Energy Requirements Based On: Formula  Weight Used for Energy Requirements: Current  Energy (kcal/day): 2300 (MSJ)  Weight Used for Protein Requirements: Ideal  Protein (g/day): 75-91 (1-1.2 g/kg IBW)     Fluid (ml/day):  2300    Nutrition Diagnosis:   Predicted inadequate energy intake related to acute injury/trauma as evidenced by poor intake prior to admission    Nutrition

## 2024-07-12 NOTE — ED NOTES
ED TO INPATIENT SBAR HANDOFF    Patient Name: Abelardo Marquis   :  1940  84 y.o.   Preferred Name  Abelardo  Family/Caregiver Present yes   Restraints no   C-SSRS: Risk of Suicide: No Risk  Sitter no   Sepsis Risk Score        Situation  Chief Complaint   Patient presents with    Chest Pain     1 nitro at home and ASA given by ems      Brief Description of Patient's Condition: Pt has been experiencing low hgb d/t unknown cause of gi bleeding. Pt started with generalized weakness and chest pain and low blood pressure; took 1 nitro and aspirin at home. Arrived here pale and hypotensive.   Mental Status: oriented and alert  Arrived from: home    Imaging:   XR CHEST PORTABLE   Final Result   Mild CHF         Electronically signed by Yuan Boyd        Abnormal labs:   Abnormal Labs Reviewed   COMPREHENSIVE METABOLIC PANEL - Abnormal; Notable for the following components:       Result Value    Glucose 120 (*)     BUN 26 (*)     Creatinine 2.7 (*)     Est, Glom Filt Rate 23 (*)     Total Protein 5.5 (*)     Albumin 3.2 (*)     All other components within normal limits   TROPONIN - Abnormal; Notable for the following components:    Troponin, High Sensitivity 86 (*)     All other components within normal limits   CBC WITH AUTO DIFFERENTIAL - Abnormal; Notable for the following components:    WBC 3.8 (*)     RBC 3.11 (*)     Hemoglobin 7.8 (*)     Hematocrit 28.3 (*)     MCH 25.1 (*)     MCHC 27.6 (*)     RDW 15.9 (*)     Monocytes % 12.3 (*)     Eosinophils % 3.1 (*)     Immature Neutrophil % 1.3 (*)     All other components within normal limits   TROPONIN - Abnormal; Notable for the following components:    Troponin, High Sensitivity 82 (*)     All other components within normal limits   BRAIN NATRIURETIC PEPTIDE - Abnormal; Notable for the following components:    Pro-BNP 1,334 (*)     All other components within normal limits        Background  History:   Past Medical History:   Diagnosis Date    Arthritis     Diabetes

## 2024-07-12 NOTE — H&P
History and Physical      Name:  Abelardo Marquis /Age/Sex: 1940  (84 y.o. male)   MRN & CSN:  [id number] & [id number] Encounter Date/Time: 2024 9:46 PM   Location:  ED[facility]/ED-31 PCP: Georgie Hirsch MD       Hospital Day: 1    Assessment and Plan:     Patient is a 87-year-old male who presented with weakness.    # VICKY on CKD3a  - No recent NSAIDs or decreased oral intake.   - Clinically hypovolemic. Cr 2.7, baseline ~1.5. UA ordered.  - Will challenge with IVF. Strict I/O's. Bladder scan if decreased voiding.    # Essential hypertension  - Endorsed generalized weakness after taking Imdur, Lopressor and NTG.   - BP 80s/40s on arrival, improved with IVF.  - Hold Imdur and Ranexa for now.     # CAD s/p PCI in   # Non-MI chronic troponin elevation  - Denied any typical CP.  - Initial Tn elevated, repeat flat. ECG without acute ischemic changes.  - Likely secondary to above. Continue ASA, Lipitor and Lopressor, hold Imdur and Ranexa as above.    # T2DM with hyperglycemia and peripheral neuropathy  - Last A1c 6.6% in 2024.  - Held Metformin.  - Decrease to Insulin Lantus 45 u BID with LCSI, consider initiating Aspart.    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  - Resume Lasix when able.    # GERD  - Continue PPI.    # Normocytic anemia  - Denied any source of bleeding including melena. Not on anticoagulants.  - Labs stable, continue home PO iron.    # Depression  - Continue Prozac.     Checklist:  Advanced care planning: full  Diet: cardiac  DVT ppx: low-dose Lovenox  Sugar: BG goal of 140-180 while inpatient    Disposition: admit to inpatient.  Estimated discharge: 2-3 day(s).  Current living situation: home.  Expected disposition: home.    Spoke with ED provider who recommended admission for the patient and I agree with that plan.  Personally reviewed lab studies and imaging.  EKG interpreted personally and results as stated above.  Imaging that was interpreted personally and results

## 2024-07-12 NOTE — PROGRESS NOTES
4 Eyes Skin Assessment     NAME:  Abelardo Marquis  YOB: 1940  MEDICAL RECORD NUMBER:  [id number]    The patient is being assessed for  Admission    I agree that at least one RN has performed a thorough Head to Toe Skin Assessment on the patient. ALL assessment sites listed below have been assessed.      Areas assessed by both nurses:    Head, Face, Ears, Shoulders, Back, Chest, Arms, Elbows, Hands, Sacrum. Buttock, Coccyx, Ischium, Legs. Feet and Heels, and Under Medical Devices         Does the Patient have a Wound? No noted wound(s)       Yomi Prevention initiated by RN: No  Wound Care Orders initiated by RN: No    Pressure Injury (Stage 3,4, Unstageable, DTI, NWPT, and Complex wounds) if present, place Wound referral order by RN under : No    New Ostomies, if present place, Ostomy referral order under : No     Nurse 1 eSignature: Electronically signed by Ashley Whitfield RN on 7/12/24 at 12:58 AM EDT    **SHARE this note so that the co-signing nurse can place an eSignature**    Nurse 2 eSignature: Electronically signed by Rosy Ruano LPN on 7/12/24 at 2:43 AM EDT

## 2024-07-12 NOTE — CONSULTS
[facility] PHYSICAL THERAPY EVALUATION  Abelardo Marquis, 1940, 4125/4125-A, 7/12/2024    History  [place]:  The primary encounter diagnosis was Chest pain, unspecified type. Diagnoses of VICKY (acute kidney injury) (HCC) and Acute on chronic right-sided congestive heart failure (HCC) were also pertinent to this visit.  Patient  has a past medical history of Arthritis, Diabetes mellitus (HCC), GERD (gastroesophageal reflux disease), Gout, Hyperlipidemia, Hypertension, and MI (myocardial infarction) (HCC).  Patient  has a past surgical history that includes Total knee arthroplasty (Bilateral); back surgery; Cholecystectomy; Cardiac surgery; Dilatation, esophagus; sinus surgery; joint replacement; Hand surgery; sinus surgery; Upper gastrointestinal endoscopy (N/A, 5/4/2024); Colonoscopy (N/A, 5/4/2024); Upper gastrointestinal endoscopy (N/A, 6/1/2024); and Upper gastrointestinal endoscopy (N/A, 6/22/2024).    Discharge Recommendation: [facility]    Equipment: none, pt owns all necessary equipment    Subjective:    Patient states:  \"I think I just did an illegal u-turn there.\"      Pain:  denies pain.      Communication with other providers:  Handoff to RN, OT    Restrictions: general precautions, fall risk    Home Setup/Prior level of function  Social/Functional History  Lives With: Daughter (and RODOLOF)  Type of Home: House  Home Layout: Two level (his bedroom and bathroom are on the 1st floor.)  Home Equipment: Rollator, Cane  Active : No  Patient's  Info: family  Mode of Transportation: Car    Examination of body systems (includes body structures/functions, activity/participation limitations):  Observation:  pt sitting EOB upon arrival and agreeable to therapy  Vision:  WFL  Hearing:  slightly [place]  Cardiopulmonary:  no O2 needs  Cognition: WFL, see OT/SLP note for further evaluation.    Musculoskeletal  ROM R/L:  WFL.    Strength R/L:  4+/5, minimal impairment in function and endurance.

## 2024-07-12 NOTE — PROGRESS NOTES
V2.0    [facility] Progress Note      Name:  Abelardo Marquis /Age/Sex: 1940  (84 y.o. male)   MRN & CSN:  [id number] & [id number] Encounter Date/Time: 2024 7:33 AM EDT   Location:  [address] [place] [address]-A PCP: Georgie Hirsch MD     Attending:Vishal Sol MD       Hospital Day: 2    Assessment and Recommendations   Abelardo Marquis is a 84 y.o. male with pmh of CAD s/p PCI in , HTN, HLD, T2DM with peripheral neuropathy, cirrhosis secondary to JEONG, GERD, normocytic anemia depression  who presents with VICKY (acute kidney injury) (HCC)      Plan:     # VICKY on CKD3a  - No recent NSAIDs or decreased oral intake.   - Clinically hypovolemic. Cr 2.7, baseline ~1.5. UA ordered.  - Will challenge with IVF. Strict I/O's. Bladder scan if decreased voiding.  -Nephro consulted     #Diarrhea:  -Follow stool studies, antidiarrheals prn    # Essential hypertension  - Endorsed generalized weakness after taking Imdur, Lopressor and NTG.   - BP 80s/40s on arrival, improved with IVF.  - Hold Imdur and Ranexa for now.      # CAD s/p PCI in   # Non-MI chronic troponin elevation  - Denied any typical CP.  - Initial Tn elevated, repeat flat. ECG without acute ischemic changes.  - Likely secondary to above. Continue ASA, Lipitor and Lopressor, hold Imdur and Ranexa as above.     # T2DM with hyperglycemia and peripheral neuropathy  - Last A1c 6.6% in 2024.  - Held Metformin.  - Decrease to Insulin Lantus 45 u BID with LCSI, consider initiating Aspart.     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  - Resume Lasix when able.     # GERD  - Continue PPI.     # Normocytic anemia  - Denied any source of bleeding including melena. Not on anticoagulants.  - Labs stable, continue home PO iron.     # Depression  - Continue Prozac.    Diet ADULT DIET; Regular  ADULT ORAL NUTRITION SUPPLEMENT; Breakfast, Lunch, Dinner; Standard High Calorie/High Protein Oral Supplement   DVT Prophylaxis [x] Lovenox, []  Heparin, [] SCDs, []  lactated ringers IV soln 100 mL/hr at 07/12/24 0101    sodium chloride      dextrose       PRN Meds: oxyCODONE-acetaminophen, 1 tablet, Q6H PRN  tiZANidine, 2 mg, BID PRN  sodium chloride flush, 5-40 mL, PRN  sodium chloride, , PRN  potassium chloride, 10 mEq, PRN  magnesium sulfate, 2,000 mg, PRN  ondansetron, 4 mg, Q8H PRN   Or  ondansetron, 4 mg, Q6H PRN  melatonin, 3 mg, Nightly PRN  polyethylene glycol, 17 g, Daily PRN  acetaminophen, 650 mg, Q6H PRN   Or  acetaminophen, 650 mg, Q6H PRN  glucose, 4 tablet, PRN  dextrose bolus, 125 mL, PRN   Or  dextrose bolus, 250 mL, PRN  glucagon (rDNA), 1 mg, PRN  dextrose, , Continuous PRN        Labs and Imaging   XR CHEST PORTABLE    Result Date: 7/11/2024  EXAMINATION: XR CHEST PORTABLE, 7/11/2024 7:33 PM HISTORY: chest pain COMPARISON:  No comparisons available.  Technique: Single view. Findings: Mild pulmonary venous congestion. No pneumothorax. Moderate cardiomegaly. Mediastinal and hilar contours are within normal limits. Bony thorax no acute abnormality.     Mild CHF Electronically signed by Yuan Boyd      CBC:   Recent Labs     07/11/24 1919 07/12/24  0305   WBC 3.8* 2.9*   HGB 7.8* 8.0*    140     BMP:    Recent Labs     07/11/24 1919 07/12/24  0305    141   K 4.1 4.4    106   CO2 25 23   BUN 26* 27*   CREATININE 2.7* 2.7*   GLUCOSE 120* 178*     Hepatic:   Recent Labs     07/11/24 1919   AST 29   ALT 19   BILITOT 0.4   ALKPHOS 67     Lipids:   Lab Results   Component Value Date/Time    CHOL 152 03/31/2023 04:27 AM    HDL 38 03/31/2023 04:27 AM    TRIG 123 03/31/2023 04:27 AM     Hemoglobin A1C:   Lab Results   Component Value Date/Time    LABA1C 6.6 06/19/2024 05:37 PM     TSH: No results found for: \"TSH\"  Troponin:   Lab Results   Component Value Date/Time    TROPONINT 0.018 03/30/2023 01:02 AM    TROPONINT 0.028 03/29/2023 06:07 PM    TROPONINT 0.048 11/27/2022 10:56 AM     Lactic Acid: No results for input(s): \"LACTA\" in the last 72

## 2024-07-12 NOTE — PROGRESS NOTES
Occupational Therapy  [facility] ACUTE CARE OCCUPATIONAL THERAPY EVALUATION    Abelardo Marquis, 1940, 4125/4125-A, 7/12/2024    Discharge Recommendation: home w/ assist prn + [facility]      History:  [place]:  The primary encounter diagnosis was Chest pain, unspecified type. Diagnoses of VICKY (acute kidney injury) ([facility]) and Acute on chronic right-sided congestive heart failure (HCC) were also pertinent to this visit.  Past Medical History:   Diagnosis Date    Arthritis     Diabetes mellitus (HCC)     GERD (gastroesophageal reflux disease)     Gout     left ankle and right shoulder    Hyperlipidemia     Hypertension     MI (myocardial infarction) (HCC)     May 2013         Subjective:  Patient states: \"I feel so much better today\"  Pain:  reports chronic lower back pain, did not rate  Communication with other providers: co-rufino w/ PT, handoff to RN  Restrictions: General Precautions, Fall Risk    Home Setup/Prior level of function:  Social/Functional History  Lives With: Daughter (and RODOLFO)  Type of Home: House  Home Layout: Two level (his bedroom and bathroom are on the 1st floor.)  Home Equipment: Rollator, Cane  Active : No  Patient's  Info: family  Mode of Transportation: Car  Lives With: Family (daughter and )  Type of Home: House  Home Layout: Two level (stays on 1st floor)  Home Access: Stairs to enter with rails  Entrance Stairs - Number of Steps: 2 steps in back entrance  Bathroom Shower/Tub: Walk-in shower (corner)  Bathroom Equipment:  (outside of shower)  Home Equipment: Cane, Grab bars, Rollator  Has the patient had two or more falls in the past year or any fall with injury in the past year?: No  ADL Assistance: Independent (sits on rollator during grooming tasks)  Homemaking Assistance: Needs assistance (can microwave, daughter does laundry)  Homemaking Responsibilities: Yes  Ambulation Assistance: Independent (use of SPC)  Active : Yes (son in law takes him to appointments  6=100%(CN)]       Educated pt on role of OT, therapy POC and functional goals, progression w/ ADLs and transfers, importance of movement and OOB activity, d/c recommendations     Safety Measures: Gait belt used, Left seated EOB, Alarm in place  Recommendations for NURSING activity:  Up to chair for all 3 meals and up to bathroom for all toileting needs     Assessment:  Pt is an 84 y o  admitted d/t VICKY. Pt at baseline is IND for ADLs, has assistance for high level IADLs, and mod I for functional transfers/mobility w/ rollator. Pt currently presents at/near baseline w/ ADLs and mobility. No further acute OT needs.    Complexity: Moderate  Prognosis: Good, no significant barriers to participation at this time.   Occupational Therapy Plan  Times Per Week: d/c       Time:   Time in: 1430  Time out: 1438  Total time: 8  Timed treatment minutes: 0        Electronically signed by:      OTTO Johnson/PACO  [id number]

## 2024-07-12 NOTE — PLAN OF CARE
Problem: Discharge Planning  Goal: Discharge to home or other facility with appropriate resources  7/12/2024 1018 by Esther Dow RN  Outcome: Progressing  7/12/2024 0055 by Ashley Whitfield RN  Outcome: Progressing     Problem: Pain  Goal: Verbalizes/displays adequate comfort level or baseline comfort level  7/12/2024 1018 by Esther Dow RN  Outcome: Progressing  7/12/2024 0055 by Ashley Whitfield RN  Outcome: Progressing     Problem: Safety - Adult  Goal: Free from fall injury  7/12/2024 1018 by Esther Dow RN  Outcome: Progressing  7/12/2024 0055 by Ashley Whitfield RN  Outcome: Progressing

## 2024-07-12 NOTE — CARE COORDINATION
07/12/24 1641   Service Assessment   Patient Orientation Alert and Oriented   Cognition Alert   History Provided By Patient;Medical Record   Primary Caregiver Self   Support Systems Children;Family Members   PCP Verified by CM Yes   Last Visit to PCP Within last 3 months   Prior Functional Level Independent in ADLs/IADLs   Current Functional Level Independent in ADLs/IADLs   Can patient return to prior living arrangement Yes   Ability to make needs known: Good   Family able to assist with home care needs: Yes   Would you like for me to discuss the discharge plan with any other family members/significant others, and if so, who? Yes  (daughters x2 and niece at bedside)   Financial Resources Medicare   Community Resources ECF/Home Care  ([facility] )   Social/Functional History   Lives With Daughter  (and RODOLFO)   Type of Home House   Home Layout Two level  (his bedroom and bathroom are on the 1st floor.)   Home Equipment Rollator;Cane   Active  No   Patient's  Info family   Mode of Transportation Car   Condition of Participation: Discharge Planning   The Patient and/or Patient Representative was provided with a Choice of Provider? Patient   The Patient and/Or Patient Representative agree with the Discharge Plan? Yes   Freedom of Choice list was provided with basic dialogue that supports the patient's individualized plan of care/goals, treatment preferences, and shares the quality data associated with the providers?  Yes     Reviewed chart, discussed in IDR and spoke with pt/family at bedside, about discharge needs/plans. Pt lives with daughter and RODOLFO, he is active with [facility].  PS to [facility].   He is also interested in [facility].  Gave him info about [facility].  CM left for [facility] services about  pt wanting [facility] and [facility].  Denies any other needs at this time.     ----- Message from Saritha Tejada MD sent at 5/22/2024  1:49 PM CDT -----  MRA shows intracranial atherosclerosis as well. Does he have appt schedule with Diony

## 2024-07-13 ENCOUNTER — APPOINTMENT (OUTPATIENT)
Dept: GENERAL RADIOLOGY | Age: 84
End: 2024-07-13
Attending: INTERNAL MEDICINE
Payer: MEDICARE

## 2024-07-13 ENCOUNTER — APPOINTMENT (OUTPATIENT)
Dept: CT IMAGING | Age: 84
End: 2024-07-13
Payer: MEDICARE

## 2024-07-13 LAB
ALBUMIN SERPL-MCNC: 3 GM/DL (ref 3.4–5)
ANION GAP SERPL CALCULATED.3IONS-SCNC: 7 MMOL/L (ref 7–16)
ANION GAP SERPL CALCULATED.3IONS-SCNC: 9 MMOL/L (ref 7–16)
ASTROVIRUS PCR: NOT DETECTED
BASOPHILS ABSOLUTE: 0 K/CU MM
BASOPHILS RELATIVE PERCENT: 0.6 % (ref 0–1)
BUN SERPL-MCNC: 27 MG/DL (ref 6–23)
BUN SERPL-MCNC: 27 MG/DL (ref 6–23)
C CAYETANENSIS DNA SPEC QL NAA+PROBE: NOT DETECTED
CALCIUM SERPL-MCNC: 8 MG/DL (ref 8.3–10.6)
CALCIUM SERPL-MCNC: 8.2 MG/DL (ref 8.3–10.6)
CAMPY SP DNA.DIARRHEA STL QL NAA+PROBE: NOT DETECTED
CHLORIDE BLD-SCNC: 106 MMOL/L (ref 99–110)
CHLORIDE BLD-SCNC: 107 MMOL/L (ref 99–110)
CHLORIDE URINE RANDOM: 39 MMOL/L (ref 43–210)
CO2: 24 MMOL/L (ref 21–32)
CO2: 27 MMOL/L (ref 21–32)
CREAT SERPL-MCNC: 1.6 MG/DL (ref 0.9–1.3)
CREAT SERPL-MCNC: 1.7 MG/DL (ref 0.9–1.3)
CREATININE URINE: 124.7 MG/DL (ref 39–259)
CRYPTOSP DNA SPEC QL NAA+PROBE: NOT DETECTED
D-DIMER QUANTITATIVE: 0.61 UG/ML (FEU)
DIFFERENTIAL TYPE: ABNORMAL
E COLI DNA SPEC QL NAA+PROBE: NOT DETECTED
E COLI ENTEROAGGREGATIVE PCR: NOT DETECTED
E COLI ENTEROPATHOGENIC PCR: NOT DETECTED
E COLI ENTEROTOXIGENIC PCR: NOT DETECTED
E COLI O157H7 DNA SPEC QL NAA+PROBE: NOT DETECTED
E HISTOLYT DNA SPEC QL NAA+PROBE: NOT DETECTED
EC STX1+STX2 + H7 FLIC SPEC NAA+PROBE: NOT DETECTED
EKG ATRIAL RATE: 53 BPM
EKG DIAGNOSIS: NORMAL
EKG P AXIS: 32 DEGREES
EKG P-R INTERVAL: 226 MS
EKG Q-T INTERVAL: 504 MS
EKG QRS DURATION: 102 MS
EKG QTC CALCULATION (BAZETT): 472 MS
EKG R AXIS: -7 DEGREES
EKG T AXIS: 33 DEGREES
EKG VENTRICULAR RATE: 53 BPM
EOSINOPHILS ABSOLUTE: 0.1 K/CU MM
EOSINOPHILS RELATIVE PERCENT: 3.6 % (ref 0–3)
G LAMBLIA DNA SPEC QL NAA+PROBE: NOT DETECTED
GFR, ESTIMATED: 40 ML/MIN/1.73M2
GFR, ESTIMATED: 42 ML/MIN/1.73M2
GLUCOSE BLD-MCNC: 159 MG/DL (ref 70–99)
GLUCOSE BLD-MCNC: 212 MG/DL (ref 70–99)
GLUCOSE BLD-MCNC: 255 MG/DL (ref 70–99)
GLUCOSE BLD-MCNC: 256 MG/DL (ref 70–99)
GLUCOSE SERPL-MCNC: 236 MG/DL (ref 70–99)
GLUCOSE SERPL-MCNC: 238 MG/DL (ref 70–99)
HADV DNA SPEC QL NAA+PROBE: NOT DETECTED
HCT VFR BLD CALC: 28.4 % (ref 42–52)
HEMOGLOBIN: 7.8 GM/DL (ref 13.5–18)
IMMATURE NEUTROPHIL %: 1.5 % (ref 0–0.43)
LACTATE: 1.6 MMOL/L (ref 0.5–1.9)
LACTOFERRIN, QUAL: NEGATIVE
LYMPHOCYTES ABSOLUTE: 0.6 K/CU MM
LYMPHOCYTES RELATIVE PERCENT: 19.3 % (ref 24–44)
MAGNESIUM: 2 MG/DL (ref 1.8–2.4)
MCH RBC QN AUTO: 24.8 PG (ref 27–31)
MCHC RBC AUTO-ENTMCNC: 27.5 % (ref 32–36)
MCV RBC AUTO: 90.4 FL (ref 78–100)
MONOCYTES ABSOLUTE: 0.3 K/CU MM
MONOCYTES RELATIVE PERCENT: 9.1 % (ref 0–4)
NEUTROPHILS ABSOLUTE: 2.2 K/CU MM
NEUTROPHILS RELATIVE PERCENT: 65.9 % (ref 36–66)
NOROVIRUS RNA SPEC QL NAA+PROBE: NOT DETECTED
NUCLEATED RBC %: 0 %
P SHIGELLOIDES DNA STL QL NAA+PROBE: NOT DETECTED
PDW BLD-RTO: 15.5 % (ref 11.7–14.9)
PHOSPHORUS: 2.8 MG/DL (ref 2.5–4.9)
PLATELET # BLD: 135 K/CU MM (ref 140–440)
PMV BLD AUTO: 9.7 FL (ref 7.5–11.1)
POTASSIUM SERPL-SCNC: 4.9 MMOL/L (ref 3.5–5.1)
POTASSIUM SERPL-SCNC: 5.1 MMOL/L (ref 3.5–5.1)
POTASSIUM, UR: 52.2 MMOL/L (ref 22–119)
PROT/CREAT RATIO, UR: 0.1
RBC # BLD: 3.14 M/CU MM (ref 4.6–6.2)
RV RNA SPEC QL NAA+PROBE: NOT DETECTED
SALMONELLA DNA SPEC QL NAA+PROBE: NOT DETECTED
SAPOVIRUS PCR: NOT DETECTED
SODIUM BLD-SCNC: 140 MMOL/L (ref 135–145)
SODIUM BLD-SCNC: 140 MMOL/L (ref 135–145)
SODIUM URINE: 41 MMOL/L (ref 35–167)
TOTAL IMMATURE NEUTOROPHIL: 0.05 K/CU MM
TOTAL NUCLEATED RBC: 0 K/CU MM
TROPONIN, HIGH SENSITIVITY: 47 NG/L (ref 0–22)
TROPONIN, HIGH SENSITIVITY: 50 NG/L (ref 0–22)
URINE TOTAL PROTEIN: 10.1 MG/DL
V CHOLERAE DNA SPEC QL NAA+PROBE: NOT DETECTED
VIBRIO DNA SPEC NAA+PROBE: NOT DETECTED
WBC # BLD: 3.3 K/CU MM (ref 4–10.5)
YERSINIA DNA SPEC NAA+PROBE: NOT DETECTED

## 2024-07-13 PROCEDURE — 84156 ASSAY OF PROTEIN URINE: CPT

## 2024-07-13 PROCEDURE — 84133 ASSAY OF URINE POTASSIUM: CPT

## 2024-07-13 PROCEDURE — 87507 IADNA-DNA/RNA PROBE TQ 12-25: CPT

## 2024-07-13 PROCEDURE — 82436 ASSAY OF URINE CHLORIDE: CPT

## 2024-07-13 PROCEDURE — 71045 X-RAY EXAM CHEST 1 VIEW: CPT

## 2024-07-13 PROCEDURE — APPNB15 APP NON BILLABLE TIME 0-15 MINS

## 2024-07-13 PROCEDURE — 82962 GLUCOSE BLOOD TEST: CPT

## 2024-07-13 PROCEDURE — 84300 ASSAY OF URINE SODIUM: CPT

## 2024-07-13 PROCEDURE — 80069 RENAL FUNCTION PANEL: CPT

## 2024-07-13 PROCEDURE — 94761 N-INVAS EAR/PLS OXIMETRY MLT: CPT

## 2024-07-13 PROCEDURE — 80048 BASIC METABOLIC PNL TOTAL CA: CPT

## 2024-07-13 PROCEDURE — 6370000000 HC RX 637 (ALT 250 FOR IP)

## 2024-07-13 PROCEDURE — 6360000002 HC RX W HCPCS: Performed by: STUDENT IN AN ORGANIZED HEALTH CARE EDUCATION/TRAINING PROGRAM

## 2024-07-13 PROCEDURE — 83735 ASSAY OF MAGNESIUM: CPT

## 2024-07-13 PROCEDURE — 85379 FIBRIN DEGRADATION QUANT: CPT

## 2024-07-13 PROCEDURE — 83630 LACTOFERRIN FECAL (QUAL): CPT

## 2024-07-13 PROCEDURE — 84100 ASSAY OF PHOSPHORUS: CPT

## 2024-07-13 PROCEDURE — 6370000000 HC RX 637 (ALT 250 FOR IP): Performed by: STUDENT IN AN ORGANIZED HEALTH CARE EDUCATION/TRAINING PROGRAM

## 2024-07-13 PROCEDURE — 36415 COLL VENOUS BLD VENIPUNCTURE: CPT

## 2024-07-13 PROCEDURE — 2580000003 HC RX 258: Performed by: STUDENT IN AN ORGANIZED HEALTH CARE EDUCATION/TRAINING PROGRAM

## 2024-07-13 PROCEDURE — 82570 ASSAY OF URINE CREATININE: CPT

## 2024-07-13 PROCEDURE — 1200000000 HC SEMI PRIVATE

## 2024-07-13 PROCEDURE — 83605 ASSAY OF LACTIC ACID: CPT

## 2024-07-13 PROCEDURE — 85025 COMPLETE CBC W/AUTO DIFF WBC: CPT

## 2024-07-13 PROCEDURE — 84484 ASSAY OF TROPONIN QUANT: CPT

## 2024-07-13 PROCEDURE — 93010 ELECTROCARDIOGRAM REPORT: CPT | Performed by: INTERNAL MEDICINE

## 2024-07-13 PROCEDURE — 74176 CT ABD & PELVIS W/O CONTRAST: CPT

## 2024-07-13 PROCEDURE — 93005 ELECTROCARDIOGRAM TRACING: CPT | Performed by: STUDENT IN AN ORGANIZED HEALTH CARE EDUCATION/TRAINING PROGRAM

## 2024-07-13 RX ORDER — OXYCODONE HYDROCHLORIDE AND ACETAMINOPHEN 5; 325 MG/1; MG/1
1 TABLET ORAL EVERY 4 HOURS PRN
Status: DISCONTINUED | OUTPATIENT
Start: 2024-07-13 | End: 2024-07-16 | Stop reason: HOSPADM

## 2024-07-13 RX ORDER — CALCIUM CARBONATE 500 MG/1
500 TABLET, CHEWABLE ORAL 3 TIMES DAILY PRN
Status: DISCONTINUED | OUTPATIENT
Start: 2024-07-13 | End: 2024-07-16 | Stop reason: HOSPADM

## 2024-07-13 RX ORDER — FUROSEMIDE 20 MG/1
20 TABLET ORAL DAILY
Status: DISCONTINUED | OUTPATIENT
Start: 2024-07-14 | End: 2024-07-16 | Stop reason: HOSPADM

## 2024-07-13 RX ORDER — NITROGLYCERIN 0.4 MG/1
0.4 TABLET SUBLINGUAL EVERY 5 MIN PRN
Status: DISCONTINUED | OUTPATIENT
Start: 2024-07-13 | End: 2024-07-16 | Stop reason: HOSPADM

## 2024-07-13 RX ORDER — LIDOCAINE 4 G/G
1 PATCH TOPICAL DAILY
Status: DISCONTINUED | OUTPATIENT
Start: 2024-07-13 | End: 2024-07-16 | Stop reason: HOSPADM

## 2024-07-13 RX ORDER — HYDROMORPHONE HYDROCHLORIDE 1 MG/ML
0.25 INJECTION, SOLUTION INTRAMUSCULAR; INTRAVENOUS; SUBCUTANEOUS EVERY 4 HOURS PRN
Status: DISCONTINUED | OUTPATIENT
Start: 2024-07-13 | End: 2024-07-16 | Stop reason: HOSPADM

## 2024-07-13 RX ADMIN — PANTOPRAZOLE SODIUM 40 MG: 40 TABLET, DELAYED RELEASE ORAL at 05:52

## 2024-07-13 RX ADMIN — GABAPENTIN 800 MG: 400 CAPSULE ORAL at 22:17

## 2024-07-13 RX ADMIN — TIZANIDINE 2 MG: 4 TABLET ORAL at 10:53

## 2024-07-13 RX ADMIN — GABAPENTIN 800 MG: 400 CAPSULE ORAL at 10:52

## 2024-07-13 RX ADMIN — ATORVASTATIN CALCIUM 40 MG: 40 TABLET, FILM COATED ORAL at 22:16

## 2024-07-13 RX ADMIN — SODIUM CHLORIDE, PRESERVATIVE FREE 10 ML: 5 INJECTION INTRAVENOUS at 13:10

## 2024-07-13 RX ADMIN — INSULIN GLARGINE 45 UNITS: 100 INJECTION, SOLUTION SUBCUTANEOUS at 22:22

## 2024-07-13 RX ADMIN — METOPROLOL TARTRATE 25 MG: 25 TABLET, FILM COATED ORAL at 10:52

## 2024-07-13 RX ADMIN — PANTOPRAZOLE SODIUM 40 MG: 40 TABLET, DELAYED RELEASE ORAL at 16:13

## 2024-07-13 RX ADMIN — CYANOCOBALAMIN TAB 1000 MCG 1000 MCG: 1000 TAB at 10:52

## 2024-07-13 RX ADMIN — FLUOXETINE HYDROCHLORIDE 40 MG: 10 CAPSULE ORAL at 10:52

## 2024-07-13 RX ADMIN — ASPIRIN 81 MG: 81 TABLET, CHEWABLE ORAL at 11:23

## 2024-07-13 RX ADMIN — EMPAGLIFLOZIN 10 MG: 10 TABLET, FILM COATED ORAL at 10:52

## 2024-07-13 RX ADMIN — INSULIN LISPRO 1 UNITS: 100 INJECTION, SOLUTION INTRAVENOUS; SUBCUTANEOUS at 17:50

## 2024-07-13 RX ADMIN — INSULIN GLARGINE 45 UNITS: 100 INJECTION, SOLUTION SUBCUTANEOUS at 10:54

## 2024-07-13 RX ADMIN — METOPROLOL TARTRATE 25 MG: 25 TABLET, FILM COATED ORAL at 22:16

## 2024-07-13 RX ADMIN — ENOXAPARIN SODIUM 30 MG: 100 INJECTION SUBCUTANEOUS at 22:18

## 2024-07-13 RX ADMIN — SODIUM CHLORIDE, PRESERVATIVE FREE 5 ML: 5 INJECTION INTRAVENOUS at 22:17

## 2024-07-13 RX ADMIN — INSULIN LISPRO 2 UNITS: 100 INJECTION, SOLUTION INTRAVENOUS; SUBCUTANEOUS at 12:41

## 2024-07-13 RX ADMIN — OXYCODONE HYDROCHLORIDE AND ACETAMINOPHEN 1 TABLET: 5; 325 TABLET ORAL at 22:25

## 2024-07-13 RX ADMIN — CALCIUM CARBONATE 500 MG: 500 TABLET, CHEWABLE ORAL at 11:22

## 2024-07-13 ASSESSMENT — PAIN DESCRIPTION - LOCATION
LOCATION: BACK;LEG

## 2024-07-13 ASSESSMENT — PAIN SCALES - GENERAL
PAINLEVEL_OUTOF10: 7

## 2024-07-13 ASSESSMENT — PAIN DESCRIPTION - ORIENTATION
ORIENTATION: LOWER;RIGHT

## 2024-07-13 ASSESSMENT — PAIN - FUNCTIONAL ASSESSMENT: PAIN_FUNCTIONAL_ASSESSMENT: ACTIVITIES ARE NOT PREVENTED

## 2024-07-13 ASSESSMENT — PAIN DESCRIPTION - DESCRIPTORS
DESCRIPTORS: ACHING
DESCRIPTORS: OTHER (COMMENT)

## 2024-07-13 NOTE — PROGRESS NOTES
V2.0    [facility] Progress Note      Name:  Abelardo Marquis /Age/Sex: 1940  (84 y.o. male)   MRN & CSN:  [id number] & [id number] Encounter Date/Time: 2024 7:33 AM EDT   Location:  [address] [place] [address]-A PCP: Georgie Hirsch MD     Attending:Vishal Sol MD       Hospital Day: 3    Assessment and Recommendations   Abelardo Marquis is a 84 y.o. male with pmh of CAD s/p PCI in , HTN, HLD, T2DM with peripheral neuropathy, cirrhosis secondary to JEONG, GERD, normocytic anemia depression  who presents with VICKY (acute kidney injury) (HCC)      Plan:     # VICKY on CKD3a  - No recent NSAIDs or decreased oral intake.   - Clinically hypovolemic. Cr 2.7, baseline ~1.5. UA ordered.  - Will challenge with IVF. Strict I/O's. Bladder scan if decreased voiding.  -Nephro consulted     #Diarrhea:  -Follow stool studies, antidiarrheals prn    # Essential hypertension  - Endorsed generalized weakness after taking Imdur, Lopressor and NTG.   - BP 80s/40s on arrival, improved with IVF.  - Hold Imdur and Ranexa for now.      # CAD s/p PCI in   # Non-MI chronic troponin elevation  - Denied any typical CP.  - Initial Tn elevated, repeat flat. ECG without acute ischemic changes.  - Likely secondary to above. Continue ASA, Lipitor and Lopressor, hold Imdur and Ranexa as above.     # T2DM with hyperglycemia and peripheral neuropathy  - Last A1c 6.6% in 2024.  - Held Metformin.  - Decrease to Insulin Lantus 45 u BID with LCSI, consider initiating Aspart.     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  - Resume Lasix when able.     # GERD  - Continue PPI.     # Normocytic anemia  - Denied any source of bleeding including melena. Not on anticoagulants.  - Labs stable, continue home PO iron.     # Depression  - Continue Prozac.    Diet ADULT DIET; Regular  ADULT ORAL NUTRITION SUPPLEMENT; Breakfast, Lunch, Dinner; Low Calorie/High Protein Oral Supplement   DVT Prophylaxis [x] Lovenox, []  Heparin, [] SCDs, [] Ambulation,  []

## 2024-07-13 NOTE — PROGRESS NOTES
Nephrology Progress Note  7/13/2024 9:52 AM  Subjective:     Interval History: Abelardo Marquis is a 84 y.o. male with doing okay in general discussed with patient as well as wife in the room no acute issues feels better awaiting repeat labs        Data:   Scheduled Meds:   aspirin  81 mg Oral Daily    atorvastatin  40 mg Oral Nightly    cholestyramine light  4 g Oral Daily    empagliflozin  10 mg Oral Daily    ferrous sulfate  325 mg Oral Every Other Day    FLUoxetine  40 mg Oral Daily    [Held by provider] furosemide  40 mg Oral Daily    gabapentin  800 mg Oral BID    insulin glargine  45 Units SubCUTAneous BID    [Held by provider] isosorbide mononitrate  30 mg Oral Daily    metoprolol tartrate  25 mg Oral BID    pantoprazole  40 mg Oral BID AC    [Held by provider] ranolazine  500 mg Oral Daily    vitamin B-12  1,000 mcg Oral Daily    sodium chloride flush  5-40 mL IntraVENous 2 times per day    enoxaparin  30 mg SubCUTAneous QPM    insulin lispro  0-4 Units SubCUTAneous TID WC    insulin lispro  0-4 Units SubCUTAneous Nightly     Continuous Infusions:   sodium chloride      dextrose           CBC   Recent Labs     07/11/24 1919 07/12/24  0305   WBC 3.8* 2.9*   HGB 7.8* 8.0*   HCT 28.3* 29.0*    140      BMP   Recent Labs     07/11/24 1919 07/12/24  0305    141   K 4.1 4.4    106   CO2 25 23   BUN 26* 27*   CREATININE 2.7* 2.7*     Hepatic:   Recent Labs     07/11/24 1919   AST 29   ALT 19   BILITOT 0.4   ALKPHOS 67     Troponin: No results for input(s): \"TROPONINI\" in the last 72 hours.  BNP: No results for input(s): \"BNP\" in the last 72 hours.  Lipids: No results for input(s): \"CHOL\", \"HDL\" in the last 72 hours.    Invalid input(s): \"LDLCALCU\"  ABGs:   Lab Results   Component Value Date/Time    PO2ART 70 09/21/2020 01:00 PM    KWS0PHM 38.0 09/21/2020 01:00 PM     INR:   Recent Labs     07/12/24  0305   INR 1.1     Renal Labs  Albumin:  No results found for: \"LABALBU\"  Calcium:    Lab

## 2024-07-13 NOTE — CONSULTS
[place] [address]  Phone: [phone]    Fax [phone]                  Qamar Alvarado MD, [facility]       Chris Huber MD, [facility]  Cecile Sol MD, [facility]    MD Mario Whelan MD Tariq Rizvi, MD Bilal Alam, MD Melissa Kellis, APRN      Alejandra Quiroga, APRN  Samantha Ngo, APRN    Francis Raymond, APRN  King Salazar, ORA    CARDIOLOGY CONSULT NOTE     Reason for consultation:  Chest pain    Referring physician:  Thaddeus Bettencourt MD     Primary care physician: Georgie Hirsch MD      Thank you for the consult.    Chief Complaints :  Chief Complaint   Patient presents with    Chest Pain     1 nitro at home and ASA given by ems         History of present illness:Abelardo is a 84 y.o.year old  male with a past medical history of coronary artery disease s/p PCI LAD 08/2023, hypertension, hyperlipidemia, T2DM, LVOT obstruction, GI bleeding/anemia.    Cardiology consulted for chest pain.    Patient states that his chest pain has been ongoing intermittently for months.  Upon my examination of patient who is chest pain is epigastric/lower sternum in origin.  Pain is worse with palpation but not inspiration.    Patient does have history of coronary artery disease however with atypical presentation at this time informed patient that unless troponin elevation is showing significant changes we will continue conservative measures.    Care discussed with primary team.      Past medical history:    has a past medical history of Arthritis, Diabetes mellitus (HCC), GERD (gastroesophageal reflux disease), Gout, Hyperlipidemia, Hypertension, and MI (myocardial infarction) ([facility]).  Past surgical history:   has a past surgical history that includes Total knee arthroplasty (Bilateral); back surgery; Cholecystectomy; Cardiac surgery; Dilatation, esophagus; sinus surgery; joint replacement; Hand surgery; sinus surgery; Upper gastrointestinal  endoscopy (N/A, 5/4/2024); Colonoscopy (N/A, 5/4/2024); Upper gastrointestinal endoscopy (N/A, 6/1/2024); and Upper gastrointestinal endoscopy (N/A, 6/22/2024).  Social History:   reports that he has never smoked. He has never used smokeless tobacco. He reports that he does not drink alcohol and does not use drugs.  Family history:  no family history of CAD, STROKE of DM at early age    Allergies   Allergen Reactions    Sulfa Antibiotics Hives       [START ON 7/14/2024] furosemide (LASIX) tablet 20 mg, Daily  calcium carbonate (TUMS) chewable tablet 500 mg, TID PRN  nitroGLYCERIN (NITROSTAT) SL tablet 0.4 mg, Q5 Min PRN  oxyCODONE-acetaminophen (PERCOCET) 5-325 MG per tablet 1 tablet, Q4H PRN  HYDROmorphone HCl PF (DILAUDID) injection 0.25 mg, Q4H PRN  aspirin chewable tablet 81 mg, Daily  atorvastatin (LIPITOR) tablet 40 mg, Nightly  cholestyramine light packet 4 g, Daily  empagliflozin (JARDIANCE) tablet 10 mg, Daily  ferrous sulfate (IRON 325) tablet 325 mg, Every Other Day  FLUoxetine (PROZAC) capsule 40 mg, Daily  gabapentin (NEURONTIN) capsule 800 mg, BID  insulin glargine (LANTUS) injection vial 45 Units, BID  isosorbide mononitrate (IMDUR) extended release tablet 30 mg, Daily  metoprolol tartrate (LOPRESSOR) tablet 25 mg, BID  pantoprazole (PROTONIX) tablet 40 mg, BID AC  ranolazine (RANEXA) extended release tablet 500 mg, Daily  tiZANidine (ZANAFLEX) tablet 2 mg, BID PRN  vitamin B-12 (CYANOCOBALAMIN) tablet 1,000 mcg, Daily  sodium chloride flush 0.9 % injection 5-40 mL, 2 times per day  sodium chloride flush 0.9 % injection 5-40 mL, PRN  0.9 % sodium chloride infusion, PRN  potassium chloride 10 mEq/100 mL IVPB (Peripheral Line), PRN  magnesium sulfate 2000 mg in 50 mL IVPB premix, PRN  enoxaparin Sodium (LOVENOX) injection 30 mg, QPM  ondansetron (ZOFRAN-ODT) disintegrating tablet 4 mg, Q8H PRN   Or  ondansetron (ZOFRAN) injection 4 mg, Q6H PRN  melatonin tablet 3 mg, Nightly PRN  polyethylene glycol

## 2024-07-14 ENCOUNTER — APPOINTMENT (OUTPATIENT)
Dept: ULTRASOUND IMAGING | Age: 84
End: 2024-07-14
Payer: MEDICARE

## 2024-07-14 LAB
ALBUMIN SERPL-MCNC: 2.9 GM/DL (ref 3.4–5)
ALP BLD-CCNC: 55 IU/L (ref 40–128)
ALT SERPL-CCNC: 19 U/L (ref 10–40)
ANION GAP SERPL CALCULATED.3IONS-SCNC: 6 MMOL/L (ref 7–16)
AST SERPL-CCNC: 33 IU/L (ref 15–37)
BASOPHILS ABSOLUTE: 0 K/CU MM
BASOPHILS RELATIVE PERCENT: 0.9 % (ref 0–1)
BILIRUB SERPL-MCNC: 0.3 MG/DL (ref 0–1)
BUN SERPL-MCNC: 28 MG/DL (ref 6–23)
CALCIUM SERPL-MCNC: 7.8 MG/DL (ref 8.3–10.6)
CHLORIDE BLD-SCNC: 110 MMOL/L (ref 99–110)
CO2: 26 MMOL/L (ref 21–32)
CREAT SERPL-MCNC: 1.4 MG/DL (ref 0.9–1.3)
DIFFERENTIAL TYPE: ABNORMAL
EKG ATRIAL RATE: 91 BPM
EKG DIAGNOSIS: NORMAL
EKG Q-T INTERVAL: 422 MS
EKG QRS DURATION: 106 MS
EKG QTC CALCULATION (BAZETT): 452 MS
EKG R AXIS: 14 DEGREES
EKG T AXIS: 38 DEGREES
EKG VENTRICULAR RATE: 69 BPM
EOSINOPHILS ABSOLUTE: 0.1 K/CU MM
EOSINOPHILS RELATIVE PERCENT: 4.7 % (ref 0–3)
GFR, ESTIMATED: 50 ML/MIN/1.73M2
GLUCOSE BLD-MCNC: 141 MG/DL (ref 70–99)
GLUCOSE BLD-MCNC: 182 MG/DL (ref 70–99)
GLUCOSE BLD-MCNC: 230 MG/DL (ref 70–99)
GLUCOSE BLD-MCNC: 264 MG/DL (ref 70–99)
GLUCOSE SERPL-MCNC: 174 MG/DL (ref 70–99)
HCT VFR BLD CALC: 25 % (ref 42–52)
HEMOGLOBIN: 7 GM/DL (ref 13.5–18)
IMMATURE NEUTROPHIL %: 1.3 % (ref 0–0.43)
LACTATE: 1.7 MMOL/L (ref 0.5–1.9)
LYMPHOCYTES ABSOLUTE: 0.6 K/CU MM
LYMPHOCYTES RELATIVE PERCENT: 27.6 % (ref 24–44)
MAGNESIUM: 2 MG/DL (ref 1.8–2.4)
MCH RBC QN AUTO: 25.3 PG (ref 27–31)
MCHC RBC AUTO-ENTMCNC: 28 % (ref 32–36)
MCV RBC AUTO: 90.3 FL (ref 78–100)
MONOCYTES ABSOLUTE: 0.3 K/CU MM
MONOCYTES RELATIVE PERCENT: 10.8 % (ref 0–4)
NEUTROPHILS ABSOLUTE: 1.3 K/CU MM
NEUTROPHILS RELATIVE PERCENT: 54.7 % (ref 36–66)
NUCLEATED RBC %: 0 %
PDW BLD-RTO: 15.6 % (ref 11.7–14.9)
PHOSPHORUS: 2.5 MG/DL (ref 2.5–4.9)
PLATELET # BLD: 130 K/CU MM (ref 140–440)
PMV BLD AUTO: 10.3 FL (ref 7.5–11.1)
POTASSIUM SERPL-SCNC: 4.4 MMOL/L (ref 3.5–5.1)
RBC # BLD: 2.77 M/CU MM (ref 4.6–6.2)
SODIUM BLD-SCNC: 142 MMOL/L (ref 135–145)
TOTAL IMMATURE NEUTOROPHIL: 0.03 K/CU MM
TOTAL NUCLEATED RBC: 0 K/CU MM
TOTAL PROTEIN: 5 GM/DL (ref 6.4–8.2)
TROPONIN, HIGH SENSITIVITY: 56 NG/L (ref 0–22)
WBC # BLD: 2.3 K/CU MM (ref 4–10.5)

## 2024-07-14 PROCEDURE — 6370000000 HC RX 637 (ALT 250 FOR IP): Performed by: STUDENT IN AN ORGANIZED HEALTH CARE EDUCATION/TRAINING PROGRAM

## 2024-07-14 PROCEDURE — 84484 ASSAY OF TROPONIN QUANT: CPT

## 2024-07-14 PROCEDURE — 2580000003 HC RX 258: Performed by: STUDENT IN AN ORGANIZED HEALTH CARE EDUCATION/TRAINING PROGRAM

## 2024-07-14 PROCEDURE — 85025 COMPLETE CBC W/AUTO DIFF WBC: CPT

## 2024-07-14 PROCEDURE — 84100 ASSAY OF PHOSPHORUS: CPT

## 2024-07-14 PROCEDURE — 6360000002 HC RX W HCPCS: Performed by: STUDENT IN AN ORGANIZED HEALTH CARE EDUCATION/TRAINING PROGRAM

## 2024-07-14 PROCEDURE — 80053 COMPREHEN METABOLIC PANEL: CPT

## 2024-07-14 PROCEDURE — 6370000000 HC RX 637 (ALT 250 FOR IP)

## 2024-07-14 PROCEDURE — 83605 ASSAY OF LACTIC ACID: CPT

## 2024-07-14 PROCEDURE — 94761 N-INVAS EAR/PLS OXIMETRY MLT: CPT

## 2024-07-14 PROCEDURE — 76705 ECHO EXAM OF ABDOMEN: CPT

## 2024-07-14 PROCEDURE — APPNB15 APP NON BILLABLE TIME 0-15 MINS

## 2024-07-14 PROCEDURE — 82962 GLUCOSE BLOOD TEST: CPT

## 2024-07-14 PROCEDURE — 36415 COLL VENOUS BLD VENIPUNCTURE: CPT

## 2024-07-14 PROCEDURE — 93010 ELECTROCARDIOGRAM REPORT: CPT | Performed by: INTERNAL MEDICINE

## 2024-07-14 PROCEDURE — 2140000000 HC CCU INTERMEDIATE R&B

## 2024-07-14 PROCEDURE — 83735 ASSAY OF MAGNESIUM: CPT

## 2024-07-14 PROCEDURE — 93005 ELECTROCARDIOGRAM TRACING: CPT | Performed by: STUDENT IN AN ORGANIZED HEALTH CARE EDUCATION/TRAINING PROGRAM

## 2024-07-14 PROCEDURE — 6370000000 HC RX 637 (ALT 250 FOR IP): Performed by: INTERNAL MEDICINE

## 2024-07-14 RX ORDER — 0.9 % SODIUM CHLORIDE 0.9 %
1000 INTRAVENOUS SOLUTION INTRAVENOUS ONCE
Status: COMPLETED | OUTPATIENT
Start: 2024-07-14 | End: 2024-07-14

## 2024-07-14 RX ORDER — MIDODRINE HYDROCHLORIDE 5 MG/1
10 TABLET ORAL ONCE
Status: COMPLETED | OUTPATIENT
Start: 2024-07-14 | End: 2024-07-14

## 2024-07-14 RX ORDER — SODIUM CHLORIDE, SODIUM LACTATE, POTASSIUM CHLORIDE, AND CALCIUM CHLORIDE .6; .31; .03; .02 G/100ML; G/100ML; G/100ML; G/100ML
500 INJECTION, SOLUTION INTRAVENOUS ONCE
Status: COMPLETED | OUTPATIENT
Start: 2024-07-14 | End: 2024-07-14

## 2024-07-14 RX ORDER — ENOXAPARIN SODIUM 100 MG/ML
30 INJECTION SUBCUTANEOUS 2 TIMES DAILY
Status: DISCONTINUED | OUTPATIENT
Start: 2024-07-14 | End: 2024-07-16 | Stop reason: HOSPADM

## 2024-07-14 RX ORDER — GLUCAGON 1 MG/ML
1 KIT INJECTION ONCE
Status: COMPLETED | OUTPATIENT
Start: 2024-07-14 | End: 2024-07-14

## 2024-07-14 RX ORDER — 0.9 % SODIUM CHLORIDE 0.9 %
500 INTRAVENOUS SOLUTION INTRAVENOUS ONCE
Status: DISCONTINUED | OUTPATIENT
Start: 2024-07-14 | End: 2024-07-14

## 2024-07-14 RX ADMIN — GABAPENTIN 800 MG: 400 CAPSULE ORAL at 11:33

## 2024-07-14 RX ADMIN — ISOSORBIDE MONONITRATE 30 MG: 30 TABLET, EXTENDED RELEASE ORAL at 11:33

## 2024-07-14 RX ADMIN — METOPROLOL TARTRATE 25 MG: 25 TABLET, FILM COATED ORAL at 11:33

## 2024-07-14 RX ADMIN — FERROUS SULFATE TAB 325 MG (65 MG ELEMENTAL FE) 325 MG: 325 (65 FE) TAB at 11:32

## 2024-07-14 RX ADMIN — INSULIN LISPRO 1 UNITS: 100 INJECTION, SOLUTION INTRAVENOUS; SUBCUTANEOUS at 17:08

## 2024-07-14 RX ADMIN — FLUOXETINE HYDROCHLORIDE 40 MG: 10 CAPSULE ORAL at 11:33

## 2024-07-14 RX ADMIN — GLUCAGON 1 MG: 1 INJECTION, POWDER, LYOPHILIZED, FOR SOLUTION INTRAMUSCULAR; INTRAVENOUS at 15:16

## 2024-07-14 RX ADMIN — ASPIRIN 81 MG: 81 TABLET, CHEWABLE ORAL at 11:33

## 2024-07-14 RX ADMIN — ENOXAPARIN SODIUM 30 MG: 100 INJECTION SUBCUTANEOUS at 20:58

## 2024-07-14 RX ADMIN — OXYCODONE HYDROCHLORIDE AND ACETAMINOPHEN 1 TABLET: 5; 325 TABLET ORAL at 20:58

## 2024-07-14 RX ADMIN — CYANOCOBALAMIN TAB 1000 MCG 1000 MCG: 1000 TAB at 11:33

## 2024-07-14 RX ADMIN — TIZANIDINE 2 MG: 4 TABLET ORAL at 11:33

## 2024-07-14 RX ADMIN — FUROSEMIDE 20 MG: 20 TABLET ORAL at 11:33

## 2024-07-14 RX ADMIN — SODIUM CHLORIDE, PRESERVATIVE FREE 10 ML: 5 INJECTION INTRAVENOUS at 20:59

## 2024-07-14 RX ADMIN — SODIUM CHLORIDE, POTASSIUM CHLORIDE, SODIUM LACTATE AND CALCIUM CHLORIDE 500 ML: 600; 310; 30; 20 INJECTION, SOLUTION INTRAVENOUS at 15:16

## 2024-07-14 RX ADMIN — GABAPENTIN 800 MG: 400 CAPSULE ORAL at 20:58

## 2024-07-14 RX ADMIN — ATORVASTATIN CALCIUM 40 MG: 40 TABLET, FILM COATED ORAL at 20:58

## 2024-07-14 RX ADMIN — SODIUM CHLORIDE, PRESERVATIVE FREE 10 ML: 5 INJECTION INTRAVENOUS at 08:55

## 2024-07-14 RX ADMIN — PANTOPRAZOLE SODIUM 40 MG: 40 TABLET, DELAYED RELEASE ORAL at 16:05

## 2024-07-14 RX ADMIN — EMPAGLIFLOZIN 10 MG: 10 TABLET, FILM COATED ORAL at 11:33

## 2024-07-14 RX ADMIN — INSULIN GLARGINE 45 UNITS: 100 INJECTION, SOLUTION SUBCUTANEOUS at 20:58

## 2024-07-14 RX ADMIN — RANOLAZINE 500 MG: 500 TABLET, EXTENDED RELEASE ORAL at 11:33

## 2024-07-14 RX ADMIN — MIDODRINE HYDROCHLORIDE 10 MG: 5 TABLET ORAL at 14:01

## 2024-07-14 RX ADMIN — SODIUM CHLORIDE 1000 ML: 9 INJECTION, SOLUTION INTRAVENOUS at 13:10

## 2024-07-14 ASSESSMENT — PAIN DESCRIPTION - PAIN TYPE: TYPE: CHRONIC PAIN

## 2024-07-14 ASSESSMENT — PAIN SCALES - GENERAL
PAINLEVEL_OUTOF10: 3
PAINLEVEL_OUTOF10: 7
PAINLEVEL_OUTOF10: 5
PAINLEVEL_OUTOF10: 9

## 2024-07-14 ASSESSMENT — PAIN DESCRIPTION - ONSET: ONSET: ON-GOING

## 2024-07-14 ASSESSMENT — PAIN DESCRIPTION - FREQUENCY: FREQUENCY: INTERMITTENT

## 2024-07-14 ASSESSMENT — PAIN DESCRIPTION - LOCATION
LOCATION: BACK

## 2024-07-14 ASSESSMENT — PAIN DESCRIPTION - ORIENTATION
ORIENTATION: LOWER

## 2024-07-14 ASSESSMENT — PAIN DESCRIPTION - DESCRIPTORS: DESCRIPTORS: ACHING

## 2024-07-14 NOTE — CONSULTS
[address] CENTER [place] [id number]                              CONSULTATION      PATIENT NAME: BRYNN NATHAN                 : 1940  MED REC NO: [id number]                      ROOM: 4125  ACCOUNT NO: [id number]                       ADMIT DATE: 2024  PROVIDER: Navi Castillo MD      CONSULT DATE: 2024    CHIEF COMPLAINT:  Epigastric/left upper quadrant abdominal pain.    HISTORY OF PRESENT ILLNESS:  The patient is an 84-year-old white gentleman, patient known to me from his multiple recent hospitalizations, last seen in consult 2024, with past medical history significant for hypertension; diabetes mellitus; coronary artery disease, status post PTCA with coronary stents in place, last stent was in 2023; history of chronic kidney disease; hyperlipidemia; obesity; history of chronic liver disease secondary to MASLD with stigmata of portal hypertension; tobacco abuse; chronic back pain; gout; history of anemia; and recurrent GI bleeding requiring multiple blood transfusions, who was admitted to the hospital on 2024, with generalized weakness and also chest discomfort.    The patient lately has complained of mild epigastric discomfort and left upper quadrant abdominal pain also, which according to the patient, has subsided.  There is no history of abdominal pain today, nausea, vomiting, hematemesis, melena, or hematochezia.  The patient is hemodynamically stable and is on Protonix.  The blood workup done during the present hospitalization comprised a chem profile, which was remarkable for BUN of 27, creatinine is 1.7, and the patient's LFTs are within normal limits, and the CBC shows WBC count of 3.3, hemoglobin 7.8, which is stable, and the platelet count of 135,000.  The patient's INR is 1.1, and the patient did have a CAT scan done of the abdomen and pelvis on the 2024, and no acute findings

## 2024-07-14 NOTE — PROGRESS NOTES
V2.0    [facility] Progress Note      Name:  Abelardo Marquis /Age/Sex: 1940  (84 y.o. male)   MRN & CSN:  [id number] & [id number] Encounter Date/Time: 2024 7:33 AM EDT   Location:  [address] [place] [address]-A PCP: Georgie Hirsch MD     Attending:Vishal Sol MD       Hospital Day: 4    Assessment and Recommendations   Abelardo Marquis is a 84 y.o. 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   - Clinically hypovolemic. Cr 2.7, baseline ~1.5. UA ordered.  - Will challenge with IVF. Strict I/O's. Bladder scan if decreased voiding.  -Nephro consulted     #Lower sternal milli/ Epigastric pain :  -Pt complained of severe, pressure like, atypical chest/ epigastric pain w some LUQ tenderness, s/p ntg x2, trops elevated 80- downtrending to 50's, EKG w no acute changes. Cardio onboard. CT A/P w cirrhosis and splenomegaly. US abdomen pending, will d/w GI.Pain improved w prn meds    #Diarrhea-resolved  -Follow stool studies, antidiarrheals prn    # Essential hypertension  - Endorsed generalized weakness after taking Imdur, Lopressor and NTG.   - BP 80s/40s on arrival, improved with IVF.  - Hold Imdur and Ranexa for now.      # CAD s/p PCI in   # Non-MI chronic troponin elevation  - Denied any typical CP.  - Initial Tn elevated, repeat flat. ECG without acute ischemic changes.  - Likely secondary to above. Continue ASA, Lipitor and Lopressor, hold Imdur and Ranexa as above.     # T2DM with hyperglycemia and peripheral neuropathy  - Last A1c 6.6% in 2024.  - Held Metformin.  - Decrease to Insulin Lantus 45 u BID with LCSI, consider initiating Aspart.     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  - Resume Lasix when able.     # GERD  - Continue PPI.     # Normocytic anemia  -     metoprolol tartrate  25 mg Oral BID    pantoprazole  40 mg Oral BID AC    ranolazine  500 mg Oral Daily    vitamin B-12  1,000 mcg Oral Daily    sodium chloride flush  5-40 mL IntraVENous 2 times per day    enoxaparin  30 mg SubCUTAneous QPM    insulin lispro  0-4 Units SubCUTAneous TID WC    insulin lispro  0-4 Units SubCUTAneous Nightly      Infusions:    sodium chloride      dextrose       PRN Meds: calcium carbonate, 500 mg, TID PRN  nitroGLYCERIN, 0.4 mg, Q5 Min PRN  oxyCODONE-acetaminophen, 1 tablet, Q4H PRN  HYDROmorphone, 0.25 mg, Q4H PRN  tiZANidine, 2 mg, BID PRN  sodium chloride flush, 5-40 mL, PRN  sodium chloride, , PRN  potassium chloride, 10 mEq, PRN  magnesium sulfate, 2,000 mg, PRN  ondansetron, 4 mg, Q8H PRN   Or  ondansetron, 4 mg, Q6H PRN  melatonin, 3 mg, Nightly PRN  polyethylene glycol, 17 g, Daily PRN  acetaminophen, 650 mg, Q6H PRN   Or  acetaminophen, 650 mg, Q6H PRN  glucose, 4 tablet, PRN  dextrose bolus, 125 mL, PRN   Or  dextrose bolus, 250 mL, PRN  glucagon (rDNA), 1 mg, PRN  dextrose, , Continuous PRN        Labs and Imaging   XR CHEST PORTABLE    Result Date: 7/11/2024  EXAMINATION: XR CHEST PORTABLE, 7/11/2024 7:33 PM HISTORY: chest pain COMPARISON:  No comparisons available.  Technique: Single view. Findings: Mild pulmonary venous congestion. No pneumothorax. Moderate cardiomegaly. Mediastinal and hilar contours are within normal limits. Bony thorax no acute abnormality.     Mild CHF Electronically signed by Yuan Boyd      CBC:   Recent Labs     07/11/24 1919 07/12/24 0305 07/13/24  1630   WBC 3.8* 2.9* 3.3*   HGB 7.8* 8.0* 7.8*    140 135*       BMP:    Recent Labs     07/11/24 1919 07/12/24 0305 07/13/24  1630    141 140  140   K 4.1 4.4 5.1  4.9    106 107  106   CO2 25 23 24  27   BUN 26* 27* 27*  27*   CREATININE 2.7* 2.7* 1.7*  1.6*   GLUCOSE 120* 178* 236*  238*       Hepatic:   Recent Labs     07/11/24 1919   AST 29   ALT 19   BILITOT 0.4

## 2024-07-14 NOTE — PROGRESS NOTES
Nephrology Progress Note  7/14/2024 10:37 AM  Subjective:     Interval History: Abelardo Marquis is a 84 y.o. male doing somewhat better today renal function improving        Data:   Scheduled Meds:   furosemide  20 mg Oral Daily    lidocaine  1 patch TransDERmal Daily    aspirin  81 mg Oral Daily    atorvastatin  40 mg Oral Nightly    cholestyramine light  4 g Oral Daily    empagliflozin  10 mg Oral Daily    ferrous sulfate  325 mg Oral Every Other Day    FLUoxetine  40 mg Oral Daily    gabapentin  800 mg Oral BID    insulin glargine  45 Units SubCUTAneous BID    isosorbide mononitrate  30 mg Oral Daily    metoprolol tartrate  25 mg Oral BID    pantoprazole  40 mg Oral BID AC    ranolazine  500 mg Oral Daily    vitamin B-12  1,000 mcg Oral Daily    sodium chloride flush  5-40 mL IntraVENous 2 times per day    enoxaparin  30 mg SubCUTAneous QPM    insulin lispro  0-4 Units SubCUTAneous TID WC    insulin lispro  0-4 Units SubCUTAneous Nightly     Continuous Infusions:   sodium chloride      dextrose           CBC   Recent Labs     07/11/24 1919 07/12/24 0305 07/13/24  1630   WBC 3.8* 2.9* 3.3*   HGB 7.8* 8.0* 7.8*   HCT 28.3* 29.0* 28.4*    140 135*      BMP   Recent Labs     07/11/24 1919 07/12/24 0305 07/13/24  1630    141 140  140   K 4.1 4.4 5.1  4.9    106 107  106   CO2 25 23 24  27   PHOS  --   --  2.8   BUN 26* 27* 27*  27*   CREATININE 2.7* 2.7* 1.7*  1.6*     Hepatic:   Recent Labs     07/11/24 1919   AST 29   ALT 19   BILITOT 0.4   ALKPHOS 67     Troponin: No results for input(s): \"TROPONINI\" in the last 72 hours.  BNP: No results for input(s): \"BNP\" in the last 72 hours.  Lipids: No results for input(s): \"CHOL\", \"HDL\" in the last 72 hours.    Invalid input(s): \"LDLCALCU\"  ABGs:   Lab Results   Component Value Date/Time    PO2ART 70 09/21/2020 01:00 PM    UEA9BZX 38.0 09/21/2020 01:00 PM     INR:   Recent Labs     07/12/24  0305   INR 1.1     Renal Labs  Albumin:  No results

## 2024-07-14 NOTE — PROGRESS NOTES
Pharmacist Review and Automatic Dose Adjustment of Prophylactic Enoxaparin    Reviewed reason(s) for admission/hospital problem list    The reviewing pharmacist has made an adjustment to the ordered enoxaparin dose or converted to UFH per the approved [facility] protocol and table as identified below.        Abelardo Marquis is a 84 y.o. male.     Recent Labs     07/12/24  0305 07/13/24  1630 07/14/24  1453   CREATININE 2.7* 1.7*  1.6* 1.4*       Estimated Creatinine Clearance: 49 mL/min (A) (based on SCr of 1.4 mg/dL (H)).    Recent Labs     07/13/24  1630 07/14/24  1453   HGB 7.8* 7.0*   HCT 28.4* 25.0*   * 130*     Recent Labs     07/12/24  0305   INR 1.1       Height:   Ht Readings from Last 1 Encounters:   07/12/24 1.778 m (5' 10\")     Weight:  Wt Readings from Last 1 Encounters:   07/13/24 109.1 kg (240 lb 8 oz)               Plan: Based upon the patient's weight and renal function    Ordered: Enoxaparin 30mg SUBQ Daily    Changed/converted to    New Order: Enoxaparin 30mg SUBQ BID      Thank you,  Hannah Carbajal [facility]  7/14/2024, 3:41 PM.

## 2024-07-14 NOTE — PROGRESS NOTES
Pt transferred to [id number]. This nurse spoke with Stella Celis, patient's sister to give her the information. All questions and concerns answered.

## 2024-07-14 NOTE — PROGRESS NOTES
[facility] [place] [address]  Phone: [phone]    Fax [phone]                  Qamar Alvarado MD, [facility]       Chris Huber MD, [facility]  Cecile Sol MD, [facility]    MD Mario Whelan MD Tariq Rizvi, MD Bilal Alam, MD Dr. Waseem Sajjad MD Melissa Kellis, APRN      Alejandra Quiroga, APRKODAK Ngo, APRKODAK Raymond, APRN  GREGORY NarayanC    CARDIOLOGY  NOTE      Name:  Abelardo Marquis /Age/Sex: 1940  (84 y.o. male)   MRN & CSN:  [id number] & [id number] Admission Date/Time: 2024  7:11 PM   Location:  [address] [place] [address]- PCP: Georgie Hirsch MD       Hospital Day: 4    - Cardiology consult is for:  Chest pain      ASSESSMENT/ PLAN:  Atypical chest pain/epigastric pain  Coronary artery disease s/p PCI  -Pain worse with touch at lower sternal border as well as epigastric and left upper quadrant region  -Troponin is chronically elevated, will trend.  EKG unchanged, no ST/T wave changes  -Patient also experiencing diarrhea  -Recent echocardiogram showing preserved EF without wall motion abnormalities however concern for LVOT obstruction  -Underwent PCI of LAD in 2023 as well as PCI of ramus in   -CT abdomen pelvis cirrhosis and splenomegaly. GI consulted.  D-dimer  elevated at 0.61  -LUQ US pending  -Recommend close outpatient follow-up with [facility]  -Continue aspirin, Lipitor 40 mg nightly, Imdur 30 mg daily, Lopressor 25 mg twice daily and Ranexa 500 mg daily  -Can attempt lidocaine patch for pain relief  VICKY on CKD 3  -nephro following  Type 2 diabetes mellitus  -Per primary care  -On Jardiance  JEONG cirrhosis  -On Lasix 20 mg daily    Cardiology will sign off, please call with any questions.  Patient to follow-up in clinic       ECHO: 2024    Definity was utilized.    Mitral Valve: Annular calcification of the mitral valve. Systolic anterior motion  2.7* 2.7* 1.7*  1.6*     LIVER PROFILE:   Recent Labs     07/11/24  1919   AST 29   ALT 19   BILITOT 0.4   ALKPHOS 67     PT/INR:   Recent Labs     07/12/24  0305   PROTIME 14.7*   INR 1.1     APTT: No results for input(s): \"APTT\" in the last 72 hours.  BNP:  No results for input(s): \"BNP\" in the last 72 hours.  TROPONIN: No results for input(s): \"TROPONINT\" in the last 72 hours.  Labs, consult, tests reviewed          King Salazar PA-C, 7/14/2024 11:14 AM

## 2024-07-14 NOTE — PROGRESS NOTES
4 Eyes Skin Assessment     NAME:  Abelardo Marquis  YOB: 1940  MEDICAL RECORD NUMBER:  [id number]    The patient is being assessed for  Transfer to New Unit    I agree that at least one RN has performed a thorough Head to Toe Skin Assessment on the patient. ALL assessment sites listed below have been assessed.      Areas assessed by both nurses:    Head, Face, Ears, Shoulders, Back, Chest, Arms, Elbows, Hands, Sacrum. Buttock, Coccyx, Ischium, Legs. Feet and Heels, and Under Medical Devices         Does the Patient have a Wound? No noted wound(s)     Scattered bruising   Yomi Prevention initiated by RN: No  Wound Care Orders initiated by RN: No    Pressure Injury (Stage 3,4, Unstageable, DTI, NWPT, and Complex wounds) if present, place Wound referral order by RN under : No    New Ostomies, if present place, Ostomy referral order under : No     Nurse 1 eSignature: Electronically signed by Deidra Engel RN on 7/14/24 at 2:17 PM EDT    **SHARE this note so that the co-signing nurse can place an eSignature**    Nurse 2 eSignature: {Esignature:[id number]}

## 2024-07-15 ENCOUNTER — APPOINTMENT (OUTPATIENT)
Dept: NON INVASIVE DIAGNOSTICS | Age: 84
End: 2024-07-15
Attending: STUDENT IN AN ORGANIZED HEALTH CARE EDUCATION/TRAINING PROGRAM
Payer: MEDICARE

## 2024-07-15 LAB
ALBUMIN SERPL-MCNC: 3.1 GM/DL (ref 3.4–5)
ALP BLD-CCNC: 59 IU/L (ref 40–128)
ALT SERPL-CCNC: 23 U/L (ref 10–40)
ANION GAP SERPL CALCULATED.3IONS-SCNC: 9 MMOL/L (ref 7–16)
AST SERPL-CCNC: 37 IU/L (ref 15–37)
BASOPHILS ABSOLUTE: 0 K/CU MM
BASOPHILS RELATIVE PERCENT: 0.7 % (ref 0–1)
BILIRUB SERPL-MCNC: 0.3 MG/DL (ref 0–1)
BUN SERPL-MCNC: 33 MG/DL (ref 6–23)
CALCIUM SERPL-MCNC: 8.2 MG/DL (ref 8.3–10.6)
CHLORIDE BLD-SCNC: 108 MMOL/L (ref 99–110)
CO2: 25 MMOL/L (ref 21–32)
CREAT SERPL-MCNC: 1.6 MG/DL (ref 0.9–1.3)
DIFFERENTIAL TYPE: ABNORMAL
ECHO BSA: 2.34 M2
ECHO EST RA PRESSURE: 3 MMHG
ECHO LV FRACTIONAL SHORTENING: 46 % (ref 28–44)
ECHO LV INTERNAL DIMENSION DIASTOLE INDEX: 1.72 CM/M2
ECHO LV INTERNAL DIMENSION DIASTOLIC: 3.9 CM (ref 4.2–5.9)
ECHO LV INTERNAL DIMENSION SYSTOLIC INDEX: 0.93 CM/M2
ECHO LV INTERNAL DIMENSION SYSTOLIC: 2.1 CM
ECHO LV IVSD: 1.2 CM (ref 0.6–1)
ECHO LV MASS 2D: 140.1 G (ref 88–224)
ECHO LV MASS INDEX 2D: 61.7 G/M2 (ref 49–115)
ECHO LV POSTERIOR WALL DIASTOLIC: 1 CM (ref 0.6–1)
ECHO LV RELATIVE WALL THICKNESS RATIO: 0.51
ECHO RIGHT VENTRICULAR SYSTOLIC PRESSURE (RVSP): 24 MMHG
ECHO TV REGURGITANT MAX VELOCITY: 2.29 M/S
ECHO TV REGURGITANT PEAK GRADIENT: 21 MMHG
EOSINOPHILS ABSOLUTE: 0.2 K/CU MM
EOSINOPHILS RELATIVE PERCENT: 5.9 % (ref 0–3)
GFR, ESTIMATED: 42 ML/MIN/1.73M2
GLUCOSE BLD-MCNC: 147 MG/DL (ref 70–99)
GLUCOSE BLD-MCNC: 339 MG/DL (ref 70–99)
GLUCOSE BLD-MCNC: 343 MG/DL (ref 70–99)
GLUCOSE SERPL-MCNC: 240 MG/DL (ref 70–99)
HCT VFR BLD CALC: 28.3 % (ref 42–52)
HEMOGLOBIN: 7.7 GM/DL (ref 13.5–18)
IMMATURE NEUTROPHIL %: 0.4 % (ref 0–0.43)
LYMPHOCYTES ABSOLUTE: 1 K/CU MM
LYMPHOCYTES RELATIVE PERCENT: 35.3 % (ref 24–44)
MAGNESIUM: 2.1 MG/DL (ref 1.8–2.4)
MCH RBC QN AUTO: 24.7 PG (ref 27–31)
MCHC RBC AUTO-ENTMCNC: 27.2 % (ref 32–36)
MCV RBC AUTO: 90.7 FL (ref 78–100)
MONOCYTES ABSOLUTE: 0.3 K/CU MM
MONOCYTES RELATIVE PERCENT: 11.5 % (ref 0–4)
NEUTROPHILS ABSOLUTE: 1.2 K/CU MM
NEUTROPHILS RELATIVE PERCENT: 46.2 % (ref 36–66)
NUCLEATED RBC %: 0 %
PDW BLD-RTO: 15.9 % (ref 11.7–14.9)
PHOSPHORUS: 3.2 MG/DL (ref 2.5–4.9)
PLATELET # BLD: 143 K/CU MM (ref 140–440)
PMV BLD AUTO: 10.3 FL (ref 7.5–11.1)
POTASSIUM SERPL-SCNC: 4.4 MMOL/L (ref 3.5–5.1)
RBC # BLD: 3.12 M/CU MM (ref 4.6–6.2)
SODIUM BLD-SCNC: 142 MMOL/L (ref 135–145)
TOTAL IMMATURE NEUTOROPHIL: 0.01 K/CU MM
TOTAL NUCLEATED RBC: 0 K/CU MM
TOTAL PROTEIN: 5.6 GM/DL (ref 6.4–8.2)
WBC # BLD: 2.7 K/CU MM (ref 4–10.5)

## 2024-07-15 PROCEDURE — 93325 DOPPLER ECHO COLOR FLOW MAPG: CPT | Performed by: INTERNAL MEDICINE

## 2024-07-15 PROCEDURE — 6370000000 HC RX 637 (ALT 250 FOR IP): Performed by: INTERNAL MEDICINE

## 2024-07-15 PROCEDURE — 2140000000 HC CCU INTERMEDIATE R&B

## 2024-07-15 PROCEDURE — 6370000000 HC RX 637 (ALT 250 FOR IP): Performed by: STUDENT IN AN ORGANIZED HEALTH CARE EDUCATION/TRAINING PROGRAM

## 2024-07-15 PROCEDURE — 82962 GLUCOSE BLOOD TEST: CPT

## 2024-07-15 PROCEDURE — 85025 COMPLETE CBC W/AUTO DIFF WBC: CPT

## 2024-07-15 PROCEDURE — 2580000003 HC RX 258: Performed by: STUDENT IN AN ORGANIZED HEALTH CARE EDUCATION/TRAINING PROGRAM

## 2024-07-15 PROCEDURE — 84100 ASSAY OF PHOSPHORUS: CPT

## 2024-07-15 PROCEDURE — 2700000000 HC OXYGEN THERAPY PER DAY

## 2024-07-15 PROCEDURE — 83735 ASSAY OF MAGNESIUM: CPT

## 2024-07-15 PROCEDURE — 6370000000 HC RX 637 (ALT 250 FOR IP)

## 2024-07-15 PROCEDURE — 93308 TTE F-UP OR LMTD: CPT | Performed by: INTERNAL MEDICINE

## 2024-07-15 PROCEDURE — 94761 N-INVAS EAR/PLS OXIMETRY MLT: CPT

## 2024-07-15 PROCEDURE — 93308 TTE F-UP OR LMTD: CPT

## 2024-07-15 PROCEDURE — 93321 DOPPLER ECHO F-UP/LMTD STD: CPT | Performed by: INTERNAL MEDICINE

## 2024-07-15 PROCEDURE — 6360000002 HC RX W HCPCS: Performed by: STUDENT IN AN ORGANIZED HEALTH CARE EDUCATION/TRAINING PROGRAM

## 2024-07-15 PROCEDURE — 36415 COLL VENOUS BLD VENIPUNCTURE: CPT

## 2024-07-15 PROCEDURE — 80053 COMPREHEN METABOLIC PANEL: CPT

## 2024-07-15 RX ADMIN — ATORVASTATIN CALCIUM 40 MG: 40 TABLET, FILM COATED ORAL at 20:38

## 2024-07-15 RX ADMIN — ACETAMINOPHEN 650 MG: 325 TABLET ORAL at 20:38

## 2024-07-15 RX ADMIN — GABAPENTIN 800 MG: 400 CAPSULE ORAL at 08:03

## 2024-07-15 RX ADMIN — SODIUM CHLORIDE, PRESERVATIVE FREE 10 ML: 5 INJECTION INTRAVENOUS at 08:04

## 2024-07-15 RX ADMIN — INSULIN GLARGINE 45 UNITS: 100 INJECTION, SOLUTION SUBCUTANEOUS at 08:04

## 2024-07-15 RX ADMIN — OXYCODONE HYDROCHLORIDE AND ACETAMINOPHEN 1 TABLET: 5; 325 TABLET ORAL at 13:29

## 2024-07-15 RX ADMIN — GABAPENTIN 800 MG: 400 CAPSULE ORAL at 20:38

## 2024-07-15 RX ADMIN — PANTOPRAZOLE SODIUM 40 MG: 40 TABLET, DELAYED RELEASE ORAL at 09:03

## 2024-07-15 RX ADMIN — ENOXAPARIN SODIUM 30 MG: 100 INJECTION SUBCUTANEOUS at 08:03

## 2024-07-15 RX ADMIN — OXYCODONE HYDROCHLORIDE AND ACETAMINOPHEN 1 TABLET: 5; 325 TABLET ORAL at 08:02

## 2024-07-15 RX ADMIN — CYANOCOBALAMIN TAB 1000 MCG 1000 MCG: 1000 TAB at 09:03

## 2024-07-15 RX ADMIN — INSULIN LISPRO 4 UNITS: 100 INJECTION, SOLUTION INTRAVENOUS; SUBCUTANEOUS at 20:45

## 2024-07-15 RX ADMIN — PANTOPRAZOLE SODIUM 40 MG: 40 TABLET, DELAYED RELEASE ORAL at 17:12

## 2024-07-15 RX ADMIN — SODIUM CHLORIDE, PRESERVATIVE FREE 10 ML: 5 INJECTION INTRAVENOUS at 20:42

## 2024-07-15 RX ADMIN — Medication 3 MG: at 22:05

## 2024-07-15 RX ADMIN — ENOXAPARIN SODIUM 30 MG: 100 INJECTION SUBCUTANEOUS at 20:38

## 2024-07-15 RX ADMIN — FUROSEMIDE 20 MG: 20 TABLET ORAL at 08:04

## 2024-07-15 RX ADMIN — ISOSORBIDE MONONITRATE 30 MG: 30 TABLET, EXTENDED RELEASE ORAL at 09:03

## 2024-07-15 RX ADMIN — INSULIN GLARGINE 45 UNITS: 100 INJECTION, SOLUTION SUBCUTANEOUS at 20:38

## 2024-07-15 RX ADMIN — FLUOXETINE HYDROCHLORIDE 40 MG: 10 CAPSULE ORAL at 08:03

## 2024-07-15 RX ADMIN — RANOLAZINE 500 MG: 500 TABLET, EXTENDED RELEASE ORAL at 09:03

## 2024-07-15 RX ADMIN — OXYCODONE HYDROCHLORIDE AND ACETAMINOPHEN 1 TABLET: 5; 325 TABLET ORAL at 17:14

## 2024-07-15 RX ADMIN — EMPAGLIFLOZIN 10 MG: 10 TABLET, FILM COATED ORAL at 08:04

## 2024-07-15 RX ADMIN — INSULIN LISPRO 3 UNITS: 100 INJECTION, SOLUTION INTRAVENOUS; SUBCUTANEOUS at 17:12

## 2024-07-15 RX ADMIN — CHOLESTYRAMINE 4 G: 4 POWDER, FOR SUSPENSION ORAL at 08:03

## 2024-07-15 RX ADMIN — ASPIRIN 81 MG: 81 TABLET, CHEWABLE ORAL at 08:03

## 2024-07-15 ASSESSMENT — PAIN DESCRIPTION - LOCATION: LOCATION: BACK

## 2024-07-15 ASSESSMENT — PAIN SCALES - GENERAL
PAINLEVEL_OUTOF10: 8
PAINLEVEL_OUTOF10: 2
PAINLEVEL_OUTOF10: 8
PAINLEVEL_OUTOF10: 7
PAINLEVEL_OUTOF10: 7

## 2024-07-15 ASSESSMENT — PAIN DESCRIPTION - ONSET: ONSET: GRADUAL

## 2024-07-15 ASSESSMENT — PAIN DESCRIPTION - PAIN TYPE: TYPE: CHRONIC PAIN

## 2024-07-15 ASSESSMENT — PAIN DESCRIPTION - FREQUENCY: FREQUENCY: INTERMITTENT

## 2024-07-15 ASSESSMENT — PAIN DESCRIPTION - ORIENTATION: ORIENTATION: MID;LOWER

## 2024-07-15 ASSESSMENT — PAIN - FUNCTIONAL ASSESSMENT: PAIN_FUNCTIONAL_ASSESSMENT: PREVENTS OR INTERFERES SOME ACTIVE ACTIVITIES AND ADLS

## 2024-07-15 ASSESSMENT — PAIN DESCRIPTION - DESCRIPTORS: DESCRIPTORS: ACHING

## 2024-07-15 NOTE — PROGRESS NOTES
Nephrology Progress Note  7/15/2024 7:49 AM        Subjective:   Admit Date: 7/11/2024  PCP: Georgie Hirsch MD    Interval History: Events leading up to and since admission briefly reviewed        Diet: Reported eating okay    ROS: No confusion, shortness of breath or orthopnea   No fever and acceptable blood pressure  Urine output was not recorded    Kidney data ( cr trend ) :      his creatinine was 1.0 mg/dL in July 2012, remains in the vicinity of 1.0 to 1.3 mg/dL, he has had several acute kidney injury first 1 is in January 2014 with peak creatinine 1.7, second episode in January 2017 with peak creatinine 2.2, thyroid 1 in August 2020 with peak creatinine of 4.3. recently creatinine running in the vicinity of 1.5 to 1.7 mg/dL. increased to 1.9 mg/dL November 2, 2023, creatinine 1.8 mg/dL in January 3 , 2024 -His creatinine is 1.5 in April 2024, peak creatinine 2.7 on July 11, down to 1.6 on July 15, 2024.        Kidney  failure  risk Equation :       Recalculate after he establishes his homeostasis as an outpatient     Kidney image :     computed tomography of abdomen and pelvis with  administration of intravenous contrast done on August 10, 2023. he underwent stent placement of left anterior descending artery,      he also had an emergency room visit back in August 7, 2023 with dysphagia, seen by gastroenterology-  he underwent esophagogastroduodenoscopy with dilatation of esophagus.        adrenal glands, and kidneys demonstrate  no acute abnormality.  The liver has a nodular contour.       Cardiac data       heart catheterization done on August 21, 2023        LM: PATENT  LAD PROXIMAL 70% TO 0% -IFFR 0.86  STENTED WITH A CARMELO XIENCE STENT 4.0X33 MM STENT  D1/D2-MILD DX  RAMUS STENT  PATENT  LCX/OM-MILD DX  RCA-DOMINANT MILD DX  LVEDP 10      transthoracic two-dimensional echocardiogram done on March 30, 2023        Summary   Ejection fraction is visually estimated at 55-60%.   Grade II diastolic

## 2024-07-15 NOTE — PROGRESS NOTES
V2.0    [facility] Progress Note      Name:  Abelardo Marquis /Age/Sex: 1940  (84 y.o. male)   MRN & CSN:  [id number] & [id number] Encounter Date/Time: 7/15/2024 7:33 AM EDT   Location:  [facility] PCP: Georgie Hirsch MD     Attending:Tony Arzola MD       Hospital Day: 5    Assessment and Recommendations   Abelardo Marquis is a 84 y.o. 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   - Clinically hypovolemic. Cr 2.7, baseline ~1.5. UA ordered.  - Will challenge with IVF. Strict I/O's. Bladder scan if decreased voiding.  -Nephro consulted     # Lower sternal chest/ Epigastric pain :  -Pt complained of severe, pressure like, atypical chest/ epigastric pain w some LUQ tenderness, s/p ntg x2, trops elevated 80- downtrending to 50's, EKG w no acute changes. Cardio onboard. CT A/P w cirrhosis and splenomegaly. US abdomen showed splenomegaly, will d/w GI.Pain improved w prn meds    # Diarrhea-resolved  -Follow stool studies, antidiarrheals prn    # Essential hypertension  - Endorsed generalized weakness after taking Imdur, Lopressor and NTG.   - BP 80s/40s on arrival, improved with IVF.  - Hold Imdur and Ranexa for now.      # CAD s/p PCI in   # Non-MI chronic troponin elevation  - Denied any typical CP.  - Initial Tn elevated, repeat flat. ECG without acute ischemic changes.  - Likely secondary to above. Continue ASA, Lipitor and Lopressor, hold Imdur and Ranexa as above.     # T2DM with hyperglycemia and peripheral neuropathy  - Last A1c 6.6% in 2024.  - Held Metformin.  - Decrease to Insulin Lantus 45 u BID with LCSI, consider initiating Aspart.     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  - Resume Lasix when able.     # GERD  - Continue PPI.     #  tenderness over the left upper quadrant, splenomegaly  Genitourinary: no suprapubic tenderness  Musculoskeletal: No edema  Skin: warm, dry  Neuro: Alert.  Psych: Mood appropriate.         Medications:   Medications:    enoxaparin  30 mg SubCUTAneous BID    furosemide  20 mg Oral Daily    lidocaine  1 patch TransDERmal Daily    aspirin  81 mg Oral Daily    atorvastatin  40 mg Oral Nightly    cholestyramine light  4 g Oral Daily    empagliflozin  10 mg Oral Daily    ferrous sulfate  325 mg Oral Every Other Day    FLUoxetine  40 mg Oral Daily    gabapentin  800 mg Oral BID    insulin glargine  45 Units SubCUTAneous BID    isosorbide mononitrate  30 mg Oral Daily    [Held by provider] metoprolol tartrate  25 mg Oral BID    pantoprazole  40 mg Oral BID AC    ranolazine  500 mg Oral Daily    vitamin B-12  1,000 mcg Oral Daily    sodium chloride flush  5-40 mL IntraVENous 2 times per day    insulin lispro  0-4 Units SubCUTAneous TID WC    insulin lispro  0-4 Units SubCUTAneous Nightly      Infusions:    sodium chloride      dextrose       PRN Meds: calcium carbonate, 500 mg, TID PRN  nitroGLYCERIN, 0.4 mg, Q5 Min PRN  oxyCODONE-acetaminophen, 1 tablet, Q4H PRN  HYDROmorphone, 0.25 mg, Q4H PRN  [Held by provider] tiZANidine, 2 mg, BID PRN  sodium chloride flush, 5-40 mL, PRN  sodium chloride, , PRN  potassium chloride, 10 mEq, PRN  magnesium sulfate, 2,000 mg, PRN  ondansetron, 4 mg, Q8H PRN   Or  ondansetron, 4 mg, Q6H PRN  melatonin, 3 mg, Nightly PRN  polyethylene glycol, 17 g, Daily PRN  acetaminophen, 650 mg, Q6H PRN   Or  acetaminophen, 650 mg, Q6H PRN  glucose, 4 tablet, PRN  dextrose bolus, 125 mL, PRN   Or  dextrose bolus, 250 mL, PRN  glucagon (rDNA), 1 mg, PRN  dextrose, , Continuous PRN        Labs and Imaging   XR CHEST PORTABLE    Result Date: 7/11/2024  EXAMINATION: XR CHEST PORTABLE, 7/11/2024 7:33 PM HISTORY: chest pain COMPARISON:  No comparisons available.  Technique: Single view. Findings: Mild pulmonary

## 2024-07-16 VITALS
OXYGEN SATURATION: 96 % | WEIGHT: 244 LBS | RESPIRATION RATE: 14 BRPM | SYSTOLIC BLOOD PRESSURE: 138 MMHG | HEART RATE: 67 BPM | DIASTOLIC BLOOD PRESSURE: 71 MMHG | HEIGHT: 70 IN | BODY MASS INDEX: 34.93 KG/M2 | TEMPERATURE: 97.5 F

## 2024-07-16 LAB
ALBUMIN SERPL-MCNC: 3.2 GM/DL (ref 3.4–5)
ALP BLD-CCNC: 65 IU/L (ref 40–129)
ALT SERPL-CCNC: 26 U/L (ref 10–40)
ANION GAP SERPL CALCULATED.3IONS-SCNC: 8 MMOL/L (ref 7–16)
AST SERPL-CCNC: 37 IU/L (ref 15–37)
BILIRUB SERPL-MCNC: 0.3 MG/DL (ref 0–1)
BUN SERPL-MCNC: 29 MG/DL (ref 6–23)
CALCIUM SERPL-MCNC: 8.7 MG/DL (ref 8.3–10.6)
CHLORIDE BLD-SCNC: 106 MMOL/L (ref 99–110)
CO2: 26 MMOL/L (ref 21–32)
CREAT SERPL-MCNC: 1.6 MG/DL (ref 0.9–1.3)
GFR, ESTIMATED: 42 ML/MIN/1.73M2
GLUCOSE BLD-MCNC: 186 MG/DL (ref 70–99)
GLUCOSE BLD-MCNC: 344 MG/DL (ref 70–99)
GLUCOSE SERPL-MCNC: 188 MG/DL (ref 70–99)
MAGNESIUM: 2.1 MG/DL (ref 1.8–2.4)
PHOSPHORUS: 2.8 MG/DL (ref 2.5–4.9)
POTASSIUM SERPL-SCNC: 4.3 MMOL/L (ref 3.5–5.1)
SODIUM BLD-SCNC: 140 MMOL/L (ref 135–145)
TOTAL PROTEIN: 5.5 GM/DL (ref 6.4–8.2)

## 2024-07-16 PROCEDURE — 6370000000 HC RX 637 (ALT 250 FOR IP)

## 2024-07-16 PROCEDURE — 82962 GLUCOSE BLOOD TEST: CPT

## 2024-07-16 PROCEDURE — 6360000002 HC RX W HCPCS: Performed by: STUDENT IN AN ORGANIZED HEALTH CARE EDUCATION/TRAINING PROGRAM

## 2024-07-16 PROCEDURE — 84100 ASSAY OF PHOSPHORUS: CPT

## 2024-07-16 PROCEDURE — 36415 COLL VENOUS BLD VENIPUNCTURE: CPT

## 2024-07-16 PROCEDURE — 80053 COMPREHEN METABOLIC PANEL: CPT

## 2024-07-16 PROCEDURE — 6370000000 HC RX 637 (ALT 250 FOR IP): Performed by: STUDENT IN AN ORGANIZED HEALTH CARE EDUCATION/TRAINING PROGRAM

## 2024-07-16 PROCEDURE — 83735 ASSAY OF MAGNESIUM: CPT

## 2024-07-16 PROCEDURE — 6370000000 HC RX 637 (ALT 250 FOR IP): Performed by: INTERNAL MEDICINE

## 2024-07-16 PROCEDURE — 2580000003 HC RX 258: Performed by: STUDENT IN AN ORGANIZED HEALTH CARE EDUCATION/TRAINING PROGRAM

## 2024-07-16 PROCEDURE — 94761 N-INVAS EAR/PLS OXIMETRY MLT: CPT

## 2024-07-16 RX ORDER — NITROGLYCERIN 0.4 MG/1
0.4 TABLET SUBLINGUAL EVERY 5 MIN PRN
Qty: 3 TABLET | Refills: 0 | Status: SHIPPED | OUTPATIENT
Start: 2024-07-16

## 2024-07-16 RX ADMIN — OXYCODONE HYDROCHLORIDE AND ACETAMINOPHEN 1 TABLET: 5; 325 TABLET ORAL at 08:13

## 2024-07-16 RX ADMIN — ASPIRIN 81 MG: 81 TABLET, CHEWABLE ORAL at 08:15

## 2024-07-16 RX ADMIN — FERROUS SULFATE TAB 325 MG (65 MG ELEMENTAL FE) 325 MG: 325 (65 FE) TAB at 08:15

## 2024-07-16 RX ADMIN — PANTOPRAZOLE SODIUM 40 MG: 40 TABLET, DELAYED RELEASE ORAL at 08:15

## 2024-07-16 RX ADMIN — INSULIN LISPRO 3 UNITS: 100 INJECTION, SOLUTION INTRAVENOUS; SUBCUTANEOUS at 11:26

## 2024-07-16 RX ADMIN — CYANOCOBALAMIN TAB 1000 MCG 1000 MCG: 1000 TAB at 08:15

## 2024-07-16 RX ADMIN — CHOLESTYRAMINE 4 G: 4 POWDER, FOR SUSPENSION ORAL at 08:15

## 2024-07-16 RX ADMIN — ENOXAPARIN SODIUM 30 MG: 100 INJECTION SUBCUTANEOUS at 08:16

## 2024-07-16 RX ADMIN — INSULIN GLARGINE 45 UNITS: 100 INJECTION, SOLUTION SUBCUTANEOUS at 08:16

## 2024-07-16 RX ADMIN — GABAPENTIN 800 MG: 400 CAPSULE ORAL at 08:15

## 2024-07-16 RX ADMIN — FLUOXETINE HYDROCHLORIDE 40 MG: 10 CAPSULE ORAL at 08:15

## 2024-07-16 RX ADMIN — SODIUM CHLORIDE, PRESERVATIVE FREE 10 ML: 5 INJECTION INTRAVENOUS at 08:13

## 2024-07-16 RX ADMIN — ISOSORBIDE MONONITRATE 30 MG: 30 TABLET, EXTENDED RELEASE ORAL at 08:15

## 2024-07-16 RX ADMIN — RANOLAZINE 500 MG: 500 TABLET, EXTENDED RELEASE ORAL at 08:15

## 2024-07-16 RX ADMIN — FUROSEMIDE 20 MG: 20 TABLET ORAL at 08:15

## 2024-07-16 RX ADMIN — EMPAGLIFLOZIN 10 MG: 10 TABLET, FILM COATED ORAL at 08:15

## 2024-07-16 ASSESSMENT — PAIN SCALES - GENERAL: PAINLEVEL_OUTOF10: 7

## 2024-07-16 ASSESSMENT — PAIN DESCRIPTION - LOCATION: LOCATION: BACK

## 2024-07-16 ASSESSMENT — PAIN - FUNCTIONAL ASSESSMENT: PAIN_FUNCTIONAL_ASSESSMENT: ACTIVITIES ARE NOT PREVENTED

## 2024-07-16 ASSESSMENT — PAIN DESCRIPTION - DESCRIPTORS: DESCRIPTORS: ACHING;DISCOMFORT

## 2024-07-16 ASSESSMENT — PAIN DESCRIPTION - ORIENTATION: ORIENTATION: MID;LOWER

## 2024-07-16 NOTE — PLAN OF CARE
Problem: Discharge Planning  Goal: Discharge to home or other facility with appropriate resources  7/16/2024 0523 by Juvencio Parker RN  Outcome: Progressing  7/16/2024 0522 by Juvencio Parker RN  Outcome: Progressing  7/15/2024 1743 by Elana Jiang RN  Outcome: Progressing     Problem: Pain  Goal: Verbalizes/displays adequate comfort level or baseline comfort level  7/16/2024 0523 by Juvencio Parker RN  Outcome: Progressing  7/16/2024 0522 by Juvencio Parker RN  Outcome: Progressing  7/15/2024 1743 by Elana Jiang RN  Outcome: Progressing     Problem: Safety - Adult  Goal: Free from fall injury  7/16/2024 0523 by Juvencio Parker RN  Outcome: Progressing  7/16/2024 0522 by Juvencio Parker RN  Outcome: Progressing  7/15/2024 1743 by Elana Jiang RN  Outcome: Progressing     Problem: Nutrition Deficit:  Goal: Optimize nutritional status  7/16/2024 0523 by Juvencio Parker RN  Outcome: Progressing  7/16/2024 0522 by Juvencio Parker RN  Outcome: Progressing  7/15/2024 1743 by Elana Jiang RN  Outcome: Progressing

## 2024-07-16 NOTE — DISCHARGE SUMMARY
V2.0  Discharge Summary    Name:  Abelardo Marqusi /Age/Sex: 1940 (84 y.o. 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 Endorsed having epigastric discomfort that was worse with food and self resolved. Denied any NSAIDs or decreased oral intake. Denied fevers, chills, CP, SOB, cough, N/V, abdominal pain, C/D or urinary changes. Denied any tobacco or alcohol use\".     Pt admitted for VICKY on CKD, likely iso diarrhea. Pt initially hypotensive on arrival but improved s/p fluids , nephro onboard while inpatient. Pt's creatinine 2.7 on arrival, improved to around 1.6. Likely diarrhea contributing to VICKY, stool studies negative. Pt's diarrhea self-resolved. GI/ cardio and nephro onboard while inpatient. No further plan for intervention, will discharge with outpatient follow up. Pt having chronic normocytic anemia , stable around baseline 7-8's, with no evidence of bleed or symptom of anemia.   Hospital course c/b chest vs epigastric / LUQ abdomen pain, with elevated but downtrending tropionins, no plan per cardio for any intervention. Abdomen pain relieved s/p gi cocktail w pantoprazole and tums added on. LUQ US w splenomegaly with no arterial/ venous flow restrictions. Plan for repeat labs CBC/ BMP in 1 week.    Brief Problem Based Course:     # VICKY on [id number]  - No recent NSAIDs or decreased oral intake.   - Clinically hypovolemic. Cr 2.7, baseline ~1.5. UA ordered.  - Will challenge with IVF. Strict I/O's. Bladder scan if decreased

## 2024-07-16 NOTE — PLAN OF CARE
Problem: Discharge Planning  Goal: Discharge to home or other facility with appropriate resources  7/16/2024 0522 by Juvencio Parker RN  Outcome: Progressing  7/15/2024 1743 by Elana Jiang RN  Outcome: Progressing     Problem: Pain  Goal: Verbalizes/displays adequate comfort level or baseline comfort level  7/16/2024 0522 by Juvencio Parker RN  Outcome: Progressing  7/15/2024 1743 by Elana Jiang RN  Outcome: Progressing     Problem: Safety - Adult  Goal: Free from fall injury  7/16/2024 0522 by Juvencio Parker RN  Outcome: Progressing  7/15/2024 1743 by Elana Jiang RN  Outcome: Progressing     Problem: Nutrition Deficit:  Goal: Optimize nutritional status  7/16/2024 0522 by Juvencio Parker RN  Outcome: Progressing  7/15/2024 1743 by Elana Jiang RN  Outcome: Progressing

## 2024-07-16 NOTE — DISCHARGE INSTRUCTIONS
Please take your Medications regularly and set up appointments to follow up with your Primary Care Physician, Cardiologist, Gastroenterologist & Nephrologist soon    If having any new, persistent or worsening symptoms including but not limited to chest pain, shorness of breath, loss of consciousness, palpitations , chest or abdomen pressure, left arm pain or pressure, severe headache, especially with new weakness/numbness or tingling in any part of body, any gait or balance issues, any fever/ chill, cough, sputum,  buring with urine , any bleeding or dark stools etc  ,please return to nearest facility for evaluation    Please check fingerstick glucose before meals and at bedtime.  Maintain hypoglycemia protocol as per reading material provided. If fingerstick glucose is less than 150, please hold the Lantus dose.  Fingerstick glucose are less than 110s, may need to hold oral diabetic medicine.  If having hypoglycemia symptoms and fingerstick glucose readings are less than 100, you may need to take juices/glucose tablets until your blood sugar improves to > 100mg/dl and symptoms resolve . If having hypoglycemia and symptoms of drowsiness, or lethargic, you/patient may need glucagon injection to be administered as per written material or be brought to the Emergency Department for further management.  In case of glucose reading greater than 300s or less than 100s and having any symptoms, you/patient need to be evaluated by physician    Please Avoid taking more than 2 gram Salt and/or more than 100- 1500 ml fluid in one day. Check Your weight daily. If your weight goes up 5 pounds or more in one week, or you experience leg swelling, palpitations or shortness of breath worsening with activity or lying down, please call your cardiologist office/ be evaluated by physician for evaluation/ medication adjustments in setting of possible heart failure worsening      Please check Blood pressure 3 times a day and Note readings,

## 2024-07-16 NOTE — PROGRESS NOTES
Nephrology Progress Note  7/16/2024 11:13 AM        Subjective:   Admit Date: 7/11/2024  PCP: Georgie Hirsch MD    Interval History:  patient seen in early morning, this late entry   no major event that I am aware of    Diet:  reasonable    ROS:   no shortness of breath, confusion orthopnea   blood pressure recorded rather high, no fever and urine output recorded overnight and 25 cc for the last 24 hours    Data:     Current meds:    enoxaparin  30 mg SubCUTAneous BID    furosemide  20 mg Oral Daily    lidocaine  1 patch TransDERmal Daily    aspirin  81 mg Oral Daily    atorvastatin  40 mg Oral Nightly    cholestyramine light  4 g Oral Daily    empagliflozin  10 mg Oral Daily    ferrous sulfate  325 mg Oral Every Other Day    FLUoxetine  40 mg Oral Daily    gabapentin  800 mg Oral BID    insulin glargine  45 Units SubCUTAneous BID    isosorbide mononitrate  30 mg Oral Daily    [Held by provider] metoprolol tartrate  25 mg Oral BID    pantoprazole  40 mg Oral BID AC    ranolazine  500 mg Oral Daily    vitamin B-12  1,000 mcg Oral Daily    sodium chloride flush  5-40 mL IntraVENous 2 times per day    insulin lispro  0-4 Units SubCUTAneous TID WC    insulin lispro  0-4 Units SubCUTAneous Nightly      sodium chloride      dextrose           I/O last 3 completed shifts:  In: -   Out: 926 [Urine:926]    CBC:   Recent Labs     07/13/24  1630 07/14/24  1453 07/15/24  0207   WBC 3.3* 2.3* 2.7*   HGB 7.8* 7.0* 7.7*   * 130* 143          Recent Labs     07/14/24  1453 07/15/24  0207 07/16/24  0355    142 140   K 4.4 4.4 4.3    108 106   CO2 26 25 26   BUN 28* 33* 29*   CREATININE 1.4* 1.6* 1.6*   GLUCOSE 174* 240* 188*       Lab Results   Component Value Date    CALCIUM 8.7 07/16/2024    PHOS 2.8 07/16/2024       Objective:     Vitals: BP (!) 170/70   Pulse 80   Temp 97.9 °F (36.6 °C) (Oral)   Resp 16   Ht 1.778 m (5' 10\")   Wt 110.7 kg (244 lb)   SpO2 92%   BMI 35.01 kg/m² ,    General

## 2024-07-16 NOTE — PROGRESS NOTES
07/16/24 1104   Encounter Summary   Encounter Overview/Reason Initial Encounter   Service Provided For Patient   Referral/Consult From [facility]   Support System Children;Family members   Last Encounter  07/16/24  (Patient was in good spirit at the time of visit. Patient believes in healing power of prayer and requested prayer support.)   Complexity of Encounter Low   Begin Time 1045   End Time  1100   Total Time Calculated 15 min   Spiritual/Emotional needs   Type Spiritual Support   Grief, Loss, and Adjustments   Type Adjustment to illness   Assessment/Intervention/Outcome   Assessment Coping;Hopeful;Unable to assess   Intervention Active listening;Discussed belief system/Mosque practices/rebekah;Empowerment;Explored/Affirmed feelings, thoughts, concerns;Explored Coping Skills/Resources;Prayer (assurance of)/[place]   Outcome Receptive;Expressed Gratitude;Encouraged;Engaged in conversation   Plan and Referrals   Plan/Referrals Continue Support (comment)  (as needed)

## 2024-07-16 NOTE — PROGRESS NOTES
V2.0    [facility] Progress Note      Name:  Abelardo Marquis /Age/Sex: 1940  (84 y.o. male)   MRN & CSN:  [id number] & [id number] Encounter Date/Time: 2024 7:33 AM EDT   [facility] PCP: Georgie Hirsch MD     Attending:Vishal Sol MD       Hospital Day: 6    Assessment and Recommendations   Abelardo Marquis is a 84 y.o. 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   - Clinically hypovolemic. Cr 2.7, baseline ~1.5. UA ordered.  - Will challenge with IVF. Strict I/O's. Bladder scan if decreased voiding.  -Nephro consulted     # Lower sternal chest/ Epigastric pain :  -Pt complained of severe, pressure like, atypical chest/ epigastric pain w some LUQ tenderness, s/p ntg x2, trops elevated 80- downtrending to 50's, EKG w no acute changes. Cardio onboard. CT A/P w cirrhosis and splenomegaly. US abdomen showed splenomegaly, will d/w GI.Pain improved w prn meds    # Diarrhea-resolved  -Follow stool studies, antidiarrheals prn    # Essential hypertension  - Endorsed generalized weakness after taking Imdur, Lopressor and NTG.   - BP 80s/40s on arrival, improved with IVF.  - Hold Imdur and Ranexa for now.      # CAD s/p PCI in   # Non-MI chronic troponin elevation  - Denied any typical CP.  - Initial Tn elevated, repeat flat. ECG without acute ischemic changes.  - Likely secondary to above. Continue ASA, Lipitor and Lopressor, hold Imdur and Ranexa as above.     # T2DM with hyperglycemia and peripheral neuropathy  - Last A1c 6.6% in 2024.  - Held Metformin.  - Decrease to Insulin Lantus 45 u BID with LCSI, consider initiating Aspart.     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  - Resume Lasix when able.     # GERD  - Continue PPI.     # Normocytic  contours are within normal limits. Bony thorax no acute abnormality.     Mild CHF Electronically signed by Yuan Boyd      CBC:   Recent Labs     07/13/24  1630 07/14/24  1453 07/15/24  0207   WBC 3.3* 2.3* 2.7*   HGB 7.8* 7.0* 7.7*   * 130* 143       BMP:    Recent Labs     07/14/24  1453 07/15/24  0207 07/16/24  0355    142 140   K 4.4 4.4 4.3    108 106   CO2 26 25 26   BUN 28* 33* 29*   CREATININE 1.4* 1.6* 1.6*   GLUCOSE 174* 240* 188*       Hepatic:   Recent Labs     07/14/24  1453 07/15/24  0207 07/16/24  0355   AST 33 37 37   ALT 19 23 26   BILITOT 0.3 0.3 0.3   ALKPHOS 55 59 65       Lipids:   Lab Results   Component Value Date/Time    CHOL 152 03/31/2023 04:27 AM    HDL 38 03/31/2023 04:27 AM    TRIG 123 03/31/2023 04:27 AM     Hemoglobin A1C:   Lab Results   Component Value Date/Time    LABA1C 6.6 06/19/2024 05:37 PM     TSH: No results found for: \"TSH\"  Troponin:   Lab Results   Component Value Date/Time    TROPONINT 0.018 03/30/2023 01:02 AM    TROPONINT 0.028 03/29/2023 06:07 PM    TROPONINT 0.048 11/27/2022 10:56 AM     Lactic Acid: No results for input(s): \"LACTA\" in the last 72 hours.  BNP:   No results for input(s): \"PROBNP\" in the last 72 hours.  UA:  Lab Results   Component Value Date/Time    NITRU NEGATIVE 06/21/2024 10:00 PM    COLORU YELLOW 06/21/2024 10:00 PM    PHUR 6.0 06/21/2024 10:00 PM    WBCUA <1 06/21/2024 10:00 PM    WBCUA 0-5 05/23/2023 10:19 AM    RBCUA <1 06/21/2024 10:00 PM    MUCUS RARE 09/20/2020 02:50 AM    TRICHOMONAS NONE SEEN 09/20/2020 02:50 AM    BACTERIA NONE SEEN 05/23/2023 10:19 AM    BACTERIA RARE 09/20/2020 02:50 AM    CLARITYU CLEAR 06/21/2024 10:00 PM    LEUKOCYTESUR NEGATIVE 06/21/2024 10:00 PM    UROBILINOGEN 0.2 06/21/2024 10:00 PM    BILIRUBINUR NEGATIVE 06/21/2024 10:00 PM    BLOODU TRACE 06/21/2024 10:00 PM    GLUCOSEU >1000 06/21/2024 10:00 PM    KETUA NEGATIVE 06/21/2024 10:00 PM     Urine Cultures: No results found for: \"LABURIN\"  Blood

## 2024-07-16 NOTE — PROGRESS NOTES
4 Eyes Skin Assessment     NAME:  Abelardo Marquis  YOB: 1940  MEDICAL RECORD NUMBER:  [id number]    The patient is being assessed for  Admission    I agree that at least one RN has performed a thorough Head to Toe Skin Assessment on the patient. ALL assessment sites listed below have been assessed.      Areas assessed by both nurses:    Head, Face, Ears, Shoulders, Back, Chest, Arms, Elbows, Hands, Sacrum. Buttock, Coccyx, Ischium, Legs. Feet and Heels, and Under Medical Devices         Does the Patient have a Wound? No noted wound(s)       Yomi Prevention initiated by RN: No  Wound Care Orders initiated by RN: No    Pressure Injury (Stage 3,4, Unstageable, DTI, NWPT, and Complex wounds) if present, place Wound referral order by RN under : No    New Ostomies, if present place, Ostomy referral order under : No     Nurse 1 eSignature: Electronically signed by Alejandra Waldron RN on 7/16/24 at 2:04 PM EDT    **SHARE this note so that the co-signing nurse can place an eSignature**

## 2024-07-17 ENCOUNTER — CARE COORDINATION (OUTPATIENT)
Dept: CASE MANAGEMENT | Age: 84
End: 2024-07-17

## 2024-07-17 LAB
EKG ATRIAL RATE: 54 BPM
EKG DIAGNOSIS: NORMAL
EKG P AXIS: 38 DEGREES
EKG P-R INTERVAL: 226 MS
EKG Q-T INTERVAL: 470 MS
EKG QRS DURATION: 96 MS
EKG QTC CALCULATION (BAZETT): 445 MS
EKG R AXIS: 17 DEGREES
EKG T AXIS: 44 DEGREES
EKG VENTRICULAR RATE: 54 BPM

## 2024-07-17 PROCEDURE — 93010 ELECTROCARDIOGRAM REPORT: CPT | Performed by: INTERNAL MEDICINE

## 2024-07-17 NOTE — CARE COORDINATION
Care Transitions Note    Initial Call - Call within 2 business days of discharge: Yes    1st attempt to reach for Care Transition discharge call unsuccessful; message left requesting call back. No HIPAA form on file for approved EC. No MyChart.     Outreach Attempts:   HIPAA compliant voicemail left for patient.     Patient: Abelardo Marquis    Patient : 1940   MRN: [id number]    Reason for Admission: Epigastric CP, Diarrhea, VICKY   Discharge Date: 24  RURS: Readmission Risk Score: 32.3  Facility: [facility] 24-24    Last Discharge Facility       Date Complaint Diagnosis Description Type Department Provider    24 Chest Pain Chest pain, unspecified type ... ED to Hosp-Admission (Discharged) (ADMITTED) [facility] 3Vishal Betancourt MD; Toni Vu...       Plan for follow-up on next business day. 2nd attempt.     Ade Weir RN

## 2024-07-18 ENCOUNTER — CARE COORDINATION (OUTPATIENT)
Dept: CASE MANAGEMENT | Age: 84
End: 2024-07-18

## 2024-07-18 NOTE — CARE COORDINATION
Care Transitions Note    Initial Call - Call within 2 business days of discharge: Yes    2nd unsuccessful attempt to reach for Care Transition discharge call. HIPAA compliant message left requesting call back. No MyChart. CT program closed per protocol, no further outreach scheduled.       Outreach Attempts:   HIPAA compliant voicemail left for patient.     Patient: Abelardo Marquis    Patient : 1940   MRN: [id number]    Reason for Admission: VICKY, Epigastric CP, Diarrhea  Discharge Date: 24  RURS: Readmission Risk Score: 32.3    Last Discharge Facility       Date Complaint Diagnosis Description Type Department Provider    24 Chest Pain Chest pain, unspecified type ... ED to Hosp-Admission (Discharged) (ADMITTED) [facility] 3N Vishal Sol MD; Toni Vu...     Ade Weir RN

## 2024-07-26 ENCOUNTER — HOSPITAL ENCOUNTER (OUTPATIENT)
Age: 84
Setting detail: SPECIMEN
Discharge: HOME | End: 2024-07-26
Payer: MEDICARE

## 2024-07-26 LAB
ANION GAP SERPL CALCULATED.3IONS-SCNC: 11 MMOL/L (ref 7–16)
BASOPHILS ABSOLUTE: 0 K/CU MM
BASOPHILS RELATIVE PERCENT: 1.4 % (ref 0–1)
BUN SERPL-MCNC: 21 MG/DL (ref 6–23)
CALCIUM SERPL-MCNC: 8.7 MG/DL (ref 8.3–10.6)
CHLORIDE BLD-SCNC: 107 MMOL/L (ref 99–110)
CO2: 25 MMOL/L (ref 21–32)
CREAT SERPL-MCNC: 1.3 MG/DL (ref 0.9–1.3)
DIFFERENTIAL TYPE: ABNORMAL
EOSINOPHILS ABSOLUTE: 0.1 K/CU MM
EOSINOPHILS RELATIVE PERCENT: 4.8 % (ref 0–3)
GFR, ESTIMATED: 55 ML/MIN/1.73M2
GLUCOSE SERPL-MCNC: 175 MG/DL (ref 70–99)
HCT VFR BLD CALC: 32.4 % (ref 42–52)
HEMOGLOBIN: 9.3 GM/DL (ref 13.5–18)
IMMATURE NEUTROPHIL %: 1 % (ref 0–0.43)
LYMPHOCYTES ABSOLUTE: 0.6 K/CU MM
LYMPHOCYTES RELATIVE PERCENT: 28.1 % (ref 24–44)
MCH RBC QN AUTO: 24.2 PG (ref 27–31)
MCHC RBC AUTO-ENTMCNC: 28.7 % (ref 32–36)
MCV RBC AUTO: 84.4 FL (ref 78–100)
MONOCYTES ABSOLUTE: 0.2 K/CU MM
MONOCYTES RELATIVE PERCENT: 11.4 % (ref 0–4)
NEUTROPHILS ABSOLUTE: 1.1 K/CU MM
NEUTROPHILS RELATIVE PERCENT: 53.3 % (ref 36–66)
NUCLEATED RBC %: 0 %
PDW BLD-RTO: 16.2 % (ref 11.7–14.9)
PLATELET # BLD: 131 K/CU MM (ref 140–440)
PMV BLD AUTO: 10.1 FL (ref 7.5–11.1)
POTASSIUM SERPL-SCNC: 3.3 MMOL/L (ref 3.5–5.1)
RBC # BLD: 3.84 M/CU MM (ref 4.6–6.2)
SODIUM BLD-SCNC: 143 MMOL/L (ref 135–145)
TOTAL IMMATURE NEUTOROPHIL: 0.02 K/CU MM
TOTAL NUCLEATED RBC: 0 K/CU MM
WBC # BLD: 2.1 K/CU MM (ref 4–10.5)

## 2024-07-26 PROCEDURE — 80048 BASIC METABOLIC PNL TOTAL CA: CPT

## 2024-07-26 PROCEDURE — 85025 COMPLETE CBC W/AUTO DIFF WBC: CPT

## 2024-09-06 ENCOUNTER — HOSPITAL ENCOUNTER (OUTPATIENT)
Dept: MRI IMAGING | Age: 84
Discharge: HOME | End: 2024-09-06
Attending: STUDENT IN AN ORGANIZED HEALTH CARE EDUCATION/TRAINING PROGRAM
Payer: MEDICARE

## 2024-09-06 DIAGNOSIS — M48.062 SPINAL STENOSIS OF LUMBAR REGION WITH NEUROGENIC CLAUDICATION: ICD-10-CM

## 2024-09-06 PROCEDURE — 72148 MRI LUMBAR SPINE W/O DYE: CPT

## 2024-09-18 ENCOUNTER — APPOINTMENT (OUTPATIENT)
Dept: GENERAL RADIOLOGY | Age: 84
End: 2024-09-18
Payer: MEDICARE

## 2024-09-18 ENCOUNTER — HOSPITAL ENCOUNTER (INPATIENT)
Age: 84
LOS: 2 days | Discharge: HOME HEALTH | End: 2024-09-21
Attending: STUDENT IN AN ORGANIZED HEALTH CARE EDUCATION/TRAINING PROGRAM | Admitting: STUDENT IN AN ORGANIZED HEALTH CARE EDUCATION/TRAINING PROGRAM
Payer: MEDICARE

## 2024-09-18 DIAGNOSIS — R73.9 HYPERGLYCEMIA: ICD-10-CM

## 2024-09-18 DIAGNOSIS — R26.81 GAIT INSTABILITY: ICD-10-CM

## 2024-09-18 DIAGNOSIS — R53.83 OTHER FATIGUE: Primary | ICD-10-CM

## 2024-09-18 DIAGNOSIS — E87.20 METABOLIC ACIDOSIS: ICD-10-CM

## 2024-09-18 DIAGNOSIS — R53.1 GENERAL WEAKNESS: ICD-10-CM

## 2024-09-18 LAB
ALBUMIN SERPL-MCNC: 3.4 G/DL (ref 3.4–5)
ALBUMIN/GLOB SERPL: 1.6 {RATIO} (ref 1.1–2.2)
ALP SERPL-CCNC: 65 U/L (ref 40–129)
ALT SERPL-CCNC: 13 U/L (ref 10–40)
ANION GAP SERPL CALCULATED.3IONS-SCNC: 12 MMOL/L (ref 9–17)
ARTERIAL PATENCY WRIST A: ABNORMAL
AST SERPL-CCNC: 17 U/L (ref 15–37)
BASOPHILS # BLD: 0.02 K/UL
BASOPHILS NFR BLD: 1 % (ref 0–1)
BILIRUB SERPL-MCNC: 0.4 MG/DL (ref 0–1)
BILIRUB UR QL STRIP: NEGATIVE
BNP SERPL-MCNC: 587 PG/ML (ref 0–450)
BODY TEMPERATURE: 37
BUN SERPL-MCNC: 52 MG/DL (ref 7–20)
CALCIUM SERPL-MCNC: 7.8 MG/DL (ref 8.3–10.6)
CHLORIDE SERPL-SCNC: 105 MMOL/L (ref 99–110)
CLARITY UR: CLEAR
CO2 SERPL-SCNC: 24 MMOL/L (ref 21–32)
COHGB MFR BLD: 0.6 % (ref 0.5–1.5)
COLOR UR: YELLOW
COMMENT: ABNORMAL
CREAT SERPL-MCNC: 2.5 MG/DL (ref 0.8–1.3)
EOSINOPHIL # BLD: 0.14 K/UL
EOSINOPHILS RELATIVE PERCENT: 4 % (ref 0–3)
ERYTHROCYTE [DISTWIDTH] IN BLOOD BY AUTOMATED COUNT: 19.2 % (ref 11.7–14.9)
GFR, ESTIMATED: 25 ML/MIN/1.73M2
GLUCOSE BLD-MCNC: 307 MG/DL (ref 74–99)
GLUCOSE SERPL-MCNC: 314 MG/DL (ref 74–99)
GLUCOSE UR STRIP-MCNC: >=1000 MG/DL
HCO3 VENOUS: 20 MMOL/L (ref 22–29)
HCT VFR BLD AUTO: 32.1 % (ref 42–52)
HGB BLD-MCNC: 8.5 G/DL (ref 13.5–18)
HGB UR QL STRIP.AUTO: NEGATIVE
IMM GRANULOCYTES # BLD AUTO: 0.06 K/UL
IMM GRANULOCYTES NFR BLD: 2 %
KETONES UR STRIP-MCNC: NEGATIVE MG/DL
LEUKOCYTE ESTERASE UR QL STRIP: NEGATIVE
LYMPHOCYTES NFR BLD: 1.2 K/UL
LYMPHOCYTES RELATIVE PERCENT: 31 % (ref 24–44)
MCH RBC QN AUTO: 20.5 PG (ref 27–31)
MCHC RBC AUTO-ENTMCNC: 26.5 G/DL (ref 32–36)
MCV RBC AUTO: 77.3 FL (ref 78–100)
METHEMOGLOBIN: 0.4 % (ref 0.5–1.5)
MONOCYTES NFR BLD: 0.35 K/UL
MONOCYTES NFR BLD: 9 % (ref 0–4)
NEGATIVE BASE EXCESS, VEN: 6.8 MMOL/L (ref 0–3)
NEUTROPHILS NFR BLD: 54 % (ref 36–66)
NEUTS SEG NFR BLD: 2.09 K/UL
NITRITE UR QL STRIP: NEGATIVE
OXYHGB MFR BLD: 94.5 %
PCO2 VENOUS: 45.6 MM HG (ref 38–54)
PH UR STRIP: 6 [PH] (ref 5–8)
PH VENOUS: 7.26 (ref 7.32–7.43)
PLATELET # BLD AUTO: 132 K/UL (ref 140–440)
PMV BLD AUTO: 10.1 FL (ref 7.5–11.1)
PO2 VENOUS: 95.3 MM HG (ref 23–48)
POTASSIUM SERPL-SCNC: 4.6 MMOL/L (ref 3.5–5.1)
PROT SERPL-MCNC: 5.5 G/DL (ref 6.4–8.2)
PROT UR STRIP-MCNC: NEGATIVE MG/DL
RBC # BLD AUTO: 4.15 M/UL (ref 4.6–6.2)
SODIUM SERPL-SCNC: 140 MMOL/L (ref 136–145)
SP GR UR STRIP: 1.02 (ref 1–1.03)
TROPONIN I SERPL HS-MCNC: 68 NG/L (ref 0–22)
UROBILINOGEN UR STRIP-ACNC: 0.2 EU/DL (ref 0–1)
WBC OTHER # BLD: 3.9 K/UL (ref 4–10.5)

## 2024-09-18 PROCEDURE — 84484 ASSAY OF TROPONIN QUANT: CPT

## 2024-09-18 PROCEDURE — 83880 ASSAY OF NATRIURETIC PEPTIDE: CPT

## 2024-09-18 PROCEDURE — 82962 GLUCOSE BLOOD TEST: CPT

## 2024-09-18 PROCEDURE — 80053 COMPREHEN METABOLIC PANEL: CPT

## 2024-09-18 PROCEDURE — 82010 KETONE BODYS QUAN: CPT

## 2024-09-18 PROCEDURE — 93005 ELECTROCARDIOGRAM TRACING: CPT | Performed by: PHYSICIAN ASSISTANT

## 2024-09-18 PROCEDURE — 84300 ASSAY OF URINE SODIUM: CPT

## 2024-09-18 PROCEDURE — 81003 URINALYSIS AUTO W/O SCOPE: CPT

## 2024-09-18 PROCEDURE — 36415 COLL VENOUS BLD VENIPUNCTURE: CPT

## 2024-09-18 PROCEDURE — 82570 ASSAY OF URINE CREATININE: CPT

## 2024-09-18 PROCEDURE — 85025 COMPLETE CBC W/AUTO DIFF WBC: CPT

## 2024-09-18 PROCEDURE — 71045 X-RAY EXAM CHEST 1 VIEW: CPT

## 2024-09-18 PROCEDURE — 82805 BLOOD GASES W/O2 SATURATION: CPT

## 2024-09-18 PROCEDURE — 99285 EMERGENCY DEPT VISIT HI MDM: CPT

## 2024-09-18 ASSESSMENT — PAIN DESCRIPTION - FREQUENCY: FREQUENCY: CONTINUOUS

## 2024-09-18 ASSESSMENT — PAIN DESCRIPTION - DESCRIPTORS: DESCRIPTORS: ACHING;THROBBING

## 2024-09-18 ASSESSMENT — PAIN - FUNCTIONAL ASSESSMENT
PAIN_FUNCTIONAL_ASSESSMENT: PREVENTS OR INTERFERES SOME ACTIVE ACTIVITIES AND ADLS
PAIN_FUNCTIONAL_ASSESSMENT: 0-10

## 2024-09-18 ASSESSMENT — PAIN DESCRIPTION - LOCATION: LOCATION: BACK;SHOULDER

## 2024-09-18 ASSESSMENT — PAIN DESCRIPTION - ONSET: ONSET: ON-GOING

## 2024-09-18 ASSESSMENT — PAIN DESCRIPTION - ORIENTATION: ORIENTATION: LEFT

## 2024-09-18 ASSESSMENT — PAIN SCALES - GENERAL: PAINLEVEL_OUTOF10: 7

## 2024-09-18 ASSESSMENT — PAIN DESCRIPTION - PAIN TYPE: TYPE: ACUTE PAIN

## 2024-09-19 PROBLEM — N17.9 ACUTE KIDNEY INJURY SUPERIMPOSED ON CKD (HCC): Status: ACTIVE | Noted: 2024-09-19

## 2024-09-19 PROBLEM — N18.9 ACUTE KIDNEY INJURY SUPERIMPOSED ON CKD (HCC): Status: ACTIVE | Noted: 2024-09-19

## 2024-09-19 LAB
ALBUMIN SERPL-MCNC: 3.1 G/DL (ref 3.4–5)
ALBUMIN/GLOB SERPL: 1.5 {RATIO} (ref 1.1–2.2)
ALP SERPL-CCNC: 64 U/L (ref 40–129)
ALT SERPL-CCNC: 12 U/L (ref 10–40)
ANION GAP SERPL CALCULATED.3IONS-SCNC: 12 MMOL/L (ref 9–17)
ARTERIAL PATENCY WRIST A: ABNORMAL
ARTERIAL PATENCY WRIST A: YES
AST SERPL-CCNC: 19 U/L (ref 15–37)
B-OH-BUTYR SERPL-MCNC: <0.2 MMOL/L (ref 0–0.28)
B-OH-BUTYR SERPL-MCNC: <0.2 MMOL/L (ref 0–0.28)
BDY SITE: ABNORMAL
BILIRUB SERPL-MCNC: 0.3 MG/DL (ref 0–1)
BODY TEMPERATURE: 37
BODY TEMPERATURE: 37
BUN SERPL-MCNC: 51 MG/DL (ref 7–20)
CALCIUM SERPL-MCNC: 7.4 MG/DL (ref 8.3–10.6)
CHLORIDE SERPL-SCNC: 106 MMOL/L (ref 99–110)
CO2 SERPL-SCNC: 23 MMOL/L (ref 21–32)
COHGB MFR BLD: 0.3 % (ref 0.5–1.5)
COHGB MFR BLD: 0.6 % (ref 0.5–1.5)
CREAT SERPL-MCNC: 2.2 MG/DL (ref 0.8–1.3)
CREAT UR-MCNC: 98.3 MG/DL (ref 39–259)
GAS FLOW.O2 O2 DELIVERY SYS: ABNORMAL L/MIN
GFR, ESTIMATED: 29 ML/MIN/1.73M2
GLUCOSE BLD-MCNC: 227 MG/DL (ref 74–99)
GLUCOSE BLD-MCNC: 230 MG/DL (ref 74–99)
GLUCOSE BLD-MCNC: 262 MG/DL (ref 74–99)
GLUCOSE BLD-MCNC: 278 MG/DL (ref 74–99)
GLUCOSE BLD-MCNC: 330 MG/DL (ref 74–99)
GLUCOSE SERPL-MCNC: 222 MG/DL (ref 74–99)
HCO3 ARTERIAL: 25.2 MMOL/L (ref 21–28)
HCO3 VENOUS: 26.7 MMOL/L (ref 22–29)
HCT VFR BLD AUTO: 29 % (ref 42–52)
HCT VFR BLD AUTO: 29.5 % (ref 42–52)
HGB BLD-MCNC: 7.8 G/DL (ref 13.5–18)
HGB BLD-MCNC: 8 G/DL (ref 13.5–18)
INFLUENZA A BY PCR: NOT DETECTED
INFLUENZA B BY PCR: NOT DETECTED
METHEMOGLOBIN: 0.6 % (ref 0.5–1.5)
METHEMOGLOBIN: 0.6 % (ref 0.5–1.5)
NEGATIVE BASE EXCESS, ART: 0.8 MMOL/L (ref 0–3)
NEGATIVE BASE EXCESS, VEN: 1 MMOL/L (ref 0–3)
OXYHGB MFR BLD: 57.4 %
OXYHGB MFR BLD: 92.2 %
PCO2 ARTERIAL: 48 MMHG (ref 35–48)
PCO2 VENOUS: 61.2 MM HG (ref 38–54)
PH ARTERIAL: 7.34 (ref 7.35–7.45)
PH VENOUS: 7.26 (ref 7.32–7.43)
PO2 ARTERIAL: 75.2 MMHG (ref 83–108)
PO2 VENOUS: 35.3 MM HG (ref 23–48)
POTASSIUM SERPL-SCNC: 4.3 MMOL/L (ref 3.5–5.1)
PROT SERPL-MCNC: 5.2 G/DL (ref 6.4–8.2)
SARS-COV-2 RNA RESP QL NAA+PROBE: NOT DETECTED
SODIUM SERPL-SCNC: 141 MMOL/L (ref 136–145)
SODIUM UR-SCNC: 42 MMOL/L (ref 40–220)
SPECIMEN DESCRIPTION: NORMAL
TEXT FOR RESPIRATORY: ABNORMAL
TROPONIN I SERPL HS-MCNC: 59 NG/L (ref 0–22)

## 2024-09-19 PROCEDURE — 97116 GAIT TRAINING THERAPY: CPT

## 2024-09-19 PROCEDURE — 85018 HEMOGLOBIN: CPT

## 2024-09-19 PROCEDURE — 84484 ASSAY OF TROPONIN QUANT: CPT

## 2024-09-19 PROCEDURE — 36415 COLL VENOUS BLD VENIPUNCTURE: CPT

## 2024-09-19 PROCEDURE — 87635 SARS-COV-2 COVID-19 AMP PRB: CPT

## 2024-09-19 PROCEDURE — 36600 WITHDRAWAL OF ARTERIAL BLOOD: CPT

## 2024-09-19 PROCEDURE — 97530 THERAPEUTIC ACTIVITIES: CPT

## 2024-09-19 PROCEDURE — 82805 BLOOD GASES W/O2 SATURATION: CPT

## 2024-09-19 PROCEDURE — 87804 INFLUENZA ASSAY W/OPTIC: CPT

## 2024-09-19 PROCEDURE — 94761 N-INVAS EAR/PLS OXIMETRY MLT: CPT

## 2024-09-19 PROCEDURE — 97166 OT EVAL MOD COMPLEX 45 MIN: CPT

## 2024-09-19 PROCEDURE — 1200000000 HC SEMI PRIVATE

## 2024-09-19 PROCEDURE — 97162 PT EVAL MOD COMPLEX 30 MIN: CPT

## 2024-09-19 PROCEDURE — 80053 COMPREHEN METABOLIC PANEL: CPT

## 2024-09-19 PROCEDURE — 6370000000 HC RX 637 (ALT 250 FOR IP): Performed by: STUDENT IN AN ORGANIZED HEALTH CARE EDUCATION/TRAINING PROGRAM

## 2024-09-19 PROCEDURE — 82962 GLUCOSE BLOOD TEST: CPT

## 2024-09-19 PROCEDURE — 85014 HEMATOCRIT: CPT

## 2024-09-19 RX ORDER — OXYCODONE AND ACETAMINOPHEN 7.5; 325 MG/1; MG/1
1 TABLET ORAL EVERY 8 HOURS PRN
Status: DISCONTINUED | OUTPATIENT
Start: 2024-09-19 | End: 2024-09-21 | Stop reason: HOSPADM

## 2024-09-19 RX ORDER — INSULIN LISPRO 100 [IU]/ML
0-4 INJECTION, SOLUTION INTRAVENOUS; SUBCUTANEOUS NIGHTLY
Status: DISCONTINUED | OUTPATIENT
Start: 2024-09-19 | End: 2024-09-21 | Stop reason: HOSPADM

## 2024-09-19 RX ORDER — METOPROLOL TARTRATE 25 MG/1
25 TABLET, FILM COATED ORAL 2 TIMES DAILY
Status: DISCONTINUED | OUTPATIENT
Start: 2024-09-19 | End: 2024-09-21 | Stop reason: HOSPADM

## 2024-09-19 RX ORDER — ACETAMINOPHEN 650 MG/1
650 SUPPOSITORY RECTAL 2 TIMES DAILY PRN
Status: DISCONTINUED | OUTPATIENT
Start: 2024-09-19 | End: 2024-09-21 | Stop reason: HOSPADM

## 2024-09-19 RX ORDER — GLUCAGON 1 MG/ML
1 KIT INJECTION PRN
Status: DISCONTINUED | OUTPATIENT
Start: 2024-09-19 | End: 2024-09-21 | Stop reason: HOSPADM

## 2024-09-19 RX ORDER — GABAPENTIN 300 MG/1
300 CAPSULE ORAL 2 TIMES DAILY
Status: DISCONTINUED | OUTPATIENT
Start: 2024-09-19 | End: 2024-09-21 | Stop reason: HOSPADM

## 2024-09-19 RX ORDER — ACETAMINOPHEN 325 MG/1
650 TABLET ORAL 2 TIMES DAILY PRN
Status: DISCONTINUED | OUTPATIENT
Start: 2024-09-19 | End: 2024-09-21 | Stop reason: HOSPADM

## 2024-09-19 RX ORDER — INSULIN LISPRO 100 [IU]/ML
0-4 INJECTION, SOLUTION INTRAVENOUS; SUBCUTANEOUS
Status: DISCONTINUED | OUTPATIENT
Start: 2024-09-19 | End: 2024-09-21 | Stop reason: HOSPADM

## 2024-09-19 RX ORDER — GABAPENTIN 800 MG/1
400 TABLET ORAL 3 TIMES DAILY
Status: DISCONTINUED | OUTPATIENT
Start: 2024-09-19 | End: 2024-09-19

## 2024-09-19 RX ORDER — ONDANSETRON 4 MG/1
4 TABLET, ORALLY DISINTEGRATING ORAL EVERY 8 HOURS PRN
Status: DISCONTINUED | OUTPATIENT
Start: 2024-09-19 | End: 2024-09-21 | Stop reason: HOSPADM

## 2024-09-19 RX ORDER — RANOLAZINE 500 MG/1
500 TABLET, EXTENDED RELEASE ORAL DAILY
Status: DISCONTINUED | OUTPATIENT
Start: 2024-09-19 | End: 2024-09-21 | Stop reason: HOSPADM

## 2024-09-19 RX ORDER — FERROUS SULFATE 325(65) MG
325 TABLET ORAL 2 TIMES DAILY WITH MEALS
Status: DISCONTINUED | OUTPATIENT
Start: 2024-09-19 | End: 2024-09-21 | Stop reason: HOSPADM

## 2024-09-19 RX ORDER — INSULIN GLARGINE 100 [IU]/ML
5 INJECTION, SOLUTION SUBCUTANEOUS NIGHTLY
Status: DISCONTINUED | OUTPATIENT
Start: 2024-09-19 | End: 2024-09-21 | Stop reason: HOSPADM

## 2024-09-19 RX ORDER — LOSARTAN POTASSIUM 100 MG/1
100 TABLET ORAL DAILY
Status: DISCONTINUED | OUTPATIENT
Start: 2024-09-19 | End: 2024-09-21

## 2024-09-19 RX ORDER — ATORVASTATIN CALCIUM 40 MG/1
40 TABLET, FILM COATED ORAL NIGHTLY
Status: DISCONTINUED | OUTPATIENT
Start: 2024-09-19 | End: 2024-09-21 | Stop reason: HOSPADM

## 2024-09-19 RX ORDER — FUROSEMIDE 40 MG
40 TABLET ORAL DAILY
Status: DISCONTINUED | OUTPATIENT
Start: 2024-09-19 | End: 2024-09-21

## 2024-09-19 RX ORDER — DEXTROSE MONOHYDRATE 100 MG/ML
INJECTION, SOLUTION INTRAVENOUS CONTINUOUS PRN
Status: DISCONTINUED | OUTPATIENT
Start: 2024-09-19 | End: 2024-09-21 | Stop reason: HOSPADM

## 2024-09-19 RX ORDER — ONDANSETRON 2 MG/ML
4 INJECTION INTRAMUSCULAR; INTRAVENOUS EVERY 6 HOURS PRN
Status: DISCONTINUED | OUTPATIENT
Start: 2024-09-19 | End: 2024-09-21 | Stop reason: HOSPADM

## 2024-09-19 RX ORDER — ISOSORBIDE MONONITRATE 30 MG/1
30 TABLET, EXTENDED RELEASE ORAL DAILY
Status: DISCONTINUED | OUTPATIENT
Start: 2024-09-19 | End: 2024-09-21 | Stop reason: HOSPADM

## 2024-09-19 RX ORDER — ASPIRIN 81 MG/1
81 TABLET, CHEWABLE ORAL DAILY
Status: DISCONTINUED | OUTPATIENT
Start: 2024-09-19 | End: 2024-09-21 | Stop reason: HOSPADM

## 2024-09-19 RX ORDER — PANTOPRAZOLE SODIUM 40 MG/1
40 TABLET, DELAYED RELEASE ORAL
Status: DISCONTINUED | OUTPATIENT
Start: 2024-09-19 | End: 2024-09-21 | Stop reason: HOSPADM

## 2024-09-19 RX ADMIN — OXYCODONE AND ACETAMINOPHEN 1 TABLET: 7.5; 325 TABLET ORAL at 02:53

## 2024-09-19 RX ADMIN — METOPROLOL TARTRATE 25 MG: 25 TABLET, FILM COATED ORAL at 02:54

## 2024-09-19 RX ADMIN — ATORVASTATIN CALCIUM 40 MG: 40 TABLET, FILM COATED ORAL at 21:52

## 2024-09-19 RX ADMIN — ISOSORBIDE MONONITRATE 30 MG: 30 TABLET, EXTENDED RELEASE ORAL at 10:27

## 2024-09-19 RX ADMIN — INSULIN LISPRO 2 UNITS: 100 INJECTION, SOLUTION INTRAVENOUS; SUBCUTANEOUS at 12:30

## 2024-09-19 RX ADMIN — PANTOPRAZOLE SODIUM 40 MG: 40 TABLET, DELAYED RELEASE ORAL at 16:16

## 2024-09-19 RX ADMIN — ASPIRIN 81 MG: 81 TABLET, CHEWABLE ORAL at 09:56

## 2024-09-19 RX ADMIN — METOPROLOL TARTRATE 25 MG: 25 TABLET, FILM COATED ORAL at 21:52

## 2024-09-19 RX ADMIN — METOPROLOL TARTRATE 25 MG: 25 TABLET, FILM COATED ORAL at 10:00

## 2024-09-19 RX ADMIN — OXYCODONE AND ACETAMINOPHEN 1 TABLET: 7.5; 325 TABLET ORAL at 18:56

## 2024-09-19 RX ADMIN — INSULIN LISPRO 1 UNITS: 100 INJECTION, SOLUTION INTRAVENOUS; SUBCUTANEOUS at 16:47

## 2024-09-19 RX ADMIN — PANTOPRAZOLE SODIUM 40 MG: 40 TABLET, DELAYED RELEASE ORAL at 06:25

## 2024-09-19 RX ADMIN — INSULIN GLARGINE 5 UNITS: 100 INJECTION, SOLUTION SUBCUTANEOUS at 21:51

## 2024-09-19 RX ADMIN — RANOLAZINE 500 MG: 500 TABLET, EXTENDED RELEASE ORAL at 10:00

## 2024-09-19 RX ADMIN — FERROUS SULFATE TAB 325 MG (65 MG ELEMENTAL FE) 325 MG: 325 (65 FE) TAB at 09:59

## 2024-09-19 RX ADMIN — INSULIN LISPRO 4 UNITS: 100 INJECTION, SOLUTION INTRAVENOUS; SUBCUTANEOUS at 21:51

## 2024-09-19 RX ADMIN — INSULIN LISPRO 2 UNITS: 100 INJECTION, SOLUTION INTRAVENOUS; SUBCUTANEOUS at 10:01

## 2024-09-19 RX ADMIN — FERROUS SULFATE TAB 325 MG (65 MG ELEMENTAL FE) 325 MG: 325 (65 FE) TAB at 16:20

## 2024-09-19 RX ADMIN — FLUOXETINE HYDROCHLORIDE 40 MG: 20 CAPSULE ORAL at 09:55

## 2024-09-19 RX ADMIN — OXYCODONE AND ACETAMINOPHEN 1 TABLET: 7.5; 325 TABLET ORAL at 10:15

## 2024-09-19 ASSESSMENT — PAIN - FUNCTIONAL ASSESSMENT
PAIN_FUNCTIONAL_ASSESSMENT: PREVENTS OR INTERFERES SOME ACTIVE ACTIVITIES AND ADLS

## 2024-09-19 ASSESSMENT — PAIN DESCRIPTION - ORIENTATION
ORIENTATION: LOWER;LEFT
ORIENTATION: LOWER
ORIENTATION: LEFT;LOWER;MID
ORIENTATION: LEFT;MID;LOWER
ORIENTATION: LOWER

## 2024-09-19 ASSESSMENT — PAIN SCALES - GENERAL
PAINLEVEL_OUTOF10: 3
PAINLEVEL_OUTOF10: 7

## 2024-09-19 ASSESSMENT — PAIN DESCRIPTION - LOCATION
LOCATION: ARM;BACK
LOCATION: BACK;SHOULDER

## 2024-09-19 ASSESSMENT — PAIN DESCRIPTION - DESCRIPTORS
DESCRIPTORS: ACHING
DESCRIPTORS: ACHING;NAGGING
DESCRIPTORS: ACHING
DESCRIPTORS: ACHING

## 2024-09-19 ASSESSMENT — PAIN DESCRIPTION - PAIN TYPE
TYPE: ACUTE PAIN
TYPE: ACUTE PAIN

## 2024-09-19 ASSESSMENT — PAIN DESCRIPTION - FREQUENCY
FREQUENCY: CONTINUOUS
FREQUENCY: CONTINUOUS

## 2024-09-19 ASSESSMENT — PAIN DESCRIPTION - DIRECTION: RADIATING_TOWARDS: LEFT ARM

## 2024-09-19 ASSESSMENT — PAIN DESCRIPTION - ONSET
ONSET: ON-GOING
ONSET: ON-GOING

## 2024-09-20 LAB
ALBUMIN SERPL-MCNC: 3.6 G/DL (ref 3.4–5)
ALBUMIN/GLOB SERPL: 1.6 {RATIO} (ref 1.1–2.2)
ALP SERPL-CCNC: 75 U/L (ref 40–129)
ALT SERPL-CCNC: 18 U/L (ref 10–40)
ANION GAP SERPL CALCULATED.3IONS-SCNC: 9 MMOL/L (ref 9–17)
AST SERPL-CCNC: 25 U/L (ref 15–37)
BILIRUB SERPL-MCNC: 0.4 MG/DL (ref 0–1)
BUN SERPL-MCNC: 48 MG/DL (ref 7–20)
CALCIUM SERPL-MCNC: 8.1 MG/DL (ref 8.3–10.6)
CHLORIDE SERPL-SCNC: 107 MMOL/L (ref 99–110)
CO2 SERPL-SCNC: 25 MMOL/L (ref 21–32)
CREAT SERPL-MCNC: 1.8 MG/DL (ref 0.8–1.3)
CREAT UR-MCNC: 49 MG/DL (ref 39–259)
GFR, ESTIMATED: 36 ML/MIN/1.73M2
GLUCOSE BLD-MCNC: 153 MG/DL (ref 74–99)
GLUCOSE BLD-MCNC: 260 MG/DL (ref 74–99)
GLUCOSE BLD-MCNC: 298 MG/DL (ref 74–99)
GLUCOSE BLD-MCNC: 308 MG/DL (ref 74–99)
GLUCOSE SERPL-MCNC: 185 MG/DL (ref 74–99)
HCT VFR BLD AUTO: 32.4 % (ref 42–52)
HGB BLD-MCNC: 8.7 G/DL (ref 13.5–18)
MAGNESIUM SERPL-MCNC: 2.3 MG/DL (ref 1.8–2.4)
PHOSPHATE SERPL-MCNC: 3.6 MG/DL (ref 2.5–4.9)
POTASSIUM SERPL-SCNC: 4.8 MMOL/L (ref 3.5–5.1)
PROT SERPL-MCNC: 5.8 G/DL (ref 6.4–8.2)
SODIUM SERPL-SCNC: 141 MMOL/L (ref 136–145)
SODIUM UR-SCNC: 77 MMOL/L (ref 40–220)
TOTAL PROTEIN, URINE: 29 MG/DL
URINE TOTAL PROTEIN CREATININE RATIO: 0.59 (ref 0–0.2)

## 2024-09-20 PROCEDURE — 6360000002 HC RX W HCPCS: Performed by: NURSE PRACTITIONER

## 2024-09-20 PROCEDURE — 83735 ASSAY OF MAGNESIUM: CPT

## 2024-09-20 PROCEDURE — 84100 ASSAY OF PHOSPHORUS: CPT

## 2024-09-20 PROCEDURE — 80053 COMPREHEN METABOLIC PANEL: CPT

## 2024-09-20 PROCEDURE — 6360000002 HC RX W HCPCS: Performed by: STUDENT IN AN ORGANIZED HEALTH CARE EDUCATION/TRAINING PROGRAM

## 2024-09-20 PROCEDURE — 85018 HEMOGLOBIN: CPT

## 2024-09-20 PROCEDURE — 85014 HEMATOCRIT: CPT

## 2024-09-20 PROCEDURE — 1200000000 HC SEMI PRIVATE

## 2024-09-20 PROCEDURE — 82962 GLUCOSE BLOOD TEST: CPT

## 2024-09-20 PROCEDURE — 97116 GAIT TRAINING THERAPY: CPT

## 2024-09-20 PROCEDURE — 36415 COLL VENOUS BLD VENIPUNCTURE: CPT

## 2024-09-20 PROCEDURE — 84300 ASSAY OF URINE SODIUM: CPT

## 2024-09-20 PROCEDURE — 82570 ASSAY OF URINE CREATININE: CPT

## 2024-09-20 PROCEDURE — 84156 ASSAY OF PROTEIN URINE: CPT

## 2024-09-20 PROCEDURE — 94761 N-INVAS EAR/PLS OXIMETRY MLT: CPT

## 2024-09-20 PROCEDURE — 6370000000 HC RX 637 (ALT 250 FOR IP): Performed by: INTERNAL MEDICINE

## 2024-09-20 PROCEDURE — 6370000000 HC RX 637 (ALT 250 FOR IP): Performed by: STUDENT IN AN ORGANIZED HEALTH CARE EDUCATION/TRAINING PROGRAM

## 2024-09-20 RX ORDER — HEPARIN SODIUM 5000 [USP'U]/ML
5000 INJECTION, SOLUTION INTRAVENOUS; SUBCUTANEOUS EVERY 8 HOURS SCHEDULED
Status: DISCONTINUED | OUTPATIENT
Start: 2024-09-20 | End: 2024-09-21 | Stop reason: HOSPADM

## 2024-09-20 RX ORDER — MORPHINE SULFATE 2 MG/ML
1 INJECTION, SOLUTION INTRAMUSCULAR; INTRAVENOUS ONCE
Status: COMPLETED | OUTPATIENT
Start: 2024-09-20 | End: 2024-09-20

## 2024-09-20 RX ADMIN — FERROUS SULFATE TAB 325 MG (65 MG ELEMENTAL FE) 325 MG: 325 (65 FE) TAB at 11:58

## 2024-09-20 RX ADMIN — RANOLAZINE 500 MG: 500 TABLET, EXTENDED RELEASE ORAL at 11:58

## 2024-09-20 RX ADMIN — OXYCODONE AND ACETAMINOPHEN 1 TABLET: 7.5; 325 TABLET ORAL at 18:32

## 2024-09-20 RX ADMIN — PANTOPRAZOLE SODIUM 40 MG: 40 TABLET, DELAYED RELEASE ORAL at 18:32

## 2024-09-20 RX ADMIN — ACETAMINOPHEN 650 MG: 325 TABLET ORAL at 11:58

## 2024-09-20 RX ADMIN — HEPARIN SODIUM 5000 UNITS: 5000 INJECTION INTRAVENOUS; SUBCUTANEOUS at 11:57

## 2024-09-20 RX ADMIN — PANTOPRAZOLE SODIUM 40 MG: 40 TABLET, DELAYED RELEASE ORAL at 06:20

## 2024-09-20 RX ADMIN — FERROUS SULFATE TAB 325 MG (65 MG ELEMENTAL FE) 325 MG: 325 (65 FE) TAB at 18:32

## 2024-09-20 RX ADMIN — INSULIN LISPRO 3 UNITS: 100 INJECTION, SOLUTION INTRAVENOUS; SUBCUTANEOUS at 18:32

## 2024-09-20 RX ADMIN — MORPHINE SULFATE 1 MG: 2 INJECTION, SOLUTION INTRAMUSCULAR; INTRAVENOUS at 02:46

## 2024-09-20 RX ADMIN — EMPAGLIFLOZIN 25 MG: 10 TABLET, FILM COATED ORAL at 11:57

## 2024-09-20 RX ADMIN — ASPIRIN 81 MG: 81 TABLET, CHEWABLE ORAL at 11:59

## 2024-09-20 RX ADMIN — METOPROLOL TARTRATE 25 MG: 25 TABLET, FILM COATED ORAL at 22:33

## 2024-09-20 RX ADMIN — INSULIN LISPRO 2 UNITS: 100 INJECTION, SOLUTION INTRAVENOUS; SUBCUTANEOUS at 12:09

## 2024-09-20 RX ADMIN — INSULIN GLARGINE 5 UNITS: 100 INJECTION, SOLUTION SUBCUTANEOUS at 22:33

## 2024-09-20 RX ADMIN — ISOSORBIDE MONONITRATE 30 MG: 30 TABLET, EXTENDED RELEASE ORAL at 11:58

## 2024-09-20 RX ADMIN — HEPARIN SODIUM 5000 UNITS: 5000 INJECTION INTRAVENOUS; SUBCUTANEOUS at 22:33

## 2024-09-20 RX ADMIN — ATORVASTATIN CALCIUM 40 MG: 40 TABLET, FILM COATED ORAL at 22:33

## 2024-09-20 RX ADMIN — FLUOXETINE HYDROCHLORIDE 40 MG: 20 CAPSULE ORAL at 11:58

## 2024-09-20 RX ADMIN — METOPROLOL TARTRATE 25 MG: 25 TABLET, FILM COATED ORAL at 11:57

## 2024-09-20 ASSESSMENT — PAIN DESCRIPTION - LOCATION
LOCATION: SHOULDER;FOOT;BACK
LOCATION: BACK;SHOULDER
LOCATION: ARM;BACK;SHOULDER
LOCATION: HEAD
LOCATION: BACK;ARM

## 2024-09-20 ASSESSMENT — ENCOUNTER SYMPTOMS
BACK PAIN: 0
COUGH: 0
SORE THROAT: 0
ABDOMINAL DISTENTION: 0
EYE DISCHARGE: 0
SHORTNESS OF BREATH: 0
CONSTIPATION: 0
NAUSEA: 0
WHEEZING: 0
DIARRHEA: 0

## 2024-09-20 ASSESSMENT — PAIN DESCRIPTION - DESCRIPTORS
DESCRIPTORS: ACHING;THROBBING
DESCRIPTORS: ACHING;SHOOTING;SHARP
DESCRIPTORS: SPASM
DESCRIPTORS: ACHING;DISCOMFORT;SORE
DESCRIPTORS: SHARP;SHOOTING

## 2024-09-20 ASSESSMENT — PAIN DESCRIPTION - PAIN TYPE: TYPE: CHRONIC PAIN

## 2024-09-20 ASSESSMENT — PAIN SCALES - GENERAL
PAINLEVEL_OUTOF10: 6
PAINLEVEL_OUTOF10: 8
PAINLEVEL_OUTOF10: 7
PAINLEVEL_OUTOF10: 8

## 2024-09-20 ASSESSMENT — PAIN DESCRIPTION - ORIENTATION
ORIENTATION: LOWER;LEFT
ORIENTATION: RIGHT;LEFT
ORIENTATION: LEFT;RIGHT;LOWER
ORIENTATION: LOWER
ORIENTATION: LEFT;LOWER;MID

## 2024-09-21 VITALS
TEMPERATURE: 98.6 F | WEIGHT: 238.1 LBS | OXYGEN SATURATION: 96 % | DIASTOLIC BLOOD PRESSURE: 65 MMHG | BODY MASS INDEX: 34.09 KG/M2 | SYSTOLIC BLOOD PRESSURE: 151 MMHG | HEART RATE: 68 BPM | RESPIRATION RATE: 19 BRPM | HEIGHT: 70 IN

## 2024-09-21 LAB
ALBUMIN SERPL-MCNC: 3.6 G/DL (ref 3.4–5)
ALBUMIN/GLOB SERPL: 1.5 {RATIO} (ref 1.1–2.2)
ALP SERPL-CCNC: 79 U/L (ref 40–129)
ALT SERPL-CCNC: 17 U/L (ref 10–40)
ANION GAP SERPL CALCULATED.3IONS-SCNC: 10 MMOL/L (ref 9–17)
AST SERPL-CCNC: 23 U/L (ref 15–37)
BILIRUB SERPL-MCNC: 0.7 MG/DL (ref 0–1)
BUN SERPL-MCNC: 34 MG/DL (ref 7–20)
CALCIUM SERPL-MCNC: 8.9 MG/DL (ref 8.3–10.6)
CHLORIDE SERPL-SCNC: 109 MMOL/L (ref 99–110)
CO2 SERPL-SCNC: 21 MMOL/L (ref 21–32)
CREAT SERPL-MCNC: 1.3 MG/DL (ref 0.8–1.3)
EKG ATRIAL RATE: 63 BPM
EKG DIAGNOSIS: NORMAL
EKG P AXIS: 48 DEGREES
EKG P-R INTERVAL: 248 MS
EKG Q-T INTERVAL: 458 MS
EKG QRS DURATION: 106 MS
EKG QTC CALCULATION (BAZETT): 468 MS
EKG R AXIS: 2 DEGREES
EKG T AXIS: 46 DEGREES
EKG VENTRICULAR RATE: 63 BPM
FOLATE SERPL-MCNC: 12.2 NG/ML (ref 4.8–24.2)
GFR, ESTIMATED: 51 ML/MIN/1.73M2
GLUCOSE BLD-MCNC: 188 MG/DL (ref 74–99)
GLUCOSE BLD-MCNC: 308 MG/DL (ref 74–99)
GLUCOSE SERPL-MCNC: 176 MG/DL (ref 74–99)
POTASSIUM SERPL-SCNC: 4 MMOL/L (ref 3.5–5.1)
PROT SERPL-MCNC: 6.1 G/DL (ref 6.4–8.2)
RETICS # AUTO: 0.07 M/UL
RETICS/RBC NFR AUTO: 1.5 % (ref 0.2–2)
SODIUM SERPL-SCNC: 141 MMOL/L (ref 136–145)
VIT B12 SERPL-MCNC: 808 PG/ML (ref 211–911)

## 2024-09-21 PROCEDURE — 85045 AUTOMATED RETICULOCYTE COUNT: CPT

## 2024-09-21 PROCEDURE — 6370000000 HC RX 637 (ALT 250 FOR IP): Performed by: INTERNAL MEDICINE

## 2024-09-21 PROCEDURE — 6360000002 HC RX W HCPCS: Performed by: STUDENT IN AN ORGANIZED HEALTH CARE EDUCATION/TRAINING PROGRAM

## 2024-09-21 PROCEDURE — 80053 COMPREHEN METABOLIC PANEL: CPT

## 2024-09-21 PROCEDURE — 82962 GLUCOSE BLOOD TEST: CPT

## 2024-09-21 PROCEDURE — 36415 COLL VENOUS BLD VENIPUNCTURE: CPT

## 2024-09-21 PROCEDURE — 82746 ASSAY OF FOLIC ACID SERUM: CPT

## 2024-09-21 PROCEDURE — 6370000000 HC RX 637 (ALT 250 FOR IP): Performed by: STUDENT IN AN ORGANIZED HEALTH CARE EDUCATION/TRAINING PROGRAM

## 2024-09-21 PROCEDURE — 82607 VITAMIN B-12: CPT

## 2024-09-21 PROCEDURE — 93010 ELECTROCARDIOGRAM REPORT: CPT | Performed by: INTERNAL MEDICINE

## 2024-09-21 PROCEDURE — 94761 N-INVAS EAR/PLS OXIMETRY MLT: CPT

## 2024-09-21 RX ORDER — HYDROCHLOROTHIAZIDE 12.5 MG/1
12.5 TABLET ORAL DAILY
Qty: 30 TABLET | Refills: 3 | Status: SHIPPED | OUTPATIENT
Start: 2024-09-21

## 2024-09-21 RX ORDER — HYDROCHLOROTHIAZIDE 12.5 MG/1
12.5 TABLET ORAL DAILY
Status: DISCONTINUED | OUTPATIENT
Start: 2024-09-21 | End: 2024-09-21 | Stop reason: HOSPADM

## 2024-09-21 RX ORDER — INSULIN GLARGINE 100 [IU]/ML
7 INJECTION, SOLUTION SUBCUTANEOUS NIGHTLY
Qty: 5 ADJUSTABLE DOSE PRE-FILLED PEN SYRINGE | Refills: 3 | Status: SHIPPED
Start: 2024-09-21

## 2024-09-21 RX ORDER — GABAPENTIN 600 MG/1
300 TABLET ORAL 3 TIMES DAILY
Qty: 45 TABLET | Refills: 0 | Status: SHIPPED | OUTPATIENT
Start: 2024-09-21 | End: 2024-10-21

## 2024-09-21 RX ORDER — AMLODIPINE BESYLATE 2.5 MG/1
2.5 TABLET ORAL DAILY
Qty: 30 TABLET | Refills: 3 | Status: SHIPPED | OUTPATIENT
Start: 2024-09-21

## 2024-09-21 RX ORDER — AMLODIPINE BESYLATE 5 MG/1
2.5 TABLET ORAL DAILY
Status: DISCONTINUED | OUTPATIENT
Start: 2024-09-21 | End: 2024-09-21 | Stop reason: HOSPADM

## 2024-09-21 RX ADMIN — OXYCODONE AND ACETAMINOPHEN 1 TABLET: 7.5; 325 TABLET ORAL at 02:40

## 2024-09-21 RX ADMIN — FLUOXETINE HYDROCHLORIDE 40 MG: 20 CAPSULE ORAL at 09:42

## 2024-09-21 RX ADMIN — AMLODIPINE BESYLATE 2.5 MG: 5 TABLET ORAL at 09:43

## 2024-09-21 RX ADMIN — ISOSORBIDE MONONITRATE 30 MG: 30 TABLET, EXTENDED RELEASE ORAL at 09:43

## 2024-09-21 RX ADMIN — ASPIRIN 81 MG: 81 TABLET, CHEWABLE ORAL at 09:43

## 2024-09-21 RX ADMIN — PANTOPRAZOLE SODIUM 40 MG: 40 TABLET, DELAYED RELEASE ORAL at 06:34

## 2024-09-21 RX ADMIN — HYDROCHLOROTHIAZIDE 12.5 MG: 12.5 TABLET ORAL at 09:42

## 2024-09-21 RX ADMIN — FERROUS SULFATE TAB 325 MG (65 MG ELEMENTAL FE) 325 MG: 325 (65 FE) TAB at 09:43

## 2024-09-21 RX ADMIN — EMPAGLIFLOZIN 25 MG: 10 TABLET, FILM COATED ORAL at 09:42

## 2024-09-21 RX ADMIN — METOPROLOL TARTRATE 25 MG: 25 TABLET, FILM COATED ORAL at 09:43

## 2024-09-21 RX ADMIN — HEPARIN SODIUM 5000 UNITS: 5000 INJECTION INTRAVENOUS; SUBCUTANEOUS at 06:34

## 2024-09-21 RX ADMIN — RANOLAZINE 500 MG: 500 TABLET, EXTENDED RELEASE ORAL at 09:42

## 2024-09-21 ASSESSMENT — PAIN SCALES - GENERAL: PAINLEVEL_OUTOF10: 8

## 2024-09-21 ASSESSMENT — PAIN DESCRIPTION - ORIENTATION: ORIENTATION: RIGHT;LEFT;POSTERIOR

## 2024-09-21 ASSESSMENT — PAIN DESCRIPTION - LOCATION: LOCATION: BACK;SHOULDER;FOOT

## 2024-09-21 ASSESSMENT — PAIN SCALES - WONG BAKER: WONGBAKER_NUMERICALRESPONSE: NO HURT

## 2024-09-21 ASSESSMENT — PAIN DESCRIPTION - DESCRIPTORS: DESCRIPTORS: SHARP

## 2024-09-21 ASSESSMENT — PAIN - FUNCTIONAL ASSESSMENT: PAIN_FUNCTIONAL_ASSESSMENT: ACTIVITIES ARE NOT PREVENTED

## 2024-09-21 ASSESSMENT — PAIN DESCRIPTION - PAIN TYPE: TYPE: ACUTE PAIN;CHRONIC PAIN

## 2024-09-23 ENCOUNTER — CARE COORDINATION (OUTPATIENT)
Dept: CASE MANAGEMENT | Age: 84
End: 2024-09-23

## 2024-09-24 ENCOUNTER — CARE COORDINATION (OUTPATIENT)
Dept: CASE MANAGEMENT | Age: 84
End: 2024-09-24

## 2024-09-24 DIAGNOSIS — N17.9 AKI (ACUTE KIDNEY INJURY) (HCC): Primary | ICD-10-CM

## 2024-09-24 PROCEDURE — 1111F DSCHRG MED/CURRENT MED MERGE: CPT | Performed by: FAMILY MEDICINE

## 2024-09-27 ENCOUNTER — CARE COORDINATION (OUTPATIENT)
Dept: CASE MANAGEMENT | Age: 84
End: 2024-09-27

## 2024-10-02 ENCOUNTER — CARE COORDINATION (OUTPATIENT)
Dept: CASE MANAGEMENT | Age: 84
End: 2024-10-02

## 2024-10-02 NOTE — CARE COORDINATION
Care Transitions Note    Follow Up Call     Patient: Abelardo Marquis                         Patient : 1940   MRN: [id number]                             Reason for Admission: VICKY  Discharge Date: 24       RURS: Readmission Risk Score: 30.9  Facility: [facility] 24-24       Patient Current Location:  Home: [address] [place] [address]    Care Transition Nurse contacted patient by telephone. Verified name and  as identifiers.    Additional needs identified to be addressed with provider   No needs identified         Method of communication with provider: none.    Care Summary Note: Reports doing well, states \"not too bad, everything is fine\". Reports back to baseline. Denies cp, sob, dizziness, pain, ac distress. Reports appetite, fluid intake good w/ b&b wnl. Reports he has not checked bp since last call--reminded to check daily. Reports he still has not scheduled appt w/ PCP, Dr Murray-Neph. Declined CTN offer to schedule citing he will do so soon. Stressed importance of these appts asap, v/u. Reports taking all meds as directed, denies refill needs. Active w/ [facility] with next visit scheduled for tomorrow. Denies additional resource needs.     Plan of care updates since last contact:  Review of patient management of conditions/medications: as above     Medication Review:  No changes since last call.     Remote Patient Monitoring:  Declined on previous call.     Assessments:  Care Transitions Subsequent and Final Call    Schedule Follow Up Appointment with PCP: Declined  Subsequent and Final Calls  Do you have any ongoing symptoms?: No  Have your medications changed?: No  Do you have any questions related to your medications?: No  Do you currently have any active services?: Yes  Are you currently active with any services?: Home Health  Do you have any needs or concerns that I can assist you with?: No  Identified Barriers: Other, Lack of Motivation  Care Transitions Interventions     Other

## 2024-10-11 ENCOUNTER — CARE COORDINATION (OUTPATIENT)
Dept: CASE MANAGEMENT | Age: 84
End: 2024-10-11

## 2024-10-11 NOTE — CARE COORDINATION
Care Transitions Note    Follow Up Call     Attempted to reach patient for transitions of care follow up.  Unable to reach patient.      Outreach Attempts:   Unable to leave message.     Care Summary Note: Call was picked up and hung up during greeting.    Follow Up Appointment:   Future Appointments         Provider Specialty Dept Phone    12/12/2024 1:45 PM Cecile Murray MD Nephrology [phone]            Plan for follow-up call in 2-5 days based on severity of symptoms and risk factors. Plan for next call: confirm pcp/neph appts, any c/o?, taking all meds?, bp?, bg?     Ara Oneal LPN

## 2024-10-16 ENCOUNTER — CARE COORDINATION (OUTPATIENT)
Dept: CASE MANAGEMENT | Age: 84
End: 2024-10-16

## 2024-10-16 NOTE — CARE COORDINATION
Care Transitions Note    Follow Up Call     Attempted to reach patient for transitions of care follow up.  Unable to reach patient.      Outreach Attempts:   HIPAA compliant voicemail left for patient.         Follow Up Appointment:   Future Appointments         Provider Specialty Dept Phone    12/12/2024 1:45 PM Cecile Murray MD Nephrology [phone]            No further follow-up call indicated based on severity of symptoms and risk factors.     Anisha Whiting RN BSN  Care Transition Nurse  [phone]

## 2025-01-24 ENCOUNTER — TELEPHONE (OUTPATIENT)
Dept: CARDIOLOGY CLINIC | Age: 85
End: 2025-01-24

## 2025-01-24 NOTE — TELEPHONE ENCOUNTER
Cardiologist: Dr. Muñoz  Surgeon: Dr. Crane  Surgery: Colonoscopy & EGD  Surgery/Procedure should be done in the hospital setting    _____ yes    _____  no    Anesthesia: Propofol  Date: Pending  Fax# [phone]  # [phone]    New Patient OV 2/5/2025 Hernandez

## 2025-02-05 ENCOUNTER — INITIAL CONSULT (OUTPATIENT)
Dept: CARDIOLOGY CLINIC | Age: 85
End: 2025-02-05

## 2025-02-05 VITALS
HEIGHT: 70 IN | RESPIRATION RATE: 16 BRPM | OXYGEN SATURATION: 98 % | HEART RATE: 99 BPM | SYSTOLIC BLOOD PRESSURE: 112 MMHG | WEIGHT: 226 LBS | BODY MASS INDEX: 32.35 KG/M2 | DIASTOLIC BLOOD PRESSURE: 60 MMHG

## 2025-02-05 DIAGNOSIS — E78.5 DYSLIPIDEMIA: ICD-10-CM

## 2025-02-05 DIAGNOSIS — R07.9 CHEST PAIN, UNSPECIFIED TYPE: Primary | ICD-10-CM

## 2025-02-05 DIAGNOSIS — I25.10 CAD IN NATIVE ARTERY: ICD-10-CM

## 2025-02-05 DIAGNOSIS — I10 ESSENTIAL HYPERTENSION: ICD-10-CM

## 2025-02-05 DIAGNOSIS — Z01.810 PREOP CARDIOVASCULAR EXAM: ICD-10-CM

## 2025-02-05 DIAGNOSIS — R42 DIZZINESS: ICD-10-CM

## 2025-02-05 DIAGNOSIS — R06.02 SOB (SHORTNESS OF BREATH): ICD-10-CM

## 2025-02-05 RX ORDER — CLOPIDOGREL BISULFATE 75 MG/1
75 TABLET ORAL DAILY
COMMUNITY
Start: 2024-12-18

## 2025-02-05 RX ORDER — AMLODIPINE BESYLATE 10 MG/1
10 TABLET ORAL DAILY
COMMUNITY

## 2025-02-05 RX ORDER — FUROSEMIDE 40 MG/1
40 TABLET ORAL DAILY
COMMUNITY
Start: 2024-12-18

## 2025-02-05 RX ORDER — SEMAGLUTIDE 1.34 MG/ML
INJECTION, SOLUTION SUBCUTANEOUS WEEKLY
COMMUNITY

## 2025-02-05 NOTE — PROGRESS NOTES
Errol Muñoz MD                                  CARDIOLOGY  NOTE            Chief Complaint:    Chief Complaint   Patient presents with    Pre-op Exam     Dr. Crane needs cardiac clearance for colonoscopy and EGD    Patient denies palpitations     Chest Pain    Shortness of Breath    Dizziness           Edema    Extremity Weakness        HPI:     Abelardo is a 84 y.o. year old male who presents to the clinic to establish local care.  Patient has previously followed up with Dr. Thaddeus Moore of [place] cardiology    Patient has prior medical history significant for diabetes mellitus, hypertension, hyperlipidemia, coronary artery disease with PCI to ramus intermedius in 2013, PCI to LAD at [facility] after IFR of 0.86 by Dr. Trvais.  History of LVOT obstruction, history of GI bleeding and anemia noted as well.      Patient complains of intermittent chest pain which is localized on the left upper chest, occasional radiation to left shoulder however patient also has left shoulder arthritis, pain  is exacerbated by left shoulder movement.    Last ECHO :       Left Ventricle: The EF by visual approximation is 60 - 65%. Septal thickening.    Mitral Valve: Annular calcification. Mild systolic anterior motion. Trace regurgitation.    Tricuspid Valve: Mild regurgitation. The estimated RVSP is 24 mmHg.    Pericardium: No pericardial effusion.    Image quality is adequate.     Last left heart cardiac catheterization in October 2019 as below:      IMPRESSION:  1.  Left main is patent.  2.  LAD proximal 30% to 40% stenosis noted.  3.  Ramus, mild disease noted.  4.  Circ is patent.  OM stent is patent.  5.  Right coronary artery is a large-sized vessel and is patent.  6.  EDP was around 20 mmHg present.  7.  Right heart pressures are elevated.  8.  The patient  has nonobstructive coronary artery disease present.            Current Outpatient Medications   Medication Sig Dispense Refill    amLODIPine (NORVASC) 10 MG

## 2025-02-18 ENCOUNTER — TELEPHONE (OUTPATIENT)
Dept: CARDIOLOGY CLINIC | Age: 85
End: 2025-02-18

## 2025-02-18 NOTE — TELEPHONE ENCOUNTER
PT called saying will not be back in for rest of testing because he is throwing up and going straight home.

## 2025-02-18 NOTE — TELEPHONE ENCOUNTER
Spoke with [facility] LUJAN testing  who will reach out to Dr. Muñoz to verify whether the patient needs to come back for the second part of the test. Per Tung GRANADOS, MELANIE richards, am portion available for Dr. Muñoz to read.

## 2025-02-19 ENCOUNTER — TELEPHONE (OUTPATIENT)
Dept: CARDIOLOGY CLINIC | Age: 85
End: 2025-02-19

## 2025-02-19 NOTE — TELEPHONE ENCOUNTER
Notified and understood       Nuclear lexiscan stress test (stress only) with myocardial perfusion     Stress Test: A pharmacological stress test was performed using regadenoson (Lexiscan). Hemodynamics are adequate for diagnosis. Blood pressure demonstrated a hypertensive response and heart rate demonstrated a normal response to stress. The patient's heart rate recovery was normal.    Image quality is good.        Only stress images were performed, which indicates good perfusion study, without any perfusion defects.  Patient unable to perform resting images due to intractable nausea  Images are satisfactory  No further ischemic workup at this time  Routine outpatient follow

## 2025-02-19 NOTE — TELEPHONE ENCOUNTER
LM for pt to return call to office for results.         Echo (TTE) complete (PRN contrast/bubble/strain/3D)  Order #: [id number] Accession #: [id number]  Study Details  Order #: [id number]     Accession #: [id number]  Result Text       Left Ventricle: Hyperdynamic left ventricular systolic function with a visually estimated EF of 60 - 65%. Left ventricle size is normal. Moderately increased wall thickness. Normal wall motion. Grade I diastolic dysfunction with normal LAP. Mild LVOT obstruction at rest. LVOT pressure gradient increases with Valsalva. LVOT peak gradient at rest is 21 mmHg. LVOT peak gradient during valsalva maneuver is 88 mmHg.    Mitral Valve: Annular calcification. Mild systolic anterior motion. Mild regurgitation.    Tricuspid Valve: Physiologically normal regurgitation. The estimated RVSP is 28 mmHg.    Pericardium: No pericardial effusion.    Image quality is technically difficult. Technically difficult study due to patient's body habitus and procedure performed with the patient in a supine position.    Nuclear stress test with myocardial perfusion  Order #: [id number] Accession #: [id number]  Study Details  Order #: [id number]     Accession #: [id number]  Result Text       Stress Test: A pharmacological stress test was performed using regadenoson (Lexiscan). Hemodynamics are adequate for diagnosis. Blood pressure demonstrated a hypertensive response and heart rate demonstrated a normal response to stress. The patient's heart rate recovery was normal.    Image quality is good.        Only stress images were performed, which indicates good perfusion study, without any perfusion defects.  Patient unable to perform resting images due to intractable nausea  Images are satisfactory  No further ischemic workup at this time  Routine outpatient follow

## 2025-02-21 NOTE — TELEPHONE ENCOUNTER
Echo 2/18/2025   Interpretation Summary  Show Result Comparison     Left Ventricle: Hyperdynamic left ventricular systolic function with a visually estimated EF of 60 - 65%. Left ventricle size is normal. Moderately increased wall thickness. Normal wall motion. Grade I diastolic dysfunction with normal LAP. Mild LVOT obstruction at rest. LVOT pressure gradient increases with Valsalva. LVOT peak gradient at rest is 21 mmHg. LVOT peak gradient during valsalva maneuver is 88 mmHg.    Mitral Valve: Annular calcification. Mild systolic anterior motion. Mild regurgitation.    Tricuspid Valve: Physiologically normal regurgitation. The estimated RVSP is 28 mmHg.    Pericardium: No pericardial effusion.    Image quality is technically difficult. Technically difficult study due to patient's body habitus and procedure performed with the patient in a supine position.     2/18/2025 Lexiscan   Interpretation Summary  Show Result Comparison     Stress Test: A pharmacological stress test was performed using regadenoson (Lexiscan). Hemodynamics are adequate for diagnosis. Blood pressure demonstrated a hypertensive response and heart rate demonstrated a normal response to stress. The patient's heart rate recovery was normal.    Image quality is good.        Only stress images were performed, which indicates good perfusion study, without any perfusion defects.  Patient unable to perform resting images due to intractable nausea  Images are satisfactory  No further ischemic workup at this time  Routine outpatient follow

## 2025-04-28 ENCOUNTER — TRANSCRIBE ORDERS (OUTPATIENT)
Dept: ADMINISTRATIVE | Age: 85
End: 2025-04-28

## 2025-04-28 DIAGNOSIS — M79.661 PAIN OF RIGHT LOWER LEG: ICD-10-CM

## 2025-04-28 DIAGNOSIS — I87.2 VENOUS INSUFFICIENCY (CHRONIC) (PERIPHERAL): Primary | ICD-10-CM

## 2025-05-05 ENCOUNTER — HOSPITAL ENCOUNTER (OUTPATIENT)
Dept: ULTRASOUND IMAGING | Age: 85
Discharge: HOME | End: 2025-05-05
Payer: MEDICARE

## 2025-05-05 DIAGNOSIS — I87.2 VENOUS INSUFFICIENCY (CHRONIC) (PERIPHERAL): ICD-10-CM

## 2025-05-05 DIAGNOSIS — M79.661 PAIN OF RIGHT LOWER LEG: ICD-10-CM

## 2025-05-05 PROCEDURE — 93971 EXTREMITY STUDY: CPT

## 2025-05-06 ENCOUNTER — HOSPITAL ENCOUNTER (EMERGENCY)
Age: 85
Discharge: HOME | End: 2025-05-06
Attending: STUDENT IN AN ORGANIZED HEALTH CARE EDUCATION/TRAINING PROGRAM
Payer: MEDICARE

## 2025-05-06 VITALS
DIASTOLIC BLOOD PRESSURE: 77 MMHG | SYSTOLIC BLOOD PRESSURE: 166 MMHG | HEART RATE: 73 BPM | RESPIRATION RATE: 16 BRPM | BODY MASS INDEX: 32.89 KG/M2 | OXYGEN SATURATION: 96 % | TEMPERATURE: 97.9 F | WEIGHT: 229.2 LBS

## 2025-05-06 DIAGNOSIS — I82.441: Primary | ICD-10-CM

## 2025-05-06 PROCEDURE — 99283 EMERGENCY DEPT VISIT LOW MDM: CPT

## 2025-05-06 ASSESSMENT — PAIN SCALES - GENERAL: PAINLEVEL_OUTOF10: 7

## 2025-05-06 ASSESSMENT — PAIN - FUNCTIONAL ASSESSMENT: PAIN_FUNCTIONAL_ASSESSMENT: 0-10

## 2025-05-06 NOTE — DISCHARGE INSTRUCTIONS
Please start taking the Eliquis for your blood clot.  Please be cautious for signs and symptoms of bleeding like we discussed about and if you suffer any trauma you will need to be evaluated like we discussed.  Call and follow-up with your family doctor in the next 1-3 days  Return to the ED if your symptoms worsen or you feel you need to be reevaluated

## 2025-05-06 NOTE — ED PROVIDER NOTES
Emergency Department Encounter        Pt Name: Abelardo Marquis  MRN: [id number]  Birthdate 1940  Date of evaluation: 5/6/2025  ED Physician: Martin Joya MD    CHIEF COMPLAINT     Triage Chief Complaint:   Leg Pain (Right leg, DVT found yesterday/)      HISTORY OF PRESENT ILLNESS & REVIEW OF SYSTEMS     History obtained from the patient and staff and family member at bedside.    Abelardo Marquis is a 84 y.o. male who presents to the emergency department for evaluation of DVT.  Says that he was told to come in by his PCP because he had ultrasound done earlier today that shows he has a blood clot.  Says that he is on Plavix and aspirin.  Says that he is not sure why they made him come in.  Otherwise denies any other issues or concerns or symptoms.  Denies any chest pain shortness of breath.  Able to ambulate.  Denies any fevers.  Has some swelling to his right lower extremity and is part of the reason why they got an ultrasound        Patient denies any new Headache, Fever, Chills, Cough, Chest pain, Shortness of breath, Abdominal pain, Nausea, Vomiting, Diarrhea, and Constipation.    The patient has no other acute complaints at this time.  Review of systems as above.          PAST MED/SURG/SOCIAL/FAM HISTORY & ALLERGY & MEDICATIONS     Past Medical History:   Diagnosis Date    Arthritis     Diabetes mellitus (Formerly [facility])     GERD (gastroesophageal reflux disease)     Gout     left ankle and right shoulder    Hyperlipidemia     Hypertension     MI (myocardial infarction) (Formerly [facility])     May 2013     Patient Active Problem List   Diagnosis Code    Essential hypertension, benign I10    Type 2 diabetes mellitus, with long-term current use of insulin ([facility]ly [facility]) E11.9, Z79.4    Other and unspecified hyperlipidemia E78.5    Chest pain R07.9    Coronary atherosclerosis I25.10    Chronic kidney disease, stage III (moderate) (Formerly [facility]) N18.30    Cellulitis of leg L03.119    Cellulitis of lower leg L03.119    Lethargy R53.83    Acute respiratory

## 2025-05-06 NOTE — ED TRIAGE NOTES
Patient had US yesterday for right calf pain, hard spot, redness. DVT found and was told to come to ED. Takes Plavix and aspirin already.

## 2025-05-27 ENCOUNTER — APPOINTMENT (OUTPATIENT)
Dept: GENERAL RADIOLOGY | Age: 85
End: 2025-05-27
Payer: MEDICARE

## 2025-05-27 ENCOUNTER — HOSPITAL ENCOUNTER (OUTPATIENT)
Age: 85
Setting detail: OBSERVATION
Discharge: HOME | End: 2025-05-28
Attending: EMERGENCY MEDICINE | Admitting: STUDENT IN AN ORGANIZED HEALTH CARE EDUCATION/TRAINING PROGRAM
Payer: MEDICARE

## 2025-05-27 ENCOUNTER — APPOINTMENT (OUTPATIENT)
Dept: CT IMAGING | Age: 85
End: 2025-05-27
Payer: MEDICARE

## 2025-05-27 DIAGNOSIS — R79.89 TROPONIN LEVEL ELEVATED: ICD-10-CM

## 2025-05-27 DIAGNOSIS — R94.31 QT PROLONGATION: ICD-10-CM

## 2025-05-27 DIAGNOSIS — R07.9 CHEST PAIN, UNSPECIFIED TYPE: Primary | ICD-10-CM

## 2025-05-27 DIAGNOSIS — R53.1 GENERAL WEAKNESS: ICD-10-CM

## 2025-05-27 LAB
ALBUMIN SERPL-MCNC: 3.8 G/DL (ref 3.4–5)
ALBUMIN/GLOB SERPL: 1.4 {RATIO} (ref 1.1–2.2)
ALP SERPL-CCNC: 75 U/L (ref 40–129)
ALT SERPL-CCNC: 17 U/L (ref 10–40)
ANION GAP SERPL CALCULATED.3IONS-SCNC: 13 MMOL/L (ref 9–17)
AST SERPL-CCNC: 26 U/L (ref 15–37)
BASOPHILS # BLD: 0.03 K/UL
BASOPHILS NFR BLD: 1 % (ref 0–1)
BILIRUB DIRECT SERPL-MCNC: 0.2 MG/DL (ref 0–0.3)
BILIRUB INDIRECT SERPL-MCNC: 0.2 MG/DL (ref 0–0.7)
BILIRUB SERPL-MCNC: 0.4 MG/DL (ref 0–1)
BILIRUB UR QL STRIP: NEGATIVE
BNP SERPL-MCNC: 382 PG/ML (ref 0–450)
BUN SERPL-MCNC: 38 MG/DL (ref 7–20)
CALCIUM SERPL-MCNC: 9.2 MG/DL (ref 8.3–10.6)
CHLORIDE SERPL-SCNC: 103 MMOL/L (ref 99–110)
CLARITY UR: CLEAR
CO2 SERPL-SCNC: 23 MMOL/L (ref 21–32)
COLOR UR: YELLOW
CREAT SERPL-MCNC: 1.5 MG/DL (ref 0.8–1.3)
EOSINOPHIL # BLD: 0.12 K/UL
EOSINOPHILS RELATIVE PERCENT: 3 % (ref 0–3)
ERYTHROCYTE [DISTWIDTH] IN BLOOD BY AUTOMATED COUNT: 21.1 % (ref 11.7–14.9)
GFR, ESTIMATED: 46 ML/MIN/1.73M2
GLUCOSE BLD-MCNC: 316 MG/DL (ref 74–99)
GLUCOSE BLD-MCNC: 71 MG/DL (ref 74–99)
GLUCOSE SERPL-MCNC: 92 MG/DL (ref 74–99)
GLUCOSE UR STRIP-MCNC: 500 MG/DL
HCT VFR BLD AUTO: 39.1 % (ref 42–52)
HGB BLD-MCNC: 12.2 G/DL (ref 13.5–18)
HGB UR QL STRIP.AUTO: ABNORMAL
IMM GRANULOCYTES # BLD AUTO: 0.02 K/UL
IMM GRANULOCYTES NFR BLD: 1 %
INR PPP: 1.2
KETONES UR STRIP-MCNC: NEGATIVE MG/DL
LACTATE BLDV-SCNC: 1.7 MMOL/L (ref 0.4–2)
LEUKOCYTE ESTERASE UR QL STRIP: NEGATIVE
LIPASE SERPL-CCNC: 21 U/L (ref 13–60)
LYMPHOCYTES NFR BLD: 1.25 K/UL
LYMPHOCYTES RELATIVE PERCENT: 35 % (ref 24–44)
MAGNESIUM SERPL-MCNC: 1.9 MG/DL (ref 1.8–2.4)
MCH RBC QN AUTO: 26.6 PG (ref 27–31)
MCHC RBC AUTO-ENTMCNC: 31.2 G/DL (ref 32–36)
MCV RBC AUTO: 85.2 FL (ref 78–100)
MONOCYTES NFR BLD: 0.36 K/UL
MONOCYTES NFR BLD: 10 % (ref 0–5)
NEUTROPHILS NFR BLD: 50 % (ref 36–66)
NEUTS SEG NFR BLD: 1.8 K/UL
NITRITE UR QL STRIP: NEGATIVE
PARTIAL THROMBOPLASTIN TIME: 47 SEC (ref 25.1–37.1)
PH UR STRIP: 6.5 [PH] (ref 5–8)
PLATELET # BLD AUTO: 117 K/UL (ref 140–440)
PMV BLD AUTO: 10.1 FL (ref 7.5–11.1)
POTASSIUM SERPL-SCNC: 3.7 MMOL/L (ref 3.5–5.1)
PROT SERPL-MCNC: 6.5 G/DL (ref 6.4–8.2)
PROT UR STRIP-MCNC: ABNORMAL MG/DL
PROTHROMBIN TIME: 15.5 SEC (ref 11.7–14.5)
RBC # BLD AUTO: 4.59 M/UL (ref 4.6–6.2)
RBC #/AREA URNS HPF: 1 /HPF (ref 0–2)
SODIUM SERPL-SCNC: 139 MMOL/L (ref 136–145)
SP GR UR STRIP: 1.01 (ref 1–1.03)
TROPONIN I SERPL HS-MCNC: 50 NG/L (ref 0–22)
TROPONIN I SERPL HS-MCNC: 51 NG/L (ref 0–22)
TROPONIN I SERPL HS-MCNC: 52 NG/L (ref 0–22)
TROPONIN I SERPL HS-MCNC: 55 NG/L (ref 0–22)
UROBILINOGEN UR STRIP-ACNC: 0.2 EU/DL (ref 0–1)
WBC #/AREA URNS HPF: <1 /HPF (ref 0–5)
WBC OTHER # BLD: 3.6 K/UL (ref 4–10.5)

## 2025-05-27 PROCEDURE — 2500000003 HC RX 250 WO HCPCS: Performed by: NURSE PRACTITIONER

## 2025-05-27 PROCEDURE — 84484 ASSAY OF TROPONIN QUANT: CPT

## 2025-05-27 PROCEDURE — 99223 1ST HOSP IP/OBS HIGH 75: CPT | Performed by: INTERNAL MEDICINE

## 2025-05-27 PROCEDURE — 83605 ASSAY OF LACTIC ACID: CPT

## 2025-05-27 PROCEDURE — 96366 THER/PROPH/DIAG IV INF ADDON: CPT

## 2025-05-27 PROCEDURE — 85025 COMPLETE CBC W/AUTO DIFF WBC: CPT

## 2025-05-27 PROCEDURE — 36415 COLL VENOUS BLD VENIPUNCTURE: CPT

## 2025-05-27 PROCEDURE — 85610 PROTHROMBIN TIME: CPT

## 2025-05-27 PROCEDURE — 83880 ASSAY OF NATRIURETIC PEPTIDE: CPT

## 2025-05-27 PROCEDURE — 80053 COMPREHEN METABOLIC PANEL: CPT

## 2025-05-27 PROCEDURE — 96375 TX/PRO/DX INJ NEW DRUG ADDON: CPT

## 2025-05-27 PROCEDURE — 6360000002 HC RX W HCPCS: Performed by: EMERGENCY MEDICINE

## 2025-05-27 PROCEDURE — 83735 ASSAY OF MAGNESIUM: CPT

## 2025-05-27 PROCEDURE — 96365 THER/PROPH/DIAG IV INF INIT: CPT

## 2025-05-27 PROCEDURE — 94150 VITAL CAPACITY TEST: CPT

## 2025-05-27 PROCEDURE — G0378 HOSPITAL OBSERVATION PER HR: HCPCS

## 2025-05-27 PROCEDURE — 83690 ASSAY OF LIPASE: CPT

## 2025-05-27 PROCEDURE — 93005 ELECTROCARDIOGRAM TRACING: CPT | Performed by: EMERGENCY MEDICINE

## 2025-05-27 PROCEDURE — 82248 BILIRUBIN DIRECT: CPT

## 2025-05-27 PROCEDURE — 6370000000 HC RX 637 (ALT 250 FOR IP): Performed by: EMERGENCY MEDICINE

## 2025-05-27 PROCEDURE — 6370000000 HC RX 637 (ALT 250 FOR IP): Performed by: NURSE PRACTITIONER

## 2025-05-27 PROCEDURE — 71275 CT ANGIOGRAPHY CHEST: CPT

## 2025-05-27 PROCEDURE — 83036 HEMOGLOBIN GLYCOSYLATED A1C: CPT

## 2025-05-27 PROCEDURE — 85730 THROMBOPLASTIN TIME PARTIAL: CPT

## 2025-05-27 PROCEDURE — 6360000004 HC RX CONTRAST MEDICATION: Performed by: EMERGENCY MEDICINE

## 2025-05-27 PROCEDURE — 81001 URINALYSIS AUTO W/SCOPE: CPT

## 2025-05-27 PROCEDURE — 99285 EMERGENCY DEPT VISIT HI MDM: CPT

## 2025-05-27 PROCEDURE — 82962 GLUCOSE BLOOD TEST: CPT

## 2025-05-27 PROCEDURE — 94761 N-INVAS EAR/PLS OXIMETRY MLT: CPT

## 2025-05-27 PROCEDURE — 71045 X-RAY EXAM CHEST 1 VIEW: CPT

## 2025-05-27 RX ORDER — SODIUM CHLORIDE 0.9 % (FLUSH) 0.9 %
10 SYRINGE (ML) INJECTION PRN
Status: DISCONTINUED | OUTPATIENT
Start: 2025-05-27 | End: 2025-05-28 | Stop reason: HOSPADM

## 2025-05-27 RX ORDER — ASPIRIN 81 MG/1
81 TABLET, CHEWABLE ORAL DAILY
Status: DISCONTINUED | OUTPATIENT
Start: 2025-05-28 | End: 2025-05-28 | Stop reason: HOSPADM

## 2025-05-27 RX ORDER — IOPAMIDOL 755 MG/ML
75 INJECTION, SOLUTION INTRAVASCULAR
Status: COMPLETED | OUTPATIENT
Start: 2025-05-27 | End: 2025-05-27

## 2025-05-27 RX ORDER — OXYCODONE AND ACETAMINOPHEN 7.5; 325 MG/1; MG/1
1 TABLET ORAL 4 TIMES DAILY
Status: DISCONTINUED | OUTPATIENT
Start: 2025-05-27 | End: 2025-05-28 | Stop reason: HOSPADM

## 2025-05-27 RX ORDER — METOCLOPRAMIDE HYDROCHLORIDE 5 MG/ML
10 INJECTION INTRAMUSCULAR; INTRAVENOUS EVERY 6 HOURS PRN
Status: DISCONTINUED | OUTPATIENT
Start: 2025-05-27 | End: 2025-05-28 | Stop reason: HOSPADM

## 2025-05-27 RX ORDER — POTASSIUM CHLORIDE 7.45 MG/ML
10 INJECTION INTRAVENOUS PRN
Status: DISCONTINUED | OUTPATIENT
Start: 2025-05-27 | End: 2025-05-28 | Stop reason: HOSPADM

## 2025-05-27 RX ORDER — LOSARTAN POTASSIUM 100 MG/1
100 TABLET ORAL DAILY
Status: DISCONTINUED | OUTPATIENT
Start: 2025-05-27 | End: 2025-05-28 | Stop reason: HOSPADM

## 2025-05-27 RX ORDER — POTASSIUM CHLORIDE 1500 MG/1
40 TABLET, EXTENDED RELEASE ORAL PRN
Status: DISCONTINUED | OUTPATIENT
Start: 2025-05-27 | End: 2025-05-28 | Stop reason: HOSPADM

## 2025-05-27 RX ORDER — MAGNESIUM SULFATE IN WATER 40 MG/ML
2000 INJECTION, SOLUTION INTRAVENOUS PRN
Status: DISCONTINUED | OUTPATIENT
Start: 2025-05-27 | End: 2025-05-28 | Stop reason: HOSPADM

## 2025-05-27 RX ORDER — ACETAMINOPHEN 325 MG/1
650 TABLET ORAL EVERY 6 HOURS PRN
Status: DISCONTINUED | OUTPATIENT
Start: 2025-05-27 | End: 2025-05-28 | Stop reason: HOSPADM

## 2025-05-27 RX ORDER — ISOSORBIDE MONONITRATE 30 MG/1
30 TABLET, EXTENDED RELEASE ORAL DAILY
Status: DISCONTINUED | OUTPATIENT
Start: 2025-05-27 | End: 2025-05-28 | Stop reason: HOSPADM

## 2025-05-27 RX ORDER — CLOPIDOGREL BISULFATE 75 MG/1
75 TABLET ORAL DAILY
Status: DISCONTINUED | OUTPATIENT
Start: 2025-05-27 | End: 2025-05-28 | Stop reason: HOSPADM

## 2025-05-27 RX ORDER — FLUOXETINE 10 MG/1
40 CAPSULE ORAL DAILY
Status: DISCONTINUED | OUTPATIENT
Start: 2025-05-27 | End: 2025-05-28 | Stop reason: HOSPADM

## 2025-05-27 RX ORDER — SODIUM CHLORIDE 0.9 % (FLUSH) 0.9 %
5-40 SYRINGE (ML) INJECTION EVERY 12 HOURS SCHEDULED
Status: DISCONTINUED | OUTPATIENT
Start: 2025-05-27 | End: 2025-05-28 | Stop reason: HOSPADM

## 2025-05-27 RX ORDER — ACETAMINOPHEN 650 MG/1
650 SUPPOSITORY RECTAL EVERY 6 HOURS PRN
Status: DISCONTINUED | OUTPATIENT
Start: 2025-05-27 | End: 2025-05-28 | Stop reason: HOSPADM

## 2025-05-27 RX ORDER — POLYETHYLENE GLYCOL 3350 17 G/17G
17 POWDER, FOR SOLUTION ORAL DAILY PRN
Status: DISCONTINUED | OUTPATIENT
Start: 2025-05-27 | End: 2025-05-28 | Stop reason: HOSPADM

## 2025-05-27 RX ORDER — ATORVASTATIN CALCIUM 40 MG/1
40 TABLET, FILM COATED ORAL NIGHTLY
Status: DISCONTINUED | OUTPATIENT
Start: 2025-05-27 | End: 2025-05-28 | Stop reason: HOSPADM

## 2025-05-27 RX ORDER — MORPHINE SULFATE 4 MG/ML
4 INJECTION, SOLUTION INTRAMUSCULAR; INTRAVENOUS EVERY 30 MIN PRN
Refills: 0 | Status: DISCONTINUED | OUTPATIENT
Start: 2025-05-27 | End: 2025-05-28 | Stop reason: HOSPADM

## 2025-05-27 RX ORDER — PANTOPRAZOLE SODIUM 40 MG/1
40 TABLET, DELAYED RELEASE ORAL
Status: DISCONTINUED | OUTPATIENT
Start: 2025-05-27 | End: 2025-05-28 | Stop reason: HOSPADM

## 2025-05-27 RX ORDER — ONDANSETRON 2 MG/ML
4 INJECTION INTRAMUSCULAR; INTRAVENOUS EVERY 6 HOURS PRN
Status: DISCONTINUED | OUTPATIENT
Start: 2025-05-27 | End: 2025-05-27

## 2025-05-27 RX ORDER — SODIUM CHLORIDE 9 MG/ML
INJECTION, SOLUTION INTRAVENOUS PRN
Status: DISCONTINUED | OUTPATIENT
Start: 2025-05-27 | End: 2025-05-28 | Stop reason: HOSPADM

## 2025-05-27 RX ORDER — POLYETHYLENE GLYCOL 3350 17 G
2 POWDER IN PACKET (EA) ORAL
Status: DISCONTINUED | OUTPATIENT
Start: 2025-05-27 | End: 2025-05-28 | Stop reason: HOSPADM

## 2025-05-27 RX ORDER — ASPIRIN 81 MG/1
324 TABLET, CHEWABLE ORAL ONCE
Status: COMPLETED | OUTPATIENT
Start: 2025-05-27 | End: 2025-05-27

## 2025-05-27 RX ORDER — MAGNESIUM SULFATE IN WATER 40 MG/ML
2000 INJECTION, SOLUTION INTRAVENOUS ONCE
Status: COMPLETED | OUTPATIENT
Start: 2025-05-27 | End: 2025-05-27

## 2025-05-27 RX ORDER — ONDANSETRON 4 MG/1
4 TABLET, ORALLY DISINTEGRATING ORAL EVERY 8 HOURS PRN
Status: DISCONTINUED | OUTPATIENT
Start: 2025-05-27 | End: 2025-05-27

## 2025-05-27 RX ORDER — FERROUS SULFATE 325(65) MG
325 TABLET ORAL EVERY OTHER DAY
Status: DISCONTINUED | OUTPATIENT
Start: 2025-05-27 | End: 2025-05-28 | Stop reason: HOSPADM

## 2025-05-27 RX ADMIN — ASPIRIN 324 MG: 81 TABLET, CHEWABLE ORAL at 06:12

## 2025-05-27 RX ADMIN — FERROUS SULFATE TAB 325 MG (65 MG ELEMENTAL FE) 325 MG: 325 (65 FE) TAB at 11:48

## 2025-05-27 RX ADMIN — IOPAMIDOL 75 ML: 755 INJECTION, SOLUTION INTRAVENOUS at 08:01

## 2025-05-27 RX ADMIN — PANTOPRAZOLE SODIUM 40 MG: 40 TABLET, DELAYED RELEASE ORAL at 17:46

## 2025-05-27 RX ADMIN — APIXABAN 5 MG: 5 TABLET, FILM COATED ORAL at 20:36

## 2025-05-27 RX ADMIN — SODIUM CHLORIDE, PRESERVATIVE FREE 10 ML: 5 INJECTION INTRAVENOUS at 20:37

## 2025-05-27 RX ADMIN — MORPHINE SULFATE 4 MG: 4 INJECTION, SOLUTION INTRAMUSCULAR; INTRAVENOUS at 06:14

## 2025-05-27 RX ADMIN — APIXABAN 5 MG: 5 TABLET, FILM COATED ORAL at 12:45

## 2025-05-27 RX ADMIN — ISOSORBIDE MONONITRATE 30 MG: 30 TABLET, EXTENDED RELEASE ORAL at 11:48

## 2025-05-27 RX ADMIN — MAGNESIUM SULFATE HEPTAHYDRATE 2000 MG: 40 INJECTION, SOLUTION INTRAVENOUS at 06:19

## 2025-05-27 RX ADMIN — ATORVASTATIN CALCIUM 40 MG: 40 TABLET, FILM COATED ORAL at 20:36

## 2025-05-27 RX ADMIN — OXYCODONE HYDROCHLORIDE AND ACETAMINOPHEN 1 TABLET: 7.5; 325 TABLET ORAL at 12:45

## 2025-05-27 RX ADMIN — OXYCODONE HYDROCHLORIDE AND ACETAMINOPHEN 1 TABLET: 7.5; 325 TABLET ORAL at 17:46

## 2025-05-27 RX ADMIN — MELATONIN TAB 3 MG 3 MG: 3 TAB at 22:40

## 2025-05-27 RX ADMIN — FLUOXETINE HYDROCHLORIDE 40 MG: 10 CAPSULE ORAL at 11:48

## 2025-05-27 RX ADMIN — OXYCODONE HYDROCHLORIDE AND ACETAMINOPHEN 1 TABLET: 7.5; 325 TABLET ORAL at 22:40

## 2025-05-27 RX ADMIN — SODIUM CHLORIDE, PRESERVATIVE FREE 5 ML: 5 INJECTION INTRAVENOUS at 11:48

## 2025-05-27 RX ADMIN — LOSARTAN POTASSIUM 100 MG: 100 TABLET, FILM COATED ORAL at 11:48

## 2025-05-27 RX ADMIN — CLOPIDOGREL BISULFATE 75 MG: 75 TABLET, FILM COATED ORAL at 11:48

## 2025-05-27 ASSESSMENT — ENCOUNTER SYMPTOMS
EYES NEGATIVE: 1
CONSTIPATION: 1
SHORTNESS OF BREATH: 1
ALLERGIC/IMMUNOLOGIC NEGATIVE: 1

## 2025-05-27 ASSESSMENT — PAIN SCALES - GENERAL
PAINLEVEL_OUTOF10: 7
PAINLEVEL_OUTOF10: 5
PAINLEVEL_OUTOF10: 7

## 2025-05-27 ASSESSMENT — PAIN DESCRIPTION - LOCATION
LOCATION: BACK
LOCATION: BACK;SHOULDER
LOCATION: CHEST

## 2025-05-27 ASSESSMENT — PAIN DESCRIPTION - ORIENTATION
ORIENTATION: LOWER;LEFT
ORIENTATION: LOWER

## 2025-05-27 ASSESSMENT — PAIN DESCRIPTION - ONSET: ONSET: ON-GOING

## 2025-05-27 ASSESSMENT — PAIN DESCRIPTION - FREQUENCY: FREQUENCY: CONTINUOUS

## 2025-05-27 ASSESSMENT — PAIN - FUNCTIONAL ASSESSMENT: PAIN_FUNCTIONAL_ASSESSMENT: 0-10

## 2025-05-27 ASSESSMENT — PAIN DESCRIPTION - PAIN TYPE: TYPE: CHRONIC PAIN

## 2025-05-27 ASSESSMENT — LIFESTYLE VARIABLES
HOW MANY STANDARD DRINKS CONTAINING ALCOHOL DO YOU HAVE ON A TYPICAL DAY: PATIENT DOES NOT DRINK
HOW OFTEN DO YOU HAVE A DRINK CONTAINING ALCOHOL: NEVER

## 2025-05-27 ASSESSMENT — PAIN DESCRIPTION - DESCRIPTORS
DESCRIPTORS: ACHING
DESCRIPTORS: ACHING

## 2025-05-27 NOTE — ED NOTES
Medication History  [facility]    Patient Name: Abelardo Marquis 1940     Medication history has been completed by: Maxine Chambers, Pharmacy Intern    Source(s) of information: Insurance claims    Primary Care Physician: Ellyn Patel APRN - CNP     Pharmacy: [facility]    Allergies as of 05/27/2025 - Fully Reviewed 05/27/2025   Allergen Reaction Noted    Sulfa antibiotics Hives 05/12/2013        Prior to Admission medications    Medication Sig Start Date End Date Taking? Authorizing Provider   losartan (COZAAR) 100 MG tablet Take 1 tablet by mouth daily 5/20/25   Cecile Murray MD   apixaban starter pack (ELIQUIS) 5 MG TBPK tablet Take 1 tablet by mouth See Admin Instructions 5/6/25   Martin Joya MD   STIMULANT LAXATIVE 8.6-50 MG per tablet Take 1 tablet by mouth 2 times daily as needed 1/22/25   Tj Mroris MD   clopidogrel (PLAVIX) 75 MG tablet Take 1 tablet by mouth daily 12/18/24   Tj Morris MD   Semaglutide,0.25 or 0.5MG/DOS, (OZEMPIC, 0.25 OR 0.5 MG/DOSE,) 2 MG/1.5ML SOPN Inject into the skin once a week    Tj Morris MD   hydroCHLOROthiazide 12.5 MG tablet Take 1 tablet by mouth daily 9/21/24   Gustavo Mar MD   insulin glargine (BASAGLAR KWIKPEN) 100 UNIT/ML injection pen Inject 7 Units into the skin nightly Patient reports he does sliding scale 9/21/24   Gustavo Mar MD   TRADJENTA 5 MG tablet Take 1 tablet by mouth daily 7/18/24   Tj Morris MD   nitroGLYCERIN (NITROSTAT) 0.4 MG SL tablet Place 1 tablet under the tongue every 5 minutes as needed for Chest pain up to max of 3 total doses. If no relief after 1 dose, call 911. 7/16/24   Vishal Sol MD   glucose 4 g chewable tablet Take 4 tablets by mouth as needed for Low blood sugar 7/16/24   Vishal Sol MD   aspirin 81 MG chewable tablet Take 1 tablet by mouth daily 5/19/24   Nirav Keen MD   ferrous sulfate (IRON 325) 325 (65 Fe) MG tablet Take 1

## 2025-05-27 NOTE — ED PROVIDER NOTES
[facility]      TRIAGE CHIEF COMPLAINT:   Chest Pain (Started a few days ago. States now more weak. )      [place]:  Abelardo Marquis is a 85 y.o. male that presents by EMS from home with complaint of worsening chest pain generalized weakness malaise fatigue constipation.  Patient states he has not seen his doctor.  Came in.  History of MI he states he has been having some exploding chest pain few days some shortness of breath generalized weakness, malaise fatigue also constipation.  Denies any fevers nausea vomiting abdominal pain urine complaints headache.  No other questions or concerns.  Denies coughing up blood..    REVIEW OF SYSTEMS:  At least 10 systems reviewed and otherwise acutely negative except as in the [place].    Review of Systems   Constitutional:  Positive for activity change and fatigue.   HENT: Negative.     Eyes: Negative.    Respiratory:  Positive for shortness of breath.    Cardiovascular:  Positive for chest pain.   Gastrointestinal:  Positive for constipation.   Endocrine: Negative.    Genitourinary: Negative.    Musculoskeletal: Negative.    Skin: Negative.    Allergic/Immunologic: Negative.    Neurological:  Positive for weakness.   Hematological: Negative.    Psychiatric/Behavioral: Negative.     All other systems reviewed and are negative.      Past Medical History:   Diagnosis Date    Arthritis     Diabetes mellitus (HCC)     GERD (gastroesophageal reflux disease)     Gout     left ankle and right shoulder    Hyperlipidemia     Hypertension     MI (myocardial infarction) (HCC)     May 2013     Past Surgical History:   Procedure Laterality Date    BACK SURGERY      CARDIAC SURGERY      stents x 1    CHOLECYSTECTOMY      May 2013    COLONOSCOPY N/A 5/4/2024    COLONOSCOPY POLYPECTOMY SNARE BIOPSY performed by Navi Castillo MD at [facility] ENDOSCOPY    DILATATION, ESOPHAGUS      HAND SURGERY      JOINT REPLACEMENT      SINUS SURGERY      sinus surgery x 2    SINUS SURGERY      
5

## 2025-05-27 NOTE — FLOWSHEET NOTE
4 Eyes Skin Assessment     NAME:  Abelardo Marquis  YOB: 1940  MEDICAL RECORD NUMBER:  [id number]    The patient is being assessed for  Admission    I agree that at least one RN has performed a thorough Head to Toe Skin Assessment on the patient. ALL assessment sites listed below have been assessed.      Areas assessed by both nurses:    Head, Face, Ears, Shoulders, Back, Chest, Arms, Elbows, Hands, Sacrum. Buttock, Coccyx, Ischium, and Legs. Feet and Heels        Does the Patient have a Wound? No noted wound(s)       [place] Prevention initiated by RN: No  Wound Care Orders initiated by RN: No    Pressure Injury (Stage 3,4, Unstageable, DTI, NWPT, and Complex wounds) if present, place Wound referral order by RN under : No    New Ostomies, if present place, Ostomy referral order under : No     Nurse 1 eSignature: Electronically signed by Rosario Corrigan RN on 5/27/25 at 3:58 PM EDT    **SHARE this note so that the co-signing nurse can place an eSignature**    Nurse 2 eSignature: Electronically signed by Ansley Lucas RN on 5/27/25 at 11:56 PM EDT

## 2025-05-27 NOTE — CONSULTS
Consult dictated #[id number]  
GI workup done in the past comprising at least 4 colonoscopies by Dr. Álvarez in [place], and colonoscopy with polypectomy performed by me on August 12, 2014, and the last colonoscopy by myself was on May 4, 2024, and the patient had 10 diminutive colon polyps removed along with diverticulosis coli and hemorrhoids, and all polyps were benign.  The patient also has had multiple EGDs performed, and EGD by myself on May 4, 2024 showed gastritis and incidentally a duodenal diverticulum was noted, but there was no evidence of an upper GI bleeding lesion, esophageal or gastric varices.  I did repeat the EGD on June 1, 2024 for recurrent GI bleeding and anemia, and the patient was noted to have portal hypertensive gastropathy along with duodenal diverticulum, but no esophageal or gastric varices were noted, and no blood recent or old was noted in the upper GI tract.  Subsequently, the patient had a capsule endoscopy done by Dr. Jesús Crane on June 22, 2024 because of the patient's recurrent anemia and GI bleeding.  I performed a push enteroscopy on June 22, 2024.  The patient was noted to have mild gastritis, duodenal and jejunal diverticula were noted, no blood was noted up to 1 m and 60 cm of the pediatric colonoscope advanced.  The patient is hemodynamically stable at present and there is no evidence of gross GI bleeding.    REVIEW OF SYSTEMS:  CENTRAL NERVOUS SYSTEM:  The patient denies headache or focal sensorimotor symptoms.  CARDIOVASCULAR SYSTEM:  The patient complains of chest pain and lightheadedness, but no shortness of breath or leg swelling.  GENITOURINARY SYSTEM:  No history of dysuria, pyuria, or hematuria.  MUSCULOSKELETAL SYSTEM:  The patient complains of generalized weakness.  RESPIRATORY SYSTEM:  No history of cough, hemoptysis, fever, or chills.    PAST MEDICAL HISTORY:  Significant for history of hypertension; diabetes mellitus; coronary artery disease, status post PTCA with coronary stents in place, 
normal, No masses or lesions. No discharge. No CVA tenderness.   Musculoskeletal:  Intact distal pulses, No edema, No tenderness, No cyanosis, No clubbing. Good range of motion in all major joints. No tenderness to palpation or major deformities noted. Back- No tenderness.   Integument:  Warm, Dry, No erythema, No rash.   Skin: no rash, no ulcers  Lymphatic:  No lymphadenopathy noted.   Neurologic:  Alert & oriented x 3, Normal motor function, Normal sensory function, No focal deficits noted.   Psychiatric:  Affect normal, Judgment normal, Mood normal.   Lab Review   Recent Labs     05/27/25  0530   WBC 3.6*   HGB 12.2*   HCT 39.1*   *      Recent Labs     05/27/25 0530      K 3.7      CO2 23   BUN 38*   CREATININE 1.5*     Recent Labs     05/27/25 0530   AST 26   ALT 17   BILIDIR 0.2   BILITOT 0.4   ALKPHOS 75     No results for input(s): \"TROPONINI\" in the last 72 hours.  Lab Results   Component Value Date    BNP 12 01/04/2014     Lab Results   Component Value Date    INR 1.2 05/27/2025    PROTIME 15.5 (H) 05/27/2025         EKG:NSR    Chest Xray:    ECHO:NORMAL LVEF  Labs, echo, meds reviewed  Assessment: 85 y.o.year old with PMH of  has a past medical history of Arthritis, Diabetes mellitus ([facility]), GERD (gastroesophageal reflux disease), Gout, Hyperlipidemia, Hypertension, and MI (myocardial infarction) (Former[facility]).      Recommendations:    Chest pain mildly elevated troponin, EKG is unchanged last stress test in February was normal, his chest pain was from esophageal dysphagia, will recommend to get the latest report of stress test which was done last week at the [place] cardio office.  CAD: Has a history of OM stent and 30 to 40% proximal LAD  disease recommend guideline mediated therapy  Odontophagia recommend to see GI  DVT of right leg continue Eliquis, CTA was negative for PE  Health maintenance: exerise and diet  All labs, medications and tests reviewed, continue all other medications of

## 2025-05-27 NOTE — H&P
V2.0  History and Physical      Name:  Abelardo Marquis /Age/Sex: 1940  (85 y.o. male)   MRN & CSN:  [id number] & [id number] Encounter Date/Time: 2025 9:28 AM EDT   Location:  ED26/ED-26 PCP: Ellyn Patel APRN - CNP       Hospital Day: 1    Assessment and Plan:   Abelardo Marquis is a 85 y.o. male with a pmh of hypertension, CAD SP 2 stents, recently diagnosed over DVT in right leg 2 weeks ago on Eliquis who presents with Chest pain    Hospital Problems           Last Modified POA    * (Principal) Chest pain 2025 Yes     Chest pain  History of CAD SP PCI  - Intermittent chest pressure in the left side, radiated to left side of neck and left shoulder for 2 to 3 days  - Patient's EKG has no ischemic change  - Troponin slightly elevated at 55 and 50 (chronic troponin elevation with baseline 60s)  -Recently had a stress test a couple of weeks ago in [place] cardiology  -Echocardiogram in 2025 showed LVEF of 60 to 65%  - Place patient to medical surgical floor with telemetry  - Serial troponin  - Cardiology consulted, appreciate their recommendations    Recently diagnosed over DVT in right leg  - Reported right leg pain  - Continue Eliquis at 5 Mg twice daily  -CTA of the pulmonary today is negative for PE  - Patient need Eliquis prescription at discharge  -  consult    Generalized weakness  - PT OT evaluation, appreciate their recommendations    Disposition:   Current Living situation: Home  Expected Disposition: Home  Estimated D/C: 1 to 2 days    Diet No diet orders on file   DVT Prophylaxis [] Lovenox, []  Heparin, [] SCDs, [] Ambulation,  [x] Eliquis, [] Xarelto   Code Status Prior   Surrogate Decision Maker/ POA      History from:     patient    History of Present Illness:     Chief Complaint: Chest pain  Abelardo Marquis is a 85 y.o. male with pmh of hypertension, CAD SP PCI with 2 stents who presents with chest pain and generalized weakness.  Patient reported intermittent

## 2025-05-27 NOTE — ED TRIAGE NOTES
Patient arrived via EMS from home, c/o chest pain for several days, 12 lead unremarkable per EMS, BG 65 gave 15mg oral glucose, hx cardiac stents.   
Started a few days ago. States now more weak.   
       Background  History:   Past Medical History:   Diagnosis Date    Arthritis     Diabetes mellitus (HCC)     GERD (gastroesophageal reflux disease)     Gout     left ankle and right shoulder    Hyperlipidemia     Hypertension     MI (myocardial infarction) (HCC)     May 2013       Assessment    Vitals: MEWS Score: 1  Level of Consciousness: Alert (0)   Vitals:    05/27/25 0847 05/27/25 0902 05/27/25 0917 05/27/25 0932   BP: (!) 150/70 139/64 (!) 153/71 (!) 134/56   Pulse: 79 82 81 82   Resp: 16 16 19 13   Temp:       SpO2: 100% 99% 97% 99%   Weight:       Height:           PO Status: Nothing by Mouth    O2 Flow Rate:   O2 Flow Rate (L/min): 4 L/min    Cardiac Rhythm: NSR    Last documented pain medication administered: n/a    NIH Score: NIH       Active LDA's:   Peripheral IV 02/18/25 Right Antecubital (Active)       Pertinent or High Risk Medications/Drips: no   If Yes, please provide details: n/a      Blood Product Administration: no  If Yes, please provide details: n/a    Recommendation    Incomplete orders pending admit orders  Additional Comments: n/a   If any further questions, please call Sending RN at [phone]    Electronically signed by: Electronically signed by Rosy Erickson RN on 5/27/2025 at 9:33 AM

## 2025-05-28 VITALS
RESPIRATION RATE: 14 BRPM | HEART RATE: 84 BPM | SYSTOLIC BLOOD PRESSURE: 138 MMHG | WEIGHT: 223.3 LBS | BODY MASS INDEX: 31.97 KG/M2 | HEIGHT: 70 IN | DIASTOLIC BLOOD PRESSURE: 57 MMHG | TEMPERATURE: 98.1 F | OXYGEN SATURATION: 94 %

## 2025-05-28 LAB
EKG ATRIAL RATE: 79 BPM
EKG DIAGNOSIS: NORMAL
EKG P AXIS: 61 DEGREES
EKG P-R INTERVAL: 250 MS
EKG Q-T INTERVAL: 438 MS
EKG QRS DURATION: 112 MS
EKG QTC CALCULATION (BAZETT): 502 MS
EKG R AXIS: 7 DEGREES
EKG T AXIS: 70 DEGREES
EKG VENTRICULAR RATE: 79 BPM
EST. AVERAGE GLUCOSE BLD GHB EST-MCNC: 190 MG/DL
GLUCOSE BLD-MCNC: 209 MG/DL (ref 74–99)
GLUCOSE BLD-MCNC: 330 MG/DL (ref 74–99)
GLUCOSE BLD-MCNC: 362 MG/DL (ref 74–99)
HBA1C MFR BLD: 8.3 % (ref 4.2–6.3)

## 2025-05-28 PROCEDURE — 97166 OT EVAL MOD COMPLEX 45 MIN: CPT

## 2025-05-28 PROCEDURE — 97116 GAIT TRAINING THERAPY: CPT

## 2025-05-28 PROCEDURE — 2500000003 HC RX 250 WO HCPCS: Performed by: NURSE PRACTITIONER

## 2025-05-28 PROCEDURE — G0378 HOSPITAL OBSERVATION PER HR: HCPCS

## 2025-05-28 PROCEDURE — 97162 PT EVAL MOD COMPLEX 30 MIN: CPT

## 2025-05-28 PROCEDURE — 93010 ELECTROCARDIOGRAM REPORT: CPT | Performed by: INTERNAL MEDICINE

## 2025-05-28 PROCEDURE — 82962 GLUCOSE BLOOD TEST: CPT

## 2025-05-28 PROCEDURE — 6370000000 HC RX 637 (ALT 250 FOR IP): Performed by: NURSE PRACTITIONER

## 2025-05-28 PROCEDURE — 97535 SELF CARE MNGMENT TRAINING: CPT

## 2025-05-28 RX ORDER — INSULIN LISPRO 100 [IU]/ML
0-8 INJECTION, SOLUTION INTRAVENOUS; SUBCUTANEOUS
Status: DISCONTINUED | OUTPATIENT
Start: 2025-05-28 | End: 2025-05-28

## 2025-05-28 RX ORDER — PANTOPRAZOLE SODIUM 40 MG/1
40 TABLET, DELAYED RELEASE ORAL
Qty: 60 TABLET | Refills: 2 | Status: SHIPPED | OUTPATIENT
Start: 2025-05-28

## 2025-05-28 RX ORDER — INSULIN GLARGINE 100 [IU]/ML
10 INJECTION, SOLUTION SUBCUTANEOUS NIGHTLY
Status: DISCONTINUED | OUTPATIENT
Start: 2025-05-28 | End: 2025-05-28 | Stop reason: HOSPADM

## 2025-05-28 RX ORDER — INSULIN LISPRO 100 [IU]/ML
0-16 INJECTION, SOLUTION INTRAVENOUS; SUBCUTANEOUS
Status: DISCONTINUED | OUTPATIENT
Start: 2025-05-28 | End: 2025-05-28

## 2025-05-28 RX ORDER — INSULIN LISPRO 100 [IU]/ML
10 INJECTION, SOLUTION INTRAVENOUS; SUBCUTANEOUS
Status: DISCONTINUED | OUTPATIENT
Start: 2025-05-28 | End: 2025-05-28 | Stop reason: HOSPADM

## 2025-05-28 RX ORDER — INSULIN LISPRO 100 [IU]/ML
5 INJECTION, SOLUTION INTRAVENOUS; SUBCUTANEOUS
Status: DISCONTINUED | OUTPATIENT
Start: 2025-05-28 | End: 2025-05-28

## 2025-05-28 RX ORDER — DEXTROSE MONOHYDRATE 100 MG/ML
INJECTION, SOLUTION INTRAVENOUS CONTINUOUS PRN
Status: DISCONTINUED | OUTPATIENT
Start: 2025-05-28 | End: 2025-05-28 | Stop reason: HOSPADM

## 2025-05-28 RX ORDER — GLUCAGON 1 MG/ML
1 KIT INJECTION PRN
Status: DISCONTINUED | OUTPATIENT
Start: 2025-05-28 | End: 2025-05-28 | Stop reason: HOSPADM

## 2025-05-28 RX ORDER — INSULIN LISPRO 100 [IU]/ML
0-16 INJECTION, SOLUTION INTRAVENOUS; SUBCUTANEOUS
Status: DISCONTINUED | OUTPATIENT
Start: 2025-05-28 | End: 2025-05-28 | Stop reason: HOSPADM

## 2025-05-28 RX ADMIN — PANTOPRAZOLE SODIUM 40 MG: 40 TABLET, DELAYED RELEASE ORAL at 06:47

## 2025-05-28 RX ADMIN — INSULIN LISPRO 6 UNITS: 100 INJECTION, SOLUTION INTRAVENOUS; SUBCUTANEOUS at 08:19

## 2025-05-28 RX ADMIN — INSULIN LISPRO 10 UNITS: 100 INJECTION, SOLUTION INTRAVENOUS; SUBCUTANEOUS at 11:56

## 2025-05-28 RX ADMIN — APIXABAN 5 MG: 5 TABLET, FILM COATED ORAL at 08:20

## 2025-05-28 RX ADMIN — ISOSORBIDE MONONITRATE 30 MG: 30 TABLET, EXTENDED RELEASE ORAL at 08:20

## 2025-05-28 RX ADMIN — ASPIRIN 81 MG: 81 TABLET, CHEWABLE ORAL at 08:20

## 2025-05-28 RX ADMIN — CLOPIDOGREL BISULFATE 75 MG: 75 TABLET, FILM COATED ORAL at 08:20

## 2025-05-28 RX ADMIN — INSULIN LISPRO 5 UNITS: 100 INJECTION, SOLUTION INTRAVENOUS; SUBCUTANEOUS at 08:21

## 2025-05-28 RX ADMIN — SODIUM CHLORIDE, PRESERVATIVE FREE 10 ML: 5 INJECTION INTRAVENOUS at 08:20

## 2025-05-28 RX ADMIN — OXYCODONE HYDROCHLORIDE AND ACETAMINOPHEN 1 TABLET: 7.5; 325 TABLET ORAL at 08:20

## 2025-05-28 RX ADMIN — LOSARTAN POTASSIUM 100 MG: 100 TABLET, FILM COATED ORAL at 08:20

## 2025-05-28 RX ADMIN — INSULIN LISPRO 16 UNITS: 100 INJECTION, SOLUTION INTRAVENOUS; SUBCUTANEOUS at 11:55

## 2025-05-28 RX ADMIN — FLUOXETINE HYDROCHLORIDE 40 MG: 10 CAPSULE ORAL at 11:55

## 2025-05-28 ASSESSMENT — PAIN - FUNCTIONAL ASSESSMENT: PAIN_FUNCTIONAL_ASSESSMENT: ACTIVITIES ARE NOT PREVENTED

## 2025-05-28 ASSESSMENT — PAIN DESCRIPTION - LOCATION: LOCATION: BACK

## 2025-05-28 ASSESSMENT — PAIN DESCRIPTION - ORIENTATION: ORIENTATION: LOWER

## 2025-05-28 ASSESSMENT — PAIN DESCRIPTION - DESCRIPTORS: DESCRIPTORS: ACHING

## 2025-05-28 ASSESSMENT — PAIN SCALES - GENERAL
PAINLEVEL_OUTOF10: 5
PAINLEVEL_OUTOF10: 5

## 2025-05-28 NOTE — DISCHARGE SUMMARY
fluid evident. The left hemidiaphragm remains mildly elevated, as previously. IMPRESSION: STABLE NONACUTE CHEST Workstation ID: [id number]  Dictated and Electronically Signed By: Zeb Ramírez MD [facility] 5/27/2025 5:43          CBC:   Recent Labs     05/27/25  0530   WBC 3.6*   HGB 12.2*   *     BMP:    Recent Labs     05/27/25  0530      K 3.7      CO2 23   BUN 38*   CREATININE 1.5*   GLUCOSE 92     Hepatic:   Recent Labs     05/27/25  0530   AST 26   ALT 17   BILITOT 0.4   ALKPHOS 75     Lipids:   Lab Results   Component Value Date/Time    CHOL 152 03/31/2023 04:27 AM    HDL 38 03/31/2023 04:27 AM    TRIG 123 03/31/2023 04:27 AM     Hemoglobin A1C:   Lab Results   Component Value Date/Time    LABA1C 8.3 05/27/2025 11:08 AM     TSH: No results found for: \"TSH\"  Troponin:   Lab Results   Component Value Date/Time    TROPONINT 0.018 03/30/2023 01:02 AM    TROPONINT 0.028 03/29/2023 06:07 PM    TROPONINT 0.048 11/27/2022 10:56 AM     Lactic Acid:   Recent Labs     05/27/25  0530   LACTA 1.7     BNP:   Recent Labs     05/27/25  0530   PROBNP 382     UA:  Lab Results   Component Value Date/Time    NITRU NEGATIVE 05/27/2025 06:35 AM    COLORU Yellow 05/27/2025 06:35 AM    PHUR 6.5 05/27/2025 06:35 AM    WBCUA <1 05/27/2025 06:35 AM    WBCUA 0-5 05/23/2023 10:19 AM    RBCUA 1 05/27/2025 06:35 AM    RBCUA <1 06/21/2024 10:00 PM    MUCUS RARE 09/20/2020 02:50 AM    TRICHOMONAS NONE SEEN 09/20/2020 02:50 AM    BACTERIA NONE SEEN 05/23/2023 10:19 AM    BACTERIA RARE 09/20/2020 02:50 AM    CLARITYU CLEAR 08/09/2024 07:33 AM    LEUKOCYTESUR NEGATIVE 05/27/2025 06:35 AM    UROBILINOGEN 0.2 05/27/2025 06:35 AM    BILIRUBINUR NEGATIVE 05/27/2025 06:35 AM    BLOODU TRACE 06/21/2024 10:00 PM    GLUCOSEU 500 05/27/2025 06:35 AM    KETUA NEGATIVE 05/27/2025 06:35 AM     Urine Cultures: No results found for: \"LABURIN\"  Blood Cultures: No results found for: \"BC\"  No results found for:

## 2025-05-28 NOTE — PLAN OF CARE
Problem: Chronic Conditions and Co-morbidities  Goal: Patient's chronic conditions and co-morbidity symptoms are monitored and maintained or improved  Outcome: Completed     Problem: Discharge Planning  Goal: Discharge to home or other facility with appropriate resources  Outcome: Completed     Problem: Pain  Goal: Verbalizes/displays adequate comfort level or baseline comfort level  Outcome: Completed     Problem: Safety - Adult  Goal: Free from fall injury  Outcome: Completed

## 2025-05-28 NOTE — PROGRESS NOTES
I did NOT see the patient. The patient was discussed with the PATEL. I was available for questions and consultation as needed.       Attempting to control glucose. Cleared by cardiology for chest pain.   
Occupational Therapy    [facility] ACUTE CARE OCCUPATIONAL THERAPY EVALUATION    Abelardo Marquis, 1940, OBS [id number], 5/28/2025    Discharge Recommendation: Home with initial 24 hour supervision/assistance, Home Health OT services    History:  [place]:  The primary encounter diagnosis was Chest pain, unspecified type. Diagnoses of General weakness, QT prolongation, and Troponin level elevated were also pertinent to this visit.    Subjective:  Patient states: \"I'm hoping I get to go home today!\"  Pain: Pt reported mild chronic back pain but did not quantify   Communication with other providers: TIA Winters  Restrictions: General Precautions, Fall Risk, Telemetry, Bed/chair alarm    Home Setup/Prior level of function:  Social/Functional History  Lives With: Daughter (and RODOLFO who isn't in good health per pt)  Type of Home: House  Home Layout: One level  Home Access: Stairs to enter without rails  Entrance Stairs - Number of Steps: 2  Bathroom Shower/Tub: Tub/Shower unit  Bathroom Toilet: Standard  Bathroom Equipment: Grab bars in shower, Grab bars around toilet, Shower chair  Bathroom Accessibility: Accessible  Home Equipment: Walker - Rolling, Cane  Has the patient had two or more falls in the past year or any fall with injury in the past year?: No  Receives Help From: Family, Home health  Prior Level of Assist for ADLs: Independent  Prior Level of Assist for Homemaking: Independent  Homemaking Responsibilities: Yes  Prior Level of Assist for Ambulation: Independent household ambulator (mod I with 4ww)  Prior Level of Assist for Transfers: Independent  Active : Yes (occasionally)  Occupation: Retired    Examination:  Observation: Sitting EOB upon OT arrival. Agreeable to evaluation.  Vision: WFL  Hearing: Slightly [place]  Vitals: Stable vitals throughout session on room air    Body Systems and functions:  ROM: Rt UE WFL all joints, Lt UE minimal active shoulder flexion (chronic neck problems per pt), 
Stress test 5/22/2025 from [place] cardiology office      
This nurse went over discharge instructions and removed PIV with no complications. Printed AVS given to patient. Patient discharged to exit via wheelchair.   
independence with LRAD  Long Term Goal 2: Pt will independently ascend/descend 6 steps with a handrail  Long Term Goal 3: Pt will independently complete 3 sets of 10 reps of BLE AROM exercises       Education  Patient Education  Education Given To: Patient  Education Provided: Role of Therapy;Plan of Care;ADL Adaptive Strategies;IADL Safety;Transfer Training;Fall Prevention Strategies;Mobility Training;Energy Conservation;Equipment  Education Method: Demonstration;Verbal  Education Outcome: Verbalized understanding;Demonstrated understanding      Time In: 1122  Time Out: 1142  Total Treatment Time: 20  Timed Code Treatment Minutes: 10      Luiz Womack PT, DPT  License #: [id number]

## 2025-08-08 ENCOUNTER — HOSPITAL ENCOUNTER (INPATIENT)
Age: 85
LOS: 2 days | Discharge: HOME | DRG: 811 | End: 2025-08-10
Attending: STUDENT IN AN ORGANIZED HEALTH CARE EDUCATION/TRAINING PROGRAM | Admitting: HOSPITALIST
Payer: MEDICARE

## 2025-08-08 ENCOUNTER — APPOINTMENT (OUTPATIENT)
Dept: GENERAL RADIOLOGY | Age: 85
DRG: 811 | End: 2025-08-08
Payer: MEDICARE

## 2025-08-08 DIAGNOSIS — R20.0 NUMBNESS AND TINGLING OF LEG: ICD-10-CM

## 2025-08-08 DIAGNOSIS — K92.2 GASTROINTESTINAL HEMORRHAGE, UNSPECIFIED GASTROINTESTINAL HEMORRHAGE TYPE: Primary | ICD-10-CM

## 2025-08-08 DIAGNOSIS — R20.2 NUMBNESS AND TINGLING OF LEG: ICD-10-CM

## 2025-08-08 DIAGNOSIS — D64.9 ANEMIA REQUIRING TRANSFUSIONS: ICD-10-CM

## 2025-08-08 DIAGNOSIS — N18.9 CHRONIC KIDNEY DISEASE, UNSPECIFIED CKD STAGE: ICD-10-CM

## 2025-08-08 LAB
ALBUMIN SERPL-MCNC: 3.2 G/DL (ref 3.4–5)
ALBUMIN/GLOB SERPL: 1.5 {RATIO} (ref 1.1–2.2)
ALP SERPL-CCNC: 58 U/L (ref 40–129)
ALT SERPL-CCNC: 14 U/L (ref 10–40)
AMMONIA PLAS-SCNC: 18 UMOL/L (ref 16–60)
ANION GAP SERPL CALCULATED.3IONS-SCNC: 9 MMOL/L (ref 9–17)
AST SERPL-CCNC: 41 U/L (ref 15–37)
BASOPHILS # BLD: 0.02 K/UL
BASOPHILS NFR BLD: 1 % (ref 0–1)
BILIRUB DIRECT SERPL-MCNC: <0.2 MG/DL (ref 0–0.3)
BILIRUB INDIRECT SERPL-MCNC: ABNORMAL MG/DL (ref 0–0.7)
BILIRUB SERPL-MCNC: 0.3 MG/DL (ref 0–1)
BILIRUB UR QL STRIP: NEGATIVE
BUN SERPL-MCNC: 54 MG/DL (ref 7–20)
CALCIUM SERPL-MCNC: 7.7 MG/DL (ref 8.3–10.6)
CHLORIDE SERPL-SCNC: 106 MMOL/L (ref 99–110)
CLARITY UR: CLEAR
CO2 SERPL-SCNC: 21 MMOL/L (ref 21–32)
COLOR UR: YELLOW
COMMENT: ABNORMAL
CREAT SERPL-MCNC: 1.6 MG/DL (ref 0.8–1.3)
EKG ATRIAL RATE: 67 BPM
EKG DIAGNOSIS: NORMAL
EKG Q-T INTERVAL: 468 MS
EKG QRS DURATION: 102 MS
EKG QTC CALCULATION (BAZETT): 494 MS
EKG R AXIS: -5 DEGREES
EKG T AXIS: 102 DEGREES
EKG VENTRICULAR RATE: 67 BPM
EOSINOPHIL # BLD: 0.11 K/UL
EOSINOPHILS RELATIVE PERCENT: 5 % (ref 0–3)
ERYTHROCYTE [DISTWIDTH] IN BLOOD BY AUTOMATED COUNT: 18.2 % (ref 11.7–14.9)
GFR, ESTIMATED: 41 ML/MIN/1.73M2
GLUCOSE BLD-MCNC: 122 MG/DL (ref 74–99)
GLUCOSE BLD-MCNC: 152 MG/DL (ref 74–99)
GLUCOSE SERPL-MCNC: 147 MG/DL (ref 74–99)
GLUCOSE UR STRIP-MCNC: >=1000 MG/DL
HCT VFR BLD AUTO: 19.3 % (ref 42–52)
HCT VFR BLD AUTO: 24.6 % (ref 42–52)
HGB BLD-MCNC: 5.4 G/DL (ref 13.5–18)
HGB BLD-MCNC: 7.1 G/DL (ref 13.5–18)
HGB UR QL STRIP.AUTO: NEGATIVE
IMM GRANULOCYTES # BLD AUTO: 0.04 K/UL
IMM GRANULOCYTES NFR BLD: 2 %
KETONES UR STRIP-MCNC: NEGATIVE MG/DL
LEUKOCYTE ESTERASE UR QL STRIP: NEGATIVE
LYMPHOCYTES NFR BLD: 0.61 K/UL
LYMPHOCYTES RELATIVE PERCENT: 25 % (ref 24–44)
MAGNESIUM SERPL-MCNC: 2.6 MG/DL (ref 1.8–2.4)
MCH RBC QN AUTO: 25.7 PG (ref 27–31)
MCHC RBC AUTO-ENTMCNC: 28 G/DL (ref 32–36)
MCV RBC AUTO: 91.9 FL (ref 78–100)
MONOCYTES NFR BLD: 0.28 K/UL
MONOCYTES NFR BLD: 11 % (ref 0–5)
NEUTROPHILS NFR BLD: 57 % (ref 36–66)
NEUTS SEG NFR BLD: 1.4 K/UL
NITRITE UR QL STRIP: NEGATIVE
PH UR STRIP: 5.5 [PH] (ref 5–8)
PLATELET # BLD AUTO: 121 K/UL (ref 140–440)
PMV BLD AUTO: 10.6 FL (ref 7.5–11.1)
POTASSIUM SERPL-SCNC: 4.7 MMOL/L (ref 3.5–5.1)
PROT SERPL-MCNC: 5.2 G/DL (ref 6.4–8.2)
PROT UR STRIP-MCNC: NEGATIVE MG/DL
RBC # BLD AUTO: 2.1 M/UL (ref 4.6–6.2)
SODIUM SERPL-SCNC: 136 MMOL/L (ref 136–145)
SP GR UR STRIP: 1.01 (ref 1–1.03)
UROBILINOGEN UR STRIP-ACNC: 0.2 EU/DL (ref 0–1)
WBC OTHER # BLD: 2.5 K/UL (ref 4–10.5)

## 2025-08-08 PROCEDURE — 80053 COMPREHEN METABOLIC PANEL: CPT

## 2025-08-08 PROCEDURE — 93005 ELECTROCARDIOGRAM TRACING: CPT | Performed by: STUDENT IN AN ORGANIZED HEALTH CARE EDUCATION/TRAINING PROGRAM

## 2025-08-08 PROCEDURE — 2500000003 HC RX 250 WO HCPCS: Performed by: HOSPITALIST

## 2025-08-08 PROCEDURE — 96374 THER/PROPH/DIAG INJ IV PUSH: CPT

## 2025-08-08 PROCEDURE — 36415 COLL VENOUS BLD VENIPUNCTURE: CPT

## 2025-08-08 PROCEDURE — 93010 ELECTROCARDIOGRAM REPORT: CPT | Performed by: INTERNAL MEDICINE

## 2025-08-08 PROCEDURE — 2580000003 HC RX 258: Performed by: HOSPITALIST

## 2025-08-08 PROCEDURE — 6370000000 HC RX 637 (ALT 250 FOR IP): Performed by: HOSPITALIST

## 2025-08-08 PROCEDURE — 81003 URINALYSIS AUTO W/O SCOPE: CPT

## 2025-08-08 PROCEDURE — 82962 GLUCOSE BLOOD TEST: CPT

## 2025-08-08 PROCEDURE — 51798 US URINE CAPACITY MEASURE: CPT

## 2025-08-08 PROCEDURE — 71046 X-RAY EXAM CHEST 2 VIEWS: CPT

## 2025-08-08 PROCEDURE — 86900 BLOOD TYPING SEROLOGIC ABO: CPT

## 2025-08-08 PROCEDURE — 82140 ASSAY OF AMMONIA: CPT

## 2025-08-08 PROCEDURE — 85018 HEMOGLOBIN: CPT

## 2025-08-08 PROCEDURE — 99285 EMERGENCY DEPT VISIT HI MDM: CPT

## 2025-08-08 PROCEDURE — 2060000000 HC ICU INTERMEDIATE R&B

## 2025-08-08 PROCEDURE — P9016 RBC LEUKOCYTES REDUCED: HCPCS

## 2025-08-08 PROCEDURE — 85025 COMPLETE CBC W/AUTO DIFF WBC: CPT

## 2025-08-08 PROCEDURE — 86850 RBC ANTIBODY SCREEN: CPT

## 2025-08-08 PROCEDURE — 83735 ASSAY OF MAGNESIUM: CPT

## 2025-08-08 PROCEDURE — 85014 HEMATOCRIT: CPT

## 2025-08-08 PROCEDURE — 36430 TRANSFUSION BLD/BLD COMPNT: CPT

## 2025-08-08 PROCEDURE — 2580000003 HC RX 258: Performed by: STUDENT IN AN ORGANIZED HEALTH CARE EDUCATION/TRAINING PROGRAM

## 2025-08-08 PROCEDURE — 82248 BILIRUBIN DIRECT: CPT

## 2025-08-08 PROCEDURE — 86920 COMPATIBILITY TEST SPIN: CPT

## 2025-08-08 PROCEDURE — 86901 BLOOD TYPING SEROLOGIC RH(D): CPT

## 2025-08-08 PROCEDURE — 6360000002 HC RX W HCPCS: Performed by: STUDENT IN AN ORGANIZED HEALTH CARE EDUCATION/TRAINING PROGRAM

## 2025-08-08 PROCEDURE — 94761 N-INVAS EAR/PLS OXIMETRY MLT: CPT

## 2025-08-08 RX ORDER — PANTOPRAZOLE SODIUM 40 MG/10ML
80 INJECTION, POWDER, LYOPHILIZED, FOR SOLUTION INTRAVENOUS ONCE
Status: COMPLETED | OUTPATIENT
Start: 2025-08-08 | End: 2025-08-08

## 2025-08-08 RX ORDER — SODIUM CHLORIDE 9 MG/ML
INJECTION, SOLUTION INTRAVENOUS CONTINUOUS
Status: DISCONTINUED | OUTPATIENT
Start: 2025-08-08 | End: 2025-08-08

## 2025-08-08 RX ORDER — INSULIN ASPART 100 [IU]/ML
INJECTION, SOLUTION INTRAVENOUS; SUBCUTANEOUS 3 TIMES DAILY
COMMUNITY

## 2025-08-08 RX ORDER — DEXTROSE MONOHYDRATE 100 MG/ML
INJECTION, SOLUTION INTRAVENOUS CONTINUOUS PRN
Status: DISCONTINUED | OUTPATIENT
Start: 2025-08-08 | End: 2025-08-10 | Stop reason: HOSPADM

## 2025-08-08 RX ORDER — LOSARTAN POTASSIUM 100 MG/1
100 TABLET ORAL DAILY
Status: DISCONTINUED | OUTPATIENT
Start: 2025-08-08 | End: 2025-08-10 | Stop reason: HOSPADM

## 2025-08-08 RX ORDER — ONDANSETRON 2 MG/ML
4 INJECTION INTRAMUSCULAR; INTRAVENOUS EVERY 6 HOURS PRN
Status: DISCONTINUED | OUTPATIENT
Start: 2025-08-08 | End: 2025-08-10 | Stop reason: HOSPADM

## 2025-08-08 RX ORDER — LANOLIN ALCOHOL/MO/W.PET/CERES
1000 CREAM (GRAM) TOPICAL DAILY
Status: DISCONTINUED | OUTPATIENT
Start: 2025-08-08 | End: 2025-08-10 | Stop reason: HOSPADM

## 2025-08-08 RX ORDER — ASPIRIN 81 MG/1
81 TABLET, CHEWABLE ORAL DAILY
Status: DISCONTINUED | OUTPATIENT
Start: 2025-08-08 | End: 2025-08-10 | Stop reason: HOSPADM

## 2025-08-08 RX ORDER — SODIUM CHLORIDE 0.9 % (FLUSH) 0.9 %
5-40 SYRINGE (ML) INJECTION EVERY 12 HOURS SCHEDULED
Status: DISCONTINUED | OUTPATIENT
Start: 2025-08-08 | End: 2025-08-10 | Stop reason: HOSPADM

## 2025-08-08 RX ORDER — FUROSEMIDE 20 MG/1
20 TABLET ORAL 2 TIMES DAILY
COMMUNITY

## 2025-08-08 RX ORDER — ACETAMINOPHEN 325 MG/1
650 TABLET ORAL EVERY 6 HOURS PRN
Status: DISCONTINUED | OUTPATIENT
Start: 2025-08-08 | End: 2025-08-10 | Stop reason: HOSPADM

## 2025-08-08 RX ORDER — ATORVASTATIN CALCIUM 40 MG/1
40 TABLET, FILM COATED ORAL NIGHTLY
Status: DISCONTINUED | OUTPATIENT
Start: 2025-08-08 | End: 2025-08-10 | Stop reason: HOSPADM

## 2025-08-08 RX ORDER — TIZANIDINE 2 MG/1
2 TABLET ORAL EVERY 8 HOURS PRN
COMMUNITY
Start: 2025-08-07

## 2025-08-08 RX ORDER — ONDANSETRON 4 MG/1
4 TABLET, ORALLY DISINTEGRATING ORAL EVERY 8 HOURS PRN
Status: DISCONTINUED | OUTPATIENT
Start: 2025-08-08 | End: 2025-08-10 | Stop reason: HOSPADM

## 2025-08-08 RX ORDER — GLUCAGON 1 MG/ML
1 KIT INJECTION PRN
Status: DISCONTINUED | OUTPATIENT
Start: 2025-08-08 | End: 2025-08-10 | Stop reason: HOSPADM

## 2025-08-08 RX ORDER — TIZANIDINE 2 MG/1
2 TABLET ORAL EVERY 8 HOURS PRN
Status: DISCONTINUED | OUTPATIENT
Start: 2025-08-08 | End: 2025-08-10 | Stop reason: HOSPADM

## 2025-08-08 RX ORDER — SODIUM CHLORIDE 0.9 % (FLUSH) 0.9 %
5-40 SYRINGE (ML) INJECTION PRN
Status: DISCONTINUED | OUTPATIENT
Start: 2025-08-08 | End: 2025-08-10 | Stop reason: HOSPADM

## 2025-08-08 RX ORDER — INSULIN LISPRO 100 [IU]/ML
0-8 INJECTION, SOLUTION INTRAVENOUS; SUBCUTANEOUS
Status: DISCONTINUED | OUTPATIENT
Start: 2025-08-08 | End: 2025-08-10 | Stop reason: HOSPADM

## 2025-08-08 RX ORDER — ACETAMINOPHEN 650 MG/1
650 SUPPOSITORY RECTAL EVERY 6 HOURS PRN
Status: DISCONTINUED | OUTPATIENT
Start: 2025-08-08 | End: 2025-08-10 | Stop reason: HOSPADM

## 2025-08-08 RX ORDER — HYDROCHLOROTHIAZIDE 12.5 MG/1
12.5 TABLET ORAL DAILY
Status: DISCONTINUED | OUTPATIENT
Start: 2025-08-08 | End: 2025-08-10 | Stop reason: HOSPADM

## 2025-08-08 RX ORDER — FUROSEMIDE 20 MG/1
20 TABLET ORAL 2 TIMES DAILY
Status: DISCONTINUED | OUTPATIENT
Start: 2025-08-08 | End: 2025-08-10 | Stop reason: HOSPADM

## 2025-08-08 RX ORDER — FERROUS SULFATE 325(65) MG
325 TABLET ORAL EVERY OTHER DAY
Status: DISCONTINUED | OUTPATIENT
Start: 2025-08-09 | End: 2025-08-10 | Stop reason: HOSPADM

## 2025-08-08 RX ORDER — SODIUM CHLORIDE 9 MG/ML
INJECTION, SOLUTION INTRAVENOUS PRN
Status: DISCONTINUED | OUTPATIENT
Start: 2025-08-08 | End: 2025-08-10 | Stop reason: HOSPADM

## 2025-08-08 RX ORDER — CLOPIDOGREL BISULFATE 75 MG/1
75 TABLET ORAL DAILY
Status: DISCONTINUED | OUTPATIENT
Start: 2025-08-08 | End: 2025-08-10 | Stop reason: HOSPADM

## 2025-08-08 RX ORDER — INSULIN GLARGINE 100 [IU]/ML
7 INJECTION, SOLUTION SUBCUTANEOUS NIGHTLY
Status: DISCONTINUED | OUTPATIENT
Start: 2025-08-08 | End: 2025-08-10 | Stop reason: HOSPADM

## 2025-08-08 RX ORDER — 0.9 % SODIUM CHLORIDE 0.9 %
1000 INTRAVENOUS SOLUTION INTRAVENOUS ONCE
Status: COMPLETED | OUTPATIENT
Start: 2025-08-08 | End: 2025-08-08

## 2025-08-08 RX ADMIN — SODIUM CHLORIDE: 0.9 INJECTION, SOLUTION INTRAVENOUS at 18:51

## 2025-08-08 RX ADMIN — LOSARTAN POTASSIUM 100 MG: 100 TABLET, FILM COATED ORAL at 16:50

## 2025-08-08 RX ADMIN — SODIUM CHLORIDE 1000 ML: 0.9 INJECTION, SOLUTION INTRAVENOUS at 12:34

## 2025-08-08 RX ADMIN — ATORVASTATIN CALCIUM 40 MG: 40 TABLET, FILM COATED ORAL at 20:55

## 2025-08-08 RX ADMIN — HYDROCHLOROTHIAZIDE 12.5 MG: 12.5 TABLET ORAL at 16:50

## 2025-08-08 RX ADMIN — FUROSEMIDE 20 MG: 20 TABLET ORAL at 20:55

## 2025-08-08 RX ADMIN — SODIUM CHLORIDE, PRESERVATIVE FREE 10 ML: 5 INJECTION INTRAVENOUS at 20:58

## 2025-08-08 RX ADMIN — INSULIN GLARGINE 7 UNITS: 100 INJECTION, SOLUTION SUBCUTANEOUS at 20:57

## 2025-08-08 RX ADMIN — FLUOXETINE HYDROCHLORIDE 40 MG: 20 CAPSULE ORAL at 16:50

## 2025-08-08 RX ADMIN — Medication 1000 MCG: at 16:50

## 2025-08-08 RX ADMIN — ACETAMINOPHEN 650 MG: 325 TABLET ORAL at 20:54

## 2025-08-08 RX ADMIN — TIZANIDINE 2 MG: 2 TABLET ORAL at 20:55

## 2025-08-08 RX ADMIN — PANTOPRAZOLE SODIUM 80 MG: 40 INJECTION, POWDER, FOR SOLUTION INTRAVENOUS at 13:48

## 2025-08-08 ASSESSMENT — LIFESTYLE VARIABLES
HOW OFTEN DO YOU HAVE A DRINK CONTAINING ALCOHOL: NEVER
HOW MANY STANDARD DRINKS CONTAINING ALCOHOL DO YOU HAVE ON A TYPICAL DAY: PATIENT DOES NOT DRINK

## 2025-08-08 ASSESSMENT — PAIN - FUNCTIONAL ASSESSMENT
PAIN_FUNCTIONAL_ASSESSMENT: ACTIVITIES ARE NOT PREVENTED
PAIN_FUNCTIONAL_ASSESSMENT: NONE - DENIES PAIN

## 2025-08-08 ASSESSMENT — PAIN DESCRIPTION - DESCRIPTORS: DESCRIPTORS: ACHING;SHOOTING;THROBBING

## 2025-08-08 ASSESSMENT — PAIN DESCRIPTION - LOCATION: LOCATION: BACK;SHOULDER

## 2025-08-08 ASSESSMENT — PAIN SCALES - GENERAL
PAINLEVEL_OUTOF10: 4
PAINLEVEL_OUTOF10: 2

## 2025-08-08 ASSESSMENT — PAIN DESCRIPTION - ORIENTATION: ORIENTATION: LEFT;MID

## 2025-08-09 ENCOUNTER — ANESTHESIA (OUTPATIENT)
Dept: ENDOSCOPY | Age: 85
End: 2025-08-09
Payer: MEDICARE

## 2025-08-09 ENCOUNTER — ANESTHESIA EVENT (OUTPATIENT)
Dept: ENDOSCOPY | Age: 85
End: 2025-08-09
Payer: MEDICARE

## 2025-08-09 LAB
ALBUMIN SERPL-MCNC: 3.3 G/DL (ref 3.4–5)
ALBUMIN/GLOB SERPL: 1.5 {RATIO} (ref 1.1–2.2)
ALP SERPL-CCNC: 73 U/L (ref 40–129)
ALT SERPL-CCNC: 11 U/L (ref 10–40)
ANION GAP SERPL CALCULATED.3IONS-SCNC: 9 MMOL/L (ref 9–17)
AST SERPL-CCNC: 17 U/L (ref 15–37)
BASOPHILS # BLD: 0.02 K/UL
BASOPHILS NFR BLD: 1 % (ref 0–1)
BILIRUB SERPL-MCNC: 0.6 MG/DL (ref 0–1)
BUN SERPL-MCNC: 42 MG/DL (ref 7–20)
CALCIUM SERPL-MCNC: 8.1 MG/DL (ref 8.3–10.6)
CHLORIDE SERPL-SCNC: 108 MMOL/L (ref 99–110)
CO2 SERPL-SCNC: 23 MMOL/L (ref 21–32)
CREAT SERPL-MCNC: 1.5 MG/DL (ref 0.8–1.3)
EOSINOPHIL # BLD: 0.08 K/UL
EOSINOPHILS RELATIVE PERCENT: 4 % (ref 0–3)
ERYTHROCYTE [DISTWIDTH] IN BLOOD BY AUTOMATED COUNT: 18 % (ref 11.7–14.9)
GFR, ESTIMATED: 46 ML/MIN/1.73M2
GLUCOSE BLD-MCNC: 149 MG/DL (ref 74–99)
GLUCOSE BLD-MCNC: 168 MG/DL (ref 74–99)
GLUCOSE BLD-MCNC: 227 MG/DL (ref 74–99)
GLUCOSE BLD-MCNC: 279 MG/DL (ref 74–99)
GLUCOSE SERPL-MCNC: 160 MG/DL (ref 74–99)
HCT VFR BLD AUTO: 24.1 % (ref 42–52)
HCT VFR BLD AUTO: 29.1 % (ref 42–52)
HCT VFR BLD AUTO: 29.1 % (ref 42–52)
HGB BLD-MCNC: 7 G/DL (ref 13.5–18)
HGB BLD-MCNC: 8.2 G/DL (ref 13.5–18)
HGB BLD-MCNC: 8.4 G/DL (ref 13.5–18)
IMM GRANULOCYTES # BLD AUTO: 0.04 K/UL
IMM GRANULOCYTES NFR BLD: 2 %
INR PPP: 1.2
IRON SATN MFR SERPL: 5 % (ref 15–50)
IRON SERPL-MCNC: 15 UG/DL (ref 59–158)
LYMPHOCYTES NFR BLD: 0.55 K/UL
LYMPHOCYTES RELATIVE PERCENT: 26 % (ref 24–44)
MCH RBC QN AUTO: 25.8 PG (ref 27–31)
MCHC RBC AUTO-ENTMCNC: 29 G/DL (ref 32–36)
MCV RBC AUTO: 88.9 FL (ref 78–100)
MONOCYTES NFR BLD: 0.23 K/UL
MONOCYTES NFR BLD: 11 % (ref 0–5)
NEUTROPHILS NFR BLD: 57 % (ref 36–66)
NEUTS SEG NFR BLD: 1.24 K/UL
PARTIAL THROMBOPLASTIN TIME: 42.1 SEC (ref 25.1–37.1)
PLATELET # BLD AUTO: 125 K/UL (ref 140–440)
PMV BLD AUTO: 10.8 FL (ref 7.5–11.1)
POTASSIUM SERPL-SCNC: 4.1 MMOL/L (ref 3.5–5.1)
PROT SERPL-MCNC: 5.4 G/DL (ref 6.4–8.2)
PROTHROMBIN TIME: 16.3 SEC (ref 11.7–14.5)
RBC # BLD AUTO: 2.71 M/UL (ref 4.6–6.2)
SODIUM SERPL-SCNC: 140 MMOL/L (ref 136–145)
TIBC SERPL-MCNC: 314 UG/DL (ref 260–445)
UNSATURATED IRON BINDING CAPACITY: 299 UG/DL (ref 110–370)
WBC OTHER # BLD: 2.2 K/UL (ref 4–10.5)

## 2025-08-09 PROCEDURE — 83540 ASSAY OF IRON: CPT

## 2025-08-09 PROCEDURE — 83550 IRON BINDING TEST: CPT

## 2025-08-09 PROCEDURE — 36430 TRANSFUSION BLD/BLD COMPNT: CPT

## 2025-08-09 PROCEDURE — 85014 HEMATOCRIT: CPT

## 2025-08-09 PROCEDURE — 82962 GLUCOSE BLOOD TEST: CPT

## 2025-08-09 PROCEDURE — 2580000003 HC RX 258: Performed by: STUDENT IN AN ORGANIZED HEALTH CARE EDUCATION/TRAINING PROGRAM

## 2025-08-09 PROCEDURE — 6360000002 HC RX W HCPCS: Performed by: HOSPITALIST

## 2025-08-09 PROCEDURE — P9016 RBC LEUKOCYTES REDUCED: HCPCS

## 2025-08-09 PROCEDURE — 6360000002 HC RX W HCPCS: Performed by: STUDENT IN AN ORGANIZED HEALTH CARE EDUCATION/TRAINING PROGRAM

## 2025-08-09 PROCEDURE — 2060000000 HC ICU INTERMEDIATE R&B

## 2025-08-09 PROCEDURE — 85018 HEMOGLOBIN: CPT

## 2025-08-09 PROCEDURE — 30233N1 TRANSFUSION OF NONAUTOLOGOUS RED BLOOD CELLS INTO PERIPHERAL VEIN, PERCUTANEOUS APPROACH: ICD-10-PCS | Performed by: STUDENT IN AN ORGANIZED HEALTH CARE EDUCATION/TRAINING PROGRAM

## 2025-08-09 PROCEDURE — 94761 N-INVAS EAR/PLS OXIMETRY MLT: CPT

## 2025-08-09 PROCEDURE — 6360000002 HC RX W HCPCS

## 2025-08-09 PROCEDURE — 3700000001 HC ADD 15 MINUTES (ANESTHESIA): Performed by: SPECIALIST

## 2025-08-09 PROCEDURE — 85730 THROMBOPLASTIN TIME PARTIAL: CPT

## 2025-08-09 PROCEDURE — 85610 PROTHROMBIN TIME: CPT

## 2025-08-09 PROCEDURE — 0DJ08ZZ INSPECTION OF UPPER INTESTINAL TRACT, VIA NATURAL OR ARTIFICIAL OPENING ENDOSCOPIC: ICD-10-PCS | Performed by: SPECIALIST

## 2025-08-09 PROCEDURE — 85025 COMPLETE CBC W/AUTO DIFF WBC: CPT

## 2025-08-09 PROCEDURE — 6370000000 HC RX 637 (ALT 250 FOR IP): Performed by: HOSPITALIST

## 2025-08-09 PROCEDURE — 3700000000 HC ANESTHESIA ATTENDED CARE: Performed by: SPECIALIST

## 2025-08-09 PROCEDURE — 6370000000 HC RX 637 (ALT 250 FOR IP): Performed by: STUDENT IN AN ORGANIZED HEALTH CARE EDUCATION/TRAINING PROGRAM

## 2025-08-09 PROCEDURE — 2500000003 HC RX 250 WO HCPCS: Performed by: HOSPITALIST

## 2025-08-09 PROCEDURE — 2580000003 HC RX 258

## 2025-08-09 PROCEDURE — 36415 COLL VENOUS BLD VENIPUNCTURE: CPT

## 2025-08-09 PROCEDURE — 3609017100 HC EGD: Performed by: SPECIALIST

## 2025-08-09 PROCEDURE — 2709999900 HC NON-CHARGEABLE SUPPLY: Performed by: SPECIALIST

## 2025-08-09 PROCEDURE — 6370000000 HC RX 637 (ALT 250 FOR IP): Performed by: NURSE PRACTITIONER

## 2025-08-09 PROCEDURE — 80053 COMPREHEN METABOLIC PANEL: CPT

## 2025-08-09 RX ORDER — POLYETHYLENE GLYCOL 3350 17 G/17G
17 POWDER, FOR SOLUTION ORAL 2 TIMES DAILY PRN
Status: DISCONTINUED | OUTPATIENT
Start: 2025-08-09 | End: 2025-08-10 | Stop reason: HOSPADM

## 2025-08-09 RX ORDER — SODIUM CHLORIDE, SODIUM LACTATE, POTASSIUM CHLORIDE, CALCIUM CHLORIDE 600; 310; 30; 20 MG/100ML; MG/100ML; MG/100ML; MG/100ML
INJECTION, SOLUTION INTRAVENOUS
Status: DISCONTINUED | OUTPATIENT
Start: 2025-08-09 | End: 2025-08-09 | Stop reason: SDUPTHER

## 2025-08-09 RX ORDER — SODIUM CHLORIDE 9 MG/ML
250 INJECTION, SOLUTION INTRAVENOUS PRN
Status: DISCONTINUED | OUTPATIENT
Start: 2025-08-09 | End: 2025-08-10 | Stop reason: HOSPADM

## 2025-08-09 RX ORDER — LIDOCAINE HYDROCHLORIDE 20 MG/ML
INJECTION, SOLUTION INFILTRATION; PERINEURAL
Status: DISCONTINUED | OUTPATIENT
Start: 2025-08-09 | End: 2025-08-09 | Stop reason: SDUPTHER

## 2025-08-09 RX ORDER — PROPOFOL 10 MG/ML
INJECTION, EMULSION INTRAVENOUS
Status: DISCONTINUED | OUTPATIENT
Start: 2025-08-09 | End: 2025-08-09 | Stop reason: SDUPTHER

## 2025-08-09 RX ORDER — SENNA AND DOCUSATE SODIUM 50; 8.6 MG/1; MG/1
2 TABLET, FILM COATED ORAL DAILY PRN
Status: DISCONTINUED | OUTPATIENT
Start: 2025-08-09 | End: 2025-08-10 | Stop reason: HOSPADM

## 2025-08-09 RX ORDER — OXYCODONE AND ACETAMINOPHEN 7.5; 325 MG/1; MG/1
1 TABLET ORAL 4 TIMES DAILY
Refills: 0 | Status: DISCONTINUED | OUTPATIENT
Start: 2025-08-09 | End: 2025-08-10 | Stop reason: HOSPADM

## 2025-08-09 RX ADMIN — LOSARTAN POTASSIUM 100 MG: 100 TABLET, FILM COATED ORAL at 08:49

## 2025-08-09 RX ADMIN — OXYCODONE HYDROCHLORIDE AND ACETAMINOPHEN 1 TABLET: 7.5; 325 TABLET ORAL at 08:48

## 2025-08-09 RX ADMIN — HYDROCHLOROTHIAZIDE 12.5 MG: 12.5 TABLET ORAL at 08:49

## 2025-08-09 RX ADMIN — FUROSEMIDE 20 MG: 20 TABLET ORAL at 08:49

## 2025-08-09 RX ADMIN — INSULIN LISPRO 4 UNITS: 100 INJECTION, SOLUTION INTRAVENOUS; SUBCUTANEOUS at 21:47

## 2025-08-09 RX ADMIN — SODIUM CHLORIDE, PRESERVATIVE FREE 10 ML: 5 INJECTION INTRAVENOUS at 08:49

## 2025-08-09 RX ADMIN — FLUOXETINE HYDROCHLORIDE 40 MG: 20 CAPSULE ORAL at 08:48

## 2025-08-09 RX ADMIN — OXYCODONE HYDROCHLORIDE AND ACETAMINOPHEN 1 TABLET: 7.5; 325 TABLET ORAL at 18:45

## 2025-08-09 RX ADMIN — OXYCODONE HYDROCHLORIDE AND ACETAMINOPHEN 1 TABLET: 7.5; 325 TABLET ORAL at 21:47

## 2025-08-09 RX ADMIN — INSULIN GLARGINE 7 UNITS: 100 INJECTION, SOLUTION SUBCUTANEOUS at 21:47

## 2025-08-09 RX ADMIN — PANTOPRAZOLE SODIUM 40 MG: 40 INJECTION, POWDER, FOR SOLUTION INTRAVENOUS at 02:18

## 2025-08-09 RX ADMIN — SENNOSIDES, DOCUSATE SODIUM 2 TABLET: 50; 8.6 TABLET, FILM COATED ORAL at 05:47

## 2025-08-09 RX ADMIN — PROPOFOL 50 MG: 10 INJECTION, EMULSION INTRAVENOUS at 10:19

## 2025-08-09 RX ADMIN — INSULIN LISPRO 2 UNITS: 100 INJECTION, SOLUTION INTRAVENOUS; SUBCUTANEOUS at 18:46

## 2025-08-09 RX ADMIN — LIDOCAINE HYDROCHLORIDE 100 MG: 20 INJECTION, SOLUTION INFILTRATION; PERINEURAL at 10:19

## 2025-08-09 RX ADMIN — Medication 1000 MCG: at 08:48

## 2025-08-09 RX ADMIN — ATORVASTATIN CALCIUM 40 MG: 40 TABLET, FILM COATED ORAL at 21:47

## 2025-08-09 RX ADMIN — IRON SUCROSE 200 MG: 20 INJECTION, SOLUTION INTRAVENOUS at 18:53

## 2025-08-09 RX ADMIN — SODIUM CHLORIDE, PRESERVATIVE FREE 10 ML: 5 INJECTION INTRAVENOUS at 21:47

## 2025-08-09 RX ADMIN — OXYCODONE HYDROCHLORIDE AND ACETAMINOPHEN 1 TABLET: 7.5; 325 TABLET ORAL at 13:51

## 2025-08-09 RX ADMIN — ACETAMINOPHEN 650 MG: 325 TABLET ORAL at 03:22

## 2025-08-09 RX ADMIN — SODIUM CHLORIDE, POTASSIUM CHLORIDE, SODIUM LACTATE AND CALCIUM CHLORIDE: 600; 310; 30; 20 INJECTION, SOLUTION INTRAVENOUS at 10:13

## 2025-08-09 RX ADMIN — FERROUS SULFATE TAB 325 MG (65 MG ELEMENTAL FE) 325 MG: 325 (65 FE) TAB at 08:48

## 2025-08-09 ASSESSMENT — PAIN - FUNCTIONAL ASSESSMENT
PAIN_FUNCTIONAL_ASSESSMENT: 0-10
PAIN_FUNCTIONAL_ASSESSMENT: ACTIVITIES ARE NOT PREVENTED
PAIN_FUNCTIONAL_ASSESSMENT: ACTIVITIES ARE NOT PREVENTED
PAIN_FUNCTIONAL_ASSESSMENT: 0-10
PAIN_FUNCTIONAL_ASSESSMENT: 0-10

## 2025-08-09 ASSESSMENT — PAIN SCALES - GENERAL
PAINLEVEL_OUTOF10: 6
PAINLEVEL_OUTOF10: 6
PAINLEVEL_OUTOF10: 3
PAINLEVEL_OUTOF10: 7
PAINLEVEL_OUTOF10: 9
PAINLEVEL_OUTOF10: 0
PAINLEVEL_OUTOF10: 7

## 2025-08-09 ASSESSMENT — PAIN DESCRIPTION - ORIENTATION
ORIENTATION: LEFT;RIGHT;MID
ORIENTATION: LEFT;RIGHT;MID
ORIENTATION: RIGHT;LEFT
ORIENTATION: LEFT;RIGHT

## 2025-08-09 ASSESSMENT — PAIN DESCRIPTION - LOCATION
LOCATION: BACK;LEG;SHOULDER
LOCATION: BACK;SHOULDER;LEG
LOCATION: BACK;LEG
LOCATION: BACK;LEG
LOCATION: BACK;LEG;SHOULDER
LOCATION: GENERALIZED

## 2025-08-09 ASSESSMENT — PAIN DESCRIPTION - DESCRIPTORS
DESCRIPTORS: SHOOTING;THROBBING
DESCRIPTORS: ACHING;SORE

## 2025-08-10 ENCOUNTER — APPOINTMENT (OUTPATIENT)
Dept: ULTRASOUND IMAGING | Age: 85
DRG: 811 | End: 2025-08-10
Payer: MEDICARE

## 2025-08-10 VITALS
DIASTOLIC BLOOD PRESSURE: 61 MMHG | BODY MASS INDEX: 33.93 KG/M2 | SYSTOLIC BLOOD PRESSURE: 141 MMHG | HEIGHT: 70 IN | OXYGEN SATURATION: 98 % | TEMPERATURE: 98.1 F | WEIGHT: 236.99 LBS | HEART RATE: 87 BPM | RESPIRATION RATE: 18 BRPM

## 2025-08-10 LAB
ABO/RH: NORMAL
ALBUMIN SERPL-MCNC: 3.6 G/DL (ref 3.4–5)
ALBUMIN/GLOB SERPL: 1.6 {RATIO} (ref 1.1–2.2)
ALP SERPL-CCNC: 78 U/L (ref 40–129)
ALT SERPL-CCNC: 13 U/L (ref 10–40)
ANION GAP SERPL CALCULATED.3IONS-SCNC: 11 MMOL/L (ref 9–17)
ANTIBODY SCREEN: NEGATIVE
AST SERPL-CCNC: 21 U/L (ref 15–37)
BASOPHILS # BLD: 0.01 K/UL
BASOPHILS NFR BLD: 0 % (ref 0–1)
BILIRUB SERPL-MCNC: 0.9 MG/DL (ref 0–1)
BLOOD BANK BLOOD PRODUCT EXPIRATION DATE: NORMAL
BLOOD BANK BLOOD PRODUCT EXPIRATION DATE: NORMAL
BLOOD BANK DISPENSE STATUS: NORMAL
BLOOD BANK DISPENSE STATUS: NORMAL
BLOOD BANK ISBT PRODUCT BLOOD TYPE: 5100
BLOOD BANK ISBT PRODUCT BLOOD TYPE: 5100
BLOOD BANK PRODUCT CODE: NORMAL
BLOOD BANK PRODUCT CODE: NORMAL
BLOOD BANK SAMPLE EXPIRATION: NORMAL
BLOOD BANK UNIT TYPE AND RH: NORMAL
BLOOD BANK UNIT TYPE AND RH: NORMAL
BPU ID: NORMAL
BPU ID: NORMAL
BUN SERPL-MCNC: 31 MG/DL (ref 7–20)
CALCIUM SERPL-MCNC: 8.3 MG/DL (ref 8.3–10.6)
CHLORIDE SERPL-SCNC: 104 MMOL/L (ref 99–110)
CO2 SERPL-SCNC: 23 MMOL/L (ref 21–32)
COMPONENT: NORMAL
COMPONENT: NORMAL
CREAT SERPL-MCNC: 1.4 MG/DL (ref 0.8–1.3)
CROSSMATCH RESULT: NORMAL
CROSSMATCH RESULT: NORMAL
EOSINOPHIL # BLD: 0.06 K/UL
EOSINOPHILS RELATIVE PERCENT: 2 % (ref 0–3)
ERYTHROCYTE [DISTWIDTH] IN BLOOD BY AUTOMATED COUNT: 18.1 % (ref 11.7–14.9)
FOLATE SERPL-MCNC: 12.6 NG/ML (ref 4.8–24.2)
GFR, ESTIMATED: 50 ML/MIN/1.73M2
GLUCOSE BLD-MCNC: 156 MG/DL (ref 74–99)
GLUCOSE BLD-MCNC: 218 MG/DL (ref 74–99)
GLUCOSE BLD-MCNC: 275 MG/DL (ref 74–99)
GLUCOSE SERPL-MCNC: 204 MG/DL (ref 74–99)
HCT VFR BLD AUTO: 29.4 % (ref 42–52)
HCT VFR BLD AUTO: 31.1 % (ref 42–52)
HGB BLD-MCNC: 8.3 G/DL (ref 13.5–18)
HGB BLD-MCNC: 8.7 G/DL (ref 13.5–18)
IMM GRANULOCYTES # BLD AUTO: 0.11 K/UL
IMM GRANULOCYTES NFR BLD: 4 %
LYMPHOCYTES NFR BLD: 0.45 K/UL
LYMPHOCYTES RELATIVE PERCENT: 16 % (ref 24–44)
MAGNESIUM SERPL-MCNC: 2.3 MG/DL (ref 1.8–2.4)
MCH RBC QN AUTO: 25.4 PG (ref 27–31)
MCHC RBC AUTO-ENTMCNC: 29.6 G/DL (ref 32–36)
MCV RBC AUTO: 86 FL (ref 78–100)
MONOCYTES NFR BLD: 0.34 K/UL
MONOCYTES NFR BLD: 12 % (ref 0–5)
NEUTROPHILS NFR BLD: 65 % (ref 36–66)
NEUTS SEG NFR BLD: 1.77 K/UL
PLATELET # BLD AUTO: 139 K/UL (ref 140–440)
PMV BLD AUTO: 10.2 FL (ref 7.5–11.1)
POTASSIUM SERPL-SCNC: 3.7 MMOL/L (ref 3.5–5.1)
PROT SERPL-MCNC: 5.8 G/DL (ref 6.4–8.2)
RBC # BLD AUTO: 3.42 M/UL (ref 4.6–6.2)
SODIUM SERPL-SCNC: 137 MMOL/L (ref 136–145)
TRANSFUSION STATUS: NORMAL
TRANSFUSION STATUS: NORMAL
TSH SERPL DL<=0.05 MIU/L-ACNC: 4.89 UIU/ML (ref 0.27–4.2)
UNIT DIVISION: 0
UNIT DIVISION: 0
UNIT ISSUE DATE/TIME: NORMAL
UNIT ISSUE DATE/TIME: NORMAL
VIT B12 SERPL-MCNC: 1816 PG/ML (ref 211–911)
WBC OTHER # BLD: 2.7 K/UL (ref 4–10.5)

## 2025-08-10 PROCEDURE — 85018 HEMOGLOBIN: CPT

## 2025-08-10 PROCEDURE — 82607 VITAMIN B-12: CPT

## 2025-08-10 PROCEDURE — 2500000003 HC RX 250 WO HCPCS: Performed by: SPECIALIST

## 2025-08-10 PROCEDURE — 82746 ASSAY OF FOLIC ACID SERUM: CPT

## 2025-08-10 PROCEDURE — 82962 GLUCOSE BLOOD TEST: CPT

## 2025-08-10 PROCEDURE — 6360000002 HC RX W HCPCS: Performed by: SPECIALIST

## 2025-08-10 PROCEDURE — 80053 COMPREHEN METABOLIC PANEL: CPT

## 2025-08-10 PROCEDURE — 84443 ASSAY THYROID STIM HORMONE: CPT

## 2025-08-10 PROCEDURE — 93925 LOWER EXTREMITY STUDY: CPT

## 2025-08-10 PROCEDURE — 6370000000 HC RX 637 (ALT 250 FOR IP): Performed by: HOSPITALIST

## 2025-08-10 PROCEDURE — 6360000002 HC RX W HCPCS: Performed by: HOSPITALIST

## 2025-08-10 PROCEDURE — 83735 ASSAY OF MAGNESIUM: CPT

## 2025-08-10 PROCEDURE — 85014 HEMATOCRIT: CPT

## 2025-08-10 PROCEDURE — 6370000000 HC RX 637 (ALT 250 FOR IP): Performed by: STUDENT IN AN ORGANIZED HEALTH CARE EDUCATION/TRAINING PROGRAM

## 2025-08-10 PROCEDURE — 85025 COMPLETE CBC W/AUTO DIFF WBC: CPT

## 2025-08-10 PROCEDURE — 36415 COLL VENOUS BLD VENIPUNCTURE: CPT

## 2025-08-10 PROCEDURE — 94761 N-INVAS EAR/PLS OXIMETRY MLT: CPT

## 2025-08-10 RX ORDER — GABAPENTIN 100 MG/1
100 CAPSULE ORAL 2 TIMES DAILY PRN
Qty: 60 CAPSULE | Refills: 0 | Status: SHIPPED | OUTPATIENT
Start: 2025-08-10 | End: 2025-09-09

## 2025-08-10 RX ADMIN — LIDOCAINE HYDROCHLORIDE: 20 SOLUTION ORAL at 12:23

## 2025-08-10 RX ADMIN — Medication 1000 MCG: at 09:40

## 2025-08-10 RX ADMIN — INSULIN LISPRO 4 UNITS: 100 INJECTION, SOLUTION INTRAVENOUS; SUBCUTANEOUS at 12:22

## 2025-08-10 RX ADMIN — INSULIN LISPRO 2 UNITS: 100 INJECTION, SOLUTION INTRAVENOUS; SUBCUTANEOUS at 10:03

## 2025-08-10 RX ADMIN — LOSARTAN POTASSIUM 100 MG: 100 TABLET, FILM COATED ORAL at 09:40

## 2025-08-10 RX ADMIN — OXYCODONE HYDROCHLORIDE AND ACETAMINOPHEN 1 TABLET: 7.5; 325 TABLET ORAL at 09:41

## 2025-08-10 RX ADMIN — HYDROCHLOROTHIAZIDE 12.5 MG: 12.5 TABLET ORAL at 09:40

## 2025-08-10 RX ADMIN — ONDANSETRON 4 MG: 2 INJECTION INTRAMUSCULAR; INTRAVENOUS at 07:24

## 2025-08-10 RX ADMIN — FUROSEMIDE 20 MG: 20 TABLET ORAL at 09:41

## 2025-08-10 RX ADMIN — OXYCODONE HYDROCHLORIDE AND ACETAMINOPHEN 1 TABLET: 7.5; 325 TABLET ORAL at 12:23

## 2025-08-10 RX ADMIN — PANTOPRAZOLE SODIUM 40 MG: 40 INJECTION, POWDER, FOR SOLUTION INTRAVENOUS at 09:42

## 2025-08-10 RX ADMIN — FLUOXETINE HYDROCHLORIDE 40 MG: 20 CAPSULE ORAL at 09:40

## 2025-08-10 ASSESSMENT — PAIN - FUNCTIONAL ASSESSMENT
PAIN_FUNCTIONAL_ASSESSMENT: ACTIVITIES ARE NOT PREVENTED
PAIN_FUNCTIONAL_ASSESSMENT: 0-10

## 2025-08-10 ASSESSMENT — PAIN DESCRIPTION - DESCRIPTORS: DESCRIPTORS: ACHING

## 2025-08-10 ASSESSMENT — PAIN DESCRIPTION - ORIENTATION: ORIENTATION: MID

## 2025-08-10 ASSESSMENT — PAIN SCALES - GENERAL: PAINLEVEL_OUTOF10: 9

## 2025-08-10 ASSESSMENT — PAIN DESCRIPTION - LOCATION: LOCATION: ABDOMEN

## 2025-08-15 DIAGNOSIS — K90.9 INTESTINAL MALABSORPTION, UNSPECIFIED TYPE: ICD-10-CM

## 2025-08-15 DIAGNOSIS — D50.9 IRON DEFICIENCY ANEMIA, UNSPECIFIED IRON DEFICIENCY ANEMIA TYPE: Primary | ICD-10-CM

## 2025-08-15 RX ORDER — ACETAMINOPHEN 325 MG/1
650 TABLET ORAL
Status: CANCELLED | OUTPATIENT
Start: 2025-08-15

## 2025-08-15 RX ORDER — DIPHENHYDRAMINE HYDROCHLORIDE 50 MG/ML
50 INJECTION, SOLUTION INTRAMUSCULAR; INTRAVENOUS
Status: CANCELLED | OUTPATIENT
Start: 2025-08-15

## 2025-08-15 RX ORDER — HYDROCORTISONE SODIUM SUCCINATE 100 MG/2ML
100 INJECTION INTRAMUSCULAR; INTRAVENOUS
Status: CANCELLED | OUTPATIENT
Start: 2025-08-15

## 2025-08-15 RX ORDER — EPINEPHRINE 1 MG/ML
0.3 INJECTION, SOLUTION INTRAMUSCULAR; SUBCUTANEOUS PRN
Status: CANCELLED | OUTPATIENT
Start: 2025-08-15

## 2025-08-15 RX ORDER — SODIUM CHLORIDE 9 MG/ML
INJECTION, SOLUTION INTRAVENOUS PRN
Status: CANCELLED | OUTPATIENT
Start: 2025-08-15

## 2025-08-15 RX ORDER — SODIUM CHLORIDE 0.9 % (FLUSH) 0.9 %
5-40 SYRINGE (ML) INJECTION PRN
Status: CANCELLED | OUTPATIENT
Start: 2025-08-15

## 2025-08-15 RX ORDER — FAMOTIDINE 10 MG/ML
20 INJECTION, SOLUTION INTRAVENOUS
Status: CANCELLED | OUTPATIENT
Start: 2025-08-15

## 2025-08-15 RX ORDER — ALBUTEROL SULFATE 90 UG/1
4 INHALANT RESPIRATORY (INHALATION) PRN
Status: CANCELLED | OUTPATIENT
Start: 2025-08-15

## 2025-08-15 RX ORDER — ONDANSETRON 2 MG/ML
8 INJECTION INTRAMUSCULAR; INTRAVENOUS
Status: CANCELLED | OUTPATIENT
Start: 2025-08-15

## 2025-08-18 ENCOUNTER — HOSPITAL ENCOUNTER (EMERGENCY)
Age: 85
Discharge: HOME | End: 2025-08-18
Payer: MEDICARE

## 2025-08-18 VITALS
TEMPERATURE: 98.8 F | BODY MASS INDEX: 33.64 KG/M2 | RESPIRATION RATE: 16 BRPM | OXYGEN SATURATION: 100 % | HEART RATE: 79 BPM | WEIGHT: 235 LBS | HEIGHT: 70 IN | DIASTOLIC BLOOD PRESSURE: 63 MMHG | SYSTOLIC BLOOD PRESSURE: 169 MMHG

## 2025-08-18 DIAGNOSIS — D64.9 CHRONIC ANEMIA: Primary | ICD-10-CM

## 2025-08-18 PROBLEM — D50.0 IRON DEFICIENCY ANEMIA SECONDARY TO BLOOD LOSS (CHRONIC): Status: ACTIVE | Noted: 2025-08-15

## 2025-08-18 LAB
ABO + RH BLD: NORMAL
ALBUMIN SERPL-MCNC: 3.4 G/DL (ref 3.4–5)
ALBUMIN/GLOB SERPL: 1.5 {RATIO} (ref 1.1–2.2)
ALP SERPL-CCNC: 71 U/L (ref 40–129)
ALT SERPL-CCNC: 18 U/L (ref 10–40)
ANION GAP SERPL CALCULATED.3IONS-SCNC: 10 MMOL/L (ref 9–17)
AST SERPL-CCNC: 23 U/L (ref 15–37)
BILIRUB SERPL-MCNC: 0.2 MG/DL (ref 0–1)
BLOOD BANK SAMPLE EXPIRATION: NORMAL
BLOOD GROUP ANTIBODIES SERPL: NEGATIVE
BUN SERPL-MCNC: 41 MG/DL (ref 7–20)
CALCIUM SERPL-MCNC: 8.4 MG/DL (ref 8.3–10.6)
CHLORIDE SERPL-SCNC: 105 MMOL/L (ref 99–110)
CO2 SERPL-SCNC: 25 MMOL/L (ref 21–32)
CREAT SERPL-MCNC: 1.7 MG/DL (ref 0.8–1.3)
GFR, ESTIMATED: 39 ML/MIN/1.73M2
GLUCOSE SERPL-MCNC: 236 MG/DL (ref 74–99)
POTASSIUM SERPL-SCNC: 4.4 MMOL/L (ref 3.5–5.1)
PROT SERPL-MCNC: 5.7 G/DL (ref 6.4–8.2)
SODIUM SERPL-SCNC: 140 MMOL/L (ref 136–145)

## 2025-08-18 PROCEDURE — 86900 BLOOD TYPING SEROLOGIC ABO: CPT

## 2025-08-18 PROCEDURE — 86850 RBC ANTIBODY SCREEN: CPT

## 2025-08-18 PROCEDURE — 80053 COMPREHEN METABOLIC PANEL: CPT

## 2025-08-18 PROCEDURE — 85025 COMPLETE CBC W/AUTO DIFF WBC: CPT

## 2025-08-18 PROCEDURE — 86901 BLOOD TYPING SEROLOGIC RH(D): CPT

## 2025-08-18 PROCEDURE — 99283 EMERGENCY DEPT VISIT LOW MDM: CPT

## 2025-08-18 ASSESSMENT — PAIN - FUNCTIONAL ASSESSMENT
PAIN_FUNCTIONAL_ASSESSMENT: 0-10
PAIN_FUNCTIONAL_ASSESSMENT: 0-10

## 2025-08-18 ASSESSMENT — PAIN SCALES - GENERAL
PAINLEVEL_OUTOF10: 7
PAINLEVEL_OUTOF10: 7

## 2025-08-19 LAB
BASOPHILS # BLD: 0.06 K/UL
BASOPHILS NFR BLD: 2 % (ref 0–1)
EOSINOPHIL # BLD: 0 K/UL
EOSINOPHILS RELATIVE PERCENT: 0 % (ref 0–3)
ERYTHROCYTE [DISTWIDTH] IN BLOOD BY AUTOMATED COUNT: 19 % (ref 11.7–14.9)
HCT VFR BLD AUTO: 26.6 % (ref 42–52)
HGB BLD-MCNC: 7.7 G/DL (ref 13.5–18)
LYMPHOCYTES NFR BLD: 0.76 K/UL
LYMPHOCYTES RELATIVE PERCENT: 27 % (ref 24–44)
MCH RBC QN AUTO: 26.3 PG (ref 27–31)
MCHC RBC AUTO-ENTMCNC: 28.9 G/DL (ref 32–36)
MCV RBC AUTO: 90.8 FL (ref 78–100)
MONOCYTES NFR BLD: 0.31 K/UL
MONOCYTES NFR BLD: 11 % (ref 0–5)
NEUTROPHILS NFR BLD: 60 % (ref 36–66)
NEUTS SEG NFR BLD: 1.68 K/UL
PLATELET # BLD AUTO: 123 K/UL (ref 140–440)
PLATELET ESTIMATE: NORMAL
PMV BLD AUTO: 10.4 FL (ref 7.5–11.1)
RBC # BLD AUTO: 2.93 M/UL (ref 4.6–6.2)
RBC # BLD: ABNORMAL 10*6/UL
WBC # BLD: NORMAL 10*3/UL
WBC OTHER # BLD: 2.8 K/UL (ref 4–10.5)

## 2025-08-22 ENCOUNTER — HOSPITAL ENCOUNTER (INPATIENT)
Age: 85
LOS: 1 days | Discharge: HOME | DRG: 810 | End: 2025-08-23
Attending: EMERGENCY MEDICINE | Admitting: STUDENT IN AN ORGANIZED HEALTH CARE EDUCATION/TRAINING PROGRAM
Payer: MEDICARE

## 2025-08-22 ENCOUNTER — HOSPITAL ENCOUNTER (OUTPATIENT)
Dept: INFUSION THERAPY | Age: 85
Setting detail: INFUSION SERIES
Discharge: HOME | DRG: 810 | End: 2025-08-22
Payer: MEDICARE

## 2025-08-22 VITALS
RESPIRATION RATE: 16 BRPM | DIASTOLIC BLOOD PRESSURE: 40 MMHG | OXYGEN SATURATION: 97 % | TEMPERATURE: 98 F | HEART RATE: 70 BPM | SYSTOLIC BLOOD PRESSURE: 96 MMHG

## 2025-08-22 DIAGNOSIS — I95.9 HYPOTENSION, UNSPECIFIED HYPOTENSION TYPE: ICD-10-CM

## 2025-08-22 DIAGNOSIS — N18.30 STAGE 3 CHRONIC KIDNEY DISEASE, UNSPECIFIED WHETHER STAGE 3A OR 3B CKD (HCC): Chronic | ICD-10-CM

## 2025-08-22 DIAGNOSIS — Z79.01 CHRONIC ANTICOAGULATION: ICD-10-CM

## 2025-08-22 DIAGNOSIS — R53.1 GENERAL WEAKNESS: ICD-10-CM

## 2025-08-22 DIAGNOSIS — D64.9 ANEMIA, UNSPECIFIED TYPE: Primary | ICD-10-CM

## 2025-08-22 DIAGNOSIS — D64.9 ACUTE ANEMIA: ICD-10-CM

## 2025-08-22 DIAGNOSIS — D50.0 IRON DEFICIENCY ANEMIA SECONDARY TO BLOOD LOSS (CHRONIC): ICD-10-CM

## 2025-08-22 DIAGNOSIS — K90.9 INTESTINAL MALABSORPTION, UNSPECIFIED TYPE: Primary | ICD-10-CM

## 2025-08-22 LAB
ALBUMIN SERPL-MCNC: 3.2 G/DL (ref 3.4–5)
ALBUMIN/GLOB SERPL: 1.4 {RATIO} (ref 1.1–2.2)
ALP SERPL-CCNC: 75 U/L (ref 40–129)
ALT SERPL-CCNC: 23 U/L (ref 10–40)
ANION GAP SERPL CALCULATED.3IONS-SCNC: 9 MMOL/L (ref 9–17)
AST SERPL-CCNC: 32 U/L (ref 15–37)
BASOPHILS # BLD: 0.02 K/UL
BASOPHILS NFR BLD: 1 % (ref 0–1)
BILIRUB SERPL-MCNC: 0.3 MG/DL (ref 0–1)
BUN SERPL-MCNC: 47 MG/DL (ref 7–20)
CALCIUM SERPL-MCNC: 7.8 MG/DL (ref 8.3–10.6)
CHLORIDE SERPL-SCNC: 106 MMOL/L (ref 99–110)
CO2 SERPL-SCNC: 26 MMOL/L (ref 21–32)
CREAT SERPL-MCNC: 1.8 MG/DL (ref 0.8–1.3)
EOSINOPHIL # BLD: 0.06 K/UL
EOSINOPHILS RELATIVE PERCENT: 3 % (ref 0–3)
ERYTHROCYTE [DISTWIDTH] IN BLOOD BY AUTOMATED COUNT: 19.8 % (ref 11.7–14.9)
FERRITIN SERPL-MCNC: 39 NG/ML (ref 30–400)
GFR, ESTIMATED: 37 ML/MIN/1.73M2
GLUCOSE BLD-MCNC: 242 MG/DL (ref 74–99)
GLUCOSE BLD-MCNC: 284 MG/DL (ref 74–99)
GLUCOSE SERPL-MCNC: 268 MG/DL (ref 74–99)
HCT VFR BLD AUTO: 24.5 % (ref 42–52)
HGB BLD-MCNC: 6.9 G/DL (ref 13.5–18)
IMM GRANULOCYTES # BLD AUTO: 0.07 K/UL
IMM GRANULOCYTES NFR BLD: 3 %
INR PPP: 1.3
LYMPHOCYTES NFR BLD: 0.58 K/UL
LYMPHOCYTES RELATIVE PERCENT: 25 % (ref 24–44)
MCH RBC QN AUTO: 26.8 PG (ref 27–31)
MCHC RBC AUTO-ENTMCNC: 28.2 G/DL (ref 32–36)
MCV RBC AUTO: 95.3 FL (ref 78–100)
MONOCYTES NFR BLD: 0.31 K/UL
MONOCYTES NFR BLD: 13 % (ref 0–5)
NEUTROPHILS NFR BLD: 55 % (ref 36–66)
NEUTS SEG NFR BLD: 1.28 K/UL
PARTIAL THROMBOPLASTIN TIME: 42.6 SEC (ref 25.1–37.1)
PLATELET # BLD AUTO: 111 K/UL (ref 140–440)
PMV BLD AUTO: 10.9 FL (ref 7.5–11.1)
POTASSIUM SERPL-SCNC: 5 MMOL/L (ref 3.5–5.1)
PROT SERPL-MCNC: 5.5 G/DL (ref 6.4–8.2)
PROTHROMBIN TIME: 16.9 SEC (ref 11.7–14.5)
RBC # BLD AUTO: 2.57 M/UL (ref 4.6–6.2)
SODIUM SERPL-SCNC: 141 MMOL/L (ref 136–145)
WBC OTHER # BLD: 2.3 K/UL (ref 4–10.5)

## 2025-08-22 PROCEDURE — P9016 RBC LEUKOCYTES REDUCED: HCPCS

## 2025-08-22 PROCEDURE — 2580000003 HC RX 258: Performed by: STUDENT IN AN ORGANIZED HEALTH CARE EDUCATION/TRAINING PROGRAM

## 2025-08-22 PROCEDURE — 2500000003 HC RX 250 WO HCPCS: Performed by: STUDENT IN AN ORGANIZED HEALTH CARE EDUCATION/TRAINING PROGRAM

## 2025-08-22 PROCEDURE — 2140000000 HC CCU INTERMEDIATE R&B

## 2025-08-22 PROCEDURE — 6370000000 HC RX 637 (ALT 250 FOR IP): Performed by: STUDENT IN AN ORGANIZED HEALTH CARE EDUCATION/TRAINING PROGRAM

## 2025-08-22 PROCEDURE — 36430 TRANSFUSION BLD/BLD COMPNT: CPT

## 2025-08-22 PROCEDURE — 99211 OFF/OP EST MAY X REQ PHY/QHP: CPT

## 2025-08-22 PROCEDURE — 85610 PROTHROMBIN TIME: CPT

## 2025-08-22 PROCEDURE — 86901 BLOOD TYPING SEROLOGIC RH(D): CPT

## 2025-08-22 PROCEDURE — 85025 COMPLETE CBC W/AUTO DIFF WBC: CPT

## 2025-08-22 PROCEDURE — 80053 COMPREHEN METABOLIC PANEL: CPT

## 2025-08-22 PROCEDURE — 99285 EMERGENCY DEPT VISIT HI MDM: CPT

## 2025-08-22 PROCEDURE — 86850 RBC ANTIBODY SCREEN: CPT

## 2025-08-22 PROCEDURE — 6370000000 HC RX 637 (ALT 250 FOR IP)

## 2025-08-22 PROCEDURE — 86900 BLOOD TYPING SEROLOGIC ABO: CPT

## 2025-08-22 PROCEDURE — 94761 N-INVAS EAR/PLS OXIMETRY MLT: CPT

## 2025-08-22 PROCEDURE — 86923 COMPATIBILITY TEST ELECTRIC: CPT

## 2025-08-22 PROCEDURE — 30233N1 TRANSFUSION OF NONAUTOLOGOUS RED BLOOD CELLS INTO PERIPHERAL VEIN, PERCUTANEOUS APPROACH: ICD-10-PCS | Performed by: STUDENT IN AN ORGANIZED HEALTH CARE EDUCATION/TRAINING PROGRAM

## 2025-08-22 PROCEDURE — 6360000002 HC RX W HCPCS: Performed by: STUDENT IN AN ORGANIZED HEALTH CARE EDUCATION/TRAINING PROGRAM

## 2025-08-22 PROCEDURE — 82962 GLUCOSE BLOOD TEST: CPT

## 2025-08-22 PROCEDURE — 85730 THROMBOPLASTIN TIME PARTIAL: CPT

## 2025-08-22 PROCEDURE — 82728 ASSAY OF FERRITIN: CPT

## 2025-08-22 RX ORDER — ATORVASTATIN CALCIUM 40 MG/1
40 TABLET, FILM COATED ORAL NIGHTLY
Status: DISCONTINUED | OUTPATIENT
Start: 2025-08-22 | End: 2025-08-23 | Stop reason: HOSPADM

## 2025-08-22 RX ORDER — GABAPENTIN 100 MG/1
100 CAPSULE ORAL 2 TIMES DAILY PRN
Status: DISCONTINUED | OUTPATIENT
Start: 2025-08-22 | End: 2025-08-23 | Stop reason: HOSPADM

## 2025-08-22 RX ORDER — ACETAMINOPHEN 325 MG/1
650 TABLET ORAL EVERY 6 HOURS PRN
Status: DISCONTINUED | OUTPATIENT
Start: 2025-08-22 | End: 2025-08-23 | Stop reason: HOSPADM

## 2025-08-22 RX ORDER — DIPHENHYDRAMINE HYDROCHLORIDE 50 MG/ML
50 INJECTION, SOLUTION INTRAMUSCULAR; INTRAVENOUS
OUTPATIENT
Start: 2025-08-29

## 2025-08-22 RX ORDER — HYDROCORTISONE SODIUM SUCCINATE 100 MG/2ML
100 INJECTION INTRAMUSCULAR; INTRAVENOUS
OUTPATIENT
Start: 2025-08-29

## 2025-08-22 RX ORDER — FUROSEMIDE 20 MG/1
20 TABLET ORAL 2 TIMES DAILY
Status: DISCONTINUED | OUTPATIENT
Start: 2025-08-22 | End: 2025-08-23 | Stop reason: HOSPADM

## 2025-08-22 RX ORDER — SODIUM CHLORIDE 0.9 % (FLUSH) 0.9 %
5-40 SYRINGE (ML) INJECTION PRN
Status: DISCONTINUED | OUTPATIENT
Start: 2025-08-22 | End: 2025-08-23 | Stop reason: HOSPADM

## 2025-08-22 RX ORDER — ONDANSETRON 4 MG/1
4 TABLET, ORALLY DISINTEGRATING ORAL EVERY 8 HOURS PRN
Status: DISCONTINUED | OUTPATIENT
Start: 2025-08-22 | End: 2025-08-22 | Stop reason: ALTCHOICE

## 2025-08-22 RX ORDER — POTASSIUM CHLORIDE 1500 MG/1
40 TABLET, EXTENDED RELEASE ORAL PRN
Status: DISCONTINUED | OUTPATIENT
Start: 2025-08-22 | End: 2025-08-23 | Stop reason: HOSPADM

## 2025-08-22 RX ORDER — SODIUM CHLORIDE 9 MG/ML
INJECTION, SOLUTION INTRAVENOUS PRN
Status: DISCONTINUED | OUTPATIENT
Start: 2025-08-22 | End: 2025-08-23 | Stop reason: HOSPADM

## 2025-08-22 RX ORDER — OXYCODONE AND ACETAMINOPHEN 7.5; 325 MG/1; MG/1
1 TABLET ORAL 4 TIMES DAILY
Status: DISCONTINUED | OUTPATIENT
Start: 2025-08-22 | End: 2025-08-23 | Stop reason: HOSPADM

## 2025-08-22 RX ORDER — ACETAMINOPHEN 325 MG/1
650 TABLET ORAL
OUTPATIENT
Start: 2025-08-29

## 2025-08-22 RX ORDER — LANOLIN ALCOHOL/MO/W.PET/CERES
1000 CREAM (GRAM) TOPICAL DAILY
Status: DISCONTINUED | OUTPATIENT
Start: 2025-08-23 | End: 2025-08-23 | Stop reason: HOSPADM

## 2025-08-22 RX ORDER — INSULIN GLARGINE 100 [IU]/ML
7 INJECTION, SOLUTION SUBCUTANEOUS NIGHTLY
Status: DISCONTINUED | OUTPATIENT
Start: 2025-08-23 | End: 2025-08-23 | Stop reason: HOSPADM

## 2025-08-22 RX ORDER — HYDROCHLOROTHIAZIDE 25 MG/1
12.5 TABLET ORAL DAILY
Status: DISCONTINUED | OUTPATIENT
Start: 2025-08-22 | End: 2025-08-22

## 2025-08-22 RX ORDER — ONDANSETRON 2 MG/ML
8 INJECTION INTRAMUSCULAR; INTRAVENOUS
OUTPATIENT
Start: 2025-08-29

## 2025-08-22 RX ORDER — TIZANIDINE 2 MG/1
2 TABLET ORAL EVERY 8 HOURS PRN
Status: DISCONTINUED | OUTPATIENT
Start: 2025-08-22 | End: 2025-08-23 | Stop reason: HOSPADM

## 2025-08-22 RX ORDER — ACETAMINOPHEN 650 MG/1
650 SUPPOSITORY RECTAL EVERY 6 HOURS PRN
Status: DISCONTINUED | OUTPATIENT
Start: 2025-08-22 | End: 2025-08-23 | Stop reason: HOSPADM

## 2025-08-22 RX ORDER — CLOPIDOGREL BISULFATE 75 MG/1
75 TABLET ORAL DAILY
Status: DISCONTINUED | OUTPATIENT
Start: 2025-08-23 | End: 2025-08-23 | Stop reason: HOSPADM

## 2025-08-22 RX ORDER — SENNA AND DOCUSATE SODIUM 50; 8.6 MG/1; MG/1
1 TABLET, FILM COATED ORAL 2 TIMES DAILY PRN
Status: DISCONTINUED | OUTPATIENT
Start: 2025-08-22 | End: 2025-08-23 | Stop reason: HOSPADM

## 2025-08-22 RX ORDER — SODIUM CHLORIDE 9 MG/ML
INJECTION, SOLUTION INTRAVENOUS PRN
OUTPATIENT
Start: 2025-08-29

## 2025-08-22 RX ORDER — ONDANSETRON 2 MG/ML
4 INJECTION INTRAMUSCULAR; INTRAVENOUS EVERY 6 HOURS PRN
Status: DISCONTINUED | OUTPATIENT
Start: 2025-08-22 | End: 2025-08-22 | Stop reason: ALTCHOICE

## 2025-08-22 RX ORDER — POLYETHYLENE GLYCOL 3350 17 G/17G
17 POWDER, FOR SOLUTION ORAL DAILY PRN
Status: DISCONTINUED | OUTPATIENT
Start: 2025-08-22 | End: 2025-08-23 | Stop reason: HOSPADM

## 2025-08-22 RX ORDER — ALBUTEROL SULFATE 90 UG/1
4 INHALANT RESPIRATORY (INHALATION) PRN
OUTPATIENT
Start: 2025-08-29

## 2025-08-22 RX ORDER — SODIUM CHLORIDE 0.9 % (FLUSH) 0.9 %
5-40 SYRINGE (ML) INJECTION EVERY 12 HOURS SCHEDULED
Status: DISCONTINUED | OUTPATIENT
Start: 2025-08-22 | End: 2025-08-23 | Stop reason: HOSPADM

## 2025-08-22 RX ORDER — PANTOPRAZOLE SODIUM 40 MG/1
40 TABLET, DELAYED RELEASE ORAL
Status: DISCONTINUED | OUTPATIENT
Start: 2025-08-22 | End: 2025-08-23 | Stop reason: HOSPADM

## 2025-08-22 RX ORDER — LOSARTAN POTASSIUM 100 MG/1
100 TABLET ORAL DAILY
Status: DISCONTINUED | OUTPATIENT
Start: 2025-08-22 | End: 2025-08-23 | Stop reason: HOSPADM

## 2025-08-22 RX ORDER — FAMOTIDINE 10 MG/ML
20 INJECTION, SOLUTION INTRAVENOUS
OUTPATIENT
Start: 2025-08-29

## 2025-08-22 RX ORDER — EPINEPHRINE 1 MG/ML
0.3 INJECTION, SOLUTION INTRAMUSCULAR; SUBCUTANEOUS PRN
OUTPATIENT
Start: 2025-08-29

## 2025-08-22 RX ORDER — GLUCAGON 1 MG/ML
1 KIT INJECTION PRN
Status: DISCONTINUED | OUTPATIENT
Start: 2025-08-22 | End: 2025-08-23 | Stop reason: HOSPADM

## 2025-08-22 RX ORDER — SODIUM CHLORIDE 0.9 % (FLUSH) 0.9 %
5-40 SYRINGE (ML) INJECTION PRN
OUTPATIENT
Start: 2025-08-29

## 2025-08-22 RX ORDER — FLUOXETINE 10 MG/1
40 CAPSULE ORAL DAILY
Status: DISCONTINUED | OUTPATIENT
Start: 2025-08-23 | End: 2025-08-23 | Stop reason: HOSPADM

## 2025-08-22 RX ORDER — DEXTROSE MONOHYDRATE 100 MG/ML
INJECTION, SOLUTION INTRAVENOUS CONTINUOUS PRN
Status: DISCONTINUED | OUTPATIENT
Start: 2025-08-22 | End: 2025-08-23 | Stop reason: HOSPADM

## 2025-08-22 RX ORDER — LORATADINE 10 MG/1
10 TABLET ORAL DAILY
COMMUNITY

## 2025-08-22 RX ORDER — INSULIN LISPRO 100 [IU]/ML
0-8 INJECTION, SOLUTION INTRAVENOUS; SUBCUTANEOUS
Status: DISCONTINUED | OUTPATIENT
Start: 2025-08-22 | End: 2025-08-23 | Stop reason: HOSPADM

## 2025-08-22 RX ORDER — CLOPIDOGREL BISULFATE 75 MG/1
75 TABLET ORAL DAILY
Status: DISCONTINUED | OUTPATIENT
Start: 2025-08-22 | End: 2025-08-22

## 2025-08-22 RX ORDER — POTASSIUM CHLORIDE 7.45 MG/ML
10 INJECTION INTRAVENOUS PRN
Status: DISCONTINUED | OUTPATIENT
Start: 2025-08-22 | End: 2025-08-23 | Stop reason: HOSPADM

## 2025-08-22 RX ORDER — SODIUM CHLORIDE 0.9 % (FLUSH) 0.9 %
5-40 SYRINGE (ML) INJECTION PRN
Status: DISCONTINUED | OUTPATIENT
Start: 2025-08-22 | End: 2025-08-22

## 2025-08-22 RX ADMIN — INSULIN LISPRO 2 UNITS: 100 INJECTION, SOLUTION INTRAVENOUS; SUBCUTANEOUS at 20:10

## 2025-08-22 RX ADMIN — PANTOPRAZOLE SODIUM 40 MG: 40 TABLET, DELAYED RELEASE ORAL at 16:46

## 2025-08-22 RX ADMIN — ATORVASTATIN CALCIUM 40 MG: 40 TABLET, FILM COATED ORAL at 20:00

## 2025-08-22 RX ADMIN — SODIUM CHLORIDE, PRESERVATIVE FREE 10 ML: 5 INJECTION INTRAVENOUS at 20:06

## 2025-08-22 RX ADMIN — OXYCODONE HYDROCHLORIDE AND ACETAMINOPHEN 1 TABLET: 7.5; 325 TABLET ORAL at 20:00

## 2025-08-22 RX ADMIN — INSULIN LISPRO 4 UNITS: 100 INJECTION, SOLUTION INTRAVENOUS; SUBCUTANEOUS at 16:45

## 2025-08-22 RX ADMIN — APIXABAN 2.5 MG: 2.5 TABLET, FILM COATED ORAL at 20:11

## 2025-08-22 RX ADMIN — IRON SUCROSE 300 MG: 20 INJECTION, SOLUTION INTRAVENOUS at 23:45

## 2025-08-22 RX ADMIN — OXYCODONE HYDROCHLORIDE AND ACETAMINOPHEN 1 TABLET: 7.5; 325 TABLET ORAL at 16:46

## 2025-08-22 ASSESSMENT — PAIN SCALES - GENERAL
PAINLEVEL_OUTOF10: 7
PAINLEVEL_OUTOF10: 0
PAINLEVEL_OUTOF10: 7
PAINLEVEL_OUTOF10: 5
PAINLEVEL_OUTOF10: 7
PAINLEVEL_OUTOF10: 7

## 2025-08-22 ASSESSMENT — PAIN - FUNCTIONAL ASSESSMENT
PAIN_FUNCTIONAL_ASSESSMENT: 0-10
PAIN_FUNCTIONAL_ASSESSMENT: ACTIVITIES ARE NOT PREVENTED

## 2025-08-22 ASSESSMENT — PAIN DESCRIPTION - LOCATION
LOCATION: BACK
LOCATION: BACK;LEG;SHOULDER
LOCATION: BACK;LEG
LOCATION: BACK

## 2025-08-22 ASSESSMENT — PAIN DESCRIPTION - ORIENTATION
ORIENTATION: MID
ORIENTATION: MID

## 2025-08-22 ASSESSMENT — PAIN DESCRIPTION - DESCRIPTORS
DESCRIPTORS: ACHING
DESCRIPTORS: ACHING

## 2025-08-22 ASSESSMENT — PAIN DESCRIPTION - PAIN TYPE: TYPE: CHRONIC PAIN

## 2025-08-23 VITALS
HEART RATE: 81 BPM | BODY MASS INDEX: 32.93 KG/M2 | HEIGHT: 70 IN | RESPIRATION RATE: 18 BRPM | SYSTOLIC BLOOD PRESSURE: 161 MMHG | DIASTOLIC BLOOD PRESSURE: 54 MMHG | WEIGHT: 230 LBS | OXYGEN SATURATION: 99 % | TEMPERATURE: 97.5 F

## 2025-08-23 LAB
ABO/RH: NORMAL
ANION GAP SERPL CALCULATED.3IONS-SCNC: 7 MMOL/L (ref 9–17)
ANTIBODY SCREEN: NEGATIVE
BASOPHILS # BLD: 0.01 K/UL
BASOPHILS NFR BLD: 0 % (ref 0–1)
BLOOD BANK BLOOD PRODUCT EXPIRATION DATE: NORMAL
BLOOD BANK DISPENSE STATUS: NORMAL
BLOOD BANK ISBT PRODUCT BLOOD TYPE: 5100
BLOOD BANK PRODUCT CODE: NORMAL
BLOOD BANK SAMPLE EXPIRATION: NORMAL
BLOOD BANK UNIT TYPE AND RH: NORMAL
BPU ID: NORMAL
BUN SERPL-MCNC: 40 MG/DL (ref 7–20)
CALCIUM SERPL-MCNC: 8.3 MG/DL (ref 8.3–10.6)
CHLORIDE SERPL-SCNC: 106 MMOL/L (ref 99–110)
CO2 SERPL-SCNC: 27 MMOL/L (ref 21–32)
COMPONENT: NORMAL
CREAT SERPL-MCNC: 1.4 MG/DL (ref 0.8–1.3)
CROSSMATCH RESULT: NORMAL
EOSINOPHIL # BLD: 0.11 K/UL
EOSINOPHILS RELATIVE PERCENT: 4 % (ref 0–3)
ERYTHROCYTE [DISTWIDTH] IN BLOOD BY AUTOMATED COUNT: 18.9 % (ref 11.7–14.9)
GFR, ESTIMATED: 50 ML/MIN/1.73M2
GLUCOSE BLD-MCNC: 190 MG/DL (ref 74–99)
GLUCOSE BLD-MCNC: 285 MG/DL (ref 74–99)
GLUCOSE SERPL-MCNC: 160 MG/DL (ref 74–99)
HCT VFR BLD AUTO: 27.2 % (ref 42–52)
HCT VFR BLD AUTO: 30 % (ref 42–52)
HCT VFR BLD AUTO: 30.3 % (ref 42–52)
HCT VFR BLD AUTO: 31 % (ref 42–52)
HGB BLD-MCNC: 7.8 G/DL (ref 13.5–18)
HGB BLD-MCNC: 8.7 G/DL (ref 13.5–18)
HGB BLD-MCNC: 8.7 G/DL (ref 13.5–18)
HGB BLD-MCNC: 8.9 G/DL (ref 13.5–18)
IMM GRANULOCYTES # BLD AUTO: 0.05 K/UL
IMM GRANULOCYTES NFR BLD: 2 %
LYMPHOCYTES NFR BLD: 0.65 K/UL
LYMPHOCYTES RELATIVE PERCENT: 25 % (ref 24–44)
MCH RBC QN AUTO: 26.8 PG (ref 27–31)
MCHC RBC AUTO-ENTMCNC: 28.7 G/DL (ref 32–36)
MCV RBC AUTO: 93.2 FL (ref 78–100)
MONOCYTES NFR BLD: 0.28 K/UL
MONOCYTES NFR BLD: 11 % (ref 0–5)
NEUTROPHILS NFR BLD: 58 % (ref 36–66)
NEUTS SEG NFR BLD: 1.49 K/UL
PLATELET # BLD AUTO: 126 K/UL (ref 140–440)
PMV BLD AUTO: 10.2 FL (ref 7.5–11.1)
POTASSIUM SERPL-SCNC: 4.3 MMOL/L (ref 3.5–5.1)
RBC # BLD AUTO: 3.25 M/UL (ref 4.6–6.2)
SODIUM SERPL-SCNC: 140 MMOL/L (ref 136–145)
TRANSFUSION STATUS: NORMAL
UNIT DIVISION: 0
UNIT ISSUE DATE/TIME: NORMAL
WBC OTHER # BLD: 2.6 K/UL (ref 4–10.5)

## 2025-08-23 PROCEDURE — 36415 COLL VENOUS BLD VENIPUNCTURE: CPT

## 2025-08-23 PROCEDURE — 85025 COMPLETE CBC W/AUTO DIFF WBC: CPT

## 2025-08-23 PROCEDURE — 6360000002 HC RX W HCPCS: Performed by: STUDENT IN AN ORGANIZED HEALTH CARE EDUCATION/TRAINING PROGRAM

## 2025-08-23 PROCEDURE — 82962 GLUCOSE BLOOD TEST: CPT

## 2025-08-23 PROCEDURE — 6370000000 HC RX 637 (ALT 250 FOR IP): Performed by: STUDENT IN AN ORGANIZED HEALTH CARE EDUCATION/TRAINING PROGRAM

## 2025-08-23 PROCEDURE — 2500000003 HC RX 250 WO HCPCS: Performed by: STUDENT IN AN ORGANIZED HEALTH CARE EDUCATION/TRAINING PROGRAM

## 2025-08-23 PROCEDURE — 85018 HEMOGLOBIN: CPT

## 2025-08-23 PROCEDURE — 85014 HEMATOCRIT: CPT

## 2025-08-23 PROCEDURE — 80048 BASIC METABOLIC PNL TOTAL CA: CPT

## 2025-08-23 PROCEDURE — 2580000003 HC RX 258: Performed by: STUDENT IN AN ORGANIZED HEALTH CARE EDUCATION/TRAINING PROGRAM

## 2025-08-23 RX ORDER — FERROUS SULFATE 325(65) MG
325 TABLET ORAL EVERY OTHER DAY
Qty: 15 TABLET | Refills: 2 | Status: SHIPPED
Start: 2025-08-23 | End: 2025-11-21

## 2025-08-23 RX ADMIN — OXYCODONE HYDROCHLORIDE AND ACETAMINOPHEN 1 TABLET: 7.5; 325 TABLET ORAL at 14:52

## 2025-08-23 RX ADMIN — APIXABAN 5 MG: 5 TABLET, FILM COATED ORAL at 12:35

## 2025-08-23 RX ADMIN — SODIUM CHLORIDE, PRESERVATIVE FREE 10 ML: 5 INJECTION INTRAVENOUS at 09:28

## 2025-08-23 RX ADMIN — IRON SUCROSE 300 MG: 20 INJECTION, SOLUTION INTRAVENOUS at 12:35

## 2025-08-23 RX ADMIN — CLOPIDOGREL BISULFATE 75 MG: 75 TABLET, FILM COATED ORAL at 09:26

## 2025-08-23 RX ADMIN — ACETAMINOPHEN 650 MG: 325 TABLET ORAL at 04:38

## 2025-08-23 RX ADMIN — PANTOPRAZOLE SODIUM 40 MG: 40 TABLET, DELAYED RELEASE ORAL at 09:27

## 2025-08-23 RX ADMIN — Medication 1000 MCG: at 09:26

## 2025-08-23 RX ADMIN — FLUOXETINE HYDROCHLORIDE 40 MG: 10 CAPSULE ORAL at 09:26

## 2025-08-23 RX ADMIN — INSULIN LISPRO 2 UNITS: 100 INJECTION, SOLUTION INTRAVENOUS; SUBCUTANEOUS at 09:27

## 2025-08-23 RX ADMIN — LOSARTAN POTASSIUM 100 MG: 100 TABLET, FILM COATED ORAL at 09:27

## 2025-08-23 RX ADMIN — OXYCODONE HYDROCHLORIDE AND ACETAMINOPHEN 1 TABLET: 7.5; 325 TABLET ORAL at 09:27

## 2025-08-23 RX ADMIN — FUROSEMIDE 20 MG: 20 TABLET ORAL at 09:27

## 2025-08-23 RX ADMIN — INSULIN LISPRO 4 UNITS: 100 INJECTION, SOLUTION INTRAVENOUS; SUBCUTANEOUS at 12:35

## 2025-08-23 ASSESSMENT — PAIN DESCRIPTION - ORIENTATION: ORIENTATION: LOWER

## 2025-08-23 ASSESSMENT — PAIN DESCRIPTION - DESCRIPTORS: DESCRIPTORS: ACHING;DISCOMFORT

## 2025-08-23 ASSESSMENT — PAIN SCALES - GENERAL: PAINLEVEL_OUTOF10: 6

## 2025-08-23 ASSESSMENT — PAIN - FUNCTIONAL ASSESSMENT
PAIN_FUNCTIONAL_ASSESSMENT: 0-10
PAIN_FUNCTIONAL_ASSESSMENT: 0-10

## 2025-08-23 ASSESSMENT — PAIN DESCRIPTION - LOCATION
LOCATION: BACK
LOCATION: LEG

## (undated) DEVICE — FORCEPS BX L240CM JAW DIA2.8MM L CAP W/ NDL MIC MESH TOOTH

## (undated) DEVICE — ENDOSCOPY KIT: Brand: MEDLINE INDUSTRIES, INC.

## (undated) DEVICE — ENDOSCOPIC KIT 1.1+ OP4 CA DE 2 GWN AAMI LEVEL 3

## (undated) DEVICE — SNARE VASC L240CM LOOP W10MM SHTH DIA2.4MM RND STIFF CLD